# Patient Record
Sex: FEMALE | Race: WHITE | NOT HISPANIC OR LATINO | Employment: OTHER | URBAN - METROPOLITAN AREA
[De-identification: names, ages, dates, MRNs, and addresses within clinical notes are randomized per-mention and may not be internally consistent; named-entity substitution may affect disease eponyms.]

---

## 2017-01-10 ENCOUNTER — CARE COORDINATION (OUTPATIENT)
Dept: GERIATRIC MEDICINE | Facility: CLINIC | Age: 72
End: 2017-01-10

## 2017-01-10 NOTE — PROGRESS NOTES
TM left for client requesting call back to complete 6 month F/U screening.  Nighat Watson RN Case Manager  [facility]  [phone]

## 2017-01-13 ENCOUNTER — CARE COORDINATION (OUTPATIENT)
Dept: GERIATRIC MEDICINE | Facility: CLINIC | Age: 72
End: 2017-01-13

## 2017-01-13 NOTE — PROGRESS NOTES
[facility] Six-Month Telephone Assessment    6 month telephone assessment completed on January 13, 2017.    ER visits: No  Hospitalizations: No  TCU stays: No  Significant health status changes: none  Falls/Injuries: No  ADL/IADL changes: No  Changes in services: No    Caregiver Assessment follow up:  NA    Goals: See POC in chart for goal progress documentation.  Client got new hearing aids. She stated that they need a slight adjustment, but that is normal. Client saw a new dentist (Out of the list CM send client, none of the dentists were taking new client (this was from [employer] web site), but client's got a call from one of the dentist she called with another name of a dentist that would take her insurance. It's on [employer], but she couldn't remember the name). Client got fitted for new dentures, but hasn't received them yet. Client is doing well. Currently, she is staying at her brother home in [place] helping out her MERLE who had surgery.    Will see client in 6 months for an annual health risk assessment.   Encouraged client to call CM with any questions or concerns in the meantime.       Nighat Watson RN Case Manager  [facility]  [phone]

## 2017-01-30 ENCOUNTER — TELEPHONE (OUTPATIENT)
Dept: PEDIATRICS | Facility: CLINIC | Age: 72
End: 2017-01-30

## 2017-02-03 ENCOUNTER — ANTICOAGULATION THERAPY VISIT (OUTPATIENT)
Dept: NURSING | Facility: CLINIC | Age: 72
End: 2017-02-03
Payer: COMMERCIAL

## 2017-02-03 DIAGNOSIS — Z79.01 LONG-TERM (CURRENT) USE OF ANTICOAGULANTS: Primary | ICD-10-CM

## 2017-02-03 LAB — INR PPP: 2.4

## 2017-02-03 PROCEDURE — 99207 ZZC NO CHARGE NURSE ONLY: CPT

## 2017-02-03 NOTE — PROGRESS NOTES
ANTICOAGULATION FOLLOW-UP CLINIC VISIT    Patient Name:  Gemma Cowart  Date:  2/3/2017  Contact Type:  Face to Face    SUBJECTIVE:     Patient Findings     Positives No Problem Findings           OBJECTIVE    INR   Date Value Ref Range Status   02/03/2017 2.4  Final       ASSESSMENT / PLAN  INR assessment THER    Recheck INR In: 6 WEEKS    INR Location Clinic      Anticoagulation Summary as of 2/3/2017     INR goal 2.0-3.0   Selected INR 2.4 (2/3/2017)   Maintenance plan 7.5 mg (5 mg x 1.5) on Mon, Wed, Fri; 5 mg (5 mg x 1) all other days   Full instructions 7.5 mg on Mon, Wed, Fri; 5 mg all other days   Weekly total 42.5 mg   Plan last modified Carleen Wright RN (9/19/2016)   Next INR check 3/17/2017   Target end date Indefinite    Indications   Long-term (current) use of anticoagulants [Z79.01] [Z79.01]  DVT (deep venous thrombosis) (Resolved) [I82.409]         Anticoagulation Episode Summary     IN[facility] check location [facility]    Preferred lab     Send INR reminders to  ANTICOAG CLINIC    Comments 5mg tabs - HS dose // CALENDAR      Anticoagulation Care Providers     Provider Role Specialty Phone number    Alva Viera MD  Internal Medicine [phone]            See the Encounter Report to view Anticoagulation Flowsheet and Dosing Calendar (Go to Encounters tab in chart review, and find the Anticoagulation Therapy Visit)        Fadia Stover, RN

## 2017-02-22 ENCOUNTER — TELEPHONE (OUTPATIENT)
Dept: PHARMACY | Facility: CLINIC | Age: 72
End: 2017-02-22

## 2017-03-28 ENCOUNTER — ONCOLOGY VISIT (OUTPATIENT)
Dept: ONCOLOGY | Facility: CLINIC | Age: 72
End: 2017-03-28
Attending: INTERNAL MEDICINE
Payer: MEDICARE

## 2017-03-28 ENCOUNTER — ANTICOAGULATION THERAPY VISIT (OUTPATIENT)
Dept: NURSING | Facility: CLINIC | Age: 72
End: 2017-03-28
Payer: COMMERCIAL

## 2017-03-28 ENCOUNTER — HOSPITAL ENCOUNTER (OUTPATIENT)
Facility: CLINIC | Age: 72
Setting detail: SPECIMEN
Discharge: HOME | End: 2017-03-28
Attending: INTERNAL MEDICINE | Admitting: INTERNAL MEDICINE
Payer: MEDICARE

## 2017-03-28 DIAGNOSIS — Z85.3 HISTORY OF LEFT BREAST CANCER: ICD-10-CM

## 2017-03-28 DIAGNOSIS — Z98.84 BARIATRIC SURGERY STATUS: ICD-10-CM

## 2017-03-28 DIAGNOSIS — Z85.3 HISTORY OF LEFT BREAST CANCER: Primary | ICD-10-CM

## 2017-03-28 DIAGNOSIS — Z79.01 LONG-TERM (CURRENT) USE OF ANTICOAGULANTS: ICD-10-CM

## 2017-03-28 LAB
ALBUMIN SERPL-MCNC: 3.7 G/DL (ref 3.4–5)
ALP SERPL-CCNC: 56 U/L (ref 40–150)
ALT SERPL W P-5'-P-CCNC: 25 U/L (ref 0–50)
AST SERPL W P-5'-P-CCNC: 16 U/L (ref 0–45)
BASOPHILS # BLD AUTO: 0 10E9/L (ref 0–0.2)
BASOPHILS NFR BLD AUTO: 0.6 %
BILIRUB DIRECT SERPL-MCNC: 0.1 MG/DL (ref 0–0.2)
BILIRUB SERPL-MCNC: 0.4 MG/DL (ref 0.2–1.3)
CANCER AG27-29 SERPL-ACNC: 12 U/ML (ref 0–39)
DIFFERENTIAL METHOD BLD: NORMAL
EOSINOPHIL # BLD AUTO: 0.1 10E9/L (ref 0–0.7)
EOSINOPHIL NFR BLD AUTO: 2.2 %
ERYTHROCYTE [DISTWIDTH] IN BLOOD BY AUTOMATED COUNT: 14.6 % (ref 10–15)
HCT VFR BLD AUTO: 40.3 % (ref 35–47)
HGB BLD-MCNC: 13 G/DL (ref 11.7–15.7)
IMM GRANULOCYTES # BLD: 0 10E9/L (ref 0–0.4)
IMM GRANULOCYTES NFR BLD: 0.2 %
INR POINT OF CARE: 2 (ref 0.86–1.14)
LYMPHOCYTES # BLD AUTO: 1 10E9/L (ref 0.8–5.3)
LYMPHOCYTES NFR BLD AUTO: 19.2 %
MCH RBC QN AUTO: 26.6 PG (ref 26.5–33)
MCHC RBC AUTO-ENTMCNC: 32.3 G/DL (ref 31.5–36.5)
MCV RBC AUTO: 83 FL (ref 78–100)
MONOCYTES # BLD AUTO: 0.4 10E9/L (ref 0–1.3)
MONOCYTES NFR BLD AUTO: 7.6 %
NEUTROPHILS # BLD AUTO: 3.6 10E9/L (ref 1.6–8.3)
NEUTROPHILS NFR BLD AUTO: 70.2 %
NRBC # BLD AUTO: 0 10*3/UL
NRBC BLD AUTO-RTO: 0 /100
PLATELET # BLD AUTO: 210 10E9/L (ref 150–450)
PROT SERPL-MCNC: 6.5 G/DL (ref 6.8–8.8)
RBC # BLD AUTO: 4.88 10E12/L (ref 3.8–5.2)
WBC # BLD AUTO: 5.1 10E9/L (ref 4–11)

## 2017-03-28 PROCEDURE — 99207 ZZC NO CHARGE NURSE ONLY: CPT

## 2017-03-28 PROCEDURE — 36415 COLL VENOUS BLD VENIPUNCTURE: CPT

## 2017-03-28 PROCEDURE — 85610 PROTHROMBIN TIME: CPT | Mod: QW

## 2017-03-28 PROCEDURE — 85025 COMPLETE CBC W/AUTO DIFF WBC: CPT | Performed by: INTERNAL MEDICINE

## 2017-03-28 PROCEDURE — 86300 IMMUNOASSAY TUMOR CA 15-3: CPT | Performed by: INTERNAL MEDICINE

## 2017-03-28 PROCEDURE — 80076 HEPATIC FUNCTION PANEL: CPT | Performed by: INTERNAL MEDICINE

## 2017-03-28 NOTE — PROGRESS NOTES
Medical Assistant Note:  Gemma Cowart presents today for lab visit.    Patient seen by provider today: No.   present during visit today: Not Applicable.    Concerns: No Concerns.    Procedure:  Lab draw site: RAC, Needle type: BF, Gauge: 21 g gauze and coban applied.    Post Assessment:  Labs drawn without difficulty: Yes.    Discharge Plan:  Departure Mode: Ambulatory.    Latosha Byrne MA

## 2017-03-28 NOTE — MR AVS SNAPSHOT
After Visit Summary   3/28/2017    Gemma Cowart    MRN: [id number]           Patient Information     Date Of Birth          1945        Visit Information        Provider Department      3/28/2017 10:30 AM Nurse,  Oncology [facility]        Today's Diagnoses     History of left breast cancer    -  1       Follow-ups after your visit        Your next 10 appointments already scheduled     Mar 28, 2017 10:30 AM CDT   Return Visit with  Oncology Nurse   [facility] ([facility])    [facility]  [address]  [place] [address]   [phone]            Apr 05, 2017  1:45 PM CDT   Return Visit with Jyoti Narvaez MD   [facility] ([facility])    [facility]  [address]  [place] [address]   [phone]            May 09, 2017  8:45 AM CDT   Anticoagulation Visit with  ANTICOAGULATION CLINIC   [facility] ([facility])    [address] [place] [address]  [address]  [facility] [id number] [phone]              Who to contact     If you have questions or need follow up information about today's clinic visit or your schedule please contact [facility] directly at [phone].  Normal or non-critical lab and imaging results will be communicated to you by [employer]Share[employer], letter or phone within 4 business days after the clinic has received the results. If you do not hear from us within 7 days, please contact the clinic through MyChart or phone. If you have a critical or abnormal lab result, we will notify you by phone as soon as possible.  Submit refill requests through Melodigram or call your pharmacy and they will forward the refill request to us. Please allow 3 business days for your refill to be completed.          Additional Information About Your Visit        Melodigram Information     Melodigram lets you send messages to your doctor, view  "your test results, renew your prescriptions, schedule appointments and more. To sign up, go to [email or web address] . Click on \"Log in\" on the left side of the screen, which will take you to the Welcome page. Then click on \"Sign up Now\" on the right side of the page.     You will be asked to enter the access code listed below, as well as some personal information. Please follow the directions to create your username and password.     Your access code is: [email or web address]  Expires: 2017  9:45 AM     Your access code will  in 90 days. If you need help or a new code, please call your [facility] clinic or [phone].        Care EveryWhere ID     This is your Care EveryWhere ID. This could be used by other organizations to access your [facility] medical records  [id number]        Your Vitals Were     Last Period                   1990            Blood Pressure from Last 3 Encounters:   11/15/16 130/64   10/12/16 122/70   16 114/66    Weight from Last 3 Encounters:   11/15/16 103 kg (227 lb 2 oz)   10/12/16 101.7 kg (224 lb 4 oz)   16 100.6 kg (221 lb 12.8 oz)              Today, you had the following     No orders found for display         Today's Medication Changes          These changes are accurate as of: 3/28/17  9:45 AM.  If you have any questions, ask your nurse or doctor.               These medicines have changed or have updated prescriptions.        Dose/Directions    warfarin 5 MG tablet   Commonly known as:  COUMADIN   This may have changed:  additional instructions   Used for:  History of deep venous thrombosis        Dose:  5-7.5 mg   Take 1-1.5 tablets (5-7.5 mg) by mouth daily   Quantity:  135 tablet   Refills:  1                Primary Care Provider Office Phone # Fax #    Alva Viera [phone]       [facility] [place] [address] [place] [address]        Thank you!     Thank you for choosing [facility]  for your care. Our " goal is always to provide you with excellent care. Hearing back from our patients is one way we can continue to improve our services. Please take a few minutes to complete the written survey that you may receive in the mail after your visit with us. Thank you!             Your Updated Medication List - Protect others around you: Learn how to safely use, store and throw away your medicines at www.disposemymeds.org.          This list is accurate as of: 3/28/17  9:45 AM.  Always use your most recent med list.                   Brand Name Dispense Instructions for use    ACE/ARB NOT PRESCRIBED (INTENTIONAL)      ACE & ARB not prescribed due to Other: normal tensive, low microalbumuria       ASPIRIN NOT PRESCRIBED    INTENTIONAL     Antiplatelet medication not prescribed intentionally due to Current anticoagulant therapy (warfarin/enoxaparin)       B-12 1000 MCG Tbcr     180 tablet    Take 2,000 mcg by mouth daily       blood glucose lancets standard    no brand specified    2 Box    Use to test blood sugars 2 times daily or as directed. Please dispense lancets for Relion Confirm.       blood glucose monitoring test strip    no brand specified    200 each    Use to test blood sugars 2 times daily or as directed. Please dispense Relion Confirm test strips       CALCIUM CITRATE + D 315-250 MG-UNIT Tabs per tablet   Generic drug:  calcium citrate-vitamin D     120 tablet    Take 2 tablets by mouth 2 times daily       CERAVE Crea      Externally apply 1 Application topically as needed       clobetasol 0.05 % cream    TEMOVATE    270 g    Apply sparingly to affected area twice daily as needed.  Do not apply to face.       desonide 0.05 % cream    DESOWEN    60 g    Apply sparingly to affected area three times daily as needed.       hydrocortisone 0.2 % cream    WESTCORT    60 g    Apply to AA QD-BID x 1 week then PRN only       hydrocortisone 2.5 % cream     30 g    Apply to AA twice daily x 1 week then PRN only.       *  hydrOXYzine 25 MG tablet    ATARAX    180 tablet    Take 1-2 tablets (25-50 mg) by mouth At Bedtime       * hydrOXYzine 25 MG tablet    ATARAX    60 tablet    Take 1-2 tablets (25-50 mg) by mouth daily as needed for itching       letrozole 2.5 MG tablet    FEMARA    90 tablet    Take 1 tablet (2.5 mg) by mouth daily       MAGNESIUM OXIDE PO      Take 400 mg by mouth daily       metFORMIN 500 MG 24 hr tablet    GLUCOPHAGE-XR    180 tablet    Take 2 tablets (1,000 mg) by mouth daily (with dinner)       PARoxetine 30 MG tablet    PAXIL    180 tablet    Take 2 tablets (60 mg) by mouth At Bedtime       rosuvastatin 10 MG tablet    CRESTOR    90 tablet    Take 1 tablet (10 mg) by mouth daily       TYLENOL 500 MG tablet   Generic drug:  acetaminophen     100 tablet    Take 2 tablets (1,000 mg) by mouth every 8 hours as needed for mild pain       vitamin D 2000 UNITS Caps      Take 1 capsule by mouth daily.       warfarin 5 MG tablet    COUMADIN    135 tablet    Take 1-1.5 tablets (5-7.5 mg) by mouth daily       * Notice:  This list has 2 medication(s) that are the same as other medications prescribed for you. Read the directions carefully, and ask your doctor or other care provider to review them with you.

## 2017-03-28 NOTE — PROGRESS NOTES
ANTICOAGULATION FOLLOW-UP CLINIC VISIT    Patient Name:  Gemma Cowart  Date:  3/28/2017  Contact Type:  Face to Face    SUBJECTIVE:     Patient Findings     Positives No Problem Findings           OBJECTIVE    INR Protime   Date Value Ref Range Status   03/28/2017 2.0 (A) 0.86 - 1.14 Final       ASSESSMENT / PLAN  INR assessment THER    Recheck INR In: 6 WEEKS    INR Location Clinic      Anticoagulation Summary as of 3/28/2017     INR goal 2.0-3.0   Today's INR 2.0   Maintenance plan 7.5 mg (5 mg x 1.5) on Mon, Wed, Fri; 5 mg (5 mg x 1) all other days   Full instructions 7.5 mg on Mon, Wed, Fri; 5 mg all other days   Weekly total 42.5 mg   No change documented Rae Cooley, RN   Plan last modified Carleen Wright RN (9/19/2016)   Next INR check 5/9/2017   Target end date Indefinite    Indications   Long-term (current) use of anticoagulants [Z79.01] [Z79.01]  DVT (deep venous thrombosis) (Resolved) [I82.409]         Anticoagulation Episode Summary     INR check location [facility]    Preferred lab     Send INR reminders to  ANTICOAG CLINIC    Comments 5mg tabs - HS dose // CALENDAR      Anticoagulation Care Providers     Provider Role Specialty Phone number    Alva Viera MD  Internal Medicine [phone]            See the Encounter Report to view Anticoagulation Flowsheet and Dosing Calendar (Go to Encounters tab in chart review, and find the Anticoagulation Therapy Visit)        Rae Cooley, VILLA

## 2017-03-28 NOTE — MR AVS SNAPSHOT
Gemma Cowart   3/28/2017 8:45 AM   Anticoagulation Therapy Visit    Description:  71 year old female   Provider:   ANTICOAGULATION CLINIC   Department:   Nurse           INR as of 3/28/2017     Today's INR 2.0      Anticoagulation Summary as of 3/28/2017     INR goal 2.0-3.0   Today's INR 2.0   Full instructions 7.5 mg on Mon, Wed, Fri; 5 mg all other days   Next INR check 5/9/2017    Indications   Long-term (current) use of anticoagulants [Z79.01] [Z79.01]  DVT (deep venous thrombosis) (Resolved) [I82.409]         Your next Anticoagulation Clinic appointment(s)     Mar 28, 2017  8:45 AM CDT   [facility]ticoagulation [facility]  [facility]    [address] [place] [address]  [address]  [facility] [address] [phone]            May 09, 2017  8:45 AM CDT   Anticoagulation Visit with  [facility]ATION CLINIC   [facility]    [address] [place] [address]  [address]  [facility] [id number] [phone]              Contact Numbers     [facility]  Please call  [phone] to cancel and/or reschedule your appointment   Please call  [phone] with any problems or questions regarding your therapy.        March 2017 Details    Sun Mon Tue Wed Thu Fri Sat        1               2               3               4                 5               6               7               8               9               10               11                 12               13               14               15               16               17               18                 19               20               21               22               23               24               25                 26               27               28      5 mg   See details      29      7.5 mg         30      5 mg         31      7.5 mg           Date Details   03/28 This INR check               How to take your warfarin dose     To take:  5 mg  Take 1 of the 5 mg tablets.    To take:  7.5 mg Take 1.5 of the 5 mg tablets.           April 2017 Details    Sun Mon Tue Wed Thu Fri Sat           1      5 mg           2      5 mg         3      7.5 mg         4      5 mg         5      7.5 mg         6      5 mg         7      7.5 mg         8      5 mg           9      5 mg         10      7.5 mg         11      5 mg         12      7.5 mg         13      5 mg         14      7.5 mg         15      5 mg           16      5 mg         17      7.5 mg         18      5 mg         19      7.5 mg         20      5 mg         21      7.5 mg         22      5 mg           23      5 mg         24      7.5 mg         25      5 mg         26      7.5 mg         27      5 mg         28      7.5 mg         29      5 mg           30      5 mg                Date Details   No additional details            How to take your warfarin dose     To take:  5 mg Take 1 of the 5 mg tablets.    To take:  7.5 mg Take 1.5 of the 5 mg tablets.           May 2017 Details    Sun Mon Tue Wed Thu Fri Sat      1      7.5 mg         2      5 mg         3      7.5 mg         4      5 mg         5      7.5 mg         6      5 mg           7      5 mg         8      7.5 mg         9            10               11               12               13                 14               15               16               17               18               19               20                 21               22               23               24               25               26               27                 28               29               30               31                   Date Details   No additional details    Date of next INR:  5/9/2017         How to take your warfarin dose     To take:  5 mg Take 1 of the 5 mg tablets.    To take:  7.5 mg Take 1.5 of the 5 mg tablets.

## 2017-04-05 ENCOUNTER — ONCOLOGY VISIT (OUTPATIENT)
Dept: ONCOLOGY | Facility: CLINIC | Age: 72
End: 2017-04-05
Attending: INTERNAL MEDICINE
Payer: COMMERCIAL

## 2017-04-05 VITALS
HEART RATE: 99 BPM | WEIGHT: 202 LBS | RESPIRATION RATE: 17 BRPM | BODY MASS INDEX: 35.5 KG/M2 | DIASTOLIC BLOOD PRESSURE: 70 MMHG | TEMPERATURE: 98.3 F | OXYGEN SATURATION: 97 % | SYSTOLIC BLOOD PRESSURE: 106 MMHG

## 2017-04-05 DIAGNOSIS — Z12.31 SCREENING MAMMOGRAM, ENCOUNTER FOR: ICD-10-CM

## 2017-04-05 DIAGNOSIS — R25.2 CRAMP OF LIMB: ICD-10-CM

## 2017-04-05 DIAGNOSIS — M85.80 OSTEOPENIA: ICD-10-CM

## 2017-04-05 DIAGNOSIS — M25.50 MULTIPLE JOINT PAIN: ICD-10-CM

## 2017-04-05 DIAGNOSIS — Z85.3 HISTORY OF LEFT BREAST CANCER: Primary | ICD-10-CM

## 2017-04-05 DIAGNOSIS — Z98.84 BARIATRIC SURGERY STATUS: ICD-10-CM

## 2017-04-05 DIAGNOSIS — Z86.718 PERSONAL HISTORY OF DVT (DEEP VEIN THROMBOSIS): ICD-10-CM

## 2017-04-05 DIAGNOSIS — N95.1 MENOPAUSAL HOT FLUSHES: ICD-10-CM

## 2017-04-05 DIAGNOSIS — Z78.0 MENOPAUSE: ICD-10-CM

## 2017-04-05 PROCEDURE — 99214 OFFICE O/P EST MOD 30 MIN: CPT | Performed by: INTERNAL MEDICINE

## 2017-04-05 PROCEDURE — 99211 OFF/OP EST MAY X REQ PHY/QHP: CPT

## 2017-04-05 ASSESSMENT — PAIN SCALES - GENERAL: PAINLEVEL: NO PAIN (0)

## 2017-04-05 NOTE — MR AVS SNAPSHOT
After Visit Summary   4/5/2017    Gemma Cowart    MRN: [id number]           Patient Information     Date Of Birth          1945        Visit Information        Provider Department      4/5/2017 1:45 PM Jyoti Narvaez MD [facility]        Today's Diagnoses     History of left breast cancer    -  1    Bariatric surgery status        Multiple joint pain        Personal history of DVT (deep vein thrombosis)        Osteopenia        Cramp of limb        Menopausal hot flushes        Screening mammogram, encounter for        Menopause          Care Instructions    6 months f/u with labs and dexa      [facility] [place] [address] [facility] [address]  [place], [place]        Phone:   [phone]      INSTRUCTIONS:     Dexa Bone Density Screening    Please do not take the following 2 days prior to your exam     Vitamins     Calcium Tablets     Antacids    If you have had an x-ray examination with contrast, you must wait 2 weeks before having a bone density exam.                    Follow-ups after your visit        Your next 10 appointments already scheduled     May 09, 2017  8:45 AM CDT   Anticoagulation Visit with  ANTICOAGULATION CLINIC   [facility]    [address] [place] [address]  [address]  [facility] [address] [phone]            Oct 10, 2017  9:30 AM CDT   Return Visit with  Oncology Nurse   [facility] ([facility])    [facility]  [address] [facility] [address]  [facility] [address] [phone]            Oct 10, 2017 10:15 AM CDT   DX HIP/PELVIS/SPINE with MADX1   [facility] ([facility])    [address]  [address]  [facility] [address] [phone]           Please do not take any of the following 48 hours prior to your exam: vitamins, calcium tablets, antacids.            Oct 18, 2017  8:30 AM CDT   Return Visit with Jyoti Narvaez MD  "  [facility] ([facility])    [facility] [place]  [address]  [place] [address] [phone]              Future tests that were ordered for you today     Open Future Orders        Priority Expected Expires Ordered    DX Hip/Pelvis/Spine Routine 10/1/2017 2017 2017    Ca27.29  breast tumor marker Routine 10/1/2017 2017 2017    Hepatic panel Routine 10/1/2017 2017 2017            Who to contact     If you have questions or need follow up information about today's clinic visit or your schedule please contact [facility] directly at [phone].  Normal or non-critical lab and imaging results will be communicated to you by [employer], letter or phone within 4 business days after the clinic has received the results. If you do not hear from us within 7 days, please contact the clinic through [employer] or phone. If you have a critical or abnormal lab result, we will notify you by phone as soon as possible.  Submit refill requests through [employer] or call your pharmacy and they will forward the refill request to us. Please allow 3 business days for your refill to be completed.          Additional Information About Your Visit        [employer] Information     [employer] lets you send messages to your doctor, view your test results, renew your prescriptions, schedule appointments and more. To sign up, go to [email or web address] . Click on \"Log in\" on the left side of the screen, which will take you to the Welcome page. Then click on \"Sign up Now\" on the right side of the page.     You will be asked to enter the access code listed below, as well as some personal information. Please follow the directions to create your username and password.     Your access code is: [email or web address]  Expires: 2017  9:45 AM     Your access code will  in 90 days. If you need help or a new code, please call your [place] clinic or [phone].   "      Care EveryWhere ID     This is your Care EveryWhere ID. This could be used by other organizations to access your [place] medical records  [id number]        Your Vitals Were     Pulse Temperature Respirations Last Period Pulse Oximetry BMI (Body Mass Index)    99 98.3  F (36.8  C) (Oral) 17 09/24/1990 97% 35.5 kg/m2       Blood Pressure from Last 3 Encounters:   04/05/17 106/70   11/15/16 130/64   10/12/16 122/70    Weight from Last 3 Encounters:   04/05/17 91.6 kg (202 lb)   11/15/16 103 kg (227 lb 2 oz)   10/12/16 101.7 kg (224 lb 4 oz)                 Today's Medication Changes          These changes are accurate as of: 4/5/17  2:34 PM.  If you have any questions, ask your nurse or doctor.               These medicines have changed or have updated prescriptions.        Dose/Directions    warfarin 5 MG tablet   Commonly known as:  COUMADIN   This may have changed:  additional instructions   Used for:  History of deep venous thrombosis        Dose:  5-7.5 mg   Take 1-1.5 tablets (5-7.5 mg) by mouth daily   Quantity:  135 tablet   Refills:  1                Primary Care Provider Office Phone # Fax #    Alva Viera [phone]       [facility] [address] [place] [address]        Thank you!     Thank you for choosing [facility]  for your care. Our goal is always to provide you with excellent care. Hearing back from our patients is one way we can continue to improve our services. Please take a few minutes to complete the written survey that you may receive in the mail after your visit with us. Thank you!             Your Updated Medication List - Protect others around you: Learn how to safely use, store and throw away your medicines at www.disposemymeds.org.          This list is accurate as of: 4/5/17  2:34 PM.  Always use your most recent med list.                   Brand Name Dispense Instructions for use    ACE/ARB NOT PRESCRIBED (INTENTIONAL)       ACE & ARB not prescribed due to Other: normal tensive, low microalbumuria       ASPIRIN NOT PRESCRIBED    INTENTIONAL     Antiplatelet medication not prescribed intentionally due to Current anticoagulant therapy (warfarin/enoxaparin)       B-12 1000 MCG Tbcr     180 tablet    Take 2,000 mcg by mouth daily       blood glucose lancets standard    no brand specified    2 Box    Use to test blood sugars 2 times daily or as directed. Please dispense lancets for Relion Confirm.       blood glucose monitoring test strip    no brand specified    200 each    Use to test blood sugars 2 times daily or as directed. Please dispense Relion Confirm test strips       CALCIUM CITRATE + D 315-250 MG-UNIT Tabs per tablet   Generic drug:  calcium citrate-vitamin D     120 tablet    Take 2 tablets by mouth 2 times daily       CERAVE Crea      Externally apply 1 Application topically as needed       clobetasol 0.05 % cream    TEMOVATE    270 g    Apply sparingly to affected area twice daily as needed.  Do not apply to face.       desonide 0.05 % cream    DESOWEN    60 g    Apply sparingly to affected area three times daily as needed.       hydrocortisone 0.2 % cream    WESTCORT    60 g    Apply to AA QD-BID x 1 week then PRN only       hydrocortisone 2.5 % cream     30 g    Apply to AA twice daily x 1 week then PRN only.       * hydrOXYzine 25 MG tablet    ATARAX    180 tablet    Take 1-2 tablets (25-50 mg) by mouth At Bedtime       * hydrOXYzine 25 MG tablet    ATARAX    60 tablet    Take 1-2 tablets (25-50 mg) by mouth daily as needed for itching       letrozole 2.5 MG tablet    FEMARA    90 tablet    Take 1 tablet (2.5 mg) by mouth daily       MAGNESIUM OXIDE PO      Take 400 mg by mouth daily       metFORMIN 500 MG 24 hr tablet    GLUCOPHAGE-XR    180 tablet    Take 2 tablets (1,000 mg) by mouth daily (with dinner)       PARoxetine 30 MG tablet    PAXIL    180 tablet    Take 2 tablets (60 mg) by mouth At Bedtime       rosuvastatin  10 MG tablet    CRESTOR    90 tablet    Take 1 tablet (10 mg) by mouth daily       TYLENOL 500 MG tablet   Generic drug:  acetaminophen     100 tablet    Take 2 tablets (1,000 mg) by mouth every 8 hours as needed for mild pain       vitamin D 2000 UNITS Caps      Take 1 capsule by mouth daily.       warfarin 5 MG tablet    COUMADIN    135 tablet    Take 1-1.5 tablets (5-7.5 mg) by mouth daily       * Notice:  This list has 2 medication(s) that are the same as other medications prescribed for you. Read the directions carefully, and ask your doctor or other care provider to review them with you.

## 2017-04-05 NOTE — PROGRESS NOTES
Hematology follow up visit    CC:   Left Breast cancer i 2012 on femara  BRETT, B12 deficiency due to gastric bypass  Hx of LE DVT on coumadin, s/p IVC filter 10/2012, removed 1/2013    HPI  Her breast cancer was diagnosed in 08/2012 via screening MA. 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 LN negative disease. Estrogen and progesterone receptors were positive at greater than 90%. HER-2/gibran was negative. She had D7gX1X0 disease. She was started on Femara on 10/10/2012.    It is put on hold in 5/2016 due to memory loss and dysarthria. She then was evaluated by neurologist, got assurance. Her cognitive function did not change by holding femara, it is resumed in July 2016.     She had reconstructive surgery done 03/2013 by Dr. Wall.     PMH  1. History of lower extremity deep vein thrombosis, currently on long-term anticoagulation with Coumadin.   2. History of IVC filter placement 10/2012, removed 01/2013.   3. History of gastric bypass surgery, B12 deficiency and iron deficiency  4. Malabsorptive syndrome secondary to gastric bypass surgery.   5. Vitamin D deficiency.   6. History of progressive weight gain.   7. History of bilateral mastectomy for breast carcinoma, status post reconstructive surgery done 03/2013 by Dr. Wall.   8. The patient due to discontinue ascorbic acid, vitamin E and potentially Toprol-XL after discussion with the primary physician.   9. diabetes mellitus, hyperlipidemia    ALLERGIES/MEDICATIONS: reviewed in epic    SOCIAL HISTORY: The patient did smoke for several years but has since stopped in 10/1986.     FAMILY HISTORY: Overall noncontributory. + FH of Steven's d/s from the Dad side     ROS:  no problems with fevers or chills, nausea, vomiting or diarrhea. No joints pain, + morning stiffness.    + leg cramps.  + hot flushes  + insomnia and hair loss.   She reported  "word finding difficulty, short term memory problem since  Christmas 2015, evaluated by neurologist and got assurance.     PE  Blood pressure 106/70, pulse 99, temperature 98.3  F (36.8  C), temperature source Oral, resp. rate 17, weight 91.6 kg (202 lb), last menstrual period 09/24/1990, SpO2 97 %, not currently breastfeeding.  General: alert, oriented, NAD, pleasant  HEENT: no mucositis, sclera is anicteric, THEE,   Neck: supple, no JVD  LN: superficial LNs are not palpable in neck, supraclavicular, axilla and groin areas, no masses  LUNGS: Clear lung fields to auscultation. No wheezing or ronchi  HEART: Sounds S1, S2 heard, regular. No murmur or gallop  ABDOMEN: Soft, full, obese, nontender without palpable hepatosplenomegaly.   Extremity: bilateral knee pain, no joints deformities or effusion, no edema  Skin: scattered erythematous rash on elbows and knees, palms, feet, face, forehead.   Chest wall: mastectomies scars well healed, no masses.     Current LAB Data  3/2017 cbc diff, marker, LFT, ferritin/B12 not low      Old data reviewed with summary  Dexa 10/2015: osteopenia  CXR/BONE SCAN 5/2015: negative       10/2014 - cbc diff/CMP are fine, except cr 1.15, B12/folate are nl, vit D 24 low    Bone scan 1/2014 - DJD, no mets    CT body 1/2014 - no mets      ASSESSMENT AND PLAN:   1. Left breast cancer s/p double mastectomies, ER/ID+, her 2 - on Femara since 10/2012.   F/u in 6 months with labs.     2. history of gastric bypass surgery with BRETT and low B12.  Continue B12 oral indefinitely, routine follow up iron, need IV iron prn.    3. Joints pain. Advice OcT \"move free\". These did not change by holding Femara.     4. Osteopenia. Advice vit D proper dosing.    5. Hx of DVT on coumadin via her PMD. She is advised on ongoing coumadin/INR monitoring via her PMD office.     6. Constant hot flushes. Advice to try apple cider vaneiger and honey.     7. Leg cramps - advice tonic water.   "

## 2017-04-05 NOTE — PATIENT INSTRUCTIONS
6 months f/u with labs and dexa      [facility]   [address]  [place], [place]        Phone:   [phone]      INSTRUCTIONS:     Dexa Bone Density Screening    Please do not take the following 2 days prior to your exam     Vitamins     Calcium Tablets     Antacids    If you have had an x-ray examination with contrast, you must wait 2 weeks before having a bone density exam.

## 2017-04-07 DIAGNOSIS — Z86.718 HISTORY OF DEEP VENOUS THROMBOSIS: ICD-10-CM

## 2017-04-10 ENCOUNTER — TELEPHONE (OUTPATIENT)
Dept: PHARMACY | Facility: CLINIC | Age: 72
End: 2017-04-10

## 2017-04-10 RX ORDER — WARFARIN SODIUM 5 MG/1
TABLET ORAL
Qty: 135 TABLET | Refills: 3 | Status: ON HOLD | OUTPATIENT
Start: 2017-04-10 | End: 2018-04-05

## 2017-04-10 NOTE — TELEPHONE ENCOUNTER
This patient is due for MTM follow-up. I called the patient to schedule an appointment.     Patient is not interested in scheduling MTM follow-up at this time because things are going fine. We will reaching out to the patient in 6 months    Christelle Yañez PharmD [facility]  Medication Therapy Management Practitioner   #[phone]

## 2017-04-10 NOTE — TELEPHONE ENCOUNTER
Prescription approved per [facility] Refill Protocol.  Josee Plunkett, RN  Triage Nurse

## 2017-04-10 NOTE — TELEPHONE ENCOUNTER
WARAFIN 5MG           Last Written Prescription Date: 7/12/2016  Last Fill Quantity: 135, # refills: 1    Last Office Visit with G, P or [facility] prescribing provider:  11/15/2016   Future Office Visit:       Lab Results   Component Value Date    WBC 5.1 03/28/2017     Lab Results   Component Value Date    RBC 4.88 03/28/2017     Lab Results   Component Value Date    HGB 13.0 03/28/2017     Lab Results   Component Value Date    HCT 40.3 03/28/2017     No components found for: MCT  Lab Results   Component Value Date    MCV 83 03/28/2017     Lab Results   Component Value Date    MCH 26.6 03/28/2017     Lab Results   Component Value Date    MCHC 32.3 03/28/2017     Lab Results   Component Value Date    RDW 14.6 03/28/2017     Lab Results   Component Value Date     03/28/2017     Lab Results   Component Value Date    AST 16 03/28/2017     Lab Results   Component Value Date    ALT 25 03/28/2017     Creatinine   Date Value Ref Range Status   10/12/2016 1.14 (H) 0.52 - 1.04 mg/dL Final   ]

## 2017-04-19 ENCOUNTER — TELEPHONE (OUTPATIENT)
Dept: PEDIATRICS | Facility: CLINIC | Age: 72
End: 2017-04-19

## 2017-04-19 NOTE — TELEPHONE ENCOUNTER
Pt would like to know whether she can take otc Garcinia Cambogia(2 caps) qd. Will this cause any drug interaction with her current meds?     Please advise. Pt can be reached at [phone](OK to LM).    Reyna, RN  Triage Nurse

## 2017-04-19 NOTE — TELEPHONE ENCOUNTER
Per Natural Medicines Database:  ANTIDEPRESSANT DRUGS  Interaction Rating = Moderate Be cautious with this combination.   Severity = High   Occurrence = Possible   Level of Evidence = D    In one report, a patient experienced serotonin syndrome after taking Garcinia extract (60% hydroxycitric acid) 1000 mg daily in combination with escitalopram 20 mg, which she had been taking for a year. The patient was switched to sertraline 50 mg daily and again experienced serotonin syndrome (26661). Theoretically, combining Garcinia with other antidepressants or other serotonergic drugs might increase the risk of serotonergic side effects, including serotonin syndrome.    Some antidepressant drugs include the selective serotonin reuptake inhibitors (SSRIs) such as fluoxetine (Prozac), paroxetine (Paxil), sertraline (Zoloft), and others; and tricyclic and atypical antidepressants such as amitriptyline (Elavil), clomipramine (Anafranil), imipramine (Tofranil), and others.     Looks to be some concern about liver toxicity.    Called patient to inform about above drug interaction and recommend monitoring for liver function tests if she is to be on this herbal supplement.  She decided she will not take this.    Christelle Yañez, PharmD [facility]  Medication Therapy Management Practitioner   #[phone]

## 2017-04-19 NOTE — TELEPHONE ENCOUNTER
Did you see this recommendation or talk about this today  Christelle Yañez, PharmD [facility]  Medication Therapy Management Practitioner   #[phone]

## 2017-05-15 ENCOUNTER — TELEPHONE (OUTPATIENT)
Dept: PEDIATRICS | Facility: CLINIC | Age: 72
End: 2017-05-15

## 2017-05-15 ENCOUNTER — OFFICE VISIT (OUTPATIENT)
Dept: PEDIATRICS | Facility: CLINIC | Age: 72
End: 2017-05-15
Payer: COMMERCIAL

## 2017-05-15 VITALS
HEIGHT: 63 IN | OXYGEN SATURATION: 95 % | WEIGHT: 196.5 LBS | RESPIRATION RATE: 18 BRPM | HEART RATE: 76 BPM | BODY MASS INDEX: 34.82 KG/M2 | SYSTOLIC BLOOD PRESSURE: 90 MMHG | TEMPERATURE: 97.9 F | DIASTOLIC BLOOD PRESSURE: 58 MMHG

## 2017-05-15 DIAGNOSIS — I83.90 VARICOSE VEIN OF LEG: ICD-10-CM

## 2017-05-15 DIAGNOSIS — E11.69 TYPE 2 DIABETES MELLITUS WITH OTHER SPECIFIED COMPLICATION (H): Chronic | ICD-10-CM

## 2017-05-15 DIAGNOSIS — R68.89 COLD INTOLERANCE: ICD-10-CM

## 2017-05-15 DIAGNOSIS — D36.7 BENIGN NEOPLASM OF OTHER SPECIFIED SITES: ICD-10-CM

## 2017-05-15 DIAGNOSIS — R39.198 DECREASED URINE STREAM: ICD-10-CM

## 2017-05-15 DIAGNOSIS — R25.2 CRAMP OF LIMB: ICD-10-CM

## 2017-05-15 DIAGNOSIS — L71.9 ROSACEA: Chronic | ICD-10-CM

## 2017-05-15 DIAGNOSIS — M79.89 MASS OF SOFT TISSUE: Primary | ICD-10-CM

## 2017-05-15 LAB — HBA1C MFR BLD: 5.7 % (ref 4.3–6)

## 2017-05-15 PROCEDURE — 36415 COLL VENOUS BLD VENIPUNCTURE: CPT | Performed by: INTERNAL MEDICINE

## 2017-05-15 PROCEDURE — 99214 OFFICE O/P EST MOD 30 MIN: CPT | Performed by: INTERNAL MEDICINE

## 2017-05-15 PROCEDURE — 83036 HEMOGLOBIN GLYCOSYLATED A1C: CPT | Performed by: INTERNAL MEDICINE

## 2017-05-15 PROCEDURE — 84443 ASSAY THYROID STIM HORMONE: CPT | Performed by: INTERNAL MEDICINE

## 2017-05-15 PROCEDURE — 80048 BASIC METABOLIC PNL TOTAL CA: CPT | Performed by: INTERNAL MEDICINE

## 2017-05-15 NOTE — PROGRESS NOTES
"  SUBJECTIVE:                                                    Gemma Cowart is a 71 year old female who presents to clinic today for the following health issues:      Mass       Duration: x2 -3 months     Description (location/character/radiation): lower left back, intermittent dull pain     Intensity:  moderate    Accompanying signs and symptoms: \"Pt states after she rubs the area will have bowel movement\"     History (similar episodes/previous evaluation): Yes     Precipitating or alleviating factors: None    Therapies tried and outcome: None     Gemma comes in for multiple concerns:    1. Soft tissue mass L side: She has noticed this for the past 2-3 months or so. Occasionally uncomfortable. No trauma that she can recall, no redness, warmth or bruising around this. Not getting larger, but not getting smaller. She reports that her hematologist thought it may be a superficial blood clot but she is on warfarin for her prior history of DVT.    2. Slow flow of urine: Has noticed that her urine is flowing more slowing. No pain with urination or increased frequency, no incontinence with this. Able to fully empty her bladder. Was told her \"utereus was falling out\" wonders if this could affect her ability to empty her bladder.     3. Rosacea: has noticed her nose itching more. Does have more crusty spots. Overdue for follow-up with Derm.     4. Leg and ankle cramps: Has been noticing at night she will get cramps in her lower ankles and lower leg. Does have varicose veins in her L leg that are getting more prominent but these do not seem to ache or hurt. She is concerned about these given her hx of DVT.     5. She reports she feels cold all the time. No significant fatigue, she has been trying to lose weight.     Problem list and histories reviewed & adjusted, as indicated.  Additional history: as documented    Patient Active Problem List   Diagnosis     Nontoxic uninodular goiter     Rosacea     BRETT (iron deficiency " anemia)     Complications of gastric bypass surgery     Vitamin D deficiency disease     Vitamin B12 deficiency     Eczema     Dyshydrosis     Hyperlipidemia LDL goal <100     Anxiety     Insomnia     Health Care Home     Type 2 diabetes mellitus with other specified complication (H)     Psoriasis     Long-term (current) use of anticoagulants [Z79.01]     Advanced directives, counseling/discussion     Malignant neoplasm of left female breast, unspecified site of breast (H)     Morbid obesity (H)     CKD (chronic kidney disease) stage 3, GFR 30-59 ml/min     Personal history of DVT (deep vein thrombosis)     Past Surgical History:   Procedure Laterality Date     ABDOMEN SURGERY  2000    gastric bypass 2000     COLONOSCOPY  10/11/2012    Procedure: COLONOSCOPY;  colonoscopy;  Surgeon: Gela Cleary MD;  Location:  GI     DENTAL SURGERY  5/2007    all teeth removed - dentures     ENT SURGERY      all teeth pulled     GYN SURGERY  1/10/75    tubal ligation      H ABLATION SVT  5/28/2015    stopped metoprolol     HC BREAST RECONSTRUC W OTHR TECHNIQ  5/8/2013     HERNIA REPAIR  2005     IMPLANT FILTER INFERIOR VENA CAVA  10/8/2012    taken out  1/4/12     INSERT TISSUE EXPANDER BREAST BILATERAL  9/5/2012    Procedure: INSERT TISSUE EXPANDER BREAST BILATERAL;;  Surgeon: Orlando Wall MD;  Location:  OR     MASTECTOMY SIMPLE, SENTINEL NODE, COMBINED  9/5/2012    Procedure: COMBINED MASTECTOMY SIMPLE, SENTINEL NODE;  BILATERAL MASTECTOMY WITH LEFT AXILLARY SENTINEL LYMPH NODE BIOPSY, BILATERAL TISSUE EXPANDER        MASTECTOMY, BILATERAL       RECONSTRUCT BREAST BILATERAL, IMPLANT PROSTHESIS BILATERAL, COMBINED  5/22/2013    Procedure: COMBINED RECONSTRUCT BREAST BILATERAL, IMPLANT PROSTHESIS BILATERAL;  BILATERAL SECOND STAGE BREAST RECONSTRUCTION WITH SILICONE GEL IMPLANTS AND LIPOSUCTION;  Surgeon: Orlando Wall MD;  Location:  [facility]     RECONSTRUCT NIPPLE BILATERAL  8/22/2013    Procedure:  "RECONSTRUCT NIPPLE BILATERAL;  BILATERAL NIPPLE AREOLAR RECONSTRUCTION;  Surgeon: Orlando Wall MD;  Location: [facility]       Social History   Substance Use Topics     Smoking status: Former Smoker     Packs/day: 1.50     Years: 25.00     Types: Cigarettes     Quit date: 10/21/1986     Smokeless tobacco: Never Used      Comment: quit 1985     Alcohol use No     Family History   Problem Relation Age of Onset     Cancer - colorectal Mother      Prostate Cancer Father      Alzheimer Disease Father      CANCER Paternal Grandmother      Alzheimer Disease Paternal Grandfather      Cancer - colorectal Brother      DIABETES Brother      DIABETES Brother      Eye Disorder Brother      DIABETES Son      CANCER Maternal Uncle      CANCER Maternal Uncle            Reviewed and updated as needed this visit by clinical staff  Tobacco  Allergies  Meds  Med Hx  Fam Hx  Soc Hx        ROS:  Constitutional, HEENT, cardiovascular, pulmonary, gi and gu systems are negative, except as otherwise noted.    OBJECTIVE:                                                    BP 90/58 (BP Location: Right arm, Patient Position: Chair, Cuff Size: Adult Regular)  Pulse 76  Temp 97.9  F (36.6  C) (Oral)  Resp 18  Ht 5' 3.25\" (1.607 m)  Wt 196 lb 8 oz (89.1 kg)  LMP 09/24/1990  SpO2 95%  Breastfeeding? No  BMI 34.53 kg/m2  Body mass index is 34.53 kg/(m^2).  GENERAL: healthy, alert and no distress  EYES: Eyes grossly normal to inspection, PERRL and conjunctivae and sclerae normal  NECK: no adenopathy, no asymmetry, masses, or scars and thyroid normal to palpation  RESP: lungs clear to auscultation - no rales, rhonchi or wheezes  CV: regular rate and rhythm, normal S1 S2, no S3 or S4, no murmur  BACK: no CVA tenderness, no paralumbar tenderness. Palpable 5cmx1.5cm area of subcutaneous firmness deep to skin over mid L flank, no overlying skin changes or erythema, warmth.   patient declines pelvic exam.   SKIN: erythematous, crusting " lesions over nose.   EXTREMITIES: L leg with venous varicosities, trace swelling bilaterally.     Diagnostic Test Results:  none      ASSESSMENT/PLAN:                                                      1. Mass of soft tissue  Unclear etiology -- does not have classic appearance, texture of lipoma. Does not appear to be a blood clot. No evidence of infection. Could be resolving hematoma/scar. However, given patient's history of breast cancer, reasonable to get an US for further characterization.   - US Extremity Non Vascular Left; Future    2. Varicose vein of leg  Discussed that these are typically benign, discussed management with compression stockings. Can refer for further treatment if these become bothersome. Does not appear to be triggering leg cramps.     3. Cramp of limb  Will have patient untuck sheets at night, also discussed trying to lower her legs to see if this helps.   - Basic metabolic panel  (Ca, Cl, CO2, Creat, Gluc, K, Na, BUN)    4. Cold intolerance  No other symptoms or hypothyroidism, but reasonable to screen today.   - TSH with free T4 reflex    5. Rosacea  Will have her follow-up with Derm. Difficult to tell on exam what is rosacea vs AKs, so reasonable for her to see her specialist for this.     6. Decreased urine stream  Unclear etiology. I do suspect a cystocele given her hx; unfortunately not a candidate for any for of estrogen given breast cancer hx. Discussed kegels, pelvic floor physical therapy and surgical options. Patient is not too bothered by this, and not interested in surgery. She did decline pelvic exam today. Will screen UA to make sure no UTI.     7. Type 2 diabetes mellitus with other specified complication (H)  Previously well controlled, due for HgbA1c.   - Hemoglobin A1c    8. Benign neoplasm of other specified sites   - US Extremity Non Vascular Left; Future    Patient Instructions   Soft tissue mass in back:  -- Let's get an ultrasound to try to figure out what this is  before we do anything else    Slow urinary passage:  Likely due to weakened walls in the vagina which is common with age and prior vaginal deliveries. Unfortunately you are not a good candidate for estrogen therapies.  -- do regular Kegels  -- if this becomes more bothersome, we could consider surgery    Leg cramps  -- we will check electrolytes today  -- untuck your sheets and covers to see if this helps!    Leg veins:  -- these are normal and not concerning as they are superficial    Follow-up with Dermatology for your rosacea.    Cold intolerance:  -- we will check thyroid levels today    Type 2 DM: we will check HgbA1c today.       Alva Casas MD  [facility]

## 2017-05-15 NOTE — TELEPHONE ENCOUNTER
I have scheduled an US at the [facility] for Wednesday, 5/17/17 at 9 AM.   Check in is at 8:45am [id number].  Spoke with patient and she agrees.    Thank you,    Park Chan

## 2017-05-15 NOTE — MR AVS SNAPSHOT
After Visit Summary   5/15/2017    Gemma Cowart    MRN: [id number]           Patient Information     Date Of Birth          1945        Visit Information        Provider Department      5/15/2017 11:50 AM Alva Viera MD [facility]        Today's Diagnoses     Mass of soft tissue    -  1    Varicose vein of leg        Cramp of limb        Cold intolerance        Rosacea        Decreased urine stream        Type 2 diabetes mellitus with other specified complication (H)          Care Instructions    Soft tissue mass in back:  -- Let's get an ultrasound to try to figure out what this is before we do anything else    Slow urinary passage:  Likely due to weakened walls in the vagina which is common with age and prior vaginal deliveries. Unfortunately you are not a good candidate for estrogen therapies.  -- do regular Kegels  -- if this becomes more bothersome, we could consider surgery    Leg cramps  -- we will check electrolytes today  -- untuck your sheets and covers to see if this helps!    Leg veins:  -- these are normal and not concerning as they are superficial    Follow-up with Dermatology for your rosacea.    Cold intolerance:  -- we will check thyroid levels today    Type 2 DM: we will check HgbA1c today.         Follow-ups after your visit        Your next 10 appointments already scheduled     Oct 10, 2017  9:30 AM CDT   Return Visit with  Oncology Nurse   [facility] ([facility])    [facility]  [address]  [place] [address]   [phone]            Oct 10, 2017 10:15 AM CDT   DX HIP/PELVIS/SPINE with MADX1   [facility] ([facility])    [address]  [address]  [place] [address]   [phone]           Please do not take any of the following 48 hours prior to your exam: vitamins, calcium tablets, antacids.            Oct 18, 2017  8:30 AM CDT   Return Visit  "with Jyoti Narvaez MD   [facility] ([facility])    [facility]  [address]  [place] [address] [phone]              Future tests that were ordered for you today     Open Future Orders        Priority Expected Expires Ordered    US Renal Limited Routine 2017 5/15/2018 5/15/2017            Who to contact     If you have questions or need follow up information about today's clinic visit or your schedule please contact [facility] directly at [phone].  Normal or non-critical lab and imaging results will be communicated to you by MyChart, letter or phone within 4 business days after the clinic has received the results. If you do not hear from us within 7 days, please contact the clinic through [employer] or phone. If you have a critical or abnormal lab result, we will notify you by phone as soon as possible.  Submit refill requests through "[employer] or call your pharmacy and they will forward the refill request to us. Please allow 3 business days for your refill to be completed.          Additional Information About Your Visit        MyChart Information     "[employer] lets you send messages to your doctor, view your test results, renew your prescriptions, schedule appointments and more. To sign up, go to [email or web address] . Click on \"Log in\" on the left side of the screen, which will take you to the Welcome page. Then click on \"Sign up Now\" on the right side of the page.     You will be asked to enter the access code listed below, as well as some personal information. Please follow the directions to create your username and password.     Your access code is: [email or web address]  Expires: 2017  9:45 AM     Your access code will  in 90 days. If you need help or a new code, please call your [facility] or [phone].        Care EveryWhere ID     This is your Care EveryWhere ID. This could be used by other organizations to " "access your [facility] medical records  [id number]        Your Vitals Were     Pulse Temperature Respirations Height Last Period Pulse Oximetry    76 97.9  F (36.6  C) (Oral) 18 5' 3.25\" (1.607 m) 09/24/1990 95%    Breastfeeding? BMI (Body Mass Index)                No 34.53 kg/m2           Blood Pressure from Last 3 Encounters:   05/15/17 90/58   04/05/17 106/70   11/15/16 130/64    Weight from Last 3 Encounters:   05/15/17 196 lb 8 oz (89.1 kg)   04/05/17 202 lb (91.6 kg)   11/15/16 227 lb 2 oz (103 kg)              We Performed the Following     Basic metabolic panel  (Ca, Cl, CO2, Creat, Gluc, K, Na, BUN)     Hemoglobin A1c     TSH with free T4 reflex        Primary Care Provider Office Phone # Fax #    Alva Viera [phone]       [address] [place] [address]        Thank you!     Thank you for choosing [facility]  for your care. Our goal is always to provide you with excellent care. Hearing back from our patients is one way we can continue to improve our services. Please take a few minutes to complete the written survey that you may receive in the mail after your visit with us. Thank you!             Your Updated Medication List - Protect others around you: Learn how to safely use, store and throw away your medicines at www.disposemymeds.org.          This list is accurate as of: 5/15/17 12:34 PM.  Always use your most recent med list.                   Brand Name Dispense Instructions for use    ACE/ARB NOT PRESCRIBED (INTENTIONAL)      ACE & ARB not prescribed due to Other: normal tensive, low microalbumuria       ASPIRIN NOT PRESCRIBED    INTENTIONAL     Antiplatelet medication not prescribed intentionally due to Current anticoagulant therapy (warfarin/enoxaparin)       B-12 1000 MCG Tbcr     180 tablet    Take 2,000 mcg by mouth daily       blood glucose lancets standard    no brand specified    2 Box    Use to test blood sugars 2 " times daily or as directed. Please dispense lancets for Relion Confirm.       blood glucose monitoring test strip    no brand specified    200 each    Use to test blood sugars 2 times daily or as directed. Please dispense Relion Confirm test strips       CALCIUM CITRATE + D 315-250 MG-UNIT Tabs per tablet   Generic drug:  calcium citrate-vitamin D     120 tablet    Take 2 tablets by mouth 2 times daily       CERAVE Crea      Externally apply 1 Application topically as needed       clobetasol 0.05 % cream    TEMOVATE    270 g    Apply sparingly to affected area twice daily as needed.  Do not apply to face.       desonide 0.05 % cream    DESOWEN    60 g    Apply sparingly to affected area three times daily as needed.       hydrocortisone 0.2 % cream    WESTCORT    60 g    Apply to AA QD-BID x 1 week then PRN only       hydrocortisone 2.5 % cream     30 g    Apply to AA twice daily x 1 week then PRN only.       * hydrOXYzine 25 MG tablet    ATARAX    180 tablet    Take 1-2 tablets (25-50 mg) by mouth At Bedtime       * hydrOXYzine 25 MG tablet    ATARAX    60 tablet    Take 1-2 tablets (25-50 mg) by mouth daily as needed for itching       letrozole 2.5 MG tablet    FEMARA    90 tablet    Take 1 tablet (2.5 mg) by mouth daily       MAGNESIUM OXIDE PO      Take 400 mg by mouth daily       metFORMIN 500 MG 24 hr tablet    GLUCOPHAGE-XR    180 tablet    Take 2 tablets (1,000 mg) by mouth daily (with dinner)       PARoxetine 30 MG tablet    PAXIL    180 tablet    Take 2 tablets (60 mg) by mouth At Bedtime       rosuvastatin 10 MG tablet    CRESTOR    90 tablet    Take 1 tablet (10 mg) by mouth daily       TYLENOL 500 MG tablet   Generic drug:  acetaminophen     100 tablet    Take 2 tablets (1,000 mg) by mouth every 8 hours as needed for mild pain       vitamin D 2000 UNITS Caps      Take 1 capsule by mouth daily.       warfarin 5 MG tablet    COUMADIN    135 tablet    TAKE ONE TO ONE & ONE-HALF TABLETS BY MOUTH DAILY        * Notice:  This list has 2 medication(s) that are the same as other medications prescribed for you. Read the directions carefully, and ask your doctor or other care provider to review them with you.

## 2017-05-15 NOTE — PATIENT INSTRUCTIONS
Soft tissue mass in back:  -- Let's get an ultrasound to try to figure out what this is before we do anything else    Slow urinary passage:  Likely due to weakened walls in the vagina which is common with age and prior vaginal deliveries. Unfortunately you are not a good candidate for estrogen therapies.  -- do regular Kegels  -- if this becomes more bothersome, we could consider surgery    Leg cramps  -- we will check electrolytes today  -- untuck your sheets and covers to see if this helps!    Leg veins:  -- these are normal and not concerning as they are superficial    Follow-up with Dermatology for your rosacea.    Cold intolerance:  -- we will check thyroid levels today    Type 2 DM: we will check HgbA1c today.

## 2017-05-15 NOTE — NURSING NOTE
"Chief Complaint   Patient presents with     Mass       Initial BP 90/58 (BP Location: Right arm, Patient Position: Chair, Cuff Size: Adult Regular)  Pulse 76  Temp 97.9  F (36.6  C) (Oral)  Resp 18  Ht 5' 3.25\" (1.607 m)  Wt 196 lb 8 oz (89.1 kg)  LMP 09/24/1990  SpO2 95%  Breastfeeding? No  BMI 34.53 kg/m2 Estimated body mass index is 34.53 kg/(m^2) as calculated from the following:    Height as of this encounter: 5' 3.25\" (1.607 m).    Weight as of this encounter: 196 lb 8 oz (89.1 kg).  Medication Reconciliation: complete     Gisel Sellers MA 5/15/2017 11:49 AM    "

## 2017-05-16 LAB
ANION GAP SERPL CALCULATED.3IONS-SCNC: 6 MMOL/L (ref 3–14)
BUN SERPL-MCNC: 23 MG/DL (ref 7–30)
CALCIUM SERPL-MCNC: 8.9 MG/DL (ref 8.5–10.1)
CHLORIDE SERPL-SCNC: 110 MMOL/L (ref 94–109)
CO2 SERPL-SCNC: 27 MMOL/L (ref 20–32)
CREAT SERPL-MCNC: 1.23 MG/DL (ref 0.52–1.04)
GFR SERPL CREATININE-BSD FRML MDRD: 43 ML/MIN/1.7M2
GLUCOSE SERPL-MCNC: 89 MG/DL (ref 70–99)
POTASSIUM SERPL-SCNC: 4.7 MMOL/L (ref 3.4–5.3)
SODIUM SERPL-SCNC: 143 MMOL/L (ref 133–144)
TSH SERPL DL<=0.005 MIU/L-ACNC: 1.93 MU/L (ref 0.4–4)

## 2017-05-19 ENCOUNTER — HOSPITAL ENCOUNTER (OUTPATIENT)
Dept: ULTRASOUND IMAGING | Facility: CLINIC | Age: 72
Discharge: HOME | End: 2017-05-19
Attending: INTERNAL MEDICINE | Admitting: INTERNAL MEDICINE
Payer: MEDICARE

## 2017-05-19 ENCOUNTER — ANTICOAGULATION THERAPY VISIT (OUTPATIENT)
Dept: NURSING | Facility: CLINIC | Age: 72
End: 2017-05-19
Payer: COMMERCIAL

## 2017-05-19 DIAGNOSIS — Z79.01 LONG-TERM (CURRENT) USE OF ANTICOAGULANTS: ICD-10-CM

## 2017-05-19 DIAGNOSIS — D36.7 BENIGN NEOPLASM OF OTHER SPECIFIED SITES: ICD-10-CM

## 2017-05-19 DIAGNOSIS — M79.89 MASS OF SOFT TISSUE: ICD-10-CM

## 2017-05-19 LAB — INR POINT OF CARE: 1.5 (ref 0.86–1.14)

## 2017-05-19 PROCEDURE — 76882 US LMTD JT/FCL EVL NVASC XTR: CPT | Mod: LT

## 2017-05-19 PROCEDURE — 36416 COLLJ CAPILLARY BLOOD SPEC: CPT

## 2017-05-19 PROCEDURE — 85610 PROTHROMBIN TIME: CPT | Mod: QW

## 2017-05-19 PROCEDURE — 99207 ZZC NO CHARGE NURSE ONLY: CPT

## 2017-05-19 NOTE — PROGRESS NOTES
ANTICOAGULATION FOLLOW-UP CLINIC VISIT    Patient Name:  Gemma Cowart  Date:  5/19/2017  Contact Type:  Face to Face    SUBJECTIVE:     Patient Findings     Positives Change in diet/appetite (has been eating a lot of broccoli, spinach, arugula)    Comments Started Weight Watchers diet.           OBJECTIVE    INR Protime   Date Value Ref Range Status   05/19/2017 1.5 (A) 0.86 - 1.14 Final       ASSESSMENT / PLAN  INR assessment SUB    Recheck INR In: 2 WEEKS    INR Location Clinic      Anticoagulation Summary as of 5/19/2017     INR goal 2.0-3.0   Today's INR 1.5!   Maintenance plan 7.5 mg (5 mg x 1.5) on Mon, Wed, Fri; 5 mg (5 mg x 1) all other days   Full instructions 5/20: 7.5 mg; 5/21: 7.5 mg; Otherwise 7.5 mg on Mon, Wed, Fri; 5 mg all other days   Weekly total 42.5 mg   Plan last modified Carleen Wright RN (9/19/2016)   Next INR check 6/2/2017   Target end date Indefinite    Indications   Long-term (current) use of anticoagulants [Z79.01] [Z79.01]  DVT (deep venous thrombosis) (Resolved) [I82.409]         Anticoagulation Episode Summary     INR check location [facility]    Preferred lab     Send INR reminders to [facility]    Comments 5mg tabs - HS dose // CALENDAR      Anticoagulation Care Providers     Provider Role Specialty Phone number    Alva Viera MD  Internal Medicine [phone]            See the Encounter Report to view Anticoagulation Flowsheet and Dosing Calendar (Go to Encounters tab in chart review, and find the Anticoagulation Therapy Visit)        Rae Cooley RN

## 2017-05-19 NOTE — MR AVS SNAPSHOT
Gemma WYATT Sofya   5/19/2017 8:45 AM   Anticoagulation Therapy Visit    Description:  71 year old female   Provider:   ANTICOAGULATION CLINIC   Department:   Nurse           INR as of 5/19/2017     Today's INR 1.5!      Anticoagulation Summary as of 5/19/2017     INR goal 2.0-3.0   Today's INR 1.5!   Full instructions 5/20: 7.5 mg; 5/21: 7.5 mg; Otherwise 7.5 mg on Mon, Wed, Fri; 5 mg all other days   Next INR check 6/2/2017    Indications   Long-term (current) use of anticoagulants [Z79.01] [Z79.01]  DVT (deep venous thrombosis) (Resolved) [I82.409]         Your next Anticoagulation Clinic appointment(s)     Jun 02, 2017  8:30 AM CDT   Anticoagulation Visit with  ANTICOAGULATION CLINIC   [facility]    [address]  [address]  [facility] [place] [id number] [phone]              Contact Numbers     [facility]  Please call  [phone] to cancel and/or reschedule your appointment   Please call  [phone] with any problems or questions regarding your therapy.        May 2017 Details    Sun Mon Tue Wed Thu Fri Sat      1               2               3               4               5               6                 7               8               9               10               11               12               13                 14               15               16               17               18               19      7.5 mg   See details      20      7.5 mg           21      7.5 mg         22      7.5 mg         23      5 mg         24      7.5 mg         25      5 mg         26      7.5 mg         27      5 mg           28      5 mg         29      7.5 mg         30      5 mg         31      7.5 mg             Date Details   05/19 This INR check               How to take your warfarin dose     To take:  5 mg Take 1 of the 5 mg tablets.    To take:  7.5 mg Take 1.5 of the 5 mg tablets.           June 2017 Details    Sun Mon Tue Wed Thu Fri Sat          1      5 mg         2            3                 4               5               6               7               8               9               10                 11               12               13               14               15               16               17                 18               19               20               21               22               23               24                 25               26               27               28               29               30                 Date Details   No additional details    Date of next INR:  6/2/2017         How to take your warfarin dose     To take:  5 mg Take 1 of the 5 mg tablets.    To take:  7.5 mg Take 1.5 of the 5 mg tablets.

## 2017-05-22 ENCOUNTER — TELEPHONE (OUTPATIENT)
Dept: PEDIATRICS | Facility: CLINIC | Age: 72
End: 2017-05-22

## 2017-05-22 DIAGNOSIS — M79.89 MASS OF SOFT TISSUE: Primary | ICD-10-CM

## 2017-05-22 NOTE — TELEPHONE ENCOUNTER
Called patient to review recent US results. 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 MRI order, patient in agreement.    Alva Viera MD  Internal Medicine-Pediatrics

## 2017-05-27 ENCOUNTER — HOSPITAL ENCOUNTER (OUTPATIENT)
Dept: MRI IMAGING | Facility: CLINIC | Age: 72
Discharge: HOME | End: 2017-05-27
Attending: INTERNAL MEDICINE | Admitting: INTERNAL MEDICINE
Payer: MEDICARE

## 2017-05-27 DIAGNOSIS — M79.89 MASS OF SOFT TISSUE: ICD-10-CM

## 2017-05-27 PROCEDURE — 25000128 H RX IP 250 OP 636: Performed by: INTERNAL MEDICINE

## 2017-05-27 PROCEDURE — 74183 MRI ABD W/O CNTR FLWD CNTR: CPT

## 2017-05-27 PROCEDURE — A9585 GADOBUTROL INJECTION: HCPCS | Performed by: INTERNAL MEDICINE

## 2017-05-27 RX ORDER — GADOBUTROL 604.72 MG/ML
15 INJECTION INTRAVENOUS ONCE
Status: COMPLETED | OUTPATIENT
Start: 2017-05-27 | End: 2017-05-27

## 2017-05-27 RX ADMIN — GADOBUTROL 15 ML: 604.72 INJECTION INTRAVENOUS at 09:11

## 2017-06-05 ENCOUNTER — ANTICOAGULATION THERAPY VISIT (OUTPATIENT)
Dept: NURSING | Facility: CLINIC | Age: 72
End: 2017-06-05
Payer: COMMERCIAL

## 2017-06-05 DIAGNOSIS — Z79.01 LONG-TERM (CURRENT) USE OF ANTICOAGULANTS: ICD-10-CM

## 2017-06-05 LAB — INR POINT OF CARE: 1.7 (ref 0.86–1.14)

## 2017-06-05 PROCEDURE — 85610 PROTHROMBIN TIME: CPT | Mod: QW

## 2017-06-05 PROCEDURE — 99207 ZZC NO CHARGE NURSE ONLY: CPT

## 2017-06-05 PROCEDURE — 36416 COLLJ CAPILLARY BLOOD SPEC: CPT

## 2017-06-05 NOTE — MR AVS SNAPSHOT
Gemma WYATT Sofya   6/5/2017 2:30 PM   Anticoagulation Therapy Visit    Description:  71 year old female   Provider:  [facility]   Department:   Nurse           INR as of 6/5/2017     Today's INR       Anticoagulation Summary as of 6/5/2017     INR goal 2.0-3.0   Today's INR    Full instructions 6/5: 10 mg; Otherwise 5 mg on Tue, Thu; 7.5 mg all other days   Next INR check 6/19/2017    Indications   Long-term (current) use of anticoagulants [Z79.01] [Z79.01]  DVT (deep venous thrombosis) (Resolved) [I82.409]         Your next Anticoagulation Clinic appointment(s)     Jun 19, 2017  8:15 AM CDT   Anticoagulation Visit with  ANTICOAGULATION CLINIC   [facility] ([facility])    [address]  [address]  [facility] [address] [phone]              Contact Numbers     [facility]  Please call  [phone] to cancel and/or reschedule your appointment   Please call  [phone] with any problems or questions regarding your therapy.        June 2017 Details    Sun Mon Tue Wed Thu Fri Sat         1               2               3                 4               5      10 mg   See details      6      5 mg         7      7.5 mg         8      5 mg         9      7.5 mg         10      7.5 mg           11      7.5 mg         12      7.5 mg         13      5 mg         14      7.5 mg         15      5 mg         16      7.5 mg         17      7.5 mg           18      7.5 mg         19            20               21               22               23               24                 25               26               27               28               29               30                 Date Details   06/05 This INR check       Date of next INR:  6/19/2017         How to take your warfarin dose     To take:  5 mg Take 1 of the 5 mg tablets.    To take:  7.5 mg Take 1.5 of the 5 mg tablets.    To take:  10 mg Take 2 of the 5 mg tablets.

## 2017-06-05 NOTE — PROGRESS NOTES
ANTICOAGULATION FOLLOW-UP CLINIC VISIT    Patient Name:  Gemma Cowart  Date:  6/5/2017  Contact Type:  Face to Face    SUBJECTIVE:     Patient Findings     Positives No Problem Findings    Comments Continuing with Weight Watchers diet and eating lots of fruit and vegetables.           OBJECTIVE    INR Protime   Date Value Ref Range Status   06/05/2017 1.7 (A) 0.86 - 1.14 Final       ASSESSMENT / PLAN  INR assessment SUB    Recheck INR In: 2 WEEKS    INR Location Clinic      Anticoagulation Summary as of 6/5/2017     INR goal 2.0-3.0   Today's INR 1.7!   Maintenance plan 5 mg (5 mg x 1) on Tue, Thu; 7.5 mg (5 mg x 1.5) all other days   Full instructions 6/5: 10 mg; Otherwise 5 mg on Tue, Thu; 7.5 mg all other days   Weekly total 47.5 mg   Plan last modified Rae Cooley RN (6/5/2017)   Next INR check 6/19/2017   Target end date Indefinite    Indications   Long-term (current) use of anticoagulants [Z79.01] [Z79.01]  DVT (deep venous thrombosis) (Resolved) [I82.409]         Anticoagulation Episode Summary     [facility]    Preferred lab     Send INR reminders to [facility]    Comments 5mg tabs - HS dose // CALENDAR      Anticoagulation Care Providers     Provider Role Specialty Phone number    Alva Viera MD  Internal Medicine [phone]            See the Encounter Report to view Anticoagulation Flowsheet and Dosing Calendar (Go to Encounters tab in chart review, and find the Anticoagulation Therapy Visit)        Rae Cooley RN

## 2017-06-19 ENCOUNTER — ANTICOAGULATION THERAPY VISIT (OUTPATIENT)
Dept: NURSING | Facility: CLINIC | Age: 72
End: 2017-06-19
Payer: COMMERCIAL

## 2017-06-19 VITALS — BODY MASS INDEX: 33.53 KG/M2 | WEIGHT: 190.8 LBS

## 2017-06-19 DIAGNOSIS — Z79.01 LONG-TERM (CURRENT) USE OF ANTICOAGULANTS: ICD-10-CM

## 2017-06-19 LAB — INR POINT OF CARE: 2.4 (ref 0.86–1.14)

## 2017-06-19 PROCEDURE — 36416 COLLJ CAPILLARY BLOOD SPEC: CPT

## 2017-06-19 PROCEDURE — 85610 PROTHROMBIN TIME: CPT | Mod: QW

## 2017-06-19 PROCEDURE — 99207 ZZC NO CHARGE NURSE ONLY: CPT

## 2017-06-19 NOTE — MR AVS SNAPSHOT
Gemma WYATT Sofya   6/19/2017 8:15 AM   Anticoagulation Therapy Visit    Description:  71 year old female   Provider:  [facility]   Department:  [facility]           INR as of 6/19/2017     Today's INR 2.4      Anticoagulation Summary as of 6/19/2017     INR goal 2.0-3.0   Today's INR 2.4   Full instructions 5 mg on Tue, Thu; 7.5 mg all other days   Next INR check 7/31/2017    Indications   Long-term (current) use of anticoagulants [Z79.01] [Z79.01]  DVT (deep venous thrombosis) (Resolved) [I82.409]         Your next Anticoagulation Clinic appointment(s)     Jul 31, 2017  8:15 AM CDT   Anticoagulation Visit with  ANTICOAGULATION CLINIC   [facility] ([facility])    [address] [place] [address]  [address]  [facility] [address] [phone]              Contact Numbers     [facility]  Please call  [phone] to cancel and/or reschedule your appointment   Please call  [phone] with any problems or questions regarding your therapy.        June 2017 Details    Sun Mon Tue Wed Thu Fri Sat         1               2               3                 4               5               6               7               8               9               10                 11               12               13               14               15               16               17                 18               19      7.5 mg   See details      20      5 mg         21      7.5 mg         22      5 mg         23      7.5 mg         24      7.5 mg           25      7.5 mg         26      7.5 mg         27      5 mg         28      7.5 mg         29      5 mg         30      7.5 mg           Date Details   06/19 This INR check               How to take your warfarin dose     To take:  5 mg Take 1 of the 5 mg tablets.    To take:  7.5 mg Take 1.5 of the 5 mg tablets.           July 2017 Details    Sun Mon Tue Wed Thu Fri Sat           1      7.5 mg           2      7.5 mg         3      7.5  mg         4      5 mg         5      7.5 mg         6      5 mg         7      7.5 mg         8      7.5 mg           9      7.5 mg         10      7.5 mg         11      5 mg         12      7.5 mg         13      5 mg         14      7.5 mg         15      7.5 mg           16      7.5 mg         17      7.5 mg         18      5 mg         19      7.5 mg         20      5 mg         21      7.5 mg         22      7.5 mg           23      7.5 mg         24      7.5 mg         25      5 mg         26      7.5 mg         27      5 mg         28      7.5 mg         29      7.5 mg           30      7.5 mg         31                  Date Details   No additional details    Date of next INR:  7/31/2017         How to take your warfarin dose     To take:  5 mg Take 1 of the 5 mg tablets.    To take:  7.5 mg Take 1.5 of the 5 mg tablets.

## 2017-06-19 NOTE — PROGRESS NOTES
ANTICOAGULATION FOLLOW-UP CLINIC VISIT    Patient Name:  Gemma Cowart  Date:  6/19/2017  Contact Type:  Face to Face    SUBJECTIVE:     Patient Findings     Positives No Problem Findings    Comments Continuing to do Weight Watchers.           OBJECTIVE    INR Protime   Date Value Ref Range Status   06/19/2017 2.4 (A) 0.86 - 1.14 Final       ASSESSMENT / PLAN  INR assessment THER    Recheck INR In: 6 WEEKS    INR Location Clinic      Anticoagulation Summary as of 6/19/2017     INR goal 2.0-3.0   Today's INR 2.4   Maintenance plan 5 mg (5 mg x 1) on Tue, Thu; 7.5 mg (5 mg x 1.5) all other days   Full instructions 5 mg on Tue, Thu; 7.5 mg all other days   Weekly total 47.5 mg   No change documented Rae Cooley RN   Plan last modified Rae Cooley RN (6/5/2017)   Next INR check 7/31/2017   Target end date Indefinite    Indications   Long-term (current) use of anticoagulants [Z79.01] [Z79.01]  DVT (deep venous thrombosis) (Resolved) [I82.409]         Anticoagulation Episode Summary     [facility]    Preferred lab     Send INR reminders to [facility]    Comments 5mg tabs - HS dose // CALENDAR      Anticoagulation Care Providers     Provider Role Specialty Phone number    Alva Viera MD  Internal Medicine [phone]            See the Encounter Report to view Anticoagulation Flowsheet and Dosing Calendar (Go to Encounters tab in chart review, and find the Anticoagulation Therapy Visit)        Rae Cooley RN

## 2017-07-19 ENCOUNTER — CARE COORDINATION (OUTPATIENT)
Dept: GERIATRIC MEDICINE | Facility: CLINIC | Age: 72
End: 2017-07-19

## 2017-07-20 RX ORDER — IBUPROFEN 200 MG
1 CAPSULE ORAL DAILY
COMMUNITY
End: 2017-10-16

## 2017-07-20 NOTE — PROGRESS NOTES
Home visit/Jose L Risk Assessment/EW screening completed on: July 19, 2017  Member resides: Lives with family in a multi-level house  Member currently receiving the following services: No current services  See EMR for a list of client's diagnoses and medications.   Medication management: Medications reviewed:Yes. Medication management: Independent and sets up on own. Medication understanding:Patient has understanding of regimen and is adherent Yes. MTM offered.  Member Mood/behavior-PHQ9 score:  0 no need to f/u with PCP on PHQ9 score.   Plan of Care: No services recommended or requested.  Follow-Up Plan: Member informed of future contact, plan to f/u with member with a 6 month telephone assessment.  Contact information shared with member and family, encouraged member to call with any questions or concerns prior to this.  See [facility] for further detailed information    Nighat Watson RN Case Manager  [facility]  [phone]

## 2017-07-31 ENCOUNTER — ANTICOAGULATION THERAPY VISIT (OUTPATIENT)
Dept: NURSING | Facility: CLINIC | Age: 72
End: 2017-07-31
Payer: COMMERCIAL

## 2017-07-31 DIAGNOSIS — Z79.01 LONG-TERM (CURRENT) USE OF ANTICOAGULANTS: ICD-10-CM

## 2017-07-31 DIAGNOSIS — Z86.718 PERSONAL HISTORY OF DVT (DEEP VEIN THROMBOSIS): Primary | ICD-10-CM

## 2017-07-31 LAB — INR POINT OF CARE: 1.5 (ref 0.86–1.14)

## 2017-07-31 PROCEDURE — 85610 PROTHROMBIN TIME: CPT | Mod: QW

## 2017-07-31 PROCEDURE — 36416 COLLJ CAPILLARY BLOOD SPEC: CPT

## 2017-07-31 PROCEDURE — 99207 ZZC NO CHARGE NURSE ONLY: CPT

## 2017-07-31 NOTE — MR AVS SNAPSHOT
Gemma EDMUNDO Cowart   7/31/2017 8:15 AM   Anticoagulation Therapy Visit    Description:  71 year old female   Provider:   [facility]   Department:   Nurse           INR as of 7/31/2017     Today's INR 1.5!      Anticoagulation Summary as of 7/31/2017     INR goal 2.0-3.0   Today's INR 1.5!   Full instructions 8/1: 7.5 mg; Otherwise 5 mg on Tue, Thu; 7.5 mg all other days   Next INR check 8/14/2017    Indications   Long-term (current) use of anticoagulants [Z79.01] [Z79.01]  DVT (deep venous thrombosis) (Resolved) [I82.409]         Your next Anticoagulation Clinic appointment(s)     Aug 14, 2017  8:15 AM CDT   Anticoagulation Visit with  ANTICOAGULATION CLINIC   [facility] ([facility])    [address] [place] [address] [address]  [facility] [address] [phone]              Contact Numbers     [facility]  Please call  [phone] to cancel and/or reschedule your appointment   Please call  [phone] with any problems or questions regarding your therapy.        July 2017 Details    Sun Mon Tue Wed Thu Fri Sat           1                 2               3               4               5               6               7               8                 9               10               11               12               13               14               15                 16               17               18               19               20               21               22                 23               24               25               26               27               28               29                 30               31      7.5 mg   See details            Date Details   07/31 This INR check               How to take your warfarin dose     To take:  7.5 mg Take 1.5 of the 5 mg tablets.           August 2017 Details    Sun Mon Tue Wed Thu Fri Sat       1      7.5 mg         2      7.5 mg         3      5 mg         4      7.5 mg         5      7.5 mg           6       7.5 mg         7      7.5 mg         8      5 mg         9      7.5 mg         10      5 mg         11      7.5 mg         12      7.5 mg           13      7.5 mg         14            15               16               17               18               19                 20               21               22               23               24               25               26                 27               28               29               30               31                  Date Details   No additional details    Date of next INR:  8/14/2017         How to take your warfarin dose     To take:  5 mg Take 1 of the 5 mg tablets.    To take:  7.5 mg Take 1.5 of the 5 mg tablets.

## 2017-07-31 NOTE — PROGRESS NOTES
ANTICOAGULATION FOLLOW-UP CLINIC VISIT    Patient Name:  Gemma Cowart  Date:  7/31/2017  Contact Type:  Face to Face    SUBJECTIVE:     Patient Findings     Positives Change in diet/appetite, No Problem Findings, Unexplained INR or factor level change    Comments Ate a can of lima beans yesterday.           OBJECTIVE    INR Protime   Date Value Ref Range Status   07/31/2017 1.5 (A) 0.86 - 1.14 Final       ASSESSMENT / PLAN  INR assessment SUB    Recheck INR In: 2 WEEKS    INR Location Clinic      Anticoagulation Summary as of 7/31/2017     INR goal 2.0-3.0   Today's INR 1.5!   Maintenance plan 5 mg (5 mg x 1) on Tue, Thu; 7.5 mg (5 mg x 1.5) all other days   Full instructions 8/1: 7.5 mg; Otherwise 5 mg on Tue, Thu; 7.5 mg all other days   Weekly total 47.5 mg   Plan last modified Rae Cooley RN (6/5/2017)   Next INR check 8/14/2017   Target end date Indefinite    Indications   Long-term (current) use of anticoagulants [Z79.01] [Z79.01]  DVT (deep venous thrombosis) (Resolved) [I82.409]         Anticoagulation Episode Summary     [facility]    Preferred lab     Send [facility]R reminders to [facility]    Comments 5mg tabs - HS dose // CALENDAR      Anticoagulation Care Providers     Provider Role Specialty Phone number    Alva Viera MD  Internal Medicine [phone]            See the Encounter Report to view Anticoagulation Flowsheet and Dosing Calendar (Go to Encounters tab in chart review, and find the Anticoagulation Therapy Visit)        aRe Cooley RN

## 2017-08-07 ENCOUNTER — CARE COORDINATION (OUTPATIENT)
Dept: GERIATRIC MEDICINE | Facility: CLINIC | Age: 72
End: 2017-08-07

## 2017-08-07 NOTE — PROGRESS NOTES
Received after visit chart from care coordinator.  Completed following tasks: Mailed copy of care plan to client  Chart was returned to DEBBY.     Miracle Keating  Case Management Specialist  [facility]  [phone]

## 2017-08-14 ENCOUNTER — ANTICOAGULATION THERAPY VISIT (OUTPATIENT)
Dept: NURSING | Facility: CLINIC | Age: 72
End: 2017-08-14
Payer: COMMERCIAL

## 2017-08-14 LAB — INR POINT OF CARE: 1.7 (ref 0.86–1.14)

## 2017-08-14 PROCEDURE — 36416 COLLJ CAPILLARY BLOOD SPEC: CPT

## 2017-08-14 PROCEDURE — 99207 ZZC NO CHARGE NURSE ONLY: CPT

## 2017-08-14 PROCEDURE — 85610 PROTHROMBIN TIME: CPT | Mod: QW

## 2017-08-14 NOTE — PROGRESS NOTES
ANTICOAGULATION FOLLOW-UP CLINIC VISIT    Patient Name:  Gemma Cowart  Date:  8/14/2017  Contact Type:  Face to Face    SUBJECTIVE:     Patient Findings     Positives Change in diet/appetite, Missed doses    Comments Pt has been eating increased greens           OBJECTIVE    INR Protime   Date Value Ref Range Status   08/14/2017 1.7 (A) 0.86 - 1.14 Final       ASSESSMENT / PLAN  INR assessment SUB    Recheck INR In: 2 WEEKS    INR Location Clinic      Anticoagulation Summary as of 8/14/2017     INR goal 2.0-3.0   Today's INR 1.7!   Maintenance plan 5 mg (5 mg x 1) on Tue, Thu; 7.5 mg (5 mg x 1.5) all other days   Full instructions 8/15: 7.5 mg; Otherwise 5 mg on Tue, Thu; 7.5 mg all other days   Weekly total 47.5 mg   Plan last modified Rae Cooley RN (6/5/2017)   Next INR check 8/28/2017   Target end date Indefinite    Indications   Personal history of DVT (deep vein thrombosis) [Z86.718]  Long-term (current) use of anticoagulants [Z79.01] [Z79.01]         Anticoagulation Episode Summary     [facility]    Preferred lab     Send INR reminders to [facility]    Comments 5mg tabs - HS dose // CALENDAR // gastric bypass 2000      Anticoagulation Care Providers     Provider Role Specialty Phone number    Alva Viera MD  Internal Medicine [phone]            See the Encounter Report to view Anticoagulation Flowsheet and Dosing Calendar (Go to Encounters tab in chart review, and find the Anticoagulation Therapy Visit)    Advised to limit greens in diet & watch for clotting sx's.    Debra Murphy RN

## 2017-08-14 NOTE — MR AVS SNAPSHOT
Gemma EDMUNDO Cowart   8/14/2017 8:15 AM   Anticoagulation Therapy Visit    Description:  71 year old female   Provider:   [facility]   Department:   Nurse           INR as of 8/14/2017     Today's INR 1.7!      Anticoagulation Summary as of 8/14/2017     INR goal 2.0-3.0   Today's INR 1.7!   Full instructions 8/15: 7.5 mg; Otherwise 5 mg on Tue, Thu; 7.5 mg all other days   Next INR check 8/28/2017    Indications   Personal history of DVT (deep vein thrombosis) [Z86.718]  Long-term (current) use of anticoagulants [Z79.01] [Z79.01]         Your next Anticoagulation Clinic appointment(s)     Aug 14, 2017  8:15 AM CDT   Anticoagulation Visit with  ANTICOAGULATION CLINIC   [facility]    [address] [place] [address]  [address]  [place] [address] [phone]            Aug 28, 2017  8:00 AM CDT   Anticoagulation Visit with  ANTICOAGULATION CLINIC   [facility] ([facility])    [address] [place] [address]  [address]  [facility] [address] [phone]              Contact Numbers     [facility]  Please call  [phone] to cancel and/or reschedule your appointment   Please call  [phone] with any problems or questions regarding your therapy.        August 2017 Details    Sun Mon Tue Wed Thu Fri Sat       1               2               3               4               5                 6               7               8               9               10               11               12                 13               14      7.5 mg   See details      15      7.5 mg         16      7.5 mg         17      5 mg         18      7.5 mg         19      7.5 mg           20      7.5 mg         21      7.5 mg         22      5 mg         23      7.5 mg         24      5 mg         25      7.5 mg         26      7.5 mg           27      7.5 mg         28            29               30               31                  Date Details    08/14 This INR check       Date of next INR:  8/28/2017         How to take your warfarin dose     To take:  5 mg Take 1 of the 5 mg tablets.    To take:  7.5 mg Take 1.5 of the 5 mg tablets.

## 2017-08-28 ENCOUNTER — ANTICOAGULATION THERAPY VISIT (OUTPATIENT)
Dept: NURSING | Facility: CLINIC | Age: 72
End: 2017-08-28
Payer: COMMERCIAL

## 2017-08-28 DIAGNOSIS — Z86.718 PERSONAL HISTORY OF DVT (DEEP VEIN THROMBOSIS): ICD-10-CM

## 2017-08-28 DIAGNOSIS — Z79.01 LONG-TERM (CURRENT) USE OF ANTICOAGULANTS: ICD-10-CM

## 2017-08-28 LAB — INR POINT OF CARE: 1.7 (ref 0.86–1.14)

## 2017-08-28 PROCEDURE — 85610 PROTHROMBIN TIME: CPT | Mod: QW

## 2017-08-28 PROCEDURE — 99207 ZZC NO CHARGE NURSE ONLY: CPT

## 2017-08-28 PROCEDURE — 36416 COLLJ CAPILLARY BLOOD SPEC: CPT

## 2017-08-28 NOTE — MR AVS SNAPSHOT
Gemma EDMUNDO Cowart   8/28/2017 8:00 AM   Anticoagulation Therapy Visit    Description:  71 year old female   Provider:   [facility]INIC   Department:   Nurse           INR as of 8/28/2017     Today's INR 1.7!      Anticoagulation Summary as of 8/28/2017     INR goal 2.0-3.0   Today's INR 1.7!   Full instructions 8/29: 7.5 mg; Otherwise 5 mg on Tue, Thu; 7.5 mg all other days   Next INR check 9/25/2017    Indications   Personal history of DVT (deep vein thrombosis) [Z86.718]  Long-term (current) use of anticoagulants [Z79.01] [Z79.01]         Your next Anticoagulation Clinic appointment(s)     Sep 25, 2017  8:15 AM CDT   Anticoagulation Visit with  ANTICOAGULATION CLINIC   [facility] ([facility])    [address] [place] [address]  [address]  [facility] [address] [phone]              Contact Numbers     [facility]  Please call  [phone] to cancel and/or reschedule your appointment   Please call  [phone] with any problems or questions regarding your therapy.        August 2017 Details    Sun Mon Tue Wed Thu Fri Sat       1               2               3               4               5                 6               7               8               9               10               11               12                 13               14               15               16               17               18               19                 20               21               22               23               24               25               26                 27               28      7.5 mg   See details      29      7.5 mg         30      7.5 mg         31      5 mg            Date Details   08/28 This INR check               How to take your warfarin dose     To take:  5 mg Take 1 of the 5 mg tablets.    To take:  7.5 mg Take 1.5 of the 5 mg tablets.           September 2017 Details    Sun Mon Tue Wed Thu Fri Sat          1      7.5 mg         2      7.5 mg            3      7.5 mg         4      7.5 mg         5      5 mg         6      7.5 mg         7      5 mg         8      7.5 mg         9      7.5 mg           10      7.5 mg         11      7.5 mg         12      5 mg         13      7.5 mg         14      5 mg         15      7.5 mg         16      7.5 mg           17      7.5 mg         18      7.5 mg         19      5 mg         20      7.5 mg         21      5 mg         22      7.5 mg         23      7.5 mg           24      7.5 mg         25            26               27               28               29               30                Date Details   No additional details    Date of next INR:  9/25/2017         How to take your warfarin dose     To take:  5 mg Take 1 of the 5 mg tablets.    To take:  7.5 mg Take 1.5 of the 5 mg tablets.

## 2017-08-28 NOTE — PROGRESS NOTES
ANTICOAGULATION FOLLOW-UP CLINIC VISIT    Patient Name:  Gemma Cowart  Date:  8/28/2017  Contact Type:  Face to Face    SUBJECTIVE:     Patient Findings     Positives Missed doses (missed on Friday and Saturday)           OBJECTIVE    INR Protime   Date Value Ref Range Status   08/28/2017 1.7 (A) 0.86 - 1.14 Final       ASSESSMENT / PLAN  INR assessment SUB    Recheck INR In: 4 WEEKS    INR Location Clinic      Anticoagulation Summary as of 8/28/2017     INR goal 2.0-3.0   Today's INR 1.7!   Maintenance plan 5 mg (5 mg x 1) on Tue, Thu; 7.5 mg (5 mg x 1.5) all other days   Full instructions 8/29: 7.5 mg; Otherwise 5 mg on Tue, Thu; 7.5 mg all other days   Weekly total 47.5 mg   Plan last modified Rae Cooley, RN (6/5/2017)   Next INR check 9/25/2017   Target end date Indefinite    Indications   Personal history of DVT (deep vein thrombosis) [Z86.718]  Long-term (current) use of anticoagulants [Z79.01] [Z79.01]         Anticoagulation Episode Summary     [facility]    Preferred lab     Send INR reminders to [facility]    Comments 5mg tabs - HS dose // CALENDAR // gastric bypass 2000      Anticoagulation Care Providers     Provider Role Specialty Phone number    Alva Viera MD  Internal Medicine [phone]            See the Encounter Report to view Anticoagulation Flowsheet and Dosing Calendar (Go to Encounters tab in chart review, and find the Anticoagulation Therapy Visit)        Rae Cooley, RN

## 2017-09-07 DIAGNOSIS — E78.5 HYPERLIPIDEMIA LDL GOAL <100: Chronic | ICD-10-CM

## 2017-09-08 NOTE — TELEPHONE ENCOUNTER
rosuvastatin (CRESTOR) 10 MG tablet     Last Written Prescription Date: 11/30/2016  Last Fill Quantity: 90, # refills: 1  Last Office Visit with [facility] or [facility] prescribing provider: 5/15/2017  Next 5 appointments (look out 90 days)     Oct 10, 2017  9:30 AM CDT   Return Visit with  Oncology Nurse   [facility] ([facility])    [facility]  [address]  [facility] [address]   [phone]            Oct 18, 2017  8:30 AM CDT   Return Visit with Jyoti Narvaez MD   [facility] ([facility])    [facility]  [address]  [facility] [address]   [phone]                   Lab Results   Component Value Date    CHOL 123 10/12/2016     Lab Results   Component Value Date    HDL 50 10/12/2016     Lab Results   Component Value Date    LDL 43 10/12/2016     Lab Results   Component Value Date    TRIG 152 10/12/2016     Lab Results   Component Value Date    CHOLHDLRATIO 2.5 09/25/2015

## 2017-09-11 RX ORDER — ROSUVASTATIN CALCIUM 10 MG/1
TABLET, COATED ORAL
Qty: 90 TABLET | Refills: 0 | Status: SHIPPED | OUTPATIENT
Start: 2017-09-11 | End: 2017-10-16

## 2017-09-11 NOTE — TELEPHONE ENCOUNTER
Prescription approved per [facility] Refill Protocol.    Due for repeat FLP 10/2017 - future order placed. Noted in pharmacy comments.

## 2017-09-15 ENCOUNTER — TRANSFERRED RECORDS (OUTPATIENT)
Dept: HEALTH INFORMATION MANAGEMENT | Facility: CLINIC | Age: 72
End: 2017-09-15

## 2017-09-25 ENCOUNTER — ANTICOAGULATION THERAPY VISIT (OUTPATIENT)
Dept: NURSING | Facility: CLINIC | Age: 72
End: 2017-09-25
Payer: COMMERCIAL

## 2017-09-25 ENCOUNTER — DOCUMENTATION ONLY (OUTPATIENT)
Dept: PEDIATRICS | Facility: CLINIC | Age: 72
End: 2017-09-25

## 2017-09-25 DIAGNOSIS — Z79.01 LONG-TERM (CURRENT) USE OF ANTICOAGULANTS: ICD-10-CM

## 2017-09-25 DIAGNOSIS — Z86.718 PERSONAL HISTORY OF DVT (DEEP VEIN THROMBOSIS): ICD-10-CM

## 2017-09-25 LAB — INR POINT OF CARE: 1.7 (ref 0.86–1.14)

## 2017-09-25 PROCEDURE — 85610 PROTHROMBIN TIME: CPT | Mod: QW

## 2017-09-25 PROCEDURE — 36416 COLLJ CAPILLARY BLOOD SPEC: CPT

## 2017-09-25 PROCEDURE — 99207 ZZC NO CHARGE NURSE ONLY: CPT

## 2017-09-25 NOTE — PROGRESS NOTES
This patient is interested in getting a home INR monitor.  [facility] Patient Information Form was given to the patient to fill out.    [facility] Physician Form started and put in Dr. Viera's inbasket to finish and sign.   Please return form to INR room.    Rae Cooley RN

## 2017-09-25 NOTE — PROGRESS NOTES
ANTICOAGULATION FOLLOW-UP CLINIC VISIT    Patient Name:  Gemma Cowart  Date:  9/25/2017  Contact Type:  Face to Face    SUBJECTIVE:     Patient Findings     Positives Blood in stool    Comments Noted some rectal bleeding on Tuesday. Bright red blood.  Only one episode. She does have hemorrhoids.    She is interested in getting a home INR monitor.  [facility] Patient Information form given to her to fill out.  [facility] Physician Form started and put in Dr. Viera's inbasket to finish and sign.                OBJECTIVE    INR Protime   Date Value Ref Range Status   09/25/2017 1.7 (A) 0.86 - 1.14 Final       ASSESSMENT / PLAN  INR assessment SUB    Recheck INR In: 4 WEEKS    INR Location Clinic      Anticoagulation Summary as of 9/25/2017     INR goal 2.0-3.0   Today's INR 1.7!   Maintenance plan 7.5 mg (5 mg x 1.5) every day   Full instructions 7.5 mg every day   Weekly total 52.5 mg   Plan last modified Rae Cooley RN (9/25/2017)   Next INR check 10/23/2017   Target end date Indefinite    Indications   Personal history of DVT (deep vein thrombosis) [Z86.718]  Long-term (current) use of anticoagulants [Z79.01] [Z79.01]         Anticoagulation Episode Summary     [facility]    Preferred lab     Send INR reminders to [facility]    Comments 5mg tabs - HS dose // CALENDAR // gastric bypass 2000      Anticoagulation Care Providers     Provider Role Specialty Phone number    Alva Viera MD  Internal Medicine [phone]            See the Encounter Report to view Anticoagulation Flowsheet and Dosing Calendar (Go to Encounters tab in chart review, and find the Anticoagulation Therapy Visit)        Rae Cooley, VILLA

## 2017-09-25 NOTE — MR AVS SNAPSHOT
Gemma EDMUNDO Cowart   9/25/2017 8:15 AM   Anticoagulation Therapy Visit    Description:  71 year old female   Provider:  [facility]   Department:  [facility]           INR as of 9/25/2017     Today's INR 1.7!      Anticoagulation Summary as of 9/25/2017     INR goal 2.0-3.0   Today's INR 1.7!   Full instructions 7.5 mg every day   Next INR check 10/23/2017    Indications   Personal history of DVT (deep vein thrombosis) [Z86.718]  Long-term (current) use of anticoagulants [Z79.01] [Z79.01]         Your next Anticoagulation Clinic appointment(s)     Oct 23, 2017  8:15 AM CDT   Anticoagulation Visit with  ANTICOAGULATION CLINIC   [facility]    [address] [place] [address]  [address]  [facility] [address] [phone]              Contact Numbers     [facility]  Please call  [phone] to cancel and/or reschedule your appointment   Please call  [phone] with any problems or questions regarding your therapy.        September 2017 Details    Sun Mon Tue Wed Thu Fri Sat          1               2                 3               4               5               6               7               8               9                 10               11               12               13               14               15               16                 17               18               19               20               21               22               23                 24               25      7.5 mg   See details      26      7.5 mg         27      7.5 mg         28      7.5 mg         29      7.5 mg         30      7.5 mg          Date Details   09/25 This INR check               How to take your warfarin dose     To take:  7.5 mg Take 1.5 of the 5 mg tablets.           October 2017 Details    Sun Mon Tue Wed Thu Fri Sat     1      7.5 mg         2      7.5 mg         3      7.5 mg         4      7.5 mg         5      7.5 mg         6      7.5 mg         7      7.5  mg           8      7.5 mg         9      7.5 mg         10      7.5 mg         11      7.5 mg         12      7.5 mg         13      7.5 mg         14      7.5 mg           15      7.5 mg         16      7.5 mg         17      7.5 mg         18      7.5 mg         19      7.5 mg         20      7.5 mg         21      7.5 mg           22      7.5 mg         23            24               25               26               27               28                 29               30               31                    Date Details   No additional details    Date of next INR:  10/23/2017         How to take your warfarin dose     To take:  7.5 mg Take 1.5 of the 5 mg tablets.

## 2017-10-02 ENCOUNTER — RADIANT APPOINTMENT (OUTPATIENT)
Dept: GENERAL RADIOLOGY | Facility: CLINIC | Age: 72
End: 2017-10-02
Attending: INTERNAL MEDICINE
Payer: COMMERCIAL

## 2017-10-02 ENCOUNTER — ANTICOAGULATION THERAPY VISIT (OUTPATIENT)
Dept: NURSING | Facility: CLINIC | Age: 72
End: 2017-10-02
Payer: COMMERCIAL

## 2017-10-02 ENCOUNTER — OFFICE VISIT (OUTPATIENT)
Dept: PEDIATRICS | Facility: CLINIC | Age: 72
End: 2017-10-02
Payer: COMMERCIAL

## 2017-10-02 VITALS
HEIGHT: 63 IN | OXYGEN SATURATION: 96 % | DIASTOLIC BLOOD PRESSURE: 58 MMHG | TEMPERATURE: 98.7 F | SYSTOLIC BLOOD PRESSURE: 110 MMHG | HEART RATE: 66 BPM | BODY MASS INDEX: 32.62 KG/M2 | WEIGHT: 184.13 LBS

## 2017-10-02 DIAGNOSIS — K91.89 COMPLICATIONS OF GASTRIC BYPASS SURGERY: ICD-10-CM

## 2017-10-02 DIAGNOSIS — E11.69 TYPE 2 DIABETES MELLITUS WITH OTHER SPECIFIED COMPLICATION, WITHOUT LONG-TERM CURRENT USE OF INSULIN (H): Chronic | ICD-10-CM

## 2017-10-02 DIAGNOSIS — R10.84 ABDOMINAL PAIN, GENERALIZED: ICD-10-CM

## 2017-10-02 DIAGNOSIS — Z79.01 LONG-TERM (CURRENT) USE OF ANTICOAGULANTS: ICD-10-CM

## 2017-10-02 DIAGNOSIS — R07.89 XYPHOIDALGIA: ICD-10-CM

## 2017-10-02 DIAGNOSIS — R19.06 EPIGASTRIC MASS: ICD-10-CM

## 2017-10-02 DIAGNOSIS — Z86.718 PERSONAL HISTORY OF DVT (DEEP VEIN THROMBOSIS): ICD-10-CM

## 2017-10-02 DIAGNOSIS — Z23 NEED FOR PROPHYLACTIC VACCINATION AND INOCULATION AGAINST INFLUENZA: Primary | ICD-10-CM

## 2017-10-02 DIAGNOSIS — R11.2 NAUSEA AND VOMITING, INTRACTABILITY OF VOMITING NOT SPECIFIED, UNSPECIFIED VOMITING TYPE: ICD-10-CM

## 2017-10-02 DIAGNOSIS — C50.912 MALIGNANT NEOPLASM OF LEFT FEMALE BREAST, UNSPECIFIED ESTROGEN RECEPTOR STATUS, UNSPECIFIED SITE OF BREAST (H): Chronic | ICD-10-CM

## 2017-10-02 DIAGNOSIS — Z12.11 SPECIAL SCREENING FOR MALIGNANT NEOPLASMS, COLON: ICD-10-CM

## 2017-10-02 DIAGNOSIS — B37.2 YEAST INFECTION OF THE SKIN: ICD-10-CM

## 2017-10-02 DIAGNOSIS — Z98.84 HISTORY OF GASTRIC BYPASS: ICD-10-CM
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
## 2017-10-02 LAB
ALBUMIN SERPL-MCNC: 3.6 G/DL (ref 3.4–5)
ALP SERPL-CCNC: 56 U/L (ref 40–150)
ALT SERPL W P-5'-P-CCNC: 35 U/L (ref 0–50)
ANION GAP SERPL CALCULATED.3IONS-SCNC: 6 MMOL/L (ref 3–14)
AST SERPL W P-5'-P-CCNC: 28 U/L (ref 0–45)
BASOPHILS # BLD AUTO: 0 10E9/L (ref 0–0.2)
BASOPHILS NFR BLD AUTO: 0.6 %
BILIRUB SERPL-MCNC: 0.4 MG/DL (ref 0.2–1.3)
BUN SERPL-MCNC: 25 MG/DL (ref 7–30)
CALCIUM SERPL-MCNC: 9 MG/DL (ref 8.5–10.1)
CHLORIDE SERPL-SCNC: 108 MMOL/L (ref 94–109)
CO2 SERPL-SCNC: 28 MMOL/L (ref 20–32)
CREAT SERPL-MCNC: 1.33 MG/DL (ref 0.52–1.04)
CREAT UR-MCNC: 90 MG/DL
CRP SERPL-MCNC: <2.9 MG/L (ref 0–8)
DIFFERENTIAL METHOD BLD: NORMAL
EOSINOPHIL # BLD AUTO: 0.1 10E9/L (ref 0–0.7)
EOSINOPHIL NFR BLD AUTO: 1.8 %
ERYTHROCYTE [DISTWIDTH] IN BLOOD BY AUTOMATED COUNT: 13.9 % (ref 10–15)
ERYTHROCYTE [SEDIMENTATION RATE] IN BLOOD BY WESTERGREN METHOD: 10 MM/H (ref 0–30)
GFR SERPL CREATININE-BSD FRML MDRD: 39 ML/MIN/1.7M2
GLUCOSE SERPL-MCNC: 115 MG/DL (ref 70–99)
HBA1C MFR BLD: 5.4 % (ref 4.3–6)
HCT VFR BLD AUTO: 39.7 % (ref 35–47)
HGB BLD-MCNC: 12.5 G/DL (ref 11.7–15.7)
INR PPP: 1.29 (ref 0.86–1.14)
LYMPHOCYTES # BLD AUTO: 1 10E9/L (ref 0.8–5.3)
LYMPHOCYTES NFR BLD AUTO: 20.2 %
MCH RBC QN AUTO: 27.4 PG (ref 26.5–33)
MCHC RBC AUTO-ENTMCNC: 31.5 G/DL (ref 31.5–36.5)
MCV RBC AUTO: 87 FL (ref 78–100)
MICROALBUMIN UR-MCNC: <5 MG/L
MICROALBUMIN/CREAT UR: NORMAL MG/G CR (ref 0–25)
MONOCYTES # BLD AUTO: 0.4 10E9/L (ref 0–1.3)
MONOCYTES NFR BLD AUTO: 8.1 %
NEUTROPHILS # BLD AUTO: 3.4 10E9/L (ref 1.6–8.3)
NEUTROPHILS NFR BLD AUTO: 69.3 %
PLATELET # BLD AUTO: 198 10E9/L (ref 150–450)
POTASSIUM SERPL-SCNC: 4.8 MMOL/L (ref 3.4–5.3)
PROT SERPL-MCNC: 6.7 G/DL (ref 6.8–8.8)
RBC # BLD AUTO: 4.56 10E12/L (ref 3.8–5.2)
SODIUM SERPL-SCNC: 142 MMOL/L (ref 133–144)
VIT B12 SERPL-MCNC: 1241 PG/ML (ref 193–986)
WBC # BLD AUTO: 5 10E9/L (ref 4–11)

## 2017-10-02 PROCEDURE — 85652 RBC SED RATE AUTOMATED: CPT | Performed by: INTERNAL MEDICINE

## 2017-10-02 PROCEDURE — G0008 ADMIN INFLUENZA VIRUS VAC: HCPCS | Performed by: INTERNAL MEDICINE

## 2017-10-02 PROCEDURE — 82607 VITAMIN B-12: CPT | Performed by: INTERNAL MEDICINE

## 2017-10-02 PROCEDURE — 90662 IIV NO PRSV INCREASED AG IM: CPT | Performed by: INTERNAL MEDICINE

## 2017-10-02 PROCEDURE — 85025 COMPLETE CBC W/AUTO DIFF WBC: CPT | Performed by: INTERNAL MEDICINE

## 2017-10-02 PROCEDURE — 99215 OFFICE O/P EST HI 40 MIN: CPT | Mod: 25 | Performed by: INTERNAL MEDICINE

## 2017-10-02 PROCEDURE — 86140 C-REACTIVE PROTEIN: CPT | Performed by: INTERNAL MEDICINE

## 2017-10-02 PROCEDURE — 99207 ZZC NO CHARGE NURSE ONLY: CPT | Performed by: INTERNAL MEDICINE

## 2017-10-02 PROCEDURE — 36415 COLL VENOUS BLD VENIPUNCTURE: CPT | Performed by: INTERNAL MEDICINE

## 2017-10-02 PROCEDURE — 83036 HEMOGLOBIN GLYCOSYLATED A1C: CPT | Performed by: INTERNAL MEDICINE

## 2017-10-02 PROCEDURE — 71020 XR CHEST 2 VW: CPT

## 2017-10-02 PROCEDURE — 85610 PROTHROMBIN TIME: CPT | Performed by: INTERNAL MEDICINE

## 2017-10-02 PROCEDURE — 80053 COMPREHEN METABOLIC PANEL: CPT | Performed by: INTERNAL MEDICINE

## 2017-10-02 PROCEDURE — 74020 XR ABDOMEN 2 VW: CPT

## 2017-10-02 PROCEDURE — 82043 UR ALBUMIN QUANTITATIVE: CPT | Performed by: INTERNAL MEDICINE

## 2017-10-02 NOTE — PROGRESS NOTES
ANTICOAGULATION FOLLOW-UP    Patient Name:  Gemma Cowart  Date:  10/2/2017  Contact Type:  Telephone/ Gemma    SUBJECTIVE:     Patient Findings     Positives Other complaints (procedure 10/6/17)           OBJECTIVE    INR   Date Value Ref Range Status   10/02/2017 1.29 (H) 0.86 - 1.14 Final       ASSESSMENT / PLAN  INR assessment SUB    Recheck INR In: 2 WEEKS    INR Location Clinic      Anticoagulation Summary as of 10/2/2017     INR goal 2.0-3.0   Today's INR 1.29!   Maintenance plan 7.5 mg (5 mg x 1.5) every day   Full instructions 10/3: Hold; 10/4: Hold; 10/5: Hold; Otherwise 7.5 mg every day   Weekly total 52.5 mg   Plan last modified Rae Cooley RN (9/25/2017)   Next INR check 10/16/2017   Target end date Indefinite    Indications   Personal history of DVT (deep vein thrombosis) [Z86.718]  Long-term (current) use of anticoagulants [Z79.01] [Z79.01]         Anticoagulation Episode Summary     [facility]    Preferred lab     Send INR reminders to [facility]    Comments 5mg tabs - HS dose // CALENDAR // gastric bypass 2000 // DVT 11/16/12 warfarin until Apr 2013 // DVT 9/12/15      Anticoagulation Care Providers     Provider Role Specialty Phone number    Alva iVera MD  Internal Medicine [phone]            See the Encounter Report to view Anticoagulation Flowsheet and Dosing Calendar (Go to Encounters tab in chart review, and find the Anticoagulation Therapy Visit)    Dosage adjustment made based on physician directed care plan.    Carleen Wright RN

## 2017-10-02 NOTE — PATIENT INSTRUCTIONS
Abdominal pain with stooling/nausea and vomiting:  I'm not sure what's going on here, but I would like to get some imaging and lab tests to make sure that you do not have something that is causing intermittent bowel obstruction.   -- labs today (blood count, liver/kidney function, inflammatory markers)  -- referral placed for colonoscopy AND upper endoscopy to look at your bypass  -- I would like for you to get a CT scan of the chest, abdomen and pelvis as well    We will check INR today.    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 You can try an over the counter antifungal medication, like miconazole, to your belly button in the meantime.     Diabetic labs today. Come back to talk about medication adjustments.

## 2017-10-02 NOTE — MR AVS SNAPSHOT
Gemma WYATT Sofya   10/2/2017   Anticoagulation Therapy Visit    Description:  71 year old female   Provider:  Alva Viera MD   Department:  [facility]           INR as of 10/2/2017     Today's INR 1.29!      Anticoagulation Summary as of 10/2/2017     INR goal 2.0-3.0   Today's INR 1.29!   Full instructions 10/3: Hold; 10/4: Hold; 10/5: Hold; Otherwise 7.5 mg every day   Next INR check 10/16/2017    Indications   Personal history of DVT (deep vein thrombosis) [Z86.718]  Long-term (current) use of anticoagulants [Z79.01] [Z79.01]         Your next Anticoagulation Clinic appointment(s)     Oct 16, 2017  8:15 AM CDT   Anticoagulation Visit with  ANTICOAGULATION CLINIC   [facility]    [address] [place] [address] [address]  [facility] [address] [phone]              Contact Numbers     [facility]  Please call  [phone] to cancel and/or reschedule your appointment   Please call  [phone] with any problems or questions regarding your therapy.        October 2017 Details    Sun Mon Tue Wed Thu Fri Sat     1               2      7.5 mg   See details      3      Hold         4      Hold         5      Hold         6      7.5 mg         7      7.5 mg           8      7.5 mg         9      7.5 mg         10      7.5 mg         11      7.5 mg         12      7.5 mg         13      7.5 mg         14      7.5 mg           15      7.5 mg         16            17               18               19               20               21                 22               23               24               25               26               27               28                 29               30               31                    Date Details   10/02 This INR check       Date of next INR:  10/16/2017         How to take your warfarin dose     To take:  7.5 mg Take 1.5 of the 5 mg tablets.    Hold Do not take your warfarin dose. See the Details table to the right for additional  instructions.

## 2017-10-02 NOTE — NURSING NOTE
"Chief Complaint   Patient presents with     Gastrointestinal Problem     Flu Shot       Initial /58 (BP Location: Right arm, Patient Position: Chair, Cuff Size: Adult Regular)  Pulse 66  Temp 98.7  F (37.1  C) (Oral)  Ht 5' 3.25\" (1.607 m)  Wt 184 lb 2 oz (83.5 kg)  LMP 09/24/1990  SpO2 96%  Breastfeeding? No  BMI 32.36 kg/m2 Estimated body mass index is 32.36 kg/(m^2) as calculated from the following:    Height as of this encounter: 5' 3.25\" (1.607 m).    Weight as of this encounter: 184 lb 2 oz (83.5 kg).  Medication Reconciliation: complete     Gisel Sellers MA 10/2/2017 8:12 AM    "

## 2017-10-02 NOTE — MR AVS SNAPSHOT
After Visit Summary   10/2/2017    Gemma Cowart    MRN: [id number]           Patient Information     Date Of Birth          1945        Visit Information        Provider Department      10/2/2017 8:10 AM Alva Viera MD [facility] [place]        Today's Diagnoses     Need for prophylactic vaccination and inoculation against influenza    -  1    Xyphoidalgia        Nausea and vomiting, intractability of vomiting not specified, unspecified vomiting type        Type 2 diabetes mellitus with other specified complication, without long-term current use of insulin (H)        Malignant neoplasm of left female breast, unspecified estrogen receptor status, unspecified site of breast (H)        Epigastric mass        History of gastric bypass        Long-term (current) use of anticoagulants [Z79.01]        Yeast infection of the skin        Complications of gastric bypass surgery        Abdominal pain, generalized        Special screening for malignant neoplasms, colon          Care Instructions    Abdominal pain with stooling/nausea and vomiting:  I'm not sure what's going on here, but I would like to get some imaging and lab tests to make sure that you do not have something that is causing intermittent bowel obstruction.   -- labs today (blood count, liver/kidney function, inflammatory markers)  -- referral placed for colonoscopy AND upper endoscopy to look at your bypass  -- I would like for you to get a CT scan of the chest, abdomen and pelvis as well    We will check INR today.    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 You can try an over the counter antifungal medication, like miconazole, to your belly button in the meantime.     Diabetic labs today. Come back to talk about medication adjustments.           Follow-ups after your visit        Additional Services     GASTROENTEROLOGY ADULT REF PROCEDURE ONLY       Last Lab  Result: Creatinine (mg/dL)       Date                     Value                 05/15/2017               1.23 (H)         ----------  Body mass index is 32.36 kg/(m^2).      Patient will be contacted to schedule procedure.     Please be aware that coverage of these services is subject to the terms and limitations of your health insurance plan.  Call member services at your health plan with any benefit or coverage questions.  Any procedures must be performed at a [facility] facility OR coordinated by your clinic's referral office.    Please bring the following with you to your appointment:    (1) Any X-Rays, CTs or MRIs which have been performed.  Contact the facility where they were done to arrange for  prior to your scheduled appointment.    (2) List of current medications   (3) This referral request   (4) Any documents/labs given to you for this referral            GASTROENTEROLOGY ADULT REF PROCEDURE ONLY       Last Lab Result: Creatinine (mg/dL)       Date                     Value                 05/15/2017               1.23 (H)         ----------  Body mass index is 32.36 kg/(m^2).      Patient will be contacted to schedule procedure.     Please be aware that coverage of these services is subject to the terms and limitations of your health insurance plan.  Call member services at your health plan with any benefit or coverage questions.  Any procedures must be performed at a [facility] facility OR coordinated by your clinic's referral office.    Please bring the following with you to your appointment:    (1) Any X-Rays, CTs or MRIs which have been performed.  Contact the facility where they were done to arrange for  prior to your scheduled appointment.    (2) List of current medications   (3) This referral request   (4) Any documents/labs given to you for this referral            PLASTIC SURGERY REFERRAL       Your provider has referred you to: [facility]: Plastic and Reconstructive Surgery Clinic -  [place] [phone]   [email or web address]  [facility] - [place] [phone]   [email or web address]    Please be aware that coverage of these services is subject to the terms and limitations of your health insurance plan.  Call member services at your health plan with any benefit or coverage questions.      Please bring the following with you to your appointment:    (1) Any X-Rays, CTs or MRIs which have been performed.  Contact the facility where they were done to arrange for  prior to your scheduled appointment.    (2) List of current medications  (3) This referral request   (4) Any documents/labs given to you for this referral                  Your next 10 appointments already scheduled     Oct 10, 2017  9:30 AM CDT   Return Visit with  Oncology Nurse   [facility] ([facility])    [facility]  [address]  [facility] [address] [phone]            Oct 10, 2017 10:15 AM CDT   DX HIP/PELVIS/SPINE with MADX1   [facility] ([facility])    [address]  [address]  [facility] [id number] [phone]           Please do not take any of the following 24 hours prior to the day of your exam: vitamins, calcium tablets, antacids.  If possible, please wear clothes without metal (snaps, zippers). A sweatsuit works well.            Oct 18, 2017  8:30 AM CDT   Return Visit with Jyoti Narvaez MD   [facility])    [facility]  [address]  [facility] [address] [phone]            Oct 23, 2017  8:15 AM CDT   Anticoagulation Visit with  ANTICOAGULATION CLINIC   [facility] [place] ([facility] [place]    [address]  [address]  [place] [address] [phone]              Future tests that were ordered for you today      "Open Future Orders        Priority Expected Expires Ordered    CT Chest/Abdomen/Pelvis w Contrast Routine  10/2/2018 10/2/2017            Who to contact     If you have questions or need follow up information about today's clinic visit or your schedule please contact [facility] directly at [phone].  Normal or non-critical lab and imaging results will be communicated to you by MyChart, letter or phone within 4 business days after the clinic has received the results. If you do not hear from us within 7 days, please contact the clinic through [employer] or phone. If you have a critical or abnormal lab result, we will notify you by phone as soon as possible.  Submit refill requests through [employer] or call your pharmacy and they will forward the refill request to us. Please allow 3 business days for your refill to be completed.          Additional Information About Your Visit        MyChart Information     [employer] lets you send messages to your doctor, view your test results, renew your prescriptions, schedule appointments and more. To sign up, go to [email or web address] . Click on \"Log in\" on the left side of the screen, which will take you to the Welcome page. Then click on \"Sign up Now\" on the right side of the page.     You will be asked to enter the access code listed below, as well as some personal information. Please follow the directions to create your username and password.     Your access code is: [email or web address]  Expires: 2017  9:07 AM     Your access code will  in 90 days. If you need help or a new code, please call your [facility] clinic or [phone].        Care EveryWhere ID     This is your Care EveryWhere ID. This could be used by other organizations to access your [facility] medical records  [id number]        Your Vitals Were     Pulse Temperature Height Last Period Pulse Oximetry Breastfeeding?    66 98.7  F (37.1  C) (Oral) 5' 3.25\" (1.607 m) 1990 96% No    BMI (Body " Mass Index)                   32.36 kg/m2            Blood Pressure from Last 3 Encounters:   10/02/17 110/58   05/15/17 90/58   04/05/17 106/70    Weight from Last 3 Encounters:   10/02/17 184 lb 2 oz (83.5 kg)   06/19/17 190 lb 12.8 oz (86.5 kg)   05/15/17 196 lb 8 oz (89.1 kg)              We Performed the Following     Albumin Random Urine Quantitative with Creat Ratio     CBC with platelets differential     Comprehensive metabolic panel (BMP + Alb, Alk Phos, ALT, AST, Total. Bili, TP)     CRP, inflammation     Erythrocyte sedimentation rate auto     FLU VACCINE, INCREASED ANTIGEN, PRESV FREE, AGE 65+ [76304]     GASTROENTEROLOGY ADULT REF PROCEDURE ONLY     GASTROENTEROLOGY ADULT REF PROCEDURE ONLY     Hemoglobin A1c     INR     PLASTIC SURGERY REFERRAL     Vaccine Administration, Initial [55151]     Vitamin B12        Primary Care Provider Office Phone # Fax #    Alva Viera [phone] [address] [place] [address]        Equal Access to Services     [facility] : Hadii aad ku hadasho Soomaali, waaxda luqadaha, qaybta kaalmada adeegyada, waxay idiin hayaan adeeg kharaej la'radhan . So [facility] [phone].    ATENCIÓN: Si habla español, tiene a marcum disposición servicios gratuitos de asistencia lingüística. Llame al [phone].    We comply with applicable federal civil rights laws and [place] laws. We do not discriminate on the basis of race, color, national origin, age, disability, sex, sexual orientation, or gender identity.            Thank you!     Thank you for choosing [facility]  for your care. Our goal is always to provide you with excellent care. Hearing back from our patients is one way we can continue to improve our services. Please take a few minutes to complete the written survey that you may receive in the mail after your visit with us. Thank you!             Your Updated Medication List - Protect others around you: Learn how to safely use,  store and throw away your medicines at www.disposemymeds.org.          This list is accurate as of: 10/2/17  9:07 AM.  Always use your most recent med list.                   Brand Name Dispense Instructions for use Diagnosis    ACE/ARB NOT PRESCRIBED (INTENTIONAL)      ACE & ARB not prescribed due to Other: normal tensive, low microalbumuria    Type 2 diabetes mellitus with other specified complication (H)       ASPIRIN NOT PRESCRIBED    INTENTIONAL     Antiplatelet medication not prescribed intentionally due to Current anticoagulant therapy (warfarin/enoxaparin)    Type 2 diabetes mellitus with other specified complication (H)       B-12 1000 MCG Tbcr     180 tablet    Take 2,000 mcg by mouth daily    Vitamin B12 deficiency       blood glucose lancets standard    no brand specified    2 Box    Use to test blood sugars 2 times daily or as directed. Please dispense lancets for Relion Confirm.    Type 2 diabetes mellitus with other specified complication (H)       blood glucose monitoring test strip    no brand specified    200 each    Use to test blood sugars 2 times daily or as directed. Please dispense Relion Confirm test strips    Type 2 diabetes mellitus with other specified complication (H)       CALCIUM CITRATE + D 315-250 MG-UNIT Tabs per tablet   Generic drug:  calcium citrate-vitamin D     120 tablet    Take 2 tablets by mouth 2 times daily    Vitamin D deficiency disease       calcium citrate 950 MG tablet    CALCITRATE     Take 1 tablet by mouth daily        CERAVE Crea      Externally apply 1 Application topically as needed        clobetasol 0.05 % cream    TEMOVATE    270 g    Apply sparingly to affected area twice daily as needed.  Do not apply to face.    Eczema, unspecified eczema       desonide 0.05 % cream    DESOWEN    60 g    Apply sparingly to affected area three times daily as needed.    Atopic neurodermatitis       hydrocortisone 0.2 % cream    WESTCORT    60 g    Apply to AA QD-BID x 1 week  then PRN only    Atopic neurodermatitis       hydrocortisone 2.5 % cream     30 g    Apply to AA twice daily x 1 week then PRN only.    Atopic neurodermatitis       * hydrOXYzine 25 MG tablet    ATARAX    180 tablet    Take 1-2 tablets (25-50 mg) by mouth At Bedtime    Eczema, unspecified eczema       * hydrOXYzine 25 MG tablet    ATARAX    60 tablet    Take 1-2 tablets (25-50 mg) by mouth daily as needed for itching    Atopic neurodermatitis       letrozole 2.5 MG tablet    FEMARA    90 tablet    Take 1 tablet (2.5 mg) by mouth daily    History of left breast cancer       MAGNESIUM OXIDE PO      Take 250 mg by mouth        metFORMIN 500 MG 24 hr tablet    GLUCOPHAGE-XR    180 tablet    Take 2 tablets (1,000 mg) by mouth daily (with dinner)    Type 2 diabetes mellitus with other specified complication (H)       PARoxetine 30 MG tablet    PAXIL    180 tablet    Take 2 tablets (60 mg) by mouth At Bedtime    Anxiety       rosuvastatin 10 MG tablet    CRESTOR    90 tablet    TAKE ONE TABLET BY MOUTH ONCE DAILY    Hyperlipidemia LDL goal <100       TYLENOL 500 MG tablet   Generic drug:  acetaminophen     100 tablet    Take 2 tablets (1,000 mg) by mouth every 8 hours as needed for mild pain        vitamin D 2000 UNITS Caps      Take 1 capsule by mouth daily.        warfarin 5 MG tablet    COUMADIN    135 tablet    TAKE ONE TO ONE & ONE-HALF TABLETS BY MOUTH DAILY    History of deep venous thrombosis       * Notice:  This list has 2 medication(s) that are the same as other medications prescribed for you. Read the directions carefully, and ask your doctor or other care provider to review them with you.

## 2017-10-03 ENCOUNTER — TELEPHONE (OUTPATIENT)
Dept: PEDIATRICS | Facility: CLINIC | Age: 72
End: 2017-10-03

## 2017-10-05 ENCOUNTER — HOSPITAL ENCOUNTER (OUTPATIENT)
Dept: CT IMAGING | Facility: CLINIC | Age: 72
Discharge: HOME | End: 2017-10-05
Attending: INTERNAL MEDICINE | Admitting: INTERNAL MEDICINE
Payer: MEDICARE

## 2017-10-05 DIAGNOSIS — R11.2 NAUSEA AND VOMITING, INTRACTABILITY OF VOMITING NOT SPECIFIED, UNSPECIFIED VOMITING TYPE: ICD-10-CM

## 2017-10-05 DIAGNOSIS — R19.06 EPIGASTRIC MASS: ICD-10-CM

## 2017-10-05 DIAGNOSIS — R10.84 ABDOMINAL PAIN, GENERALIZED: ICD-10-CM

## 2017-10-05 DIAGNOSIS — R07.89 XYPHOIDALGIA: ICD-10-CM

## 2017-10-05 LAB
CREAT BLD-MCNC: 1.3 MG/DL (ref 0.52–1.04)
GFR SERPL CREATININE-BSD FRML MDRD: 40 ML/MIN/1.7M2

## 2017-10-05 PROCEDURE — 74177 CT ABD & PELVIS W/CONTRAST: CPT

## 2017-10-05 PROCEDURE — 71260 CT THORAX DX C+: CPT

## 2017-10-05 PROCEDURE — 25000128 H RX IP 250 OP 636: Performed by: RADIOLOGY

## 2017-10-05 PROCEDURE — 82565 ASSAY OF CREATININE: CPT

## 2017-10-05 RX ORDER — IOPAMIDOL 755 MG/ML
500 INJECTION, SOLUTION INTRAVASCULAR ONCE
Status: COMPLETED | OUTPATIENT
Start: 2017-10-05 | End: 2017-10-05

## 2017-10-05 RX ADMIN — IOPAMIDOL 91 ML: 755 INJECTION, SOLUTION INTRAVENOUS at 12:54

## 2017-10-05 RX ADMIN — SODIUM CHLORIDE 64 ML: 9 INJECTION, SOLUTION INTRAVENOUS at 12:54

## 2017-10-06 ENCOUNTER — HOSPITAL ENCOUNTER (OUTPATIENT)
Facility: CLINIC | Age: 72
Discharge: HOME | End: 2017-10-06
Attending: INTERNAL MEDICINE | Admitting: INTERNAL MEDICINE
Payer: MEDICARE

## 2017-10-06 VITALS
RESPIRATION RATE: 16 BRPM | DIASTOLIC BLOOD PRESSURE: 53 MMHG | OXYGEN SATURATION: 98 % | SYSTOLIC BLOOD PRESSURE: 112 MMHG

## 2017-10-06 LAB
COLONOSCOPY: NORMAL
GLUCOSE BLDC GLUCOMTR-MCNC: 108 MG/DL (ref 70–99)
INR PPP: 1.12 (ref 0.86–1.14)
UPPER GI ENDOSCOPY: NORMAL

## 2017-10-06 PROCEDURE — 25000125 ZZHC RX 250: Performed by: INTERNAL MEDICINE

## 2017-10-06 PROCEDURE — 36415 COLL VENOUS BLD VENIPUNCTURE: CPT | Performed by: INTERNAL MEDICINE

## 2017-10-06 PROCEDURE — G0500 MOD SEDAT ENDO SERVICE >5YRS: HCPCS | Performed by: INTERNAL MEDICINE

## 2017-10-06 PROCEDURE — 99153 MOD SED SAME PHYS/QHP EA: CPT | Performed by: INTERNAL MEDICINE

## 2017-10-06 PROCEDURE — 82962 GLUCOSE BLOOD TEST: CPT

## 2017-10-06 PROCEDURE — G0105 COLORECTAL SCRN; HI RISK IND: HCPCS | Performed by: INTERNAL MEDICINE

## 2017-10-06 PROCEDURE — 85610 PROTHROMBIN TIME: CPT | Performed by: INTERNAL MEDICINE

## 2017-10-06 PROCEDURE — 25000128 H RX IP 250 OP 636: Performed by: INTERNAL MEDICINE

## 2017-10-06 PROCEDURE — 43235 EGD DIAGNOSTIC BRUSH WASH: CPT | Performed by: INTERNAL MEDICINE

## 2017-10-06 PROCEDURE — 45378 DIAGNOSTIC COLONOSCOPY: CPT | Performed by: INTERNAL MEDICINE

## 2017-10-06 RX ORDER — LIDOCAINE 40 MG/G
2.5 CREAM TOPICAL
Status: DISCONTINUED | OUTPATIENT
Start: 2017-10-06 | End: 2017-10-06 | Stop reason: HOSPADM

## 2017-10-06 RX ORDER — FENTANYL CITRATE 50 UG/ML
INJECTION, SOLUTION INTRAMUSCULAR; INTRAVENOUS PRN
Status: DISCONTINUED | OUTPATIENT
Start: 2017-10-06 | End: 2017-10-06 | Stop reason: HOSPADM

## 2017-10-06 RX ORDER — NALOXONE HYDROCHLORIDE 0.4 MG/ML
.1-.4 INJECTION, SOLUTION INTRAMUSCULAR; INTRAVENOUS; SUBCUTANEOUS
Status: DISCONTINUED | OUTPATIENT
Start: 2017-10-06 | End: 2017-10-06 | Stop reason: HOSPADM

## 2017-10-06 RX ORDER — ONDANSETRON 2 MG/ML
4 INJECTION INTRAMUSCULAR; INTRAVENOUS EVERY 6 HOURS PRN
Status: DISCONTINUED | OUTPATIENT
Start: 2017-10-06 | End: 2017-10-06 | Stop reason: HOSPADM

## 2017-10-06 RX ORDER — FLUMAZENIL 0.1 MG/ML
0.2 INJECTION, SOLUTION INTRAVENOUS
Status: DISCONTINUED | OUTPATIENT
Start: 2017-10-06 | End: 2017-10-06 | Stop reason: HOSPADM

## 2017-10-06 RX ORDER — ONDANSETRON 2 MG/ML
4 INJECTION INTRAMUSCULAR; INTRAVENOUS
Status: DISCONTINUED | OUTPATIENT
Start: 2017-10-06 | End: 2017-10-06 | Stop reason: HOSPADM

## 2017-10-06 RX ORDER — ONDANSETRON 4 MG/1
4 TABLET, ORALLY DISINTEGRATING ORAL EVERY 6 HOURS PRN
Status: DISCONTINUED | OUTPATIENT
Start: 2017-10-06 | End: 2017-10-06 | Stop reason: HOSPADM

## 2017-10-06 NOTE — LETTER
October 3, 2017      Gemma Cowart  [address] [facility] [address]  [place] [address]        Thank you for choosing [facility]. You are scheduled for the following services.   Date:     10/06/2017   Friday  Procedure: UPPER ENDOSCOPY & COLONOSCOPY  Doctor:       Dr. Shashank Ortiz    Arrival Time:  7:00 AM   *check in at Emergency/Endoscopy desk*  *You will have your INR blood test done upon arrival.  Procedure Time:  8:00 AM  Location:   [facility]        Endoscopy Department, First Floor (Enter through ER Doors) *         [address] [place], [place] [address]      [phone] or [phone] ([facility]) to reschedule    NuLYTELY  PREP  Upper Endoscopy or Esophagogastroduodenoscopy (EGD) is a test performed to evaluate symptoms of persistent abdominal pain, nausea, vomiting or difficulty swallowing. It may also be used to treat various conditions of the upper gastrointestinal (GI) tract, such as bleeding, narrowing or abnormal growths. During the procedure, a doctor examines the lining of your esophagus, stomach and the first part of your small intestine through a thin, flexible tube called an endoscope. If growths or other abnormalities are found during the procedure, the doctor may remove the abnormal tissue (biopsy) for further examination.     Colonoscopy is the most accurate test to detect colon polyps and colon cancer; and the only test where polyps can be removed. During this procedure, a doctor examines the lining of your large intestine and rectum through a flexible tube.     Transportation  Arrange for a ride for the day of your procedures with a responsible adult.  A taxi ride is not an option unless you are accompanied by a responsible adult. If you fail to arrange transportation with a responsible adult, your procedure will be cancelled and rescheduled.          Fill your prescription for NuLYTELY  at your local pharmacy. Please call our office  at [phone] if you did not receive a prescription.    PREPARATION FOR COLONOSCOPY    7 days before:    Discontinue fiber supplements and medications containing iron. This includes Metamucil  and Fibercon ; and multivitamins with iron.  3 days before:    Begin a low-fiber diet. A low-fiber diet helps making the cleanout more effective. For additional details on low-fiber diet, please refer to the table on the last page.  2 days before:    Continue the low-fiber diet.     Drink at least 8 glasses of water throughout the day.     Stop eating solid foods at 11:45 pm.  1 day before:    In the morning: begin a clear liquid diet (liquids you can see through).     Examples of a clear liquid diet include: water, clear broth or bouillon, Gatorade, Pedialyte or Powerade, carbonated and non-carbonated soft drinks (Sprite , 7-Up , ginger ale), strained fruit juices without pulp (apple, white grape, white cranberry), Jell-O  and popsicles.     The following are not allowed on a clear liquid diet: red liquids, alcoholic beverages, coffee, dairy products (milk, creamer, and yogurt), protein shakes, creamy broths, juice with pulp and chewing tobacco.    At 6pm: drink 1 (one) 8 oz glass of NuLYTELY  solution every 15 minutes until half the bottle (approximately 8 glasses of 8 oz) is gone. Keep the solution refrigerated. Do not drink any other liquids while you are drinking the NuLYTELY  solution.    Over the course of the evening, drink an additional   liter of clear liquids and continue clear liquid diet.    COLON CLEANSING TIPS: drink adequate amounts of fluids before and after your colon cleansing to prevent dehydration. Stay near a toilet because you will have diarrhea. Even if you are sitting on the toilet, continue to drink the cleansing solution every 15 minutes. If you feel nauseous or vomit, rinse your mouth with water, take a 15 to 30-minute-break and then continue drinking the solution. You will be uncomfortable until  the stool has flushed from your colon (in about 2 to 4 hours). You may feel chilled.    DAY OF YOUR PROCEDURE    You may take all of your morning medications including blood pressure medications, blood thinners (if you have not been instructed to stop these by our office), methadone, and anti-seizure medications with sips of water 3 hours prior to your procedure or earlier. Do not take insulin or vitamins prior to your procedure. Continue the clear liquid diet.      6 hours prior: drink 1 (one) 8 oz glass of NuLYTELY  solution every 15 minutes until the remaining solution (approximately 8 glasses of 8 oz) is gone. Keep the solution refrigerated.      4 hours prior:   o STOP consuming all liquids   o Do not take anything by mouth during this time.   o Allow extra time to travel to your procedure as you may need to stop and use a restroom along the way.    You are ready for the procedure, if you followed all instructions and your stool is no longer formed, but clear or yellow liquid. If you are unsure whether your colon is clean, please call our office at [phone] before you leave for your appointment.    Bring the following to your procedure:  - Insurance Card/Photo ID.   - List of current medications including over-the-counter medications and supplements.   - Your rescue inhaler if you currently use one to control asthma.    Canceling or rescheduling your appointment:   If you must cancel or reschedule your appointment, please call [phone] as soon as possible.    DIRECTIONS TO THE ENDOSCOPY DEPARTMENT   From the north ([facility])  Take 35W South, exit on [address]. Get into the left hand barrett, turn left (east), go one-half mile to [address] and turn left. Go north to the first stoplight, take a right on [address] and follow it to the Emergency entrance.    From the south ([facility])  Take 35N to the 35E split and exit on [address]. On [address], turn left (west) to [address]. Turn right (north) on [address]. Go north to the first stoplight, take a right on [address] and follow it to the Emergency entrance.    From the east via 35E ([facility])  Take 35E south to [place] [address] exit. Turn right on [address] [place] [address]. Go west to [address]. Turn right (north) on [address]. Go to the first stoplight, take a right and follow on [address] to the Emergency entrance.  From the east via [address] ([facility])  Take [address] West to [address]. Turn left (south) on [address] to [address]. Turn left (east) on [address] and follow it to the Emergency entrance.    From the west via [address] ([place], [place])  Take [address] east to [address]. Turn right (south) on [address] to [address]. Turn left (east) on [address] and follow it to the Emergency entrance.    COLONOSCOPY PRE-PROCEDURE CHECKLIST  If you have diabetes, ask your regular doctor for diet and medication restrictions.  If you take an anticoagulant or anti-platelet medication (such as Coumadin , Lovenox , Pradaxa , Xarelto , Eliquis , etc.), please call your primary doctor for advice on holding this medication.  If you take aspirin you may continue to do so.  If you are or may be pregnant, please discuss the risks and benefits of this procedure with your doctor.    What happens during a colonoscopy?  Plan to spend up to two hours, starting at registration time, at the endoscopy center the day of your procedure. The colonoscopy takes an average of 15 to 30 minutes. Recovery time is about 30 minutes.    Before the exam:    You will change into a gown.    Your medical history and medication list will be reviewed with you, unless that has been done over the phone prior to the procedure.     A nurse will insert an intravenous (IV) line into your hand or arm.    The doctor will meet with you and will give you a consent form  to sign.    During the exam:     Medicine will be given through the IV line to help you relax.     Your heart rate and oxygen levels will be monitored. If your blood pressure is low, you may be given fluids through the IV line.     The doctor will insert a flexible hollow tube, called a colonoscope, into your rectum. The scope will be advanced slowly through the large intestine (colon).    You may have a feeling of fullness or pressure.     If an abnormal tissue or a polyp is found, the doctor may remove it through the endoscope for closer examination, or biopsy. Tissue removal is painless    After the exam:           Any tissue samples removed during the exam will be sent to a lab for evaluation. It may take 5-7 working days for you to be notified of the results.     A nurse will provide you with complete discharge instructions before you leave the endoscopy center. Be sure to ask the nurse for specific instructions if you take blood thinners such as Aspirin, Coumadin or Plavix.     The doctor will prepare a full report for you and for the physician who referred you for the procedure.     Your doctor will talk with you about the initial results of your exam.      Medication given during the exam will prohibit you from driving for the rest of the day.     Following the exam, you may resume your normal diet. Your first meal should be light, no greasy foods. Avoid alcohol until the next day.     You may resume your regular activities the day after the procedure.     LOW-FIBER DIET  Foods RECOMMENDED Foods to AVOID   Breads, Cereal, Rice and Pasta:   White bread, rolls, biscuits, croissant and esteban toast.   Waffles, Vietnamese toast and pancakes.   White rice, noodles, pasta, macaroni and peeled cooked potatoes.   Plain crackers and saltines.   Cooked cereals: farina, cream of rice.   Cold cereals: Puffed Rice , Rice Krispies , Corn Flakes  and Special K    Breads, Cereal, Rice and Pasta:   Breads or rolls with nuts,  seeds or fruit.   Whole wheat, pumpernickel, rye breads and cornbread.   Potatoes with skin, brown or wild rice, and kasha (buckwheat).     Vegetables:   Tender cooked and canned vegetables without seeds: carrots, asparagus tips, green or wax beans, pumpkin, spinach, lima beans. Vegetables:   Raw or steamed vegetables.   Vegetables with seeds.   Sauerkraut.   Winter squash, peas, broccoli, Brussel sprouts, cabbage, onions, cauliflower, baked beans, peas and corn.   Fruits:   Strained fruit juice.   Canned fruit, except pineapple.   Ripe bananas and melon. Fruits:   Prunes and prune juice.   Raw fruits.   Dried fruits: figs, dates and raisins.   Milk/Dairy:   Milk: plain or flavored.   Yogurt, custard and ice cream.   Cheese and cottage cheese Milk/Dairy:     Meat and other proteins:   ground, well-cooked tender beef, lamb, ham, veal, pork, fish, poultry and organ meats.   Eggs.   Peanut butter without nuts. Meat and other proteins:   Tough, fibrous meats with gristle.   Dry beans, peas and lentils.   Peanut butter with nuts.   Tofu.   Fats, Snack, Sweets, Condiments and Beverages:   Margarine, butter, oils, mayonnaise, sour cream and salad dressing, plain gravy.   Sugar, hard candy, clear jelly, honey and syrup.   Spices, cooked herbs, bouillon, broth and soups made with allowed vegetable, ketchup and mustard.   Coffee, tea and carbonated drinks.   Plain cakes, cookies and pretzels.   Gelatin, plain puddings, custard, ice cream, sherbet and popsicles. Fats, Snack, Sweets, Condiments and Beverages:   Nuts, seeds and coconut.   Jam, marmalade and preserves.   Pickles, olives, relish and horseradish.   All desserts containing nuts, seeds, dried fruit and coconut; or made from whole grains or bran.   Candy made with nuts or seeds.   Popcorn.

## 2017-10-06 NOTE — LETTER
October 3, 2017      Gemma Cowart  [address] [facility] [address]  [place] [address]        Thank you for choosing [facility]. You are scheduled for the following services.   Date:     10/06/2017   Friday  Procedure: UPPER ENDOSCOPY & COLONOSCOPY  Doctor:       Dr. Shashank Ortiz    Arrival Time:  7:00 AM   *check in at Emergency/Endoscopy desk*  *You will have your INR blood test done upon arrival.  Procedure Time:  8:00 AM  Location:   [facility]        Endoscopy Department, First Floor (Enter through ER Doors) *         [address] [place], [place] [address]      [phone] or [phone] ([facility]) to reschedule    NuLYTELY  PREP  Upper Endoscopy or Esophagogastroduodenoscopy (EGD) is a test performed to evaluate symptoms of persistent abdominal pain, nausea, vomiting or difficulty swallowing. It may also be used to treat various conditions of the upper gastrointestinal (GI) tract, such as bleeding, narrowing or abnormal growths. During the procedure, a doctor examines the lining of your esophagus, stomach and the first part of your small intestine through a thin, flexible tube called an endoscope. If growths or other abnormalities are found during the procedure, the doctor may remove the abnormal tissue (biopsy) for further examination.     Colonoscopy is the most accurate test to detect colon polyps and colon cancer; and the only test where polyps can be removed. During this procedure, a doctor examines the lining of your large intestine and rectum through a flexible tube.     Transportation  Arrange for a ride for the day of your procedures with a responsible adult.  A taxi ride is not an option unless you are accompanied by a responsible adult. If you fail to arrange transportation with a responsible adult, your procedure will be cancelled and rescheduled.          Fill your prescription for NuLYTELY  at your local pharmacy. Please call our office  at [phone] if you did not receive a prescription.    PREPARATION FOR COLONOSCOPY    7 days before:    Discontinue fiber supplements and medications containing iron. This includes Metamucil  and Fibercon ; and multivitamins with iron.  3 days before:    Begin a low-fiber diet. A low-fiber diet helps making the cleanout more effective. For additional details on low-fiber diet, please refer to the table on the last page.  2 days before:    Continue the low-fiber diet.     Drink at least 8 glasses of water throughout the day.     Stop eating solid foods at 11:45 pm.  1 day before:    In the morning: begin a clear liquid diet (liquids you can see through).     Examples of a clear liquid diet include: water, clear broth or bouillon, Gatorade, Pedialyte or Powerade, carbonated and non-carbonated soft drinks (Sprite , 7-Up , ginger ale), strained fruit juices without pulp (apple, white grape, white cranberry), Jell-O  and popsicles.     The following are not allowed on a clear liquid diet: red liquids, alcoholic beverages, coffee, dairy products (milk, creamer, and yogurt), protein shakes, creamy broths, juice with pulp and chewing tobacco.    At 6pm: drink 1 (one) 8 oz glass of NuLYTELY  solution every 15 minutes until half the bottle (approximately 8 glasses of 8 oz) is gone. Keep the solution refrigerated. Do not drink any other liquids while you are drinking the NuLYTELY  solution.    Over the course of the evening, drink an additional   liter of clear liquids and continue clear liquid diet.    COLON CLEANSING TIPS: drink adequate amounts of fluids before and after your colon cleansing to prevent dehydration. Stay near a toilet because you will have diarrhea. Even if you are sitting on the toilet, continue to drink the cleansing solution every 15 minutes. If you feel nauseous or vomit, rinse your mouth with water, take a 15 to 30-minute-break and then continue drinking the solution. You will be uncomfortable until  the stool has flushed from your colon (in about 2 to 4 hours). You may feel chilled.    DAY OF YOUR PROCEDURE    You may take all of your morning medications including blood pressure medications, blood thinners (if you have not been instructed to stop these by our office), methadone, and anti-seizure medications with sips of water 3 hours prior to your procedure or earlier. Do not take insulin or vitamins prior to your procedure. Continue the clear liquid diet.      6 hours prior: drink 1 (one) 8 oz glass of NuLYTELY  solution every 15 minutes until the remaining solution (approximately 8 glasses of 8 oz) is gone. Keep the solution refrigerated.      4 hours prior:   o STOP consuming all liquids   o Do not take anything by mouth during this time.   o Allow extra time to travel to your procedure as you may need to stop and use a restroom along the way.    You are ready for the procedure, if you followed all instructions and your stool is no longer formed, but clear or yellow liquid. If you are unsure whether your colon is clean, please call our office at [phone] before you leave for your appointment.    Bring the following to your procedure:  - Insurance Card/Photo ID.   - List of current medications including over-the-counter medications and supplements.   - Your rescue inhaler if you currently use one to control asthma.    Canceling or rescheduling your appointment:   If you must cancel or reschedule your appointment, please call [phone] as soon as possible.    DIRECTIONS TO THE ENDOSCOPY DEPARTMENT   From the north ([facility])  Take 35W South, exit on [address]. Get into the left hand barrett, turn left (east), go one-half mile to [address] and turn left. Go north to the first stoplight, take a right on [address] and follow it to the Emergency entrance.    From the south ([facility])  Take 35N to the 35E split and exit on [address]. On [address], turn left (west) to [address]. Turn right (north) on [address]. Go north to the first stoplight, take a right on [address] and follow it to the Emergency entrance.    From the east via 35E ([facility])  Take 35E south to [place] [address] exit. Turn right on [place] [address]. Go west to [address]. Turn right (north) on [address]. Go to the first stoplight, take a right and follow on [address] to the Emergency entrance.  From the east via [address] ([facility])  Take [address] West to [address]. Turn left (south) on [address] to [address]. Turn left (east) on [address] and follow it to the Emergency entrance.    From the west via [address] ([place], [place])  Take [address] 13 east to [address]. Turn right (south) on [address] to [address]. Turn left (east) on [address] and follow it to the Emergency entrance.    COLONOSCOPY PRE-PROCEDURE CHECKLIST  If you have diabetes, ask your regular doctor for diet and medication restrictions.  If you take an anticoagulant or anti-platelet medication (such as Coumadin , Lovenox , Pradaxa , Xarelto , Eliquis , etc.), please call your primary doctor for advice on holding this medication.  If you take aspirin you may continue to do so.  If you are or may be pregnant, please discuss the risks and benefits of this procedure with your doctor.    What happens during a colonoscopy?  Plan to spend up to two hours, starting at registration time, at the endoscopy center the day of your procedure. The colonoscopy takes an average of 15 to 30 minutes. Recovery time is about 30 minutes.    Before the exam:    You will change into a gown.    Your medical history and medication list will be reviewed with you, unless that has been done over the phone prior to the procedure.     A nurse will insert an intravenous (IV) line into your hand or arm.    The doctor will meet with you and will give you a consent form  to sign.    During the exam:     Medicine will be given through the IV line to help you relax.     Your heart rate and oxygen levels will be monitored. If your blood pressure is low, you may be given fluids through the IV line.     The doctor will insert a flexible hollow tube, called a colonoscope, into your rectum. The scope will be advanced slowly through the large intestine (colon).    You may have a feeling of fullness or pressure.     If an abnormal tissue or a polyp is found, the doctor may remove it through the endoscope for closer examination, or biopsy. Tissue removal is painless    After the exam:           Any tissue samples removed during the exam will be sent to a lab for evaluation. It may take 5-7 working days for you to be notified of the results.     A nurse will provide you with complete discharge instructions before you leave the endoscopy center. Be sure to ask the nurse for specific instructions if you take blood thinners such as Aspirin, Coumadin or Plavix.     The doctor will prepare a full report for you and for the physician who referred you for the procedure.     Your doctor will talk with you about the initial results of your exam.      Medication given during the exam will prohibit you from driving for the rest of the day.     Following the exam, you may resume your normal diet. Your first meal should be light, no greasy foods. Avoid alcohol until the next day.     You may resume your regular activities the day after the procedure.     LOW-FIBER DIET  Foods RECOMMENDED Foods to AVOID   Breads, Cereal, Rice and Pasta:   White bread, rolls, biscuits, croissant and esteban toast.   Waffles, Romanian toast and pancakes.   White rice, noodles, pasta, macaroni and peeled cooked potatoes.   Plain crackers and saltines.   Cooked cereals: farina, cream of rice.   Cold cereals: Puffed Rice , Rice Krispies , Corn Flakes  and Special K    Breads, Cereal, Rice and Pasta:   Breads or rolls with nuts,  seeds or fruit.   Whole wheat, pumpernickel, rye breads and cornbread.   Potatoes with skin, brown or wild rice, and kasha (buckwheat).     Vegetables:   Tender cooked and canned vegetables without seeds: carrots, asparagus tips, green or wax beans, pumpkin, spinach, lima beans. Vegetables:   Raw or steamed vegetables.   Vegetables with seeds.   Sauerkraut.   Winter squash, peas, broccoli, Brussel sprouts, cabbage, onions, cauliflower, baked beans, peas and corn.   Fruits:   Strained fruit juice.   Canned fruit, except pineapple.   Ripe bananas and melon. Fruits:   Prunes and prune juice.   Raw fruits.   Dried fruits: figs, dates and raisins.   Milk/Dairy:   Milk: plain or flavored.   Yogurt, custard and ice cream.   Cheese and cottage cheese Milk/Dairy:     Meat and other proteins:   ground, well-cooked tender beef, lamb, ham, veal, pork, fish, poultry and organ meats.   Eggs.   Peanut butter without nuts. Meat and other proteins:   Tough, fibrous meats with gristle.   Dry beans, peas and lentils.   Peanut butter with nuts.   Tofu.   Fats, Snack, Sweets, Condiments and Beverages:   Margarine, butter, oils, mayonnaise, sour cream and salad dressing, plain gravy.   Sugar, hard candy, clear jelly, honey and syrup.   Spices, cooked herbs, bouillon, broth and soups made with allowed vegetable, ketchup and mustard.   Coffee, tea and carbonated drinks.   Plain cakes, cookies and pretzels.   Gelatin, plain puddings, custard, ice cream, sherbet and popsicles. Fats, Snack, Sweets, Condiments and Beverages:   Nuts, seeds and coconut.   Jam, marmalade and preserves.   Pickles, olives, relish and horseradish.   All desserts containing nuts, seeds, dried fruit and coconut; or made from whole grains or bran.   Candy made with nuts or seeds.   Popcorn.

## 2017-10-06 NOTE — DISCHARGE INSTRUCTIONS

## 2017-10-06 NOTE — IP AVS SNAPSHOT
MRN:[id number]                      After Visit Summary   10/6/2017    Gemma Cowart    MRN: [id number]           Thank you!     Thank you for choosing [facility] for your care. Our goal is always to provide you with excellent care. Hearing back from our patients is one way we can continue to improve our services. Please take a few minutes to complete the written survey that you may receive in the mail after you visit. If you would like to speak to someone directly about your visit please contact Patient Relations at [phone]. Thank you!          Patient Information     Date Of Birth          1945        About your hospital stay     You were admitted on:  October 6, 2017 You last received care in the:  [facility]    You were discharged on:  October 6, 2017       Who to Call     For medical emergencies, please call 911.  For non-urgent questions about your medical care, please call your primary care provider or clinic, [phone]  For questions related to your surgery, please call your surgery clinic        Attending Provider     Provider Specialty    Shashank Ortiz MD Gastroenterology       Primary Care Provider Office Phone # Fax #    Alva MD Aylin [phone]      Your next 10 appointments already scheduled     Oct 10, 2017  9:30 AM CDT   Return Visit with  Oncology Nurse   [facility] ([facility])    [facility]  [address]  [facility] [address] [phone]            Oct 10, 2017 10:15 AM CDT   DX HIP/PELVIS/SPINE with MADX1   [facility] ([facility])    [address]  [address]  [facility] [address] [phone]           Please do not take any of the following 24 hours prior to the day of your exam: vitamins, calcium tablets, antacids.  If possible, please wear clothes without metal (snaps, zippers). A sweatsuit works  well.            Oct 16, 2017  8:15 AM CDT   Anticoagulation Visit with [facility]   [facility] [place] ([facility])    [address]  [address]  [place] [address] [phone]            Oct 18, 2017  8:30 AM CDT   Return Visit with Jyoti Narvaez MD   [facility] ([facility])    [facility] [place]  [address]  [place] [id number] [phone]              Further instructions from your care team         Understanding Diverticulosis and Diverticulitis     Pouches or diverticula usually occur in the lower part of the colon called the sigmoid.      Diverticulitis occurs when the pouches become inflamed.     The colon (large intestine) is the last part of the digestive tract. It absorbs water from stool and changes it from a liquid to a solid. In certain cases, small pouches called diverticula can form in the colon wall. This condition is called diverticulosis. The pouches can become infected. If this happens, it becomes a more serious problem called diverticulitis. These problems can be painful. But they can be managed.   Managing Your Condition  Diet changes or taking medications are often tried first. These may be enough to bring relief. If the case is bad, surgery may be done. You and your doctor can discuss the plan that is best for you.  If You Have Diverticulosis  Diet changes are often enough to control symptoms. The main changes are adding fiber (roughage) and drinking more water. Fiber absorbs water as it travels through your colon. This helps your stool stay soft and move smoothly. Water helps this process. If needed, you may be told to take over-the-counter stool softeners. To help relieve pain, antispasmodic medications may be prescribed.  If You Have Diverticulitis  Treatment depends on how bad your symptoms are.  For mild symptoms: You may be put on a liquid diet for a short time. You may also be  prescribed antibiotics. If these two steps relieve your symptoms, you may then be prescribed a high-fiber diet. If you still have symptoms, your doctor will discuss further treatment options with you.  For severe symptoms: You may need to be admitted to the hospital. There, you can be given IV antibiotics and fluids. Once symptoms are under control, the above treatments may be tried. If these don t control your condition, your doctor may discuss the option of having surgery with you.  [place] to Colon Health  Help keep your colon healthy with a diet that includes plenty of high-fiber fruits, vegetables, and whole grains. Drink plenty of liquids like water and juice. Your doctor may also recommend avoiding seeds and nuts.          0459-9636 [facility], [address] [place] [address], [place] [address]. All rights reserved. This information is not intended as a substitute for professional medical care. Always follow your healthcare professional's instructions.    Eating a High-Fiber Diet  Fiber is what gives strength and structure to plants. Most grains, beans, vegetables, and fruits contain fiber. Foods rich in fiber are often low in calories and fat, and they fill you up more. They may also reduce your risks for certain health problems. To find out the amount of fiber in canned, packaged, or frozen foods, read the  Nutrition Facts  label. It tells you how much fiber is in a serving.      Types of Fiber and Their Benefits  There are two types of fiber: insoluble and soluble. They both aid digestion and help you maintain a healthy weight.  Insoluble fiber: This is found in whole grains, cereals, certain fruits and vegetables (such as apple skin, corn, and carrots). Insoluble fiber may prevent constipation and reduce the risk of certain types of cancer.   Soluble fiber: This type of fiber is in oats, beans, and certain fruits and vegetables (such as strawberries and peas). Soluble fiber can reduce cholesterol (which may help  "lower the risk of heart disease), and helps control blood sugar levels.  Look for High-Fiber Foods  Whole-grain breads and cereals: Try to eat 6-8 ounces a day. Include wheat and oat bran cereals, whole-wheat muffins or toast, and corn tortillas in your meals.  Fruits: Try to eat 2 cups a day. Apples, oranges, strawberries, pears, and bananas are good sources. (Note: Fruit juice is low in fiber.)  Vegetables: Try to eat 3 cups a day. Add asparagus, carrots, broccoli, peas, and corn to your meals.  Legumes (beans): One cup of cooked lentils gives you over 15 grams of fiber. Try navy beans, lentils, and chickpeas.  Seeds:  A small handful of seeds gives you about 3 grams of fiber. Try sunflower seeds.    Keep Track of Your Fiber  A healthy diet includes 31 grams of fiber a day if you have a 2,000-calorie diet. Keep track of how much fiber you eat. Start by reading food labels. Then eat a variety of foods high in fiber. Ask your doctor about supplemental fiber products.            0940-4911 [facility], [address] [place] [address], [place] [address]. All rights reserved. This information is not intended as a substitute for professional medical care. Always follow your healthcare professional's instructions.    Pending Results     No orders found from 10/4/2017 to 10/7/2017.            Admission Information     Date & Time Provider Department Dept. Phone    10/6/2017 Shashank Ortiz MD [facility] [phone]      Your Vitals Were     Blood Pressure Respirations Last Period Pulse Oximetry          110/64 15 09/24/1990 94%        MyChart Information     [employer] lets you send messages to your doctor, view your test results, renew your prescriptions, schedule appointments and more. To sign up, go to [email or web address] . Click on \"Log in\" on the left side of the screen, which will take you to the Welcome page. Then click on \"Sign up Now\" on the right side of the page.     You will be asked to enter " the access code listed below, as well as some personal information. Please follow the directions to create your username and password.     Your access code is: [email or web address]  Expires: 2017  9:07 AM     Your access code will  in 90 days. If you need help or a new code, please call your [facility] clinic or [phone].        Care EveryWhere ID     This is your Care EveryWhere ID. This could be used by other organizations to access your [facility] medical records  [id number]        Equal Access to Services     [facility]: Hadii kavitha browning hadhola Somisa, waaxda luqadaha, qaybta kaalmagopi smith, bg hines . [facility] [phone].    ATENCIÓN: Si habla español, tiene a marcum disposición servicios gratuitos de asistencia lingüística. Llame al [phone].    We comply with applicable federal civil rights laws and [place] laws. We do not discriminate on the basis of race, color, national origin, age, disability, sex, sexual orientation, or gender identity.               Review of your medicines      CONTINUE these medicines which have NOT CHANGED        Dose / Directions    ACE/ARB NOT PRESCRIBED (INTENTIONAL)   Used for:  Type 2 diabetes mellitus with other specified complication (H)        ACE & ARB not prescribed due to Other: normal tensive, low microalbumuria   Refills:  0       ASPIRIN NOT PRESCRIBED   Commonly known as:  INTENTIONAL   Used for:  Type 2 diabetes mellitus with other specified complication (H)        Antiplatelet medication not prescribed intentionally due to Current anticoagulant therapy (warfarin/enoxaparin)   Refills:  0       B-12 1000 MCG Tbcr   Used for:  Vitamin B12 deficiency        Dose:  2000 mcg   Take 2,000 mcg by mouth daily   Quantity:  180 tablet   Refills:  1       blood glucose lancets standard   Commonly known as:  no brand specified   Used for:  Type 2 diabetes mellitus with other specified complication (H)        Use to test blood sugars  2 times daily or as directed. Please dispense lancets for Relion Confirm.   Quantity:  2 Box   Refills:  3       blood glucose monitoring test strip   Commonly known as:  no brand specified   Used for:  Type 2 diabetes mellitus with other specified complication (H)        Use to test blood sugars 2 times daily or as directed. Please dispense Relion Confirm test strips   Quantity:  200 each   Refills:  3       CALCIUM CITRATE + D 315-250 MG-UNIT Tabs per tablet   Used for:  Vitamin D deficiency disease   Generic drug:  calcium citrate-vitamin D        Dose:  2 tablet   Take 2 tablets by mouth 2 times daily   Quantity:  120 tablet   Refills:  0       calcium citrate 950 MG tablet   Commonly known as:  CALCITRATE        Dose:  1 tablet   Take 1 tablet by mouth daily   Refills:  0       CERAVE Crea        Dose:  1 Application   Externally apply 1 Application topically as needed   Refills:  0       clobetasol 0.05 % cream   Commonly known as:  TEMOVATE   Used for:  Eczema, unspecified eczema        Apply sparingly to affected area twice daily as needed.  Do not apply to face.   Quantity:  270 g   Refills:  3       desonide 0.05 % cream   Commonly known as:  DESOWEN   Used for:  Atopic neurodermatitis        Apply sparingly to affected area three times daily as needed.   Quantity:  60 g   Refills:  3       hydrocortisone 0.2 % cream   Commonly known as:  WESTCORT   Used for:  Atopic neurodermatitis        Apply to AA QD-BID x 1 week then PRN only   Quantity:  60 g   Refills:  3       hydrocortisone 2.5 % cream   Used for:  Atopic neurodermatitis        Apply to AA twice daily x 1 week then PRN only.   Quantity:  30 g   Refills:  3       * hydrOXYzine 25 MG tablet   Commonly known as:  ATARAX   Used for:  Eczema, unspecified eczema        Dose:  25-50 mg   Take 1-2 tablets (25-50 mg) by mouth At Bedtime   Quantity:  180 tablet   Refills:  1       * hydrOXYzine 25 MG tablet   Commonly known as:  ATARAX   Used for:  Atopic  neurodermatitis        Dose:  25-50 mg   Take 1-2 tablets (25-50 mg) by mouth daily as needed for itching   Quantity:  60 tablet   Refills:  11       letrozole 2.5 MG tablet   Commonly known as:  FEMARA   Used for:  History of left breast cancer        Dose:  2.5 mg   Take 1 tablet (2.5 mg) by mouth daily   Quantity:  90 tablet   Refills:  3       MAGNESIUM OXIDE PO   Indication:  with zinc (15 mg)        Dose:  250 mg   Take 250 mg by mouth   Refills:  0       metFORMIN 500 MG 24 hr tablet   Commonly known as:  GLUCOPHAGE-XR   Used for:  Type 2 diabetes mellitus with other specified complication (H)        Dose:  1000 mg   Take 2 tablets (1,000 mg) by mouth daily (with dinner)   Quantity:  180 tablet   Refills:  3       PARoxetine 30 MG tablet   Commonly known as:  PAXIL   Used for:  Anxiety        Dose:  60 mg   Take 2 tablets (60 mg) by mouth At Bedtime   Quantity:  180 tablet   Refills:  3       rosuvastatin 10 MG tablet   Commonly known as:  CRESTOR   Used for:  Hyperlipidemia LDL goal <100        TAKE ONE TABLET BY MOUTH ONCE DAILY   Quantity:  90 tablet   Refills:  0       TYLENOL 500 MG tablet   Generic drug:  acetaminophen        Dose:  1000 mg   Take 2 tablets (1,000 mg) by mouth every 8 hours as needed for mild pain   Quantity:  100 tablet   Refills:  0       vitamin D 2000 UNITS Caps        Dose:  1 capsule   Take 1 capsule by mouth daily.   Refills:  0       warfarin 5 MG tablet   Commonly known as:  COUMADIN   Used for:  History of deep venous thrombosis        TAKE ONE TO ONE & ONE-HALF TABLETS BY MOUTH DAILY   Quantity:  135 tablet   Refills:  3       * Notice:  This list has 2 medication(s) that are the same as other medications prescribed for you. Read the directions carefully, and ask your doctor or other care provider to review them with you.             Protect others around you: Learn how to safely use, store and throw away your medicines at www.disposemymeds.org.             Medication List:  This is a list of all your medications and when to take them. Check marks below indicate your daily home schedule. Keep this list as a reference.      Medications           Morning Afternoon Evening Bedtime As Needed    ACE/ARB NOT PRESCRIBED (INTENTIONAL)   ACE & ARB not prescribed due to Other: normal tensive, low microalbumuria                                ASPIRIN NOT PRESCRIBED   Commonly known as:  INTENTIONAL   Antiplatelet medication not prescribed intentionally due to Current anticoagulant therapy (warfarin/enoxaparin)                                B-12 1000 MCG Tbcr   Take 2,000 mcg by mouth daily                                blood glucose lancets standard   Commonly known as:  no brand specified   Use to test blood sugars 2 times daily or as directed. Please dispense lancets for Relion Confirm.                                blood glucose monitoring test strip   Commonly known as:  no brand specified   Use to test blood sugars 2 times daily or as directed. Please dispense Relion Confirm test strips                                CALCIUM CITRATE + D 315-250 MG-UNIT Tabs per tablet   Take 2 tablets by mouth 2 times daily   Generic drug:  calcium citrate-vitamin D                                calcium citrate 950 MG tablet   Commonly known as:  CALCITRATE   Take 1 tablet by mouth daily                                CERAVE Crea   Externally apply 1 Application topically as needed                                clobetasol 0.05 % cream   Commonly known as:  TEMOVATE   Apply sparingly to affected area twice daily as needed.  Do not apply to face.                                desonide 0.05 % cream   Commonly known as:  DESOWEN   Apply sparingly to affected area three times daily as needed.                                hydrocortisone 0.2 % cream   Commonly known as:  WESTCORT   Apply to AA QD-BID x 1 week then PRN only                                hydrocortisone 2.5 % cream   Apply to AA twice daily x  1 week then PRN only.                                * hydrOXYzine 25 MG tablet   Commonly known as:  ATARAX   Take 1-2 tablets (25-50 mg) by mouth At Bedtime                                * hydrOXYzine 25 MG tablet   Commonly known as:  ATARAX   Take 1-2 tablets (25-50 mg) by mouth daily as needed for itching                                letrozole 2.5 MG tablet   Commonly known as:  FEMARA   Take 1 tablet (2.5 mg) by mouth daily                                MAGNESIUM OXIDE PO   Take 250 mg by mouth                                metFORMIN 500 MG 24 hr tablet   Commonly known as:  GLUCOPHAGE-XR   Take 2 tablets (1,000 mg) by mouth daily (with dinner)                                PARoxetine 30 MG tablet   Commonly known as:  PAXIL   Take 2 tablets (60 mg) by mouth At Bedtime                                rosuvastatin 10 MG tablet   Commonly known as:  CRESTOR   TAKE ONE TABLET BY MOUTH ONCE DAILY                                TYLENOL 500 MG tablet   Take 2 tablets (1,000 mg) by mouth every 8 hours as needed for mild pain   Generic drug:  acetaminophen                                vitamin D 2000 UNITS Caps   Take 1 capsule by mouth daily.                                warfarin 5 MG tablet   Commonly known as:  COUMADIN   TAKE ONE TO ONE & ONE-HALF TABLETS BY MOUTH DAILY                                * Notice:  This list has 2 medication(s) that are the same as other medications prescribed for you. Read the directions carefully, and ask your doctor or other care provider to review them with you.

## 2017-10-06 NOTE — LETTER
October 11, 2017      Gemma Cowart  [address] [place] [address]          Dear Gemma,     Please find the results of your endoscopy and colonoscopy enclosed. Your endoscopy looks good -- everything is well healed at the site of your gastric bypass. Your colonoscopy did show some hemorrhoids, which was likely the source of bleeding in your stool. Otherwise, aside from some diverticula (small outpouchings of the colon which can be caused by constipation), the colonoscopy also looked normal.     Hopefully you are starting to feel better! Please let me know if you have any concerns or questions.     Sincerely,     Alva Casas MD   Internal Medicine-Pediatrics   Resulted Orders   COLONOSCOPY   Result Value Ref Range    COLONOSCOPY       [facility]  _______________________________________________________________________________  Patient Name: Gemma Cowart        Procedure Date: 10/6/2017 7:22 AM  MRN: [id number]                       Account Number: [id number]  YOB: 1945             Admit Type: Outpatient  Age: 71                               Gender: Female  Attending MD: Shashank Ortiz MD Total Sedation Time: 31 minutes  Instrument Name: 134                    _______________________________________________________________________________     Procedure:                Colonoscopy  Indications:              High risk colon cancer surveillance: Personal                             history of colonic polyps  Providers:                Shashank Ortiz MD (Doctor)  Referring MD:             Alva Casas (Referring MD)  Medicines:                Fentanyl 100 micrograms IV, Midazolam 2 mg IV  Complications:            No immediate complications.  ________________________________ _______________________________________________  Procedure:                Pre-Anesthesia Assessment:                            - Prior to the procedure, a History and Physical                              was performed, and patient medications and                             allergies were reviewed. The patient is competent.                             The risks and benefits of the procedure and the                             sedation options and risks were discussed with the                             patient. All questions were answered and informed                             consent was obtained. Patient identification and                             proposed procedure were verified by the physician                             in the pre-procedure area. Mental Status                             Examination: alert and oriented. Airway                             Examination: normal oropharyngeal airway and neck                             mobility. Respiratory Examination: clear  to                             auscultation. CV Examination: normal. Prophylactic                             Antibiotics: The patient does not require                             prophylactic antibiotics. Prior Anticoagulants: The                             patient has taken Coumadin (warfarin), last dose                             was 5 days prior to procedure. ASA Grade                             Assessment: II - A patient with mild systemic                             disease. After reviewing the risks and benefits,                             the patient was deemed in satisfactory condition to                             undergo the procedure. The anesthesia plan was to                             use moderate sedation / analgesia (conscious                             sedation). Immediately prior to administration of                             medications, the patient was re-assessed for                             adequacy to receive sedatives. The heart rate,                              respiratory rate, oxygen saturations, blood                             pressure, adequacy of pulmonary  ventilation, and                             response to care were monitored throughout the                             procedure. The physical status of the patient was                             re-assessed after the procedure.                            After obtaining informed consent, the colonoscope                             was passed under direct vision. Throughout the                             procedure, the patient's blood pressure, pulse, and                             oxygen saturations were monitored continuously. The                             Olympus Peds Colonoscope Model #PCF-H190L,                             [place]#134, SN#[id number] was introduced through the                             anus and advanced to the cecum, identified by                             appendiceal orifice and ileocecal valve. The                             colonoscopy was  performed without difficulty. The                             patient tolerated the procedure well. The quality                             of the bowel preparation was good.                                                                                   Findings:       Multiple medium-mouthed diverticula were found in the sigmoid colon.       Non-bleeding internal hemorrhoids were found during retroflexion. The        hemorrhoids were medium-sized.       The exam was otherwise without abnormality.                                                                                   Impression:               - Diverticulosis in the sigmoid colon.                            - Non-bleeding internal hemorrhoids.                            - The examination was otherwise normal.                            - No specimens collected.  Recommendation:           - Repeat colonoscopy in 5 years for surveillance.                                                                                   Pro cedure Code(s):       --- Professional ---       13984,  Colonoscopy, flexible; diagnostic, including collection of        specimen(s) by brushing or washing, when performed (separate procedure)    CPT copyright 2016 American Medical Association. All rights reserved.    The codes documented in this report are preliminary and upon  review may   be revised to meet current compliance requirements.    Shashank Ortiz M.D.  ______________________  Shashank Ortiz MD  10/6/2017 8:50:37 AM  Number of Addenda: 0    Note Initiated On: 10/6/2017 7:22 AM  MRN:                      [id number]  Procedure Date:           10/6/2017 7:22:07 AM  Scope Withdrawal Time: 0 hours 10 minutes 16 seconds   Total Procedure Duration: 0 hours 13 minutes 57 seconds   Estimated Blood Loss:       Scope In: 8:13:10 AM  Scope Out: 8:27:07 AM

## 2017-10-06 NOTE — CONSULTS
Pre-Endoscopy History and Physical     Gemma Cowart MRN# [id number]   YOB: 1945 Age: 71 year old     Date of Procedure: 10/6/2017  Primary care provider: Alva Viera  Type of Endoscopy: colonoscopy and esophagogastroduodenoscopy (upper GI endoscopy)  Reason for Procedure: personal history of polyps, abdominal pain  Type of Anesthesia Anticipated: Moderate (conscious) sedation    HPI:    Gemma is a 71 year old female who will be undergoing the above procedure.      A history and physical has been performed. The patient's medications and allergies have been reviewed. The risks and benefits of the procedure and the sedation options and risks were discussed with the patient.  All questions were answered and informed consent was obtained.      She denies a personal or family history of anesthesia complications or bleeding disorders.     Allergies   Allergen Reactions     Bacitracin Unknown     Phenylene         Current Facility-Administered Medications   Medication     lidocaine 1 % 1 mL     lidocaine (LMX4) kit 2.5 g     sodium chloride (PF) 0.9% PF flush 3 mL     sodium chloride (PF) 0.9% PF flush 3 mL     0.9% sodium chloride BOLUS     sodium chloride (PF) 0.9% PF flush 3 mL     ondansetron (ZOFRAN) injection 4 mg       Patient Active Problem List   Diagnosis     Nontoxic uninodular goiter     Rosacea     BRETT (iron deficiency anemia)     Complications of gastric bypass surgery     Vitamin D deficiency disease     Vitamin B12 deficiency     Eczema     Dyshydrosis     Hyperlipidemia LDL goal <100     Anxiety     Insomnia     Health Care Home     Type 2 diabetes mellitus with other specified complication (H)     Psoriasis     Long-term (current) use of anticoagulants [Z79.01]     Advanced directives, counseling/discussion     Malignant neoplasm of left female breast (H)     Morbid obesity (H)     CKD (chronic kidney disease) stage 3, GFR 30-59 ml/min     Personal history of DVT (deep vein  thrombosis)        Past Medical History:   Diagnosis Date     Anemia     iron deficiency anemia     Anxiety      Breast cancer (H) 2012    Bilateral Masectomies 9/2012     Breast cancer (H) 9/5/2012     Complications of gastric bypass surgery      Diabetes mellitus (H)     diet controlled     DVT (deep venous thrombosis) (H)      DVT, bilateral lower limbs (H)      Dyspnea on exertion      Eczema      Fracture of left knee region     patella - vertical     History of kidney stones      Obesity      Polyneuropathy      Pruritus     generalized     Rosacea      Tachycardia     with no treatment     Thyroid disease         Past Surgical History:   Procedure Laterality Date     ABDOMEN SURGERY  2000    gastric bypass 2000     COLONOSCOPY  10/11/2012    Procedure: COLONOSCOPY;  colonoscopy;  Surgeon: Gela Cleary MD;  Location:  GI     DENTAL SURGERY  5/2007    all teeth removed - dentures     ENT SURGERY      all teeth pulled     GYN SURGERY  1/10/75    tubal ligation      H ABLATION SVT  5/28/2015    stopped metoprolol     HC BREAST RECONSTRUC W OTHR TECHNIQ  5/8/2013     HERNIA REPAIR  2005     IMPLANT FILTER INFERIOR VENA CAVA  10/8/2012    taken out  1/4/12     INSERT TISSUE EXPANDER BREAST BILATERAL  9/5/2012    Procedure: INSERT TISSUE EXPANDER BREAST BILATERAL;;  Surgeon: Orlando Wall MD;  Location:  OR     MASTECTOMY SIMPLE, SENTINEL NODE, COMBINED  9/5/2012    Procedure: COMBINED MASTECTOMY SIMPLE, SENTINEL NODE;  BILATERAL MASTECTOMY WITH LEFT AXILLARY SENTINEL LYMPH NODE BIOPSY, BILATERAL TISSUE EXPANDER        MASTECTOMY, BILATERAL       RECONSTRUCT BREAST BILATERAL, IMPLANT PROSTHESIS BILATERAL, COMBINED  5/22/2013    Procedure: COMBINED RECONSTRUCT BREAST BILATERAL, IMPLANT PROSTHESIS BILATERAL;  BILATERAL SECOND STAGE BREAST RECONSTRUCTION WITH SILICONE GEL IMPLANTS AND LIPOSUCTION;  Surgeon: Orlando Wall MD;  Location:  [facility]     RECONSTRUCT NIPPLE BILATERAL  8/22/2013     "Procedure: RECONSTRUCT NIPPLE BILATERAL;  BILATERAL NIPPLE AREOLAR RECONSTRUCTION;  Surgeon: Orlando Wall MD;  Location: [facility]       Social History   Substance Use Topics     Smoking status: Former Smoker     Packs/day: 1.50     Years: 25.00     Types: Cigarettes     Quit date: 10/21/1986     Smokeless tobacco: Never Used      Comment: quit 1985     Alcohol use No       Family History   Problem Relation Age of Onset     Cancer - colorectal Mother      Colon Cancer Mother      Prostate Cancer Father      Alzheimer Disease Father      CANCER Paternal Grandmother      Alzheimer Disease Paternal Grandfather      Cancer - colorectal Brother      DIABETES Brother      DIABETES Brother      Eye Disorder Brother      DIABETES Son      CANCER Maternal Uncle      CANCER Maternal Uncle        REVIEW OF SYSTEMS:     5 point ROS negative except as noted above in HPI, including Gen., Resp., CV, GI &  system review.    PHYSICAL EXAM:   /71  Resp 15  LMP 09/24/1990  SpO2 98% Estimated body mass index is 32.36 kg/(m^2) as calculated from the following:    Height as of 10/2/17: 1.607 m (5' 3.25\").    Weight as of 10/2/17: 83.5 kg (184 lb 2 oz).   GENERAL APPEARANCE: healthy  MENTAL STATUS: alert  AIRWAY EXAM: Mallampatti Class I (visualization of the soft palate, fauces, uvula, anterior and posterior pillars)  RESP: lungs clear to auscultation - no rales, rhonchi or wheezes  CV: regular rates and rhythm    DIAGNOSTICS:      Not indicated    IMPRESSION     ASA Class 2 - Mild systemic disease    PLAN:     Colonoscopy, EGD    The above has been forwarded to the consulting provider.    Signed Electronically by: Shashank Ortiz  October 6, 2017  "

## 2017-10-06 NOTE — PROGRESS NOTES
"  SUBJECTIVE:   Gemma Cowart is a 71 year old female who presents to clinic today for the following health issues:    Gastrointestinal symptoms      Duration: x1 month intermittent     Description:           ABDOMINAL PAIN - \" all over\" .   Pain is described as sharp and burning and radiates to pelvic area .    Intensity:  mild    Accompanying signs and symptoms:  nausea, vomitting, chills, diarrhea, loose stools, constipation and painful bowel movements    History  Previous {similar problem: no   Previous evaluation:  no    Aggravating factors: none    Alleviating factors: nothing    Other Therapies tried: None    Gemma comes in today for evaluation of abdominal symptoms. She reports that she has been having nausea and vomiting intermittently for the past 2 weeks. She does get some bilateral lower abdominal pain when she has to pass a stool. She recently has had some alternating constipation and diarrhea, but no symptoms right now. She has a history of gastric bypass, and has noticed a prominent, painful lump at the bottom of her sternum. Does endorse some chills but no fevers, no rashes. Has lost about 40 pounds, but this was purposeful weight loss. No difficulty swallowing or symptoms of acid reflux. No black stools, but did recently have a stool with blood in it. Does have a hx of hemorrhoids.     Also wondering if she can get excess skin removed following weight loss, she has redundant abdominal folds and is getting foul smelling discharge from her belly button.     Problem list and histories reviewed & adjusted, as indicated.  Additional history: as documented    Patient Active Problem List   Diagnosis     Nontoxic uninodular goiter     Rosacea     BRETT (iron deficiency anemia)     Complications of gastric bypass surgery     Vitamin D deficiency disease     Vitamin B12 deficiency     Eczema     Dyshydrosis     Hyperlipidemia LDL goal <100     Anxiety     Insomnia     Health Care Home     Type 2 diabetes " mellitus with other specified complication (H)     Psoriasis     Long-term (current) use of anticoagulants [Z79.01]     Advanced directives, counseling/discussion     Malignant neoplasm of left female breast (H)     Morbid obesity (H)     CKD (chronic kidney disease) stage 3, GFR 30-59 ml/min     Personal history of DVT (deep vein thrombosis)     Past Surgical History:   Procedure Laterality Date     ABDOMEN SURGERY  2000    gastric bypass 2000     COLONOSCOPY  10/11/2012    Procedure: COLONOSCOPY;  colonoscopy;  Surgeon: Gela Cleary MD;  Location:  GI     COLONOSCOPY N/A 10/6/2017    Procedure: COLONOSCOPY;;  Surgeon: Shashank Ortiz MD;  Location:  GI     DENTAL SURGERY  5/2007    all teeth removed - dentures     ENT SURGERY      all teeth pulled     ESOPHAGOSCOPY, GASTROSCOPY, DUODENOSCOPY (EGD), COMBINED N/A 10/6/2017    Procedure: COMBINED ESOPHAGOSCOPY, GASTROSCOPY, DUODENOSCOPY (EGD);  ESOPHAGOSCOPY, GASTROSCOPY, DUODENOSCOPY (EGD) & Colonoscopy *Needs INR on arrival;  Surgeon: Shashank Ortiz MD;  Location:  GI     GYN SURGERY  1/10/75    tubal ligation      H ABLATION SVT  5/28/2015    stopped metoprolol     HC BREAST RECONSTRUC W OTHR TECHNIQ  5/8/2013     HERNIA REPAIR  2005     IMPLANT FILTER INFERIOR VENA CAVA  10/8/2012    taken out  1/4/12     INSERT TISSUE EXPANDER BREAST BILATERAL  9/5/2012    Procedure: INSERT TISSUE EXPANDER BREAST BILATERAL;;  Surgeon: Orlando Wall MD;  Location:  OR     MASTECTOMY SIMPLE, SENTINEL NODE, COMBINED  9/5/2012    Procedure: COMBINED MASTECTOMY SIMPLE, SENTINEL NODE;  BILATERAL MASTECTOMY WITH LEFT AXILLARY SENTINEL LYMPH NODE BIOPSY, BILATERAL TISSUE EXPANDER        MASTECTOMY, BILATERAL       RECONSTRUCT BREAST BILATERAL, IMPLANT PROSTHESIS BILATERAL, COMBINED  5/22/2013    Procedure: COMBINED RECONSTRUCT BREAST BILATERAL, IMPLANT PROSTHESIS BILATERAL;  BILATERAL SECOND STAGE BREAST RECONSTRUCTION WITH SILICONE GEL IMPLANTS AND  "LIPOSUCTION;  Surgeon: Orlando Wall MD;  Location: [facility]     RECONSTRUCT NIPPLE BILATERAL  8/22/2013    Procedure: RECONSTRUCT NIPPLE BILATERAL;  BILATERAL NIPPLE AREOLAR RECONSTRUCTION;  Surgeon: Orlando Wall MD;  Location: [facility]       Social History   Substance Use Topics     Smoking status: Former Smoker     Packs/day: 1.50     Years: 25.00     Types: Cigarettes     Quit date: 10/21/1986     Smokeless tobacco: Never Used      Comment: quit 1985     Alcohol use No     Family History   Problem Relation Age of Onset     Cancer - colorectal Mother      Colon Cancer Mother      Prostate Cancer Father      Alzheimer Disease Father      CANCER Paternal Grandmother      Alzheimer Disease Paternal Grandfather      Cancer - colorectal Brother      DIABETES Brother      DIABETES Brother      Eye Disorder Brother      DIABETES Son      CANCER Maternal Uncle      CANCER Maternal Uncle              Reviewed and updated as needed this visit by clinical staff  Tobacco  Allergies  Meds  Med Hx  Fam Hx  Soc Hx        ROS:  Ten point review of systems performed and all others negative unless noted above.      OBJECTIVE:     /58 (BP Location: Right arm, Patient Position: Chair, Cuff Size: Adult Regular)  Pulse 66  Temp 98.7  F (37.1  C) (Oral)  Ht 5' 3.25\" (1.607 m)  Wt 184 lb 2 oz (83.5 kg)  LMP 09/24/1990  SpO2 96%  Breastfeeding? No  BMI 32.36 kg/m2  Body mass index is 32.36 kg/(m^2).  GENERAL: healthy, alert and no distress  EYES: Eyes grossly normal to inspection, PERRL and conjunctivae and sclerae normal  NECK: no adenopathy, no asymmetry, masses, or scars and thyroid normal to palpation  RESP: lungs clear to auscultation - no rales, rhonchi or wheezes  CV: regular rates and rhythm, normal S1 S2, no S3 or S4 and no murmur, click or rub  ABDOMEN: prominent, irregular firm mass just below sternum which seems to correlate with xyphoid process; this is tender. Abdomen otherwise non-tender, " normoactive bowel sounds. No appreciable masses or organomegaly.   MS: no gross musculoskeletal defects noted, no edema  SKIN: no suspicious lesions or rashes. Minimal discharge and erythema around umbilicus  NEURO: Normal strength and tone, mentation intact and speech normal    Diagnostic Test Results:  CXR: no infiltrates, unclear what prominent mass is on this image  AXR: not acutely obstructed but air-fluid levels present by my read    ASSESSMENT/PLAN:     1. Nausea and vomiting, intractability of vomiting not specified, unspecified vomiting type  Could be related to gastroenteritis, however, given prominent epigastric mass (unclear if xyphoid vs underlying mass pressing xyphoid out) along with hx of metastatic breast cancer and gastric bypass, some concern for intermittent obstruction given severity of symptoms and air fluid levels on XR. Reasonable to obtain CAP CT scan for further characterization and to make sure there is not a developing mass or intermittent obstruction. Will also obtain EGD if CT negative to fully assess gastric bypass anastamosis and make sure that there is not an intragastric or intestinal process that could be triggering abdominal pain and vomiting.   - XR Abdomen 2 Views; Future  - CBC with platelets differential  - Comprehensive metabolic panel (BMP + Alb, Alk Phos, ALT, AST, Total. Bili, TP)  - Erythrocyte sedimentation rate auto  - CRP, inflammation  - CT Chest/Abdomen/Pelvis w Contrast; Future  - GASTROENTEROLOGY ADULT REF PROCEDURE ONLY  - GASTROENTEROLOGY ADULT REF PROCEDURE ONLY    2. Epigastric mass  May be prominent xyphoid; however, given irregularity on exam and tenderness with palpation and patient's report that this is worsening/getting more prominent, will need to investigate further to make sure there is not an underlying mass or metastatic process.   - CT Chest/Abdomen/Pelvis w Contrast; Future    3. Xyphoidalgia  As above.   - XR Chest 2 Views; Future  - CT  Chest/Abdomen/Pelvis w Contrast; Future    4. Abdominal pain, generalized  Unclear etiology. Concerning for possible obstruction vs gastroenteritis as above. Will also obtain basic labs. In setting of hematochezia (albeit with known hemorrhoids), reasonable to also obtain colonoscopy as patient is due for this anyway.   - CBC with platelets differential  - Hemoglobin A1c  - Comprehensive metabolic panel (BMP + Alb, Alk Phos, ALT, AST, Total. Bili, TP)  - Erythrocyte sedimentation rate auto  - CRP, inflammation  - CT Chest/Abdomen/Pelvis w Contrast; Future  - GASTROENTEROLOGY ADULT REF PROCEDURE ONLY    5. History of gastric bypass  - Vitamin B12    6. Type 2 diabetes mellitus with other specified complication, without long-term current use of insulin (H)  Due for labs.  - Hemoglobin A1c  - Albumin Random Urine Quantitative with Creat Ratio    7. Long-term (current) use of anticoagulants [Z79.01]  Due for labs.   - INR    8. Yeast infection of the skin  Secondary to redundant skin following significant weight loss; will refer to Plastics to discuss removal.   - PLASTIC SURGERY REFERRAL    9. Malignant neoplasm of left female breast, unspecified estrogen receptor status, unspecified site of breast (H)    10. Complications of gastric bypass surgery  Abdominal pannus and recurrent skin infections, referral as above.   - PLASTIC SURGERY REFERRAL    11. Need for prophylactic vaccination and inoculation against influenza  - FLU VACCINE, INCREASED ANTIGEN, PRESV FREE, AGE 65+ [00448]  - Vaccine Administration, Initial [55701]    12. Special screening for malignant neoplasms, colon  - GASTROENTEROLOGY ADULT REF PROCEDURE ONLY  - GASTROENTEROLOGY ADULT REF PROCEDURE ONLY    Patient Instructions   Abdominal pain with stooling/nausea and vomiting:  I'm not sure what's going on here, but I would like to get some imaging and lab tests to make sure that you do not have something that is causing intermittent bowel obstruction.   --  labs today (blood count, liver/kidney function, inflammatory markers)  -- referral placed for colonoscopy AND upper endoscopy to look at your bypass  -- I would like for you to get a CT scan of the chest, abdomen and pelvis as well    We will check INR today.    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 You can try an over the counter antifungal medication, like miconazole, to your belly button in the meantime.     Diabetic labs today. Come back to talk about medication adjustments.       Alva Casas MD  [facility]  Injectable Influenza Immunization Documentation    1.  Is the person to be vaccinated sick today?   No    2. Does the person to be vaccinated have an allergy to a component   of the vaccine?   No    3. Has the person to be vaccinated ever had a serious reaction   to influenza vaccine in the past?   No    4. Has the person to be vaccinated ever had Guillain-Barré syndrome?   No    Form completed by pt/ Gisel Sellers MA 10/2/2017 8:09 AM

## 2017-10-10 ENCOUNTER — HOSPITAL ENCOUNTER (OUTPATIENT)
Dept: BONE DENSITY | Facility: CLINIC | Age: 72
Discharge: HOME | End: 2017-10-10
Attending: INTERNAL MEDICINE | Admitting: INTERNAL MEDICINE
Payer: MEDICARE

## 2017-10-10 ENCOUNTER — HOSPITAL ENCOUNTER (OUTPATIENT)
Facility: CLINIC | Age: 72
Setting detail: SPECIMEN
Discharge: HOME | End: 2017-10-10
Attending: INTERNAL MEDICINE | Admitting: INTERNAL MEDICINE
Payer: MEDICARE

## 2017-10-10 ENCOUNTER — ONCOLOGY VISIT (OUTPATIENT)
Dept: ONCOLOGY | Facility: CLINIC | Age: 72
End: 2017-10-10
Attending: INTERNAL MEDICINE
Payer: MEDICARE

## 2017-10-10 DIAGNOSIS — Z85.3 HISTORY OF LEFT BREAST CANCER: ICD-10-CM

## 2017-10-10 DIAGNOSIS — Z78.0 MENOPAUSE: ICD-10-CM

## 2017-10-10 LAB
ALBUMIN SERPL-MCNC: 3.6 G/DL (ref 3.4–5)
ALP SERPL-CCNC: 58 U/L (ref 40–150)
ALT SERPL W P-5'-P-CCNC: 28 U/L (ref 0–50)
AST SERPL W P-5'-P-CCNC: 23 U/L (ref 0–45)
BILIRUB DIRECT SERPL-MCNC: <0.1 MG/DL (ref 0–0.2)
BILIRUB SERPL-MCNC: 0.5 MG/DL (ref 0.2–1.3)
CANCER AG27-29 SERPL-ACNC: 12 U/ML (ref 0–39)
PROT SERPL-MCNC: 6.5 G/DL (ref 6.8–8.8)

## 2017-10-10 PROCEDURE — 77080 DXA BONE DENSITY AXIAL: CPT

## 2017-10-10 PROCEDURE — 36415 COLL VENOUS BLD VENIPUNCTURE: CPT

## 2017-10-10 NOTE — MR AVS SNAPSHOT
After Visit Summary   10/10/2017    Gemma Cowart    MRN: [id number]           Patient Information     Date Of Birth          1945        Visit Information        Provider Department      10/10/2017 9:30 AM Nurse,  Oncology [facility]        Today's Diagnoses     History of left breast cancer           Follow-ups after your visit        Your next 10 appointments already scheduled     Oct 10, 2017  9:30 AM CDT   Return Visit with  Oncology Nurse   [facility] ([facility])    [facility]  [address]  [place] [address] [phone]            Oct 10, 2017 10:15 AM CDT   DX HIP/PELVIS/SPINE with MADX1   [facility] ([facility])    [address]  [address]  [place] [address] [phone]           Please do not take any of the following 24 hours prior to the day of your exam: vitamins, calcium tablets, antacids.  If possible, please wear clothes without metal (snaps, zippers). A sweatsuit works well.            Oct 16, 2017  8:15 AM CDT   Anticoagulation Visit with  ANTICOAGULATION CLINIC   [facility] ([facility])    [address]  [address]  [facility] [address] [phone]            Oct 16, 2017  9:00 AM CDT   Office Visit with Christelle Yañez [facility] ([facility])    [address]  [address]  [facility] [address] [phone]           Bring a current list of meds and any records pertaining to this visit. For Physicals, please bring immunization records and any forms needing to be filled out. Please arrive 10 minutes early to complete paperwork.            Oct 18, 2017  8:30 AM CDT   Return Visit with Jyoti Narvaez MD   [facility] ([facility])    [facility]  [address]  [place] [address]  "  [phone]              Who to contact     If you have questions or need follow up information about today's clinic visit or your schedule please contact [facility] directly at [phone].  Normal or non-critical lab and imaging results will be communicated to you by MyChart, letter or phone within 4 business days after the clinic has received the results. If you do not hear from us within 7 days, please contact the clinic through MyChart or phone. If you have a critical or abnormal lab result, we will notify you by phone as soon as possible.  Submit refill requests through [employer] or call your pharmacy and they will forward the refill request to us. Please allow 3 business days for your refill to be completed.          Additional Information About Your Visit        [employer] Information     [employer] lets you send messages to your doctor, view your test results, renew your prescriptions, schedule appointments and more. To sign up, go to [email or web address] . Click on \"Log in\" on the left side of the screen, which will take you to the Welcome page. Then click on \"Sign up Now\" on the right side of the page.     You will be asked to enter the access code listed below, as well as some personal information. Please follow the directions to create your username and password.     Your access code is: [email or web address]  Expires: 2017  9:07 AM     Your access code will  in 90 days. If you need help or a new code, please call your [place] clinic or [phone].        Care EveryWhere ID     This is your Care EveryWhere ID. This could be used by other organizations to access your [place] medical records  [id number]        Your Vitals Were     Last Period                   1990            Blood Pressure from Last 3 Encounters:   10/06/17 112/53   10/02/17 110/58   05/15/17 90/58    Weight from Last 3 Encounters:   10/02/17 83.5 kg (184 lb 2 oz)   17 86.5 kg (190 lb 12.8 oz)   05/15/17 " 89.1 kg (196 lb 8 oz)              We Performed the Following     Ca27.29  breast tumor marker     Hepatic panel        Primary Care Provider Office Phone # Fax #    Alva Viera [phone] [address] [place] [address]        Equal Access to Services     [facility]: Hadii aad ku hadasho Soomaali, waaxda luqadaha, qaybta kaalmada adeegyada, waxay idiin hayaan adeeg saman lalotusn . So [facility] [phone].    ATENCIÓN: Si habla español, tiene a marcum disposición servicios gratuitos de asistencia lingüística. Llame al [phone].    We comply with applicable federal civil rights laws and [place] laws. We do not discriminate on the basis of race, color, national origin, age, disability, sex, sexual orientation, or gender identity.            Thank you!     Thank you for choosing [facility]  for your care. Our goal is always to provide you with excellent care. Hearing back from our patients is one way we can continue to improve our services. Please take a few minutes to complete the written survey that you may receive in the mail after your visit with us. Thank you!             Your Updated Medication List - Protect others around you: Learn how to safely use, store and throw away your medicines at www.disposemymeds.org.          This list is accurate as of: 10/10/17  9:06 AM.  Always use your most recent med list.                   Brand Name Dispense Instructions for use Diagnosis    ACE/ARB NOT PRESCRIBED (INTENTIONAL)      ACE & ARB not prescribed due to Other: normal tensive, low microalbumuria    Type 2 diabetes mellitus with other specified complication (H)       ASPIRIN NOT PRESCRIBED    INTENTIONAL     Antiplatelet medication not prescribed intentionally due to Current anticoagulant therapy (warfarin/enoxaparin)    Type 2 diabetes mellitus with other specified complication (H)       B-12 1000 MCG Tbcr     180 tablet    Take 2,000 mcg by mouth daily     Vitamin B12 deficiency       blood glucose lancets standard    no brand specified    2 Box    Use to test blood sugars 2 times daily or as directed. Please dispense lancets for Relion Confirm.    Type 2 diabetes mellitus with other specified complication (H)       blood glucose monitoring test strip    no brand specified    200 each    Use to test blood sugars 2 times daily or as directed. Please dispense Relion Confirm test strips    Type 2 diabetes mellitus with other specified complication (H)       CALCIUM CITRATE + D 315-250 MG-UNIT Tabs per tablet   Generic drug:  calcium citrate-vitamin D     120 tablet    Take 2 tablets by mouth 2 times daily    Vitamin D deficiency disease       calcium citrate 950 MG tablet    CALCITRATE     Take 1 tablet by mouth daily        CERAVE Crea      Externally apply 1 Application topically as needed        clobetasol 0.05 % cream    TEMOVATE    270 g    Apply sparingly to affected area twice daily as needed.  Do not apply to face.    Eczema, unspecified eczema       desonide 0.05 % cream    DESOWEN    60 g    Apply sparingly to affected area three times daily as needed.    Atopic neurodermatitis       hydrocortisone 0.2 % cream    WESTCORT    60 g    Apply to AA QD-BID x 1 week then PRN only    Atopic neurodermatitis       hydrocortisone 2.5 % cream     30 g    Apply to AA twice daily x 1 week then PRN only.    Atopic neurodermatitis       * hydrOXYzine 25 MG tablet    ATARAX    180 tablet    Take 1-2 tablets (25-50 mg) by mouth At Bedtime    Eczema, unspecified eczema       * hydrOXYzine 25 MG tablet    ATARAX    60 tablet    Take 1-2 tablets (25-50 mg) by mouth daily as needed for itching    Atopic neurodermatitis       letrozole 2.5 MG tablet    FEMARA    90 tablet    Take 1 tablet (2.5 mg) by mouth daily    History of left breast cancer       MAGNESIUM OXIDE PO      Take 250 mg by mouth        metFORMIN 500 MG 24 hr tablet    GLUCOPHAGE-XR    180 tablet    Take 2 tablets  (1,000 mg) by mouth daily (with dinner)    Type 2 diabetes mellitus with other specified complication (H)       PARoxetine 30 MG tablet    PAXIL    180 tablet    Take 2 tablets (60 mg) by mouth At Bedtime    Anxiety       rosuvastatin 10 MG tablet    CRESTOR    90 tablet    TAKE ONE TABLET BY MOUTH ONCE DAILY    Hyperlipidemia LDL goal <100       TYLENOL 500 MG tablet   Generic drug:  acetaminophen     100 tablet    Take 2 tablets (1,000 mg) by mouth every 8 hours as needed for mild pain        vitamin D 2000 UNITS Caps      Take 1 capsule by mouth daily.        warfarin 5 MG tablet    COUMADIN    135 tablet    TAKE ONE TO ONE & ONE-HALF TABLETS BY MOUTH DAILY    History of deep venous thrombosis       * Notice:  This list has 2 medication(s) that are the same as other medications prescribed for you. Read the directions carefully, and ask your doctor or other care provider to review them with you.

## 2017-10-16 ENCOUNTER — ANTICOAGULATION THERAPY VISIT (OUTPATIENT)
Dept: NURSING | Facility: CLINIC | Age: 72
End: 2017-10-16
Payer: COMMERCIAL

## 2017-10-16 ENCOUNTER — OFFICE VISIT (OUTPATIENT)
Dept: PHARMACY | Facility: CLINIC | Age: 72
End: 2017-10-16
Payer: COMMERCIAL

## 2017-10-16 VITALS
WEIGHT: 186 LBS | HEART RATE: 59 BPM | DIASTOLIC BLOOD PRESSURE: 67 MMHG | SYSTOLIC BLOOD PRESSURE: 115 MMHG | BODY MASS INDEX: 32.69 KG/M2

## 2017-10-16 DIAGNOSIS — E11.69 TYPE 2 DIABETES MELLITUS WITH OTHER SPECIFIED COMPLICATION, WITHOUT LONG-TERM CURRENT USE OF INSULIN (H): Primary | Chronic | ICD-10-CM

## 2017-10-16 DIAGNOSIS — Z86.718 PERSONAL HISTORY OF DVT (DEEP VEIN THROMBOSIS): ICD-10-CM

## 2017-10-16 DIAGNOSIS — R52 INTERMITTENT PAIN: ICD-10-CM

## 2017-10-16 DIAGNOSIS — E78.5 HYPERLIPIDEMIA LDL GOAL <100: ICD-10-CM

## 2017-10-16 DIAGNOSIS — L30.9 ECZEMA, UNSPECIFIED TYPE: ICD-10-CM

## 2017-10-16 DIAGNOSIS — F41.9 ANXIETY: ICD-10-CM

## 2017-10-16 DIAGNOSIS — M85.80 OSTEOPENIA, UNSPECIFIED LOCATION: ICD-10-CM

## 2017-10-16 DIAGNOSIS — E53.8 VITAMIN B12 DEFICIENCY: ICD-10-CM

## 2017-10-16 DIAGNOSIS — R10.9 STOMACH PAIN: ICD-10-CM

## 2017-10-16 DIAGNOSIS — C50.912 MALIGNANT NEOPLASM OF LEFT FEMALE BREAST, UNSPECIFIED ESTROGEN RECEPTOR STATUS, UNSPECIFIED SITE OF BREAST (H): ICD-10-CM

## 2017-10-16 DIAGNOSIS — Z71.85 VACCINE COUNSELING: ICD-10-CM

## 2017-10-16 DIAGNOSIS — K21.9 GASTROESOPHAGEAL REFLUX DISEASE WITHOUT ESOPHAGITIS: ICD-10-CM

## 2017-10-16 DIAGNOSIS — I82.409 ACUTE THROMBOEMBOLISM OF DEEP VEINS OF LOWER EXTREMITY, UNSPECIFIED LATERALITY (H): ICD-10-CM

## 2017-10-16 DIAGNOSIS — Z79.01 LONG-TERM (CURRENT) USE OF ANTICOAGULANTS: ICD-10-CM

## 2017-10-16 LAB — INR POINT OF CARE: 1.4 (ref 0.86–1.14)

## 2017-10-16 PROCEDURE — 99607 MTMS BY PHARM ADDL 15 MIN: CPT | Performed by: PHARMACIST

## 2017-10-16 PROCEDURE — 99207 ZZC NO CHARGE NURSE ONLY: CPT

## 2017-10-16 PROCEDURE — 99605 MTMS BY PHARM NP 15 MIN: CPT | Performed by: PHARMACIST

## 2017-10-16 PROCEDURE — 85610 PROTHROMBIN TIME: CPT | Mod: QW

## 2017-10-16 PROCEDURE — 36416 COLLJ CAPILLARY BLOOD SPEC: CPT

## 2017-10-16 ASSESSMENT — ANXIETY QUESTIONNAIRES
6. BECOMING EASILY ANNOYED OR IRRITABLE: SEVERAL DAYS
2. NOT BEING ABLE TO STOP OR CONTROL WORRYING: NOT AT ALL
3. WORRYING TOO MUCH ABOUT DIFFERENT THINGS: SEVERAL DAYS
5. BEING SO RESTLESS THAT IT IS HARD TO SIT STILL: NOT AT ALL
1. FEELING NERVOUS, ANXIOUS, OR ON EDGE: NOT AT ALL
GAD7 TOTAL SCORE: 2
IF YOU CHECKED OFF ANY PROBLEMS ON THIS QUESTIONNAIRE, HOW DIFFICULT HAVE THESE PROBLEMS MADE IT FOR YOU TO DO YOUR WORK, TAKE CARE OF THINGS AT HOME, OR GET ALONG WITH OTHER PEOPLE: NOT DIFFICULT AT ALL
7. FEELING AFRAID AS IF SOMETHING AWFUL MIGHT HAPPEN: NOT AT ALL

## 2017-10-16 ASSESSMENT — PATIENT HEALTH QUESTIONNAIRE - PHQ9
SUM OF ALL RESPONSES TO PHQ QUESTIONS 1-9: 4
5. POOR APPETITE OR OVEREATING: NOT AT ALL

## 2017-10-16 NOTE — MR AVS SNAPSHOT
After Visit Summary   10/16/2017    Gemma Cowart    MRN: [id number]           Patient Information     Date Of Birth          1945        Visit Information        Provider Department      10/16/2017 9:00 AM Christelle Yañez, [facility] [place] [facility]        Today's Diagnoses     Type 2 diabetes mellitus with other specified complication, without long-term current use of insulin (H)    -  1      Care Instructions    Recommendations from today's MTM visit:                                                    MTM (medication therapy management) is a service provided by a clinical pharmacist designed to help you get the most of out of your medicines.      Cholesterol:  Continue off rosuvastatin.  Check cholesterol in 1 month to see if you need cholesterol medication.  Make lab only appointment.  If LDL is >70, need restart the rosuvastatin.    Osteoporosis:  Take calcium citrate petite 2 tablets once daily.    Vaccinations:  Have your pharmacy give you the Zoster vaccine, they can run it through insurance to let you know if it covered.    Next MTM/pharmacist visit: 1 year    To schedule another MTM appointment, please call the clinic directly or you may call the MTM scheduling line at [phone] or toll-free at [phone].     My Clinical Pharmacist's contact information:                                                      It was a pleasure seeing you today!  Please feel free to contact me with any questions or concerns you have.      Christelle Yañez, PharmD [facility]  Medication Therapy Management Practitioner   #[phone]    You may receive a survey about the MTM services you received.  I would appreciate your feedback to help me serve you better in the future. Please fill it out and return it when you can. Your comments will be anonymous.      My healthcare goals:                                                      Great job on weight watchers and walking.               "Follow-ups after your visit        Your next 10 appointments already scheduled     Oct 18, 2017  8:30 AM CDT   Return Visit with Jyoti Narvaez MD   [facility] ([facility])    [facility] [place]  [address]  [place] [address] [phone]            Oct 30, 2017  8:30 AM CDT   Anticoagulation Visit with  ANTICOAGULATION CLINIC   [facility] [place] ([facility]    [address]  [address]  [place] [address] [phone]              Who to contact     If you have questions or need follow up information about today's clinic visit or your schedule please contact [facility] directly at [phone].  Normal or non-critical lab and imaging results will be communicated to you by MyChart, letter or phone within 4 business days after the clinic has received the results. If you do not hear from us within 7 days, please contact the clinic through MyChart or phone. If you have a critical or abnormal lab result, we will notify you by phone as soon as possible.  Submit refill requests through [employer] or call your pharmacy and they will forward the refill request to us. Please allow 3 business days for your refill to be completed.          Additional Information About Your Visit        [employer] Information     [employer] lets you send messages to your doctor, view your test results, renew your prescriptions, schedule appointments and more. To sign up, go to [email or web address] . Click on \"Log in\" on the left side of the screen, which will take you to the Welcome page. Then click on \"Sign up Now\" on the right side of the page.     You will be asked to enter the access code listed below, as well as some personal information. Please follow the directions to create your username and password.     Your access code is: [email or web address]  Expires: 2017  9:07 AM     Your access code will  in 90 days. If you need help " or a new code, please call your [place] clinic or [phone].        Care EveryWhere ID     This is your Care EveryWhere ID. This could be used by other organizations to access your [place] medical records  [id number]        Your Vitals Were     Pulse Last Period BMI (Body Mass Index)             59 09/24/1990 32.69 kg/m2          Blood Pressure from Last 3 Encounters:   10/16/17 115/67   10/06/17 112/53   10/02/17 110/58    Weight from Last 3 Encounters:   10/16/17 186 lb (84.4 kg)   10/02/17 184 lb 2 oz (83.5 kg)   06/19/17 190 lb 12.8 oz (86.5 kg)              We Performed the Following     C FOOT EXAM  NO CHARGE          Today's Medication Changes          These changes are accurate as of: 10/16/17  9:11 AM.  If you have any questions, ask your nurse or doctor.               These medicines have changed or have updated prescriptions.        Dose/Directions    B-12 1000 MCG Tbcr   This may have changed:  how much to take   Used for:  Vitamin B12 deficiency        Dose:  2000 mcg   Take 2,000 mcg by mouth daily   Quantity:  180 tablet   Refills:  1                Primary Care Provider Office Phone # Fax #    Alva Viera [phone]       [facility] [place] [address]        Equal Access to Services     [facility]: Hadii kavitha wilkinsono Somisa, waaxda luqadaha, qaybta kaalmada adebonifacio, bg miranda. So [facility] [phone].    ATENCIÓN: Si habla español, tiene a marcum disposición servicios gratuitos de asistencia lingüística. Llame al [phone].    We comply with applicable federal civil rights laws and [place] laws. We do not discriminate on the basis of race, color, national origin, age, disability, sex, sexual orientation, or gender identity.            Thank you!     Thank you for choosing [facility]  for your care. Our goal is always to provide you with excellent care. Hearing back from our patients is one way we can  continue to improve our services. Please take a few minutes to complete the written survey that you may receive in the mail after your visit with us. Thank you!             Your Updated Medication List - Protect others around you: Learn how to safely use, store and throw away your medicines at www.disposemymeds.org.          This list is accurate as of: 10/16/17  9:11 AM.  Always use your most recent med list.                   Brand Name Dispense Instructions for use Diagnosis    ACE/ARB NOT PRESCRIBED (INTENTIONAL)      ACE & ARB not prescribed due to Other: normal tensive, low microalbumuria    Type 2 diabetes mellitus with other specified complication (H)       ASPIRIN NOT PRESCRIBED    INTENTIONAL     Antiplatelet medication not prescribed intentionally due to Current anticoagulant therapy (warfarin/enoxaparin)    Type 2 diabetes mellitus with other specified complication (H)       B-12 1000 MCG Tbcr     180 tablet    Take 2,000 mcg by mouth daily    Vitamin B12 deficiency       blood glucose lancets standard    no brand specified    2 Box    Use to test blood sugars 2 times daily or as directed. Please dispense lancets for Relion Confirm.    Type 2 diabetes mellitus with other specified complication (H)       blood glucose monitoring test strip    no brand specified    200 each    Use to test blood sugars 2 times daily or as directed. Please dispense Relion Confirm test strips    Type 2 diabetes mellitus with other specified complication (H)       Calcium Citrate Petite/Vit D 200-250 MG-UNIT Tabs      Take 2 tablets by mouth daily        CERAVE Crea      Externally apply 1 Application topically as needed        clobetasol 0.05 % cream    TEMOVATE    270 g    Apply sparingly to affected area twice daily as needed.  Do not apply to face.    Eczema, unspecified eczema       PARoxetine 30 MG tablet    PAXIL    180 tablet    Take 2 tablets (60 mg) by mouth At Bedtime    Anxiety       TYLENOL 500 MG tablet   Generic  drug:  acetaminophen     100 tablet    Take 2 tablets (1,000 mg) by mouth every 8 hours as needed for mild pain        vitamin D 2000 UNITS Caps      Take 1 capsule by mouth daily.        warfarin 5 MG tablet    COUMADIN    135 tablet    TAKE ONE TO ONE & ONE-HALF TABLETS BY MOUTH DAILY    History of deep venous thrombosis

## 2017-10-16 NOTE — PROGRESS NOTES
SUBJECTIVE/OBJECTIVE:                                                    Gemma Cowart is a 72 year old female coming in for an initial visit for Medication Therapy Management.  She was referred to me from Dr. Viera.     Chief Complaint: Stopped taking statins due to side effects.  Stomach pain is being investigated.      Personal Healthcare Goals: Her goal is to be on little as possible for medications.    Allergies/ADRs: Reviewed in Epic  Tobacco: No tobacco use Quit 31 years ago  Alcohol: Less than 1 beverage / month  Caffeine: 1-2 cups/day of coffee  Activity: Walking 1-2 miles a day, 5 days a week; has treadmill  PMH: Gastric Bypass Myla-en-Y 2000     Medication Adherence: no issues reported and sets up med boxes, take meds at bedtime    Pain in stomach:  Saw GI and had upper GI endoscopy and colonoscopy.  Pain is on and off and no plans for treatment as procedures were normal except for some bleeding hemmorhoids.  Will meet with Dr. Viera.    Diabetes:  Pt currently taking no medication.  Stopped taking metformin.  Pt is not experiencing side effects  SMBG: several times daily.  Ranges (patient reported):  and this is highest, 2 hours meal 114 yesterday  Recent symptoms of high blood sugar? polyphagia and fatigue  Eye exam: up to date  Foot exam: due  Microalbumin is < 30 mg/g. Pt is not taking an ACEi/ARB.  Aspirin: Not taking due to warfarin therapy  Diet/Exercise: Lost 48 lbs from weight watchers in the past year.  See above for walking  Diabetic Foot Screen:  Any complaints of increased pain or numbness ? No  Is there a foot ulcer now or a history of foot ulcer? No  Does the foot have an abnormal shape? No  Are the nails thick, too long or ingrown? No  Are there any redness or open areas? No      Hyperlipidemia: Current therapy includes none.  Stopped taking statin 1 month ago.  No side effects from rosuvastatin.    Vitamin B 12 deficiency: Current medication vitamin B12  1000mcg per day.  No current side effects.  Component      Latest Ref Rng 4/27/2016   Vitamin B12      193 - 986 pg/mL 1771 (H)     Component      Latest Ref Rng & Units 10/2/2017   Vitamin B12      193 - 986 pg/mL 1241 (H)     Osteopenia: Current therapy includes: Vitamin D supplements: 2,000 IU. Pt is not experiencing side effects.  Pt is getting the following sources of dietary calcium: yogurt once a day and cheese 2 slices, greens, little cottage cheese or milk.  Pills in the past were causing her to vomit them up.  See oncology and will discuss recent DEXA scan..  Last vitamin D level:   Component      Latest Ref Rng 10/27/2014   Vitamin D Deficiency screening      30 - 75 ug/L 24 (L)     Component      Latest Ref Rng & Units 10/12/2016   Vitamin D Deficiency screening      20 - 75 ug/L 29     DEXA History: 10/9/2015 and 10/10/2017 Osteopenia  Risk factors: aromatase inhibitor use  Gastric Bypass surgery    Arthritis pain: Current medication is acetaminophen 500mg as needed, rarely uses    Eczema: Current medication is clobetasol and this is working very well.  No side effects reported.    Breast Cancer:  Current medication is none.  Stopped Letrozole 2.5mg daily after 5 years even thought her oncologist recommended 10 years.    DVT:  Current medication is warfarin 7.5mg every day but M and F 10 mg.  She had IVC filter taken out and sees Dr. Narvaez, hematologist.  She had clot when stopped warfarin.  See [facility] in 2 weeks.  No bleeding or bruising.  Recently had colonoscopy and the reason for lower INR today.  Lab Results   Component Value Date    INR 1.4 10/16/2017    INR 1.12 10/06/2017    INR 1.29 10/02/2017    INR 1.7 09/25/2017    INR 1.7 08/28/2017    INR 1.7 08/14/2017    INR 1.5 07/31/2017    INR 2.4 06/19/2017       Anxiety:  Current medication is paroxetine 60mg QD. She reports she knows she is on a high dose but wants to continue and her children want her to continue.  She does not have side effects even when she stopped  this for 4-5 days once.  She finds with losing weight she has more interest and energy in wanting to do things.  ELI-7 SCORE 3/18/2014 8/17/2016 10/16/2017   Total Score 2 - -   Total Score - 6 2     PHQ-9 SCORE 3/18/2014 8/8/2015 10/16/2017   Total Score 11 5 -   Total Score - - 4     GERD: Current medications include: tums as needed  Pt c/o heartburn.  Patient feels that current regimen is effective.    Current labs include:BP Readings from Last 3 Encounters:   10/06/17 112/53   10/02/17 110/58   05/15/17 90/58     Today's Vitals: /67  Pulse 59  Wt 186 lb (84.4 kg)  LMP 09/24/1990  BMI 32.69 kg/m2  Lab Results   Component Value Date    A1C 5.4 10/02/2017   .  Lab Results   Component Value Date    CHOL 123 10/12/2016     Lab Results   Component Value Date    TRIG 152 10/12/2016     Lab Results   Component Value Date    HDL 50 10/12/2016     Lab Results   Component Value Date    LDL 43 10/12/2016       Liver Function Studies -   Recent Labs   Lab Test  10/10/17   0845   PROTTOTAL  6.5*   ALBUMIN  3.6   BILITOTAL  0.5   ALKPHOS  58   AST  23   ALT  28       Lab Results   Component Value Date    UCRR 90 10/02/2017    MICROL <5 10/02/2017    UMALCR Unable to calculate due to low value 10/02/2017       Last Basic Metabolic Panel:  Lab Results   Component Value Date     10/02/2017      Lab Results   Component Value Date    POTASSIUM 4.8 10/02/2017     Lab Results   Component Value Date    CHLORIDE 108 10/02/2017     Lab Results   Component Value Date    BUN 25 10/02/2017     Lab Results   Component Value Date    CR 1.33 10/02/2017     GFR Estimate   Date Value Ref Range Status   10/05/2017 40 (L) >60 mL/min/1.7m2 Final   10/02/2017 39 (L) >60 mL/min/1.7m2 Final     Comment:     Non  GFR Calc   05/15/2017 43 (L) >60 mL/min/1.7m2 Final     Comment:     Non  GFR Calc     GFR Estimate If Black   Date Value Ref Range Status   10/05/2017 49 (L) >60 mL/min/1.7m2 Final   10/02/2017  48 (L) >60 mL/min/1.7m2 Final     Comment:      GFR Calc   05/15/2017 52 (L) >60 mL/min/1.7m2 Final     Comment:      GFR Calc     TSH   Date Value Ref Range Status   05/15/2017 1.93 0.40 - 4.00 mU/L Final   ]    Most Recent Immunizations   Administered Date(s) Administered     Influenza (High Dose) 3 valent vaccine 10/02/2017     Influenza (IIV3) 10/19/2012     Pneumococcal (PCV 13) 10/09/2015     Pneumococcal 23 valent 04/23/2013     TD (ADULT, 7+) 01/01/2006     TDAP Vaccine (Adacel) 04/23/2013     ASSESSMENT:                                                       Current medications were reviewed today.     Medication Adherence: needs improvement - see below    Diabetes: Stable. Patient is meeting A1c goal of < 7%. Pt would benefit from Updated Hemoglobin A1c in 3 months. Due for annual foot exam.  Diabetic foot exam:   Sensation Testing done at all points on the diagram with monofilament     Right Foot: Sensation Normal at all points  Left Foot: Sensation Normal at all points     Risk Category: 0- No loss of protective sensation  Performed by Christelle Yañez [facility]    Hyperlipidemia: Needs Improvement. Pt is not on moderate intensity statin which is indicated based on 2013 ACC/AHA guidelines for lipid management.  She would benefit form lipid labs in 1 month and determine if at that time she needs to restart statin.    Pain in stomach: unimproved.  Pt would benefit from meeting with Dr. Viera to discuss next steps for pain.    Vitamin B 12 deficiency: stable.  Vitamin B-12 above goal but decreasing with decreasing the amount of vitamin B-12 the patient reduced to.    Osteopenia: Needs Improvement. Pt is not meeting RDI of calcium 1200mg/day. Pt is exceeding RDI of Vitamin D 1000 IU/day and would recommend continuing this dose with lower levels of vitamin D. Pt would benefit from:additional supplementation with calcium citrate, this type of calcium preferred with history of  gastric bypass.    Arthritis pain: stable    Eczema: stable    Breast Cancer:  Stable.  Pt would benefit from following up with oncologist    DVT:  Needs improvement.  Pt not meeting INR goal but had warfarin dose adjusted today by [facility], she would benefit from following up in 2 weeks as planned.    Anxiety:  Stable    GERD: stable    Vaccinations:  Needs improvement.  Pt would benefit from getting Zoster vaccination at her pharmacy.    PLAN:                                                      Cholesterol:  Continue off rosuvastatin.  Check cholesterol in 1 month. Pt to make lab only appointment.  If LDL is >70, recommend restart the rosuvastatin.    Osteoporosis:  Take calcium citrate petite 2 tablets once daily.    Vaccinations:  Pt to get Zoster vaccine at the pharmacy.    Next MTM/pharmacist visit: 1 year    I spent 50 minutes with this patient today.  All changes were made via collaborative practice agreement with Alva Viera. A copy of the visit note was provided to the patient's primary care provider.    The patient was given a summary of these recommendations as an after visit summary.     Christelle Yañez, PharmD [facility]  Medication Therapy Management Practitioner   #[phone]

## 2017-10-16 NOTE — PROGRESS NOTES
ANTICOAGULATION FOLLOW-UP CLINIC VISIT    Patient Name:  Gemma Cowart  Date:  10/16/2017  Contact Type:  Face to Face    SUBJECTIVE:     Patient Findings     Positives No Problem Findings, Unexplained INR or factor level change           OBJECTIVE    INR Protime   Date Value Ref Range Status   10/16/2017 1.4 (A) 0.86 - 1.14 Final       ASSESSMENT / PLAN  INR assessment SUB    Recheck INR In: 2 WEEKS    INR Location Clinic      Anticoagulation Summary as of 10/16/2017     INR goal 2.0-3.0   Today's INR 1.4!   Maintenance plan 10 mg (5 mg x 2) on Mon, Fri; 7.5 mg (5 mg x 1.5) all other days   Full instructions 10/16: 10 mg; Otherwise 10 mg on Mon, Fri; 7.5 mg all other days   Weekly total 57.5 mg   Plan last modified Rae Cooley RN (10/16/2017)   Next INR check 10/30/2017   Target end date Indefinite    Indications   Personal history of DVT (deep vein thrombosis) [Z86.718]  Long-term (current) use of anticoagulants [Z79.01] [Z79.01]         Anticoagulation Episode Summary     INR check location [facility]    Preferred lab     Send INR reminders to [facility]    Comments 5mg tabs - HS dose // CALENDAR // gastric bypass 2000 // DVT 11/16/12 warfarin until Apr 2013 // DVT 9/12/15      Anticoagulation Care Providers     Provider Role Specialty Phone number    Alva Viera MD  Internal Medicine [phone]            See the Encounter Report to view Anticoagulation Flowsheet and Dosing Calendar (Go to Encounters tab in chart review, and find the Anticoagulation Therapy Visit)        Rae Cooley RN

## 2017-10-16 NOTE — MR AVS SNAPSHOT
Gemma Cowart   10/16/2017 8:15 AM   Anticoagulation Therapy Visit    Description:  72 year old female   Provider:   ANTICOAGULATION CLINIC   Department:   Nurse           INR as of 10/16/2017     Today's INR 1.4!      Anticoagulation Summary as of 10/16/2017     INR goal 2.0-3.0   Today's INR 1.4!   Full instructions 10/16: 10 mg; Otherwise 10 mg on Mon, Fri; 7.5 mg all other days   Next INR check 10/30/2017    Indications   Personal history of DVT (deep vein thrombosis) [Z86.718]  Long-term (current) use of anticoagulants [Z79.01] [Z79.01]         Your next Anticoagulation Clinic appointment(s)     Oct 16, 2017  8:15 AM CDT   [facility]  [facility] CL[facility]    [address] [place] [address]  [address]  [place] [address] [phone]            Oct 30, 2017  8:30 AM CDT   Anticoagulation Visit with  [facility]AGULATION CLINIC   [facility]    [address] [place] [address]  [address]  [facility] [address] [phone]              Contact Numbers     [facility]  Please call  [phone] to cancel and/or reschedule your appointment   Please call  [phone] with any problems or questions regarding your therapy.        October 2017 Details    Sun Mon Tue Wed Thu Fri Sat     1               2               3               4               5               6               7                 8               9               10               11               12               13               14                 15               16      10 mg   See details      17      7.5 mg         18      7.5 mg         19      7.5 mg         20      10 mg         21      7.5 mg           22      7.5 mg         23      10 mg         24      7.5 mg         25      7.5 mg         26      7.5 mg         27      10 mg         28      7.5 mg           29      7.5 mg         30            31                    Date  Details   10/16 This INR check       Date of next INR:  10/30/2017         How to take your warfarin dose     To take:  7.5 mg Take 1.5 of the 5 mg tablets.    To take:  10 mg Take 2 of the 5 mg tablets.

## 2017-10-16 NOTE — PATIENT INSTRUCTIONS
Recommendations from today's MTM visit:                                                    MTM (medication therapy management) is a service provided by a clinical pharmacist designed to help you get the most of out of your medicines.      Cholesterol:  Continue off rosuvastatin.  Check cholesterol in 1 month to see if you need cholesterol medication.  Make lab only appointment.  If LDL is >70, need restart the rosuvastatin.    Osteoporosis:  Take calcium citrate petite 2 tablets once daily.    Vaccinations:  Have your pharmacy give you the Zoster vaccine, they can run it through insurance to let you know if it covered.    Next MTM/pharmacist visit: 1 year    To schedule another MTM appointment, please call the clinic directly or you may call the MTM scheduling line at [phone] or toll-free at [phone].     My Clinical Pharmacist's contact information:                                                      It was a pleasure seeing you today!  Please feel free to contact me with any questions or concerns you have.      Christelle Yañez, PharmD [facility]  Medication Therapy Management Practitioner   #[phone]    You may receive a survey about the MTM services you received.  I would appreciate your feedback to help me serve you better in the future. Please fill it out and return it when you can. Your comments will be anonymous.      My healthcare goals:                                                      Great job on weight watchers and walking.

## 2017-10-17 ASSESSMENT — ANXIETY QUESTIONNAIRES: GAD7 TOTAL SCORE: 2

## 2017-10-18 ENCOUNTER — ONCOLOGY VISIT (OUTPATIENT)
Dept: ONCOLOGY | Facility: CLINIC | Age: 72
End: 2017-10-18
Attending: INTERNAL MEDICINE
Payer: COMMERCIAL

## 2017-10-18 VITALS
HEART RATE: 61 BPM | WEIGHT: 186.6 LBS | TEMPERATURE: 98.8 F | DIASTOLIC BLOOD PRESSURE: 66 MMHG | BODY MASS INDEX: 32.79 KG/M2 | SYSTOLIC BLOOD PRESSURE: 108 MMHG | OXYGEN SATURATION: 95 %

## 2017-10-18 DIAGNOSIS — Z86.718 PERSONAL HISTORY OF DVT (DEEP VEIN THROMBOSIS): ICD-10-CM

## 2017-10-18 DIAGNOSIS — M25.50 MULTIPLE JOINT PAIN: ICD-10-CM

## 2017-10-18 DIAGNOSIS — Z98.84 BARIATRIC SURGERY STATUS: ICD-10-CM

## 2017-10-18 DIAGNOSIS — Z85.3 HISTORY OF LEFT BREAST CANCER: Primary | ICD-10-CM

## 2017-10-18 DIAGNOSIS — M85.80 OSTEOPENIA, UNSPECIFIED LOCATION: ICD-10-CM

## 2017-10-18 PROCEDURE — 99214 OFFICE O/P EST MOD 30 MIN: CPT | Performed by: INTERNAL MEDICINE

## 2017-10-18 PROCEDURE — 99211 OFF/OP EST MAY X REQ PHY/QHP: CPT

## 2017-10-18 ASSESSMENT — PAIN SCALES - GENERAL: PAINLEVEL: NO PAIN (0)

## 2017-10-18 NOTE — PROGRESS NOTES
Hematology follow up visit    CC:   Left Breast cancer in 2012 on femara  BRETT, B12 deficiency due to gastric bypass  Hx of LE DVT on coumadin, s/p IVC filter 10/2012, removed 1/2013    HPI  Her breast cancer was diagnosed in 08/2012 via screening MA. 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 LN negative disease. Estrogen and progesterone receptors were positive at greater than 90%. HER-2/gibran was negative. She had Z1fM2K2 disease.   She was started on Femara on 10/10/2012.    It is put on hold in 5/2016 due to memory loss and dysarthria. She then was evaluated by neurologist, got assurance. Her cognitive function did not change by holding femara, it is resumed in July 2016. She finished 5 yrs of it in fall 2017, not willing to continue due to hair loss, bone health and hot flushes.     She had reconstructive surgery done 03/2013 by Dr. Wall.     PMH  1. History of lower extremity deep vein thrombosis x 2 LLE, currently on long-term anticoagulation with Coumadin.   2. History of IVC filter placement 10/2012, removed 01/2013.   3. History of gastric bypass surgery, B12 deficiency and iron deficiency  4. Malabsorptive syndrome secondary to gastric bypass surgery.   5. Vitamin D deficiency.   6. History of progressive weight gain.   7. History of bilateral mastectomy for breast carcinoma, status post reconstructive surgery done 03/2013 by Dr. Wall.   8. The patient due to discontinue ascorbic acid, vitamin E and potentially Toprol-XL after discussion with the primary physician.   9. diabetes mellitus, hyperlipidemia  10. Hemorrhoid     ALLERGIES/MEDICATIONS: reviewed in [facility]    SOCIAL HISTORY: The patient did smoke for several years but has since stopped in 10/1986.     FAMILY HISTORY: Overall noncontributory. + FH of Steven's d/s from the Dad side     ROS:    no problems with fevers or chills,  "nausea, vomiting or diarrhea.   + joints pain, + morning stiffness.    + insomnia .   She reported word finding difficulty, short term memory problem since  Christmas 2015, evaluated by neurologist and got assurance.     PE  Blood pressure 108/66, pulse 61, temperature 98.8  F (37.1  C), temperature source Oral, weight 84.6 kg (186 lb 9.6 oz), last menstrual period 09/24/1990, SpO2 95 %, not currently breastfeeding.  General: alert, oriented, NAD, pleasant  HEENT: no mucositis, sclera is anicteric, THEE,   Neck: supple, no JVD  LN: superficial LNs are not palpable in neck, supraclavicular, axilla and groin areas, no masses  LUNGS: Clear lung fields to auscultation. No wheezing or ronchi  HEART: Sounds S1, S2 heard, regular. No murmur or gallop  ABDOMEN: Soft, full, obese, nontender without palpable hepatosplenomegaly.   Extremity: bilateral knee pain, no joints deformities or effusion, no edema  Skin: scattered erythematous rash on elbows and knees, palms, feet, face, forehead.   Chest wall: mastectomies scars well healed, no masses.     Current LAB Data Reviewed  10/2017 cbc diff, marker, LFT,   3/2017 -ferritin/B12 not low      Current Image Reviewed  Dexa 10/2017 : Advanced osteopenia of the left hip, moderate osteopenia of the right hip, and normal bone mineral density of the lumbar spine. Stable to minor improvement compare to 2015.     Old data reviewed with summary  Dexa 10/2015: osteopenia  CXR/BONE SCAN 5/2015: negative       10/2014 - cbc diff/CMP are fine, except cr 1.15, B12/folate are nl, vit D 24 low    Bone scan 1/2014 - DJD, no mets    CT body 1/2014 - no mets      ASSESSMENT AND PLAN:   1. Left breast cancer s/p double mastectomies, ER/MO+, her 2 - on Femara since 10/2012.   F/u in 6 months with labs.     2. history of gastric bypass surgery with BRETT and low B12.  Continue B12 oral indefinitely, routine follow up iron, need IV iron prn.    3. Joints pain. Advice OcT \"move free\".     4. Osteopenia. " Advice vit D proper dosing.    5. Hx of DVT on coumadin via her PMD. She is advised on ongoing coumadin/INR monitoring via her PMD office.

## 2017-10-18 NOTE — MR AVS SNAPSHOT
After Visit Summary   10/18/2017    Gemma Cowart    MRN: [id number]           Patient Information     Date Of Birth          1945        Visit Information        Provider Department      10/18/2017 8:30 AM Jyoti Narvaez MD [facility]        Today's Diagnoses     History of left breast cancer    -  1    Bariatric surgery status        Personal history of DVT (deep vein thrombosis)        Multiple joint pain        Osteopenia, unspecified location          Care Instructions    1 yr f/u with labs.           Follow-ups after your visit        Your next 10 appointments already scheduled     Oct 30, 2017  8:30 AM CDT   Anticoagulation Visit with  ANTICOAGULATION CLINIC   [facility] ([facility])    [address]  [address]  [place] [address] [phone]            Oct 30, 2017  9:30 AM CDT   Office Visit with Alva Viera MD   [facility] ([facility])    [address] [place] [address]  [address]  [place] [address] [phone]           Bring a current list of meds and any records pertaining to this visit. For Physicals, please bring immunization records and any forms needing to be filled out. Please arrive 10 minutes early to complete paperwork.            Oct 10, 2018  8:30 AM CDT   Return Visit with  Oncology Nurse   [facility] ([facility])    [facility]  [address]  [place] [address]   [phone]            Oct 17, 2018  8:30 AM CDT   Return Visit with Jyoti Narvaez MD   [facility] ([facility])    [facility]  [address]  [place] [address]   [phone]              Future tests that were ordered for you today     Open Future Orders        Priority Expected Expires Ordered    Ca27.29  breast tumor marker Routine 9/1/2018 10/18/2018 10/18/2017    Comprehensive metabolic  "panel Routine 2018 10/18/2018 10/18/2017            Who to contact     If you have questions or need follow up information about today's clinic visit or your schedule please contact [facility] directly at [phone].  Normal or non-critical lab and imaging results will be communicated to you by MyChart, letter or phone within 4 business days after the clinic has received the results. If you do not hear from us within 7 days, please contact the clinic through ShopReply[employer] or phone. If you have a critical or abnormal lab result, we will notify you by phone as soon as possible.  Submit refill requests through NOBOT or call your pharmacy and they will forward the refill request to us. Please allow 3 business days for your refill to be completed.          Additional Information About Your Visit        [employer] Information     NOBOT lets you send messages to your doctor, view your test results, renew your prescriptions, schedule appointments and more. To sign up, go to [email or web address] . Click on \"Log in\" on the left side of the screen, which will take you to the Welcome page. Then click on \"Sign up Now\" on the right side of the page.     You will be asked to enter the access code listed below, as well as some personal information. Please follow the directions to create your username and password.     Your access code is: [email or web address]  Expires: 2017  9:07 AM     Your access code will  in 90 days. If you need help or a new code, please call your [place] clinic or [phone].        Care EveryWhere ID     This is your Care EveryWhere ID. This could be used by other organizations to access your [place] medical records  [id number]        Your Vitals Were     Pulse Temperature Last Period Pulse Oximetry BMI (Body Mass Index)       61 98.8  F (37.1  C) (Oral) 1990 95% 32.79 kg/m2        Blood Pressure from Last 3 Encounters:   10/18/17 108/66   10/16/17 115/67   10/06/17 112/53    " Weight from Last 3 Encounters:   10/18/17 84.6 kg (186 lb 9.6 oz)   10/16/17 84.4 kg (186 lb)   10/02/17 83.5 kg (184 lb 2 oz)                 Today's Medication Changes          These changes are accurate as of: 10/18/17  9:00 AM.  If you have any questions, ask your nurse or doctor.               Stop taking these medicines if you haven't already. Please contact your care team if you have questions.     KWASI Watkins                    Primary Care Provider Office Phone # Fax #    Alva Viera [phone] [address] [place] [address]        Equal Access to Services     [facility]: Hadii kavitha Ortiz, wahudson joseph, sonia brinkmagopi smith, bg hines . So [facility] [phone].    ATENCIÓN: Si habla español, tiene a marcum disposición servicios gratuitos de asistencia lingüística. Llame al [phone].    We comply with applicable federal civil rights laws and [place] laws. We do not discriminate on the basis of race, color, national origin, age, disability, sex, sexual orientation, or gender identity.            Thank you!     Thank you for choosing [facility]  for your care. Our goal is always to provide you with excellent care. Hearing back from our patients is one way we can continue to improve our services. Please take a few minutes to complete the written survey that you may receive in the mail after your visit with us. Thank you!             Your Updated Medication List - Protect others around you: Learn how to safely use, store and throw away your medicines at www.disposemymeds.org.          This list is accurate as of: 10/18/17  9:00 AM.  Always use your most recent med list.                   Brand Name Dispense Instructions for use Diagnosis    ACE/ARB NOT PRESCRIBED (INTENTIONAL)      ACE & ARB not prescribed due to Other: normal tensive, low microalbumuria    Type 2 diabetes mellitus with other specified  complication (H)       ASPIRIN NOT PRESCRIBED    INTENTIONAL     Antiplatelet medication not prescribed intentionally due to Current anticoagulant therapy (warfarin/enoxaparin)    Type 2 diabetes mellitus with other specified complication (H)       B-12 1000 MCG Tbcr      Take 1,000 mcg by mouth daily    Vitamin B12 deficiency       blood glucose lancets standard    no brand specified    2 Box    Use to test blood sugars 2 times daily or as directed. Please dispense lancets for Relion Confirm.    Type 2 diabetes mellitus with other specified complication (H)       blood glucose monitoring test strip    no brand specified    200 each    Use to test blood sugars 2 times daily or as directed. Please dispense Relion Confirm test strips    Type 2 diabetes mellitus with other specified complication (H)       Calcium Citrate Petite/Vit D 200-250 MG-UNIT Tabs      Take 2 tablets by mouth daily        clobetasol 0.05 % cream    TEMOVATE    270 g    Apply sparingly to affected area twice daily as needed.  Do not apply to face.    Eczema, unspecified eczema       PARoxetine 30 MG tablet    PAXIL    180 tablet    Take 2 tablets (60 mg) by mouth At Bedtime    Anxiety       TYLENOL 500 MG tablet   Generic drug:  acetaminophen     100 tablet    Take 2 tablets (1,000 mg) by mouth every 8 hours as needed for mild pain        vitamin D 2000 UNITS Caps      Take 1 capsule by mouth daily.        warfarin 5 MG tablet    COUMADIN    135 tablet    TAKE ONE TO ONE & ONE-HALF TABLETS BY MOUTH DAILY    History of deep venous thrombosis

## 2017-10-18 NOTE — PROGRESS NOTES
"Oncology Rooming Note    October 18, 2017 8:28 AM   Gemma Cowart is a 72 year old female who presents for:    Chief Complaint   Patient presents with     Oncology Clinic Visit     Initial Vitals: /66 (BP Location: Right arm, Patient Position: Chair, Cuff Size: Adult Large)  Pulse 61  Temp 98.8  F (37.1  C) (Oral)  Wt 84.6 kg (186 lb 9.6 oz)  LMP 09/24/1990  SpO2 95%  BMI 32.79 kg/m2 Estimated body mass index is 32.79 kg/(m^2) as calculated from the following:    Height as of 10/2/17: 1.607 m (5' 3.25\").    Weight as of this encounter: 84.6 kg (186 lb 9.6 oz). Body surface area is 1.94 meters squared.  No Pain (0) Comment: Data Unavailable   Patient's last menstrual period was 09/24/1990.  Allergies reviewed: Yes  Medications reviewed: Yes    Medications: Medication refills not needed today.  Pharmacy name entered into [facility]:    [facility] [address] [place] [address] PHARMACY [address] [place], [place] - [address] [place] [address].    Clinical concerns: None     5 minutes for nursing intake (face to face time)     Huyen Kumar CMA            DISCHARGE PLAN:  Next appointments: See patient instruction section. Pt instructions reviewed with pt. Lab and follow up scheduled by Huyen. AVS printed and given to pt.  Departure Mode: Ambulatory  Accompanied by: self  5 minutes for nursing discharge (face to face time)   Huyen Kumar CMA      "

## 2017-10-30 ENCOUNTER — ANTICOAGULATION THERAPY VISIT (OUTPATIENT)
Dept: NURSING | Facility: CLINIC | Age: 72
End: 2017-10-30
Payer: COMMERCIAL

## 2017-10-30 ENCOUNTER — TELEPHONE (OUTPATIENT)
Dept: PEDIATRICS | Facility: CLINIC | Age: 72
End: 2017-10-30

## 2017-10-30 ENCOUNTER — OFFICE VISIT (OUTPATIENT)
Dept: PEDIATRICS | Facility: CLINIC | Age: 72
End: 2017-10-30
Payer: COMMERCIAL

## 2017-10-30 VITALS
OXYGEN SATURATION: 98 % | BODY MASS INDEX: 32.57 KG/M2 | TEMPERATURE: 97.9 F | SYSTOLIC BLOOD PRESSURE: 102 MMHG | HEART RATE: 65 BPM | WEIGHT: 183.8 LBS | DIASTOLIC BLOOD PRESSURE: 62 MMHG | HEIGHT: 63 IN

## 2017-10-30 DIAGNOSIS — L08.82 OMPHALITIS IN ADULT: ICD-10-CM

## 2017-10-30 DIAGNOSIS — E11.69 TYPE 2 DIABETES MELLITUS WITH OTHER SPECIFIED COMPLICATION, WITHOUT LONG-TERM CURRENT USE OF INSULIN (H): Chronic | ICD-10-CM

## 2017-10-30 DIAGNOSIS — Z79.01 LONG-TERM (CURRENT) USE OF ANTICOAGULANTS: ICD-10-CM

## 2017-10-30 DIAGNOSIS — L40.9 PSORIASIS: Chronic | ICD-10-CM

## 2017-10-30 DIAGNOSIS — Z86.718 PERSONAL HISTORY OF DVT (DEEP VEIN THROMBOSIS): ICD-10-CM

## 2017-10-30 DIAGNOSIS — K58.9 IRRITABLE BOWEL SYNDROME, UNSPECIFIED TYPE: Primary | ICD-10-CM

## 2017-10-30 LAB — INR POINT OF CARE: 2.2 (ref 0.86–1.14)

## 2017-10-30 PROCEDURE — 36416 COLLJ CAPILLARY BLOOD SPEC: CPT

## 2017-10-30 PROCEDURE — 99214 OFFICE O/P EST MOD 30 MIN: CPT | Performed by: INTERNAL MEDICINE

## 2017-10-30 PROCEDURE — 85610 PROTHROMBIN TIME: CPT | Mod: QW

## 2017-10-30 PROCEDURE — 99207 ZZC NO CHARGE NURSE ONLY: CPT

## 2017-10-30 RX ORDER — CLOBETASOL PROPIONATE 0.5 MG/G
CREAM TOPICAL
Qty: 270 G | Refills: 3 | COMMUNITY
Start: 2017-10-30 | End: 2018-02-16

## 2017-10-30 RX ORDER — KETOCONAZOLE 20 MG/G
CREAM TOPICAL 2 TIMES DAILY
Qty: 60 G | Refills: 1 | Status: SHIPPED | OUTPATIENT
Start: 2017-10-30 | End: 2018-02-16

## 2017-10-30 RX ORDER — CLOBETASOL PROPIONATE 0.5 MG/ML
SOLUTION TOPICAL
Qty: 50 ML | Refills: 0 | Status: SHIPPED | OUTPATIENT
Start: 2017-10-30 | End: 2018-03-02

## 2017-10-30 NOTE — MR AVS SNAPSHOT
Gemma Cowart   10/30/2017 8:30 AM   Anticoagulation Therapy Visit    Description:  72 year old female   Provider:   ANTICOAGULATION CLINIC   Department:   Nurse           INR as of 10/30/2017     Today's INR 2.2      Anticoagulation Summary as of 10/30/2017     INR goal 2.0-3.0   Today's INR 2.2   Full instructions 10 mg on Mon; 7.5 mg all other days   Next INR check 11/27/2017    Indications   Personal history of DVT (deep vein thrombosis) [Z86.718]  Long-term (current) use of anticoagulants [Z79.01] [Z79.01]         Your next Anticoagulation Clinic appointment(s)     Oct 30, 2017  8:30 AM CDT   Anticoagulation Visit with  ANTICOAGULATION CLINIC   [facility] ([facility]    [address] [place] [address]  [address]  [facility] [id number] [phone]            Nov 27, 2017  8:15 AM CST   Anticoagulation Visit with  ANTICOAGULATION CLINIC   [facility] ([facility])    [address] [place] [address]  [address]  [facility] [id number] [phone]              Contact Numbers     [facility] [place]  Please call  [phone] to cancel and/or reschedule your appointment   Please call  [phone] with any problems or questions regarding your therapy.        October 2017 Details    Sun Mon Tue Wed Thu Fri Sat     1               2               3               4               5               6               7                 8               9               10               11               12               13               14                 15               16               17               18               19               20               21                 22               23               24               25               26               27               28                 29               30      10 mg   See details      31      7.5 mg              Date Details   10/30 This INR check               How to take your warfarin dose     To take:  7.5 mg Take  1.5 of the 5 mg tablets.    To take:  10 mg Take 2 of the 5 mg tablets.           November 2017 Details    Sun Mon Tue Wed Thu Fri Sat        1      7.5 mg         2      7.5 mg         3      7.5 mg         4      7.5 mg           5      7.5 mg         6      10 mg         7      7.5 mg         8      7.5 mg         9      7.5 mg         10      7.5 mg         11      7.5 mg           12      7.5 mg         13      10 mg         14      7.5 mg         15      7.5 mg         16      7.5 mg         17      7.5 mg         18      7.5 mg           19      7.5 mg         20      10 mg         21      7.5 mg         22      7.5 mg         23      7.5 mg         24      7.5 mg         25      7.5 mg           26      7.5 mg         27            28               29               30                  Date Details   No additional details    Date of next INR:  11/27/2017         How to take your warfarin dose     To take:  7.5 mg Take 1.5 of the 5 mg tablets.    To take:  10 mg Take 2 of the 5 mg tablets.

## 2017-10-30 NOTE — NURSING NOTE
"Chief Complaint   Patient presents with     Recheck Medication       Initial /62  Pulse 65  Temp 97.9  F (36.6  C) (Tympanic)  Ht 5' 3.25\" (1.607 m)  Wt 183 lb 12.8 oz (83.4 kg)  LMP 09/24/1990  SpO2 98%  BMI 32.3 kg/m2 Estimated body mass index is 32.3 kg/(m^2) as calculated from the following:    Height as of this encounter: 5' 3.25\" (1.607 m).    Weight as of this encounter: 183 lb 12.8 oz (83.4 kg).  Medication Reconciliation: complete   Dee Dee Keating CMA    "

## 2017-10-30 NOTE — PATIENT INSTRUCTIONS
All of your tests looked good! Most likely the abdominal pain is due to irritable bowel syndrome. Try keeping an journal of your food to see if we can determine if there is something that makes your abdominal symptoms worse. Let me know if you are continuing to get pain and we can try a medication to see if it helps.    Use ketoconazole cream to belly button twice daily for at least 14 days.    Clobetasol to scalp refilled.     Come back in 3-6 months for fasting labs.

## 2017-10-30 NOTE — TELEPHONE ENCOUNTER
Prior auth received from [facility] for clobetasol 0.05% solution.  Checking with provider to see if needed or will change Rx.  Dee Dee Keating, CMA

## 2017-10-30 NOTE — TELEPHONE ENCOUNTER
Called insurance.   Transferred to correct dept: Sarbjit [phone]. Keren - rep  Prior auth for clobetasol solution 0.05%.    Formulary covers:  fluocinolone topical solution  Fluocinonide solution  Mometasone solution  Hydrocortisone vuty solution    Prior auth started for clobetasol solution  Case # [id number]  Rep states 72 hours before decision.    Dee Dee Keating, CMA

## 2017-10-30 NOTE — MR AVS SNAPSHOT
After Visit Summary   10/30/2017    Gemma Cowart    MRN: [id number]           Patient Information     Date Of Birth          1945        Visit Information        Provider Department      10/30/2017 9:30 AM Alva Viera MD [facility]        Today's Diagnoses     Irritable bowel syndrome, unspecified type    -  1    Type 2 diabetes mellitus with other specified complication, without long-term current use of insulin (H)        Psoriasis        Omphalitis in adult          Care Instructions    All of your tests looked good! Most likely the abdominal pain is due to irritable bowel syndrome. Try keeping an journal of your food to see if we can determine if there is something that makes your abdominal symptoms worse. Let me know if you are continuing to get pain and we can try a medication to see if it helps.    Use ketoconazole cream to belly button twice daily for at least 14 days.    Clobetasol to scalp refilled.           Follow-ups after your visit        Your next 10 appointments already scheduled     Nov 27, 2017  8:15 AM CST   Anticoagulation Visit with  ANTICOAGULATION CLINIC   [facility] [place] ([facility])    [address] [place] [address]  [address]  [facility] [address]   [phone]            Oct 10, 2018  8:30 AM CDT   Return Visit with  Oncology Nurse   [facility] ([facility])    [facility]  [address]  [facility] [address]   [phone]            Oct 17, 2018  8:30 AM CDT   Return Visit with Jyoti Narvaez MD   [facility] ([facility])    [facility]  [address]  [facility] [address]   [phone]              Future tests that were ordered for you today     Open Future Orders        Priority Expected Expires Ordered    Hemoglobin A1c Routine  10/30/2018 10/30/2017    Lipid panel reflex to direct LDL Fasting Routine   "10/30/2018 10/30/2017            Who to contact     If you have questions or need follow up information about today's clinic visit or your schedule please contact [facility] directly at [phone].  Normal or non-critical lab and imaging results will be communicated to you by MyChart, letter or phone within 4 business days after the clinic has received the results. If you do not hear from us within 7 days, please contact the clinic through MyChart or phone. If you have a critical or abnormal lab result, we will notify you by phone as soon as possible.  Submit refill requests through OutTrippin or call your pharmacy and they will forward the refill request to us. Please allow 3 business days for your refill to be completed.          Additional Information About Your Visit        [employer] Information     Out[employer] lets you send messages to your doctor, view your test results, renew your prescriptions, schedule appointments and more. To sign up, go to [email or web address] . Click on \"Log in\" on the left side of the screen, which will take you to the Welcome page. Then click on \"Sign up Now\" on the right side of the page.     You will be asked to enter the access code listed below, as well as some personal information. Please follow the directions to create your username and password.     Your access code is: [email or web address]  Expires: 2017  9:07 AM     Your access code will  in 90 days. If you need help or a new code, please call your [place] clinic or [phone].        Care EveryWhere ID     This is your Care EveryWhere ID. This could be used by other organizations to access your [place] medical records  [id number]        Your Vitals Were     Pulse Temperature Height Last Period Pulse Oximetry BMI (Body Mass Index)    65 97.9  F (36.6  C) (Tympanic) 5' 3.25\" (1.607 m) 1990 98% 32.3 kg/m2       Blood Pressure from Last 3 Encounters:   10/30/17 102/62   10/18/17 108/66   10/16/17 115/67 "    Weight from Last 3 Encounters:   10/30/17 183 lb 12.8 oz (83.4 kg)   10/18/17 186 lb 9.6 oz (84.6 kg)   10/16/17 186 lb (84.4 kg)                 Today's Medication Changes          These changes are accurate as of: 10/30/17 10:02 AM.  If you have any questions, ask your nurse or doctor.               Start taking these medicines.        Dose/Directions    ketoconazole 2 % cream   Commonly known as:  NIZORAL   Used for:  Omphalitis in adult   Started by:  Alva Viera MD        Apply topically 2 times daily   Quantity:  60 g   Refills:  1         These medicines have changed or have updated prescriptions.        Dose/Directions    * clobetasol 0.05 % external solution   Commonly known as:  TEMOVATE   This may have changed:  You were already taking a medication with the same name, and this prescription was added. Make sure you understand how and when to take each.   Used for:  Psoriasis   Changed by:  Alva Viera MD        Apply sparingly to affected area twice daily for 14 days.  Do not apply to face.   Quantity:  50 mL   Refills:  0       * clobetasol 0.05 % cream   Commonly known as:  TEMOVATE   This may have changed:  Another medication with the same name was added. Make sure you understand how and when to take each.   Changed by:  Alva Viera MD        Apply sparingly to affected area twice daily as needed.  Do not apply to face.   Quantity:  270 g   Refills:  3       * Notice:  This list has 2 medication(s) that are the same as other medications prescribed for you. Read the directions carefully, and ask your doctor or other care provider to review them with you.         Where to get your medicines      These medications were sent to [facility] [address] [place] [address] - [address]  [address] [address]     Phone:  [phone]     clobetasol 0.05 % external solution    ketoconazole 2 % cream                Primary Care Provider Office  Phone # Fax #    Alva Viera [phone] [address] [place] [address]        Equal Access to Services     CAROLE LERMA : Hadii aad ku hadzulayvon Betzaidamisa, waleonilada khurramraghavha, qamarkosta kaalmada sarah, bg davison lalotusevelyne ruben. So [facility] [place] [facility] [phone].    ATENCIÓN: Si habla español, tiene a marcum disposición servicios gratuitos de asistencia lingüística. Llame al [phone].    We comply with applicable federal civil rights laws and [place] laws. We do not discriminate on the basis of race, color, national origin, age, disability, sex, sexual orientation, or gender identity.            Thank you!     Thank you for choosing [facility]  for your care. Our goal is always to provide you with excellent care. Hearing back from our patients is one way we can continue to improve our services. Please take a few minutes to complete the written survey that you may receive in the mail after your visit with us. Thank you!             Your Updated Medication List - Protect others around you: Learn how to safely use, store and throw away your medicines at www.disposemymeds.org.          This list is accurate as of: 10/30/17 10:02 AM.  Always use your most recent med list.                   Brand Name Dispense Instructions for use Diagnosis    ACE/ARB/ARNI NOT PRESCRIBED (INTENTIONAL)      ACE & ARB not prescribed due to Other: normal tensive, low microalbumuria    Type 2 diabetes mellitus with other specified complication (H)       ASPIRIN NOT PRESCRIBED    INTENTIONAL     Antiplatelet medication not prescribed intentionally due to Current anticoagulant therapy (warfarin/enoxaparin)    Type 2 diabetes mellitus with other specified complication (H)       B-12 1000 MCG Tbcr      Take 1,000 mcg by mouth daily    Vitamin B12 deficiency       blood glucose lancets standard    no brand specified    2 Box    Use to test blood sugars 2 times daily or as directed. Please  dispense lancets for Relion Confirm.    Type 2 diabetes mellitus with other specified complication (H)       blood glucose monitoring test strip    no brand specified    200 each    Use to test blood sugars 2 times daily or as directed. Please dispense Relion Confirm test strips    Type 2 diabetes mellitus with other specified complication (H)       Calcium Citrate Petite/Vit D 200-250 MG-UNIT Tabs      Take 2 tablets by mouth daily        * clobetasol 0.05 % external solution    TEMOVATE    50 mL    Apply sparingly to affected area twice daily for 14 days.  Do not apply to face.    Psoriasis       * clobetasol 0.05 % cream    TEMOVATE    270 g    Apply sparingly to affected area twice daily as needed.  Do not apply to face.        ketoconazole 2 % cream    NIZORAL    60 g    Apply topically 2 times daily    Omphalitis in adult       PARoxetine 30 MG tablet    PAXIL    180 tablet    Take 2 tablets (60 mg) by mouth At Bedtime    Anxiety       TYLENOL 500 MG tablet   Generic drug:  acetaminophen     100 tablet    Take 2 tablets (1,000 mg) by mouth every 8 hours as needed for mild pain        vitamin D 2000 UNITS Caps      Take 1 capsule by mouth daily.        warfarin 5 MG tablet    COUMADIN    135 tablet    TAKE ONE TO ONE & ONE-HALF TABLETS BY MOUTH DAILY    History of deep venous thrombosis       * Notice:  This list has 2 medication(s) that are the same as other medications prescribed for you. Read the directions carefully, and ask your doctor or other care provider to review them with you.

## 2017-10-30 NOTE — PROGRESS NOTES
SUBJECTIVE:   Gemma Cowart is a 72 year old female who presents to clinic today for the following health issues:    Gemma comes in to follow-up on her recent test results. She reports that she continues to have intermittent abdominal pain, however, no vomiting or terrible nausea any more. The abdominal pain seems to come and go, and can be associated with both constipation and diarrhea. She is not sure if any specific food sets this off or not. No longer having blood in her stool.     She does continue to have redness and some drainage as well as foul odor from her belly button. Wondering if there is a treatment for this. She is planning to make an appointment with plastic surgery to discuss pannus removal.     Needs refill of clobetasol solution for her scalp psoriasis.    Met with MTM recently, stopped statin and metformin. Wanted to make sure that this was ok.     Problem list and histories reviewed & adjusted, as indicated.  Additional history: as documented    Patient Active Problem List   Diagnosis     Nontoxic uninodular goiter     Rosacea     BRETT (iron deficiency anemia)     Complications of gastric bypass surgery     Vitamin D deficiency disease     Vitamin B12 deficiency     Eczema     Dyshydrosis     Hyperlipidemia LDL goal <100     Anxiety     Insomnia     Health Care Home     Type 2 diabetes mellitus with other specified complication (H)     Psoriasis     Long-term (current) use of anticoagulants [Z79.01]     Advanced directives, counseling/discussion     Malignant neoplasm of left female breast (H)     Morbid obesity (H)     CKD (chronic kidney disease) stage 3, GFR 30-59 ml/min     Personal history of DVT (deep vein thrombosis)     Past Surgical History:   Procedure Laterality Date     ABDOMEN SURGERY  2000    gastric bypass 2000     COLONOSCOPY  10/11/2012    Procedure: COLONOSCOPY;  colonoscopy;  Surgeon: Gela Cleary MD;  Location:  GI     COLONOSCOPY N/A 10/6/2017    Procedure:  COLONOSCOPY;;  Surgeon: Shashank Ortiz MD;  Location:  GI     DENTAL SURGERY  5/2007    all teeth removed - dentures     ENT SURGERY      all teeth pulled     ESOPHAGOSCOPY, GASTROSCOPY, DUODENOSCOPY (EGD), COMBINED N/A 10/6/2017    Procedure: COMBINED ESOPHAGOSCOPY, GASTROSCOPY, DUODENOSCOPY (EGD);  ESOPHAGOSCOPY, GASTROSCOPY, DUODENOSCOPY (EGD) & Colonoscopy *Needs INR on arrival;  Surgeon: Shashank Ortiz MD;  Location:  GI     GYN SURGERY  1/10/75    tubal ligation      H ABLATION SVT  5/28/2015    stopped metoprolol     HC BREAST RECONSTRUC W OTHR TECHNIQ  5/8/2013     HERNIA REPAIR  2005     IMPLANT FILTER INFERIOR VENA CAVA  10/8/2012    taken out  1/4/12     INSERT TISSUE EXPANDER BREAST BILATERAL  9/5/2012    Procedure: INSERT TISSUE EXPANDER BREAST BILATERAL;;  Surgeon: Orlando Wall MD;  Location:  OR     MASTECTOMY SIMPLE, SENTINEL NODE, COMBINED  9/5/2012    Procedure: COMBINED MASTECTOMY SIMPLE, SENTINEL NODE;  BILATERAL MASTECTOMY WITH LEFT AXILLARY SENTINEL LYMPH NODE BIOPSY, BILATERAL TISSUE EXPANDER        MASTECTOMY, BILATERAL       RECONSTRUCT BREAST BILATERAL, IMPLANT PROSTHESIS BILATERAL, COMBINED  5/22/2013    Procedure: COMBINED RECONSTRUCT BREAST BILATERAL, IMPLANT PROSTHESIS BILATERAL;  BILATERAL SECOND STAGE BREAST RECONSTRUCTION WITH SILICONE GEL IMPLANTS AND LIPOSUCTION;  Surgeon: Orlando Wall MD;  Location: [facility]     RECONSTRUCT NIPPLE BILATERAL  8/22/2013    Procedure: RECONSTRUCT NIPPLE BILATERAL;  BILATERAL NIPPLE AREOLAR RECONSTRUCTION;  Surgeon: Orlando Wall MD;  Location: [facility]       Social History   Substance Use Topics     Smoking status: Former Smoker     Packs/day: 1.50     Years: 25.00     Types: Cigarettes     Quit date: 10/21/1986     Smokeless tobacco: Never Used      Comment: quit 1985     Alcohol use No     Family History   Problem Relation Age of Onset     Cancer - colorectal Mother      Colon Cancer Mother      Prostate Cancer  "Father      Alzheimer Disease Father      CANCER Paternal Grandmother      Alzheimer Disease Paternal Grandfather      Cancer - colorectal Brother      DIABETES Brother      DIABETES Brother      Eye Disorder Brother      DIABETES Son      CANCER Maternal Uncle      CANCER Maternal Uncle              Reviewed and updated as needed this visit by clinical staffTobacco  Allergies  Meds  Med Hx  Surg Hx  Fam Hx  Soc Hx        ROS:  Constitutional, HEENT, derm, GI,  systems are negative, except as otherwise noted.      OBJECTIVE:   /62  Pulse 65  Temp 97.9  F (36.6  C) (Tympanic)  Ht 5' 3.25\" (1.607 m)  Wt 183 lb 12.8 oz (83.4 kg)  LMP 09/24/1990  SpO2 98%  BMI 32.3 kg/m2  Body mass index is 32.3 kg/(m^2).  GENERAL: overweight woman, appears stated age. Seated comfortably and in NAD.  SKIN: scattered, coalescing erythematous papules in umbilicus with small amount of oozy drainage. Scattered crusted lesions on scalp    Diagnostic Test Results:  none     ASSESSMENT/PLAN:     1. Irritable bowel syndrome, unspecified type  Reviewed recent work up, including colonoscopy, EGD, and CT scan. Overall reassuring and without clear etiology for patient's symptoms; as such, anticipate that some of her intermittent abdominal discomfort may be related to IBS. Discussed keeping a food diary to monitor symptoms, and management of diarrhea/constipation with stool softeners or imodium if needed. Could consider Levsin in future as well.     2. Psoriasis  Will refill solution for scalp.   - clobetasol (TEMOVATE) 0.05 % external solution; Apply sparingly to affected area twice daily for 14 days.  Do not apply to face.  Dispense: 50 mL; Refill: 0    3. Omphalitis in adult  Appears candidal in nature, will try topical antifungal treatment.   - ketoconazole (NIZORAL) 2 % cream; Apply topically 2 times daily  Dispense: 60 g; Refill: 1    4. Type 2 diabetes mellitus with other specified complication, without long-term current " use of insulin (H)  Will obtain fasting labs in 3-6 months after stopping metformin and statin.   - Lipid panel reflex to direct LDL Fasting; Future  - Hemoglobin A1c; Future    Patient Instructions   All of your tests looked good! Most likely the abdominal pain is due to irritable bowel syndrome. Try keeping an journal of your food to see if we can determine if there is something that makes your abdominal symptoms worse. Let me know if you are continuing to get pain and we can try a medication to see if it helps.    Use ketoconazole cream to belly button twice daily for at least 14 days.    Clobetasol to scalp refilled.     Come back in 3-6 months for fasting labs.       Alva Casas MD  [facility]

## 2017-10-30 NOTE — PROGRESS NOTES
ANTICOAGULATION FOLLOW-UP CLINIC VISIT    Patient Name:  Gemma Cowart  Date:  10/30/2017  Contact Type:  Face to Face    SUBJECTIVE:     Patient Findings     Positives Med error    Comments She took 10 mg on Mon and Fri only the first week after being seen and took 7.5 mg qd after that.  Dose adjusted.           OBJECTIVE    INR Protime   Date Value Ref Range Status   10/30/2017 2.2 (A) 0.86 - 1.14 Final       ASSESSMENT / PLAN  INR assessment THER    Recheck INR In: 4 WEEKS    INR Location Clinic      Anticoagulation Summary as of 10/30/2017     INR goal 2.0-3.0   Today's INR 2.2   Maintenance plan 10 mg (5 mg x 2) on Mon; 7.5 mg (5 mg x 1.5) all other days   Full instructions 10 mg on Mon; 7.5 mg all other days   Weekly total 55 mg   Plan last modified Rae Cooley, RN (10/30/2017)   Next INR check 11/27/2017   Target end date Indefinite    Indications   Personal history of DVT (deep vein thrombosis) [Z86.718]  Long-term (current) use of anticoagulants [Z79.01] [Z79.01]         Anticoagulation Episode Summary     [facility]    Preferred lab     Send INR reminders to [facility]    Comments 5mg tabs - HS dose // CALENDAR // gastric bypass 2000 // DVT 11/16/12 warfarin until Apr 2013 // DVT 9/12/15      Anticoagulation Care Providers     Provider Role Specialty Phone number    Alva Viera MD  Internal Medicine [phone]            See the Encounter Report to view Anticoagulation Flowsheet and Dosing Calendar (Go to Encounters tab in chart review, and find the Anticoagulation Therapy Visit)        Rae Cooley RN

## 2017-11-02 NOTE — TELEPHONE ENCOUNTER
Request for more information received from insurance.  Provider completed.  Faxed to insurance.  Dee Dee Keating, CMA

## 2017-11-27 ENCOUNTER — ANTICOAGULATION THERAPY VISIT (OUTPATIENT)
Dept: NURSING | Facility: CLINIC | Age: 72
End: 2017-11-27
Payer: COMMERCIAL

## 2017-11-27 DIAGNOSIS — Z79.01 LONG-TERM (CURRENT) USE OF ANTICOAGULANTS: ICD-10-CM

## 2017-11-27 DIAGNOSIS — Z86.718 PERSONAL HISTORY OF DVT (DEEP VEIN THROMBOSIS): ICD-10-CM

## 2017-11-27 LAB — INR POINT OF CARE: 2 (ref 0.86–1.14)

## 2017-11-27 PROCEDURE — 36416 COLLJ CAPILLARY BLOOD SPEC: CPT

## 2017-11-27 PROCEDURE — 99207 ZZC NO CHARGE NURSE ONLY: CPT

## 2017-11-27 PROCEDURE — 85610 PROTHROMBIN TIME: CPT | Mod: QW

## 2017-11-27 NOTE — PROGRESS NOTES
ANTICOAGULATION FOLLOW-UP CLINIC VISIT    Patient Name:  Gemma Cowart  Date:  11/27/2017  Contact Type:  Face to Face    SUBJECTIVE:     Patient Findings     Positives No Problem Findings           OBJECTIVE    INR Protime   Date Value Ref Range Status   11/27/2017 2.0 (A) 0.86 - 1.14 Final       ASSESSMENT / PLAN  INR assessment THER    Recheck INR In: 5 WEEKS Due to Christmas holiday   INR Location Clinic      Anticoagulation Summary as of 11/27/2017     INR goal 2.0-3.0   Today's INR 2.0   Maintenance plan 10 mg (5 mg x 2) on Mon; 7.5 mg (5 mg x 1.5) all other days   Full instructions 10 mg on Mon; 7.5 mg all other days   Weekly total 55 mg   No change documented Rae Cooley RN   Plan last modified Rae Cooley RN (10/30/2017)   Next INR check 1/2/2018   Target end date Indefinite    Indications   Personal history of DVT (deep vein thrombosis) [Z86.718]  Long-term (current) use of anticoagulants [Z79.01] [Z79.01]         Anticoagulation Episode Summary     [facility]    Preferred lab     Send INR reminders to [facility]    Comments 5mg tabs - HS dose // CALENDAR // gastric bypass 2000 // DVT 11/16/12 warfarin until Apr 2013 // DVT 9/12/15      Anticoagulation Care Providers     Provider Role Specialty Phone number    Alva Viera MD  Internal Medicine [phone]            See the Encounter Report to view Anticoagulation Flowsheet and Dosing Calendar (Go to Encounters tab in chart review, and find the Anticoagulation Therapy Visit)        Rae Cooley RN

## 2017-11-27 NOTE — MR AVS SNAPSHOT
Gemma EDMUNDO Cowart   11/27/2017 8:15 AM   Anticoagulation Therapy Visit    Description:  72 year old female   Provider:  [facility]   Department:   Nurse           INR as of 11/27/2017     Today's INR 2.0      Anticoagulation Summary as of 11/27/2017     INR goal 2.0-3.0   Today's INR 2.0   Full instructions 10 mg on Mon; 7.5 mg all other days   Next INR check 1/2/2018    Indications   Personal history of DVT (deep vein thrombosis) [Z86.718]  Long-term (current) use of anticoagulants [Z79.01] [Z79.01]         Your next Anticoagulation Clinic appointment(s)     Jan 02, 2018  8:45 AM CST   Anticoagulation Visit with  ANTICOAGULATION CLINIC   [facility]    [address]  [address]  [facility] [address] [phone]              Contact Numbers     [facility]  Please call  [phone] to cancel and/or reschedule your appointment   Please call  [phone] with any problems or questions regarding your therapy.        November 2017 Details    Sun Mon Tue Wed Thu Fri Sat        1               2               3               4                 5               6               7               8               9               10               11                 12               13               14               15               16               17               18                 19               20               21               22               23               24               25                 26               27      10 mg   See details      28      7.5 mg         29      7.5 mg         30      7.5 mg            Date Details   11/27 This INR check               How to take your warfarin dose     To take:  7.5 mg Take 1.5 of the 5 mg tablets.    To take:  10 mg Take 2 of the 5 mg tablets.           December 2017 Details    Sun Mon Tue Wed Thu Fri Sat          1      7.5 mg         2      7.5 mg           3      7.5 mg         4      10 mg          5      7.5 mg         6      7.5 mg         7      7.5 mg         8      7.5 mg         9      7.5 mg           10      7.5 mg         11      10 mg         12      7.5 mg         13      7.5 mg         14      7.5 mg         15      7.5 mg         16      7.5 mg           17      7.5 mg         18      10 mg         19      7.5 mg         20      7.5 mg         21      7.5 mg         22      7.5 mg         23      7.5 mg           24      7.5 mg         25      10 mg         26      7.5 mg         27      7.5 mg         28      7.5 mg         29      7.5 mg         30      7.5 mg           31      7.5 mg                Date Details   No additional details            How to take your warfarin dose     To take:  7.5 mg Take 1.5 of the 5 mg tablets.    To take:  10 mg Take 2 of the 5 mg tablets.           January 2018 Details    Sun Mon Tue Wed Thu Fri Sat      1      10 mg         2            3               4               5               6                 7               8               9               10               11               12               13                 14               15               16               17               18               19               20                 21               22               23               24               25               26               27                 28               29               30               31                   Date Details   No additional details    Date of next INR:  1/2/2018         How to take your warfarin dose     To take:  7.5 mg Take 1.5 of the 5 mg tablets.    To take:  10 mg Take 2 of the 5 mg tablets.

## 2018-01-02 ENCOUNTER — ANTICOAGULATION THERAPY VISIT (OUTPATIENT)
Dept: NURSING | Facility: CLINIC | Age: 73
End: 2018-01-02
Payer: COMMERCIAL

## 2018-01-02 DIAGNOSIS — Z79.01 LONG-TERM (CURRENT) USE OF ANTICOAGULANTS: ICD-10-CM

## 2018-01-02 DIAGNOSIS — Z86.718 PERSONAL HISTORY OF DVT (DEEP VEIN THROMBOSIS): ICD-10-CM

## 2018-01-02 LAB — INR POINT OF CARE: 2.4 (ref 0.86–1.14)

## 2018-01-02 PROCEDURE — 85610 PROTHROMBIN TIME: CPT | Mod: QW

## 2018-01-02 PROCEDURE — 36416 COLLJ CAPILLARY BLOOD SPEC: CPT

## 2018-01-02 PROCEDURE — 99207 ZZC NO CHARGE NURSE ONLY: CPT

## 2018-01-02 NOTE — PROGRESS NOTES
ANTICOAGULATION FOLLOW-UP CLINIC VISIT    Patient Name:  Gemma Cowart  Date:  1/2/2018  Contact Type:  Face to Face    SUBJECTIVE:     Patient Findings     Positives No Problem Findings           OBJECTIVE    INR Protime   Date Value Ref Range Status   01/02/2018 2.4 (A) 0.86 - 1.14 Final       ASSESSMENT / PLAN  INR assessment THER    Recheck INR In: 6 WEEKS    INR Location Clinic      Anticoagulation Summary as of 1/2/2018     INR goal 2.0-3.0   Today's INR 2.4   Maintenance plan 10 mg (5 mg x 2) on Mon; 7.5 mg (5 mg x 1.5) all other days   Full instructions 10 mg on Mon; 7.5 mg all other days   Weekly total 55 mg   No change documented Rae Cooley RN   Plan last modified Rae Cooley RN (10/30/2017)   Next INR check 2/12/2018   Target end date Indefinite    Indications   Personal history of DVT (deep vein thrombosis) [Z86.718]  Long-term (current) use of anticoagulants [Z79.01] [Z79.01]         Anticoagulation Episode Summary     INR check location [facility]    Preferred lab     Send INR reminders to  [facility]    Comments 5mg tabs - HS dose // CALENDAR // gastric bypass 2000 // DVT 11/16/12 warfarin until Apr 2013 // DVT 9/12/15      Anticoagulation Care Providers     Provider Role Specialty Phone number    Alva Viera MD  Internal Medicine [phone]            See the Encounter Report to view Anticoagulation Flowsheet and Dosing Calendar (Go to Encounters tab in chart review, and find the Anticoagulation Therapy Visit)        Rae Cooley RN

## 2018-01-02 NOTE — MR AVS SNAPSHOT
Gemma EDMUNDO Cowart   1/2/2018 8:45 AM   Anticoagulation Therapy Visit    Description:  72 year old female   Provider:  [facility]   Department:   Nurse           INR as of 1/2/2018     Today's INR 2.4      Anticoagulation Summary as of 1/2/2018     INR goal 2.0-3.0   Today's INR 2.4   Full instructions 10 mg on Mon; 7.5 mg all other days   Next INR check 2/12/2018    Indications   Personal history of DVT (deep vein thrombosis) [Z86.718]  Long-term (current) use of anticoagulants [Z79.01] [Z79.01]         Your next Anticoagulation Clinic appointment(s)     Feb 12, 2018  8:45 AM CST   Anticoagulation Visit with  ANTICOAGULATION CLINIC   [facility]    [address] [place] [address]  [address]  [facility] [address] [phone]              Contact Numbers     [facility]  Please call  [phone] to cancel and/or reschedule your appointment   Please call  [phone] with any problems or questions regarding your therapy.        January 2018 Details    Sun Mon Tue Wed Thu Fri Sat      1               2      7.5 mg   See details      3      7.5 mg         4      7.5 mg         5      7.5 mg         6      7.5 mg           7      7.5 mg         8      10 mg         9      7.5 mg         10      7.5 mg         11      7.5 mg         12      7.5 mg         13      7.5 mg           14      7.5 mg         15      10 mg         16      7.5 mg         17      7.5 mg         18      7.5 mg         19      7.5 mg         20      7.5 mg           21      7.5 mg         22      10 mg         23      7.5 mg         24      7.5 mg         25      7.5 mg         26      7.5 mg         27      7.5 mg           28      7.5 mg         29      10 mg         30      7.5 mg         31      7.5 mg             Date Details   01/02 This INR check               How to take your warfarin dose     To take:  7.5 mg Take 1.5 of the 5 mg tablets.    To take:  10 mg Take 2 of the 5 mg  tablets.           February 2018 Details    Sun Mon Tue Wed Thu Fri Sat         1      7.5 mg         2      7.5 mg         3      7.5 mg           4      7.5 mg         5      10 mg         6      7.5 mg         7      7.5 mg         8      7.5 mg         9      7.5 mg         10      7.5 mg           11      7.5 mg         12            13               14               15               16               17                 18               19               20               21               22               23               24                 25               26               27               28                   Date Details   No additional details    Date of next INR:  2/12/2018         How to take your warfarin dose     To take:  7.5 mg Take 1.5 of the 5 mg tablets.    To take:  10 mg Take 2 of the 5 mg tablets.

## 2018-01-24 ENCOUNTER — TELEPHONE (OUTPATIENT)
Dept: PEDIATRICS | Facility: CLINIC | Age: 73
End: 2018-01-24

## 2018-01-24 NOTE — TELEPHONE ENCOUNTER
Pt called back and the surgeons office  Dr Orlando Wall   At [facility]  [address]  [address]  [place]  [address]    Phone is [phone]

## 2018-01-24 NOTE — TELEPHONE ENCOUNTER
Reason for Call:  Other Letter    Detailed comments: pt called and needs a letter about her stomach flap surgery. Surgeon is Dr Orlando Wall with [facility]. The letter needs to say concerning the stomach flap removal. Her [employer] refuesed it and he is requesting a letter writtten by Dr Viera stating why she needs to surgery done. Needs to mention any pertaining information regarding the bellybutton, rashes, and why the flap should be removed. The surgeon is appealing the refusal.  Pt will call back on how to send the letter.    Phone Number Patient can be reached at: Home number on file [phone] (home)    Best Time: any    Can we leave a detailed message on this number? YES    Call taken on 1/24/2018 at 12:01 PM by Stacie Rosenbaum

## 2018-01-25 NOTE — TELEPHONE ENCOUNTER
Called, spoke to pt and informed of letter completed and asked if she would like us to Fax letter or mail it to [facility]. PT verbalized understanding and stated she would like letter faxed.    Letter Faxed to [facility] Attn: Dr. Wall Fax# [phone]    Gisel Sellers MA 1/25/2018 1:46 PM

## 2018-02-05 ENCOUNTER — TELEPHONE (OUTPATIENT)
Dept: PEDIATRICS | Facility: CLINIC | Age: 73
End: 2018-02-05

## 2018-02-05 ENCOUNTER — TRANSFERRED RECORDS (OUTPATIENT)
Dept: HEALTH INFORMATION MANAGEMENT | Facility: CLINIC | Age: 73
End: 2018-02-05

## 2018-02-05 NOTE — TELEPHONE ENCOUNTER
Pt LM in INR [facility] requesting call back to discuss about the low INR reading today. Was seen in urgency room today & INR was 1.5. Haven't taken today's coumadin dose(10 mg) yet. Has INR appointment on 02/12. Requesting call back at [phone](OK to LM).    Called pt back. Pt had a fall recently(a week ago), still has rib pain & knee pain, was seen in urgency room, had x-ray, ruled out fracture, was advised to monitor & f/u with pcp. While she was in the UR, they checked her inr(venipuncture) & it was 1.5. Not on any new med, not taking any otc ibuprofen, no changes in diet, didn't miss any dose.     Advised to increase her coumadin to 15 mg tonight(usually takes 10 mg on Mon & 7.5 mg on all other days) & continue 7.5 mg on Tues & Wed, recheck INR on Thursday. With any worsening sx's, advised pt to notify us. Pt agrees to the plan.      Last INR visit notes from 01/02:  INR goal 2.0-3.0   Today's INR 2.4   Maintenance plan 10 mg (5 mg x 2) on Mon; 7.5 mg (5 mg x 1.5) all other days   Full instructions 10 mg on Mon; 7.5 mg all other days   Weekly total 55 mg   No change documented Rae Cooley, RN   Plan last modified Rae Cooley, RN (10/30/2017)   Next INR check 2/12/2018     Reyna, RN  Triage Nurse

## 2018-02-05 NOTE — TELEPHONE ENCOUNTER
Patient fell about a week ago and has had severe rib and knee pain since. Also has some back pain. Appointment scheduled.  Camila Hernández RN

## 2018-02-08 ENCOUNTER — ANTICOAGULATION THERAPY VISIT (OUTPATIENT)
Dept: NURSING | Facility: CLINIC | Age: 73
End: 2018-02-08
Payer: COMMERCIAL

## 2018-02-08 DIAGNOSIS — Z86.718 PERSONAL HISTORY OF DVT (DEEP VEIN THROMBOSIS): ICD-10-CM

## 2018-02-08 DIAGNOSIS — Z79.01 LONG-TERM (CURRENT) USE OF ANTICOAGULANTS: ICD-10-CM

## 2018-02-08 LAB — INR POINT OF CARE: 2.7 (ref 0.86–1.14)

## 2018-02-08 PROCEDURE — 85610 PROTHROMBIN TIME: CPT | Mod: QW

## 2018-02-08 PROCEDURE — 36416 COLLJ CAPILLARY BLOOD SPEC: CPT

## 2018-02-08 PROCEDURE — 99207 ZZC NO CHARGE NURSE ONLY: CPT

## 2018-02-08 NOTE — MR AVS SNAPSHOT
Gemma EDMUNDO Cowart   2/8/2018 8:15 AM   Anticoagulation Therapy Visit    Description:  72 year old female   Provider:  [facility]   Department:   Nurse           INR as of 2/8/2018     Today's INR 2.7      Anticoagulation Summary as of 2/8/2018     INR goal 2.0-3.0   Today's INR 2.7   Full instructions 10 mg on Mon; 7.5 mg all other days   Next INR check 2/26/2018    Indications   Personal history of DVT (deep vein thrombosis) [Z86.718]  Long-term (current) use of anticoagulants [Z79.01] [Z79.01]         Your next Anticoagulation Clinic appointment(s)     Feb 26, 2018  8:45 AM CST   Anticoagulation Visit with  ANTICOAGULATION CLINIC   [facility] ([facility]    [address]  [address]  [facility] [address] [phone]              Contact Numbers     [facility]  Please call  [phone] to cancel and/or reschedule your appointment   Please call  [phone] with any problems or questions regarding your therapy.        February 2018 Details    Sun Mon Tue Wed Thu Fri Sat         1               2               3                 4               5               6               7               8      7.5 mg   See details      9      7.5 mg         10      7.5 mg           11      7.5 mg         12      10 mg         13      7.5 mg         14      7.5 mg         15      7.5 mg         16      7.5 mg         17      7.5 mg           18      7.5 mg         19      10 mg         20      7.5 mg         21      7.5 mg         22      7.5 mg         23      7.5 mg         24      7.5 mg           25      7.5 mg         26            27               28                   Date Details   02/08 This INR check       Date of next INR:  2/26/2018         How to take your warfarin dose     To take:  7.5 mg Take 1.5 of the 5 mg tablets.    To take:  10 mg Take 2 of the 5 mg tablets.

## 2018-02-09 NOTE — PROGRESS NOTES
ANTICOAGULATION FOLLOW-UP CLINIC VISIT    Patient Name:  Gemma Cowart  Date:  2/8/2018  Contact Type:  Face to Face    SUBJECTIVE:     Patient Findings     Positives Other complaints    Comments Fell on 2/5/18.  Rib and knee pain.  Healing bruise on right upper shin.           OBJECTIVE    INR Protime   Date Value Ref Range Status   02/08/2018 2.7 (A) 0.86 - 1.14 Final       ASSESSMENT / PLAN  INR assessment THER    Recheck INR In: 2 WEEKS    INR Location Clinic      Anticoagulation Summary as of 2/8/2018     INR goal 2.0-3.0   Today's INR 2.7   Maintenance plan 10 mg (5 mg x 2) on Mon; 7.5 mg (5 mg x 1.5) all other days   Full instructions 10 mg on Mon; 7.5 mg all other days   Weekly total 55 mg   Plan last modified Rae Cooley, RN (10/30/2017)   Next INR check 2/26/2018   Target end date Indefinite    Indications   Personal history of DVT (deep vein thrombosis) [Z86.718]  Long-term (current) use of anticoagulants [Z79.01] [Z79.01]         Anticoagulation Episode Summary     INR check location [facility]    Preferred lab     Send INR reminders to [facility]    Comments 5mg tabs - HS dose // CALENDAR // gastric bypass 2000 // DVT 11/16/12 warfarin until Apr 2013 // DVT 9/12/15      Anticoagulation Care Providers     Provider Role Specialty Phone number    Alva Viera MD  Internal Medicine [phone]            See the Encounter Report to view Anticoagulation Flowsheet and Dosing Calendar (Go to Encounters tab in chart review, and find the Anticoagulation Therapy Visit)        Rae Cooley RN

## 2018-02-15 NOTE — PROGRESS NOTES
SUBJECTIVE:   Gemma Cowart is a 72 year old female who presents to clinic today for the following health issues:      ED/UC Followup:    Facility:  [facility]   Date of visit: 02/05/2018  Reason for visit: Fall   Current Status: Improved      Fall: patient had a fall on 2/5/18 where she tripped over  A rug that did not have anti-slip on it and hit her left side against her laundry machine. She went to the urgency room and was noted to have concern for rib fracture on xray at the 9th rib. She feels as though she has been improving well and occasionally will have some pain over the area. She notes that this was purely a mechanical fall and did not feel LH or dizzy prior to this. She also hit her right knee, which had an xray (normal) and feels improved. No head trauma or other areas of trauma following this. No dysuria or hematuria.    Palpitations: patient has noted that she has been getting palpitations again at night. She has a history of ablation for SVT in the past. No palpitations, chest pain or shortness of breath with exertion. She has been noting more for the last several days. Typically the SVT had been helped with bearing down, this is not helped as much. NO LH or dizziness with these episodes. She is anticoagulated for history of DVTs.    Psoriasis: needs refill of her clobetasol, feels that this has been working well.      Problem list and histories reviewed & adjusted, as indicated.  Additional history: as documented    Patient Active Problem List   Diagnosis     Nontoxic uninodular goiter     Rosacea     BRETT (iron deficiency anemia)     Complications of gastric bypass surgery     Vitamin D deficiency disease     Vitamin B12 deficiency     Eczema     Dyshydrosis     Hyperlipidemia LDL goal <100     Anxiety     Insomnia     Health Care Home     Type 2 diabetes mellitus with other specified complication (H)     Psoriasis     Long-term (current) use of anticoagulants [Z79.01]     Advanced  directives, counseling/discussion     Malignant neoplasm of left female breast (H)     Morbid obesity (H)     CKD (chronic kidney disease) stage 3, GFR 30-59 ml/min     Personal history of DVT (deep vein thrombosis)     Past Surgical History:   Procedure Laterality Date     ABDOMEN SURGERY  2000    gastric bypass 2000     COLONOSCOPY  10/11/2012    Procedure: COLONOSCOPY;  colonoscopy;  Surgeon: Gela Cleary MD;  Location:  GI     COLONOSCOPY N/A 10/6/2017    Procedure: COLONOSCOPY;;  Surgeon: Shashank Ortiz MD;  Location:  GI     DENTAL SURGERY  5/2007    all teeth removed - dentures     ENT SURGERY      all teeth pulled     ESOPHAGOSCOPY, GASTROSCOPY, DUODENOSCOPY (EGD), COMBINED N/A 10/6/2017    Procedure: COMBINED ESOPHAGOSCOPY, GASTROSCOPY, DUODENOSCOPY (EGD);  ESOPHAGOSCOPY, GASTROSCOPY, DUODENOSCOPY (EGD) & Colonoscopy *Needs INR on arrival;  Surgeon: Shashank Ortiz MD;  Location:  GI     GYN SURGERY  1/10/75    tubal ligation      H ABLATION SVT  5/28/2015    stopped metoprolol     HC BREAST RECONSTRUC W OTHR TECHNIQ  5/8/2013     HERNIA REPAIR  2005     IMPLANT FILTER INFERIOR VENA CAVA  10/8/2012    taken out  1/4/12     INSERT TISSUE EXPANDER BREAST BILATERAL  9/5/2012    Procedure: INSERT TISSUE EXPANDER BREAST BILATERAL;;  Surgeon: Orlando Wall MD;  Location:  OR     MASTECTOMY SIMPLE, SENTINEL NODE, COMBINED  9/5/2012    Procedure: COMBINED MASTECTOMY SIMPLE, SENTINEL NODE;  BILATERAL MASTECTOMY WITH LEFT AXILLARY SENTINEL LYMPH NODE BIOPSY, BILATERAL TISSUE EXPANDER        MASTECTOMY, BILATERAL       RECONSTRUCT BREAST BILATERAL, IMPLANT PROSTHESIS BILATERAL, COMBINED  5/22/2013    Procedure: COMBINED RECONSTRUCT BREAST BILATERAL, IMPLANT PROSTHESIS BILATERAL;  BILATERAL SECOND STAGE BREAST RECONSTRUCTION WITH SILICONE GEL IMPLANTS AND LIPOSUCTION;  Surgeon: Orlando Wall MD;  Location:  [facility]     RECONSTRUCT NIPPLE BILATERAL  8/22/2013    Procedure: RECONSTRUCT  "NIPPLE BILATERAL;  BILATERAL NIPPLE AREOLAR RECONSTRUCTION;  Surgeon: Orlando Wall MD;  Location: [facility]       Social History   Substance Use Topics     Smoking status: Former Smoker     Packs/day: 1.50     Years: 25.00     Types: Cigarettes     Quit date: 10/21/1986     Smokeless tobacco: Never Used      Comment: quit 1985     Alcohol use No     Family History   Problem Relation Age of Onset     Cancer - colorectal Mother      Colon Cancer Mother      Prostate Cancer Father      Alzheimer Disease Father      CANCER Paternal Grandmother      Alzheimer Disease Paternal Grandfather      Cancer - colorectal Brother      DIABETES Brother      Crohn Disease Daughter      DIABETES Brother      Eye Disorder Brother      DIABETES Son      CANCER Maternal Uncle      CANCER Maternal Uncle            Reviewed and updated as needed this visit by clinical staff       Reviewed and updated as needed this visit by Provider         ROS:  Constitutional, HEENT, cardiovascular, pulmonary, GI, , musculoskeletal, neuro, skin, endocrine and psych systems are negative, except as otherwise noted.    OBJECTIVE:     /58 (BP Location: Right arm, Cuff Size: Adult Regular)  Pulse 70  Temp 97.5  F (36.4  C) (Oral)  Ht 5' 3.25\" (1.607 m)  Wt 185 lb (83.9 kg)  LMP 09/24/1990  SpO2 97%  BMI 32.51 kg/m2  Body mass index is 32.51 kg/(m^2).  GENERAL: healthy, alert and no distress  NECK: no adenopathy, no asymmetry, masses, or scars and thyroid normal to palpation  RESP: lungs clear to auscultation - no rales, rhonchi or wheezes  RESP: mild tenderness over lateral rib cage area along the 9th rib  CV: regular rate and rhythm, normal S1 S2, no S3 or S4, no murmur, click or rub, no peripheral edema and peripheral pulses strong  ABDOMEN: very mild very deep palpation tenderness in the LUQ otherwise soft, nontender, no hepatosplenomegaly, no masses and bowel sounds normal  MS: no gross musculoskeletal defects noted, no edema  SKIN: no " suspicious lesions or rashes  NEURO: Normal strength and tone, mentation intact and speech normal  PSYCH: mentation appears normal, affect normal/bright    Diagnostic Test Results:  Reviewed in care-everywhere    EKG today sinus rhythm    ASSESSMENT/PLAN:       ICD-10-CM    1. Palpitations R00.2 EKG 12-lead complete w/read - Clinics     Zio Patch Holter     CBC with platelets     Comprehensive metabolic panel   2. Psoriasis L40.9 clobetasol (TEMOVATE) 0.05 % cream   3. Fall, subsequent encounter W19.XXXD      Discussed healing pattern for rib fractures. Discussed with LUQ tenderness (mild) that she should let us know ASAP if changing- would evaluate for spleen lac or kidney hematoma but since improving and out over a week, will hold off for now.     With palpitations, will get Ziopatch to further elucidate what is going on with current symptoms with her history of SVT. Discussed warning signs for recheck.    Patient Instructions   1) Let us know if stomach area becomes painful as we discussed    2) Labs as we discussed    3) They should call you to set up the holter monitor-If you don't hear from them please call  [facility] : [phone] or toll-free [phone] ? [facility] [address] [place] [address].     MD Alf Doe MD, MD  [facility]

## 2018-02-16 ENCOUNTER — OFFICE VISIT (OUTPATIENT)
Dept: PEDIATRICS | Facility: CLINIC | Age: 73
End: 2018-02-16
Payer: COMMERCIAL

## 2018-02-16 VITALS
OXYGEN SATURATION: 97 % | BODY MASS INDEX: 32.78 KG/M2 | TEMPERATURE: 97.5 F | HEART RATE: 70 BPM | SYSTOLIC BLOOD PRESSURE: 102 MMHG | DIASTOLIC BLOOD PRESSURE: 58 MMHG | WEIGHT: 185 LBS | HEIGHT: 63 IN

## 2018-02-16 DIAGNOSIS — R00.2 PALPITATIONS: Primary | ICD-10-CM

## 2018-02-16 DIAGNOSIS — L40.9 PSORIASIS: Chronic | ICD-10-CM
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
## 2018-02-16 PROCEDURE — 93000 ELECTROCARDIOGRAM COMPLETE: CPT | Performed by: INTERNAL MEDICINE

## 2018-02-16 PROCEDURE — 99214 OFFICE O/P EST MOD 30 MIN: CPT | Performed by: INTERNAL MEDICINE

## 2018-02-16 RX ORDER — CLOBETASOL PROPIONATE 0.5 MG/G
CREAM TOPICAL
Qty: 270 G | Refills: 3 | Status: SHIPPED | OUTPATIENT
Start: 2018-02-16 | End: 2019-05-13

## 2018-02-16 NOTE — MR AVS SNAPSHOT
After Visit Summary   2/16/2018    Gemma Cowart    MRN: [id number]           Patient Information     Date Of Birth          1945        Visit Information        Provider Department      2/16/2018 9:50 AM Alf Guzmán MD [facility]        Today's Diagnoses     Palpitations    -  1    Psoriasis          Care Instructions    1) Let us know if stomach area becomes painful as we discussed    2) Labs as we discussed    3) They should call you to set up the holter monitor-If you don't hear from them please call [facility] : [phone] or toll-free [phone] ? [place] [facility] [address] [place] [address].     Alf Guzmán MD              Follow-ups after your visit        Your next 10 appointments already scheduled     Feb 26, 2018  8:10 AM CST   LAB with [facility]   [facility]    [address]  [address]  [facility] [address] [phone]           Please do not eat 10-12 hours before your appointment if you are coming in fasting for labs on lipids, cholesterol, or glucose (sugar). This does not apply to pregnant women. Water, hot tea and black coffee (with nothing added) are okay. Do not drink other fluids, diet soda or chew gum.            Feb 26, 2018  8:45 AM CST   Anticoagulation Visit with [facility]   [facility]    [address]  [address]  [facility] [address] [phone]            Oct 10, 2018  8:30 AM CDT   Return Visit with  Oncology Nurse   [facility] ([facility])    [facility]  [address]  [facility] [address] [phone]            Oct 17, 2018  8:30 AM CDT   Return Visit with Jyoti Narvaez MD   [facility])    [facility]  [address]  [facility] [address]  "  [phone]              Future tests that were ordered for you today     Open Future Orders        Priority Expected Expires Ordered    CBC with platelets Routine  2019    Comprehensive metabolic panel Routine  2019    Zio Patch Holter Routine  2018            Who to contact     If you have questions or need follow up information about today's clinic visit or your schedule please contact [facility] directly at [phone].  Normal or non-critical lab and imaging results will be communicated to you by MyChart, letter or phone within 4 business days after the clinic has received the results. If you do not hear from us within 7 days, please contact the clinic through [employer] or phone. If you have a critical or abnormal lab result, we will notify you by phone as soon as possible.  Submit refill requests through [employer] or call your pharmacy and they will forward the refill request to us. Please allow 3 business days for your refill to be completed.          Additional Information About Your Visit        [email or web address] Information     L[employer]inate lets you send messages to your doctor, view your test results, renew your prescriptions, schedule appointments and more. To sign up, go to [email or web address] . Click on \"Log in\" on the left side of the screen, which will take you to the Welcome page. Then click on \"Sign up Now\" on the right side of the page.     You will be asked to enter the access code listed below, as well as some personal information. Please follow the directions to create your username and password.     Your access code is: [email or web address]  Expires: 2018 10:24 AM     Your access code will  in 90 days. If you need help or a new code, please call your [place] clinic or [phone].        Care EveryWhere ID     This is your Care EveryWhere ID. This could be used by other organizations to access your [place] medical records  [id number]      " "  Your Vitals Were     Pulse Temperature Height Last Period Pulse Oximetry BMI (Body Mass Index)    70 97.5  F (36.4  C) (Oral) 5' 3.25\" (1.607 m) 09/24/1990 97% 32.51 kg/m2       Blood Pressure from Last 3 Encounters:   02/16/18 102/58   10/30/17 102/62   10/18/17 108/66    Weight from Last 3 Encounters:   02/16/18 185 lb (83.9 kg)   10/30/17 183 lb 12.8 oz (83.4 kg)   10/18/17 186 lb 9.6 oz (84.6 kg)              We Performed the Following     EKG 12-lead complete w/read - Clinics          Where to get your medicines      These medications were sent to [facility] [address] [place] [address] & [address] [address]    Hours:  24-hours Phone:  [phone]     clobetasol 0.05 % cream          Primary Care Provider Office Phone # Fax #    Alva Viera [phone]       [facility] [place] [address]        Equal Access to Services     [facility]: Hadii kavitha browning hadzulayo Soomaali, waaxda luqadaha, qaybta kaalmada adeegyada, bg miranda. So [facility] [phone].    ATENCIÓN: Si habla español, tiene a marcum disposición servicios gratuitos de asistencia lingüística. [facility] [phone].    We comply with applicable federal civil rights laws and [place] laws. We do not discriminate on the basis of race, color, national origin, age, disability, sex, sexual orientation, or gender identity.            Thank you!     Thank you for choosing [facility]  for your care. Our goal is always to provide you with excellent care. Hearing back from our patients is one way we can continue to improve our services. Please take a few minutes to complete the written survey that you may receive in the mail after your visit with us. Thank you!             Your Updated Medication List - Protect others around you: Learn how to safely use, store and throw away your medicines at " www.disposemymeds.org.          This list is accurate as of 2/16/18 10:24 AM.  Always use your most recent med list.                   Brand Name Dispense Instructions for use Diagnosis    ACE/ARB/ARNI NOT PRESCRIBED (INTENTIONAL)      ACE & ARB not prescribed due to Other: normal tensive, low microalbumuria    Type 2 diabetes mellitus with other specified complication (H)       ASPIRIN NOT PRESCRIBED    INTENTIONAL     Antiplatelet medication not prescribed intentionally due to Current anticoagulant therapy (warfarin/enoxaparin)    Type 2 diabetes mellitus with other specified complication (H)       B-12 1000 MCG Tbcr      Take 1,000 mcg by mouth daily    Vitamin B12 deficiency       blood glucose lancets standard    no brand specified    2 Box    Use to test blood sugars 2 times daily or as directed. Please dispense lancets for Relion Confirm.    Type 2 diabetes mellitus with other specified complication (H)       blood glucose monitoring test strip    no brand specified    200 each    Use to test blood sugars 2 times daily or as directed. Please dispense Relion Confirm test strips    Type 2 diabetes mellitus with other specified complication (H)       * clobetasol 0.05 % external solution    TEMOVATE    50 mL    Apply sparingly to affected area twice daily for 14 days.  Do not apply to face.    Psoriasis       * clobetasol 0.05 % cream    TEMOVATE    270 g    Apply sparingly to affected area twice daily as needed.  Do not apply to face.    Psoriasis       PARoxetine 30 MG tablet    PAXIL    180 tablet    Take 2 tablets (60 mg) by mouth At Bedtime    Anxiety       TYLENOL 500 MG tablet   Generic drug:  acetaminophen     100 tablet    Take 2 tablets (1,000 mg) by mouth every 8 hours as needed for mild pain        vitamin D 2000 UNITS Caps      Take 1 capsule by mouth daily.        warfarin 5 MG tablet    COUMADIN    135 tablet    TAKE ONE TO ONE & ONE-HALF TABLETS BY MOUTH DAILY    History of deep venous thrombosis        * Notice:  This list has 2 medication(s) that are the same as other medications prescribed for you. Read the directions carefully, and ask your doctor or other care provider to review them with you.

## 2018-02-16 NOTE — PATIENT INSTRUCTIONS
1) Let us know if stomach area becomes painful as we discussed    2) Labs as we discussed    3) They should call you to set up the holter monitor-If you don't hear from them please call [facility] : [phone] or toll-free [phone] ? [facility] [address] [place] [address].     Alf Guzmán MD

## 2018-02-19 ENCOUNTER — CARE COORDINATION (OUTPATIENT)
Dept: GERIATRIC MEDICINE | Facility: CLINIC | Age: 73
End: 2018-02-19

## 2018-02-19 DIAGNOSIS — E53.8 VITAMIN B12 DEFICIENCY: ICD-10-CM

## 2018-02-19 NOTE — PROGRESS NOTES
[facility] Six-Month Telephone Assessment    6 month telephone assessment completed on February 19, 2018.    ER visits: No  Hospitalizations: No  TCU stays: No  Significant health status changes: None  Falls/Injuries: Yes: Client had a mechanical fall on a rug and sprained her right knee and broke her rib  ADL/IADL changes: No  Changes in services: No    Caregiver Assessment follow up:  NA    Goals: See POC in chart for goal progress documentation.  Client has lost 45 pounds on Weight Watchers. She hasn't been exercising as it is too cold/icy to go walking outside. She will start again in the spring. Client had a fall and broke a rib. She went to Urgent Care for this. She is doing better, but it still huts when she takes deep breathes. She hasn't gotten any hearing aid batteries, so CM will try to order them from [facility].    Will see client in 6 months for an annual health risk assessment.   Encouraged client to call CM with any questions or concerns in the meantime.     Nighat Carbajal RN, BSN, PHN  [facility]   [phone]

## 2018-02-20 NOTE — TELEPHONE ENCOUNTER
"Requested Prescriptions   Pending Prescriptions Disp Refills     Cyanocobalamin (B-12) 1000 MCG TBCR Historical Rx  Last Written Prescription Date:  10/16/2017  Last Fill Quantity: na,  # refills: na   Last office visit: 2/16/2018 with prescribing provider:  Alf Guzmán MD   Future Office Visit:     30 tablet      Sig: Take 1,000 mcg by mouth daily    Vitamin Supplements (Adult) Protocol Passed    2/19/2018  7:11 PM       Passed - High dose Vitamin D not ordered       Passed - Recent or future visit with authorizing provider's specialty    Patient had office visit in the last year or has a visit in the next 30 days with authorizing provider.  See \"Patient Info\" tab in inbasket, or \"Choose Columns\" in Meds & Orders section of the refill encounter.               "

## 2018-02-21 ENCOUNTER — TELEPHONE (OUTPATIENT)
Dept: PEDIATRICS | Facility: CLINIC | Age: 73
End: 2018-02-21

## 2018-02-21 ENCOUNTER — HOSPITAL ENCOUNTER (OUTPATIENT)
Dept: CARDIOLOGY | Facility: CLINIC | Age: 73
Discharge: HOME | End: 2018-02-21
Attending: INTERNAL MEDICINE | Admitting: INTERNAL MEDICINE
Payer: MEDICARE

## 2018-02-21 DIAGNOSIS — R00.2 PALPITATIONS: ICD-10-CM

## 2018-02-21 PROCEDURE — 0296T ZIO PATCH HOLTER: CPT

## 2018-02-21 PROCEDURE — 0298T ZZC EXT ECG > 48HR TO 21 DAY REVIEW AND INTERPRETATN: CPT | Performed by: INTERNAL MEDICINE

## 2018-02-21 NOTE — LETTER
[facility]  [address] [address]                  [phone]   2018    Gemma WYATT Sofya  [address]  [place] [address]      Dear Gemma,    Here is a summary of your recent test results:    Here are the results from the recent monitor that we did. Your heart monitor did not show any concerning heart beats. There were some early contractions from the atria (top of the heart) causing something called premature atrial contractions. These are not concerning and may be causing your symptoms.     Your test results are enclosed.      Please contact me if you have any questions.           Thank you very much for choosing [facility]    Best regards,    Alf Guzmán MD        Results for orders placed or performed during the hospital encounter of 18   Zio Patch Holter    [facility]  [address]  [facility] [address]  [phone]  2018      Patient:  Gemma Cowart  Chart: [id number]  :  1945  Age:  72 year old  Sex:  female       Procedure:  Event Monitor Placed: Please see scanned document for result once interpretation is completed.        Technician performing hook-up:  Cindy Hopkins

## 2018-02-21 NOTE — TELEPHONE ENCOUNTER
Prior Authorization Retail Medication Request  Medication/Dose: clobetasol (TEMOVATE) 0.05 % cream  Diagnosis and ICD code: Psoriasis [L40.9]  New/Renewal/Insurance Change PA: Renewal  Previously Tried and Failed Therapies: N/A     Insurance ID (if provided): [id number]  Insurance Phone (if provided): [phone]    Medicare ID: [id number]  Medicare Phone: [phone]    [facility] ID: [id number]  [facility] Phone: [phone]    Any additional info from fax request: N/A     If you received a fax notification from an outside Pharmacy:  Pharmacy Name:[facility]   Pharmacy #:[phone]  Pharmacy Fax:[phone]

## 2018-02-26 ENCOUNTER — ANTICOAGULATION THERAPY VISIT (OUTPATIENT)
Dept: NURSING | Facility: CLINIC | Age: 73
End: 2018-02-26
Payer: COMMERCIAL

## 2018-02-26 DIAGNOSIS — E11.69 TYPE 2 DIABETES MELLITUS WITH OTHER SPECIFIED COMPLICATION, WITHOUT LONG-TERM CURRENT USE OF INSULIN (H): Chronic | ICD-10-CM

## 2018-02-26 DIAGNOSIS — Z86.718 PERSONAL HISTORY OF DVT (DEEP VEIN THROMBOSIS): ICD-10-CM

## 2018-02-26 DIAGNOSIS — R00.2 PALPITATIONS: ICD-10-CM

## 2018-02-26 DIAGNOSIS — Z79.01 LONG-TERM (CURRENT) USE OF ANTICOAGULANTS: ICD-10-CM

## 2018-02-26 LAB
ALBUMIN SERPL-MCNC: 3.4 G/DL (ref 3.4–5)
ALP SERPL-CCNC: 70 U/L (ref 40–150)
ALT SERPL W P-5'-P-CCNC: 25 U/L (ref 0–50)
ANION GAP SERPL CALCULATED.3IONS-SCNC: 5 MMOL/L (ref 3–14)
AST SERPL W P-5'-P-CCNC: 23 U/L (ref 0–45)
BILIRUB SERPL-MCNC: 0.3 MG/DL (ref 0.2–1.3)
BUN SERPL-MCNC: 21 MG/DL (ref 7–30)
CALCIUM SERPL-MCNC: 8.7 MG/DL (ref 8.5–10.1)
CHLORIDE SERPL-SCNC: 110 MMOL/L (ref 94–109)
CHOLEST SERPL-MCNC: 164 MG/DL
CO2 SERPL-SCNC: 26 MMOL/L (ref 20–32)
CREAT SERPL-MCNC: 1.15 MG/DL (ref 0.52–1.04)
ERYTHROCYTE [DISTWIDTH] IN BLOOD BY AUTOMATED COUNT: 14.2 % (ref 10–15)
GFR SERPL CREATININE-BSD FRML MDRD: 46 ML/MIN/1.7M2
GLUCOSE SERPL-MCNC: 121 MG/DL (ref 70–99)
HBA1C MFR BLD: 5.5 % (ref 4.3–6)
HCT VFR BLD AUTO: 40.6 % (ref 35–47)
HDLC SERPL-MCNC: 61 MG/DL
HGB BLD-MCNC: 12.5 G/DL (ref 11.7–15.7)
INR POINT OF CARE: 2.3 (ref 0.86–1.14)
LDLC SERPL CALC-MCNC: 85 MG/DL
MCH RBC QN AUTO: 26.7 PG (ref 26.5–33)
MCHC RBC AUTO-ENTMCNC: 30.8 G/DL (ref 31.5–36.5)
MCV RBC AUTO: 87 FL (ref 78–100)
NONHDLC SERPL-MCNC: 103 MG/DL
PLATELET # BLD AUTO: 180 10E9/L (ref 150–450)
POTASSIUM SERPL-SCNC: 4.3 MMOL/L (ref 3.4–5.3)
PROT SERPL-MCNC: 6.3 G/DL (ref 6.8–8.8)
RBC # BLD AUTO: 4.69 10E12/L (ref 3.8–5.2)
SODIUM SERPL-SCNC: 141 MMOL/L (ref 133–144)
TRIGL SERPL-MCNC: 88 MG/DL
WBC # BLD AUTO: 4 10E9/L (ref 4–11)

## 2018-02-26 PROCEDURE — 85027 COMPLETE CBC AUTOMATED: CPT | Performed by: INTERNAL MEDICINE

## 2018-02-26 PROCEDURE — 85610 PROTHROMBIN TIME: CPT | Mod: QW

## 2018-02-26 PROCEDURE — 99207 ZZC NO CHARGE NURSE ONLY: CPT

## 2018-02-26 PROCEDURE — 36416 COLLJ CAPILLARY BLOOD SPEC: CPT

## 2018-02-26 PROCEDURE — 80053 COMPREHEN METABOLIC PANEL: CPT | Performed by: INTERNAL MEDICINE

## 2018-02-26 PROCEDURE — 83036 HEMOGLOBIN GLYCOSYLATED A1C: CPT | Performed by: INTERNAL MEDICINE

## 2018-02-26 PROCEDURE — 80061 LIPID PANEL: CPT | Performed by: INTERNAL MEDICINE

## 2018-02-26 NOTE — TELEPHONE ENCOUNTER
PA Initiation    Medication: Clobetasol cream 0.05% - INITIATED  Insurance Company: [employer] - Phone [phone] Fax [phone]  Pharmacy Filling the Rx: [facility] [address] [place] [address]  Filling Pharmacy Phone: [phone]  Filling Pharmacy Fax:    Start Date: 2/26/2018

## 2018-02-26 NOTE — MR AVS SNAPSHOT
Gemma EDMUNDO Cowart   2/26/2018 8:45 AM   Anticoagulation Therapy Visit    Description:  72 year old female   Provider:  [facility]   Department:  [facility]           INR as of 2/26/2018     Today's INR 2.3      Anticoagulation Summary as of 2/26/2018     INR goal 2.0-3.0   Today's INR 2.3   Full instructions 10 mg on Mon; 7.5 mg all other days   Next INR check 3/26/2018    Indications   Personal history of DVT (deep vein thrombosis) [Z86.718]  Long-term (current) use of anticoagulants [Z79.01] [Z79.01]         Your next Anticoagulation Clinic appointment(s)     Mar 26, 2018  8:30 AM CDT   Anticoagulation Visit with  [facility] CLINIC   [facility]    [address]  [address]  [facility] [address] [phone]              Contact Numbers     [facility]  Please call  [phone] to cancel and/or reschedule your appointment   Please call  [phone] with any problems or questions regarding your therapy.        February 2018 Details    Sun Mon Tue Wed Thu Fri Sat         1               2               3                 4               5               6               7               8               9               10                 11               12               13               14               15               16               17                 18               19               20               21               22               23               24                 25               26      10 mg   See details      27      7.5 mg         28      7.5 mg             Date Details   02/26 This INR check               How to take your warfarin dose     To take:  7.5 mg Take 1.5 of the 5 mg tablets.    To take:  10 mg Take 2 of the 5 mg tablets.           March 2018 Details    Sun Mon Tue Wed Thu Fri Sat         1      7.5 mg         2      7.5 mg         3      7.5 mg           4      7.5 mg         5      10 mg         6      7.5 mg          7      7.5 mg         8      7.5 mg         9      7.5 mg         10      7.5 mg           11      7.5 mg         12      10 mg         13      7.5 mg         14      7.5 mg         15      7.5 mg         16      7.5 mg         17      7.5 mg           18      7.5 mg         19      10 mg         20      7.5 mg         21      7.5 mg         22      7.5 mg         23      7.5 mg         24      7.5 mg           25      7.5 mg         26            27               28               29               30               31                Date Details   No additional details    Date of next INR:  3/26/2018         How to take your warfarin dose     To take:  7.5 mg Take 1.5 of the 5 mg tablets.    To take:  10 mg Take 2 of the 5 mg tablets.

## 2018-02-26 NOTE — PROGRESS NOTES
ANTICOAGULATION FOLLOW-UP CLINIC VISIT    Patient Name:  Gemma Cowart  Date:  2/26/2018  Contact Type:  Face to Face    SUBJECTIVE:     Patient Findings     Positives No Problem Findings           OBJECTIVE    INR Protime   Date Value Ref Range Status   02/26/2018 2.3 (A) 0.86 - 1.14 Final       ASSESSMENT / PLAN  INR assessment THER    Recheck INR In: 4 WEEKS    INR Location Clinic      Anticoagulation Summary as of 2/26/2018     INR goal 2.0-3.0   Today's INR 2.3   Maintenance plan 10 mg (5 mg x 2) on Mon; 7.5 mg (5 mg x 1.5) all other days   Full instructions 10 mg on Mon; 7.5 mg all other days   Weekly total 55 mg   No change documented Rae Cooley RN   Plan last modified Rae Cooley RN (10/30/2017)   Next INR check 3/26/2018   Target end date Indefinite    Indications   Personal history of DVT (deep vein thrombosis) [Z86.718]  Long-term (current) use of anticoagulants [Z79.01] [Z79.01]         Anticoagulation Episode Summary     INR check location [facility]    Preferred lab     Send INR reminders to  ANTICOAG CLINIC    Comments 5mg tabs - HS dose // CALENDAR // gastric bypass 2000 // DVT 11/16/12 warfarin until Apr 2013 // DVT 9/12/15      Anticoagulation Care Providers     Provider Role Specialty Phone number    Alva Viera MD  Internal Medicine [phone]            See the Encounter Report to view Anticoagulation Flowsheet and Dosing Calendar (Go to Encounters tab in chart review, and find the Anticoagulation Therapy Visit)        Rae Cooley RN

## 2018-02-27 NOTE — TELEPHONE ENCOUNTER
Prior Authorization Approval    Authorization Effective Date: 1/1/2018  Authorization Expiration Date: 2/26/2019  Medication: Clobetasol cream 0.05% - APPROVED  Approved Dose/Quantity: DAILY  Reference #:     Insurance Company: [employer] - Phone [phone] Fax [phone]  Expected CoPay: $1.25     CoPay Card Available:      Foundation Assistance Needed:    Which Pharmacy is filling the prescription (Not needed for infusion/clinic administered): [facility] [address] [place] [address]  Pharmacy Notified: Yes  Patient Notified: Yes    PHARMACY AND PATIENT NOTIFIED

## 2018-03-02 ENCOUNTER — OFFICE VISIT (OUTPATIENT)
Dept: PEDIATRICS | Facility: CLINIC | Age: 73
End: 2018-03-02
Payer: COMMERCIAL

## 2018-03-02 VITALS
DIASTOLIC BLOOD PRESSURE: 66 MMHG | WEIGHT: 189.6 LBS | BODY MASS INDEX: 33.59 KG/M2 | TEMPERATURE: 98.2 F | SYSTOLIC BLOOD PRESSURE: 110 MMHG | HEART RATE: 62 BPM | OXYGEN SATURATION: 99 % | HEIGHT: 63 IN

## 2018-03-02 DIAGNOSIS — B37.2 YEAST INFECTION OF THE SKIN: ICD-10-CM

## 2018-03-02 DIAGNOSIS — R21 RASH AND NONSPECIFIC SKIN ERUPTION: Primary | ICD-10-CM

## 2018-03-02 DIAGNOSIS — F41.9 ANXIETY: Chronic | ICD-10-CM

## 2018-03-02 DIAGNOSIS — L40.9 PSORIASIS: Chronic | ICD-10-CM

## 2018-03-02 DIAGNOSIS — Z12.31 ENCOUNTER FOR SCREENING MAMMOGRAM FOR BREAST CANCER: ICD-10-CM

## 2018-03-02 DIAGNOSIS — M25.561 ACUTE PAIN OF RIGHT KNEE: ICD-10-CM

## 2018-03-02 PROCEDURE — 99214 OFFICE O/P EST MOD 30 MIN: CPT | Performed by: INTERNAL MEDICINE

## 2018-03-02 RX ORDER — PAROXETINE 30 MG/1
60 TABLET, FILM COATED ORAL AT BEDTIME
Qty: 180 TABLET | Refills: 3 | Status: SHIPPED | OUTPATIENT
Start: 2018-03-02 | End: 2018-09-07

## 2018-03-02 RX ORDER — NYSTATIN 100000 [USP'U]/G
POWDER TOPICAL 3 TIMES DAILY PRN
Qty: 60 G | Refills: 1 | Status: SHIPPED | OUTPATIENT
Start: 2018-03-02 | End: 2018-10-17

## 2018-03-02 RX ORDER — CLOBETASOL PROPIONATE 0.5 MG/ML
SOLUTION TOPICAL
Qty: 50 ML | Refills: 11 | Status: SHIPPED | OUTPATIENT
Start: 2018-03-02 | End: 2019-03-25

## 2018-03-02 RX ORDER — MUPIROCIN 20 MG/G
OINTMENT TOPICAL 3 TIMES DAILY
Qty: 22 G | Refills: 1 | Status: SHIPPED | OUTPATIENT
Start: 2018-03-02 | End: 2018-03-07

## 2018-03-02 ASSESSMENT — ANXIETY QUESTIONNAIRES
3. WORRYING TOO MUCH ABOUT DIFFERENT THINGS: NEARLY EVERY DAY
5. BEING SO RESTLESS THAT IT IS HARD TO SIT STILL: SEVERAL DAYS
1. FEELING NERVOUS, ANXIOUS, OR ON EDGE: NEARLY EVERY DAY
IF YOU CHECKED OFF ANY PROBLEMS ON THIS QUESTIONNAIRE, HOW DIFFICULT HAVE THESE PROBLEMS MADE IT FOR YOU TO DO YOUR WORK, TAKE CARE OF THINGS AT HOME, OR GET ALONG WITH OTHER PEOPLE: VERY DIFFICULT
7. FEELING AFRAID AS IF SOMETHING AWFUL MIGHT HAPPEN: MORE THAN HALF THE DAYS
GAD7 TOTAL SCORE: 14
2. NOT BEING ABLE TO STOP OR CONTROL WORRYING: NOT AT ALL
6. BECOMING EASILY ANNOYED OR IRRITABLE: NEARLY EVERY DAY

## 2018-03-02 ASSESSMENT — PATIENT HEALTH QUESTIONNAIRE - PHQ9: 5. POOR APPETITE OR OVEREATING: MORE THAN HALF THE DAYS

## 2018-03-02 NOTE — MR AVS SNAPSHOT
After Visit Summary   3/2/2018    Gemma Cowart    MRN: [id number]           Patient Information     Date Of Birth          1945        Visit Information        Provider Department      3/2/2018 9:50 AM Alf Guzmán MD [facility]        Today's Diagnoses     Rash and nonspecific skin eruption    -  1    Psoriasis        Anxiety        Yeast infection of the skin        Acute pain of right knee        Encounter for screening mammogram for breast cancer          Care Instructions    1) Physical therapy downstairs to help with the knee pain, use icy hot or biofreeze over the areas- if not improving we will get you in to see physical therapy    2) Breast imaging to be done at [facility]- can call your insurance to see if these are covered     3) Refilled medications that we discussed    4) Use nystatin powder and mupirocin over the belly button area    I will let you know holter monitor results when I get them back.    Alf Guzmán MD          Follow-ups after your visit        Additional Services     [facility], AND CHIROPRACTIC REFERRAL       **This order will print in the [facility] Scheduling Office**    Physical Therapy, Hand Therapy and Chiropractic Care are available through:    *[facility]  *[facility]  *[facility]    Call one number to schedule at any of the above locations: [phone].    Your provider has referred you to: Physical Therapy at [facility] or [facility]    Indication/Reason for Referral: Knee Pain  Onset of Illness: after fall about a month ago  Therapy Orders: Evaluate and Treat  Special Programs: None  Special Request: None    Gracie Bowman      Additional Comments for the Therapist or Chiropractor:     Please be aware that coverage of these services is subject to the terms and limitations of your health insurance plan.  Call member services at your health plan with any benefit or coverage questions.      Please bring  the following to your appointment:    *Your personal calendar for scheduling future appointments  *Comfortable clothing                  Your next 10 appointments already scheduled     Mar 26, 2018  8:30 AM CDT   Anticoagulation Visit with [facility]   [facility] [place] [facility]    [address]  [address]  [place] [address]   [phone]            Oct 10, 2018  8:30 AM CDT   Return Visit with  Oncology Nurse   [facility] ([facility])    [facility]  [address]  [place] [address]   [phone]            Oct 17, 2018  8:30 AM CDT   Return Visit with Jyoti Narvaez MD   [facility] ([facility])    [facility]  [address]  [place] [address]   [phone]              Future tests that were ordered for you today     Open Future Orders        Priority Expected Expires Ordered    MA Diagnostic Digital Bilateral Routine  3/2/2019 3/2/2018    US Breast Left Complete 4 Quadrants Routine  3/2/2019 3/2/2018            Who to contact     If you have questions or need follow up information about today's clinic visit or your schedule please contact [facility] directly at [phone].  Normal or non-critical lab and imaging results will be communicated to you by Hobobehart, letter or phone within 4 business days after the clinic has received the results. If you do not hear from us within 7 days, please contact the clinic through Hobobehart or phone. If you have a critical or abnormal lab result, we will notify you by phone as soon as possible.  Submit refill requests through SeeToo or call your pharmacy and they will forward the refill request to us. Please allow 3 business days for your refill to be completed.          Additional Information About Your Visit        SeeToo Information     SeeToo lets you send messages to your doctor,  "view your test results, renew your prescriptions, schedule appointments and more. To sign up, go to [email or web address] . Click on \"Log in\" on the left side of the screen, which will take you to the Welcome page. Then click on \"Sign up Now\" on the right side of the page.     You will be asked to enter the access code listed below, as well as some personal information. Please follow the directions to create your username and password.     Your access code is: [email or web address]  Expires: 2018 10:24 AM     Your access code will  in 90 days. If you need help or a new code, please call your [facility] clinic or [phone].        Care EveryWhere ID     This is your Care EveryWhere ID. This could be used by other organizations to access your [facility] medical records  [id number]        Your Vitals Were     Pulse Temperature Height Last Period Pulse Oximetry BMI (Body Mass Index)    62 98.2  F (36.8  C) (Oral) 5' 3.25\" (1.607 m) 1990 99% 33.32 kg/m2       Blood Pressure from Last 3 Encounters:   18 110/66   18 102/58   10/30/17 102/62    Weight from Last 3 Encounters:   18 189 lb 9.6 oz (86 kg)   18 185 lb (83.9 kg)   10/30/17 183 lb 12.8 oz (83.4 kg)              We Performed the Following     [facility] PT, HAND, AND CHIROPRACTIC REFERRAL          Today's Medication Changes          These changes are accurate as of 3/2/18 10:30 AM.  If you have any questions, ask your nurse or doctor.               Start taking these medicines.        Dose/Directions    mupirocin 2 % ointment   Commonly known as:  BACTROBAN   Used for:  Rash and nonspecific skin eruption   Started by:  Alf Guzmán MD        Apply topically 3 times daily for 5 days   Quantity:  22 g   Refills:  1       nystatin [id number] UNIT/GM Powd   Commonly known as:  MYCOSTATIN   Used for:  Yeast infection of the skin   Started by:  Alf Guzmán MD        Apply topically 3 times daily as needed   Quantity:  60 g "   Refills:  1         These medicines have changed or have updated prescriptions.        Dose/Directions    warfarin 5 MG tablet   Commonly known as:  COUMADIN   This may have changed:  See the new instructions.   Used for:  History of deep venous thrombosis        TAKE ONE TO ONE & ONE-HALF TABLETS BY MOUTH DAILY   Quantity:  135 tablet   Refills:  3            Where to get your medicines      These medications were sent to [facility] [address] [place] [address] & [address] [address]    Hours:  24-hours Phone:  [phone]     clobetasol 0.05 % external solution    mupirocin 2 % ointment    nystatin [id number] UNIT/GM Powd    PARoxetine 30 MG tablet                Primary Care Provider Office Phone # Fax #    Alva Viera [phone] [address] [place] [address]        Equal Access to Services     [facility]: Hadii aad ku hadasho Soomaali, waaxda luqadaha, qaybta kaalmada adeegyada, waxay jocelinin hayaan reyna hines . So [facility] [phone].    ATENCIÓN: Si habla español, tiene a marcum disposición servicios gratuitos de asistencia lingüística. [facility] [phone].    We comply with applicable federal civil rights laws and [place] laws. We do not discriminate on the basis of race, color, national origin, age, disability, sex, sexual orientation, or gender identity.            Thank you!     Thank you for choosing [facility]  for your care. Our goal is always to provide you with excellent care. Hearing back from our patients is one way we can continue to improve our services. Please take a few minutes to complete the written survey that you may receive in the mail after your visit with us. Thank you!             Your Updated Medication List - Protect others around you: Learn how to safely use, store and throw away your medicines at www.disposemymeds.org.          This list is  accurate as of 3/2/18 10:30 AM.  Always use your most recent med list.                   Brand Name Dispense Instructions for use Diagnosis    ACE/ARB/ARNI NOT PRESCRIBED (INTENTIONAL)      ACE & ARB not prescribed due to Other: normal tensive, low microalbumuria    Type 2 diabetes mellitus with other specified complication (H)       ASPIRIN NOT PRESCRIBED    INTENTIONAL     Antiplatelet medication not prescribed intentionally due to Current anticoagulant therapy (warfarin/enoxaparin)    Type 2 diabetes mellitus with other specified complication (H)       B-12 1000 MCG Tbcr     90 tablet    Take 1,000 mcg by mouth daily    Vitamin B12 deficiency       blood glucose lancets standard    no brand specified    2 Box    Use to test blood sugars 2 times daily or as directed. Please dispense lancets for Relion Confirm.    Type 2 diabetes mellitus with other specified complication (H)       blood glucose monitoring test strip    no brand specified    200 each    Use to test blood sugars 2 times daily or as directed. Please dispense Relion Confirm test strips    Type 2 diabetes mellitus with other specified complication (H)       * clobetasol 0.05 % cream    TEMOVATE    270 g    Apply sparingly to affected area twice daily as needed.  Do not apply to face.    Psoriasis       * clobetasol 0.05 % external solution    TEMOVATE    50 mL    Apply sparingly to affected area twice daily for 14 days.  Do not apply to face.    Psoriasis       mupirocin 2 % ointment    BACTROBAN    22 g    Apply topically 3 times daily for 5 days    Rash and nonspecific skin eruption       nystatin [id number] UNIT/GM Powd    MYCOSTATIN    60 g    Apply topically 3 times daily as needed    Yeast infection of the skin       PARoxetine 30 MG tablet    PAXIL    180 tablet    Take 2 tablets (60 mg) by mouth At Bedtime    Anxiety       TYLENOL 500 MG tablet   Generic drug:  acetaminophen     100 tablet    Take 2 tablets (1,000 mg) by mouth every 8 hours as  needed for mild pain        vitamin D 2000 UNITS Caps      Take 1 capsule by mouth daily.        warfarin 5 MG tablet    COUMADIN    135 tablet    TAKE ONE TO ONE & ONE-HALF TABLETS BY MOUTH DAILY    History of deep venous thrombosis       * Notice:  This list has 2 medication(s) that are the same as other medications prescribed for you. Read the directions carefully, and ask your doctor or other care provider to review them with you.

## 2018-03-02 NOTE — PATIENT INSTRUCTIONS
1) Physical therapy downstairs to help with the knee pain, use icy hot or biofreeze over the areas- if not improving we will get you in to see physical therapy    2) Breast imaging to be done at [facility]- can call your insurance to see if these are covered Please call [facility] at [phone] to arrange your study.    3) Refilled medications that we discussed    4) Use nystatin powder and mupirocin over the belly button area    I will let you know holter monitor results when I get them back.    Alf Guzmán MD

## 2018-03-02 NOTE — PROGRESS NOTES
SUBJECTIVE:   Gemma Cowart is a 72 year old female who presents to clinic today for the following health issues:      Musculoskeletal problem/pain      Duration: x 1 month    Description  Location: right knee    Intensity:  moderate    Accompanying signs and symptoms: radiation of pain to up and down right leg and swelling    History  Previous similar problem: no   Previous evaluation:  x-ray    Precipitating or alleviating factors:  Trauma or overuse: YES- sprained your knee due to a fall  Aggravating factors include: standing, walking and climbing stairs    Therapies tried and outcome: heat and ice     She feels that it has been getting slightly better but still having some ongoing pain. Cannot take NSAIDs with warfarin, has not tried anything topical over the area.    Pt would also like to go over labs from last visit. Had the holter monitor done for her palpitations.    Has noted that needs refill on clobetasol ointment for her psoriasis, works well.     Umbilical skin infection: has been having this ongoing, tried ketoconazole in the past, thinking about getting tummy tuck for help with this.     History of breast cancer about 5 years ago: feels some pain over the left breast area again now, wondering about repeat screening, has implants in place. Noted in left upper medial area.         Problem list and histories reviewed & adjusted, as indicated.  Additional history: as documented    Patient Active Problem List   Diagnosis     Nontoxic uninodular goiter     Rosacea     BRETT (iron deficiency anemia)     Complications of gastric bypass surgery     Vitamin D deficiency disease     Vitamin B12 deficiency     Eczema     Dyshydrosis     Hyperlipidemia LDL goal <100     Anxiety     Insomnia     Health Care Home     Type 2 diabetes mellitus with other specified complication (H)     Psoriasis     Long-term (current) use of anticoagulants [Z79.01]     Advanced directives, counseling/discussion     Malignant  neoplasm of left female breast (H)     Morbid obesity (H)     CKD (chronic kidney disease) stage 3, GFR 30-59 ml/min     Personal history of DVT (deep vein thrombosis)     Past Surgical History:   Procedure Laterality Date     ABDOMEN SURGERY  2000    gastric bypass 2000     COLONOSCOPY  10/11/2012    Procedure: COLONOSCOPY;  colonoscopy;  Surgeon: Gela Cleary MD;  Location:  GI     COLONOSCOPY N/A 10/6/2017    Procedure: COLONOSCOPY;;  Surgeon: Shashank Ortiz MD;  Location:  GI     DENTAL SURGERY  5/2007    all teeth removed - dentures     ENT SURGERY      all teeth pulled     ESOPHAGOSCOPY, GASTROSCOPY, DUODENOSCOPY (EGD), COMBINED N/A 10/6/2017    Procedure: COMBINED ESOPHAGOSCOPY, GASTROSCOPY, DUODENOSCOPY (EGD);  ESOPHAGOSCOPY, GASTROSCOPY, DUODENOSCOPY (EGD) & Colonoscopy *Needs INR on arrival;  Surgeon: Shashank Ortiz MD;  Location:  GI     GYN SURGERY  1/10/75    tubal ligation      H ABLATION SVT  5/28/2015    stopped metoprolol     HC BREAST RECONSTRUC W OTHR TECHNIQ  5/8/2013     HERNIA REPAIR  2005     IMPLANT FILTER INFERIOR VENA CAVA  10/8/2012    taken out  1/4/12     INSERT TISSUE EXPANDER BREAST BILATERAL  9/5/2012    Procedure: INSERT TISSUE EXPANDER BREAST BILATERAL;;  Surgeon: Orlando Wall MD;  Location:  OR     MASTECTOMY SIMPLE, SENTINEL NODE, COMBINED  9/5/2012    Procedure: COMBINED MASTECTOMY SIMPLE, SENTINEL NODE;  BILATERAL MASTECTOMY WITH LEFT AXILLARY SENTINEL LYMPH NODE BIOPSY, BILATERAL TISSUE EXPANDER        MASTECTOMY, BILATERAL       RECONSTRUCT BREAST BILATERAL, IMPLANT PROSTHESIS BILATERAL, COMBINED  5/22/2013    Procedure: COMBINED RECONSTRUCT BREAST BILATERAL, IMPLANT PROSTHESIS BILATERAL;  BILATERAL SECOND STAGE BREAST RECONSTRUCTION WITH SILICONE GEL IMPLANTS AND LIPOSUCTION;  Surgeon: Orlando Wall MD;  Location:  [facility]     RECONSTRUCT NIPPLE BILATERAL  8/22/2013    Procedure: RECONSTRUCT NIPPLE BILATERAL;  BILATERAL NIPPLE AREOLAR  "RECONSTRUCTION;  Surgeon: Orlando Wall MD;  Location: [facility]       Social History   Substance Use Topics     Smoking status: Former Smoker     Packs/day: 1.50     Years: 25.00     Types: Cigarettes     Quit date: 10/21/1986     Smokeless tobacco: Never Used      Comment: quit 1985     Alcohol use No     Family History   Problem Relation Age of Onset     Cancer - colorectal Mother      Colon Cancer Mother      Prostate Cancer Father      Alzheimer Disease Father      CANCER Paternal Grandmother      Alzheimer Disease Paternal Grandfather      Cancer - colorectal Brother      DIABETES Brother      Crohn Disease Daughter      DIABETES Brother      Eye Disorder Brother      DIABETES Son      CANCER Maternal Uncle      CANCER Maternal Uncle            Reviewed and updated as needed this visit by clinical staff       Reviewed and updated as needed this visit by Provider         ROS:  Constitutional, HEENT, cardiovascular, pulmonary, GI, , musculoskeletal, neuro, skin, endocrine and psych systems are negative, except as otherwise noted.    OBJECTIVE:     /66 (BP Location: Right arm, Patient Position: Chair, Cuff Size: Adult Regular)  Pulse 62  Temp 98.2  F (36.8  C) (Oral)  Ht 5' 3.25\" (1.607 m)  Wt 189 lb 9.6 oz (86 kg)  LMP 09/24/1990  SpO2 99%  BMI 33.32 kg/m2  Body mass index is 33.32 kg/(m^2).  GENERAL: healthy, alert and no distress  NECK: no adenopathy, no asymmetry, masses, or scars and thyroid normal to palpation  RESP: lungs clear to auscultation - no rales, rhonchi or wheezes  BREAST: noted implants bilaterally, well healed bilateral mastectomy scars, no nodules, mild tenderness in the left upper medial quadrant of the breast on the left  CV: regular rate and rhythm, normal S1 S2, no S3 or S4, no murmur, click or rub, no peripheral edema and peripheral pulses strong  ABDOMEN: soft, nontender, no hepatosplenomegaly, no masses and bowel sounds normal  MS: noted mild medial tenderness of the " knee, mild effusion, no warmth, normal ROM of the hip  SKIN: noted mild erythema around umbilicus,   no suspicious lesions or rashes  NEURO: Normal strength and tone, mentation intact and speech normal  PSYCH: mentation appears normal, affect normal/bright    Diagnostic Test Results:  Results for orders placed or performed in visit on 02/26/18   INR point of care   Result Value Ref Range    INR Protime 2.3 (A) 0.86 - 1.14       ASSESSMENT/PLAN:       ICD-10-CM    1. Rash and nonspecific skin eruption R21 mupirocin (BACTROBAN) 2 % ointment   2. Psoriasis L40.9 clobetasol (TEMOVATE) 0.05 % external solution   3. Anxiety F41.9 PARoxetine (PAXIL) 30 MG tablet   4. Yeast infection of the skin B37.2 nystatin (MYCOSTATIN) 349458 UNIT/GM POWD   5. Acute pain of right knee M25.561 [facility], AND CHIROPRACTIC REFERRAL   6. Encounter for screening mammogram for breast cancer Z12.31 MA Diagnostic Digital Bilateral     US Breast Left Complete 4 Quadrants     Start with PT for knee pain, consider ortho if not improving.    Patient Instructions   1) Physical therapy downstairs to help with the knee pain, use icy hot or biofreeze over the areas- if not improving we will get you in to see physical therapy    2) Breast [facility] to be done at [facility]- can call your insurance to see if these are covered Please call [facility] at [phone] to arrange your study.    3) Refilled medications that we discussed    4) Use nystatin powder and mupirocin over the belly button area    I will let you know holter monitor results when I get them back.    MD Alf Doe MD, MD  [facility]

## 2018-03-03 ASSESSMENT — PATIENT HEALTH QUESTIONNAIRE - PHQ9: SUM OF ALL RESPONSES TO PHQ QUESTIONS 1-9: 8

## 2018-03-03 ASSESSMENT — ANXIETY QUESTIONNAIRES: GAD7 TOTAL SCORE: 14

## 2018-03-08 ENCOUNTER — THERAPY VISIT (OUTPATIENT)
Dept: PHYSICAL THERAPY | Facility: CLINIC | Age: 73
End: 2018-03-08
Payer: MEDICARE

## 2018-03-08 DIAGNOSIS — M25.561 ACUTE PAIN OF RIGHT KNEE: Primary | ICD-10-CM

## 2018-03-08 PROCEDURE — G8979 MOBILITY GOAL STATUS: HCPCS | Mod: GP | Performed by: PHYSICAL THERAPIST

## 2018-03-08 PROCEDURE — 97110 THERAPEUTIC EXERCISES: CPT | Mod: GP | Performed by: PHYSICAL THERAPIST

## 2018-03-08 PROCEDURE — 97161 PT EVAL LOW COMPLEX 20 MIN: CPT | Mod: GP | Performed by: PHYSICAL THERAPIST

## 2018-03-08 PROCEDURE — G8978 MOBILITY CURRENT STATUS: HCPCS | Mod: GP | Performed by: PHYSICAL THERAPIST

## 2018-03-08 ASSESSMENT — ACTIVITIES OF DAILY LIVING (ADL)
KNEEL ON THE FRONT OF YOUR KNEE: ACTIVITY IS VERY DIFFICULT
STAND: ACTIVITY IS NOT DIFFICULT
SWELLING: I HAVE THE SYMPTOM BUT IT DOES NOT AFFECT MY ACTIVITY
RAW_SCORE: 41
HOW_WOULD_YOU_RATE_THE_OVERALL_FUNCTION_OF_YOUR_KNEE_DURING_YOUR_USUAL_DAILY_ACTIVITIES?: ABNORMAL
WALK: ACTIVITY IS SOMEWHAT DIFFICULT
STIFFNESS: THE SYMPTOM AFFECTS MY ACTIVITY SLIGHTLY
GO DOWN STAIRS: ACTIVITY IS SOMEWHAT DIFFICULT
PAIN: THE SYMPTOM AFFECTS MY ACTIVITY MODERATELY
GO UP STAIRS: ACTIVITY IS SOMEWHAT DIFFICULT
WEAKNESS: THE SYMPTOM AFFECTS MY ACTIVITY SLIGHTLY
AS_A_RESULT_OF_YOUR_KNEE_INJURY,_HOW_WOULD_YOU_RATE_YOUR_CURRENT_LEVEL_OF_DAILY_ACTIVITY?: ABNORMAL
SIT WITH YOUR KNEE BENT: ACTIVITY IS NOT DIFFICULT
SQUAT: I AM UNABLE TO DO THE ACTIVITY
RISE FROM A CHAIR: ACTIVITY IS SOMEWHAT DIFFICULT
KNEE_ACTIVITY_OF_DAILY_LIVING_SUM: 41
LIMPING: THE SYMPTOM AFFECTS MY ACTIVITY SEVERELY
GIVING WAY, BUCKLING OR SHIFTING OF KNEE: I DO NOT HAVE THE SYMPTOM
KNEE_ACTIVITY_OF_DAILY_LIVING_SCORE: 58.57
HOW_WOULD_YOU_RATE_THE_CURRENT_FUNCTION_OF_YOUR_KNEE_DURING_YOUR_USUAL_DAILY_ACTIVITIES_ON_A_SCALE_FROM_0_TO_100_WITH_100_BEING_YOUR_LEVEL_OF_KNEE_FUNCTION_PRIOR_TO_YOUR_INJURY_AND_0_BEING_THE_INABILITY_TO_PERFORM_ANY_OF_YOUR_USUAL_DAILY_ACTIVITIES?: 50

## 2018-03-08 NOTE — LETTER
DEPARTMENT OF HEALTH AND HUMAN SERVICES  CENTERS FOR MEDICARE & MEDICAID SERVICES    PLAN/UPDATED PLAN OF PROGRESS FOR OUTPATIENT REHABILITATION    PATIENTS NAME:  Gemma Cowart     : 1945    PROVIDER NUMBER:    [id number]    [facility]   [facility]    PROVIDER NAME: [facility] [employer] [facility]    MEDICAL RECORD NUMBER: [id number]     START OF CARE DATE:  SOC Date: 18   TYPE:  PT    PRIMARY/TREATMENT DIAGNOSIS: (Pertinent Medical Diagnosis)  Acute pain of right knee    VISITS FROM START OF CARE:  Rxs Used: 1     [facility] Initial Evaluation  Subjective:  Patient is a 72 year old female presenting with rehab right knee hpi. The history is provided by the patient. No  was used.   Gemma Cowart is a 72 year old female with a right knee condition.  Condition occurred with:  A fall/slip.  Condition occurred: at home.  This is a new condition  Pt fell and twisted her knee stepping onto a scale around 2018.  She also fractured a rib left side. Xrays negative for fracture left knee. Chief complaint currently is decreased mobility, decreased strength, pain and limping. She has 14 steps to get into her home. She feels better with rest and worse with walking and weight bearing. Past history of left patellar fracture. .    Patient reports pain:  Anterior, medial and posterior.  Radiates to:  Lower leg and low back.  Pain is described as sharp and aching and is intermittent and reported as 4/10.  Associated symptoms:  Edema and loss of motion/stiffness. Pain is the same all the time.  Symptoms are exacerbated by weight bearing, ascending stairs, descending stairs, standing, walking and activity and relieved by rest.  Since onset symptoms are unchanged.  Special tests:  X-ray (negative for fracture).        Pertinent medical history includes:  Osteoarthritis, history of fractures, cancer, diabetes, overweight, heart problems and smoking.      Current  medications:  Heparin/coumadin and other (anxiety).  Current occupation is Retired.      Red flags:  Calf pain, swelling, warmth and pain at rest/night.    Objective:    Gait:    Gait Type:  Antalgic   Assistive Devices:  None  Deviations:  Knee:  Knee extension decr R  Knee Evaluation:  ROM:    AROM  PATIENTS NAME:  Gemma Cowart   : 1945      Extension:  Left: 0    Right:  7  Flexion: Left: 130    Right: 110  Strength:   Extension:  Left: 5/5   Pain:      Right: 4-/5    Pain:+  Flexion:  Left: 5/5   Pain:      Right: 4+ and 5-/5    Pain:-    Ligament Testing:    Valgus 30:  Right:  Trace  Palpation:    Right knee tenderness present at:  Medial Joint Line; Semitendinosus and Patellar Medial  Edema:  Edema of the knee: medial.    Assessment/Plan:    Patient is a 72 year old female with right side knee complaints.    Patient has the following significant findings with corresponding treatment plan.                Diagnosis 1:  Right knee pain  Pain -  hot/cold therapy, self management, education and home program  Decreased ROM/flexibility - manual therapy, therapeutic exercise, therapeutic activity and home program  Decreased strength - therapeutic exercise, therapeutic activities and home program  Edema - cold therapy and self management/home program  Impaired gait - gait training and home program  Decreased function - therapeutic activities and home program  Therapy Evaluation Codes:   1) History comprised of:   Personal factors that impact the plan of care:      None.    Comorbidity factors that impact the plan of care are:      Cancer, Diabetes, Osteoarthritis, Overweight and Smoking.     Medications impacting care: Heparin/coumadin and anti-anxiety.  2) Examination of Body Systems comprised of:   Body structures and functions that impact the plan of care:      Knee.   Activity limitations that impact the plan of care are:      Bathing, Bending, Cooking, Dressing, Squatting/kneeling, Stairs, Standing and  "Walking.  3) Clinical presentation characteristics are:   Stable/Uncomplicated.  4) Decision-Making    Low complexity using standardized patient assessment instrument and/or measureable assessment of functional outcome.  Cumulative Therapy Evaluation is: Low complexity.  Previous and current functional limitations:  (See Goal Flow Sheet for this information)    Short term and Long term goals: (See Goal Flow Sheet for this information)   Communication ability:  Patient appears to be able to clearly communicate and understand verbal and written communication and follow directions correctly.  Treatment Explanation - The following has been discussed with the patient:   RX ordered/plan of care  Anticipated outcomes  PATIENTS NAME:  Gemma Cowart   : 1945       Possible risks and side effects  This patient would benefit from PT intervention to resume normal activities.   Rehab potential is excellent.  Frequency:  1 X week, once daily  Duration:  for 10 weeks  Discharge Plan:  Achieve all LTG.  Independent in home treatment program.  Reach maximal therapeutic benefit.    Caregiver Signature/Credentials _____________________________ Date ________       Treating Provider: Stacie Apodaca, PT   I have reviewed and certified the need for these services and plan of treatment while under my care.        PHYSICIAN'S SIGNATURE:   _________________________________________  Date___________   Alf Guzmán    Certification period:  Beginning of Cert date period: 18 to  End of Cert period date: 18     Functional Level Progress Report: Please see attached \"Goal Flow sheet for Functional level.\"    ____X____ Continue Services or       ________ DC Services                Service dates: From  SOC Date: 18 date to present                         "

## 2018-03-15 ENCOUNTER — THERAPY VISIT (OUTPATIENT)
Dept: PHYSICAL THERAPY | Facility: CLINIC | Age: 73
End: 2018-03-15
Payer: MEDICARE

## 2018-03-15 DIAGNOSIS — M25.561 ACUTE PAIN OF RIGHT KNEE: ICD-10-CM

## 2018-03-15 PROCEDURE — 97110 THERAPEUTIC EXERCISES: CPT | Mod: GP | Performed by: PHYSICAL THERAPIST

## 2018-03-15 PROCEDURE — 97112 NEUROMUSCULAR REEDUCATION: CPT | Mod: GP | Performed by: PHYSICAL THERAPIST

## 2018-03-22 ENCOUNTER — THERAPY VISIT (OUTPATIENT)
Dept: PHYSICAL THERAPY | Facility: CLINIC | Age: 73
End: 2018-03-22
Payer: MEDICARE

## 2018-03-22 ENCOUNTER — TELEPHONE (OUTPATIENT)
Dept: PEDIATRICS | Facility: CLINIC | Age: 73
End: 2018-03-22

## 2018-03-22 DIAGNOSIS — M25.561 ACUTE PAIN OF RIGHT KNEE: ICD-10-CM

## 2018-03-22 PROCEDURE — 97110 THERAPEUTIC EXERCISES: CPT | Mod: GP | Performed by: PHYSICAL THERAPIST

## 2018-03-22 PROCEDURE — 97112 NEUROMUSCULAR REEDUCATION: CPT | Mod: GP | Performed by: PHYSICAL THERAPIST

## 2018-03-22 NOTE — TELEPHONE ENCOUNTER
Galina with [facility] calling ([phone]) to inquire on Warfarin hold plan for upcoming surgery on 4/5/18 for a panniculectomy.     Routing to Dr. Viera & Rae, INR RN to advise.     Call back Galina at number above with plan.     FYI PreOp   Next 5 appointments (look out 90 days)     Mar 29, 2018 11:30 AM CDT   Pre-Op physical with Alva [facility]iera MD   [facility]    [address] [place] [address]  [address] [place] [address] [phone]

## 2018-03-22 NOTE — TELEPHONE ENCOUNTER
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 I have a message out to her hematologist/oncologist to enquire about whether she should have pre-/seamus-operative bridging, but would recommend that she have post-operative bridging with enoxaparin.    Alva Viera MD  Internal Medicine-Pediatrics

## 2018-03-23 ENCOUNTER — TELEPHONE (OUTPATIENT)
Dept: PEDIATRICS | Facility: CLINIC | Age: 73
End: 2018-03-23

## 2018-03-23 NOTE — TELEPHONE ENCOUNTER
Called [facility] back at [phone] & provided info as per  to Ekta.     Reyna, RN  Triage Nurse

## 2018-03-23 NOTE — TELEPHONE ENCOUNTER
[facility] calling to check on status of script request for hearing aid batteries that was sent on 3/12.   I told her we try to find the request and refax assuming we already sent it being the request was almost 2 weeks ago.     If you have any questions or need the original fax resent, please call Leida at [facility] [phone]

## 2018-03-26 ENCOUNTER — ANTICOAGULATION THERAPY VISIT (OUTPATIENT)
Dept: NURSING | Facility: CLINIC | Age: 73
End: 2018-03-26
Payer: COMMERCIAL

## 2018-03-26 DIAGNOSIS — Z86.718 PERSONAL HISTORY OF DVT (DEEP VEIN THROMBOSIS): ICD-10-CM

## 2018-03-26 DIAGNOSIS — Z79.01 LONG-TERM (CURRENT) USE OF ANTICOAGULANTS: ICD-10-CM

## 2018-03-26 LAB — INR POINT OF CARE: 3.1 (ref 0.86–1.14)

## 2018-03-26 PROCEDURE — 85610 PROTHROMBIN TIME: CPT | Mod: QW

## 2018-03-26 PROCEDURE — 36416 COLLJ CAPILLARY BLOOD SPEC: CPT

## 2018-03-26 PROCEDURE — 99207 ZZC NO CHARGE NURSE ONLY: CPT

## 2018-03-26 NOTE — MR AVS SNAPSHOT
Gemma EDMUNDO Cowart   3/26/2018 8:30 AM   Anticoagulation Therapy Visit    Description:  72 year old female   Provider:  [facility]   Department:  [facility] Nurse           INR as of 3/26/2018     Today's INR 3.1!      Anticoagulation Summary as of 3/26/2018     INR goal 2.0-3.0   Today's INR 3.1!   Full instructions 3/31: Hold; 4/1: Hold; 4/2: Hold; 4/3: Hold; 4/4: Hold; Otherwise 10 mg on Mon; 7.5 mg all other days   Next INR check 4/9/2018    Indications   Personal history of DVT (deep vein thrombosis) [Z86.718]  Long-term (current) use of anticoagulants [Z79.01] [Z79.01]         Your next Anticoagulation Clinic appointment(s)     Apr 09, 2018 11:00 AM CDT   Anticoagulation Visit with  ANTICOAGULATION CLINIC   [facility] ([facility])    [address] [place] [address]  [address]  [facility] [address] [phone]              Contact Numbers     [facility]  Please call  [phone] to cancel and/or reschedule your appointment   Please call  [phone] with any problems or questions regarding your therapy.        March 2018 Details    Sun Mon Tue Wed Thu Fri Sat         1               2               3                 4               5               6               7               8               9               10                 11               12               13               14               15               16               17                 18               19               20               21               22               23               24                 25               26      10 mg   See details      27      7.5 mg         28      7.5 mg         29      7.5 mg         30      7.5 mg         31      Hold          Date Details   03/26 This INR check               How to take your warfarin dose     To take:  7.5 mg Take 1.5 of the 5 mg tablets.    To take:  10 mg Take 2 of the 5 mg tablets.    Hold Do not take your warfarin dose. See the Details table to the  right for additional instructions.                April 2018 Details    Sun Mon Tue Wed Thu Fri Sat     1      Hold         2      Hold         3      Hold         4      Hold         5      7.5 mg         6      7.5 mg         7      7.5 mg           8      7.5 mg         9            10               11               12               13               14                 15               16               17               18               19               20               21                 22               23               24               25               26               27               28                 29               30                     Date Details   No additional details    Date of next INR:  4/9/2018         How to take your warfarin dose     To take:  7.5 mg Take 1.5 of the 5 mg tablets.    To take:  10 mg Take 2 of the 5 mg tablets.    Hold Do not take your warfarin dose. See the Details table to the right for additional instructions.

## 2018-03-26 NOTE — PROGRESS NOTES
ANTICOAGULATION FOLLOW-UP CLINIC VISIT    Patient Name:  Gemma Cowart  Date:  3/26/2018  Contact Type:  Face to Face    SUBJECTIVE:     Patient Findings     Positives Dental/Other procedures, No Problem Findings    Comments She is having surgery on 4/5/18 (PANNICULECTOMY)  Pre-op on 3/29/18 with Dr. Viera.  Lovenox bridging will be determined at pre-op.                   OBJECTIVE    INR   Date Value Ref Range Status   04/05/2018 1.06 0.86 - 1.14 Final       ASSESSMENT / PLAN  INR assessment THER    Recheck INR In: 2 WEEKS    INR Location Clinic      Anticoagulation Summary as of 3/26/2018     INR goal 2.0-3.0   Today's INR 3.1!   Maintenance plan 10 mg (5 mg x 2) on Mon; 7.5 mg (5 mg x 1.5) all other days   Full instructions 3/31: Hold; 4/1: Hold; 4/2: Hold; 4/3: Hold; 4/4: Hold; 4/5: Hold; 4/6: Hold; 4/7: Hold; Otherwise 10 mg on Mon; 7.5 mg all other days   Weekly total 55 mg   Plan last modified Rae Cooley RN (10/30/2017)   Next INR check 4/16/2018   Target end date Indefinite    Indications   Personal history of DVT (deep vein thrombosis) [Z86.718]  Long-term (current) use of anticoagulants [Z79.01] [Z79.01]         Anticoagulation Episode Summary     INR check location [facility]    Preferred lab     Send INR reminders to [facility]    Comments 5mg tabs - HS dose // CALENDAR // gastric bypass 2000 // DVT 11/16/12 warfarin until Apr 2013 // DVT 9/12/15      Anticoagulation Care Providers     Provider Role Specialty Phone number    Alva Viera MD  Internal Medicine [phone]            See the Encounter Report to view Anticoagulation Flowsheet and Dosing Calendar (Go to Encounters tab in chart review, and find the Anticoagulation Therapy Visit)        Rae Cooley RN

## 2018-03-26 NOTE — TELEPHONE ENCOUNTER
Did not see script request for hearing aid batteries. Called and spoke with Leida at [facility] to refax the original request.    Gisel Sellers MA 3/26/2018 12:08 PM

## 2018-03-29 ENCOUNTER — OFFICE VISIT (OUTPATIENT)
Dept: PEDIATRICS | Facility: CLINIC | Age: 73
End: 2018-03-29
Payer: COMMERCIAL

## 2018-03-29 ENCOUNTER — TELEPHONE (OUTPATIENT)
Dept: PEDIATRICS | Facility: CLINIC | Age: 73
End: 2018-03-29

## 2018-03-29 ENCOUNTER — THERAPY VISIT (OUTPATIENT)
Dept: PHYSICAL THERAPY | Facility: CLINIC | Age: 73
End: 2018-03-29
Payer: MEDICARE

## 2018-03-29 VITALS
SYSTOLIC BLOOD PRESSURE: 96 MMHG | BODY MASS INDEX: 33.19 KG/M2 | WEIGHT: 187.31 LBS | HEIGHT: 63 IN | OXYGEN SATURATION: 96 % | HEART RATE: 65 BPM | DIASTOLIC BLOOD PRESSURE: 62 MMHG | TEMPERATURE: 98.8 F

## 2018-03-29 DIAGNOSIS — Z01.818 PREOP GENERAL PHYSICAL EXAM: Primary | ICD-10-CM

## 2018-03-29 DIAGNOSIS — Z86.718 PERSONAL HISTORY OF DVT (DEEP VEIN THROMBOSIS): ICD-10-CM

## 2018-03-29 DIAGNOSIS — M79.3 PANNICULITIS: ICD-10-CM
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
## 2018-03-29 DIAGNOSIS — K91.89 COMPLICATIONS OF GASTRIC BYPASS SURGERY: ICD-10-CM

## 2018-03-29 DIAGNOSIS — M25.561 ACUTE PAIN OF RIGHT KNEE: ICD-10-CM

## 2018-03-29 PROCEDURE — 97110 THERAPEUTIC EXERCISES: CPT | Mod: GP | Performed by: PHYSICAL THERAPIST

## 2018-03-29 PROCEDURE — 97112 NEUROMUSCULAR REEDUCATION: CPT | Mod: GP | Performed by: PHYSICAL THERAPIST

## 2018-03-29 PROCEDURE — 99215 OFFICE O/P EST HI 40 MIN: CPT | Performed by: INTERNAL MEDICINE

## 2018-03-29 NOTE — TELEPHONE ENCOUNTER
Call received from [facility](Katelynn) checking on hematologist/oncologist's in-put on bridging after upcoming procedure. ([facility], Dr.Li Narvaez at [phone]).    Huddled with :  - oncologist did get back to us stating that they agree with 's advise(recommend post-operative bridging with enoxaparin)  - they didn't advise on any pre/seamus bridging      Notified Katelynn. Pt coming in for pre-op today, need to advise her on stopping coumadin before surgery. Last INR appointment was on 03/26.    Notes from INR visit:  She is having surgery on 4/5/18 (PANNICULECTOMY)  Pre-op on 3/29/18 with Dr. Viera.  Lovenox bridging with be determined at pre-op.     Reyna, RN  Triage Nurse

## 2018-03-29 NOTE — TELEPHONE ENCOUNTER
PHarmacy calling to clarify Lovenox dosing.  Lovenox comes in a 40 mg syringe-Ok to dispense this instead of 100 mg syringe to reduce chance of error?    Patient will be taking a 40 mg dose.  Clarify if sig should be date specific or written to take daily for 8 days.    Discussed with Dr. Viera.  May dispense 40 mg syringe.  Sig should be date specific.  Patient will need a total of 8 vials.  She should use daily on April 2., 3 ,4 and then for 5 days post-op.  Pharmacist notified of these directions.  Voices understanding of instructions.  No further questions.  Will call back if any other questions or concerns.  ROBY Vann RN

## 2018-03-29 NOTE — PROGRESS NOTES
[facility]  [address]  [address]  [place] [address]  [phone]  Dept: [phone]    PRE-OP EVALUATION:  Today's date: 3/29/2018    Gemma Cowart (: 1945) presents for pre-operative evaluation assessment as requested by Dr. Wall.  She requires evaluation and anesthesia risk assessment prior to undergoing surgery/procedure for treatment of stomach .    Fax number for surgical facility: [facility]  Primary Physician: Alva Viera  Type of Anesthesia Anticipated: General    Patient has a Health Care Directive or Living Will:  NO    Preop Questions 3/29/2018   Who is doing your surgery? DR Perry   What are you having done?  panniculectony   Date of Surgery/Procedure:     Facility or Hospital where procedure/surgery will be performed: [facility]   1.  Do you have a history of Heart attack, stroke, stent, coronary bypass surgery, or other heart surgery? YES  - patient had an IVC filter placed for recurrent DVT. No other cardiac interventions.    2.  Do you ever have any pain or discomfort in your chest? YES - patient has a history of heart burn and occasional xyphoid discomfort   3.  Do you have a history of  Heart Failure? No   4.   Are you troubled by shortness of breath when:  walking on a level surface, or up a slight hill, or at night? No   5.  Do you currently have a cold, bronchitis or other respiratory infection? No   6.  Do you have a cough, shortness of breath, or wheezing? No   7.  Do you sometimes get pains in the calves of your legs when you walk? No   8. Do you or anyone in your family have previous history of blood clots? YES - personal history of recurrent DVT.    9.  Do you or does anyone in your family have a serious bleeding problem such as prolonged bleeding following surgeries or cuts? No   10. Have you ever had problems with anemia or been told to take iron pills? YES - last taken 3 years ago.    11. Have you had any abnormal  blood loss such as black, tarry or bloody stools, or abnormal vaginal bleeding? YES - patient with distant history of vaginal and rectal bleeding following colonoscopy while on treatment for breast cancer.    12. Have you ever had a blood transfusion? YES - see above   13. Have you or any of your relatives ever had problems with anesthesia? No   14. Do you have sleep apnea, excessive snoring or daytime drowsiness? YES - does snore, never diagnosed with NAIMA.    15. Do you have any prosthetic heart valves? No   16. Do you have prosthetic joints? No   17. Is there any chance that you may be pregnant? No         HPI:     HPI related to upcoming procedure:     Patient with back pain and recurrent abdominal wall infections, oomphalitis and panniculitis secondary to excess abdominal skin following gastric bypass. Planning for panniculectomy and umbilical resection.       See problem list for active medical problems.  Problems all longstanding and stable, except as noted/documented.  See ROS for pertinent symptoms related to these conditions.                                                                                                  .  Diabetes - Patient has a longstanding history of DiabetesType Type II . Patient is being treated with diet modification and gastric bypass and denies significant side effects. Control has been good. No complications.                                                                                                            .  Depression/anxiety - Patient has a long history of Depression of moderate severity requiring medication for control with recent symptoms being stable.    History of left breast cancer - status post bilateral mastectomies.    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 Did require IVC filter placed previously, this has since been removed.                 Chronic kidney disease stage 3 - stable. Most recent creatinine 1.15 on  2018.                                                                                                                                                                  .    MEDICAL HISTORY:     Patient Active Problem List    Diagnosis Date Noted     Acute pain of right knee 2018     Priority: Medium     Personal history of DVT (deep vein thrombosis) 11/15/2016     Priority: Medium     CKD (chronic kidney disease) stage 3, GFR 30-59 ml/min 10/16/2016     Priority: Medium     Malignant neoplasm of left female breast (H) 10/12/2016     Priority: Medium     S/p bilateral mastectomy, follows with Dr. Narvaez.        Morbid obesity (H) 10/12/2016     Priority: Medium     Advanced directives, counseling/discussion 2016     Priority: Medium     Advance Care Planning 2017: ACP Review of Chart / Resources Provided:  Reviewed chart for advance care plan.  Gemma Cowart has no plan or code status on file. Discussed available resources and provided with information.   Added by Nighat Watson  Advance Care Planning 2016: ACP Review of Chart / Resources Provided:  Reviewed chart for advance care plan.  Gemma Cowart has no plan or code status on file. Discussed available resources and provided with information. Confirmed code status reflects current choices pending further ACP discussions.  Confirmed/documented legally designated decision makers.  Added by Nighat Watson             Long-term (current) use of anticoagulants [Z79.01] 2016     Priority: Medium     Type 2 diabetes mellitus with other specified complication (H) 10/09/2015     Priority: Medium     Psoriasis 10/09/2015     Priority: Medium     Health Care Home 2013     Priority: Medium     [facility] Case Management  Nighat Carbajal RN  [phone]  [facility] CARE PLAN SUMMARY    Client Name:  Gemma EDMUNDO Cowart  Address:  [address] [place] [address] Phone: [phone] (home)    :   "1945 Date of Assessment:  7/19/2017   Health Plan:  [facility] [employer]  Health Plan Number: [id number] Medical Assistance Number: [id number]  Financial Worker:    Case #:     FVP :  Nighat Carbajal RN CM Phone:  [phone]  CM Fax:  [phone]   FVP Enrollment Date: 07/01/2013 Case Mix:  L  Rate Cell:  A  Waiver Type:  none   Primary Emergency Contact:   CATCAMILLE GEORGE  Address: [address] [place] [address]  Home Phone: [phone]  Mobile Phone: [phone]  Relation: Daughter  Secondary Emergency Contact:   IRAIDA SHELTON [place], [place]   Home Phone: [phone]  Mobile Phone: [phone]  Relation: Son Language:  English  :  No   Health Care Agent/POA:  none Advanced Directives/Living Will:  none   Primary Care Clinic/Phone[facility]Fax[facility]  [facility]/(p) [phone], (f) [phone] Primary Dx:  Type 2 diabetes mellitus [E11.69]  Secondary Dx:  Breast cancer, L w/bilat mastectomy [C50.919]   Primary Physician:  Yue Martin   Height:  5' 3\"  Weight:  196 lbs   Specialty Physician:    Audiologist:     Eye Care Provider:   Dental Care Provider:    [facility]: [place] [phone]/See a Dentist [phone]   Other:          [facility] CURRENT SERVICES SUMMARY  Equipment owned/DME history:   SERVICE TYPE/PROVIDER NAME/PHONE AUTH DATE FREQUENCY Units OR $ Amt DESCRIPTION   Medical Transportation: [facility] [phone]  Fax:  8/1/17-  7/31/18 as needed       * For ADC please select ADC provider and [employer] in order to process auth                   Insomnia 06/27/2013     Priority: Medium     Anxiety 04/23/2013     Priority: Medium     Eczema 02/22/2013     Priority: Medium     Dyshydrosis 02/22/2013     Priority: Medium     Hyperlipidemia LDL goal <100 02/22/2013     Priority: Medium     BRETT (iron deficiency anemia) 02/01/2013     Priority: Medium     Complications of gastric bypass surgery " 02/01/2013     Priority: Medium     Vitamin D deficiency disease 02/01/2013     Priority: Medium     Vitamin B12 deficiency 02/01/2013     Priority: Medium     Nontoxic uninodular goiter 10/18/2012     Priority: Medium     Rosacea 10/18/2012     Priority: Medium      Past Medical History:   Diagnosis Date     Anemia     iron deficiency anemia     Anxiety      Breast cancer (H) 2012    Bilateral Masectomies 9/2012     Breast cancer (H) 9/5/2012     Complications of gastric bypass surgery      Diabetes mellitus (H)     diet controlled     DVT (deep venous thrombosis) (H)      DVT, bilateral lower limbs (H)      Dyspnea on exertion      Eczema      Fracture of left knee region     patella - vertical     History of kidney stones      Obesity      Polyneuropathy      Pruritus     generalized     Rosacea      Tachycardia     with no treatment     Thyroid disease      Past Surgical History:   Procedure Laterality Date     ABDOMEN SURGERY  2000    gastric bypass 2000     COLONOSCOPY  10/11/2012    Procedure: COLONOSCOPY;  colonoscopy;  Surgeon: Gela Cleary MD;  Location:  GI     COLONOSCOPY N/A 10/6/2017    Procedure: COLONOSCOPY;;  Surgeon: Shashank Ortiz MD;  Location:  GI     DENTAL SURGERY  5/2007    all teeth removed - dentures     ENT SURGERY      all teeth pulled     ESOPHAGOSCOPY, GASTROSCOPY, DUODENOSCOPY (EGD), COMBINED N/A 10/6/2017    Procedure: COMBINED ESOPHAGOSCOPY, GASTROSCOPY, DUODENOSCOPY (EGD);  ESOPHAGOSCOPY, GASTROSCOPY, DUODENOSCOPY (EGD) & Colonoscopy *Needs INR on arrival;  Surgeon: Shashank Ortiz MD;  Location:  GI     GYN SURGERY  1/10/75    tubal ligation      H ABLATION SVT  5/28/2015    stopped metoprolol     HC BREAST RECONSTRUC W OTHR TECHNIQ  5/8/2013     HERNIA REPAIR  2005     IMPLANT FILTER INFERIOR VENA CAVA  10/8/2012    taken out  1/4/12     INSERT TISSUE EXPANDER BREAST BILATERAL  9/5/2012    Procedure: INSERT TISSUE EXPANDER BREAST BILATERAL;;  Surgeon:  Orlando Wall MD;  Location:  OR     MASTECTOMY SIMPLE, SENTINEL NODE, COMBINED  9/5/2012    Procedure: COMBINED MASTECTOMY SIMPLE, SENTINEL NODE;  BILATERAL MASTECTOMY WITH LEFT AXILLARY SENTINEL LYMPH NODE BIOPSY, BILATERAL TISSUE EXPANDER        MASTECTOMY, BILATERAL       RECONSTRUCT BREAST BILATERAL, IMPLANT PROSTHESIS BILATERAL, COMBINED  5/22/2013    Procedure: COMBINED RECONSTRUCT BREAST BILATERAL, IMPLANT PROSTHESIS BILATERAL;  BILATERAL SECOND STAGE BREAST RECONSTRUCTION WITH SILICONE GEL IMPLANTS AND LIPOSUCTION;  Surgeon: Orlando Wall MD;  Location: [facility]     RECONSTRUCT NIPPLE BILATERAL  8/22/2013    Procedure: RECONSTRUCT NIPPLE BILATERAL;  BILATERAL NIPPLE AREOLAR RECONSTRUCTION;  Surgeon: Orlando Wall MD;  Location: [facility]     Current Outpatient Prescriptions   Medication Sig Dispense Refill     clobetasol (TEMOVATE) 0.05 % external solution Apply sparingly to affected area twice daily for 14 days.  Do not apply to face. 50 mL 11     PARoxetine (PAXIL) 30 MG tablet Take 2 tablets (60 mg) by mouth At Bedtime 180 tablet 3     nystatin (MYCOSTATIN) 446352 UNIT/GM POWD Apply topically 3 times daily as needed 60 g 1     STATIN NOT PRESCRIBED, INTENTIONAL, Please choose reason not prescribed, below       Cyanocobalamin (B-12) 1000 MCG TBCR Take 1,000 mcg by mouth daily 90 tablet 1     clobetasol (TEMOVATE) 0.05 % cream Apply sparingly to affected area twice daily as needed.  Do not apply to face. 270 g 3     warfarin (COUMADIN) 5 MG tablet TAKE ONE TO ONE & ONE-HALF TABLETS BY MOUTH DAILY (Patient taking differently: TAKE ONE AND ONE-HALF TO TWO TABLETS BY MOUTH DAILY) 135 tablet 3     acetaminophen (TYLENOL) 500 MG tablet Take 2 tablets (1,000 mg) by mouth every 8 hours as needed for mild pain 100 tablet 0     ACE/ARB NOT PRESCRIBED, INTENTIONAL, ACE & ARB not prescribed due to Other: normal tensive, low microalbumuria       ASPIRIN NOT PRESCRIBED (INTENTIONAL) Antiplatelet  "medication not prescribed intentionally due to Current anticoagulant therapy (warfarin/enoxaparin)       blood glucose monitoring (NO BRAND SPECIFIED) test strip Use to test blood sugars 2 times daily or as directed. Please dispense Relion Confirm test strips 200 each 3     blood glucose (NO BRAND SPECIFIED) lancets standard Use to test blood sugars 2 times daily or as directed. Please dispense lancets for Relion Confirm. 2 Box 3     Cholecalciferol (VITAMIN D) 2000 UNITS CAPS Take 1 capsule by mouth daily.       OTC products: None, except as noted above    Allergies   Allergen Reactions     Bacitracin Unknown     Phenylene       Latex Allergy: NO    Social History   Substance Use Topics     Smoking status: Former Smoker     Packs/day: 1.50     Years: 25.00     Types: Cigarettes     Quit date: 10/21/1986     Smokeless tobacco: Never Used      Comment: quit 1985     Alcohol use No     History   Drug Use No       REVIEW OF SYSTEMS:   + recurrent panniculitis as above. Does have intermittent nausea which is stable; thought to be due to gastric bypass. Does have acid reflux. Does have IBS. Has right knee pain secondary to osteoarthritis. Constitutional, neuro, ENT, endocrine, pulmonary, cardiac, gastrointestinal, genitourinary, musculoskeletal, integument and psychiatric systems are negative, except as otherwise noted.    EXAM:   BP 96/62  Pulse 65  Temp 98.8  F (37.1  C) (Tympanic)  Ht 5' 3.25\" (1.607 m)  Wt 187 lb 5 oz (85 kg)  LMP 09/24/1990  SpO2 96%  Breastfeeding? No  BMI 32.92 kg/m2    GENERAL APPEARANCE: healthy, alert and no distress     EYES: EOMI, PERRL     HENT: ear canals and TM's normal and nose and mouth without ulcers or lesions     NECK: no adenopathy, no asymmetry, masses, or scars and thyroid normal to palpation     RESP: lungs clear to auscultation - no rales, rhonchi or wheezes     CV: regular rates and rhythm, normal S1 S2, no S3 or S4 and no murmur, click or rub     ABDOMEN:  soft, " nontender, no HSM or masses and bowel sounds normal. +pannus     MS: extremities normal- no gross deformities noted, no evidence of inflammation in joints, FROM in all extremities.     SKIN: no suspicious lesions or rashes     NEURO: Normal strength and tone, sensory exam grossly normal, mentation intact and speech normal     PSYCH: mentation appears normal. and affect normal/bright     LYMPHATICS: No cervical adenopathy    DIAGNOSTICS:   EKG: Not indicated due to non-vascular surgery and last ekg on 2-16-18 (within 30 days for CAD history or last year for cardiac risk factors); demonstrates normal sinus rhythm without ST changes.     Recent Labs   Lab Test 03/26/18 02/26/18   0747 02/26/18   10/02/17   0918   HGB   --   12.5   --    --   12.5   PLT   --   180   --    --   198   INR  3.1*   --   2.3*   < >  1.29*   NA   --   141   --    --   142   POTASSIUM   --   4.3   --    --   4.8   CR   --   1.15*   --    --   1.33*   A1C   --   5.5   --    --   5.4    < > = values in this interval not displayed.        IMPRESSION:   Reason for surgery/procedure: pannus with recurrent panniculitis secondary to complications from gastric bypass.     The proposed surgical procedure is considered INTERMEDIATE risk.    REVISED CARDIAC RISK INDEX  The patient has the following serious cardiovascular risks for perioperative complications such as (MI, PE, VFib and 3  AV Block):  No serious cardiac risks  INTERPRETATION: 0 risks: Class I (very low risk - 0.4% complication rate)    The patient has the following additional risks for perioperative complications:  History of recurrent DVT, follow bridging protocol as below.       ICD-10-CM    1. Preop general physical exam Z01.818    2. Complications of gastric bypass surgery K91.89     Y83.2    3. Panniculitis M79.3    4. Personal history of DVT (deep vein thrombosis) Z86.718 enoxaparin (LOVENOX) 100 MG/ML injection     INR       RECOMMENDATIONS:     --Because of DVT or PE history,  patient should be on low molecular weight heparin and wear compression hose before & after surgery. Please see patient instructions. She will hold warfarin 5 days prior to surgery, and begin anticoagulation with enoxaparin 3 days prior to surgery. She will hold enoxaparin 24 hours prior to surgery and restart this 24 hours following surgery. She has follow-up scheduled in INR clinic, which will determine duration of enoxaparin.     Obstructive Sleep Apnea (or suspected sleep apnea)  Hospital staff are advised to monitor for sleep related oxygen desaturations due to suspicion of NAIMA    --Patient is to take all scheduled medications on the day of surgery EXCEPT for modifications listed below.    Anticoagulant or Antiplatelet Medication Use  WARFARIN: Bridging therapy with Lovenox/IV heparin recommended to start 3 days before surgery (see above).         APPROVAL GIVEN to proceed with proposed procedure, without further diagnostic evaluation       Signed Electronically by: Alva Casas MD    Copy of this evaluation report is provided to requesting physician.    [facility] Preop Guidelines

## 2018-03-29 NOTE — PATIENT INSTRUCTIONS
Stop warfarin on March 31.     Start enoxaparin injections daily on April 2. Given last injection on April 4 at least 24 hours prior to surgery. On April 4, come in to lab for INR check.     Restart injections on April 6 24 hours after surgery.     Follow-up with Rae on April 9, she will determine if you need any more injections.     Before Your Surgery      Call your surgeon if there is any change in your health. This includes signs of a cold or flu (such as a sore throat, runny nose, cough, rash or fever).    Do not smoke, drink alcohol or take over the counter medicine (unless your surgeon or primary care doctor tells you to) for the 24 hours before and after surgery.    If you take prescribed drugs: Follow your doctor s orders about which medicines to take and which to stop until after surgery.    Eating and drinking prior to surgery: follow the instructions from your surgeon    Take a shower or bath the night before surgery. Use the soap your surgeon gave you to gently clean your skin. If you do not have soap from your surgeon, use your regular soap. Do not shave or scrub the surgery site.  Wear clean pajamas and have clean sheets on your bed.

## 2018-03-29 NOTE — MR AVS SNAPSHOT
After Visit Summary   3/29/2018    Gemma Cowart    MRN: [id number]           Patient Information     Date Of Birth          1945        Visit Information        Provider Department      3/29/2018 11:30 AM Alva Viera MD [facility]        Today's Diagnoses     Preop general physical exam    -  1    Complications of gastric bypass surgery        Panniculitis        Personal history of DVT (deep vein thrombosis)          Care Instructions    Stop warfarin on March 31.     Start enoxaparin injections daily on April 2. Given last injection on April 4 at least 24 hours prior to surgery. On April 4, come in to lab for INR check.     Restart injections on April 6 24 hours after surgery.     Follow-up with Rae on April 9, she will determine if you need any more injections.     Before Your Surgery      Call your surgeon if there is any change in your health. This includes signs of a cold or flu (such as a sore throat, runny nose, cough, rash or fever).    Do not smoke, drink alcohol or take over the counter medicine (unless your surgeon or primary care doctor tells you to) for the 24 hours before and after surgery.    If you take prescribed drugs: Follow your doctor s orders about which medicines to take and which to stop until after surgery.    Eating and drinking prior to surgery: follow the instructions from your surgeon    Take a shower or bath the night before surgery. Use the soap your surgeon gave you to gently clean your skin. If you do not have soap from your surgeon, use your regular soap. Do not shave or scrub the surgery site.  Wear clean pajamas and have clean sheets on your bed.           Follow-ups after your visit        Your next 10 appointments already scheduled     Apr 04, 2018  9:10 AM CDT   LAB with [facility]   [facility] ([facility])    [address] [address] [phone]           Please do not eat  10-12 hours before your appointment if you are coming in fasting for labs on lipids, cholesterol, or glucose (sugar). This does not apply to pregnant women. Water, hot tea and black coffee (with nothing added) are okay. Do not drink other fluids, diet soda or chew gum.            Apr 05, 2018   Procedure with [facility] Wall MD   [facility]    [address]  [facility] [address]   [phone]            Apr 09, 2018 11:00 AM CDT   Anticoagulation Visit with [facility]   [facility] ([facility])    [address]  [address]  [facility] [address] [phone]            Oct 10, 2018  8:30 AM CDT   Return Visit with  Oncology Nurse   [facility] ([facility])    [facility]  [address]  [facility] [address]   [phone]            Oct 17, 2018  8:30 AM CDT   Return Visit with Jyoti Narvaez MD   [facility] ([facility])    [facility]  [address]  [facility] [address]   [phone]              Future tests that were ordered for you today     Open Future Orders        Priority Expected Expires Ordered    INR STAT  3/29/2019 3/29/2018            Who to contact     If you have questions or need follow up information about today's clinic visit or your schedule please contact [facility] directly at [phone].  Normal or non-critical lab and imaging results will be communicated to you by [employer], letter or phone within 4 business days after the clinic has received the results. If you do not hear from us within 7 days, please contact the clinic through MyChart or phone. If you have a critical or abnormal lab result, we will notify you by phone as soon as possible.  Submit refill requests through [employer] or call your pharmacy and they will forward the refill request to us. Please allow 3  "business days for your refill to be completed.          Additional Information About Your Visit        [employer] Information     [employer] lets you send messages to your doctor, view your test results, renew your prescriptions, schedule appointments and more. To sign up, go to [email or web address] . Click on \"Log in\" on the left side of the screen, which will take you to the Welcome page. Then click on \"Sign up Now\" on the right side of the page.     You will be asked to enter the access code listed below, as well as some personal information. Please follow the directions to create your username and password.     Your access code is: [email or web address]  Expires: 2018 11:24 AM     Your access code will  in 90 days. If you need help or a new code, please call your [place] clinic or [phone].        Care EveryWhere ID     This is your Care EveryWhere ID. This could be used by other organizations to access your [place] medical records  [id number]        Your Vitals Were     Pulse Temperature Height Last Period Pulse Oximetry Breastfeeding?    65 98.8  F (37.1  C) (Tympanic) 5' 3.25\" (1.607 m) 1990 96% No    BMI (Body Mass Index)                   32.92 kg/m2            Blood Pressure from Last 3 Encounters:   18 96/62   18 110/66   18 102/58    Weight from Last 3 Encounters:   18 187 lb 5 oz (85 kg)   18 189 lb 9.6 oz (86 kg)   18 185 lb (83.9 kg)                 Today's Medication Changes          These changes are accurate as of 3/29/18 12:07 PM.  If you have any questions, ask your nurse or doctor.               Start taking these medicines.        Dose/Directions    enoxaparin 100 MG/ML injection   Commonly known as:  LOVENOX   Used for:  Personal history of DVT (deep vein thrombosis)   Started by:  Alva Viera MD        Dose:  40 mg   Inject 0.4 mLs (40 mg) Subcutaneous daily for 8 days   Quantity:  3.2 mL   Refills:  0         These medicines have changed " or have updated prescriptions.        Dose/Directions    warfarin 5 MG tablet   Commonly known as:  COUMADIN   This may have changed:  See the new instructions.   Used for:  History of deep venous thrombosis        TAKE ONE TO ONE & ONE-HALF TABLETS BY MOUTH DAILY   Quantity:  135 tablet   Refills:  3            Where to get your medicines      These medications were sent to [id number] - [place], [place] - [address] AT [facility] [place]  [address], [facility] [address]     Phone:  [phone]     enoxaparin 100 MG/ML injection                Primary Care Provider Office Phone # Fax #    Alva Viera [phone] [address] [place] [address]        Equal Access to Services     [facility] : Hadii aad ku hadasho Soomaali, waaxda luqadaha, qaybta kaalmada adeegyada, waxkelly hines . So [facility] [phone].    ATENCIÓN: Si habla español, tiene a marcum disposición servicios gratuitos de asistencia lingüística. [facility] [phone].    We comply with applicable federal civil rights laws and [place] laws. We do not discriminate on the basis of race, color, national origin, age, disability, sex, sexual orientation, or gender identity.            Thank you!     Thank you for choosing [facility]  for your care. Our goal is always to provide you with excellent care. Hearing back from our patients is one way we can continue to improve our services. Please take a few minutes to complete the written survey that you may receive in the mail after your visit with us. Thank you!             Your Updated Medication List - Protect others around you: Learn how to safely use, store and throw away your medicines at www.disposemymeds.org.          This list is accurate as of 3/29/18 12:07 PM.  Always use your most recent med list.                   Brand Name Dispense Instructions for use Diagnosis     ACE/ARB/ARNI NOT PRESCRIBED (INTENTIONAL)      ACE & ARB not prescribed due to Other: normal tensive, low microalbumuria    Type 2 diabetes mellitus with other specified complication (H)       ASPIRIN NOT PRESCRIBED    INTENTIONAL     Antiplatelet medication not prescribed intentionally due to Current anticoagulant therapy (warfarin/enoxaparin)    Type 2 diabetes mellitus with other specified complication (H)       B-12 1000 MCG Tbcr     90 tablet    Take 1,000 mcg by mouth daily    Vitamin B12 deficiency       blood glucose lancets standard    no brand specified    2 Box    Use to test blood sugars 2 times daily or as directed. Please dispense lancets for Relion Confirm.    Type 2 diabetes mellitus with other specified complication (H)       blood glucose monitoring test strip    no brand specified    200 each    Use to test blood sugars 2 times daily or as directed. Please dispense Relion Confirm test strips    Type 2 diabetes mellitus with other specified complication (H)       * clobetasol 0.05 % cream    TEMOVATE    270 g    Apply sparingly to affected area twice daily as needed.  Do not apply to face.    Psoriasis       * clobetasol 0.05 % external solution    TEMOVATE    50 mL    Apply sparingly to affected area twice daily for 14 days.  Do not apply to face.    Psoriasis       enoxaparin 100 MG/ML injection    LOVENOX    3.2 mL    Inject 0.4 mLs (40 mg) Subcutaneous daily for 8 days    Personal history of DVT (deep vein thrombosis)       nystatin 853504 UNIT/GM Powd    MYCOSTATIN    60 g    Apply topically 3 times daily as needed    Yeast infection of the skin       PARoxetine 30 MG tablet    PAXIL    180 tablet    Take 2 tablets (60 mg) by mouth At Bedtime    Anxiety       STATIN NOT PRESCRIBED (INTENTIONAL)      Please choose reason not prescribed, below        TYLENOL 500 MG tablet   Generic drug:  acetaminophen     100 tablet    Take 2 tablets (1,000 mg) by mouth every 8 hours as needed for mild pain         vitamin D 2000 UNITS Caps      Take 1 capsule by mouth daily.        warfarin 5 MG tablet    COUMADIN    135 tablet    TAKE ONE TO ONE & ONE-HALF TABLETS BY MOUTH DAILY    History of deep venous thrombosis       * Notice:  This list has 2 medication(s) that are the same as other medications prescribed for you. Read the directions carefully, and ask your doctor or other care provider to review them with you.

## 2018-04-02 NOTE — H&P (VIEW-ONLY)
[facility]  [address]  [address]  [place] [address]  [phone]  Dept: [phone]    PRE-OP EVALUATION:  Today's date: 3/29/2018    Gemma Cowart (: 1945) presents for pre-operative evaluation assessment as requested by Dr. Wall.  She requires evaluation and anesthesia risk assessment prior to undergoing surgery/procedure for treatment of stomach .    Fax number for surgical facility: [facility]  Primary Physician: Alva Viera  Type of Anesthesia Anticipated: General    Patient has a Health Care Directive or Living Will:  NO    Preop Questions 3/29/2018   Who is doing your surgery? DR Perry   What are you having done?  panniculectony   Date of Surgery/Procedure:     Facility or Hospital where procedure/surgery will be performed: [facility]   1.  Do you have a history of Heart attack, stroke, stent, coronary bypass surgery, or other heart surgery? YES  - patient had an IVC filter placed for recurrent DVT. No other cardiac interventions.    2.  Do you ever have any pain or discomfort in your chest? YES - patient has a history of heart burn and occasional xyphoid discomfort   3.  Do you have a history of  Heart Failure? No   4.   Are you troubled by shortness of breath when:  walking on a level surface, or up a slight hill, or at night? No   5.  Do you currently have a cold, bronchitis or other respiratory infection? No   6.  Do you have a cough, shortness of breath, or wheezing? No   7.  Do you sometimes get pains in the calves of your legs when you walk? No   8. Do you or anyone in your family have previous history of blood clots? YES - personal history of recurrent DVT.    9.  Do you or does anyone in your family have a serious bleeding problem such as prolonged bleeding following surgeries or cuts? No   10. Have you ever had problems with anemia or been told to take iron pills? YES - last taken 3 years ago.    11. Have you had any abnormal  blood loss such as black, tarry or bloody stools, or abnormal vaginal bleeding? YES - patient with distant history of vaginal and rectal bleeding following colonoscopy while on treatment for breast cancer.    12. Have you ever had a blood transfusion? YES - see above   13. Have you or any of your relatives ever had problems with anesthesia? No   14. Do you have sleep apnea, excessive snoring or daytime drowsiness? YES - does snore, never diagnosed with NAIMA.    15. Do you have any prosthetic heart valves? No   16. Do you have prosthetic joints? No   17. Is there any chance that you may be pregnant? No         HPI:     HPI related to upcoming procedure:     Patient with back pain and recurrent abdominal wall infections, oomphalitis and panniculitis secondary to excess abdominal skin following gastric bypass. Planning for panniculectomy and umbilical resection.       See problem list for active medical problems.  Problems all longstanding and stable, except as noted/documented.  See ROS for pertinent symptoms related to these conditions.                                                                                                  .  Diabetes - Patient has a longstanding history of DiabetesType Type II . Patient is being treated with diet modification and gastric bypass and denies significant side effects. Control has been good. No complications.                                                                                                            .  Depression/anxiety - Patient has a long history of Depression of moderate severity requiring medication for control with recent symptoms being stable.    History of left breast cancer - status post bilateral mastectomies.    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 Did require IVC filter placed previously, this has since been removed.                 Chronic kidney disease stage 3 - stable. Most recent creatinine 1.15 on  2018.                                                                                                                                                                  .    MEDICAL HISTORY:     Patient Active Problem List    Diagnosis Date Noted     Acute pain of right knee 2018     Priority: Medium     Personal history of DVT (deep vein thrombosis) 11/15/2016     Priority: Medium     CKD (chronic kidney disease) stage 3, GFR 30-59 ml/min 10/16/2016     Priority: Medium     Malignant neoplasm of left female breast (H) 10/12/2016     Priority: Medium     S/p bilateral mastectomy, follows with Dr. Narvaez.        Morbid obesity (H) 10/12/2016     Priority: Medium     Advanced directives, counseling/discussion 2016     Priority: Medium     Advance Care Planning 2017: ACP Review of Chart / Resources Provided:  Reviewed chart for advance care plan.  Gemma Cowart has no plan or code status on file. Discussed available resources and provided with information.   Added by Nighat Watson  Advance Care Planning 2016: ACP Review of Chart / Resources Provided:  Reviewed chart for advance care plan.  Gemma Cowart has no plan or code status on file. Discussed available resources and provided with information. Confirmed code status reflects current choices pending further ACP discussions.  Confirmed/documented legally designated decision makers.  Added by Nighat Watson             Long-term (current) use of anticoagulants [Z79.01] 2016     Priority: Medium     Type 2 diabetes mellitus with other specified complication (H) 10/09/2015     Priority: Medium     Psoriasis 10/09/2015     Priority: Medium     Health Care Home 2013     Priority: Medium     [facility] Case Management  Nighat Carbajal RN  [phone]  [facility] CARE PLAN SUMMARY    Client Name:  Gemma EDMUNDO Cowart  Address:  [address] [place] [address] Phone: [phone] (home)    :   "1945 Date of Assessment:  7/19/2017   Health Plan:  [facility] [employer]  Health Plan Number: [id number] Medical Assistance Number: [id number]  Financial Worker:    Case #:     FVP :  Nighat Carbajal RN CM Phone:  [phone]  CM Fax:  [phone]   FVP Enrollment Date: 07/01/2013 Case Mix:  L  Rate Cell:  A  Waiver Type:  none   Primary Emergency Contact:   CATCAMILLE GEORGE  Address: [address] [place] [address]  Home Phone: [phone]  Mobile Phone: [phone]  Relation: Daughter  Secondary Emergency Contact:   IRAIDA SHELTON [place], [place]   Home Phone: [phone]  Mobile Phone: [phone]  Relation: Son Language:  English  :  No   Health Care Agent/POA:  none Advanced Directives/Living Will:  none   Primary Care Clinic/Phone/Fax:  [facility]/(p) [phone], (f) [phone] Primary Dx:  Type 2 diabetes mellitus [E11.69]  Secondary Dx:  Breast cancer, L w/bilat mastectomy [C50.919]   Primary Physician:  Yue Martin   Height:  5' 3\"  Weight:  196 lbs   Specialty Physician:    Audiologist:     Eye Care Provider:   Dental Care Provider:    [facility]: Deyvi [phone]/See a Dentist [phone]   Other:          [facility] CURRENT SERVICES SUMMARY  Equipment owned/DME history:   SERVICE TYPE/PROVIDER NAME/PHONE AUTH DATE FREQUENCY Units OR $ Amt DESCRIPTION   Medical Transportation: [facility] [phone]  Fax:  8/1/17-  7/31/18 as needed       * For ADC please select ADC provider and [employer] in order to process auth                   Insomnia 06/27/2013     Priority: Medium     Anxiety 04/23/2013     Priority: Medium     Eczema 02/22/2013     Priority: Medium     Dyshydrosis 02/22/2013     Priority: Medium     Hyperlipidemia LDL goal <100 02/22/2013     Priority: Medium     BRETT (iron deficiency anemia) 02/01/2013     Priority: Medium     Complications of gastric bypass surgery " 02/01/2013     Priority: Medium     Vitamin D deficiency disease 02/01/2013     Priority: Medium     Vitamin B12 deficiency 02/01/2013     Priority: Medium     Nontoxic uninodular goiter 10/18/2012     Priority: Medium     Rosacea 10/18/2012     Priority: Medium      Past Medical History:   Diagnosis Date     Anemia     iron deficiency anemia     Anxiety      Breast cancer (H) 2012    Bilateral Masectomies 9/2012     Breast cancer (H) 9/5/2012     Complications of gastric bypass surgery      Diabetes mellitus (H)     diet controlled     DVT (deep venous thrombosis) (H)      DVT, bilateral lower limbs (H)      Dyspnea on exertion      Eczema      Fracture of left knee region     patella - vertical     History of kidney stones      Obesity      Polyneuropathy      Pruritus     generalized     Rosacea      Tachycardia     with no treatment     Thyroid disease      Past Surgical History:   Procedure Laterality Date     ABDOMEN SURGERY  2000    gastric bypass 2000     COLONOSCOPY  10/11/2012    Procedure: COLONOSCOPY;  colonoscopy;  Surgeon: Gela Cleary MD;  Location:  GI     COLONOSCOPY N/A 10/6/2017    Procedure: COLONOSCOPY;;  Surgeon: Shashank Ortiz MD;  Location:  GI     DENTAL SURGERY  5/2007    all teeth removed - dentures     ENT SURGERY      all teeth pulled     ESOPHAGOSCOPY, GASTROSCOPY, DUODENOSCOPY (EGD), COMBINED N/A 10/6/2017    Procedure: COMBINED ESOPHAGOSCOPY, GASTROSCOPY, DUODENOSCOPY (EGD);  ESOPHAGOSCOPY, GASTROSCOPY, DUODENOSCOPY (EGD) & Colonoscopy *Needs INR on arrival;  Surgeon: Shashank Ortiz MD;  Location:  GI     GYN SURGERY  1/10/75    tubal ligation      H ABLATION SVT  5/28/2015    stopped metoprolol     HC BREAST RECONSTRUC W OTHR TECHNIQ  5/8/2013     HERNIA REPAIR  2005     IMPLANT FILTER INFERIOR VENA CAVA  10/8/2012    taken out  1/4/12     INSERT TISSUE EXPANDER BREAST BILATERAL  9/5/2012    Procedure: INSERT TISSUE EXPANDER BREAST BILATERAL;;  Surgeon:  Orlando Wall MD;  Location:  OR     MASTECTOMY SIMPLE, SENTINEL NODE, COMBINED  9/5/2012    Procedure: COMBINED MASTECTOMY SIMPLE, SENTINEL NODE;  BILATERAL MASTECTOMY WITH LEFT AXILLARY SENTINEL LYMPH NODE BIOPSY, BILATERAL TISSUE EXPANDER        MASTECTOMY, BILATERAL       RECONSTRUCT BREAST BILATERAL, IMPLANT PROSTHESIS BILATERAL, COMBINED  5/22/2013    Procedure: COMBINED RECONSTRUCT BREAST BILATERAL, IMPLANT PROSTHESIS BILATERAL;  BILATERAL SECOND STAGE BREAST RECONSTRUCTION WITH SILICONE GEL IMPLANTS AND LIPOSUCTION;  Surgeon: Orlando Wall MD;  Location: [facility]     RECONSTRUCT NIPPLE BILATERAL  8/22/2013    Procedure: RECONSTRUCT NIPPLE BILATERAL;  BILATERAL NIPPLE AREOLAR RECONSTRUCTION;  Surgeon: Orlando Wall MD;  Location: [facility]     Current Outpatient Prescriptions   Medication Sig Dispense Refill     clobetasol (TEMOVATE) 0.05 % external solution Apply sparingly to affected area twice daily for 14 days.  Do not apply to face. 50 mL 11     PARoxetine (PAXIL) 30 MG tablet Take 2 tablets (60 mg) by mouth At Bedtime 180 tablet 3     nystatin (MYCOSTATIN) 301718 UNIT/GM POWD Apply topically 3 times daily as needed 60 g 1     STATIN NOT PRESCRIBED, INTENTIONAL, Please choose reason not prescribed, below       Cyanocobalamin (B-12) 1000 MCG TBCR Take 1,000 mcg by mouth daily 90 tablet 1     clobetasol (TEMOVATE) 0.05 % cream Apply sparingly to affected area twice daily as needed.  Do not apply to face. 270 g 3     warfarin (COUMADIN) 5 MG tablet TAKE ONE TO ONE & ONE-HALF TABLETS BY MOUTH DAILY (Patient taking differently: TAKE ONE AND ONE-HALF TO TWO TABLETS BY MOUTH DAILY) 135 tablet 3     acetaminophen (TYLENOL) 500 MG tablet Take 2 tablets (1,000 mg) by mouth every 8 hours as needed for mild pain 100 tablet 0     ACE/ARB NOT PRESCRIBED, INTENTIONAL, ACE & ARB not prescribed due to Other: normal tensive, low microalbumuria       ASPIRIN NOT PRESCRIBED (INTENTIONAL) Antiplatelet  "medication not prescribed intentionally due to Current anticoagulant therapy (warfarin/enoxaparin)       blood glucose monitoring (NO BRAND SPECIFIED) test strip Use to test blood sugars 2 times daily or as directed. Please dispense Relion Confirm test strips 200 each 3     blood glucose (NO BRAND SPECIFIED) lancets standard Use to test blood sugars 2 times daily or as directed. Please dispense lancets for Relion Confirm. 2 Box 3     Cholecalciferol (VITAMIN D) 2000 UNITS CAPS Take 1 capsule by mouth daily.       OTC products: None, except as noted above    Allergies   Allergen Reactions     Bacitracin Unknown     Phenylene       Latex Allergy: NO    Social History   Substance Use Topics     Smoking status: Former Smoker     Packs/day: 1.50     Years: 25.00     Types: Cigarettes     Quit date: 10/21/1986     Smokeless tobacco: Never Used      Comment: quit 1985     Alcohol use No     History   Drug Use No       REVIEW OF SYSTEMS:   + recurrent panniculitis as above. Does have intermittent nausea which is stable; thought to be due to gastric bypass. Does have acid reflux. Does have IBS. Has right knee pain secondary to osteoarthritis. Constitutional, neuro, ENT, endocrine, pulmonary, cardiac, gastrointestinal, genitourinary, musculoskeletal, integument and psychiatric systems are negative, except as otherwise noted.    EXAM:   BP 96/62  Pulse 65  Temp 98.8  F (37.1  C) (Tympanic)  Ht 5' 3.25\" (1.607 m)  Wt 187 lb 5 oz (85 kg)  LMP 09/24/1990  SpO2 96%  Breastfeeding? No  BMI 32.92 kg/m2    GENERAL APPEARANCE: healthy, alert and no distress     EYES: EOMI, PERRL     HENT: ear canals and TM's normal and nose and mouth without ulcers or lesions     NECK: no adenopathy, no asymmetry, masses, or scars and thyroid normal to palpation     RESP: lungs clear to auscultation - no rales, rhonchi or wheezes     CV: regular rates and rhythm, normal S1 S2, no S3 or S4 and no murmur, click or rub     ABDOMEN:  soft, " nontender, no HSM or masses and bowel sounds normal. +pannus     MS: extremities normal- no gross deformities noted, no evidence of inflammation in joints, FROM in all extremities.     SKIN: no suspicious lesions or rashes     NEURO: Normal strength and tone, sensory exam grossly normal, mentation intact and speech normal     PSYCH: mentation appears normal. and affect normal/bright     LYMPHATICS: No cervical adenopathy    DIAGNOSTICS:   EKG: Not indicated due to non-vascular surgery and last ekg on 2-16-18 (within 30 days for CAD history or last year for cardiac risk factors); demonstrates normal sinus rhythm without ST changes.     Recent Labs   Lab Test 03/26/18 02/26/18   0747 02/26/18   10/02/17   0918   HGB   --   12.5   --    --   12.5   PLT   --   180   --    --   198   INR  3.1*   --   2.3*   < >  1.29*   NA   --   141   --    --   142   POTASSIUM   --   4.3   --    --   4.8   CR   --   1.15*   --    --   1.33*   A1C   --   5.5   --    --   5.4    < > = values in this interval not displayed.        IMPRESSION:   Reason for surgery/procedure: pannus with recurrent panniculitis secondary to complications from gastric bypass.     The proposed surgical procedure is considered INTERMEDIATE risk.    REVISED CARDIAC RISK INDEX  The patient has the following serious cardiovascular risks for perioperative complications such as (MI, PE, VFib and 3  AV Block):  No serious cardiac risks  INTERPRETATION: 0 risks: Class I (very low risk - 0.4% complication rate)    The patient has the following additional risks for perioperative complications:  History of recurrent DVT, follow bridging protocol as below.       ICD-10-CM    1. Preop general physical exam Z01.818    2. Complications of gastric bypass surgery K91.89     Y83.2    3. Panniculitis M79.3    4. Personal history of DVT (deep vein thrombosis) Z86.718 enoxaparin (LOVENOX) 100 MG/ML injection     INR       RECOMMENDATIONS:     --Because of DVT or PE history,  patient should be on low molecular weight heparin and wear compression hose before & after surgery. Please see patient instructions. She will hold warfarin 5 days prior to surgery, and begin anticoagulation with enoxaparin 3 days prior to surgery. She will hold enoxaparin 24 hours prior to surgery and restart this 24 hours following surgery. She has follow-up scheduled in INR clinic, which will determine duration of enoxaparin.     Obstructive Sleep Apnea (or suspected sleep apnea)  Hospital staff are advised to monitor for sleep related oxygen desaturations due to suspicion of NAIMA    --Patient is to take all scheduled medications on the day of surgery EXCEPT for modifications listed below.    Anticoagulant or Antiplatelet Medication Use  WARFARIN: Bridging therapy with Lovenox/IV heparin recommended to start 3 days before surgery (see above).         APPROVAL GIVEN to proceed with proposed procedure, without further diagnostic evaluation       Signed Electronically by: Alva Casas MD    Copy of this evaluation report is provided to requesting physician.    [facility] Preop Guidelines

## 2018-04-03 NOTE — PROGRESS NOTES
Patient never followed through.  She continues to come into the clinic for INR appointments.  Form is in INR room.    Rae Cooley RN

## 2018-04-04 DIAGNOSIS — Z86.718 PERSONAL HISTORY OF DVT (DEEP VEIN THROMBOSIS): ICD-10-CM

## 2018-04-04 LAB — INR PPP: 1.2 (ref 0.86–1.14)

## 2018-04-04 PROCEDURE — 85610 PROTHROMBIN TIME: CPT | Performed by: INTERNAL MEDICINE

## 2018-04-04 PROCEDURE — 36415 COLL VENOUS BLD VENIPUNCTURE: CPT | Performed by: INTERNAL MEDICINE

## 2018-04-05 ENCOUNTER — SURGERY (OUTPATIENT)
Age: 73
End: 2018-04-05

## 2018-04-05 ENCOUNTER — TRANSFERRED RECORDS (OUTPATIENT)
Dept: HEALTH INFORMATION MANAGEMENT | Facility: CLINIC | Age: 73
End: 2018-04-05

## 2018-04-05 ENCOUNTER — ANESTHESIA EVENT (OUTPATIENT)
Dept: SURGERY | Facility: CLINIC | Age: 73
End: 2018-04-05
Payer: MEDICARE

## 2018-04-05 ENCOUNTER — HOSPITAL ENCOUNTER (OUTPATIENT)
Facility: CLINIC | Age: 73
Discharge: HOME | End: 2018-04-05
Attending: PLASTIC SURGERY | Admitting: PLASTIC SURGERY
Payer: MEDICARE

## 2018-04-05 ENCOUNTER — ANESTHESIA (OUTPATIENT)
Dept: SURGERY | Facility: CLINIC | Age: 73
End: 2018-04-05
Payer: MEDICARE

## 2018-04-05 VITALS
DIASTOLIC BLOOD PRESSURE: 74 MMHG | WEIGHT: 187 LBS | TEMPERATURE: 98.2 F | SYSTOLIC BLOOD PRESSURE: 123 MMHG | BODY MASS INDEX: 32.86 KG/M2 | OXYGEN SATURATION: 95 % | RESPIRATION RATE: 16 BRPM

## 2018-04-05 DIAGNOSIS — E65 PANNICULUS: Primary | ICD-10-CM

## 2018-04-05 LAB — INR PPP: 1.06 (ref 0.86–1.14)

## 2018-04-05 PROCEDURE — 25000128 H RX IP 250 OP 636: Performed by: NURSE ANESTHETIST, CERTIFIED REGISTERED

## 2018-04-05 PROCEDURE — 36415 COLL VENOUS BLD VENIPUNCTURE: CPT | Performed by: ANESTHESIOLOGY

## 2018-04-05 PROCEDURE — 36000058 ZZH SURGERY LEVEL 3 EA 15 ADDTL MIN: Performed by: PLASTIC SURGERY

## 2018-04-05 PROCEDURE — 25000128 H RX IP 250 OP 636: Performed by: PLASTIC SURGERY

## 2018-04-05 PROCEDURE — 37000008 ZZH ANESTHESIA TECHNICAL FEE, 1ST 30 MIN: Performed by: PLASTIC SURGERY

## 2018-04-05 PROCEDURE — 71000027 ZZH RECOVERY PHASE 2 EACH 15 MINS: Performed by: PLASTIC SURGERY

## 2018-04-05 PROCEDURE — 25000125 ZZHC RX 250: Performed by: NURSE ANESTHETIST, CERTIFIED REGISTERED

## 2018-04-05 PROCEDURE — 25000132 ZZH RX MED GY IP 250 OP 250 PS 637: Mod: GY | Performed by: PLASTIC SURGERY

## 2018-04-05 PROCEDURE — 71000012 ZZH RECOVERY PHASE 1 LEVEL 1 FIRST HR: Performed by: PLASTIC SURGERY

## 2018-04-05 PROCEDURE — 25000128 H RX IP 250 OP 636: Performed by: ANESTHESIOLOGY

## 2018-04-05 PROCEDURE — 37000009 ZZH ANESTHESIA TECHNICAL FEE, EACH ADDTL 15 MIN: Performed by: PLASTIC SURGERY

## 2018-04-05 PROCEDURE — 27210995 ZZH RX 272: Performed by: PLASTIC SURGERY

## 2018-04-05 PROCEDURE — 36000056 ZZH SURGERY LEVEL 3 1ST 30 MIN: Performed by: PLASTIC SURGERY

## 2018-04-05 PROCEDURE — 27210794 ZZH OR GENERAL SUPPLY STERILE: Performed by: PLASTIC SURGERY

## 2018-04-05 PROCEDURE — A9270 NON-COVERED ITEM OR SERVICE: HCPCS | Mod: GY | Performed by: PLASTIC SURGERY

## 2018-04-05 PROCEDURE — 71000013 ZZH RECOVERY PHASE 1 LEVEL 1 EA ADDTL HR: Performed by: PLASTIC SURGERY

## 2018-04-05 PROCEDURE — 25000125 ZZHC RX 250: Performed by: PLASTIC SURGERY

## 2018-04-05 PROCEDURE — 25000566 ZZH SEVOFLURANE, EA 15 MIN: Performed by: PLASTIC SURGERY

## 2018-04-05 PROCEDURE — 40000170 ZZH STATISTIC PRE-PROCEDURE ASSESSMENT II: Performed by: PLASTIC SURGERY

## 2018-04-05 PROCEDURE — 85610 PROTHROMBIN TIME: CPT | Performed by: ANESTHESIOLOGY

## 2018-04-05 RX ORDER — SODIUM CHLORIDE, SODIUM LACTATE, POTASSIUM CHLORIDE, CALCIUM CHLORIDE 600; 310; 30; 20 MG/100ML; MG/100ML; MG/100ML; MG/100ML
INJECTION, SOLUTION INTRAVENOUS CONTINUOUS PRN
Status: DISCONTINUED | OUTPATIENT
Start: 2018-04-05 | End: 2018-04-05

## 2018-04-05 RX ORDER — CEFAZOLIN SODIUM 1 G
1 VIAL (EA) INJECTION SEE ADMIN INSTRUCTIONS
Status: DISCONTINUED | OUTPATIENT
Start: 2018-04-05 | End: 2018-04-05 | Stop reason: HOSPADM

## 2018-04-05 RX ORDER — MAGNESIUM HYDROXIDE 1200 MG/15ML
LIQUID ORAL PRN
Status: DISCONTINUED | OUTPATIENT
Start: 2018-04-05 | End: 2018-04-05 | Stop reason: HOSPADM

## 2018-04-05 RX ORDER — OXYCODONE HYDROCHLORIDE 5 MG/1
5 TABLET ORAL
Status: COMPLETED | OUTPATIENT
Start: 2018-04-05 | End: 2018-04-05

## 2018-04-05 RX ORDER — ONDANSETRON 4 MG/1
4 TABLET, ORALLY DISINTEGRATING ORAL EVERY 30 MIN PRN
Status: DISCONTINUED | OUTPATIENT
Start: 2018-04-05 | End: 2018-04-05 | Stop reason: HOSPADM

## 2018-04-05 RX ORDER — HYDROMORPHONE HYDROCHLORIDE 1 MG/ML
.3-.5 INJECTION, SOLUTION INTRAMUSCULAR; INTRAVENOUS; SUBCUTANEOUS EVERY 10 MIN PRN
Status: DISCONTINUED | OUTPATIENT
Start: 2018-04-05 | End: 2018-04-05 | Stop reason: HOSPADM

## 2018-04-05 RX ORDER — SODIUM CHLORIDE, SODIUM LACTATE, POTASSIUM CHLORIDE, CALCIUM CHLORIDE 600; 310; 30; 20 MG/100ML; MG/100ML; MG/100ML; MG/100ML
INJECTION, SOLUTION INTRAVENOUS CONTINUOUS
Status: DISCONTINUED | OUTPATIENT
Start: 2018-04-05 | End: 2018-04-05 | Stop reason: HOSPADM

## 2018-04-05 RX ORDER — HYDROXYZINE HYDROCHLORIDE 25 MG/1
25 TABLET, FILM COATED ORAL
Status: DISCONTINUED | OUTPATIENT
Start: 2018-04-05 | End: 2018-04-05 | Stop reason: HOSPADM

## 2018-04-05 RX ORDER — PROPOFOL 10 MG/ML
INJECTION, EMULSION INTRAVENOUS PRN
Status: DISCONTINUED | OUTPATIENT
Start: 2018-04-05 | End: 2018-04-05

## 2018-04-05 RX ORDER — CEPHALEXIN 500 MG/1
500 CAPSULE ORAL 4 TIMES DAILY
Qty: 20 CAPSULE | Refills: 0 | Status: SHIPPED | OUTPATIENT
Start: 2018-04-05 | End: 2018-04-10

## 2018-04-05 RX ORDER — FENTANYL CITRATE 50 UG/ML
INJECTION, SOLUTION INTRAMUSCULAR; INTRAVENOUS PRN
Status: DISCONTINUED | OUTPATIENT
Start: 2018-04-05 | End: 2018-04-05

## 2018-04-05 RX ORDER — EPHEDRINE SULFATE 50 MG/ML
INJECTION, SOLUTION INTRAMUSCULAR; INTRAVENOUS; SUBCUTANEOUS PRN
Status: DISCONTINUED | OUTPATIENT
Start: 2018-04-05 | End: 2018-04-05

## 2018-04-05 RX ORDER — ONDANSETRON 2 MG/ML
INJECTION INTRAMUSCULAR; INTRAVENOUS PRN
Status: DISCONTINUED | OUTPATIENT
Start: 2018-04-05 | End: 2018-04-05

## 2018-04-05 RX ORDER — ACETAMINOPHEN 325 MG/1
650 TABLET ORAL
Status: DISCONTINUED | OUTPATIENT
Start: 2018-04-05 | End: 2018-04-05 | Stop reason: HOSPADM

## 2018-04-05 RX ORDER — PROPOFOL 10 MG/ML
INJECTION, EMULSION INTRAVENOUS CONTINUOUS PRN
Status: DISCONTINUED | OUTPATIENT
Start: 2018-04-05 | End: 2018-04-05

## 2018-04-05 RX ORDER — ACETAMINOPHEN 10 MG/ML
1000 INJECTION, SOLUTION INTRAVENOUS
Status: DISCONTINUED | OUTPATIENT
Start: 2018-04-05 | End: 2018-04-05 | Stop reason: HOSPADM

## 2018-04-05 RX ORDER — NALOXONE HYDROCHLORIDE 0.4 MG/ML
.1-.4 INJECTION, SOLUTION INTRAMUSCULAR; INTRAVENOUS; SUBCUTANEOUS
Status: DISCONTINUED | OUTPATIENT
Start: 2018-04-05 | End: 2018-04-05 | Stop reason: HOSPADM

## 2018-04-05 RX ORDER — LIDOCAINE HYDROCHLORIDE 20 MG/ML
INJECTION, SOLUTION INFILTRATION; PERINEURAL PRN
Status: DISCONTINUED | OUTPATIENT
Start: 2018-04-05 | End: 2018-04-05

## 2018-04-05 RX ORDER — FENTANYL CITRATE 50 UG/ML
25-50 INJECTION, SOLUTION INTRAMUSCULAR; INTRAVENOUS
Status: DISCONTINUED | OUTPATIENT
Start: 2018-04-05 | End: 2018-04-05 | Stop reason: HOSPADM

## 2018-04-05 RX ORDER — ONDANSETRON 2 MG/ML
4 INJECTION INTRAMUSCULAR; INTRAVENOUS EVERY 30 MIN PRN
Status: DISCONTINUED | OUTPATIENT
Start: 2018-04-05 | End: 2018-04-05 | Stop reason: HOSPADM

## 2018-04-05 RX ORDER — CEFAZOLIN SODIUM 2 G/100ML
2 INJECTION, SOLUTION INTRAVENOUS
Status: COMPLETED | OUTPATIENT
Start: 2018-04-05 | End: 2018-04-05

## 2018-04-05 RX ORDER — ONDANSETRON 4 MG/1
4 TABLET, ORALLY DISINTEGRATING ORAL
Status: DISCONTINUED | OUTPATIENT
Start: 2018-04-05 | End: 2018-04-05 | Stop reason: HOSPADM

## 2018-04-05 RX ORDER — OXYCODONE HYDROCHLORIDE 5 MG/1
5-10 TABLET ORAL
Qty: 50 TABLET | Refills: 0 | Status: SHIPPED | OUTPATIENT
Start: 2018-04-05 | End: 2018-04-20

## 2018-04-05 RX ORDER — DEXAMETHASONE SODIUM PHOSPHATE 4 MG/ML
INJECTION, SOLUTION INTRA-ARTICULAR; INTRALESIONAL; INTRAMUSCULAR; INTRAVENOUS; SOFT TISSUE PRN
Status: DISCONTINUED | OUTPATIENT
Start: 2018-04-05 | End: 2018-04-05

## 2018-04-05 RX ADMIN — MIDAZOLAM 1 MG: 1 INJECTION INTRAMUSCULAR; INTRAVENOUS at 15:19

## 2018-04-05 RX ADMIN — FENTANYL CITRATE 25 MCG: 50 INJECTION, SOLUTION INTRAMUSCULAR; INTRAVENOUS at 17:07

## 2018-04-05 RX ADMIN — Medication 10 MG: at 15:34

## 2018-04-05 RX ADMIN — Medication 10 MG: at 15:37

## 2018-04-05 RX ADMIN — Medication 5 MG: at 15:42

## 2018-04-05 RX ADMIN — OXYCODONE HYDROCHLORIDE 5 MG: 5 TABLET ORAL at 18:10

## 2018-04-05 RX ADMIN — DEXAMETHASONE SODIUM PHOSPHATE 4 MG: 4 INJECTION, SOLUTION INTRA-ARTICULAR; INTRALESIONAL; INTRAMUSCULAR; INTRAVENOUS; SOFT TISSUE at 15:55

## 2018-04-05 RX ADMIN — SODIUM CHLORIDE 1000 ML: 900 IRRIGANT IRRIGATION at 15:44

## 2018-04-05 RX ADMIN — FENTANYL CITRATE 25 MCG: 50 INJECTION, SOLUTION INTRAMUSCULAR; INTRAVENOUS at 16:30

## 2018-04-05 RX ADMIN — SODIUM CHLORIDE, POTASSIUM CHLORIDE, SODIUM LACTATE AND CALCIUM CHLORIDE: 600; 310; 30; 20 INJECTION, SOLUTION INTRAVENOUS at 15:19

## 2018-04-05 RX ADMIN — METHYLENE BLUE 0.5 ML: 10 INJECTION INTRAVENOUS at 16:44

## 2018-04-05 RX ADMIN — PROPOFOL 30 MCG/KG/MIN: 10 INJECTION, EMULSION INTRAVENOUS at 15:26

## 2018-04-05 RX ADMIN — SODIUM CHLORIDE, POTASSIUM CHLORIDE, SODIUM LACTATE AND CALCIUM CHLORIDE: 600; 310; 30; 20 INJECTION, SOLUTION INTRAVENOUS at 16:09

## 2018-04-05 RX ADMIN — FENTANYL CITRATE 25 MCG: 50 INJECTION, SOLUTION INTRAMUSCULAR; INTRAVENOUS at 16:45

## 2018-04-05 RX ADMIN — FENTANYL CITRATE 25 MCG: 50 INJECTION INTRAMUSCULAR; INTRAVENOUS at 17:42

## 2018-04-05 RX ADMIN — FENTANYL CITRATE 50 MCG: 50 INJECTION, SOLUTION INTRAMUSCULAR; INTRAVENOUS at 15:47

## 2018-04-05 RX ADMIN — CEFAZOLIN SODIUM 2 G: 2 INJECTION, SOLUTION INTRAVENOUS at 15:27

## 2018-04-05 RX ADMIN — PROPOFOL 200 MG: 10 INJECTION, EMULSION INTRAVENOUS at 15:21

## 2018-04-05 RX ADMIN — Medication 5 MG: at 15:31

## 2018-04-05 RX ADMIN — LIDOCAINE HYDROCHLORIDE 80 MG: 20 INJECTION, SOLUTION INFILTRATION; PERINEURAL at 15:21

## 2018-04-05 RX ADMIN — FENTANYL CITRATE 50 MCG: 50 INJECTION, SOLUTION INTRAMUSCULAR; INTRAVENOUS at 15:21

## 2018-04-05 RX ADMIN — FENTANYL CITRATE 25 MCG: 50 INJECTION, SOLUTION INTRAMUSCULAR; INTRAVENOUS at 16:08

## 2018-04-05 RX ADMIN — PHENYLEPHRINE HYDROCHLORIDE 100 MCG: 10 INJECTION, SOLUTION INTRAMUSCULAR; INTRAVENOUS; SUBCUTANEOUS at 15:42

## 2018-04-05 RX ADMIN — ONDANSETRON 4 MG: 2 INJECTION INTRAMUSCULAR; INTRAVENOUS at 17:15

## 2018-04-05 ASSESSMENT — LIFESTYLE VARIABLES: TOBACCO_USE: 0

## 2018-04-05 NOTE — ANESTHESIA PREPROCEDURE EVALUATION
Anesthesia Evaluation     . Pt has had prior anesthetic.     No history of anesthetic complications          ROS/MED HX    ENT/Pulmonary:      (-) tobacco use, asthma and sleep apnea   Neurologic:       Cardiovascular: Comment: Hx of cardiac ablation for tachycardia    (+) ----. : . . VIDES, . :. .       METS/Exercise Tolerance:     Hematologic:         Musculoskeletal:         GI/Hepatic:        (-) GERD   Renal/Genitourinary:     (+) chronic renal disease,       Endo:     (+) type II DM (no onsulin) thyroid problem Obesity, .      Psychiatric:         Infectious Disease:         Malignancy:         Other: Comment: S/p gastric bypass                    Physical Exam  Normal systems: dental    Airway   Mallampati: II  TM distance: >3 FB  Neck ROM: full    Dental     Cardiovascular   Rhythm and rate: regular      Pulmonary    breath sounds clear to auscultation                    Anesthesia Plan      History & Physical Review  History and physical reviewed and following examination; no interval change.    ASA Status:  2 .        Plan for General with Intravenous induction.   PONV prophylaxis:  Ondansetron (or other 5HT-3)       Postoperative Care      Consents  Anesthetic plan, risks, benefits and alternatives discussed with:  Patient..                          .

## 2018-04-05 NOTE — IP AVS SNAPSHOT
[place] [address]    [place] [address]    Phone:  [phone]                                       After Visit Summary   4/5/2018    Gemma Cowart    MRN: [id number]           After Visit Summary Signature Page     I have received my discharge instructions, and my questions have been answered. I have discussed any challenges I see with this plan with the nurse or doctor.    ..........................................................................................................................................  Patient/Patient Representative Signature      ..........................................................................................................................................  Patient Representative Print Name and Relationship to Patient    ..................................................               ................................................  Date                                            Time    ..........................................................................................................................................  Reviewed by Signature/Title    ...................................................              ..............................................  Date                                                            Time

## 2018-04-05 NOTE — IP AVS SNAPSHOT
MRN:[id number]                      After Visit Summary   4/5/2018    Gemma Cowart    MRN: [id number]           Thank you!     Thank you for choosing [facility] for your care. Our goal is always to provide you with excellent care. Hearing back from our patients is one way we can continue to improve our services. Please take a few minutes to complete the written survey that you may receive in the mail after you visit with us. Thank you!        Patient Information     Date Of Birth          1945        About your hospital stay     You were admitted on:  April 5, 2018 You last received care in the:  [facility] PACU    You were discharged on:  April 5, 2018       Who to Call     For medical emergencies, please call 911.  For non-urgent questions about your medical care, please call your primary care provider or clinic, [phone]  For questions related to your surgery, please call your surgery clinic        Attending Provider     Provider Orlando Aguilera MD Plastic Surgery       Primary Care Provider Office Phone # Fax #    Alva Viera [phone]      After Care Instructions     Diet Instructions       Resume pre-procedure diet            Discharge Instructions       Patient to follow up with appointment in 1 week.            Dressing       Light activity.  No lifting over 10lbs. Wear abdominal binder day and night.  Ok to shower in 48 hours.  Leave sterri strips on.  Record output from drains.  Do not restart Lovenox for at least 48 hours.  Start Coumadin in 48 hours.            Follow up       Follow up with Dr. Wall in approximately 1 week.  You may request an appointment on the portal or you may call the office for an appointment - [phone].            Ice to affected area       Ice to operative site PRN            No lifting       No lifting over 10 lbs and no strenuous physical activity for 4 weeks            Shower       No shower  for 48 hours post procedure. May shower Postoperative Day (POD)  2                  Your next 10 appointments already scheduled     Apr 09, 2018 11:00 AM CDT   Anticoagulation Visit with [facility]   [facility] [place] ([facility] [place])    [address]  [address]  [place] [address]   [phone]            Oct 10, 2018  8:30 AM CDT   Return Visit with  Oncology Nurse   [facility]    [facility]  [address]  [place] [address]   [phone]            Oct 17, 2018  8:30 AM CDT   Return Visit with Jyoti Narvaez MD   [facility]    [facility]  [address]  [place] [address]   [phone]              Further instructions from your care team       Same Day Surgery Discharge Instructions for  Sedation and General Anesthesia       It's not unusual to feel dizzy, light-headed or faint for up to 24 hours after surgery or while taking pain medication.  If you have these symptoms: sit for a few minutes before standing and have someone assist you when you get up to walk or use the bathroom.      You should rest and relax for the next 24 hours. We recommend you make arrangements to have an adult stay with you for at least 24 hours after your discharge.  Avoid hazardous and strenuous activity.      DO NOT DRIVE any vehicle or operate mechanical equipment for 24 hours following the end of your surgery.  Even though you may feel normal, your reactions may be affected by the medication you have received.      Do not drink alcoholic beverages for 24 hours following surgery.       Slowly progress to your regular diet as you feel able. It's not unusual to feel nauseated and/or vomit after receiving anesthesia.  If you develop these symptoms, drink clear liquids (apple juice, ginger ale, broth, 7-up, etc. ) until you feel better.  If  your nausea and vomiting persists for 24 hours, please notify your surgeon.        All narcotic pain medications, along with inactivity and anesthesia, can cause constipation. Drinking plenty of liquids and increasing fiber intake will help.      For any questions of a medical nature, call your surgeon.      Do not make important decisions for 24 hours.      If you had general anesthesia, you may have a sore throat for a couple of days related to the breathing tube used during surgery.  You may use Cepacol lozenges to help with this discomfort.  If it worsens or if you develop a fever, contact your surgeon.       If you feel your pain is not well managed with the pain medications prescribed by your surgeon, please contact your surgeon's office to let them know so they can address your concerns.       Reasons to contact your surgeon:    1. Signs of possible infection: Check your incision daily for redness, swelling, warmth, red streaks or foul drainage.   2. Elevated temperature.  3. Pain not controlled with pain medication and/or rest.   4. Uncontrolled nausea or vomiting.  5. Any questions or concerns.          Spencer Cortes Drain  Home Care Instructions    What is a Spencer Cortes (SHERLYN) Drain?  This is a small tube that connects to a bulb.  Its gentle suction removes extra fluid from a surgical wound.  Your doctor will remove the tube when the amount of fluid decreases.  The color and amount of fluid varies.  Right after surgery the fluid is bright red.  Over time, it changes to light pink and may become clear or the color of straw.    How should I care for my tube site?    Keep the skin around the tube dry.  Check with your doctor about how to shower.  You may need to cover the site with plastic when you shower.  Or, it may be okay to let the site get wet and put on a clean bandage after you shower.      If the bandage gets wet, you will need to change it.  How should I care for the bulb?    Keep the bulb  compressed at all times except while you empty it.     Attach the bulb to your clothing with tape and a safety pin.    Try to empty the bulb at the same time every day.  Empty the bulb at least once a day, or when the bulb becomes half full.  If it becomes too full, there will not be enough suction.    To empty the bulb:    Wash your hands.    Open the bulb cap.    Drain the fluid from the bulb into the measuring cup.  If you have two drains, use two cups.      Clean the mouth of the bulb with an alcohol wipe if your nurse told you to.    Squeeze the bulb (fold it in half before you close the bulb cap) If it does not stay compressed, call your nurse or clinic.    Write the amount of drainage on the drainage record (see back page).  If you have two drains, write the amount for each bulb.    Flush the drainage down the toilet.  Rinse the measuring cup.    Wash your hands.    When should I call my doctor?   Call your doctor if:    You have a fever over 101 F (38.3 C), taken under the tongue.     The drainage increases or smells bad.    The skin around your tube has increased redness, swelling, warmth or pain.    You have pus or fluid leaking at the tube site.    Your stitches break.    You think the tube is not draining.    The tube falls out.    You have any problems or concerns.    Your drainage record:    Empty your bulb at least once per day or when 1/2 to 1/3 full.  Write down the date, time and amount of drainage in each bulb.   Bring this record to each clinic visit.    Date Time Bulb 1: amount of  Drainage in (ml or cc) Bulb 2: amount of drainage (in ml or cc) Notes                                                            Pending Results     No orders found from 4/3/2018 to 4/6/2018.            Admission Information     Date & Time Provider Department Dept. Phone    4/5/2018 [facility] [phone]      Your Vitals Were     Blood Pressure Temperature Respirations Weight  "Last Period Pulse Oximetry    131/69 98.2  F (36.8  C) 14 84.8 kg (187 lb) 1990 95%    BMI (Body Mass Index)                   32.86 kg/m2           [facility] Information     [facility] lets you send messages to your doctor, view your test results, renew your prescriptions, schedule appointments and more. To sign up, go to [email or web address] . Click on \"Log in\" on the left side of the screen, which will take you to the Welcome page. Then click on \"Sign up Now\" on the right side of the page.     You will be asked to enter the access code listed below, as well as some personal information. Please follow the directions to create your username and password.     Your access code is: [email or web address]  Expires: 2018 11:24 AM     Your access code will  in 90 days. If you need help or a new code, please call your [facility] clinic or [phone].        Care EveryWhere ID     This is your Care EveryWhere ID. This could be used by other organizations to access your [facility] medical records  [id number]        Equal Access to Services     [facility] : Hadii kavitha Ortiz, wahudson joseph, qaybta kaalmada sarah, bg hines . So [facility] [phone].    ATENCIÓN: Si habla español, tiene a marcum disposición servicios gratuitos de asistencia lingüística. Llame al [phone].    We comply with applicable federal civil rights laws and [place] laws. We do not discriminate on the basis of race, color, national origin, age, disability, sex, sexual orientation, or gender identity.               Review of your medicines      START taking        Dose / Directions    cephALEXin 500 MG capsule   Commonly known as:  KEFLEX   Used for:  Panniculus        Dose:  500 mg   Take 1 capsule (500 mg) by mouth 4 times daily for 5 days   Quantity:  20 capsule   Refills:  0       oxyCODONE IR 5 MG tablet   Commonly known as:  ROXICODONE   Used for:  Panniculus   Notes to Patient:  Had one tab at " 6:10 PM        Dose:  5-10 mg   Take 1-2 tablets (5-10 mg) by mouth every 3 hours as needed for pain or other (Moderate to Severe)   Quantity:  50 tablet   Refills:  0         CONTINUE these medicines which have NOT CHANGED        Dose / Directions    ACE/ARB/ARNI NOT PRESCRIBED (INTENTIONAL)   Used for:  Type 2 diabetes mellitus with other specified complication (H)        ACE & ARB not prescribed due to Other: normal tensive, low microalbumuria   Refills:  0       B-12 1000 MCG Tbcr   Used for:  Vitamin B12 deficiency        Dose:  1000 mcg   Take 1,000 mcg by mouth daily   Quantity:  90 tablet   Refills:  1       blood glucose lancets standard   Commonly known as:  no brand specified   Used for:  Type 2 diabetes mellitus with other specified complication (H)        Use to test blood sugars 2 times daily or as directed. Please dispense lancets for Relion Confirm.   Quantity:  2 Box   Refills:  3       blood glucose monitoring test strip   Commonly known as:  no brand specified   Used for:  Type 2 diabetes mellitus with other specified complication (H)        Use to test blood sugars 2 times daily or as directed. Please dispense Relion Confirm test strips   Quantity:  200 each   Refills:  3       * clobetasol 0.05 % cream   Commonly known as:  TEMOVATE   Used for:  Psoriasis        Apply sparingly to affected area twice daily as needed.  Do not apply to face.   Quantity:  270 g   Refills:  3       * clobetasol 0.05 % external solution   Commonly known as:  TEMOVATE   Used for:  Psoriasis        Apply sparingly to affected area twice daily for 14 days.  Do not apply to face.   Quantity:  50 mL   Refills:  11       nystatin 559228 UNIT/GM Powd   Commonly known as:  MYCOSTATIN   Used for:  Yeast infection of the skin        Apply topically 3 times daily as needed   Quantity:  60 g   Refills:  1       PARoxetine 30 MG tablet   Commonly known as:  PAXIL   Used for:  Anxiety        Dose:  60 mg   Take 2 tablets (60 mg)  by mouth At Bedtime   Quantity:  180 tablet   Refills:  3       STATIN NOT PRESCRIBED (INTENTIONAL)        Please choose reason not prescribed, below   Refills:  0       TYLENOL 500 MG tablet   Generic drug:  acetaminophen        Dose:  1000 mg   Take 2 tablets (1,000 mg) by mouth every 8 hours as needed for mild pain   Quantity:  100 tablet   Refills:  0       vitamin D 2000 UNITS Caps        Dose:  1 capsule   Take 1 capsule by mouth daily.   Refills:  0       * Notice:  This list has 2 medication(s) that are the same as other medications prescribed for you. Read the directions carefully, and ask your doctor or other care provider to review them with you.      STOP taking     enoxaparin 100 MG/ML injection   Commonly known as:  LOVENOX           warfarin 5 MG tablet   Commonly known as:  COUMADIN                Where to get your medicines      These medications were sent to [facility] - [address]  [address], [place] [address]     Phone:  [phone]     cephALEXin 500 MG capsule         Some of these will need a paper prescription and others can be bought over the counter. Ask your nurse if you have questions.     Bring a paper prescription for each of these medications     oxyCODONE IR 5 MG tablet                Protect others around you: Learn how to safely use, store and throw away your medicines at www.disposemymeds.org.        ANTIBIOTIC INSTRUCTION     You've Been Prescribed an Antibiotic - Now What?  Your healthcare team thinks that you or your loved one might have an infection. Some infections can be treated with antibiotics, which are powerful, life-saving drugs. Like all medications, antibiotics have side effects and should only be used when necessary. There are some important things you should know about your antibiotic treatment.      Your healthcare team may run tests before you start taking an antibiotic.    Your team may take samples (e.g., from  your blood, urine or other areas) to run tests to look for bacteria. These test can be important to determine if you need an antibiotic at all and, if you do, which antibiotic will work best.      Within a few days, your healthcare team might change or even stop your antibiotic.    Your team may start you on an antibiotic while they are working to find out what is making you sick.    Your team might change your antibiotic because test results show that a different antibiotic would be better to treat your infection.    In some cases, once your team has more information, they learn that you do not need an antibiotic at all. They may find out that you don't have an infection, or that the antibiotic you're taking won't work against your infection. For example, an infection caused by a virus can't be treated with antibiotics. Staying on an antibiotic when you don't need it is more likely to be harmful than helpful.      You may experience side effects from your antibiotic.    Like all medications, antibiotics have side effects. Some of these can be serious.    Let you healthcare team know if you have any known allergies when you are admitted to the hospital.    One significant side effect of nearly all antibiotics is the risk of severe and sometimes deadly diarrhea caused by Clostridium difficile (C. Difficile). This occurs when a person takes antibiotics because some good germs are destroyed. Antibiotic use allows C. diificile to take over, putting patients at high risk for this serious infection.    As a patient or caregiver, it is important to understand your or your loved one's antibiotic treatment. It is especially important for caregivers to speak up when patients can't speak for themselves. Here are some important questions to ask your healthcare team.    What infection is this antibiotic treating and how do you know I have that infection?    What side effects might occur from this antibiotic?    How long will I  need to take this antibiotic?    Is it safe to take this antibiotic with other medications or supplements (e.g., vitamins) that I am taking?     Are there any special directions I need to know about taking this antibiotic? For example, should I take it with food?    How will I be monitored to know whether my infection is responding to the antibiotic?    What tests may help to make sure the right antibiotic is prescribed for me?      Information provided by:  www.cdc.gov/getsmart  U.S. Department of Health and Human Services  Centers for disease Control and Prevention  National Center for Emerging and Zoonotic Infectious Diseases  Division of Healthcare Quality Promotion        Information about OPIOIDS     PRESCRIPTION OPIOIDS: WHAT YOU NEED TO KNOW    Prescription opioids can be used to help relieve moderate to severe pain and are often prescribed following a surgery or injury, or for certain health conditions. These medications can be an important part of treatment but also come with serious risks. It is important to work with your health care provider to make sure you are getting the safest, most effective care.    WHAT ARE THE RISKS AND SIDE EFFECTS OF OPIOID USE?  Prescription opioids carry serious risks of addiction and overdose, especially with prolonged use. An opioid overdose, often marked by slowed breathing can cause sudden death. The use of prescription opioids can have a number of side effects as well, even when taken as directed:      Tolerance - meaning you might need to take more of a medication for the same pain relief    Physical dependence - meaning you have symptoms of withdrawal when a medication is stopped    Increased sensitivity to pain    Constipation    Nausea, vomiting, and dry mouth    Sleepiness and dizziness    Confusion    Depression    Low levels of testosterone that can result in lower sex drive, energy, and strength    Itching and sweating    RISKS ARE GREATER WITH:    History of  drug misuse, substance use disorder, or overdose    Mental health conditions (such as depression or anxiety)    Sleep apnea    Older age (65 years or older)    Pregnancy    Avoid alcohol while taking prescription opioids.   Also, unless specifically advised by your health care provider, medications to avoid include:    Benzodiazepines (such as Xanax or Valium)    Muscle relaxants (such as Soma or Flexeril)    Hypnotics (such as Ambien or Lunesta)    Other prescription opioids    KNOW YOUR OPTIONS:  Talk to your health care provider about ways to manage your pain that do not involve prescription opioids. Some of these options may actually work better and have fewer risks and side effects:    Pain relievers such as acetaminophen, ibuprofen, and naproxen    Some medications that are also used for depression or seizures    Physical therapy and exercise    Cognitive behavioral therapy, a psychological, goal-directed approach, in which patients learn how to modify physical, behavioral, and emotional triggers of pain and stress    IF YOU ARE PRESCRIBED OPIOIDS FOR PAIN:    Never take opioids in greater amounts or more often than prescribed    Follow up with your primary health care provider and work together to create a plan on how to manage your pain.    Talk about ways to help manage your pain that do not involve prescription opioids    Talk about all concerns and side effects    Help prevent misuse and abuse    Never sell or share prescription opioids    Never use another person's prescription opioids    Store prescription opioids in a secure place and out of reach of others (this may include visitors, children, friends, and family)    Visit www.cdc.gov/drugoverdose to learn about risks of opioid abuse and overdose    If you believe you may be struggling with addiction, tell your health care provider and ask for guidance or call [facility]'s National Helpline at 1-448-459-HELP    LEARN MORE /  www.cdc.gov/drugoverdose/prescribing/guideline.html    Safely dispose of unused prescription opioids: Find your local drug take-back programs and more information about the importance of safe disposal at www.doseofreality.[place].gov             Medication List: This is a list of all your medications and when to take them. Check marks below indicate your daily home schedule. Keep this list as a reference.      Medications           Morning Afternoon Evening Bedtime As Needed    ACE/ARB/ARNI NOT PRESCRIBED (INTENTIONAL)   ACE & ARB not prescribed due to Other: normal tensive, low microalbumuria                                B-12 1000 MCG Tbcr   Take 1,000 mcg by mouth daily                                blood glucose lancets standard   Commonly known as:  no brand specified   Use to test blood sugars 2 times daily or as directed. Please dispense lancets for Relion Confirm.                                blood glucose monitoring test strip   Commonly known as:  no brand specified   Use to test blood sugars 2 times daily or as directed. Please dispense Relion Confirm test strips                                cephALEXin 500 MG capsule   Commonly known as:  KEFLEX   Take 1 capsule (500 mg) by mouth 4 times daily for 5 days                                * clobetasol 0.05 % cream   Commonly known as:  TEMOVATE   Apply sparingly to affected area twice daily as needed.  Do not apply to face.                                * clobetasol 0.05 % external solution   Commonly known as:  TEMOVATE   Apply sparingly to affected area twice daily for 14 days.  Do not apply to face.                                nystatin [id number] UNIT/GM Powd   Commonly known as:  MYCOSTATIN   Apply topically 3 times daily as needed                                oxyCODONE IR 5 MG tablet   Commonly known as:  ROXICODONE   Take 1-2 tablets (5-10 mg) by mouth every 3 hours as needed for pain or other (Moderate to Severe)   Last time this was given:  5 mg on  4/5/2018  6:10 PM   Notes to Patient:  Had one tab at 6:10 PM                                PARoxetine 30 MG tablet   Commonly known as:  PAXIL   Take 2 tablets (60 mg) by mouth At Bedtime                                STATIN NOT PRESCRIBED (INTENTIONAL)   Please choose reason not prescribed, below                                TYLENOL 500 MG tablet   Take 2 tablets (1,000 mg) by mouth every 8 hours as needed for mild pain   Generic drug:  acetaminophen                                vitamin D 2000 UNITS Caps   Take 1 capsule by mouth daily.                                * Notice:  This list has 2 medication(s) that are the same as other medications prescribed for you. Read the directions carefully, and ask your doctor or other care provider to review them with you.

## 2018-04-05 NOTE — PROGRESS NOTES
Patient discharged home with daughter.  Instructions and medications reviewed including care of SHERLYN drains.  All belongings returned.

## 2018-04-05 NOTE — ANESTHESIA POSTPROCEDURE EVALUATION
Patient: Gemma Cowart    Procedure(s):  PANNICULECTOMY  - Wound Class: I-Clean    Diagnosis:PINNICULOUS   Diagnosis Additional Information: No value filed.    Anesthesia Type:  General    Note:  Anesthesia Post Evaluation    Patient location during evaluation: PACU  Patient participation: Able to fully participate in evaluation  Level of consciousness: awake  Pain management: adequate  Airway patency: patent  Cardiovascular status: acceptable  Respiratory status: acceptable  Hydration status: acceptable  PONV: none     Anesthetic complications: None          Last vitals:  Vitals:    04/05/18 1322   BP: 122/73   Resp: 16   Temp: 35.7  C (96.2  F)   SpO2: 97%         Electronically Signed By: Dee Dee Brown  April 5, 2018  5:17 PM

## 2018-04-05 NOTE — ANESTHESIA CARE TRANSFER NOTE
Patient: Gemma Cowart    Procedure(s):  PANNICULECTOMY  - Wound Class: I-Clean    Diagnosis: PINNICULOUS   Diagnosis Additional Information: No value filed.    Anesthesia Type:   General     Note:  Airway :Face Mask  Patient transferred to:PACU  Comments: Spontaneous respirations, tidal volume > 400, oxygen saturation > 97%, follows commands.  Suctioned and LMA removed.  Exchanging well.Transferred to PACU, spontaneous respirations, 10L oxygen via facemask.  All monitors and alarms on and functioning, VSS.  Patient awake, comfortable.  Report to PACU RN.Handoff Report: Identifed the Patient, Identified the Reponsible Provider, Reviewed the pertinent medical history, Discussed the surgical course, Reviewed Intra-OP anesthesia mangement and issues during anesthesia, Set expectations for post-procedure period and Allowed opportunity for questions and acknowledgement of understanding      Vitals: (Last set prior to Anesthesia Care Transfer)    CRNA VITALS  4/5/2018 1648 - 4/5/2018 1724      4/5/2018             Pulse: 86    SpO2: 98 %    Resp Rate (observed): (!)  2                Electronically Signed By: ANTON Jewell CRNA  April 5, 2018  5:24 PM

## 2018-04-05 NOTE — ANESTHESIA POSTPROCEDURE EVALUATION
Patient: Gemma Cowart    Procedure(s):  PANNICULECTOMY  - Wound Class: I-Clean    Diagnosis:PINNICULOUS   Diagnosis Additional Information: No value filed.    Anesthesia Type:  General    Note:  Anesthesia Post Evaluation    Patient location during evaluation: PACU  Patient participation: Able to fully participate in evaluation  Level of consciousness: awake  Pain management: adequate  Airway patency: patent  Cardiovascular status: acceptable  Respiratory status: acceptable  Hydration status: acceptable  PONV: none     Anesthetic complications: None          Last vitals:  Vitals:    04/05/18 1742 04/05/18 1745 04/05/18 1800   BP:  133/70 131/69   Resp: 16 14 14   Temp:  36.8  C (98.2  F) 36.8  C (98.2  F)   SpO2: 95% 94% 95%         Electronically Signed By: Dee Dee Brown  April 5, 2018  6:07 PM

## 2018-04-05 NOTE — DISCHARGE INSTRUCTIONS
Same Day Surgery Discharge Instructions for  Sedation and General Anesthesia       It's not unusual to feel dizzy, light-headed or faint for up to 24 hours after surgery or while taking pain medication.  If you have these symptoms: sit for a few minutes before standing and have someone assist you when you get up to walk or use the bathroom.      You should rest and relax for the next 24 hours. We recommend you make arrangements to have an adult stay with you for at least 24 hours after your discharge.  Avoid hazardous and strenuous activity.      DO NOT DRIVE any vehicle or operate mechanical equipment for 24 hours following the end of your surgery.  Even though you may feel normal, your reactions may be affected by the medication you have received.      Do not drink alcoholic beverages for 24 hours following surgery.       Slowly progress to your regular diet as you feel able. It's not unusual to feel nauseated and/or vomit after receiving anesthesia.  If you develop these symptoms, drink clear liquids (apple juice, ginger ale, broth, 7-up, etc. ) until you feel better.  If your nausea and vomiting persists for 24 hours, please notify your surgeon.        All narcotic pain medications, along with inactivity and anesthesia, can cause constipation. Drinking plenty of liquids and increasing fiber intake will help.      For any questions of a medical nature, call your surgeon.      Do not make important decisions for 24 hours.      If you had general anesthesia, you may have a sore throat for a couple of days related to the breathing tube used during surgery.  You may use Cepacol lozenges to help with this discomfort.  If it worsens or if you develop a fever, contact your surgeon.       If you feel your pain is not well managed with the pain medications prescribed by your surgeon, please contact your surgeon's office to let them know so they can address your concerns.       Reasons to contact your surgeon:    1. Signs  of possible infection: Check your incision daily for redness, swelling, warmth, red streaks or foul drainage.   2. Elevated temperature.  3. Pain not controlled with pain medication and/or rest.   4. Uncontrolled nausea or vomiting.  5. Any questions or concerns.          Spencer Ocrtes Drain  Home Care Instructions    What is a Spencer Cortes (SHERLYN) Drain?  This is a small tube that connects to a bulb.  Its gentle suction removes extra fluid from a surgical wound.  Your doctor will remove the tube when the amount of fluid decreases.  The color and amount of fluid varies.  Right after surgery the fluid is bright red.  Over time, it changes to light pink and may become clear or the color of straw.    How should I care for my tube site?    Keep the skin around the tube dry.  Check with your doctor about how to shower.  You may need to cover the site with plastic when you shower.  Or, it may be okay to let the site get wet and put on a clean bandage after you shower.      If the bandage gets wet, you will need to change it.  How should I care for the bulb?    Keep the bulb compressed at all times except while you empty it.     Attach the bulb to your clothing with tape and a safety pin.    Try to empty the bulb at the same time every day.  Empty the bulb at least once a day, or when the bulb becomes half full.  If it becomes too full, there will not be enough suction.    To empty the bulb:    Wash your hands.    Open the bulb cap.    Drain the fluid from the bulb into the measuring cup.  If you have two drains, use two cups.      Clean the mouth of the bulb with an alcohol wipe if your nurse told you to.    Squeeze the bulb (fold it in half before you close the bulb cap) If it does not stay compressed, call your nurse or clinic.    Write the amount of drainage on the drainage record (see back page).  If you have two drains, write the amount for each bulb.    Flush the drainage down the toilet.  Rinse the measuring  cup.    Wash your hands.    When should I call my doctor?   Call your doctor if:    You have a fever over 101 F (38.3 C), taken under the tongue.     The drainage increases or smells bad.    The skin around your tube has increased redness, swelling, warmth or pain.    You have pus or fluid leaking at the tube site.    Your stitches break.    You think the tube is not draining.    The tube falls out.    You have any problems or concerns.    Your drainage record:    Empty your bulb at least once per day or when 1/2 to 1/3 full.  Write down the date, time and amount of drainage in each bulb.   Bring this record to each clinic visit.    Date Time Bulb 1: amount of  Drainage in (ml or cc) Bulb 2: amount of drainage (in ml or cc) Notes

## 2018-04-05 NOTE — OP NOTE
Date of Service: 04/05/18    PREOPERATIVE DIAGNOSES:   Symptomatic panniculus    POSTOPERATIVE DIAGNOSES:   same    PROCEDURES:   Panniculectomy    SURGEON: Orlando Wall MD     ANESTHESIA: General endotracheal anesthesia    COMPLICATIONS: None.     ESTIMATED BLOOD LOSS: 50 cc    DISPOSITION: To the Post Anesthesia Care Unit in stable condition.    TUBES AND DRAINS: Adria ×2    COUNTS: Correct     SPECIMENS: Discarded.  8.1 pounds of tissue was removed.      INDICATIONS:    This is a 72-year-old female who lost a substantial amount weight.  She now has excess skin and symptoms of intertriginous rashes including her umbilicus.  She has requested removal of the excess tissue along with her umbilicus.  We discussed details, risks, benefits, and alternatives.  She understands limitations and risks including bleeding, hematoma, seroma, dehiscence, scarring, asymmetry, DVT or PE.  She has a history of DVTs.  She was approved for surgery by her primary care physician.  She was bridged with Lovenox but stopped more than 24 hours ago.  We will resume her Lovenox in 48 hours as well as her Coumadin.      DESCRIPTION OF PROCEDURE:    She was marked preoperatively in the upright position.   The upper incision was marked above the umbilicus in a horizontal plane. She was taken to the operating room and placed supine. Pressure points were padded and sequential compression devices were placed. She was given IV anesthesia and IV antibiotic by the anesthesia staff.   General anesthesia was used. She was prepped in the standard fashion and a timeout was performed to ensure the correct orientation and procedure.      I made my upper incision and continued directly down towards the abdominal wall with electrocautery.  Vessels were controlled with ligaclips and 3-0 Vicryl ties.  I then elevated the expected amount of tissue to be removed inferiorly.  I marked the umbilical depth with methylene blue.  I then ensured complete  removal of the umbilicus.  There is no fascial defect here.  I then marked the excess skin and excised it.  This weighed 8.1 pounds.  It was discarded.  Hemostasis was ensured and I irrigated copiously with normal saline.  I placed 15-Danish round channel drains from the lateral aspects and secured them with 2-0 silk sutures.  The skin was closed with interrupted 0 PDS in Gene's fascia.  The deep dermis was closed with interrupted, inverted 3-0 Monocryl and Insorb staples.  The final layer was closed with a running 4-0 Monocryl subcuticular stitch.  Mastisol and Steri-Strips were applied to the incision.  Sterile ABDs and drain sponges were applied.  She was wrapped with an abdominal binder comfortably.  She was awoken from anesthesia and taken to the PACU in stable condition.

## 2018-04-06 ENCOUNTER — PATIENT OUTREACH (OUTPATIENT)
Dept: GERIATRIC MEDICINE | Facility: CLINIC | Age: 73
End: 2018-04-06

## 2018-04-06 NOTE — PROGRESS NOTES
TM left for client after her surgery yesterday. TM from client stating that she had surgery yesterday to remove extra skin on her stomach and she is doing very well. She stated that her phone isn't working. It isn't ringing. No other needs noted by client.  Nighat Carbajal, RN, BSN, PHN  [facility]   [phone]

## 2018-04-11 ENCOUNTER — TELEPHONE (OUTPATIENT)
Dept: NURSING | Facility: CLINIC | Age: 73
End: 2018-04-11

## 2018-04-11 NOTE — TELEPHONE ENCOUNTER
Patient called asking to speak to INR nurse, advised she is not in the office today.    Would like a call back to be notified when she is due for her next visit with the anticoagulation clinic.

## 2018-04-12 NOTE — PROGRESS NOTES
Per [facility], hearing aid batteries were delivered on 04/05/2018.     Miracle Keating  Case Management Specialist  [facility]  [phone]

## 2018-04-12 NOTE — TELEPHONE ENCOUNTER
Spoke to patient by phone.  She is scheduled for INR appointment on Monday 4/16/18.    Rae Cooley RN

## 2018-04-16 ENCOUNTER — ANTICOAGULATION THERAPY VISIT (OUTPATIENT)
Dept: NURSING | Facility: CLINIC | Age: 73
End: 2018-04-16
Payer: COMMERCIAL

## 2018-04-16 DIAGNOSIS — Z79.01 LONG-TERM (CURRENT) USE OF ANTICOAGULANTS: ICD-10-CM

## 2018-04-16 DIAGNOSIS — Z86.718 PERSONAL HISTORY OF DVT (DEEP VEIN THROMBOSIS): ICD-10-CM

## 2018-04-16 LAB — INR POINT OF CARE: 1.8 (ref 0.86–1.14)

## 2018-04-16 PROCEDURE — 36416 COLLJ CAPILLARY BLOOD SPEC: CPT

## 2018-04-16 PROCEDURE — 99207 ZZC NO CHARGE NURSE ONLY: CPT

## 2018-04-16 PROCEDURE — 85610 PROTHROMBIN TIME: CPT | Mod: QW

## 2018-04-16 NOTE — PROGRESS NOTES
ANTICOAGULATION FOLLOW-UP CLINIC VISIT    Patient Name:  Gemma Cowart  Date:  4/16/2018  Contact Type:  Face to Face  Pt had panniculectomy on 04/05, was on lovenox, restarted coumadin on 04/08, has been taking 10 mg on Mon & 7.5 mg all other days. Denies any concerns. Not on any abx or pain meds. Advised to increase coumadin to 10 mg on Tues & recheck INR on Fri.     SUBJECTIVE:     Patient Findings     Positives No Problem Findings    Comments Denies bleeding/bruising or missed dose.             OBJECTIVE    INR Protime   Date Value Ref Range Status   04/16/2018 1.8 (A) 0.86 - 1.14 Final       ASSESSMENT / PLAN  INR assessment SUB    Recheck INR In: 4 DAYS    INR Location Clinic      Anticoagulation Summary as of 4/16/2018     INR goal 2.0-3.0   Today's INR 1.8!   Maintenance plan 10 mg (5 mg x 2) on Mon; 7.5 mg (5 mg x 1.5) all other days   Full instructions 4/17: 10 mg; Otherwise 10 mg on Mon; 7.5 mg all other days   Weekly total 55 mg   Plan last modified Rae Cooley RN (10/30/2017)   Next INR check 4/20/2018   Target end date Indefinite    Indications   Personal history of DVT (deep vein thrombosis) [Z86.718]  Long-term (current) use of anticoagulants [Z79.01] [Z79.01]         Anticoagulation Episode Summary     INR check location [facility]    Preferred lab     Send INR reminders to  ANTICO CL[facility]IC    Comments 5mg tabs - HS dose // CALENDAR // gastric bypass 2000 // DVT 11/16/12 warfarin until Apr 2013 // DVT 9/12/15      Anticoagulation Care Providers     Provider Role Specialty Phone number    Alva Viera MD  Internal Medicine [phone]            See the Encounter Report to view Anticoagulation Flowsheet and Dosing Calendar (Go to Encounters tab in chart review, and find the Anticoagulation Therapy Visit)    Debra Murphy RN

## 2018-04-16 NOTE — MR AVS SNAPSHOT
Gemma EDMUNDO Cowart   4/16/2018 8:30 AM   Anticoagulation Therapy Visit    Description:  72 year old female   Provider:  [facility]   Department:   Nurse           INR as of 4/16/2018     Today's INR 1.8!      Anticoagulation Summary as of 4/16/2018     INR goal 2.0-3.0   Today's INR 1.8!   Full instructions 4/17: 10 mg; Otherwise 10 mg on Mon; 7.5 mg all other days   Next INR check 4/20/2018    Indications   Personal history of DVT (deep vein thrombosis) [Z86.718]  Long-term (current) use of anticoagulants [Z79.01] [Z79.01]         Your next Anticoagulation Clinic appointment(s)     Apr 20, 2018  8:00 AM CDT   Anticoagulation Visit with  ANTICOAGULATION CLINIC   [facility] ([facility])    [address] [place] [address]  [address]  [facility] [id number] [phone]              Contact Numbers     [facility]  Please call  [phone] to cancel and/or reschedule your appointment   Please call  [phone] with any problems or questions regarding your therapy.        April 2018 Details    Sun Mon Tue Wed Thu Fri Sat     1               2               3               4               5               6               7                 8               9               10               11               12               13               14                 15               16      10 mg   See details      17      10 mg         18      7.5 mg         19      7.5 mg         20            21                 22               23               24               25               26               27               28                 29               30                     Date Details   04/16 This INR check       Date of next INR:  4/20/2018         How to take your warfarin dose     To take:  7.5 mg Take 1.5 of the 5 mg tablets.    To take:  10 mg Take 2 of the 5 mg tablets.

## 2018-04-20 ENCOUNTER — ANTICOAGULATION THERAPY VISIT (OUTPATIENT)
Dept: NURSING | Facility: CLINIC | Age: 73
End: 2018-04-20
Payer: COMMERCIAL

## 2018-04-20 DIAGNOSIS — Z79.01 LONG-TERM (CURRENT) USE OF ANTICOAGULANTS: ICD-10-CM

## 2018-04-20 DIAGNOSIS — Z86.718 PERSONAL HISTORY OF DVT (DEEP VEIN THROMBOSIS): ICD-10-CM

## 2018-04-20 LAB — INR POINT OF CARE: 2.8 (ref 0.86–1.14)

## 2018-04-20 PROCEDURE — 85610 PROTHROMBIN TIME: CPT | Mod: QW

## 2018-04-20 PROCEDURE — 99207 ZZC NO CHARGE NURSE ONLY: CPT

## 2018-04-20 PROCEDURE — 36416 COLLJ CAPILLARY BLOOD SPEC: CPT

## 2018-04-20 NOTE — PROGRESS NOTES
ANTICOAGULATION FOLLOW-UP CLINIC VISIT    Patient Name:  Gemma Cowart  Date:  4/20/2018  Contact Type:  Face to Face    SUBJECTIVE:     Patient Findings     Positives No Problem Findings           OBJECTIVE    INR Protime   Date Value Ref Range Status   04/20/2018 2.8 (A) 0.86 - 1.14 Final       ASSESSMENT / PLAN  INR assessment THER    Recheck INR In: 4 WEEKS    INR Location Clinic      Anticoagulation Summary as of 4/20/2018     INR goal 2.0-3.0   Today's INR 2.8   Maintenance plan 10 mg (5 mg x 2) on Mon; 7.5 mg (5 mg x 1.5) all other days   Full instructions 10 mg on Mon; 7.5 mg all other days   Weekly total 55 mg   No change documented Rae Cooley RN   Plan last modified Rae Cooley RN (10/30/2017)   Next INR check 5/14/2018   Target end date Indefinite    Indications   Personal history of DVT (deep vein thrombosis) [Z86.718]  Long-term (current) use of anticoagulants [Z79.01] [Z79.01]         Anticoagulation Episode Summary     INR check location [facility]    Preferred lab     Send INR reminders to  ANTICOAG CLINIC    Comments 5mg tabs - HS dose // CALENDAR // gastric bypass 2000 // DVT 11/16/12 warfarin until Apr 2013 // DVT 9/12/15      Anticoagulation Care Providers     Provider Role Specialty Phone number    Alva Viera MD  Internal Medicine [phone]            See the Encounter Report to view Anticoagulation Flowsheet and Dosing Calendar (Go to Encounters tab in chart review, and find the Anticoagulation Therapy Visit)        Rae Cooley RN

## 2018-04-20 NOTE — MR AVS SNAPSHOT
Gemma WYATT Sofya   4/20/2018 8:00 AM   Anticoagulation Therapy Visit    Description:  72 year old female   Provider:  [facility]   Department:  [facility]           INR as of 4/20/2018     Today's INR 2.8      Anticoagulation Summary as of 4/20/2018     INR goal 2.0-3.0   Today's INR 2.8   Full instructions 10 mg on Mon; 7.5 mg all other days   Next INR check 5/14/2018    Indications   Personal history of DVT (deep vein thrombosis) [Z86.718]  Long-term (current) use of anticoagulants [Z79.01] [Z79.01]         Your next Anticoagulation Clinic appointment(s)     May 14, 2018  8:00 AM CDT   Anticoagulation Visit with  ANTICOAGULATION CLINIC   [facility] ([facility])    [address] [place] [address]  [address]  [facility] [address] [phone]              Contact Numbers     [facility]  Please call  [phone] to cancel and/or reschedule your appointment   Please call  [phone] with any problems or questions regarding your therapy.        April 2018 Details    Sun Mon Tue Wed Thu Fri Sat     1               2               3               4               5               6               7                 8               9               10               11               12               13               14                 15               16               17               18               19               20      7.5 mg   See details      21      7.5 mg           22      7.5 mg         23      10 mg         24      7.5 mg         25      7.5 mg         26      7.5 mg         27      7.5 mg         28      7.5 mg           29      7.5 mg         30      10 mg               Date Details   04/20 This INR check               How to take your warfarin dose     To take:  7.5 mg Take 1.5 of the 5 mg tablets.    To take:  10 mg Take 2 of the 5 mg tablets.           May 2018 Details    Sun Mon Tue Wed Thu Fri Sat       1      7.5 mg         2      7.5 mg         3      7.5 mg          4      7.5 mg         5      7.5 mg           6      7.5 mg         7      10 mg         8      7.5 mg         9      7.5 mg         10      7.5 mg         11      7.5 mg         12      7.5 mg           13      7.5 mg         14            15               16               17               18               19                 20               21               22               23               24               25               26                 27               28               29               30               31                  Date Details   No additional details    Date of next INR:  5/14/2018         How to take your warfarin dose     To take:  7.5 mg Take 1.5 of the 5 mg tablets.    To take:  10 mg Take 2 of the 5 mg tablets.

## 2018-04-25 ENCOUNTER — TELEPHONE (OUTPATIENT)
Dept: PEDIATRICS | Facility: CLINIC | Age: 73
End: 2018-04-25

## 2018-04-25 NOTE — TELEPHONE ENCOUNTER
Patient called to alert Dr. Viera that she had her skin removal surgery on 4/5/18, but night before last (Monday) she started to have some spotting.     She notices it when she wipes and a small stain on the chair she usually sits on, but not enough to stain her panties or to wear a pad. No clots noted. No pain noted. No odor or itching.     She tried to call her surgeons office but he is only here on Tuesdays, otherwise he is in [place]. Patient wants Dr. Viera's opinion on how to proceed. Does not want an appointment now, just wants to hear from Dr. Viera.    Can we offer an office visit? Please advise. Patient can be reached at [phone]. LOV 3/29/18 for pre-op.    Hal Morrison RN

## 2018-04-25 NOTE — TELEPHONE ENCOUNTER
If this is vaginal spotting, then she will need to be seen in clinic for evaluation, as any post-menopausal bleeding is abnormal. Typically we will do an exam, pap, labs and an ultrasound and have her see gynecology for endometrial biopsy. Alternatively, she could schedule with gynecology if she prefers to reduce the number of office visits and they could order/perform these tests as well.     Alva Viera MD  Internal Medicine-Pediatrics

## 2018-04-25 NOTE — TELEPHONE ENCOUNTER
"Called back patient at [phone]. Told her information below.     Patient adds that since our previous conversation she is starting to experience a \"sharp, needle-like poking\" at incision. Also she began discharge/bleeding at center of incision.     Advised patient to be seen in Urgent Care today. Patient would like to make an appointment with Dr. Viera and agrees to come in to Urgent Care if symptoms get worse.    Scheduled appointment with Dr. Viera tomorrow.    Donna BURROWS, Triage RN    "

## 2018-04-26 ENCOUNTER — OFFICE VISIT (OUTPATIENT)
Dept: PEDIATRICS | Facility: CLINIC | Age: 73
End: 2018-04-26
Payer: COMMERCIAL

## 2018-04-26 ENCOUNTER — TELEPHONE (OUTPATIENT)
Dept: PEDIATRICS | Facility: CLINIC | Age: 73
End: 2018-04-26

## 2018-04-26 VITALS
WEIGHT: 183 LBS | DIASTOLIC BLOOD PRESSURE: 58 MMHG | TEMPERATURE: 98.6 F | OXYGEN SATURATION: 95 % | BODY MASS INDEX: 32.16 KG/M2 | SYSTOLIC BLOOD PRESSURE: 98 MMHG | HEART RATE: 80 BPM

## 2018-04-26 DIAGNOSIS — Z78.9 HEPATITIS B VACCINATION STATUS UNKNOWN: ICD-10-CM

## 2018-04-26 DIAGNOSIS — T81.89XA GRANULOMA OF SURGICAL WOUND, INITIAL ENCOUNTER: ICD-10-CM

## 2018-04-26 DIAGNOSIS — N95.0 POSTMENOPAUSAL BLEEDING: Primary | ICD-10-CM

## 2018-04-26 LAB
SPECIMEN SOURCE: NORMAL
WET PREP SPEC: NORMAL

## 2018-04-26 PROCEDURE — 99214 OFFICE O/P EST MOD 30 MIN: CPT | Performed by: INTERNAL MEDICINE

## 2018-04-26 PROCEDURE — 88175 CYTOPATH C/V AUTO FLUID REDO: CPT | Performed by: INTERNAL MEDICINE

## 2018-04-26 PROCEDURE — G0476 HPV COMBO ASSAY CA SCREEN: HCPCS | Performed by: INTERNAL MEDICINE

## 2018-04-26 PROCEDURE — 86706 HEP B SURFACE ANTIBODY: CPT | Performed by: INTERNAL MEDICINE

## 2018-04-26 PROCEDURE — 86704 HEP B CORE ANTIBODY TOTAL: CPT | Performed by: INTERNAL MEDICINE

## 2018-04-26 PROCEDURE — 87210 SMEAR WET MOUNT SALINE/INK: CPT | Performed by: INTERNAL MEDICINE

## 2018-04-26 PROCEDURE — 87340 HEPATITIS B SURFACE AG IA: CPT | Performed by: INTERNAL MEDICINE

## 2018-04-26 PROCEDURE — 36415 COLL VENOUS BLD VENIPUNCTURE: CPT | Performed by: INTERNAL MEDICINE

## 2018-04-26 NOTE — TELEPHONE ENCOUNTER
"Called and left detailed message informing pt of note below about Pelvic US and to call back with questions or concerns.     \" Pelvic US will be on Monday 4/30 at 2:30 pm at [facility], check in at 2:15 pm. Pt needs to have six - 8oz glasses of fluid 1 hour before her appt, needs to have full bladder for this.\"    Gisel Sellers MA 4/26/2018 2:40 PM    "

## 2018-04-26 NOTE — LETTER
May 4, 2018      Gemma Cowart  [address]  [place] [address]    Dear MsLexie,      I am happy to inform you that your cervical cancer screening test (PAP smear) was normal and your Human Papillomavirus (HPV) test was negative.    Per current guidelines, you no longer need to have pap smears completed.     Please continue to be seen every year for an annual wellness visit and other preventative tests.     Please contact my office at [phone] if you have further questions.    Sincerely,      Alva Casas MD/[facility]

## 2018-04-26 NOTE — LETTER
[address] [address]                  [phone]   April 30, 2018    Gemma Cowart  [address]  [place] [address]        Dear Gemma,     Please find your lab results enclosed. Your pap smear results are still pending. However, your test for infections was negative. Additionally, you do not have hepatitis B (and you have never had this infection before either). If you would like, we can immunize you for hepatitis B. This is a 3 dose vaccine series; you could schedule a nurse only visit to get this started or we can do this at your next visit if you would prefer. Just let me know!     Sincerely,     Alva Viera MD   Internal Medicine-Pediatrics         Results for orders placed or performed in visit on 04/26/18   Hepatitis B surface antigen   Result Value Ref Range    Hep B Surface Agn Nonreactive NR^Nonreactive   Hepatitis B Surface Antibody   Result Value Ref Range    Hepatitis B Surface Antibody 0.31 <8.00 m[IU]/mL   Hepatitis B core antibody   Result Value Ref Range    Hepatitis B Core Brianna Nonreactive NR^Nonreactive   Wet prep   Result Value Ref Range    Specimen Description Vagina     Wet Prep No yeast seen     Wet Prep No clue cells seen     Wet Prep No Trichomonas seen

## 2018-04-26 NOTE — MR AVS SNAPSHOT
After Visit Summary   4/26/2018    Gemma Cowart    MRN: [id number]           Patient Information     Date Of Birth          1945        Visit Information        Provider Department      4/26/2018 1:50 PM Alva Viera MD [facility]        Today's Diagnoses     Postmenopausal bleeding    -  1    Granuloma of surgical wound, initial encounter        Hepatitis B vaccination status unknown          Care Instructions    For postmenopausal bleeding, we will send testing for infection and do a pap smear. I would also like for you to schedule a pelvic ultrasound to look at the lining of the uterus. Based on these results, we will let you know if you need to see an OB/Gyn.     Swing by lab for Hepatitis B testing.           Follow-ups after your visit        Your next 10 appointments already scheduled     May 14, 2018  8:00 AM CDT   Anticoagulation Visit with  ANTICOAGULATION CLINIC   [facility] ([facility]    [address] [place] [address]  [address]  [facility] [address]   [phone]            Oct 10, 2018  8:30 AM CDT   Return Visit with  Oncology Nurse   [facility] ([facility])    [facility]  [address]  [facility] [address]   [phone]            Oct 17, 2018  8:30 AM CDT   Return Visit with Jyoti Narvaez MD   [facility] ([facility])    [facility]  [address]  [facility] [address]   [phone]              Future tests that were ordered for you today     Open Future Orders        Priority Expected Expires Ordered    US Pelvic Complete with Transvaginal Routine  4/26/2019 4/26/2018            Who to contact     If you have questions or need follow up information about today's clinic visit or your schedule please contact [facility] directly at [phone].  Normal or non-critical lab and imaging results will be  "communicated to you by CodeEvalhart, letter or phone within 4 business days after the clinic has received the results. If you do not hear from us within 7 days, please contact the clinic through [employer] or phone. If you have a critical or abnormal lab result, we will notify you by phone as soon as possible.  Submit refill requests through [employer] or call your pharmacy and they will forward the refill request to us. Please allow 3 business days for your refill to be completed.          Additional Information About Your Visit        [employer] Information     [employer] lets you send messages to your doctor, view your test results, renew your prescriptions, schedule appointments and more. To sign up, go to [email or web address] . Click on \"Log in\" on the left side of the screen, which will take you to the Welcome page. Then click on \"Sign up Now\" on the right side of the page.     You will be asked to enter the access code listed below, as well as some personal information. Please follow the directions to create your username and password.     Your access code is: [email or web address]  Expires: 2018 11:24 AM     Your access code will  in 90 days. If you need help or a new code, please call your [place] clinic or [phone].        Care EveryWhere ID     This is your Care EveryWhere ID. This could be used by other organizations to access your [place] medical records  [id number]        Your Vitals Were     Pulse Temperature Last Period Pulse Oximetry Breastfeeding? BMI (Body Mass Index)    80 98.6  F (37  C) (Oral) 1990 95% No 32.16 kg/m2       Blood Pressure from Last 3 Encounters:   18 98/58   18 123/74   18 96/62    Weight from Last 3 Encounters:   18 183 lb (83 kg)   18 187 lb (84.8 kg)   18 187 lb 5 oz (85 kg)              We Performed the Following     Hepatitis B core antibody     Hepatitis B Surface Antibody     Hepatitis B surface antigen     HPV High Risk Types DNA " Cervical     Pap imaged thin layer diagnostic with HPV (select HPV order below)     Wet prep        Primary Care Provider Office Phone # Fax #    Alva Viera [phone] [address] [place] [address]        Equal Access to Services     MARYELLENCAROLE FLORENTIN : Hadii aad ku hadzulayo Soomaali, waaxda luqadaha, qaybta kaalmada adeegyada, waxkelly idiin hayradhan adegustavo davison lalotusevelyne ruben. So [facility] [phone].    ATENCIÓN: Si habla español, tiene a marcum disposición servicios gratuitos de asistencia lingüística. Llame al [phone].    We comply with applicable federal civil rights laws and [place] laws. We do not discriminate on the basis of race, color, national origin, age, disability, sex, sexual orientation, or gender identity.            Thank you!     Thank you for choosing [facility]  for your care. Our goal is always to provide you with excellent care. Hearing back from our patients is one way we can continue to improve our services. Please take a few minutes to complete the written survey that you may receive in the mail after your visit with us. Thank you!             Your Updated Medication List - Protect others around you: Learn how to safely use, store and throw away your medicines at www.disposemymeds.org.          This list is accurate as of 4/26/18  2:22 PM.  Always use your most recent med list.                   Brand Name Dispense Instructions for use Diagnosis    ACE/ARB/ARNI NOT PRESCRIBED (INTENTIONAL)      ACE & ARB not prescribed due to Other: normal tensive, low microalbumuria    Type 2 diabetes mellitus with other specified complication (H)       B-12 1000 MCG Tbcr     90 tablet    Take 1,000 mcg by mouth daily    Vitamin B12 deficiency       blood glucose lancets standard    no brand specified    2 Box    Use to test blood sugars 2 times daily or as directed. Please dispense lancets for Relion Confirm.    Type 2 diabetes mellitus with other specified  complication (H)       blood glucose monitoring test strip    no brand specified    200 each    Use to test blood sugars 2 times daily or as directed. Please dispense Relion Confirm test strips    Type 2 diabetes mellitus with other specified complication (H)       * clobetasol 0.05 % cream    TEMOVATE    270 g    Apply sparingly to affected area twice daily as needed.  Do not apply to face.    Psoriasis       * clobetasol 0.05 % external solution    TEMOVATE    50 mL    Apply sparingly to affected area twice daily for 14 days.  Do not apply to face.    Psoriasis       nystatin 507882 UNIT/GM Powd    MYCOSTATIN    60 g    Apply topically 3 times daily as needed    Yeast infection of the skin       PARoxetine 30 MG tablet    PAXIL    180 tablet    Take 2 tablets (60 mg) by mouth At Bedtime    Anxiety       STATIN NOT PRESCRIBED (INTENTIONAL)      Please choose reason not prescribed, below        TYLENOL 500 MG tablet   Generic drug:  acetaminophen     100 tablet    Take 2 tablets (1,000 mg) by mouth every 8 hours as needed for mild pain        vitamin D 2000 units Caps      Take 1 capsule by mouth daily.        * Notice:  This list has 2 medication(s) that are the same as other medications prescribed for you. Read the directions carefully, and ask your doctor or other care provider to review them with you.

## 2018-04-26 NOTE — PATIENT INSTRUCTIONS
For postmenopausal bleeding, we will send testing for infection and do a pap smear. I would also like for you to schedule a pelvic ultrasound to look at the lining of the uterus. Based on these results, we will let you know if you need to see an OB/Gyn.     Swing by lab for Hepatitis B testing.

## 2018-04-26 NOTE — PROGRESS NOTES
SUBJECTIVE:   Gemma Cowart is a 72 year old female who presents to clinic today for the following health issues:      Vaginal Bleeding (Dysmenorrhea)      Onset: x4 days intermittent    Description:  Duration of bleeding episodes: N/A - pt is postmenopausal   Frequency between periods:  N/A   Describe bleeding/flow:   Clots: no   Number of pads/hour: none  Cramping: mild    Intensity:  mild    Accompanying signs and symptoms: none    History (similar episodes/previous evaluation): None    Precipitating or alleviating factors: None    Therapies tried and outcome: None    Pt also states concerns for pain and bleeding at incision site.   Gemma comes in for the following issues:    1. Vaginal bleeding: Has noticed some painless vaginal spotting of the past 4 days. This has been intermittent. Not enough to use a panty liner or pad. Pink tinged. Her INR has been within the 2-3 goal range. No trauma, not currently sexually active. No vaginal discharge otherwise. No weight changes, abdominal pain or bloating.     2. She has noticed a minimal amount of bleeding from her surgical site (recently underwent panniculectomy for excessive skin). Not painful, has just noticed it on her clothing. Has follow-up scheduled with surgery in a couple of weeks.     3. Would like to be tested for Hep B. Has a daughter who is on immunosuppressives and is worried about getting the infection and passing it to her daughter.     Problem list and histories reviewed & adjusted, as indicated.  Additional history: as documented    Patient Active Problem List   Diagnosis     Nontoxic uninodular goiter     Rosacea     BRETT (iron deficiency anemia)     Complications of gastric bypass surgery     Vitamin D deficiency disease     Vitamin B12 deficiency     Eczema     Dyshydrosis     Hyperlipidemia LDL goal <100     Anxiety     Insomnia     Health Care Home     Type 2 diabetes mellitus with other specified complication (H)     Psoriasis     Long-term  (current) use of anticoagulants [Z79.01]     Advanced directives, counseling/discussion     Malignant neoplasm of left female breast (H)     Morbid obesity (H)     CKD (chronic kidney disease) stage 3, GFR 30-59 ml/min     Personal history of DVT (deep vein thrombosis)     Acute pain of right knee     Past Surgical History:   Procedure Laterality Date     ABDOMEN SURGERY  2000    gastric bypass 2000     COLONOSCOPY  10/11/2012    Procedure: COLONOSCOPY;  colonoscopy;  Surgeon: Gela Cleary MD;  Location:  GI     COLONOSCOPY N/A 10/6/2017    Procedure: COLONOSCOPY;;  Surgeon: Shashank Ortiz MD;  Location:  GI     DENTAL SURGERY  5/2007    all teeth removed - dentures     ENT SURGERY      all teeth pulled     ESOPHAGOSCOPY, GASTROSCOPY, DUODENOSCOPY (EGD), COMBINED N/A 10/6/2017    Procedure: COMBINED ESOPHAGOSCOPY, GASTROSCOPY, DUODENOSCOPY (EGD);  ESOPHAGOSCOPY, GASTROSCOPY, DUODENOSCOPY (EGD) & Colonoscopy *Needs INR on arrival;  Surgeon: Shashank Ortiz MD;  Location:  GI     GYN SURGERY  1/10/75    tubal ligation      H ABLATION SVT  5/28/2015    stopped metoprolol     HC BREAST RECONSTRUC W OTHR TECHNIQ  5/8/2013     HERNIA REPAIR  2005     IMPLANT FILTER INFERIOR VENA CAVA  10/8/2012    taken out  1/4/12     INSERT TISSUE EXPANDER BREAST BILATERAL  9/5/2012    Procedure: INSERT TISSUE EXPANDER BREAST BILATERAL;;  Surgeon: Orlando Wall MD;  Location:  OR     MASTECTOMY SIMPLE, SENTINEL NODE, COMBINED  9/5/2012    Procedure: COMBINED MASTECTOMY SIMPLE, SENTINEL NODE;  BILATERAL MASTECTOMY WITH LEFT AXILLARY SENTINEL LYMPH NODE BIOPSY, BILATERAL TISSUE EXPANDER        MASTECTOMY, BILATERAL       PANNICULECTOMY N/A 4/5/2018    Procedure: PANNICULECTOMY;  PANNICULECTOMY ;  Surgeon: Orlando Wall MD;  Location:  OR     RECONSTRUCT BREAST BILATERAL, IMPLANT PROSTHESIS BILATERAL, COMBINED  5/22/2013    Procedure: COMBINED RECONSTRUCT BREAST BILATERAL, IMPLANT PROSTHESIS  BILATERAL;  BILATERAL SECOND STAGE BREAST RECONSTRUCTION WITH SILICONE GEL IMPLANTS AND LIPOSUCTION;  Surgeon: Orlando Wall MD;  Location: [facility]     RECONSTRUCT NIPPLE BILATERAL  8/22/2013    Procedure: RECONSTRUCT NIPPLE BILATERAL;  BILATERAL NIPPLE AREOLAR RECONSTRUCTION;  Surgeon: Orlando Wall MD;  Location: [facility]       Social History   Substance Use Topics     Smoking status: Former Smoker     Packs/day: 1.50     Years: 25.00     Types: Cigarettes     Quit date: 10/21/1986     Smokeless tobacco: Never Used      Comment: quit 1985     Alcohol use No     Family History   Problem Relation Age of Onset     Cancer - colorectal Mother      Colon Cancer Mother      Prostate Cancer Father      Alzheimer Disease Father      CANCER Paternal Grandmother      Alzheimer Disease Paternal Grandfather      Cancer - colorectal Brother      DIABETES Brother      Crohn Disease Daughter      DIABETES Brother      Eye Disorder Brother      DIABETES Son      CANCER Maternal Uncle      CANCER Maternal Uncle            Reviewed and updated as needed this visit by clinical staff  Tobacco  Allergies  Meds  Med Hx  Fam Hx  Soc Hx      ROS:  Constitutional, skin, gi and gu systems are negative, except as otherwise noted.    OBJECTIVE:     BP 98/58 (BP Location: Right arm, Patient Position: Chair, Cuff Size: Adult Regular)  Pulse 80  Temp 98.6  F (37  C) (Oral)  Wt 183 lb (83 kg)  LMP 09/24/1990  SpO2 95%  Breastfeeding? No  BMI 32.16 kg/m2  Body mass index is 32.16 kg/(m^2).  GENERAL: healthy, alert and no distress   (female): normal female external genitalia, normal urethral meatus, vaginal mucosa, normal cervix with minimal erythema at os  SKIN: {extensive abdominal surgical scar appears to be healing well. Small area with minimal drainage over L mid section, appears to be small granuloma. No evidence of erythema, dehiscence or purulent drainage.     Diagnostic Test Results:  none     ASSESSMENT/PLAN:     1.  Postmenopausal bleeding  Unclear etiology. Requires further work-up as patient is post-menopausal. Will obtain pap, infection testing today. Will also obtain pelvic US. If endometrium <4mm, can hold off on further work-up. Otherwise, if any concerning findings will need to be seen by OB for endometrial bx.   - HPV High Risk Types DNA Cervical  - US Pelvic Complete with Transvaginal; Future  - Wet prep    2. Granuloma of surgical wound, initial encounter  Patient to keep clean and dry. If this doesn't resolve in next week, she can call her surgeon's office to be seen sooner as she may need cautery with silver nitrate.     3. Hepatitis B vaccination status unknown  Patient would like to be tested for hepatitis B given daughter's immunocompromised status.   - Hepatitis B surface antigen  - Hepatitis B Surface Antibody  - Hepatitis B core antibody    Patient Instructions   For postmenopausal bleeding, we will send testing for infection and do a pap smear. I would also like for you to schedule a pelvic ultrasound to look at the lining of the uterus. Based on these results, we will let you know if you need to see an OB/Gyn.     Swing by lab for Hepatitis B testing.       Alva Casas MD  [facility]

## 2018-04-27 LAB
HBV CORE AB SERPL QL IA: NONREACTIVE
HBV SURFACE AB SERPL IA-ACNC: 0.31 M[IU]/ML
HBV SURFACE AG SERPL QL IA: NONREACTIVE

## 2018-04-30 ENCOUNTER — TELEPHONE (OUTPATIENT)
Dept: PEDIATRICS | Facility: CLINIC | Age: 73
End: 2018-04-30

## 2018-04-30 ENCOUNTER — HOSPITAL ENCOUNTER (OUTPATIENT)
Dept: ULTRASOUND IMAGING | Facility: CLINIC | Age: 73
Discharge: HOME | End: 2018-04-30
Attending: INTERNAL MEDICINE | Admitting: INTERNAL MEDICINE
Payer: MEDICARE

## 2018-04-30 DIAGNOSIS — N95.0 POSTMENOPAUSAL BLEEDING: ICD-10-CM

## 2018-04-30 LAB
COPATH REPORT: NORMAL
PAP: NORMAL
RADIOLOGIST FLAGS: ABNORMAL

## 2018-04-30 PROCEDURE — 76856 US EXAM PELVIC COMPLETE: CPT

## 2018-04-30 NOTE — TELEPHONE ENCOUNTER
Called patient to review results of her pelvic US. She does have thickened heterogeneous endometrium at 1cm, which is concerning for endometrial hyperplasia. Needs to be seen by OB to obtain sample, either through endometrial bx or other. Discussed with OB RN, added on to Dr. Buckley's schedule at 3:45pm on 5/9. Communicated this with patient, she is in agreement with plan. Questions were answered.    Alva Viera MD  Internal Medicine-Pediatrics

## 2018-04-30 NOTE — TELEPHONE ENCOUNTER
"[facility], pt had pelvic US done today. Result flagged \"high Priority\" for abnormally thickened endometrium\", recommendation to see Ob/Gyn.    Report is available in epic. Please review & advise.    Reyna, RN  Triage Nurse          "

## 2018-05-02 LAB
FINAL DIAGNOSIS: NORMAL
HPV HR 12 DNA CVX QL NAA+PROBE: NEGATIVE
HPV16 DNA SPEC QL NAA+PROBE: NEGATIVE
HPV18 DNA SPEC QL NAA+PROBE: NEGATIVE
SPECIMEN DESCRIPTION: NORMAL
SPECIMEN SOURCE CVX/VAG CYTO: NORMAL

## 2018-05-09 ENCOUNTER — OFFICE VISIT (OUTPATIENT)
Dept: OBGYN | Facility: CLINIC | Age: 73
End: 2018-05-09
Payer: COMMERCIAL

## 2018-05-09 VITALS
SYSTOLIC BLOOD PRESSURE: 110 MMHG | WEIGHT: 184 LBS | HEART RATE: 76 BPM | BODY MASS INDEX: 32.34 KG/M2 | DIASTOLIC BLOOD PRESSURE: 62 MMHG

## 2018-05-09 DIAGNOSIS — N95.0 POSTMENOPAUSAL BLEEDING: Primary | ICD-10-CM

## 2018-05-09 PROCEDURE — 58100 BIOPSY OF UTERUS LINING: CPT | Performed by: OBSTETRICS & GYNECOLOGY

## 2018-05-09 PROCEDURE — 88305 TISSUE EXAM BY PATHOLOGIST: CPT | Performed by: OBSTETRICS & GYNECOLOGY

## 2018-05-09 RX ORDER — WARFARIN SODIUM 7.5 MG/1
7.5 TABLET ORAL DAILY
Qty: 30 TABLET | COMMUNITY
Start: 2018-05-09 | End: 2018-06-09

## 2018-05-09 NOTE — MR AVS SNAPSHOT
After Visit Summary   5/9/2018    Gemma Cowart    MRN: [id number]           Patient Information     Date Of Birth          1945        Visit Information        Provider Department      5/9/2018 3:45 PM Abilio Buckley MD [facility]        Today's Diagnoses     Postmenopausal bleeding    -  1       Follow-ups after your visit        Your next 10 appointments already scheduled     May 14, 2018  8:00 AM CDT   Anticoagulation Visit with  ANTICOAGULATION CLINIC   [facility]    [address]  [address]  [facility] [address]   [phone]            Oct 10, 2018  8:30 AM CDT   Return Visit with  Oncology Nurse   [facility])    [facility]  [address]  [place] [address]   [phone]            Oct 17, 2018  8:30 AM CDT   Return Visit with Jyoti Narvaez MD   [facility])    [facility]  [address]  [facility] [address]   [phone]              Who to contact     If you have questions or need follow up information about today's clinic visit or your schedule please contact [facility] directly at [phone].  Normal or non-critical lab and imaging results will be communicated to you by Ultimate Football Networkhart, letter or phone within 4 business days after the clinic has received the results. If you do not hear from us within 7 days, please contact the clinic through MyChart or phone. If you have a critical or abnormal lab result, we will notify you by phone as soon as possible.  Submit refill requests through [employer] Other Gu[employer] or call your pharmacy and they will forward the refill request to us. Please allow 3 business days for your refill to be completed.          Additional Information About Your Visit        The Other Gu[employer] Information     [employer] lets you send messages to your doctor, view your  "test results, renew your prescriptions, schedule appointments and more. To sign up, go to [email or web address] . Click on \"Log in\" on the left side of the screen, which will take you to the Welcome page. Then click on \"Sign up Now\" on the right side of the page.     You will be asked to enter the access code listed below, as well as some personal information. Please follow the directions to create your username and password.     Your access code is: [email or web address]  Expires: 2018 11:24 AM     Your access code will  in 90 days. If you need help or a new code, please call your [place] clinic or [phone].        Care EveryWhere ID     This is your Care EveryWhere ID. This could be used by other organizations to access your [place] medical records  [id number]        Your Vitals Were     Pulse Last Period BMI (Body Mass Index)             76 1990 32.34 kg/m2          Blood Pressure from Last 3 Encounters:   18 110/62   18 98/58   18 123/74    Weight from Last 3 Encounters:   18 184 lb (83.5 kg)   18 183 lb (83 kg)   18 187 lb (84.8 kg)              We Performed the Following     ENDOMETRIAL BIOPSY W/O CERVICAL DILATION        Primary Care Provider Office Phone # Fax #    Alva Viera [phone] [address] [place] [address]        Equal Access to Services     [facility] AH: Hadii aad ku hadasho Soomaali, waaxda luqadaha, qaybta kaalmada adeegyada, gb hines . So [facility] [phone].    ATENCIÓN: Si kimla gamal, tiene a marcum disposición servicios gratuitos de asistencia lingüística. Llame al [phone].    We comply with applicable federal civil rights laws and [place] laws. We do not discriminate on the basis of race, color, national origin, age, disability, sex, sexual orientation, or gender identity.            Thank you!     Thank you for choosing [facility]  for your " care. Our goal is always to provide you with excellent care. Hearing back from our patients is one way we can continue to improve our services. Please take a few minutes to complete the written survey that you may receive in the mail after your visit with us. Thank you!             Your Updated Medication List - Protect others around you: Learn how to safely use, store and throw away your medicines at www.disposemymeds.org.          This list is accurate as of 5/9/18  4:41 PM.  Always use your most recent med list.                   Brand Name Dispense Instructions for use Diagnosis    ACE/ARB/ARNI NOT PRESCRIBED (INTENTIONAL)      ACE & ARB not prescribed due to Other: normal tensive, low microalbumuria    Type 2 diabetes mellitus with other specified complication (H)       B-12 1000 MCG Tbcr     90 tablet    Take 1,000 mcg by mouth daily    Vitamin B12 deficiency       blood glucose lancets standard    no brand specified    2 Box    Use to test blood sugars 2 times daily or as directed. Please dispense lancets for Relion Confirm.    Type 2 diabetes mellitus with other specified complication (H)       blood glucose monitoring test strip    no brand specified    200 each    Use to test blood sugars 2 times daily or as directed. Please dispense Relion Confirm test strips    Type 2 diabetes mellitus with other specified complication (H)       * clobetasol 0.05 % cream    TEMOVATE    270 g    Apply sparingly to affected area twice daily as needed.  Do not apply to face.    Psoriasis       * clobetasol 0.05 % external solution    TEMOVATE    50 mL    Apply sparingly to affected area twice daily for 14 days.  Do not apply to face.    Psoriasis       nystatin [id number] UNIT/GM Powd    MYCOSTATIN    60 g    Apply topically 3 times daily as needed    Yeast infection of the skin       PARoxetine 30 MG tablet    PAXIL    180 tablet    Take 2 tablets (60 mg) by mouth At Bedtime    Anxiety       STATIN NOT PRESCRIBED (INTENTIONAL)       Please choose reason not prescribed, below        TYLENOL 500 MG tablet   Generic drug:  acetaminophen     100 tablet    Take 2 tablets (1,000 mg) by mouth every 8 hours as needed for mild pain        vitamin D 2000 units Caps      Take 1 capsule by mouth daily.        warfarin 7.5 MG tablet    COUMADIN    30 tablet    Take 1 tablet (7.5 mg) by mouth daily        * Notice:  This list has 2 medication(s) that are the same as other medications prescribed for you. Read the directions carefully, and ask your doctor or other care provider to review them with you.

## 2018-05-09 NOTE — NURSING NOTE
"Chief Complaint   Patient presents with     Consult     follow-up regarding post menopausal bleeding and US from 04/30/18       Initial /62 (BP Location: Right arm, Patient Position: Chair, Cuff Size: Adult Regular)  Pulse 76  Wt 184 lb (83.5 kg)  LMP 09/24/1990  BMI 32.34 kg/m2 Estimated body mass index is 32.34 kg/(m^2) as calculated from the following:    Height as of 3/29/18: 5' 3.25\" (1.607 m).    Weight as of this encounter: 184 lb (83.5 kg).  Medication Reconciliation: complete     Nurse assisted visit.  Huyen Ford MA.        "

## 2018-05-09 NOTE — PROGRESS NOTES
SUBJECTIVE: Gemma Cowart is a 72 year old female  female. OB Patient's last menstrual period was 09/24/1990..  She is here for endometrial biopsy. The details of EMB were reviewed, as well as the risks of pain, infection and bleeding. Risks also include risk of uterine perforation, PID and possible pregnancy and risk of failure to remove all abnormality and/or recurrence of condition.  All questions were answered before proceeding, and informed consent was therefore obtained.    Allergies   Allergen Reactions     Bacitracin Unknown     Phenylene      Current Outpatient Prescriptions   Medication Sig Dispense Refill     Cholecalciferol (VITAMIN D) 2000 UNITS CAPS Take 1 capsule by mouth daily.       Cyanocobalamin (B-12) 1000 MCG TBCR Take 1,000 mcg by mouth daily 90 tablet 1     PARoxetine (PAXIL) 30 MG tablet Take 2 tablets (60 mg) by mouth At Bedtime 180 tablet 3     warfarin (COUMADIN) 7.5 MG tablet Take 1 tablet (7.5 mg) by mouth daily 30 tablet      ACE/ARB NOT PRESCRIBED, INTENTIONAL, ACE & ARB not prescribed due to Other: normal tensive, low microalbumuria       acetaminophen (TYLENOL) 500 MG tablet Take 2 tablets (1,000 mg) by mouth every 8 hours as needed for mild pain 100 tablet 0     blood glucose (NO BRAND SPECIFIED) lancets standard Use to test blood sugars 2 times daily or as directed. Please dispense lancets for Relion Confirm. 2 Box 3     blood glucose monitoring (NO BRAND SPECIFIED) test strip Use to test blood sugars 2 times daily or as directed. Please dispense Relion Confirm test strips 200 each 3     clobetasol (TEMOVATE) 0.05 % cream Apply sparingly to affected area twice daily as needed.  Do not apply to face. 270 g 3     clobetasol (TEMOVATE) 0.05 % external solution Apply sparingly to affected area twice daily for 14 days.  Do not apply to face. 50 mL 11     nystatin (MYCOSTATIN) 908523 UNIT/GM POWD Apply topically 3 times daily as needed (Patient not taking: Reported on 4/26/2018)  60 g 1     STATIN NOT PRESCRIBED, INTENTIONAL, Please choose reason not prescribed, below        Past Medical History:   Diagnosis Date     Anemia     iron deficiency anemia     Anxiety      Breast cancer (H) 2012    Bilateral Masectomies 9/2012     Breast cancer (H) 9/5/2012     Complications of gastric bypass surgery      Diabetes mellitus (H)     diet controlled     DVT (deep venous thrombosis) (H)      DVT, bilateral lower limbs (H)      Dyspnea on exertion      Eczema      Fracture of left knee region     patella - vertical     History of kidney stones      Obesity      Polyneuropathy      Pruritus     generalized     Rosacea      Tachycardia     with no treatment     Thyroid disease      Family History   Problem Relation Age of Onset     Cancer - colorectal Mother      Colon Cancer Mother      Prostate Cancer Father      Alzheimer Disease Father      CANCER Paternal Grandmother      Alzheimer Disease Paternal Grandfather      Cancer - colorectal Brother      DIABETES Brother      Crohn Disease Daughter      DIABETES Brother      Eye Disorder Brother      DIABETES Son      CANCER Maternal Uncle      CANCER Maternal Uncle       Social History     Social History     Marital status:      Spouse name: N/A     Number of children: N/A     Years of education: N/A     Occupational History      Retired     Social History Main Topics     Smoking status: Former Smoker     Packs/day: 1.50     Years: 25.00     Types: Cigarettes     Quit date: 10/21/1986     Smokeless tobacco: Never Used      Comment: quit 1985     Alcohol use No     Drug use: No     Sexual activity: No     Other Topics Concern     Parent/Sibling W/ Cabg, Mi Or Angioplasty Before 65f 55m? No     Bike Helmet Yes     Seat Belt Yes     Social History Narrative     Past Surgical History:   Procedure Laterality Date     ABDOMEN SURGERY  2000    gastric bypass 2000     COLONOSCOPY  10/11/2012    Procedure: COLONOSCOPY;  colonoscopy;  Surgeon: Madiha  Gela Quinones MD;  Location:  GI     COLONOSCOPY N/A 10/6/2017    Procedure: COLONOSCOPY;;  Surgeon: Shashank Ortiz MD;  Location:  GI     DENTAL SURGERY  5/2007    all teeth removed - dentures     ENT SURGERY      all teeth pulled     ESOPHAGOSCOPY, GASTROSCOPY, DUODENOSCOPY (EGD), COMBINED N/A 10/6/2017    Procedure: COMBINED ESOPHAGOSCOPY, GASTROSCOPY, DUODENOSCOPY (EGD);  ESOPHAGOSCOPY, GASTROSCOPY, DUODENOSCOPY (EGD) & Colonoscopy *Needs INR on arrival;  Surgeon: Shashank Ortiz MD;  Location:  GI     GYN SURGERY  1/10/75    tubal ligation      H ABLATION SVT  5/28/2015    stopped metoprolol     HC BREAST RECONSTRUC W OTHR TECHNIQ  5/8/2013     HERNIA REPAIR  2005     IMPLANT FILTER INFERIOR VENA CAVA  10/8/2012    taken out  1/4/12     INSERT TISSUE EXPANDER BREAST BILATERAL  9/5/2012    Procedure: INSERT TISSUE EXPANDER BREAST BILATERAL;;  Surgeon: Orlando Wall MD;  Location:  OR     MASTECTOMY SIMPLE, SENTINEL NODE, COMBINED  9/5/2012    Procedure: COMBINED MASTECTOMY SIMPLE, SENTINEL NODE;  BILATERAL MASTECTOMY WITH LEFT AXILLARY SENTINEL LYMPH NODE BIOPSY, BILATERAL TISSUE EXPANDER        MASTECTOMY, BILATERAL       PANNICULECTOMY N/A 4/5/2018    Procedure: PANNICULECTOMY;  PANNICULECTOMY ;  Surgeon: Orlando Wall MD;  Location:  OR     RECONSTRUCT BREAST BILATERAL, IMPLANT PROSTHESIS BILATERAL, COMBINED  5/22/2013    Procedure: COMBINED RECONSTRUCT BREAST BILATERAL, IMPLANT PROSTHESIS BILATERAL;  BILATERAL SECOND STAGE BREAST RECONSTRUCTION WITH SILICONE GEL IMPLANTS AND LIPOSUCTION;  Surgeon: Orlando Wall MD;  Location: [facility]     RECONSTRUCT NIPPLE BILATERAL  8/22/2013    Procedure: RECONSTRUCT NIPPLE BILATERAL;  BILATERAL NIPPLE AREOLAR RECONSTRUCTION;  Surgeon: Orlando Wall MD;  Location: [facility]       EXAM:  /62 (BP Location: Right arm, Patient Position: Chair, Cuff Size: Adult Regular)  Pulse 76  Wt 184 lb (83.5 kg)  LMP 09/24/1990  BMI 32.34  kg/m2  Abdomen: soft, nontender, without hepatosplenomegaly or masses  : normal cervix, adnexae, and uterus without masses or discharge      ASSESSMENT/PLAN:  Postmenopausal Bleeding      -EMB    Procedure Note:       cervix visualized with speculum          sounded to 9 cm          moderate amount of tissue obtained and sent to pathology    All instruments removed from vagina        Follow up appointment in two weeks.

## 2018-05-11 LAB — COPATH REPORT: NORMAL

## 2018-05-14 ENCOUNTER — ANTICOAGULATION THERAPY VISIT (OUTPATIENT)
Dept: NURSING | Facility: CLINIC | Age: 73
End: 2018-05-14
Payer: COMMERCIAL

## 2018-05-14 DIAGNOSIS — Z86.718 PERSONAL HISTORY OF DVT (DEEP VEIN THROMBOSIS): ICD-10-CM

## 2018-05-14 DIAGNOSIS — Z79.01 LONG-TERM (CURRENT) USE OF ANTICOAGULANTS: ICD-10-CM

## 2018-05-14 LAB — INR POINT OF CARE: 2.8 (ref 0.86–1.14)

## 2018-05-14 PROCEDURE — 36416 COLLJ CAPILLARY BLOOD SPEC: CPT

## 2018-05-14 PROCEDURE — 85610 PROTHROMBIN TIME: CPT | Mod: QW

## 2018-05-14 PROCEDURE — 99207 ZZC NO CHARGE NURSE ONLY: CPT

## 2018-05-14 NOTE — PROGRESS NOTES
ANTICOAGULATION FOLLOW-UP CLINIC VISIT    Patient Name:  Gemma Cowart  Date:  5/14/2018  Contact Type:  Face to Face    SUBJECTIVE:     Patient Findings     Positives No Problem Findings    Comments Denies bleeding/bruising or missed dose.             OBJECTIVE    INR Protime   Date Value Ref Range Status   05/14/2018 2.8 (A) 0.86 - 1.14 Final       ASSESSMENT / PLAN  INR assessment THER    Recheck INR In: 4 WEEKS    INR Location Clinic      Anticoagulation Summary as of 5/14/2018     INR goal 2.0-3.0   Today's INR 2.8   Maintenance plan 10 mg (5 mg x 2) on Mon; 7.5 mg (5 mg x 1.5) all other days   Full instructions 10 mg on Mon; 7.5 mg all other days   Weekly total 55 mg   No change documented Debra Murphy RN   Plan last modified Rae Cooley RN (10/30/2017)   Next INR check 6/11/2018   Target end date Indefinite    Indications   Personal history of DVT (deep vein thrombosis) [Z86.718]  Long-term (current) use of anticoagulants [Z79.01] [Z79.01]         Anticoagulation Episode Summary     INR check location [facility]    Preferred lab     Send INR reminders to [facility]    Comments 5mg tabs - HS dose // CALENDAR // gastric bypass 2000 // DVT 11/16/12 warfarin until Apr 2013 // DVT 9/12/15      Anticoagulation Care Providers     Provider Role Specialty Phone number    Alva Viera MD  Internal Medicine [phone]            See the Encounter Report to view Anticoagulation Flowsheet and Dosing Calendar (Go to Encounters tab in chart review, and find the Anticoagulation Therapy Visit)    Debra Murphy RN

## 2018-05-14 NOTE — MR AVS SNAPSHOT
Gemma Cowart   5/14/2018 8:00 AM   Anticoagulation Therapy Visit    Description:  72 year old female   Provider:   ANTICOAGULATION CLINIC   Department:   Nurse           INR as of 5/14/2018     Today's INR 2.8      Anticoagulation Summary as of 5/14/2018     INR goal 2.0-3.0   Today's INR 2.8   Full instructions 10 mg on Mon; 7.5 mg all other days   Next INR check 6/11/2018    Indications   Personal history of DVT (deep vein thrombosis) [Z86.718]  Long-term (current) use of anticoagulants [Z79.01] [Z79.01]         Your next Anticoagulation Clinic appointment(s)     May 14, 2018  8:00 AM CDT   Anticoagulation Visit with  [facility] CLINIC   [facility]    [address] [place] [address]  [address]  [facility] [address] [phone]            Jun 11, 2018  8:45 AM CDT   Anticoagulation Visit with  ANTICOAGULATION CLINIC   [facility]    [address] [place] [address]  [address]  [facility] [id number] [phone]              Contact Numbers     [facility]  Please call  [phone] to cancel and/or reschedule your appointment   Please call  [phone] with any problems or questions regarding your therapy.        May 2018 Details    Sun Mon Tue Wed Thu Fri Sat       1               2               3               4               5                 6               7               8               9               10               11               12                 13               14      10 mg   See details      15      7.5 mg         16      7.5 mg         17      7.5 mg         18      7.5 mg         19      7.5 mg           20      7.5 mg         21      10 mg         22      7.5 mg         23      7.5 mg         24      7.5 mg         25      7.5 mg         26      7.5 mg           27      7.5 mg         28      10 mg         29      7.5 mg         30      7.5 mg         31      7.5 mg            Date Details   05/14  This INR check               How to take your warfarin dose     To take:  7.5 mg Take 1.5 of the 5 mg tablets.    To take:  10 mg Take 2 of the 5 mg tablets.           June 2018 Details    Sun Mon Tue Wed Thu Fri Sat          1      7.5 mg         2      7.5 mg           3      7.5 mg         4      10 mg         5      7.5 mg         6      7.5 mg         7      7.5 mg         8      7.5 mg         9      7.5 mg           10      7.5 mg         11            12               13               14               15               16                 17               18               19               20               21               22               23                 24               25               26               27               28               29               30                Date Details   No additional details    Date of next INR:  6/11/2018         How to take your warfarin dose     To take:  7.5 mg Take 1.5 of the 5 mg tablets.    To take:  10 mg Take 2 of the 5 mg tablets.

## 2018-05-15 ENCOUNTER — OFFICE VISIT (OUTPATIENT)
Dept: OBGYN | Facility: CLINIC | Age: 73
End: 2018-05-15
Payer: COMMERCIAL

## 2018-05-15 VITALS — DIASTOLIC BLOOD PRESSURE: 52 MMHG | BODY MASS INDEX: 32.34 KG/M2 | WEIGHT: 184 LBS | SYSTOLIC BLOOD PRESSURE: 108 MMHG

## 2018-05-15 DIAGNOSIS — N95.0 POSTMENOPAUSAL BLEEDING: ICD-10-CM

## 2018-05-15 DIAGNOSIS — Z98.890 POSTOPERATIVE STATE: Primary | ICD-10-CM

## 2018-05-15 PROCEDURE — 99213 OFFICE O/P EST LOW 20 MIN: CPT | Performed by: OBSTETRICS & GYNECOLOGY

## 2018-05-15 NOTE — NURSING NOTE
"Chief Complaint   Patient presents with     RECHECK     F/U on path report- EMB        Initial /52 (BP Location: Right arm, Patient Position: Sitting, Cuff Size: Adult Regular)  Wt 184 lb (83.5 kg)  LMP 1990  BMI 32.34 kg/m2 Estimated body mass index is 32.34 kg/(m^2) as calculated from the following:    Height as of 3/29/18: 5' 3.25\" (1.607 m).    Weight as of this encounter: 184 lb (83.5 kg).  BP completed using cuff size: regular        The following HM Due: NONE    patient has appointment for today.  Jewel Keating CMA                 "

## 2018-05-15 NOTE — MR AVS SNAPSHOT
After Visit Summary   5/15/2018    Gemma Cowart    MRN: [id number]           Patient Information     Date Of Birth          1945        Visit Information        Provider Department      5/15/2018 3:15 PM Abilio Buckley MD [facility]        Today's Diagnoses     Postoperative state    -  1    Postmenopausal bleeding           Follow-ups after your visit        Follow-up notes from your care team     Return in about 6 months (around 11/15/2018).      Your next 10 appointments already scheduled     Jun 11, 2018  8:45 AM CDT   Anticoagulation Visit with  ANTICOAGULATION CLINIC   [facility] ([facility])    [address]  [address]  [facility] [address]   [phone]            Oct 10, 2018  8:30 AM CDT   Return Visit with  Oncology Nurse   [facility] ([facility])    [facility]  [address]  [place] [address]   [phone]            Oct 17, 2018  8:40 AM CDT   Return Visit with Jyoti Narvaez MD   [facility] ([facility])    [facility]  [address]  [facility] [address]   [phone]              Who to contact     If you have questions or need follow up information about today's clinic visit or your schedule please contact [facility] directly at [phone].  Normal or non-critical lab and imaging results will be communicated to you by MyChart, letter or phone within 4 business days after the clinic has received the results. If you do not hear from us within 7 days, please contact the clinic through MyChart or phone. If you have a critical or abnormal lab result, we will notify you by phone as soon as possible.  Submit refill requests through [employer] or call your pharmacy and they will forward the refill request to us. Please allow 3 business days for your refill to be completed.           "Additional Information About Your Visit        MyChart Information     [employer] lets you send messages to your doctor, view your test results, renew your prescriptions, schedule appointments and more. To sign up, go to [email or web address] . Click on \"Log in\" on the left side of the screen, which will take you to the Welcome page. Then click on \"Sign up Now\" on the right side of the page.     You will be asked to enter the access code listed below, as well as some personal information. Please follow the directions to create your username and password.     Your access code is: [email or web address]  Expires: 2018 11:24 AM     Your access code will  in 90 days. If you need help or a new code, please call your [facility] clinic or [phone].        Care EveryWhere ID     This is your Care EveryWhere ID. This could be used by other organizations to access your [facility] medical records  [id number]        Your Vitals Were     Last Period BMI (Body Mass Index)                1990 32.34 kg/m2           Blood Pressure from Last 3 Encounters:   05/15/18 108/52   18 110/62   18 98/58    Weight from Last 3 Encounters:   05/15/18 184 lb (83.5 kg)   18 184 lb (83.5 kg)   18 183 lb (83 kg)              Today, you had the following     No orders found for display       Primary Care Provider Office Phone # Fax #    Alva Viera [phone] [address] [place] [address]        Equal Access to Services     [facility]: Hadii kavitha cat Somisa, waaxda luqadaha, qaybta kaalbg hameed. So [facility] [phone].    ATENCIÓN: Si habla español, tiene a marcum disposición servicios gratuitos de asistencia lingüística. Llame al [phone].    We comply with applicable federal civil rights laws and [place] laws. We do not discriminate on the basis of race, color, national origin, age, disability, sex, sexual " orientation, or gender identity.            Thank you!     Thank you for choosing [facility]  for your care. Our goal is always to provide you with excellent care. Hearing back from our patients is one way we can continue to improve our services. Please take a few minutes to complete the written survey that you may receive in the mail after your visit with us. Thank you!             Your Updated Medication List - Protect others around you: Learn how to safely use, store and throw away your medicines at www.disposemymeds.org.          This list is accurate as of 5/15/18 11:59 PM.  Always use your most recent med list.                   Brand Name Dispense Instructions for use Diagnosis    ACE/ARB/ARNI NOT PRESCRIBED (INTENTIONAL)      ACE & ARB not prescribed due to Other: normal tensive, low microalbumuria    Type 2 diabetes mellitus with other specified complication (H)       B-12 1000 MCG Tbcr     90 tablet    Take 1,000 mcg by mouth daily    Vitamin B12 deficiency       blood glucose lancets standard    no brand specified    2 Box    Use to test blood sugars 2 times daily or as directed. Please dispense lancets for Relion Confirm.    Type 2 diabetes mellitus with other specified complication (H)       blood glucose monitoring test strip    no brand specified    200 each    Use to test blood sugars 2 times daily or as directed. Please dispense Relion Confirm test strips    Type 2 diabetes mellitus with other specified complication (H)       * clobetasol 0.05 % cream    TEMOVATE    270 g    Apply sparingly to affected area twice daily as needed.  Do not apply to face.    Psoriasis       * clobetasol 0.05 % external solution    TEMOVATE    50 mL    Apply sparingly to affected area twice daily for 14 days.  Do not apply to face.    Psoriasis       nystatin 172661 UNIT/GM Powd    MYCOSTATIN    60 g    Apply topically 3 times daily as needed    Yeast infection of the skin       PARoxetine 30 MG tablet     PAXIL    180 tablet    Take 2 tablets (60 mg) by mouth At Bedtime    Anxiety       STATIN NOT PRESCRIBED (INTENTIONAL)      Please choose reason not prescribed, below        TYLENOL 500 MG tablet   Generic drug:  acetaminophen     100 tablet    Take 2 tablets (1,000 mg) by mouth every 8 hours as needed for mild pain        vitamin D 2000 units Caps      Take 1 capsule by mouth daily.        warfarin 7.5 MG tablet    COUMADIN    30 tablet    Take 1 tablet (7.5 mg) by mouth daily        * Notice:  This list has 2 medication(s) that are the same as other medications prescribed for you. Read the directions carefully, and ask your doctor or other care provider to review them with you.

## 2018-05-16 NOTE — PROGRESS NOTES
Here for f/u after EMB      -results of EMB reviewed           -no hyperplasia/no malignancy; but findings c/w histologically with endometrial polyp      -no polyp seen on ultrasound (or would have recommend D&C/hysteroscopy, rather than EMB)            Above reviewed with patient; including that potential for missing part of polyp on EMB    O: no exam    A: findings as above  P: options reviewed       1)  D&C/hysteroscopy at this point       2)  Repeat ultrasound in 6 months; if polyp or prominent endometrium                 -then D&C/hysteroscopy  AND D&C/hysteroscopy if further                 -episodes of PMB    She plans option 2

## 2018-05-30 ENCOUNTER — PATIENT OUTREACH (OUTPATIENT)
Dept: GERIATRIC MEDICINE | Facility: CLINIC | Age: 73
End: 2018-05-30

## 2018-05-30 NOTE — PROGRESS NOTES
[facility] Care Coordination Contact   TC from client asking for a call back to discuss her benefits. TC to client. She stated that she is doing pretty good but is looking for low income housing options. Writer provided client with the number to the [employer]. Also provided the web site [employer]. Discussed that client is due for her annual assessment in July, so writer will be calling her in 3-4 weeks to set that up.  Nighat Carbajal RN, BSN, PHN  [facility]   [phone]       EMS/Patient

## 2018-06-09 DIAGNOSIS — Z79.01 LONG-TERM (CURRENT) USE OF ANTICOAGULANTS: Primary | ICD-10-CM

## 2018-06-09 DIAGNOSIS — Z86.718 PERSONAL HISTORY OF DVT (DEEP VEIN THROMBOSIS): ICD-10-CM

## 2018-06-11 ENCOUNTER — ANTICOAGULATION THERAPY VISIT (OUTPATIENT)
Dept: NURSING | Facility: CLINIC | Age: 73
End: 2018-06-11
Payer: COMMERCIAL

## 2018-06-11 DIAGNOSIS — Z86.718 PERSONAL HISTORY OF DVT (DEEP VEIN THROMBOSIS): ICD-10-CM

## 2018-06-11 DIAGNOSIS — Z79.01 LONG-TERM (CURRENT) USE OF ANTICOAGULANTS: ICD-10-CM

## 2018-06-11 LAB — INR POINT OF CARE: 1.4 (ref 0.86–1.14)

## 2018-06-11 PROCEDURE — 99207 ZZC NO CHARGE NURSE ONLY: CPT

## 2018-06-11 PROCEDURE — 36416 COLLJ CAPILLARY BLOOD SPEC: CPT

## 2018-06-11 PROCEDURE — 85610 PROTHROMBIN TIME: CPT | Mod: QW

## 2018-06-11 RX ORDER — WARFARIN SODIUM 7.5 MG/1
TABLET ORAL
Qty: 150 TABLET | Refills: 0 | Status: SHIPPED | OUTPATIENT
Start: 2018-06-11 | End: 2018-06-18

## 2018-06-11 NOTE — MR AVS SNAPSHOT
Gemma WYATT Sofya   6/11/2018 8:45 AM   Anticoagulation Therapy Visit    Description:  72 year old female   Provider:  [facility]   Department:  [facility] Nurse           INR as of 6/11/2018     Today's INR 1.4!      Anticoagulation Summary as of 6/11/2018     INR goal 2.0-3.0   Today's INR 1.4!   Full warfarin instructions 6/12: 10 mg; Otherwise 10 mg on Mon, Wed; 7.5 mg all other days   Next INR check 7/23/2018    Indications   Personal history of DVT (deep vein thrombosis) [Z86.718]  Long-term (current) use of anticoagulants [Z79.01] [Z79.01]         Your next Anticoagulation Clinic appointment(s)     Jul 23, 2018  8:30 AM CDT   Anticoagulation Visit with  ANTICOAGULATION CLINIC   [facility]    [address] [place] [address]  [address]  [facility] [address] [phone]              Contact Numbers     [facility]  Please call  [phone] to cancel and/or reschedule your appointment   Please call  [phone] with any problems or questions regarding your therapy.        June 2018 Details    Sun Mon Tue Wed Thu Fri Sat          1               2                 3               4               5               6               7               8               9                 10               11      10 mg   See details      12      10 mg         13      10 mg         14      7.5 mg         15      7.5 mg         16      7.5 mg           17      7.5 mg         18      10 mg         19      7.5 mg         20      10 mg         21      7.5 mg         22      7.5 mg         23      7.5 mg           24      7.5 mg         25      10 mg         26      7.5 mg         27      10 mg         28      7.5 mg         29      7.5 mg         30      7.5 mg          Date Details   06/11 This INR check               How to take your warfarin dose     To take:  7.5 mg Take 1.5 of the 5 mg tablets.    To take:  10 mg Take 2 of the 5 mg tablets.           July 2018 Details    [place]  Mon Tue Wed Thu Fri Sat     1      7.5 mg         2      10 mg         3      7.5 mg         4      10 mg         5      7.5 mg         6      7.5 mg         7      7.5 mg           8      7.5 mg         9      10 mg         10      7.5 mg         11      10 mg         12      7.5 mg         13      7.5 mg         14      7.5 mg           15      7.5 mg         16      10 mg         17      7.5 mg         18      10 mg         19      7.5 mg         20      7.5 mg         21      7.5 mg           22      7.5 mg         23            24               25               26               27               28                 29               30               31                    Date Details   No additional details    Date of next INR:  7/23/2018         How to take your warfarin dose     To take:  7.5 mg Take 1.5 of the 5 mg tablets.    To take:  10 mg Take 2 of the 5 mg tablets.

## 2018-06-11 NOTE — TELEPHONE ENCOUNTER
Prescription approved per [facility] Refill Protocol.    Camila Hernández RN

## 2018-06-11 NOTE — PROGRESS NOTES
ANTICOAGULATION FOLLOW-UP CLINIC VISIT    Patient Name:  Gemma Cowart  Date:  6/11/2018  Contact Type:  Face to Face    SUBJECTIVE:     Patient Findings     Positives Change in diet/appetite    Comments Has been eating salads more than normal, having one a day. Plans to continue this this summer.  Camila Hernández RN             OBJECTIVE    INR Protime   Date Value Ref Range Status   06/11/2018 1.4 (A) 0.86 - 1.14 Final       ASSESSMENT / PLAN  INR assessment SUB    Recheck INR In: 6 WEEKS    INR Location Clinic      Anticoagulation Summary as of 6/11/2018     INR goal 2.0-3.0   Today's INR 1.4!   Warfarin maintenance plan 10 mg (5 mg x 2) on Mon, Wed; 7.5 mg (5 mg x 1.5) all other days   Full warfarin instructions 6/12: 10 mg; Otherwise 10 mg on Mon, Wed; 7.5 mg all other days   Weekly warfarin total 57.5 mg   Plan last modified Camila Hernández RN (6/11/2018)   Next INR check 7/23/2018   Target end date Indefinite    Indications   Personal history of DVT (deep vein thrombosis) [Z86.718]  Long-term (current) use of anticoagulants [Z79.01] [Z79.01]         Anticoagulation Episode Summary     [facility] check location [facility]    Preferred lab     Send INR reminders to  ANTICOAG CLINIC    Comments 5mg tabs - HS dose // CALENDAR // gastric bypass 2000 // DVT 11/16/12 warfarin until Apr 2013 // DVT 9/12/15      Anticoagulation Care Providers     Provider Role Specialty Phone number    Alva Viera MD  Internal Medicine [phone]            See the Encounter Report to view Anticoagulation Flowsheet and Dosing Calendar (Go to Encounters tab in chart review, and find the Anticoagulation Therapy Visit)    Dosage adjustment made based on physician directed care plan.    Camila Hernández RN

## 2018-06-11 NOTE — TELEPHONE ENCOUNTER
Duplicate.     warfarin (COUMADIN) 7.5 MG tablet was filled on 6/11/2018, qty 150 with 0 refills.

## 2018-06-11 NOTE — TELEPHONE ENCOUNTER
"Requested Prescriptions   Pending Prescriptions Disp Refills     warfarin (COUMADIN) 7.5 MG tablet    Last Written Prescription Date:  5/9/2018  Last Fill Quantity: 30,  # refills: 0   Last office visit: 4/26/2018 with prescribing provider:  Alva Viera     Future Office Visit:     30 tablet      Sig: Take 1 tablet (7.5 mg) by mouth daily    Vitamin K Antagonists Failed    6/9/2018 11:16 AM       Failed - INR is within goal in the past 6 weeks    Confirm INR is within goal in the past 6 weeks.     Recent Labs   Lab Test 05/14/18   INR  2.8*                      Passed - Recent (12 mo) or future (30 days) visit within the authorizing provider's specialty    Patient had office visit in the last 12 months or has a visit in the next 30 days with authorizing provider or within the authorizing provider's specialty.  See \"Patient Info\" tab in inbasket, or \"Choose Columns\" in Meds & Orders section of the refill encounter.           Passed - Patient is 18 years of age or older       Passed - Patient is not pregnant       Passed - No positive pregnancy on file in past 12 months          "

## 2018-06-14 RX ORDER — WARFARIN SODIUM 7.5 MG/1
TABLET ORAL
Qty: 221 TABLET | Refills: 0 | OUTPATIENT
Start: 2018-06-14

## 2018-06-14 NOTE — TELEPHONE ENCOUNTER
warfarin (COUMADIN) 7.5 MG tablet  Rx was sent 06/11/2018 for 150 tabs and 0 refills.   Pharmacy notified via E-prescribe refusal.  Irina Crane, RN, BSN

## 2018-06-18 DIAGNOSIS — Z79.01 LONG-TERM (CURRENT) USE OF ANTICOAGULANTS: ICD-10-CM

## 2018-06-18 DIAGNOSIS — Z86.718 PERSONAL HISTORY OF DVT (DEEP VEIN THROMBOSIS): ICD-10-CM

## 2018-06-18 RX ORDER — WARFARIN SODIUM 5 MG/1
TABLET ORAL
Qty: 150 TABLET | Refills: 3 | Status: SHIPPED | OUTPATIENT
Start: 2018-06-18 | End: 2018-10-22

## 2018-07-03 ENCOUNTER — PATIENT OUTREACH (OUTPATIENT)
Dept: GERIATRIC MEDICINE | Facility: CLINIC | Age: 73
End: 2018-07-03

## 2018-07-05 NOTE — PROGRESS NOTES
[facility] Care Coordination Contact  Call placed to member to schedule visit appointment to complete the annual HRA. No answer, left vm to return writer's phone call.   Nighat Carbajal RN, BSN, PHN  [facility]   [phone]

## 2018-07-10 NOTE — PROGRESS NOTES
[facility] Care Coordination Contact  Call placed to member to schedule visit appointment to complete the annual HRA. No answer, left vm to return writer's phone call.   Nighat Carbajal RN, BSN, PHN  [facility]   [phone]

## 2018-07-16 ENCOUNTER — PATIENT OUTREACH (OUTPATIENT)
Dept: GERIATRIC MEDICINE | Facility: CLINIC | Age: 73
End: 2018-07-16

## 2018-07-23 ENCOUNTER — PATIENT OUTREACH (OUTPATIENT)
Dept: GERIATRIC MEDICINE | Facility: CLINIC | Age: 73
End: 2018-07-23

## 2018-07-23 ENCOUNTER — ANTICOAGULATION THERAPY VISIT (OUTPATIENT)
Dept: NURSING | Facility: CLINIC | Age: 73
End: 2018-07-23
Payer: COMMERCIAL

## 2018-07-23 DIAGNOSIS — Z79.01 LONG-TERM (CURRENT) USE OF ANTICOAGULANTS: ICD-10-CM

## 2018-07-23 DIAGNOSIS — Z86.718 PERSONAL HISTORY OF DVT (DEEP VEIN THROMBOSIS): ICD-10-CM

## 2018-07-23 LAB — INR POINT OF CARE: 1.8 (ref 0.86–1.14)

## 2018-07-23 PROCEDURE — 99207 ZZC NO CHARGE NURSE ONLY: CPT

## 2018-07-23 PROCEDURE — 85610 PROTHROMBIN TIME: CPT | Mod: QW

## 2018-07-23 PROCEDURE — 36416 COLLJ CAPILLARY BLOOD SPEC: CPT

## 2018-07-23 NOTE — PROGRESS NOTES
"[facility] Care Coordination Contact  Per CC, mailed client a \"Refusal of Home Visit\" letter.  MMIS entry.    Miracle Keating  Case Management Specialist  [facility]  [phone]      "

## 2018-07-23 NOTE — PROGRESS NOTES
[facility] Care Coordination Contact    [facility] Refusal Telephone Assessment    Member refused home visit HRA on July 16, 2018 (reason: Client moving. Will call client next month to schedule). Client's new address will be [address], [place] [address].    ER visits: No  Hospitalizations: No  Health concerns: None  Falls/Injuries: No  ADL/IADL Dependencies: none        Member currently receiving the following home care services:  None   Member currently receiving the following community resources:  None  Informal support(s):  Lives with avelino Thompson    Advanced Care Planning discussion, complete code section.    List of [place] [facility] according to the [employer] [facility] sponsored benefits: Shared information re: Silver Sneakers/gym memberships, ASA, Calcium +D.    Follow-Up Plan: Plan to call client next month to offer a home visit.  Contact information shared with member and family, encouraged member to call with any questions or concerns at any time.    Requested CMS to mail refusal letter.    Nighat Carbajal RN, BSN, PHN  [facility]   [phone]

## 2018-07-23 NOTE — MR AVS SNAPSHOT
Gemma Cowart   7/23/2018 8:30 AM   Anticoagulation Therapy Visit    Description:  72 year old female   Provider:   ANTICOAGULATION CLINIC   Department:   Nurse           INR as of 7/23/2018     Today's INR 1.8!      Anticoagulation Summary as of 7/23/2018     INR goal 2.0-3.0   Today's INR 1.8!   Full warfarin instructions 7/24: 10 mg; Otherwise 10 mg on Mon, Wed; 7.5 mg all other days   Next INR check 8/20/2018    Indications   Personal history of DVT (deep vein thrombosis) [Z86.718]  Long-term (current) use of anticoagulants [Z79.01] [Z79.01]         Your next Anticoagulation Clinic appointment(s)     Jul 23, 2018  8:30 AM CDT   Anticoagulation Visit with  ANTICOAGULATION CLINIC   [facility] ([facility]    [address] [place] [address]  [address]  [place] [address] [phone]            Aug 20, 2018  8:00 AM CDT   Anticoagulation Visit with  ANTICOAGULATION CLINIC   [facility] ([facility])    [address] [place] [address]  [address]  [facility] [address] [phone]              Contact Numbers     [facility]  Please call  [phone] to cancel and/or reschedule your appointment   Please call  [phone] with any problems or questions regarding your therapy.        July 2018 Details    Sun Mon Tue Wed Thu Fri Sat     1               2               3               4               5               6               7                 8               9               10               11               12               13               14                 15               16               17               18               19               20               21                 22               23      10 mg   See details      24      10 mg         25      10 mg         26      7.5 mg         27      7.5 mg         28      7.5 mg           29      7.5 mg         30      10 mg         31      7.5 mg              Date Details   07/23 This INR check                How to take your warfarin dose     To take:  7.5 mg Take 1.5 of the 5 mg tablets.    To take:  10 mg Take 2 of the 5 mg tablets.           August 2018 Details    Sun Mon Tue Wed Thu Fri Sat        1      10 mg         2      7.5 mg         3      7.5 mg         4      7.5 mg           5      7.5 mg         6      10 mg         7      7.5 mg         8      10 mg         9      7.5 mg         10      7.5 mg         11      7.5 mg           12      7.5 mg         13      10 mg         14      7.5 mg         15      10 mg         16      7.5 mg         17      7.5 mg         18      7.5 mg           19      7.5 mg         20            21               22               23               24               25                 26               27               28               29               30               31                 Date Details   No additional details    Date of next INR:  8/20/2018         How to take your warfarin dose     To take:  7.5 mg Take 1.5 of the 5 mg tablets.    To take:  10 mg Take 2 of the 5 mg tablets.

## 2018-07-23 NOTE — PROGRESS NOTES
ANTICOAGULATION FOLLOW-UP CLINIC VISIT    Patient Name:  Gemma Cowart  Date:  7/23/2018  Contact Type:  Face to Face  Pt missed her coumadin on Sat by accident. Denies any concerns/sx's. Increased her coumadin dosing for this week & monitor. Recheck in 4 weeks.     SUBJECTIVE:     Patient Findings     Positives Missed doses    Comments Denies bleeding/bruising.             OBJECTIVE    INR Protime   Date Value Ref Range Status   07/23/2018 1.8 (A) 0.86 - 1.14 Final       ASSESSMENT / PLAN  INR assessment SUB    Recheck INR In: 4 WEEKS    INR Location Clinic      Anticoagulation Summary as of 7/23/2018     INR goal 2.0-3.0   Today's INR 1.8!   Warfarin maintenance plan 10 mg (5 mg x 2) on Mon, Wed; 7.5 mg (5 mg x 1.5) all other days   Full warfarin instructions 7/24: 10 mg; Otherwise 10 mg on Mon, Wed; 7.5 mg all other days   Weekly warfarin total 57.5 mg   Plan last modified Camila Hernández RN (6/11/2018)   Next INR check 8/20/2018   Target end date Indefinite    Indications   Personal history of DVT (deep vein thrombosis) [Z86.718]  Long-term (current) use of anticoagulants [Z79.01] [Z79.01]         Anticoagulation Episode Summary     INR check location [facility]    Preferred lab     Send INR reminders to [facility]    Comments 5mg tabs - HS dose // CALENDAR // gastric bypass 2000 // DVT 11/16/12 warfarin until Apr 2013 // DVT 9/12/15      Anticoagulation Care Providers     Provider Role Specialty Phone number    Alva Viera MD  Internal Medicine [phone]            See the Encounter Report to view Anticoagulation Flowsheet and Dosing Calendar (Go to Encounters tab in chart review, and find the Anticoagulation Therapy Visit)    Debra Murphy RN

## 2018-07-23 NOTE — LETTER
[address], [address]  [place] [address]  Phone:  [phone]  Fax:  [phone]        July 23, 2018    SALUD EDMUNDO MARTINEZ  [address] [place] [address]    Dear Salud,    My name is Nighat Carbajal RN, BSN, PHN and I am your [facility] Care Coordinator.  You have indicated that you are not interested in meeting with me in person to complete a Health Risk Assessment.     As your care coordinator, I work in collaboration with your primary physician and clinic.  We also want you to be aware of the health plan initiatives that are available to you, free of charge.     Currently, the health plan is supporting the pneumonia vaccine and prescription ordered calcium and vitamin D supplements.  Please call me for more details, my number is listed below.    [facility] encourages all members to contact their care coordinator if they have had any change in condition, a recent emergency room visit or hospital stay.    Thank you,       Nighat Carbajal RN, BSN, PHN  Care Coordinator  [facility]  [phone]

## 2018-08-09 DIAGNOSIS — Z79.01 LONG-TERM (CURRENT) USE OF ANTICOAGULANTS: ICD-10-CM

## 2018-08-09 DIAGNOSIS — Z86.718 PERSONAL HISTORY OF DVT (DEEP VEIN THROMBOSIS): ICD-10-CM

## 2018-08-10 NOTE — TELEPHONE ENCOUNTER
Not due for a refill.     warfarin (COUMADIN) 5 MG tablet was filled on 6/18/2018, qty 150 with 3 refills.

## 2018-08-13 ENCOUNTER — PATIENT OUTREACH (OUTPATIENT)
Dept: GERIATRIC MEDICINE | Facility: CLINIC | Age: 73
End: 2018-08-13

## 2018-08-13 RX ORDER — WARFARIN SODIUM 7.5 MG/1
TABLET ORAL
Qty: 150 TABLET | Refills: 0 | OUTPATIENT
Start: 2018-08-13

## 2018-08-13 NOTE — PROGRESS NOTES
[facility] Care Coordination Contact  Call placed to member to schedule a home visit appointment to complete the annual HRA.  A home visit has been scheduled for Friday, August 24 at 2 pm. Client's new address is [address] [place] [address].  will send [place] [phone] with client's new address.  Nighat Carbajal RN, BSN, PHN  [facility] Care Coordinator  [phone]

## 2018-08-13 NOTE — TELEPHONE ENCOUNTER
Pharmacist Wendy called back, stated Patient is not due, for RX refill, at this time.    Please disregard RX refill request

## 2018-08-13 NOTE — TELEPHONE ENCOUNTER
Spoke with pharmacist and we had sent in for 5 mg tabs in June. We usually prefer to send in just one size tablet if possible. Pharmacist is going to check with the patient to see if she is specifically wanting the 7.5 and the 5 mg for the Monday dosing and call back.   Camila Hernández RN

## 2018-08-22 ENCOUNTER — TELEPHONE (OUTPATIENT)
Dept: PEDIATRICS | Facility: CLINIC | Age: 73
End: 2018-08-22

## 2018-08-22 NOTE — TELEPHONE ENCOUNTER
Pt no showed for his INR appointment on 8/20. I have left a message on 8/20 for pt to call back.     Doesn't look like pt called us back. Please reach pt again to offer INR appointment. Thanks.     Reyna, RN  Triage Nurse

## 2018-08-24 NOTE — TELEPHONE ENCOUNTER
The pt is aware and scheduled for her upcoming appointments on 9/7/18.   Rosa Maria Ngo on 8/24/2018 at 9:44 AM

## 2018-08-31 ENCOUNTER — PATIENT OUTREACH (OUTPATIENT)
Dept: GERIATRIC MEDICINE | Facility: CLINIC | Age: 73
End: 2018-08-31

## 2018-09-06 ASSESSMENT — ACTIVITIES OF DAILY LIVING (ADL): DEPENDENT_IADLS:: INDEPENDENT

## 2018-09-06 NOTE — PROGRESS NOTES
[facility] Care Coordination Contact    [facility] Home Visit Assessment     Home visit for Health Risk Assessment with Gemma Cowart completed on August 31, 2018    Type of residence:: Private home - stairs  Current living arrangement:: I live in a private home with family     Assessment completed with:: Patient    Current Care Plan  Member currently receiving the following home care services:     Member currently receiving the following community resources: None      Medication Review  Medication reconciliation completed in Epic: Yes  Medication set-up & administration: Independent and sets up on own weekly.  Self-administers medications.  Medication understanding concerns (by member, family or CC): No  Medication adherence concerns (by member, family or CC): No    Mental/Behavioral Health   Depression Screening: See PHQ assessment flowsheet.   Mental health DX:: Yes   Mental health DX how managed:: Medication    Falls Assessment:   Fallen 2 or more times in the past year?: Yes, Closed Rib Fracture.        ADL/IADL Dependencies:   Dependent ADLs:: Independent  Dependent IADLs:: Independent    [facility] sponsored benefits: Shared information re: Silver Sneakers/gym memberships, ASA, Calcium +D.    PCA Assessment completed at visit: Not applicable     Elderly Waiver Eligibility: No-does not meet criteria    Care Plan & Recommendations: Continue to support client with care coordination services as needed.    See [facility] for detailed assessment information.    Follow-Up Plan: Member informed of future contact, plan to f/u with member with a 6 month telephone assessment.  Contact information shared with member and family, encouraged member to call with any questions or concerns at any time.    [facility] care continuum providers: Please refer to Health Care Home on the [facility] Problem List to view this patient's [facility] Care Plan Summary.    Nighat Carbajal RN, BSN, PHN  [facility]  Care Coordinator  [phone]

## 2018-09-07 ENCOUNTER — OFFICE VISIT (OUTPATIENT)
Dept: PEDIATRICS | Facility: CLINIC | Age: 73
End: 2018-09-07
Payer: COMMERCIAL

## 2018-09-07 ENCOUNTER — ANTICOAGULATION THERAPY VISIT (OUTPATIENT)
Dept: PEDIATRICS | Facility: CLINIC | Age: 73
End: 2018-09-07

## 2018-09-07 VITALS
HEART RATE: 78 BPM | DIASTOLIC BLOOD PRESSURE: 60 MMHG | HEIGHT: 63 IN | OXYGEN SATURATION: 96 % | SYSTOLIC BLOOD PRESSURE: 110 MMHG | WEIGHT: 189 LBS | BODY MASS INDEX: 33.49 KG/M2 | TEMPERATURE: 98.4 F

## 2018-09-07 DIAGNOSIS — F41.1 GAD (GENERALIZED ANXIETY DISORDER): ICD-10-CM

## 2018-09-07 DIAGNOSIS — E11.69 TYPE 2 DIABETES MELLITUS WITH OTHER SPECIFIED COMPLICATION, WITHOUT LONG-TERM CURRENT USE OF INSULIN (H): Primary | Chronic | ICD-10-CM

## 2018-09-07 DIAGNOSIS — E53.8 VITAMIN B12 DEFICIENCY: ICD-10-CM

## 2018-09-07 DIAGNOSIS — Z98.84 S/P GASTRIC BYPASS: ICD-10-CM

## 2018-09-07 DIAGNOSIS — Z79.01 LONG-TERM (CURRENT) USE OF ANTICOAGULANTS: ICD-10-CM

## 2018-09-07 DIAGNOSIS — C50.912 MALIGNANT NEOPLASM OF LEFT FEMALE BREAST, UNSPECIFIED ESTROGEN RECEPTOR STATUS, UNSPECIFIED SITE OF BREAST (H): ICD-10-CM

## 2018-09-07 DIAGNOSIS — E11.69 TYPE 2 DIABETES MELLITUS WITH OTHER SPECIFIED COMPLICATION, WITHOUT LONG-TERM CURRENT USE OF INSULIN (H): Chronic | ICD-10-CM

## 2018-09-07 DIAGNOSIS — Z86.718 PERSONAL HISTORY OF DVT (DEEP VEIN THROMBOSIS): ICD-10-CM

## 2018-09-07 DIAGNOSIS — Z23 NEED FOR PROPHYLACTIC VACCINATION AND INOCULATION AGAINST INFLUENZA: ICD-10-CM

## 2018-09-07 DIAGNOSIS — E78.5 HYPERLIPIDEMIA LDL GOAL <100: Chronic | ICD-10-CM

## 2018-09-07 LAB
CREAT UR-MCNC: 126 MG/DL
HBA1C MFR BLD: 5.7 % (ref 0–5.6)
INR PPP: 1.2 (ref 0.86–1.14)
MICROALBUMIN UR-MCNC: 6 MG/L
MICROALBUMIN/CREAT UR: 5.03 MG/G CR (ref 0–25)

## 2018-09-07 PROCEDURE — 99207 ZZC NO CHARGE NURSE ONLY: CPT | Performed by: INTERNAL MEDICINE

## 2018-09-07 PROCEDURE — 83036 HEMOGLOBIN GLYCOSYLATED A1C: CPT | Performed by: INTERNAL MEDICINE

## 2018-09-07 PROCEDURE — 85610 PROTHROMBIN TIME: CPT | Performed by: INTERNAL MEDICINE

## 2018-09-07 PROCEDURE — 82043 UR ALBUMIN QUANTITATIVE: CPT | Performed by: INTERNAL MEDICINE

## 2018-09-07 PROCEDURE — G0008 ADMIN INFLUENZA VIRUS VAC: HCPCS | Performed by: INTERNAL MEDICINE

## 2018-09-07 PROCEDURE — 90662 IIV NO PRSV INCREASED AG IM: CPT | Performed by: INTERNAL MEDICINE

## 2018-09-07 PROCEDURE — 99214 OFFICE O/P EST MOD 30 MIN: CPT | Mod: 25 | Performed by: INTERNAL MEDICINE

## 2018-09-07 PROCEDURE — 80061 LIPID PANEL: CPT | Performed by: INTERNAL MEDICINE

## 2018-09-07 PROCEDURE — 36416 COLLJ CAPILLARY BLOOD SPEC: CPT | Performed by: INTERNAL MEDICINE

## 2018-09-07 RX ORDER — PAROXETINE 30 MG/1
60 TABLET, FILM COATED ORAL AT BEDTIME
Qty: 180 TABLET | Refills: 3 | Status: SHIPPED | OUTPATIENT
Start: 2018-09-07 | End: 2019-08-12

## 2018-09-07 RX ORDER — BUSPIRONE HYDROCHLORIDE 5 MG/1
5 TABLET ORAL 2 TIMES DAILY
Qty: 180 TABLET | Refills: 1 | Status: SHIPPED | OUTPATIENT
Start: 2018-09-07 | End: 2018-10-17

## 2018-09-07 RX ORDER — CYANOCOBALAMIN (VITAMIN B-12) 2500 MCG
2500 TABLET, SUBLINGUAL SUBLINGUAL DAILY
Qty: 90 TABLET | Refills: 3 | Status: SHIPPED | OUTPATIENT
Start: 2018-09-07 | End: 2019-09-06

## 2018-09-07 RX ORDER — MULTIVIT-MIN/IRON/FOLIC ACID/K 18-600-40
1 CAPSULE ORAL DAILY
Qty: 90 CAPSULE | Refills: 3 | Status: SHIPPED | OUTPATIENT
Start: 2018-09-07 | End: 2019-09-06

## 2018-09-07 NOTE — Clinical Note
"Pt has a hx of \"no shows\" for INR visits. Her INR on 9/7 was 1.2. Dosing instructions were provided & scheduled her to recheck INR on 9/11. If pt doesn't show up for her appointment or cancels her appointment, please reach out to bring her in ASAP. Thanks.   Debra"

## 2018-09-07 NOTE — PROGRESS NOTES
ANTICOAGULATION FOLLOW-UP CLINIC VISIT    Patient Name:  Gemma Cowart  Date:  9/7/2018  Contact Type:  Telephone  Pt no showed for her INR appointment on 8/20, was seen by  today & had INR done through labs. Her INR today is low(1.2). Called pt. Unable to find the cause for the abnormal INR result. Denies change in diet, new med/supplement, illness, inflammation, bleeding/bruising, missed dose or any alcohol intake. She usually takes her coumadin in the morning, but haven't taken her med yet because of she had to go in fasting for her appointment this am. Denies any clotting/stroke sx's.     Advised pt to take 15 mg today, 10 mg Sat, 7.5 mg Sun & 10 mg on Mon, recheck INR on Tues(9/11). Also advised to stay away from green leafy veg/salads. Went over the sx's to look for in case of clot/PE/stroke. In such instances, advised her to go to ER. Pt agrees to the plan.    SUBJECTIVE:        OBJECTIVE    INR   Date Value Ref Range Status   09/07/2018 1.20 (H) 0.86 - 1.14 Final     Comment:     This test is intended for monitoring Coumadin therapy.  Results are not   accurate in patients with prolonged INR due to factor deficiency.         ASSESSMENT / PLAN  INR assessment SUB    Recheck INR In: 4 DAYS    INR Location Clinic      Anticoagulation Summary as of 9/7/2018     INR goal 2.0-3.0   Today's INR 1.20!   Warfarin maintenance plan 10 mg (5 mg x 2) on Mon, Wed; 7.5 mg (5 mg x 1.5) all other days   Full warfarin instructions 9/7: 15 mg; 9/8: 10 mg; Otherwise 10 mg on Mon, Wed; 7.5 mg all other days   Weekly warfarin total 57.5 mg   Plan last modified Camila Hernández RN (6/11/2018)   Next INR check 9/11/2018   Target end date Indefinite    Indications   Personal history of DVT (deep vein thrombosis) [Z86.718]  Long-term (current) use of anticoagulants [Z79.01] [Z79.01]         Anticoagulation Episode Summary     INR check location [facility]    Preferred lab     Send IN[facility] reminders to [facility]    Comments 5mg tabs - HS dose // CALENDAR // gastric bypass 2000 // DVT 11/16/12 warfarin until Apr 2013 // DVT 9/12/15      Anticoagulation Care Providers     Provider Role Specialty Phone number    Alva Viera MD  Internal Medicine [phone]            See the Encounter Report to view Anticoagulation Flowsheet and Dosing Calendar (Go to Encounters tab in chart review, and find the Anticoagulation Therapy Visit)    Debra Murphy RN

## 2018-09-07 NOTE — MR AVS SNAPSHOT
Gemma WYATT Sofya   9/7/2018   Anticoagulation Therapy Visit    Description:  72 year old female   Provider:  Alva Viera MD   Department:  [facility]           INR as of 9/7/2018     Today's INR 1.20!      Anticoagulation Summary as of 9/7/2018     INR goal 2.0-3.0   Today's INR 1.20!   Full warfarin instructions 9/7: 15 mg; 9/8: 10 mg; Otherwise 10 mg on Mon, Wed; 7.5 mg all other days   Next INR check 9/11/2018    Indications   Personal history of DVT (deep vein thrombosis) [Z86.718]  Long-term (current) use of anticoagulants [Z79.01] [Z79.01]         Your next Anticoagulation Clinic appointment(s)     Sep 11, 2018 10:30 AM CDT   Anticoagulation Visit with  ANTICOAGULATION CLINIC   [facility] [place] [facility] ([facility])    [address] [place] [address]  [address]  [facility] [address]   [phone]              September 2018 Details    Sun Mon Tue Wed Thu Fri Sat           1                 2               3               4               5               6               7      15 mg   See details      8      10 mg           9      7.5 mg         10      10 mg         11            12               13               14               15                 16               17               18               19               20               21               22                 23               24               25               26               27               28               29                 30                      Date Details   09/07 This INR check       Date of next INR:  9/11/2018         How to take your warfarin dose     To take:  7.5 mg Take 1.5 of the 5 mg tablets.    To take:  10 mg Take 2 of the 5 mg tablets.    To take:  15 mg Take 3 of the 5 mg tablets.

## 2018-09-07 NOTE — MR AVS SNAPSHOT
After Visit Summary   9/7/2018    Gemma Cowart    MRN: [id number]           Patient Information     Date Of Birth          1945        Visit Information        Provider Department      9/7/2018 10:50 AM Alva Viera MD [facility] [place]        Today's Diagnoses     Type 2 diabetes mellitus with other specified complication, without long-term current use of insulin (H)    -  1    Need for prophylactic vaccination and inoculation against influenza        Vitamin B12 deficiency        S/P gastric bypass        ELI (generalized anxiety disorder)        Personal history of DVT (deep vein thrombosis)          Care Instructions    Diabetes numbers look great! A1c is 5.7. No changes (you aren't on anything). Follow-up in 6-9 months.     Follow-up with Dr. Narvaez as scheduled.    We will get you figured out for INR management.    For anxiety:  -- continue paroxetine  -- start buspirone 1 tab twice daily. Touch base with me in about 1 month to see if we need to increase this dose further.           Follow-ups after your visit        Additional Services     INR CLINIC REFERRAL       Your provider has referred you to INR Services.    Please be aware that coverage of these services is subject to the terms and limitations of your health insurance plan.  Call member services at your health plan with any benefit or coverage questions.    Indication for Anticoagulation: DVT (recurrent)  If nonstandard INR is desired, indicate goal range and explanation: 2-3  Expected Duration of Therapy: Lifetime                  Your next 10 appointments already scheduled     Sep 07, 2018 10:50 AM CDT   Office Visit with Alva Viera MD   [facility] [place] ([facility])    [address] [place] [address] [phone]           Bring a current list of meds and any records pertaining to this visit. For Physicals, please bring immunization records and any forms needing to  be filled out. Please arrive 10 minutes early to complete paperwork.            Sep 07, 2018 11:30 AM CDT   LAB with EA LAB   [facility] [place] ([facility])    [address]  [address]  [place] [address] [phone]           Please do not eat 10-12 hours before your appointment if you are coming in fasting for labs on lipids, cholesterol, or glucose (sugar). This does not apply to pregnant women. Water, hot tea and black coffee (with nothing added) are okay. Do not drink other fluids, diet soda or chew gum.            Oct 10, 2018  8:30 AM CDT   Return Visit with  Oncology Nurse   [facility] ([facility])    [facility]  [address]  [facility] [address]   [phone]            Oct 17, 2018  8:40 AM CDT   Return Visit with Jyoti Narvaez MD   [facility] ([facility])    [facility]  [address]  [facility] [address]   [phone]              Who to contact     If you have questions or need follow up information about today's clinic visit or your schedule please contact [facility] directly at [phone].  Normal or non-critical lab and imaging results will be communicated to you by MyChart, letter or phone within 4 business days after the clinic has received the results. If you do not hear from us within 7 days, please contact the clinic through MyChart or phone. If you have a critical or abnormal lab result, we will notify you by phone as soon as possible.  Submit refill requests through [employer] or call your pharmacy and they will forward the refill request to us. Please allow 3 business days for your refill to be completed.          Additional Information About Your Visit        Care EveryWhere ID     This is your Care EveryWhere ID. This could be used by other organizations to access your [place] medical records  [id number]       "  Your Vitals Were     Pulse Temperature Height Last Period Pulse Oximetry BMI (Body Mass Index)    78 98.4  F (36.9  C) (Tympanic) 5' 3.25\" (1.607 m) 09/24/1990 96% 33.22 kg/m2       Blood Pressure from Last 3 Encounters:   09/07/18 110/60   05/15/18 108/52   05/09/18 110/62    Weight from Last 3 Encounters:   09/07/18 189 lb (85.7 kg)   05/15/18 184 lb (83.5 kg)   05/09/18 184 lb (83.5 kg)              We Performed the Following     FLU VAC, SPLIT VIRUS IM > 3 YO (QUADRIVALENT) [83548]     INR CLINIC REFERRAL     Vaccine Administration, Initial [59028]          Today's Medication Changes          These changes are accurate as of 9/7/18 10:26 AM.  If you have any questions, ask your nurse or doctor.               Start taking these medicines.        Dose/Directions    busPIRone 5 MG tablet   Commonly known as:  BUSPAR   Used for:  ELI (generalized anxiety disorder)   Started by:  Alva Viera MD        Dose:  5 mg   Take 1 tablet (5 mg) by mouth 2 times daily   Quantity:  180 tablet   Refills:  1         These medicines have changed or have updated prescriptions.        Dose/Directions    * B-12 1000 MCG Tbcr   This may have changed:  Another medication with the same name was added. Make sure you understand how and when to take each.   Used for:  Vitamin B12 deficiency   Changed by:  Alva Viera MD        Dose:  1000 mcg   Take 1,000 mcg by mouth daily   Quantity:  90 tablet   Refills:  1       * cyanocobalamin 2500 MCG sublingual tablet   Commonly known as:  VITAMIN B-12   This may have changed:  You were already taking a medication with the same name, and this prescription was added. Make sure you understand how and when to take each.   Used for:  Vitamin B12 deficiency, S/P gastric bypass   Changed by:  Alva Viera MD        Dose:  2500 mcg   Place 2,500 mcg under the tongue daily   Quantity:  90 tablet   Refills:  3       * Notice:  This list has 2 medication(s) that are the same as other " medications prescribed for you. Read the directions carefully, and ask your doctor or other care provider to review them with you.         Where to get your medicines      These medications were sent to [facility] [id number] [place] [address] AT [address]  [address] [facility] [address]     Phone:  [phone]     busPIRone 5 MG tablet    cyanocobalamin 2500 MCG sublingual tablet    PARoxetine 30 MG tablet    vitamin D 2000 units Caps                Primary Care Provider Office Phone # Fax #    Alva Viera [phone] [address] [place] [address]        Equal Access to Services     [facility] FLORENTIN : Hadii kavitha wilkinsono Somisa, waaxda khurramadaha, qaybta kaalmada sarah, gb hines . So [facility] [phone].    ATENCIÓN: Si habla español, tiene a marcum disposición servicios gratuitos de asistencia lingüística. Llame al [phone].    We comply with applicable federal civil rights laws and [place] laws. We do not discriminate on the basis of race, color, national origin, age, disability, sex, sexual orientation, or gender identity.            Thank you!     Thank you for choosing [facility]  for your care. Our goal is always to provide you with excellent care. Hearing back from our patients is one way we can continue to improve our services. Please take a few minutes to complete the written survey that you may receive in the mail after your visit with us. Thank you!             Your Updated Medication List - Protect others around you: Learn how to safely use, store and throw away your medicines at www.disposemymeds.org.          This list is accurate as of 9/7/18 10:26 AM.  Always use your most recent med list.                   Brand Name Dispense Instructions for use Diagnosis    ACE/ARB/ARNI NOT PRESCRIBED (INTENTIONAL)      ACE & ARB not prescribed due to Other: normal tensive, low  microalbumuria    Type 2 diabetes mellitus with other specified complication (H)       * B-12 1000 MCG Tbcr     90 tablet    Take 1,000 mcg by mouth daily    Vitamin B12 deficiency       * cyanocobalamin 2500 MCG sublingual tablet    VITAMIN B-12    90 tablet    Place 2,500 mcg under the tongue daily    Vitamin B12 deficiency, S/P gastric bypass       blood glucose lancets standard    no brand specified    2 Box    Use to test blood sugars 2 times daily or as directed. Please dispense lancets for Relion Confirm.    Type 2 diabetes mellitus with other specified complication (H)       blood glucose monitoring test strip    no brand specified    200 each    Use to test blood sugars 2 times daily or as directed. Please dispense Relion Confirm test strips    Type 2 diabetes mellitus with other specified complication (H)       busPIRone 5 MG tablet    BUSPAR    180 tablet    Take 1 tablet (5 mg) by mouth 2 times daily    ELI (generalized anxiety disorder)       * clobetasol 0.05 % cream    TEMOVATE    270 g    Apply sparingly to affected area twice daily as needed.  Do not apply to face.    Psoriasis       * clobetasol 0.05 % external solution    TEMOVATE    50 mL    Apply sparingly to affected area twice daily for 14 days.  Do not apply to face.    Psoriasis       nystatin 736671 UNIT/GM Powd    MYCOSTATIN    60 g    Apply topically 3 times daily as needed    Yeast infection of the skin       PARoxetine 30 MG tablet    PAXIL    180 tablet    Take 2 tablets (60 mg) by mouth At Bedtime    ELI (generalized anxiety disorder)       STATIN NOT PRESCRIBED (INTENTIONAL)      Please choose reason not prescribed, below        TYLENOL 500 MG tablet   Generic drug:  acetaminophen     100 tablet    Take 2 tablets (1,000 mg) by mouth every 8 hours as needed for mild pain        vitamin D 2000 units Caps     90 capsule    Take 1 capsule by mouth daily    S/P gastric bypass       warfarin 5 MG tablet    COUMADIN    150 tablet    Take 10  MG M,W(2 tabs) 7.5 MG(1.5 tabs) all others or as advise by INR clinic    Long-term (current) use of anticoagulants, Personal history of DVT (deep vein thrombosis)       * Notice:  This list has 4 medication(s) that are the same as other medications prescribed for you. Read the directions carefully, and ask your doctor or other care provider to review them with you.

## 2018-09-07 NOTE — PATIENT INSTRUCTIONS
Diabetes numbers look great! A1c is 5.7. No changes (you aren't on anything). Follow-up in 6-9 months.     Follow-up with Dr. Narvaez as scheduled.    We will get you figured out for INR management.    For anxiety:  -- continue paroxetine  -- start buspirone 1 tab twice daily. Touch base with me in about 1 month to see if we need to increase this dose further.

## 2018-09-07 NOTE — LETTER
[facility]  [address] [place] [address]                  [phone]   September 10, 2018    Gemma Cowart  [address] [place] [address]   [facility] [address]      Dear Gemma,     Please find your lab results enclosed. Your hemoglobin A1c looks excellent, as we discussed. Additionally, your cholesterol levels look great. Your urine protein levels were normal. Everything is looking good! Please let me know if you have any concerns or questions.     Sincerely,     Alva Viera MD   Internal Medicine-Pediatrics       Results for orders placed or performed in visit on 09/07/18   Lipid panel reflex to direct LDL   Result Value Ref Range    Cholesterol 172 <200 mg/dL    Triglycerides 106 <150 mg/dL    HDL Cholesterol 58 >49 mg/dL    LDL Cholesterol Calculated 93 <100 mg/dL    Non HDL Cholesterol 114 <130 mg/dL   Hemoglobin A1c   Result Value Ref Range    Hemoglobin A1C 5.7 (H) 0 - 5.6 %   Albumin Random Urine Quantitative with Creat Ratio   Result Value Ref Range    Creatinine Urine 126 mg/dL    Albumin Urine mg/L 6 mg/L    Albumin Urine mg/g Cr 5.03 0 - 25 mg/g Cr

## 2018-09-07 NOTE — PROGRESS NOTES
"  SUBJECTIVE:   Gemma Cowart is a 72 year old female who presents to clinic today for the following health issues:      Diabetes Follow-up      Patient is checking blood sugars: intermittently results 140's - 150's in the am     Diabetic concerns: low blood sugar - 47 and 60's      Symptoms of hypoglycemia (low blood sugar): shaky, dizzy, weak     Paresthesias (numbness or burning in feet) or sores: No     Date of last diabetic eye exam: x1 year ago     BP Readings from Last 2 Encounters:   05/15/18 108/52   18 110/62     Hemoglobin A1C (%)   Date Value   2018 5.5   10/02/2017 5.4     LDL Cholesterol Calculated (mg/dL)   Date Value   2018 85   10/12/2016 43       Diabetes Management Resources  Hyperlipidemia Follow-Up      Rate your low fat/cholesterol diet?: not monitoring fat    Taking statin?  No    Other lipid medications/supplements?:  none      Amount of exercise or physical activity: None    Problems taking medications regularly: No    Medication side effects: none     Diet: regular (no restrictions)      Gemma comes in for follow-up of the followin. DM: Manages this without medication; purely on diet control. Has not been checking blood sugars.    2. Breast cancer: Scheduled for annual follow-up with Oncology next month.    3. Hx of DVT: on warfarin chronically; had INR checked today and needs advice on how to manage this. Has been eating more salads recently.    4. Anxiety: Feels that she has been slightly more anxious recently. Unsure of why this is, but just feeling more \"on edge\". Wondering if she can go up on her paroxetine dose.     Problem list and histories reviewed & adjusted, as indicated.  Additional history: as documented    Patient Active Problem List   Diagnosis     Nontoxic uninodular goiter     Rosacea     BRETT (iron deficiency anemia)     Complications of gastric bypass surgery     Vitamin D deficiency disease     Vitamin B12 deficiency     Eczema     Dyshydrosis "     Hyperlipidemia LDL goal <100     Anxiety     Insomnia     Health Care Home     Type 2 diabetes mellitus with other specified complication (H)     Psoriasis     Long-term (current) use of anticoagulants [Z79.01]     Advanced directives, counseling/discussion     Malignant neoplasm of left female breast (H)     Morbid obesity (H)     CKD (chronic kidney disease) stage 3, GFR 30-59 ml/min     Personal history of DVT (deep vein thrombosis)     Acute pain of right knee     Postmenopausal bleeding     Past Surgical History:   Procedure Laterality Date     ABDOMEN SURGERY  2000    gastric bypass 2000     COLONOSCOPY  10/11/2012    Procedure: COLONOSCOPY;  colonoscopy;  Surgeon: Gela Cleary MD;  Location:  GI     COLONOSCOPY N/A 10/6/2017    Procedure: COLONOSCOPY;;  Surgeon: Shashank Ortiz MD;  Location:  GI     DENTAL SURGERY  5/2007    all teeth removed - dentures     ENT SURGERY      all teeth pulled     ESOPHAGOSCOPY, GASTROSCOPY, DUODENOSCOPY (EGD), COMBINED N/A 10/6/2017    Procedure: COMBINED ESOPHAGOSCOPY, GASTROSCOPY, DUODENOSCOPY (EGD);  ESOPHAGOSCOPY, GASTROSCOPY, DUODENOSCOPY (EGD) & Colonoscopy *Needs INR on arrival;  Surgeon: Shashank Ortiz MD;  Location:  GI     GYN SURGERY  1/10/75    tubal ligation      H ABLATION SVT  5/28/2015    stopped metoprolol     HC BREAST RECONSTRUC W OTHR TECHNIQ  5/8/2013     HERNIA REPAIR  2005     IMPLANT FILTER INFERIOR VENA CAVA  10/8/2012    taken out  1/4/12     INSERT TISSUE EXPANDER BREAST BILATERAL  9/5/2012    Procedure: INSERT TISSUE EXPANDER BREAST BILATERAL;;  Surgeon: Orlando Wall MD;  Location:  OR     MASTECTOMY SIMPLE, SENTINEL NODE, COMBINED  9/5/2012    Procedure: COMBINED MASTECTOMY SIMPLE, SENTINEL NODE;  BILATERAL MASTECTOMY WITH LEFT AXILLARY SENTINEL LYMPH NODE BIOPSY, BILATERAL TISSUE EXPANDER        MASTECTOMY, BILATERAL       PANNICULECTOMY N/A 4/5/2018    Procedure: PANNICULECTOMY;  PANNICULECTOMY ;  Surgeon:  "Orlando Wall MD;  Location:  OR     RECONSTRUCT BREAST BILATERAL, IMPLANT PROSTHESIS BILATERAL, COMBINED  5/22/2013    Procedure: COMBINED RECONSTRUCT BREAST BILATERAL, IMPLANT PROSTHESIS BILATERAL;  BILATERAL SECOND STAGE BREAST RECONSTRUCTION WITH SILICONE GEL IMPLANTS AND LIPOSUCTION;  Surgeon: Orlando Wall MD;  Location: [facility]     RECONSTRUCT NIPPLE BILATERAL  8/22/2013    Procedure: RECONSTRUCT NIPPLE BILATERAL;  BILATERAL NIPPLE AREOLAR RECONSTRUCTION;  Surgeon: Orlando Wall MD;  Location: [facility]       Social History   Substance Use Topics     Smoking status: Former Smoker     Packs/day: 1.50     Years: 25.00     Types: Cigarettes     Quit date: 10/21/1986     Smokeless tobacco: Never Used      Comment: quit 1985     Alcohol use No     Family History   Problem Relation Age of Onset     Cancer - colorectal Mother      Colon Cancer Mother      Prostate Cancer Father      Alzheimer Disease Father      Cancer Paternal Grandmother      Alzheimer Disease Paternal Grandfather      Cancer - colorectal Brother      Diabetes Brother      Crohn Disease Daughter      Diabetes Brother      Eye Disorder Brother      Diabetes Son      Cancer Maternal Uncle      Cancer Maternal Uncle            Reviewed and updated as needed this visit by clinical staff  Allergies  Meds  Med Hx  Fam Hx       ROS:  Constitutional, HEENT, cardiovascular, pulmonary, gi and psych systems are negative, except as otherwise noted.    OBJECTIVE:     /60 (BP Location: Right arm, Patient Position: Chair, Cuff Size: Adult Regular)  Pulse 78  Temp 98.4  F (36.9  C) (Tympanic)  Ht 5' 3.25\" (1.607 m)  Wt 189 lb (85.7 kg)  LMP 09/24/1990  SpO2 96%  BMI 33.22 kg/m2  Body mass index is 33.22 kg/(m^2).  GENERAL: healthy, alert and no distress  NEURO: Normal strength and tone, mentation intact and speech normal  PSYCH: mentation appears normal, affect normal/bright    Diagnostic Test Results:  Results for orders placed " or performed in visit on 09/07/18   INR   Result Value Ref Range    INR 1.20 (H) 0.86 - 1.14       ASSESSMENT/PLAN:     1. Type 2 diabetes mellitus with other specified complication, without long-term current use of insulin (H)  Managed with diet following gastric bypass; well controlled at this time. Follow-up every 6 months.  - Hemoglobin A1c; Future  - Albumin Random Urine Quantitative with Creat Ratio; Future    2. Vitamin B12 deficiency  Due to gastric bypass; medication refilled.   - cyanocobalamin (VITAMIN B-12) 2500 MCG sublingual tablet; Place 2,500 mcg under the tongue daily  Dispense: 90 tablet; Refill: 3    3. S/P gastric bypass  Medications refilled. Due for labs at next visit.  - Cholecalciferol (VITAMIN D) 2000 units CAPS; Take 1 capsule by mouth daily  Dispense: 90 capsule; Refill: 3  - cyanocobalamin (VITAMIN B-12) 2500 MCG sublingual tablet; Place 2,500 mcg under the tongue daily  Dispense: 90 tablet; Refill: 3    4. ELI (generalized anxiety disorder)  Recently worsened, unclear why. Reviewed with patient that she is already taking higher than recommended dose of paroxetine (in past she has declined to decrease). Will plan to start Buspar to see if this helps. Medication side effects reviewed, will start at low dose and taper up as needed.   - PARoxetine (PAXIL) 30 MG tablet; Take 2 tablets (60 mg) by mouth At Bedtime  Dispense: 180 tablet; Refill: 3  - busPIRone (BUSPAR) 5 MG tablet; Take 1 tablet (5 mg) by mouth 2 times daily  Dispense: 180 tablet; Refill: 1    5. Personal history of DVT (deep vein thrombosis)  INR nurse to reach out to patient today for management of subtherapeutic levels.   - INR CLINIC REFERRAL  - INR    6. Long-term (current) use of anticoagulants [Z79.01]  - INR    7. Malignant neoplasm of left female breast, unspecified estrogen receptor status, unspecified site of breast (H)  Follows with oncology.     8. Need for prophylactic vaccination and inoculation against  influenza  - Vaccine Administration, Initial [48692]  - FLU VACCINE, INCREASED ANTIGEN, PRESV FREE, AGE 65+ [19215]    Patient Instructions   Diabetes numbers look great! A1c is 5.7. No changes (you aren't on anything). Follow-up in 6-9 months.     Follow-up with Dr. Narvaez as scheduled.    We will get you figured out for INR management.    For anxiety:  -- continue paroxetine  -- start buspirone 1 tab twice daily. Touch base with me in about 1 month to see if we need to increase this dose further.       Alva Casas MD  [facility]    Injectable Influenza Immunization Documentation    1.  Is the person to be vaccinated sick today?   No    2. Does the person to be vaccinated have an allergy to a component   of the vaccine?   No  Egg Allergy Algorithm Link    3. Has the person to be vaccinated ever had a serious reaction   to influenza vaccine in the past?   No    4. Has the person to be vaccinated ever had Guillain-Barré syndrome?   No    Form completed by Gisel Sellers MA 9/7/2018 10:01 AM

## 2018-09-08 LAB
CHOLEST SERPL-MCNC: 172 MG/DL
HDLC SERPL-MCNC: 58 MG/DL
LDLC SERPL CALC-MCNC: 93 MG/DL
NONHDLC SERPL-MCNC: 114 MG/DL
TRIGL SERPL-MCNC: 106 MG/DL

## 2018-09-10 ENCOUNTER — TELEPHONE (OUTPATIENT)
Dept: PEDIATRICS | Facility: CLINIC | Age: 73
End: 2018-09-10

## 2018-09-10 ENCOUNTER — PATIENT OUTREACH (OUTPATIENT)
Dept: GERIATRIC MEDICINE | Facility: CLINIC | Age: 73
End: 2018-09-10

## 2018-09-10 NOTE — LETTER
September 10, 2018    SALUD MARTINEZ  [address]  [place] [place] [address]     Dear Salud,    At [facility], we re dedicated to improving the health and well-being of our members.  Enclosed you will find the Comprehensive Care Plan that was developed with you on August 31st, 2018. Please review the Care Plan carefully.    As a reminder, some of the things we discussed at your visit include:    Ways to improve or maintain your physical health such as walking 20 minutes a day.     Ways to reduce the risk of falls.     Health care needs you may have.     Don t forget to contact your care coordinator if you:    Have been hospitalized or plan to be hospitalized.     Have experienced a fall.      Have experienced a change in physical health, which may include bladder control and pain issues.      Are experiencing emotional problems.     If you do not agree with your Care Plan, have questions about it, or have experienced a change in your needs, please contact me, your care coordinator, at [phone]. If you are hearing impaired, please call the [place] [employer] at 851 or [phone] (mhmjes-tc-yljzio relay service).    Sincerely,       Nighat Carbajal RN, BSN, PHN  [facility]     MSC+ [id number] [phone]                                        (12/16)

## 2018-09-10 NOTE — PROGRESS NOTES
[facility] Care Coordination Contact  Received after visit chart from care coordinator.  Completed following tasks: Mailed copy of care plan to client and Updated services in access.     Chart was returned to DEBBY.     Miracle Keating  Case Management Specialist  [facility]  [phone]

## 2018-09-10 NOTE — TELEPHONE ENCOUNTER
"Pt notifies the following:     - has been feeling extremely dizzy(constant), low grade fever(haven't check temp), sinus pressure in face \"clogged up\", low appetite since Friday evening  - had projectile vomiting(3-4 episodes) on Sat, subsided on Sat evening  - started buspar 5 mg bid from Fri(already taking paxil 60 mg at bedtime)  - able to keep food & fluids down  - received flu vaccine on Fri as well  - denies SOB, trouble breathing, vision trouble, any cardiac sx's, vomiting, abd pain/cramping, diarrhea, HA, nausea, increased anxiety, panic attacks   - took her morning dose today    Advised pt not take any more buspar until she hears from us. Push fluids & bland diet. Please advise. Need to notify pt with MD's advise. Pt can be reached at [phone](OK to LM).    Notes from 9/7:  ELI (generalized anxiety disorder)  Recently worsened, unclear why. Reviewed with patient that she is already taking higher than recommended dose of paroxetine (in past she has declined to decrease). Will plan to start Buspar to see if this helps. Medication side effects reviewed, will start at low dose and taper up as needed.     Reyna, RN  Triage Nurse          "

## 2018-09-11 ENCOUNTER — ANTICOAGULATION THERAPY VISIT (OUTPATIENT)
Dept: NURSING | Facility: CLINIC | Age: 73
End: 2018-09-11
Payer: COMMERCIAL

## 2018-09-11 DIAGNOSIS — Z86.718 PERSONAL HISTORY OF DVT (DEEP VEIN THROMBOSIS): ICD-10-CM

## 2018-09-11 DIAGNOSIS — Z79.01 LONG-TERM (CURRENT) USE OF ANTICOAGULANTS: ICD-10-CM

## 2018-09-11 LAB — INR POINT OF CARE: 2.6 (ref 0.86–1.14)

## 2018-09-11 PROCEDURE — 36416 COLLJ CAPILLARY BLOOD SPEC: CPT

## 2018-09-11 PROCEDURE — 85610 PROTHROMBIN TIME: CPT | Mod: QW

## 2018-09-11 PROCEDURE — 99207 ZZC NO CHARGE NURSE ONLY: CPT

## 2018-09-11 NOTE — MR AVS SNAPSHOT
Gemma WYATT Sofya   9/11/2018 10:30 AM   Anticoagulation Therapy Visit    Description:  72 year old female   Provider:  [facility]   Department:   Nurse           INR as of 9/11/2018     Today's INR 2.6      Anticoagulation Summary as of 9/11/2018     INR goal 2.0-3.0   Today's INR 2.6   Full warfarin instructions 10 mg on Mon, Thu; 7.5 mg all other days   Next INR check     Indications   Personal history of DVT (deep vein thrombosis) [Z86.718]  Long-term (current) use of anticoagulants [Z79.01] [Z79.01]         Your next Anticoagulation Clinic appointment(s)     Oct 23, 2018 10:00 AM CDT   Anticoagulation Visit with  ANTICOAGULATION CLINIC   [facility]    [address]  [address]  [facility] [place] [facility] [address] [phone]              Contact Numbers     [facility]  Please call  [phone] to cancel and/or reschedule your appointment   Please call  [phone] with any problems or questions regarding your therapy.        September 2018 Details    Sun Mon Tue Wed Thu Fri Sat           1                 2               3               4               5               6               7               8                 9               10               11      7.5 mg   See details      12      7.5 mg         13      10 mg         14      7.5 mg         15      7.5 mg           16      7.5 mg         17      10 mg         18      7.5 mg         19      7.5 mg         20      10 mg         21      7.5 mg         22      7.5 mg           23      7.5 mg         24      10 mg         25      7.5 mg         26      7.5 mg         27      10 mg         28      7.5 mg         29      7.5 mg           30      7.5 mg                Date Details   09/11 This INR check      Date of next INR: No date specified         How to take your warfarin dose     To take:  7.5 mg Take 1.5 of the 5 mg tablets.    To take:  10 mg Take 2 of the 5 mg tablets.

## 2018-09-11 NOTE — PROGRESS NOTES
ANTICOAGULATION FOLLOW-UP CLINIC VISIT    Patient Name:  Gemma Cowart  Date:  9/11/2018  Contact Type:  Face to Face    SUBJECTIVE:     Patient Findings     Positives Change in medications    Comments Patient started Buspar last week but was stopped yesterday due to dizziness. No changes in total dosage amount but patient wants to change dates of 10 mg from Wed to Thurs.   Camila Hernández RN             OBJECTIVE    INR Protime   Date Value Ref Range Status   09/11/2018 2.6 (A) 0.86 - 1.14 Final       ASSESSMENT / PLAN  INR assessment THER    Recheck INR In: 6 WEEKS    INR Location Clinic      Anticoagulation Summary as of 9/11/2018     INR goal 2.0-3.0   Today's INR 2.6   Warfarin maintenance plan 10 mg (5 mg x 2) on Mon, Thu; 7.5 mg (5 mg x 1.5) all other days   Full warfarin instructions 10 mg on Mon, Thu; 7.5 mg all other days   Weekly warfarin total 57.5 mg   Plan last modified Camila Hernández RN (9/11/2018)   Next INR check 10/23/2018   Target end date Indefinite    Indications   Personal history of DVT (deep vein thrombosis) [Z86.718]  Long-term (current) use of anticoagulants [Z79.01] [Z79.01]         Anticoagulation Episode Summary     INR check location [facility]    Preferred lab     Send INR reminders to [facility]    Comments 5mg tabs - HS dose // CALENDAR // gastric bypass 2000 // DVT 11/16/12 warfarin until Apr 2013 // DVT 9/12/15      Anticoagulation Care Providers     Provider Role Specialty Phone number    Alva Viera MD  Internal Medicine [phone]            See the Encounter Report to view Anticoagulation Flowsheet and Dosing Calendar (Go to Encounters tab in chart review, and find the Anticoagulation Therapy Visit)    Dosage adjustment made based on physician directed care plan.    Camila Hernández RN

## 2018-10-05 ENCOUNTER — PATIENT OUTREACH (OUTPATIENT)
Dept: GERIATRIC MEDICINE | Facility: CLINIC | Age: 73
End: 2018-10-05

## 2018-10-10 ENCOUNTER — HOSPITAL ENCOUNTER (OUTPATIENT)
Facility: CLINIC | Age: 73
Setting detail: SPECIMEN
Discharge: HOME | End: 2018-10-10
Attending: INTERNAL MEDICINE | Admitting: INTERNAL MEDICINE
Payer: MEDICARE

## 2018-10-10 ENCOUNTER — ONCOLOGY VISIT (OUTPATIENT)
Dept: ONCOLOGY | Facility: CLINIC | Age: 73
End: 2018-10-10
Attending: INTERNAL MEDICINE
Payer: MEDICARE

## 2018-10-10 DIAGNOSIS — Z85.3 HISTORY OF LEFT BREAST CANCER: ICD-10-CM

## 2018-10-10 LAB
ALBUMIN SERPL-MCNC: 3.3 G/DL (ref 3.4–5)
ALP SERPL-CCNC: 71 U/L (ref 40–150)
ALT SERPL W P-5'-P-CCNC: 22 U/L (ref 0–50)
ANION GAP SERPL CALCULATED.3IONS-SCNC: 4 MMOL/L (ref 3–14)
AST SERPL W P-5'-P-CCNC: 18 U/L (ref 0–45)
BILIRUB SERPL-MCNC: 0.5 MG/DL (ref 0.2–1.3)
BUN SERPL-MCNC: 17 MG/DL (ref 7–30)
CALCIUM SERPL-MCNC: 8.4 MG/DL (ref 8.5–10.1)
CANCER AG27-29 SERPL-ACNC: 15 U/ML (ref 0–39)
CHLORIDE SERPL-SCNC: 109 MMOL/L (ref 94–109)
CO2 SERPL-SCNC: 29 MMOL/L (ref 20–32)
CREAT SERPL-MCNC: 1.09 MG/DL (ref 0.52–1.04)
GFR SERPL CREATININE-BSD FRML MDRD: 49 ML/MIN/1.7M2
GLUCOSE SERPL-MCNC: 102 MG/DL (ref 70–99)
POTASSIUM SERPL-SCNC: 5.2 MMOL/L (ref 3.4–5.3)
PROT SERPL-MCNC: 6.5 G/DL (ref 6.8–8.8)
SODIUM SERPL-SCNC: 142 MMOL/L (ref 133–144)

## 2018-10-10 PROCEDURE — 36415 COLL VENOUS BLD VENIPUNCTURE: CPT

## 2018-10-10 PROCEDURE — 86300 IMMUNOASSAY TUMOR CA 15-3: CPT | Performed by: INTERNAL MEDICINE

## 2018-10-10 PROCEDURE — 80053 COMPREHEN METABOLIC PANEL: CPT | Performed by: INTERNAL MEDICINE

## 2018-10-10 NOTE — LETTER
10/10/2018         RE: Gemma Cowart  [address]  [place] [address]        Dear Colleague,    Thank you for referring your patient, Gemma Cowart, to the [facility]. Please see a copy of my visit note below.    Medical Assistant Note:  Gemma Cowart presents today for lab draw.    Patient seen by provider today: No.   present during visit today: Not Applicable.    Concerns: No Concerns.    Procedure:  Lab draw site: RAC, Needle type: BF, Gauge: 23g with gauze and coban.    Post Assessment:  Labs drawn without difficulty: Yes.    Discharge Plan:  Departure Mode: Ambulatory.    Face to Face Time: 4mins.      Yen Angeles CMA                  Again, thank you for allowing me to participate in the care of your patient.        Sincerely,        Oncology Nurse

## 2018-10-10 NOTE — PROGRESS NOTES
Medical Assistant Note:  Gemma Cowart presents today for lab draw.    Patient seen by provider today: No.   present during visit today: Not Applicable.    Concerns: No Concerns.    Procedure:  Lab draw site: RAC, Needle type: BF, Gauge: 23g with gauze and coban.    Post Assessment:  Labs drawn without difficulty: Yes.    Discharge Plan:  Departure Mode: Ambulatory.    Face to Face Time: 4mins.      Yen Angeles CMA

## 2018-10-14 NOTE — PROGRESS NOTES
[facility] Care Coordination Contact   TC from client stating that she cancelled her [employer]. She is wanting the application for [facility] from [facility]. TC to [facility] to request they send client a [facility] application.  Nighat Carbajal RN, BSN, PHN  [facility] Coordinator  [phone]

## 2018-10-17 ENCOUNTER — HOSPITAL ENCOUNTER (OUTPATIENT)
Facility: CLINIC | Age: 73
Setting detail: SPECIMEN
End: 2018-10-17
Attending: INTERNAL MEDICINE
Payer: MEDICARE

## 2018-10-17 ENCOUNTER — ONCOLOGY VISIT (OUTPATIENT)
Dept: ONCOLOGY | Facility: CLINIC | Age: 73
End: 2018-10-17
Attending: INTERNAL MEDICINE
Payer: COMMERCIAL

## 2018-10-17 VITALS
WEIGHT: 194 LBS | OXYGEN SATURATION: 98 % | RESPIRATION RATE: 16 BRPM | DIASTOLIC BLOOD PRESSURE: 84 MMHG | TEMPERATURE: 97.9 F | BODY MASS INDEX: 34.09 KG/M2 | SYSTOLIC BLOOD PRESSURE: 120 MMHG | HEART RATE: 61 BPM

## 2018-10-17 DIAGNOSIS — Z85.3 HISTORY OF LEFT BREAST CANCER: Primary | ICD-10-CM

## 2018-10-17 DIAGNOSIS — Z98.84 BARIATRIC SURGERY STATUS: ICD-10-CM

## 2018-10-17 DIAGNOSIS — M85.80 OSTEOPENIA, UNSPECIFIED LOCATION: ICD-10-CM

## 2018-10-17 DIAGNOSIS — Z86.718 PERSONAL HISTORY OF DVT (DEEP VEIN THROMBOSIS): ICD-10-CM

## 2018-10-17 PROCEDURE — 99214 OFFICE O/P EST MOD 30 MIN: CPT | Performed by: INTERNAL MEDICINE

## 2018-10-17 PROCEDURE — G0463 HOSPITAL OUTPT CLINIC VISIT: HCPCS

## 2018-10-17 ASSESSMENT — PAIN SCALES - GENERAL: PAINLEVEL: NO PAIN (0)

## 2018-10-17 NOTE — PROGRESS NOTES
"Oncology Rooming Note    October 17, 2018 8:38 AM   Gemma Cowart is a 73 year old female who presents for:    No chief complaint on file.    Initial Vitals: /84 (BP Location: Right arm, Patient Position: Sitting, Cuff Size: Adult Regular)  Pulse 61  Temp 97.9  F (36.6  C) (Oral)  Resp 16  Wt 88 kg (194 lb)  LMP 09/24/1990  SpO2 98%  BMI 34.09 kg/m2 Estimated body mass index is 34.09 kg/(m^2) as calculated from the following:    Height as of 9/7/18: 1.607 m (5' 3.25\").    Weight as of this encounter: 88 kg (194 lb). Body surface area is 1.98 meters squared.  No Pain (0) Comment: Data Unavailable   Patient's last menstrual period was 09/24/1990.  Allergies reviewed: Yes  Medications reviewed: Yes    Medications: Medication refills not needed today.  Pharmacy name entered into [facility]:    [id number] - [place], [place] - [address] AT [facility] [place], [place] - [address] AT [facility] [id number] [place] [address] - [address] [facility] AT [facility] [address]    Clinical concerns: no   5 minutes for nursing intake (face to face time)          Harini Villarreal MA              "

## 2018-10-17 NOTE — MR AVS SNAPSHOT
After Visit Summary   10/17/2018    Gemma Cowart    MRN: [id number]           Patient Information     Date Of Birth          1945        Visit Information        Provider Department      10/17/2018 8:40 AM Jyoti Narvaez MD [facility]        Today's Diagnoses     History of left breast cancer    -  1    Bariatric surgery status        Personal history of DVT (deep vein thrombosis)        Osteopenia, unspecified location          Care Instructions    1 yr f/u with labs.           Follow-ups after your visit        Your next 10 appointments already scheduled     Oct 23, 2018 10:00 AM CDT   Anticoagulation Visit with  ANTICOAGULATION CLINIC   [facility]    [address]  [address]  [place] [address]   [phone]            Oct 16, 2019  8:00 AM CDT   Return Visit with  Oncology Nurse   [facility] ([facility])    [facility] [place]  [address]  [place] [address]   [phone]            Oct 16, 2019  9:00 AM CDT   Return Visit with Jyoti Narvaez MD   [facility] ([facility])    [facility] [place]  [address]  [place] [address] [phone]              Future tests that were ordered for you today     Open Future Orders        Priority Expected Expires Ordered    Ca27.29  breast tumor marker Routine 9/1/2019 10/17/2019 10/17/2018    Comprehensive metabolic panel Routine 9/1/2019 10/17/2019 10/17/2018    CBC with platelets differential Routine 9/1/2019 10/17/2019 10/17/2018            Who to contact     If you have questions or need follow up information about today's clinic visit or your schedule please contact [facility] directly at [phone].  Normal or non-critical lab and imaging results will be communicated to you by MyChart, letter or phone within 4 business days after the clinic has  received the results. If you do not hear from us within 7 days, please contact the clinic through [employer] or phone. If you have a critical or abnormal lab result, we will notify you by phone as soon as possible.  Submit refill requests through [employer] or call your pharmacy and they will forward the refill request to us. Please allow 3 business days for your refill to be completed.          Additional Information About Your Visit        Care EveryWhere ID     This is your Care EveryWhere ID. This could be used by other organizations to access your [place] medical records  [id number]        Your Vitals Were     Pulse Temperature Respirations Last Period Pulse Oximetry BMI (Body Mass Index)    61 97.9  F (36.6  C) (Oral) 16 09/24/1990 98% 34.09 kg/m2       Blood Pressure from Last 3 Encounters:   10/17/18 120/84   09/07/18 110/60   05/15/18 108/52    Weight from Last 3 Encounters:   10/17/18 88 kg (194 lb)   09/07/18 85.7 kg (189 lb)   05/15/18 83.5 kg (184 lb)                 Today's Medication Changes          These changes are accurate as of 10/17/18  9:16 AM.  If you have any questions, ask your nurse or doctor.               These medicines have changed or have updated prescriptions.        Dose/Directions    cyanocobalamin 2500 MCG sublingual tablet   Commonly known as:  VITAMIN B-12   This may have changed:  Another medication with the same name was removed. Continue taking this medication, and follow the directions you see here.   Used for:  Vitamin B12 deficiency, S/P gastric bypass   Changed by:  Jyoti Narvaez MD        Dose:  2500 mcg   Place 2,500 mcg under the tongue daily   Quantity:  90 tablet   Refills:  3         Stop taking these medicines if you haven't already. Please contact your care team if you have questions.     busPIRone 5 MG tablet   Commonly known as:  BUSPAR   Stopped by:  Jyoti Narvaez MD           nystatin [id number] UNIT/GM Powd   Commonly known as:  MYCOSTATIN   Stopped by:  Jyoti Narvaez  MD           STATIN NOT PRESCRIBED (INTENTIONAL)   Stopped by:  Jyoti Narvaez MD                    Primary Care Provider Office Phone # Fax #    Alva Viera [phone] [address] [place] [address]        Equal Access to Services     [facility]: Hadii aad ku hadasho Soomaali, waaxda luqadaha, qaybta kaalmada adeegyada, waxay idiin hayaan adeeg khmaddy lalotusn . So [facility] [phone].    ATENCIÓN: Si habla español, tiene a marcum disposición servicios gratuitos de asistencia lingüística. Llame al [phone].    We comply with applicable federal civil rights laws and [place] laws. We do not discriminate on the basis of race, color, national origin, age, disability, sex, sexual orientation, or gender identity.            Thank you!     Thank you for choosing [facility]  for your care. Our goal is always to provide you with excellent care. Hearing back from our patients is one way we can continue to improve our services. Please take a few minutes to complete the written survey that you may receive in the mail after your visit with us. Thank you!             Your Updated Medication List - Protect others around you: Learn how to safely use, store and throw away your medicines at www.disposemymeds.org.          This list is accurate as of 10/17/18  9:16 AM.  Always use your most recent med list.                   Brand Name Dispense Instructions for use Diagnosis    ACE/ARB/ARNI NOT PRESCRIBED (INTENTIONAL)      ACE & ARB not prescribed due to Other: normal tensive, low microalbumuria    Type 2 diabetes mellitus with other specified complication (H)       blood glucose lancets standard    no brand specified    2 Box    Use to test blood sugars 2 times daily or as directed. Please dispense lancets for Relion Confirm.    Type 2 diabetes mellitus with other specified complication (H)       blood glucose monitoring test strip    no brand specified    200 each    Use to  test blood sugars 2 times daily or as directed. Please dispense Relion Confirm test strips    Type 2 diabetes mellitus with other specified complication (H)       * clobetasol 0.05 % cream    TEMOVATE    270 g    Apply sparingly to affected area twice daily as needed.  Do not apply to face.    Psoriasis       * clobetasol 0.05 % external solution    TEMOVATE    50 mL    Apply sparingly to affected area twice daily for 14 days.  Do not apply to face.    Psoriasis       cyanocobalamin 2500 MCG sublingual tablet    VITAMIN B-12    90 tablet    Place 2,500 mcg under the tongue daily    Vitamin B12 deficiency, S/P gastric bypass       PARoxetine 30 MG tablet    PAXIL    180 tablet    Take 2 tablets (60 mg) by mouth At Bedtime    ELI (generalized anxiety disorder)       TYLENOL 500 MG tablet   Generic drug:  acetaminophen     100 tablet    Take 2 tablets (1,000 mg) by mouth every 8 hours as needed for mild pain        vitamin D 2000 units Caps     90 capsule    Take 1 capsule by mouth daily    S/P gastric bypass       warfarin 5 MG tablet    COUMADIN    150 tablet    Take 10 MG M,W(2 tabs) 7.5 MG(1.5 tabs) all others or as advise by INR clinic    Long-term (current) use of anticoagulants, Personal history of DVT (deep vein thrombosis)       * Notice:  This list has 2 medication(s) that are the same as other medications prescribed for you. Read the directions carefully, and ask your doctor or other care provider to review them with you.

## 2018-10-17 NOTE — LETTER
"    10/17/2018         RE: Gemma Cowart  [address]  [place] [address]        Dear Colleague,    Thank you for referring your patient, Gemma Cowart, to the [facility]. Please see a copy of my visit note below.    Oncology Rooming Note    October 17, 2018 8:38 AM   Gemma Cowart is a 73 year old female who presents for:    No chief complaint on file.    Initial Vitals: /84 (BP Location: Right arm, Patient Position: Sitting, Cuff Size: Adult Regular)  Pulse 61  Temp 97.9  F (36.6  C) (Oral)  Resp 16  Wt 88 kg (194 lb)  LMP 09/24/1990  SpO2 98%  BMI 34.09 kg/m2 Estimated body mass index is 34.09 kg/(m^2) as calculated from the following:    Height as of 9/7/18: 1.607 m (5' 3.25\").    Weight as of this encounter: 88 kg (194 lb). Body surface area is 1.98 meters squared.  No Pain (0) Comment: Data Unavailable   Patient's last menstrual period was 09/24/1990.  Allergies reviewed: Yes  Medications reviewed: Yes    Medications: Medication refills not needed today.  Pharmacy name entered into [facility]:    [id number] - [place], [place] - [address] [facility] AT [facility] [address] [place], [place] - [address] AT [facility] [address] [place], [place] - [address] AT [facility] OF [address]    Clinical concerns: no   5 minutes for nursing intake (face to face time)          Harini Villarreal MA                Hematology follow up visit    CC:   Left Breast cancer in 2012 s/p 5 yrs of femara  BRETT, B12 deficiency due to gastric bypass  Hx of LE DVT on coumadin, s/p IVC filter 10/2012, removed 1/2013    HPI  Her breast cancer was diagnosed in 08/2012 via screening MA. 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 LN negative disease. Estrogen and progesterone receptors were " positive at greater than 90%. HER-2/gibran was negative. She had B7vB6A0 disease.   She was started on Femara on 10/10/2012.    It is put on hold in 5/2016 due to memory loss and dysarthria. She then was evaluated by neurologist, got assurance. Her cognitive function did not change by holding femara, it is resumed in July 2016. She finished 5 yrs of it in fall 2017, not willing to continue due to hair loss, bone health and hot flushes.     She had reconstructive surgery done 03/2013 by Dr. Wall.     PMH  1. History of lower extremity deep vein thrombosis x 2 LLE, currently on long-term anticoagulation with Coumadin.   2. History of IVC filter placement 10/2012, removed 01/2013.   3. History of gastric bypass surgery, B12 deficiency and iron deficiency  4. Malabsorptive syndrome secondary to gastric bypass surgery.   5. Vitamin D deficiency.   6. History of progressive weight gain.   7. History of bilateral mastectomy for breast carcinoma, status post reconstructive surgery done 03/2013 by Dr. Wall.   8. The patient due to discontinue ascorbic acid, vitamin E and potentially Toprol-XL after discussion with the primary physician.   9. diabetes mellitus, hyperlipidemia  10. Hemorrhoid     ALLERGIES/MEDICATIONS: reviewed in epic    SOCIAL HISTORY: The patient did smoke for several years but has since stopped in 10/1986.     FAMILY HISTORY: Overall noncontributory. + FH of Alezheimer's d/s from the Dad side     ROS:    no problems with fevers or chills, nausea, vomiting or diarrhea.   + insomnia .   She reported word finding difficulty, short term memory problem since  Christmas 2015, evaluated by neurologist and got assurance, working dx is age related.     PE  Blood pressure 120/84, pulse 61, temperature 97.9  F (36.6  C), temperature source Oral, resp. rate 16, weight 88 kg (194 lb), last menstrual period 09/24/1990, SpO2 98 %, not currently breastfeeding.    ECOG 0    General: alert, oriented, NAD,  pleasant  HEENT: no mucositis, sclera is anicteric, THEE,   Neck: supple, no JVD  LN: superficial LNs are not palpable in neck, supraclavicular, axilla and groin areas, no masses  LUNGS: Clear lung fields to auscultation. No wheezing or ronchi  HEART: Sounds S1, S2 heard, regular. No murmur or gallop  ABDOMEN: Soft, full, obese, nontender without palpable hepatosplenomegaly.   Extremity: bilateral knee pain, no joints deformities or effusion, no edema  Skin: scattered erythematous rash on elbows and knees, palms, feet, face, forehead.   Chest wall: mastectomies scars well healed, no masses.     Current LAB Data Reviewed  cmp marker are fine      Current Image Reviewed  Dexa 10/2017 : Advanced osteopenia of the left hip, moderate osteopenia of the right hip, and normal bone mineral density of the lumbar spine. Stable to minor improvement compare to 2015.     Old data reviewed with summary  Dexa 10/2015: osteopenia  CXR/BONE SCAN 5/2015: negative       10/2014 - cbc diff/CMP are fine, except cr 1.15, B12/folate are nl, vit D 24 low    Bone scan 1/2014 - DJD, no mets    CT body 1/2014 - no mets      ASSESSMENT AND PLAN:   1. Left breast cancer s/p double mastectomies, ER/CA+, her 2 - s/p  5 yrs of Femara till 2017.  F/u in 12 months with labs.     2. history of gastric bypass surgery with BRETT and low B12.  Continue B12 oral indefinitely, routine follow up iron, need IV iron prn.    3. Osteopenia. Advice vit D proper dosing.    5. Hx of DVT on coumadin via her PMD. She is advised on ongoing coumadin/INR monitoring via her PMD office.         Again, thank you for allowing me to participate in the care of your patient.        Sincerely,        Jyoti Narvaez MD, MD

## 2018-10-17 NOTE — PROGRESS NOTES
Hematology follow up visit    CC:   Left Breast cancer in 2012 s/p 5 yrs of femara  BRETT, B12 deficiency due to gastric bypass  Hx of LE DVT on coumadin, s/p IVC filter 10/2012, removed 1/2013    HPI  Her breast cancer was diagnosed in 08/2012 via screening MA. 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 LN negative disease. Estrogen and progesterone receptors were positive at greater than 90%. HER-2/gibran was negative. She had G4nI3G1 disease.   She was started on Femara on 10/10/2012.    It is put on hold in 5/2016 due to memory loss and dysarthria. She then was evaluated by neurologist, got assurance. Her cognitive function did not change by holding femara, it is resumed in July 2016. She finished 5 yrs of it in fall 2017, not willing to continue due to hair loss, bone health and hot flushes.     She had reconstructive surgery done 03/2013 by Dr. Wall.     PMH  1. History of lower extremity deep vein thrombosis x 2 LLE, currently on long-term anticoagulation with Coumadin.   2. History of IVC filter placement 10/2012, removed 01/2013.   3. History of gastric bypass surgery, B12 deficiency and iron deficiency  4. Malabsorptive syndrome secondary to gastric bypass surgery.   5. Vitamin D deficiency.   6. History of progressive weight gain.   7. History of bilateral mastectomy for breast carcinoma, status post reconstructive surgery done 03/2013 by Dr. Wall.   8. The patient due to discontinue ascorbic acid, vitamin E and potentially Toprol-XL after discussion with the primary physician.   9. diabetes mellitus, hyperlipidemia  10. Hemorrhoid     ALLERGIES/MEDICATIONS: reviewed in [facility]    SOCIAL HISTORY: The patient did smoke for several years but has since stopped in 10/1986.     FAMILY HISTORY: Overall noncontributory. + FH of Steven's d/s from the Dad side     ROS:    no problems with fevers or  chills, nausea, vomiting or diarrhea.   + insomnia .   She reported word finding difficulty, short term memory problem since  Christmas 2015, evaluated by neurologist and got assurance, working dx is age related.     PE  Blood pressure 120/84, pulse 61, temperature 97.9  F (36.6  C), temperature source Oral, resp. rate 16, weight 88 kg (194 lb), last menstrual period 09/24/1990, SpO2 98 %, not currently breastfeeding.    ECOG 0    General: alert, oriented, NAD, pleasant  HEENT: no mucositis, sclera is anicteric, THEE,   Neck: supple, no JVD  LN: superficial LNs are not palpable in neck, supraclavicular, axilla and groin areas, no masses  LUNGS: Clear lung fields to auscultation. No wheezing or ronchi  HEART: Sounds S1, S2 heard, regular. No murmur or gallop  ABDOMEN: Soft, full, obese, nontender without palpable hepatosplenomegaly.   Extremity: bilateral knee pain, no joints deformities or effusion, no edema  Skin: scattered erythematous rash on elbows and knees, palms, feet, face, forehead.   Chest wall: mastectomies scars well healed, no masses.     Current LAB Data Reviewed  cmp marker are fine      Current Image Reviewed  Dexa 10/2017 : Advanced osteopenia of the left hip, moderate osteopenia of the right hip, and normal bone mineral density of the lumbar spine. Stable to minor improvement compare to 2015.     Old data reviewed with summary  Dexa 10/2015: osteopenia  CXR/BONE SCAN 5/2015: negative       10/2014 - cbc diff/CMP are fine, except cr 1.15, B12/folate are nl, vit D 24 low    Bone scan 1/2014 - DJD, no mets    CT body 1/2014 - no mets      ASSESSMENT AND PLAN:   1. Left breast cancer s/p double mastectomies, ER/IA+, her 2 - s/p  5 yrs of Femara till 2017.  F/u in 12 months with labs.     2. history of gastric bypass surgery with BRETT and low B12.  Continue B12 oral indefinitely, routine follow up iron, need IV iron prn.    3. Osteopenia. Advice vit D proper dosing.    5. Hx of DVT on coumadin via her  PMD. She is advised on ongoing coumadin/INR monitoring via her PMD office.

## 2018-10-19 ENCOUNTER — TELEPHONE (OUTPATIENT)
Dept: ONCOLOGY | Facility: CLINIC | Age: 73
End: 2018-10-19

## 2018-10-19 NOTE — TELEPHONE ENCOUNTER
Received positive distress screen regarding patient's concern for current weight.  Called and spoke with patient.  Patient states she is following [employer] meal plan.  Patient's daughter is also attempting to lose weight so they are supporting each other.  Congratulated patient on her effort for weight loss.  Encouraged patient to call if had any weight loss questions or additional questions.  Patient verbalized understanding of plan.      Moncho Temple, RD, LD  Clinical Dietitian  [facility]  [phone] (direct)

## 2018-10-22 ENCOUNTER — ANTICOAGULATION THERAPY VISIT (OUTPATIENT)
Dept: NURSING | Facility: CLINIC | Age: 73
End: 2018-10-22
Payer: COMMERCIAL

## 2018-10-22 DIAGNOSIS — Z86.718 PERSONAL HISTORY OF DVT (DEEP VEIN THROMBOSIS): ICD-10-CM

## 2018-10-22 DIAGNOSIS — Z79.01 LONG TERM CURRENT USE OF ANTICOAGULANT THERAPY: Primary | ICD-10-CM

## 2018-10-22 LAB — INR POINT OF CARE: 2.6 (ref 0.86–1.14)

## 2018-10-22 PROCEDURE — 85610 PROTHROMBIN TIME: CPT | Mod: QW

## 2018-10-22 PROCEDURE — 99207 ZZC NO CHARGE NURSE ONLY: CPT

## 2018-10-22 PROCEDURE — 36416 COLLJ CAPILLARY BLOOD SPEC: CPT

## 2018-10-22 RX ORDER — WARFARIN SODIUM 5 MG/1
TABLET ORAL
Qty: 150 TABLET | Refills: 1
Start: 2018-10-22 | End: 2020-05-04

## 2018-10-22 NOTE — PROGRESS NOTES
ANTICOAGULATION FOLLOW-UP CLINIC VISIT    Patient Name:  Gemma Cowart  Date:  10/22/2018  Contact Type:  Face to Face    SUBJECTIVE:     Patient Findings     Positives No Problem Findings           OBJECTIVE    INR Protime   Date Value Ref Range Status   10/22/2018 2.6 (A) 0.86 - 1.14 Final       ASSESSMENT / PLAN  INR assessment THER    Recheck INR In: 6 WEEKS    INR Location Clinic      Anticoagulation Summary as of 10/22/2018     INR goal 2.0-3.0   Today's INR 2.6   Warfarin maintenance plan 10 mg (5 mg x 2) on Mon, Thu; 7.5 mg (5 mg x 1.5) all other days   Full warfarin instructions 10 mg on Mon, Thu; 7.5 mg all other days   Weekly warfarin total 57.5 mg   No change documented Rae Cooley RN   Plan last modified Camila Hernández RN (9/11/2018)   Next INR check 12/3/2018   Target end date Indefinite    Indications   Personal history of DVT (deep vein thrombosis) [Z86.718]  Long term current use of anticoagulant therapy [Z79.01]         Anticoagulation Episode Summary     INR check location [facility]    Preferred lab     Send INR reminders to  ANTICOAG CLINIC    Comments 5mg tabs - HS dose // CALENDAR // gastric bypass 2000 // DVT 11/16/12 warfarin until Apr 2013 // DVT 9/12/15      Anticoagulation Care Providers     Provider Role Specialty Phone number    Alva Viera MD  Internal Medicine [phone]            See the Encounter Report to view Anticoagulation Flowsheet and Dosing Calendar (Go to Encounters tab in chart review, and find the Anticoagulation Therapy Visit)        Rae Cooley RN

## 2018-10-22 NOTE — MR AVS SNAPSHOT
Gemma Cowart   10/22/2018 11:00 AM   Anticoagulation Therapy Visit    Description:  73 year old female   Provider:   ANTICOAGULATION CLINIC   Department:   Nurse           INR as of 10/22/2018     Today's INR 2.6      Anticoagulation Summary as of 10/22/2018     INR goal 2.0-3.0   Today's INR 2.6   Full warfarin instructions 10 mg on Mon, Thu; 7.5 mg all other days   Next INR check 12/3/2018    Indications   Personal history of DVT (deep vein thrombosis) [Z86.718]  Long term current use of anticoagulant therapy [Z79.01]         Your next Anticoagulation Clinic appointment(s)     Oct 22, 2018 11:00 AM CDT   Anticoagulation Visit with  ANTICOAGULATION CLINIC   [facility]    [address]  [address]  [place] [id number] [phone]            Dec 03, 2018  8:45 AM CST   Anticoagulation Visit with  ANTICOAGULATION CLINIC   [facility] ([facility])    [address] [place] [address]  [address]  [place] [id number] [phone]              Contact Numbers     [facility]  Please call  [phone] to cancel and/or reschedule your appointment   Please call  [phone] with any problems or questions regarding your therapy.        October 2018 Details    Sun Mon Tue Wed Thu Fri Sat      1               2               3               4               5               6                 7               8               9               10               11               12               13                 14               15               16               17               18               19               20                 21               22      10 mg   See details      23      7.5 mg         24      7.5 mg         25      10 mg         26      7.5 mg         27      7.5 mg           28      7.5 mg         29      10 mg         30      7.5 mg         31      7.5 mg             Date Details   10/22 This INR check                How to take your warfarin dose     To take:  7.5 mg Take 1.5 of the 5 mg tablets.    To take:  10 mg Take 2 of the 5 mg tablets.           November 2018 Details    Sun Mon Tue Wed Thu Fri Sat         1      10 mg         2      7.5 mg         3      7.5 mg           4      7.5 mg         5      10 mg         6      7.5 mg         7      7.5 mg         8      10 mg         9      7.5 mg         10      7.5 mg           11      7.5 mg         12      10 mg         13      7.5 mg         14      7.5 mg         15      10 mg         16      7.5 mg         17      7.5 mg           18      7.5 mg         19      10 mg         20      7.5 mg         21      7.5 mg         22      10 mg         23      7.5 mg         24      7.5 mg           25      7.5 mg         26      10 mg         27      7.5 mg         28      7.5 mg         29      10 mg         30      7.5 mg           Date Details   No additional details            How to take your warfarin dose     To take:  7.5 mg Take 1.5 of the 5 mg tablets.    To take:  10 mg Take 2 of the 5 mg tablets.           December 2018 Details    Sun Mon Tue Wed Thu Fri Sat           1      7.5 mg           2      7.5 mg         3            4               5               6               7               8                 9               10               11               12               13               14               15                 16               17               18               19               20               21               22                 23               24               25               26               27               28               29                 30               31                     Date Details   No additional details    Date of next INR:  12/3/2018         How to take your warfarin dose     To take:  7.5 mg Take 1.5 of the 5 mg tablets.    To take:  10 mg Take 2 of the 5 mg tablets.

## 2018-11-08 ENCOUNTER — OFFICE VISIT (OUTPATIENT)
Dept: PEDIATRICS | Facility: CLINIC | Age: 73
End: 2018-11-08
Payer: COMMERCIAL

## 2018-11-08 VITALS
WEIGHT: 195 LBS | SYSTOLIC BLOOD PRESSURE: 120 MMHG | BODY MASS INDEX: 34.27 KG/M2 | HEART RATE: 72 BPM | TEMPERATURE: 98.5 F | DIASTOLIC BLOOD PRESSURE: 77 MMHG

## 2018-11-08 DIAGNOSIS — Z79.01 LONG TERM CURRENT USE OF ANTICOAGULANT THERAPY: ICD-10-CM

## 2018-11-08 DIAGNOSIS — K64.9 BLEEDING HEMORRHOID: Primary | ICD-10-CM

## 2018-11-08 PROCEDURE — 99214 OFFICE O/P EST MOD 30 MIN: CPT | Performed by: PHYSICIAN ASSISTANT

## 2018-11-08 NOTE — PROGRESS NOTES
SUBJECTIVE:   Gemma Cowart is a 73 year old female who presents to clinic today for the following health issues    BRBPR  Onset: this morning    Description:   Pain: YES- sometimes with BM's  Itching: no     Accompanying Signs & Symptoms:  Blood streaked toilet paper: YES-quite a lot on tissue  Blood in stool: not sure, too much toilet paper in toilet due to bleeding  Changes in stool pattern: YES- used to go every morning but now every 2-3 days, may have BM 3-4x in one day.    History:   Any previous GI studies done:Colonoscopy-mother  from colon CA  Family History of colon cancer: YES- mother, brother    Precipitating factors:   Hasnt been paying attention to portion size that she has been eating lately    Alleviating factors:  None  Therapies Tried and outcome: none  On coumadin.     ROS:  ROS otherwise negative    OBJECTIVE:                                                    /77  Pulse 72  Temp 98.5  F (36.9  C) (Tympanic)  Wt 195 lb (88.5 kg)  LMP 1990  BMI 34.27 kg/m2  Body mass index is 34.27 kg/(m^2).   GENERAL: alert, no distress  RECTUM: inspection reveals an external hemorrhoid with open wound. No tenderness to palpation. No bleeding currently.     Diagnostic test results:  No results found for this or any previous visit (from the past 24 hour(s)).     ASSESSMENT/PLAN:                                                    (K64.9) Bleeding hemorrhoid  (primary encounter diagnosis)  Comment: referral to CRS for further evaluation given anti-coagulation status. Keep stools soft.   Proceed to ED if cannot stop bleeding. Patient in agreement with plan.   Plan: COLORECTAL SURGERY REFERRAL            (Z79.01) Long term current use of anticoagulant therapy  Comment:   Plan:       Follow up with PMD regarding 6-12 month history of intermittent LLQ abdominal pain. Appointment scheduled.    Neris Pritchard PA-C  [facility]

## 2018-11-09 ENCOUNTER — PATIENT OUTREACH (OUTPATIENT)
Dept: GERIATRIC MEDICINE | Facility: CLINIC | Age: 73
End: 2018-11-09

## 2018-11-09 NOTE — PROGRESS NOTES
[facility] Care Coordination Contact    [facility] Health Plan or Product Change    CC received notification that member's health plan or health plan product changed from [facility] MSC+ to [facility] MSHO effective 11/1/2018.  CC contacted member and discussed change and face-to-face visit was offered. Member declined need for home visit. CC reviewed previous Health Risk Assessment/LTCC and POC with member and no changes noted.    Called member and introduced self as member s new CC. Confirmed with member that the welcome letter with writer's name and contact information has been received.  Reviewed LTCC/Health Risk Assessment (HRA) and POC with member. No changes noted.  Transitional HRA completed. Care Plan Summary updated and reflects current services.  Required referral authorization information communicated to CMS: No  MMIS entry completed by: Case Management Specialist  Writer reviewed the following with member:  ER visits: No  Hospitalizations: No  TCU stays: No  Significant health status changes: None  Falls/Injuries: No  ADL/IADL changes: No  Changes in services: No    Follow-Up Plan: Member informed of future contact, plan to f/u with member with at next regularly scheduled contact.  Contact information shared with member and family, encouraged member to call with any questions or concerns.  Nighat Carbajal, RN, BSN, PHN  [facility] Care Coordinator  [phone]

## 2018-11-12 ENCOUNTER — PATIENT OUTREACH (OUTPATIENT)
Dept: GERIATRIC MEDICINE | Facility: CLINIC | Age: 73
End: 2018-11-12

## 2018-11-12 NOTE — LETTER
November 12, 2018    SALUD MARTINEZ  [address] [place] [address]    Dear Salud,    Welcome to [facility] ([facility]) ([facility]) health program. My name is Nighat Carbajal RN, BSN, PHN, and I am your [facility] .     I will call you soon to see how you are doing and determine what needs you may have. My job is to help connect you to services, complete an assessment, and develop a care plan with you. There is no charge to you for the case management and assessment services. Our goal is to keep you as healthy and independent as possible.     [facility] combines the benefits you may already receive from Medical Assistance, Medicare and the Prescription Drug Coverage Program.    Soon you will receive a new [facility] member identification (ID) card from [facility]. You must show this card whenever you get health services.    If you have questions, please feel free to call me at [phone]. If you reach my voice mail, please leave a message and your phone number. If you are hearing impaired, please call the [place] Relay at 671 or [phone] ([email or web address] relay service).    Sincerely,       Nighat Carbajal RN, BSN, PHN    [employer]  [email or web address]    [[facility] members only:  [facility] is a health plan that contracts with both Medicare and the [place] Medical Assistance (Medicaid) program to provide benefits of both programs to enrollees. Enrollment in [facility] depends on contract renewal.]    [facility] [id number] DHS Approved ([id number])    (12/16)

## 2018-11-12 NOTE — PROGRESS NOTES
Member [facility]. CC notified.    Ginger Armendariz  Care Management Specialist  [facility]  [phone]

## 2018-11-13 ENCOUNTER — PATIENT OUTREACH (OUTPATIENT)
Dept: GERIATRIC MEDICINE | Facility: CLINIC | Age: 73
End: 2018-11-13

## 2018-11-13 NOTE — PROGRESS NOTES
[facility] Care Coordination Contact    TC from client stating that she got a letter stating that her insurance is ending because she didn't sign up for any drug program.  Client's daughter faxed a copy of form to CC. It is from [facility]. CC checked [place] and client has insurance as of 11/9/18 with [facility]. She has switched from MSC+ to MSHO. The letter states MSC+ is ending. TC to client informing her she still has insurance with [employer] just a different product.  Nighat Carbajal, RN, BSN, PHN  [facility] Coordinator  [phone]

## 2018-12-03 ENCOUNTER — ANTICOAGULATION THERAPY VISIT (OUTPATIENT)
Dept: NURSING | Facility: CLINIC | Age: 73
End: 2018-12-03
Payer: COMMERCIAL

## 2018-12-03 DIAGNOSIS — Z79.01 LONG TERM CURRENT USE OF ANTICOAGULANT THERAPY: ICD-10-CM

## 2018-12-03 DIAGNOSIS — Z86.718 PERSONAL HISTORY OF DVT (DEEP VEIN THROMBOSIS): Primary | ICD-10-CM

## 2018-12-03 LAB — INR POINT OF CARE: 3.8 (ref 0.86–1.14)

## 2018-12-03 PROCEDURE — 85610 PROTHROMBIN TIME: CPT | Mod: QW

## 2018-12-03 PROCEDURE — 36416 COLLJ CAPILLARY BLOOD SPEC: CPT

## 2018-12-03 PROCEDURE — 99207 ZZC NO CHARGE NURSE ONLY: CPT

## 2018-12-03 NOTE — PROGRESS NOTES
ANTICOAGULATION FOLLOW-UP CLINIC VISIT    Patient Name:  Gemma Cowart  Date:  12/3/2018  Contact Type:  Face to Face    SUBJECTIVE:     Patient Findings     Positives Inflammation    Comments Psoriasis is acting up because of dry weather.  Increased stress due to a computer update.    Patient denies: extra doses, illness,   bleeding/bruises, medication changes, diet changes,  or increased alcohol intake.             OBJECTIVE    INR Protime   Date Value Ref Range Status   12/03/2018 3.8 (A) 0.86 - 1.14 Final       ASSESSMENT / PLAN  INR assessment SUPRA    Recheck INR In: 2 WEEKS    INR Location Clinic      Anticoagulation Summary as of 12/3/2018     INR goal 2.0-3.0   Today's INR 3.8!   Warfarin maintenance plan 10 mg (5 mg x 2) on Mon, Thu; 7.5 mg (5 mg x 1.5) all other days   Full warfarin instructions 12/3: 5 mg; Otherwise 10 mg on Mon, Thu; 7.5 mg all other days   Weekly warfarin total 57.5 mg   Plan last modified Camila Hernández RN (9/11/2018)   Next INR check 12/17/2018   Target end date Indefinite    Indications   Personal history of DVT (deep vein thrombosis) [Z86.718]  Long term current use of anticoagulant therapy [Z79.01]         Anticoagulation Episode Summary     INR check location [facility]    Preferred lab     Send INR reminders to [facility]    Comments 5mg tabs - HS dose // CALENDAR // gastric bypass 2000 // DVT 11/16/12 warfarin until Apr 2013 // DVT 9/12/15      Anticoagulation Care Providers     Provider Role Specialty Phone number    Alva Viera MD  Internal Medicine [phone]            See the Encounter Report to view Anticoagulation Flowsheet and Dosing Calendar (Go to Encounters tab in chart review, and find the Anticoagulation Therapy Visit)    Dosage adjustment made based on physician directed care plan.    Rae Cooley, VILLA

## 2018-12-03 NOTE — MR AVS SNAPSHOT
Gemma WYATT Sofya   12/3/2018 8:45 AM   Anticoagulation Therapy Visit    Description:  73 year old female   Provider:  [facility]   Department:  [facility]           INR as of 12/3/2018     Today's INR 3.8!      Anticoagulation Summary as of 12/3/2018     INR goal 2.0-3.0   Today's INR 3.8!   Full warfarin instructions 12/3: 5 mg; Otherwise 10 mg on Mon, Thu; 7.5 mg all other days   Next INR check 12/17/2018    Indications   Personal history of DVT (deep vein thrombosis) [Z86.718]  Long term current use of anticoagulant therapy [Z79.01]         Your next Anticoagulation Clinic appointment(s)     Dec 17, 2018  9:30 AM CST   Anticoagulation Visit with  ANTICOAGULATION CLINIC   [facility] ([facility])    [address]  [address]  [place] [address] [phone]              Contact Numbers     [facility]  Please call  [phone] to cancel and/or reschedule your appointment   Please call  [phone] with any problems or questions regarding your therapy.        December 2018 Details    Sun Mon Tue Wed Thu Fri Sat           1                 2               3      5 mg   See details      4      7.5 mg         5      7.5 mg         6      10 mg         7      7.5 mg         8      7.5 mg           9      7.5 mg         10      10 mg         11      7.5 mg         12      7.5 mg         13      10 mg         14      7.5 mg         15      7.5 mg           16      7.5 mg         17            18               19               20               21               22                 23               24               25               26               27               28               29                 30               31                     Date Details   12/03 This INR check       Date of next INR:  12/17/2018         How to take your warfarin dose     To take:  5 mg Take 1 of the 5 mg tablets.    To take:  7.5 mg Take 1.5 of the 5 mg tablets.    To take:  10  mg Take 2 of the 5 mg tablets.

## 2018-12-04 NOTE — MR AVS SNAPSHOT
After Visit Summary   10/10/2018    Gemma Cowart    MRN: [id number]           Patient Information     Date Of Birth          1945        Visit Information        Provider Department      10/10/2018 8:30 AM Nurse, [facility]        Today's Diagnoses     History of left breast cancer           Follow-ups after your visit        Your next 10 appointments already scheduled     Oct 17, 2018  8:40 AM CDT   Return Visit with Jyoti Narvaez MD   [facility] ([facility])    [facility]  [address]  [facility] [address] [phone]            Oct 23, 2018 10:00 AM CDT   Anticoagulation Visit with  ANTICOAGULATION CLINIC   [facility] ([facility])    [address]  [address]  [facility] [place] [facility] [address] [phone]              Who to contact     If you have questions or need follow up information about today's clinic visit or your schedule please contact [facility] directly at [phone].  Normal or non-critical lab and imaging results will be communicated to you by MyChart, letter or phone within 4 business days after the clinic has received the results. If you do not hear from us within 7 days, please contact the clinic through MyChart or phone. If you have a critical or abnormal lab result, we will notify you by phone as soon as possible.  Submit refill requests through [employer] or call your pharmacy and they will forward the refill request to us. Please allow 3 business days for your refill to be completed.          Additional Information About Your Visit        Care EveryWhere ID     This is your Care EveryWhere ID. This could be used by other organizations to access your [facility] medical records  [id number]        Your Vitals Were     Last Period                   09/24/1990            Blood Pressure from Last 3 Encounters:   09/07/18 110/60   05/15/18 108/52    05/09/18 110/62    Weight from Last 3 Encounters:   09/07/18 85.7 kg (189 lb)   05/15/18 83.5 kg (184 lb)   05/09/18 83.5 kg (184 lb)              We Performed the Following     Ca27.29  breast tumor marker     Comprehensive metabolic panel        Primary Care Provider Office Phone # Fax #    Alva Viera [phone] [address] [place] [address]        Equal Access to Services     [facility] : Hadii aad ku hadasho Soomaali, waaxda luqadaha, qaybta kaalmada adeegyada, waxay idiin hayaan adeeg kharash lamark anthony . So [facility] [phone].    ATENCIÓN: Si habla español, tiene a marcum disposición servicios gratuitos de asistencia lingüística. [facility] [phone].    We comply with applicable federal civil rights laws and [place] laws. We do not discriminate on the basis of race, color, national origin, age, disability, sex, sexual orientation, or gender identity.            Thank you!     Thank you for choosing [facility]  for your care. Our goal is always to provide you with excellent care. Hearing back from our patients is one way we can continue to improve our services. Please take a few minutes to complete the written survey that you may receive in the mail after your visit with us. Thank you!             Your Updated Medication List - Protect others around you: Learn how to safely use, store and throw away your medicines at www.disposemymeds.org.          This list is accurate as of 10/10/18  8:35 AM.  Always use your most recent med list.                   Brand Name Dispense Instructions for use Diagnosis    ACE/ARB/ARNI NOT PRESCRIBED (INTENTIONAL)      ACE & ARB not prescribed due to Other: normal tensive, low microalbumuria    Type 2 diabetes mellitus with other specified complication (H)       * B-12 1000 MCG Tbcr     90 tablet    Take 1,000 mcg by mouth daily    Vitamin B12 deficiency       * cyanocobalamin 2500 MCG sublingual tablet    VITAMIN B-12    90  fall tablet    Place 2,500 mcg under the tongue daily    Vitamin B12 deficiency, S/P gastric bypass       blood glucose lancets standard    no brand specified    2 Box    Use to test blood sugars 2 times daily or as directed. Please dispense lancets for Relion Confirm.    Type 2 diabetes mellitus with other specified complication (H)       blood glucose monitoring test strip    no brand specified    200 each    Use to test blood sugars 2 times daily or as directed. Please dispense Relion Confirm test strips    Type 2 diabetes mellitus with other specified complication (H)       busPIRone 5 MG tablet    BUSPAR    180 tablet    Take 1 tablet (5 mg) by mouth 2 times daily    ELI (generalized anxiety disorder)       * clobetasol 0.05 % cream    TEMOVATE    270 g    Apply sparingly to affected area twice daily as needed.  Do not apply to face.    Psoriasis       * clobetasol 0.05 % external solution    TEMOVATE    50 mL    Apply sparingly to affected area twice daily for 14 days.  Do not apply to face.    Psoriasis       nystatin 727755 UNIT/GM Powd    MYCOSTATIN    60 g    Apply topically 3 times daily as needed    Yeast infection of the skin       PARoxetine 30 MG tablet    PAXIL    180 tablet    Take 2 tablets (60 mg) by mouth At Bedtime    ELI (generalized anxiety disorder)       STATIN NOT PRESCRIBED (INTENTIONAL)      Please choose reason not prescribed, below        TYLENOL 500 MG tablet   Generic drug:  acetaminophen     100 tablet    Take 2 tablets (1,000 mg) by mouth every 8 hours as needed for mild pain        vitamin D 2000 units Caps     90 capsule    Take 1 capsule by mouth daily    S/P gastric bypass       warfarin 5 MG tablet    COUMADIN    150 tablet    Take 10 MG M,W(2 tabs) 7.5 MG(1.5 tabs) all others or as advise by INR clinic    Long-term (current) use of anticoagulants, Personal history of DVT (deep vein thrombosis)       * Notice:  This list has 4 medication(s) that are the same as other medications  prescribed for you. Read the directions carefully, and ask your doctor or other care provider to review them with you.

## 2018-12-05 ENCOUNTER — PATIENT OUTREACH (OUTPATIENT)
Dept: GERIATRIC MEDICINE | Facility: CLINIC | Age: 73
End: 2018-12-05

## 2018-12-07 ENCOUNTER — TRANSFERRED RECORDS (OUTPATIENT)
Dept: HEALTH INFORMATION MANAGEMENT | Facility: CLINIC | Age: 73
End: 2018-12-07

## 2018-12-07 NOTE — PROGRESS NOTES
[facility] Care Coordination Contact    TC from client ([phone]) stating that she is having problems getting her prescriptions filled. She spoke with the [facility] that had something listed wrong and got that corrected, but she is still having problems. She goes to [facility] in [place] ([phone]). TC to [facility]. Client was going to bring in her insurance card, but they haven't been able to get it through their system. Client is trying to fill Paroxetine and Clabetasol. TC to [facility]. Spoke with Eric with [facility]. Client still had [employer] listed as her pharmacy insurance. CC informed them that client cancelled her BCBS as of 11/1/18. [facility] will update this and is should be updated in the system by the end of the day. TC to client informing her of this. Advised her to call back if she is still having problems.  Nighat Carbajal, RN, BSN, PHN  [facility] Care Coordinator  [phone]

## 2018-12-10 ENCOUNTER — TELEPHONE (OUTPATIENT)
Dept: PEDIATRICS | Facility: CLINIC | Age: 73
End: 2018-12-10

## 2018-12-10 DIAGNOSIS — H91.90 HEARING LOSS, UNSPECIFIED HEARING LOSS TYPE, UNSPECIFIED LATERALITY: Primary | ICD-10-CM

## 2018-12-10 NOTE — TELEPHONE ENCOUNTER
12/10/2018 Patient call wanting a referral for hearing test and hearing aids to the [facility].Pt can be reached @ [phone] can leave a message.

## 2018-12-14 NOTE — TELEPHONE ENCOUNTER
Referral to ENT placed, patient can call to schedule.    Alva Viera MD  Internal Medicine-Pediatrics

## 2018-12-14 NOTE — TELEPHONE ENCOUNTER
The pt is aware and has no further questions at this time.   Rosa Maria Ngo on 12/14/2018 at 4:26 PM

## 2018-12-17 ENCOUNTER — ANTICOAGULATION THERAPY VISIT (OUTPATIENT)
Dept: NURSING | Facility: CLINIC | Age: 73
End: 2018-12-17
Payer: COMMERCIAL

## 2018-12-17 DIAGNOSIS — Z79.01 LONG TERM CURRENT USE OF ANTICOAGULANT THERAPY: Primary | ICD-10-CM

## 2018-12-17 DIAGNOSIS — Z86.718 PERSONAL HISTORY OF DVT (DEEP VEIN THROMBOSIS): ICD-10-CM

## 2018-12-17 LAB — INR POINT OF CARE: 2.2 (ref 0.86–1.14)

## 2018-12-17 PROCEDURE — 85610 PROTHROMBIN TIME: CPT | Mod: QW

## 2018-12-17 PROCEDURE — 36416 COLLJ CAPILLARY BLOOD SPEC: CPT

## 2018-12-17 PROCEDURE — 99207 ZZC NO CHARGE NURSE ONLY: CPT

## 2018-12-17 NOTE — PROGRESS NOTES
ANTICOAGULATION FOLLOW-UP CLINIC VISIT    Patient Name:  Gemma Cowart  Date:  2018  Contact Type:  Face to Face    SUBJECTIVE:     Patient Findings     Positives:   Bruising    Comments:   Bruises both knees.  No known injury.            OBJECTIVE    INR Protime   Date Value Ref Range Status   2018 2.2 (A) 0.86 - 1.14 Final       ASSESSMENT / PLAN  INR assessment THER    Recheck INR In: 6 WEEKS    INR Location Clinic      Anticoagulation Summary  As of 2018    INR goal:   2.0-3.0   TTR:   59.1 % (2.6 y)   INR used for dosin.2 (2018)   Warfarin maintenance plan:   10 mg (5 mg x 2) every Mon, Thu; 7.5 mg (5 mg x 1.5) all other days   Full warfarin instructions:   10 mg every Mon, Thu; 7.5 mg all other days   Weekly warfarin total:   57.5 mg   No change documented:   Rae Cooley RN   Plan last modified:   Camila Hernández RN (2018)   Next INR check:   2019   Target end date:   Indefinite    Indications    Personal history of DVT (deep vein thrombosis) [Z86.718]  Long term current use of anticoagulant therapy [Z79.01]             Anticoagulation Episode Summary     INR check location:   [facility]    Preferred lab:       Send INR reminders to:   [facility]    Comments:   5mg tabs - HS dose // CALENDAR // gastric bypass  // DVT 12 warfarin until 2013 // DVT 9/12/15      Anticoagulation Care Providers     Provider Role Specialty Phone number    Alva Viera MD  Internal Medicine [phone]            See the Encounter Report to view Anticoagulation Flowsheet and Dosing Calendar (Go to Encounters tab in chart review, and find the Anticoagulation Therapy Visit)        Rae Cooley, VILLA

## 2018-12-24 ENCOUNTER — ANCILLARY PROCEDURE (OUTPATIENT)
Dept: GENERAL RADIOLOGY | Facility: CLINIC | Age: 73
End: 2018-12-24
Attending: INTERNAL MEDICINE
Payer: COMMERCIAL

## 2018-12-24 ENCOUNTER — OFFICE VISIT (OUTPATIENT)
Dept: PEDIATRICS | Facility: CLINIC | Age: 73
End: 2018-12-24
Payer: COMMERCIAL

## 2018-12-24 VITALS
BODY MASS INDEX: 35.12 KG/M2 | HEIGHT: 63 IN | DIASTOLIC BLOOD PRESSURE: 60 MMHG | SYSTOLIC BLOOD PRESSURE: 118 MMHG | TEMPERATURE: 98.2 F | WEIGHT: 198.19 LBS | HEART RATE: 68 BPM | OXYGEN SATURATION: 95 %

## 2018-12-24 DIAGNOSIS — M25.50 MULTIPLE JOINT PAIN: ICD-10-CM

## 2018-12-24 DIAGNOSIS — Z86.79 HISTORY OF PSVT (PAROXYSMAL SUPRAVENTRICULAR TACHYCARDIA): ICD-10-CM

## 2018-12-24 DIAGNOSIS — N18.30 CKD (CHRONIC KIDNEY DISEASE) STAGE 3, GFR 30-59 ML/MIN (H): ICD-10-CM

## 2018-12-24 DIAGNOSIS — R00.2 PALPITATIONS: Primary | ICD-10-CM

## 2018-12-24 DIAGNOSIS — Q66.70 HIGH ARCHES: ICD-10-CM

## 2018-12-24 LAB
ERYTHROCYTE [DISTWIDTH] IN BLOOD BY AUTOMATED COUNT: 14.1 % (ref 10–15)
HCT VFR BLD AUTO: 40.2 % (ref 35–47)
HGB BLD-MCNC: 12.2 G/DL (ref 11.7–15.7)
MCH RBC QN AUTO: 25.1 PG (ref 26.5–33)
MCHC RBC AUTO-ENTMCNC: 30.3 G/DL (ref 31.5–36.5)
MCV RBC AUTO: 83 FL (ref 78–100)
PLATELET # BLD AUTO: 218 10E9/L (ref 150–450)
RBC # BLD AUTO: 4.87 10E12/L (ref 3.8–5.2)
WBC # BLD AUTO: 5.8 10E9/L (ref 4–11)

## 2018-12-24 PROCEDURE — 73030 X-RAY EXAM OF SHOULDER: CPT | Mod: RT

## 2018-12-24 PROCEDURE — 36415 COLL VENOUS BLD VENIPUNCTURE: CPT | Performed by: INTERNAL MEDICINE

## 2018-12-24 PROCEDURE — 80048 BASIC METABOLIC PNL TOTAL CA: CPT | Performed by: INTERNAL MEDICINE

## 2018-12-24 PROCEDURE — 84443 ASSAY THYROID STIM HORMONE: CPT | Performed by: INTERNAL MEDICINE

## 2018-12-24 PROCEDURE — 85027 COMPLETE CBC AUTOMATED: CPT | Performed by: INTERNAL MEDICINE

## 2018-12-24 PROCEDURE — 93000 ELECTROCARDIOGRAM COMPLETE: CPT | Performed by: INTERNAL MEDICINE

## 2018-12-24 PROCEDURE — 83735 ASSAY OF MAGNESIUM: CPT | Performed by: INTERNAL MEDICINE

## 2018-12-24 PROCEDURE — 99214 OFFICE O/P EST MOD 30 MIN: CPT | Performed by: INTERNAL MEDICINE

## 2018-12-24 PROCEDURE — 73030 X-RAY EXAM OF SHOULDER: CPT | Mod: LT

## 2018-12-24 ASSESSMENT — MIFFLIN-ST. JEOR: SCORE: 1382.06

## 2018-12-24 NOTE — PROGRESS NOTES
SUBJECTIVE:   Gemma Cowart is a 73 year old female who presents to clinic today for the following health issues:    1.Kidney disease: Has seen this in her chart but doesn't know why she was diagnosed with kidney disease or if there is anything she should be doing about it. Doesn't take NSAIDs due to gastric bypass.     2. High arch: Has noticed prominent mid foot bones. Wondering if this is problematic. No foot pain or issues with this.     3. Palpitations: She reports that she is starting to get intermittent palpitations again. Apparently underwent ablation through [facility] for paroxysmal SVT previously, had been asymptomatic for some time up until recently. palpitations occur probably every 3 days or so and self resolve.     4. Joint pain: Reports that since she moved 5 months ago, she has been experiencing joint pain in her shoulders, hips and knees bilaterally. Has been told she has OA in her knees. Shoulders are most bothersome right now. Does have some swelling in her R shoulder, otherwise no issues with joint swelling, redness or warmth. Has previously had injections in her knees which helped with the pain.     Problem list and histories reviewed & adjusted, as indicated.  Additional history: as documented    Patient Active Problem List   Diagnosis     Nontoxic uninodular goiter     Rosacea     BRETT (iron deficiency anemia)     Complications of gastric bypass surgery     Vitamin D deficiency disease     Vitamin B12 deficiency     Eczema     Dyshydrosis     Hyperlipidemia LDL goal <100     Anxiety     Insomnia     Health Care Home     Type 2 diabetes mellitus with other specified complication (H)     Psoriasis     Long term current use of anticoagulant therapy     Advanced directives, counseling/discussion     Malignant neoplasm of left female breast (H)     Morbid obesity (H)     CKD (chronic kidney disease) stage 3, GFR 30-59 ml/min (H)     Personal history of DVT (deep vein thrombosis)     Acute pain of  right knee     Postmenopausal bleeding     Past Surgical History:   Procedure Laterality Date     ABDOMEN SURGERY  2000    gastric bypass 2000     COLONOSCOPY  10/11/2012    Procedure: COLONOSCOPY;  colonoscopy;  Surgeon: Gela Cleary MD;  Location:  GI     COLONOSCOPY N/A 10/6/2017    Procedure: COLONOSCOPY;;  Surgeon: Shashank Ortiz MD;  Location:  GI     DENTAL SURGERY  5/2007    all teeth removed - dentures     ENT SURGERY      all teeth pulled     ESOPHAGOSCOPY, GASTROSCOPY, DUODENOSCOPY (EGD), COMBINED N/A 10/6/2017    Procedure: COMBINED ESOPHAGOSCOPY, GASTROSCOPY, DUODENOSCOPY (EGD);  ESOPHAGOSCOPY, GASTROSCOPY, DUODENOSCOPY (EGD) & Colonoscopy *Needs INR on arrival;  Surgeon: Shashank Ortiz MD;  Location:  GI     GYN SURGERY  1/10/75    tubal ligation      H ABLATION SVT  5/28/2015    stopped metoprolol     HC BREAST RECONSTRUC W OTHR TECHNIQ  5/8/2013     HERNIA REPAIR  2005     IMPLANT FILTER INFERIOR VENA CAVA  10/8/2012    taken out  1/4/12     INSERT TISSUE EXPANDER BREAST BILATERAL  9/5/2012    Procedure: INSERT TISSUE EXPANDER BREAST BILATERAL;;  Surgeon: Orlando Wall MD;  Location:  OR     MASTECTOMY SIMPLE, SENTINEL NODE, COMBINED  9/5/2012    Procedure: COMBINED MASTECTOMY SIMPLE, SENTINEL NODE;  BILATERAL MASTECTOMY WITH LEFT AXILLARY SENTINEL LYMPH NODE BIOPSY, BILATERAL TISSUE EXPANDER        MASTECTOMY, BILATERAL       PANNICULECTOMY N/A 4/5/2018    Procedure: PANNICULECTOMY;  PANNICULECTOMY ;  Surgeon: Orlando Wall MD;  Location:  OR     RECONSTRUCT BREAST BILATERAL, IMPLANT PROSTHESIS BILATERAL, COMBINED  5/22/2013    Procedure: COMBINED RECONSTRUCT BREAST BILATERAL, IMPLANT PROSTHESIS BILATERAL;  BILATERAL SECOND STAGE BREAST RECONSTRUCTION WITH SILICONE GEL IMPLANTS AND LIPOSUCTION;  Surgeon: Orlando Wall MD;  Location:  [facility]     RECONSTRUCT NIPPLE BILATERAL  8/22/2013    Procedure: RECONSTRUCT NIPPLE BILATERAL;  BILATERAL NIPPLE AREOLAR  "RECONSTRUCTION;  Surgeon: Orlando Wall MD;  Location: [facility]       Social History     Tobacco Use     Smoking status: Former Smoker     Packs/day: 1.50     Years: 25.00     Pack years: 37.50     Types: Cigarettes     Last attempt to quit: 10/21/1986     Years since quittin.2     Smokeless tobacco: Never Used     Tobacco comment: quit    Substance Use Topics     Alcohol use: No     Alcohol/week: 0.0 oz     Family History   Problem Relation Age of Onset     Cancer - colorectal Mother      Colon Cancer Mother      Prostate Cancer Father      Alzheimer Disease Father      Cancer Paternal Grandmother      Alzheimer Disease Paternal Grandfather      Cancer - colorectal Brother      Diabetes Brother      Crohn's Disease Daughter      Diabetes Brother      Eye Disorder Brother      Diabetes Son      Cancer Maternal Uncle      Cancer Maternal Uncle            Reviewed and updated as needed this visit by clinical staff  Tobacco  Allergies  Meds  Med Hx  Fam Hx  Soc Hx      ROS:  Constitutional, HEENT, cardiovascular, pulmonary, gi and gu systems are negative, except as otherwise noted.    OBJECTIVE:     /60 (BP Location: Right arm, Patient Position: Chair, Cuff Size: Adult Large)   Pulse 68   Temp 98.2  F (36.8  C) (Tympanic)   Ht 1.607 m (5' 3.25\")   Wt 89.9 kg (198 lb 3 oz)   LMP 1990   SpO2 95%   BMI 34.83 kg/m    Body mass index is 34.83 kg/m .  GENERAL: healthy, alert and no distress  NECK: no adenopathy, no asymmetry, masses, or scars and thyroid normal to palpation  RESP: lungs clear to auscultation - no rales, rhonchi or wheezes  CV: regular rate and rhythm, normal S1 S2, no S3 or S4, no murmur, click or rub, no peripheral edema and peripheral pulses strong  MS: slightly decreased ROM in shoulders bilaterally. R shoulder with soft slightly fluctuant bulging around AC joint but no overlying warmth or erythema, no appreciable knee effusions or tenderness.       ASSESSMENT/PLAN: "     1. Palpitations  Concerning for recurrence of patient's SVT. Will obtain 14 day event monitor as she is having these episodes every couple of days. Will obtain basic screening labs as well. Will likely need cardiology evaluation once event monitor is complete to determine if she needs repeat ablation, but can wait until report available. ECG today reassuring.   - EKG 12-lead complete w/read - Clinics  - TSH with free T4 reflex  - CBC with platelets  - Basic metabolic panel  (Ca, Cl, CO2, Creat, Gluc, K, Na, BUN)  - Magnesium  - Cardiac Event Monitor Adult Pediatric; Future  - CARDIOLOGY EVAL ADULT REFERRAL    2. History of PSVT (paroxysmal supraventricular tachycardia)  As above.   - TSH with free T4 reflex  - CBC with platelets  - Basic metabolic panel  (Ca, Cl, CO2, Creat, Gluc, K, Na, BUN)  - Magnesium  - Cardiac Event Monitor Adult Pediatric; Future  - CARDIOLOGY EVAL ADULT REFERRAL    3. Multiple joint pain  Anticipate she likely has OA flare. Cannot take NSAIDs due to CKD and gastric bypass. Possible R AC joint effusion. Given that shoulders are most bothersome at this time, will obtain radiographs; if evidence of arthritis will refer to Ortho to discuss injections as this has worked well in past. In meantime, discussed benefits of acetaminophen, ice, and possible physical therapy.   - XR Shoulder Left G/E 3 Views; Future  - XR Shoulder Right G/E 3 Views; Future    4. CKD (chronic kidney disease) stage 3, GFR 30-59 ml/min (H)  Reviewed pathophysiology with patient, importance of staying hydrated and nephrotoxin avoidance.     5. High arches  Discussed supportive cares.       Patient Instructions   1. Kidney disease is common at your age and something we will monitor. Stay well hydrated, avoid ibuprofen (as you are) and keep BP under control.    2. You have high arches in your feet -- probably not a major issue, just affects shoes that you choose.    3. Palpitations: Check labs today, get event monitor and  then follow-up with cardiology after you do event monitor. They are here in clinic. Avoid caffeine and alcohol as these make it worse.    4. X-rays today of shoulders, likely will send you to orthopedics if it looks like arthritis to talk about management options. Ice regularly and use acetaminophen up to 1000mg 3 times daily.       Alva Casas MD  [facility]

## 2018-12-24 NOTE — PATIENT INSTRUCTIONS
1. Kidney disease is common at your age and something we will monitor. Stay well hydrated, avoid ibuprofen (as you are) and keep BP under control.    2. You have high arches in your feet -- probably not a major issue, just affects shoes that you choose.    3. Palpitations: Check labs today, get event monitor and then follow-up with cardiology after you do event monitor. They are here in clinic. Avoid caffeine and alcohol as these make it worse.    4. X-rays today of shoulders, likely will send you to orthopedics if it looks like arthritis to talk about management options. Ice regularly and use acetaminophen up to 1000mg 3 times daily.

## 2018-12-25 LAB
ANION GAP SERPL CALCULATED.3IONS-SCNC: 5 MMOL/L (ref 3–14)
BUN SERPL-MCNC: 21 MG/DL (ref 7–30)
CALCIUM SERPL-MCNC: 8.5 MG/DL (ref 8.5–10.1)
CHLORIDE SERPL-SCNC: 108 MMOL/L (ref 94–109)
CO2 SERPL-SCNC: 28 MMOL/L (ref 20–32)
CREAT SERPL-MCNC: 1.27 MG/DL (ref 0.52–1.04)
GFR SERPL CREATININE-BSD FRML MDRD: 42 ML/MIN/{1.73_M2}
GLUCOSE SERPL-MCNC: 95 MG/DL (ref 70–99)
MAGNESIUM SERPL-MCNC: 2.2 MG/DL (ref 1.6–2.3)
POTASSIUM SERPL-SCNC: 4.7 MMOL/L (ref 3.4–5.3)
SODIUM SERPL-SCNC: 141 MMOL/L (ref 133–144)
TSH SERPL DL<=0.005 MIU/L-ACNC: 1.89 MU/L (ref 0.4–4)

## 2018-12-31 DIAGNOSIS — M19.011 ARTHRITIS OF RIGHT ACROMIOCLAVICULAR JOINT: Primary | ICD-10-CM

## 2018-12-31 DIAGNOSIS — M25.50 MULTIPLE JOINT PAIN: ICD-10-CM

## 2019-01-02 ENCOUNTER — HOSPITAL ENCOUNTER (OUTPATIENT)
Dept: CARDIOLOGY | Facility: CLINIC | Age: 74
Discharge: HOME | End: 2019-01-02
Attending: INTERNAL MEDICINE | Admitting: INTERNAL MEDICINE
Payer: COMMERCIAL

## 2019-01-02 DIAGNOSIS — Z86.79 HISTORY OF PSVT (PAROXYSMAL SUPRAVENTRICULAR TACHYCARDIA): ICD-10-CM

## 2019-01-02 DIAGNOSIS — R00.2 PALPITATIONS: ICD-10-CM

## 2019-01-02 PROCEDURE — 93270 REMOTE 30 DAY ECG REV/REPORT: CPT

## 2019-01-02 PROCEDURE — 93272 ECG/REVIEW INTERPRET ONLY: CPT | Performed by: INTERNAL MEDICINE

## 2019-01-02 NOTE — LETTER
[facility]  [address] [place] [id number]                  [phone]   January 18, 2019    Gemma Cowart  [address] [place] [id number]      Dear Gemma,     Here are the results from the recent holter monitor that we did. There was a couple of short runs of evidence of the PSVT that you had previously- the longest run was 7 beats in a row (short). We will have you see cardiology as scheduled.     Let me know if you have questions or concerns!     Sincerely,       Alf Guzmán MD   Internal Medicine and Pediatrics       Results for orders placed or performed in visit on 12/24/18   XR Shoulder Right G/E 3 Views    Narrative    XR SHOULDER RT G/E 3 VW, XR SHOULDER LT G/E 3 VW 12/24/2018 1:06 PM    HISTORY: Joint pain.    COMPARISON: None.    THREE-VIEW RIGHT SHOULDER: No acute osseous finding is demonstrated.  AC joint degenerative change is present.    THREE-VIEW LEFT SHOULDER: No significant osseous or joint abnormality  is seen.    ARON JUSTIN MD

## 2019-01-04 NOTE — PROGRESS NOTES
ASSESSMENT & PLAN  Patient Instructions     1. Impingement syndrome of right shoulder    2. Biceps tendinitis of both shoulders      -Patient has right shoulder pain due to impingement syndrome and biceps tendinitis and left shoulder pain due to biceps tendinitis.  -Patient will start formal physical therapy and home exercise program.  -Patient may continue with Tylenol as needed.  -Patient will follow-up if pain does not improve.  -Patient reports pain in other body parts which she may schedule follow-up appointment for further workup and treatment.  -Call direct clinic number [[phone]] at any time with questions or concerns.    Albert Yeo MD [facility]  [facility]          -----    SUBJECTIVE  Gemma Cowart is a/an 73 year old Right handed female who is seen in consultation at the request of  Alva Viera M.D. for evaluation of right shoulder pain. The patient is seen by themselves.    Onset: 5 month(s) ago. Patient states she noticed pain in bilateral shoulders while moving into a new home, but does not recall a specific injury.  Location of Pain: bilateral anterior shoulder  Rating of Pain at worst: 8/10  Rating of Pain Currently: 2/10  Worsened by: reaching behind back, overhead movements  Better with: rest/activity avoidance  Treatments tried: rest/activity avoidance, Tylenol and previous imaging (xray 12/24/18)  Associated symptoms: no distal numbness or tingling; denies swelling or warmth  Orthopedic history: NO  Relevant surgical history: NO  Social history: social history: retired    Past Medical History:   Diagnosis Date     Anemia     iron deficiency anemia     Anxiety      Breast cancer (H) 2012    Bilateral Masectomies 9/2012     Breast cancer (H) 9/5/2012     Complications of gastric bypass surgery      Diabetes mellitus (H)     diet controlled     DVT (deep venous thrombosis) (H)      DVT, bilateral lower limbs (H)      Dyspnea on  exertion      Eczema      Fracture of left knee region     patella - vertical     History of kidney stones      Obesity      Polyneuropathy      Pruritus     generalized     Rosacea      Tachycardia     with no treatment     Thyroid disease      Social History     Socioeconomic History     Marital status:      Spouse name: Not on file     Number of children: Not on file     Years of education: Not on file     Highest education level: Not on file   Social Needs     Financial resource strain: Not on file     Food insecurity - worry: Not on file     Food insecurity - inability: Not on file     Transportation needs - medical: Not on file     Transportation needs - non-medical: Not on file   Occupational History     Employer: RETIRED   Tobacco Use     Smoking status: Former Smoker     Packs/day: 1.50     Years: 25.00     Pack years: 37.50     Types: Cigarettes     Last attempt to quit: 10/21/1986     Years since quittin.2     Smokeless tobacco: Never Used     Tobacco comment: quit    Substance and Sexual Activity     Alcohol use: No     Alcohol/week: 0.0 oz     Drug use: No     Sexual activity: No     Partners: Male   Other Topics Concern     Parent/sibling w/ CABG, MI or angioplasty before 65F 55M? No      Service Not Asked     Blood Transfusions Not Asked     Caffeine Concern Not Asked     Occupational Exposure Not Asked     Hobby Hazards Not Asked     Sleep Concern Not Asked     Stress Concern Not Asked     Weight Concern Not Asked     Special Diet Not Asked     Back Care Not Asked     Exercise Not Asked     Bike Helmet Yes     Seat Belt Yes     Self-Exams Not Asked   Social History Narrative     Not on file         Patient's past medical, surgical, social, and family histories were reviewed today and no changes are noted.    REVIEW OF SYSTEMS:  10 point ROS is negative other than symptoms noted above in HPI, Past Medical History or as stated below  Constitutional: NEGATIVE for fever, chills,  "change in weight  Skin: NEGATIVE for worrisome rashes, moles or lesions  GI/: NEGATIVE for bowel or bladder changes  Neuro: NEGATIVE for weakness, dizziness or paresthesias    OBJECTIVE:  /80   Ht 1.607 m (5' 3.25\")   Wt 89.8 kg (198 lb)   LMP 09/24/1990   BMI 34.80 kg/m     General: healthy, alert and in no distress  HEENT: no scleral icterus or conjunctival erythema  Skin: no suspicious lesions or rash. No jaundice.  CV: regular rhythm by palpation  Resp: normal respiratory effort without conversational dyspnea   Psych: normal mood and affect  Gait: normal steady gait with appropriate coordination and balance  Neuro: normal light touch sensory exam of the bilateral upper extremities.    MSK:  BILATERAL SHOULDER  Inspection:    no swelling, bruising, discoloration, or obvious deformity or asymmetry  Palpation:    Tender about the proximal biceps tendon. Remainder of bony and tendinous landmarks are nontender.  Active Range of Motion:     Abduction 1650, FF 1650, , IR L3.      Scapular dyskinesis absent  Strength:    Scapular plane abduction 5-/5,  ER 5/5, IR 5/5, biceps 5/5, triceps 5/5  Special Tests:    Positive: Posey' (right only) and Speed's (b/l)    Negative: Neer's, supraspinatus (empty can), drop arm/painful arc, crossed arm adduction, Avery's and Yergason's  Independent visualization of the below image:  No results found for this or any previous visit (from the past 24 hour(s)).  Results for orders placed or performed in visit on 12/24/18   XR Shoulder Right G/E 3 Views    Narrative    XR SHOULDER RT G/E 3 VW, XR SHOULDER LT G/E 3 VW 12/24/2018 1:06 PM    HISTORY: Joint pain.    COMPARISON: None.    THREE-VIEW RIGHT SHOULDER: No acute osseous finding is demonstrated.  AC joint degenerative change is present.    THREE-VIEW LEFT SHOULDER: No significant osseous or joint abnormality  is seen.    ARON JUSTIN MD     XR SHOULDER RT G/E 3 VW, XR SHOULDER LT G/E 3 VW 12/24/2018 1:06 " PM     HISTORY: Joint pain.     COMPARISON: None.     THREE-VIEW RIGHT SHOULDER: No acute osseous finding is demonstrated.  AC joint degenerative change is present.     THREE-VIEW LEFT SHOULDER: No significant osseous or joint abnormality  is seen.     KEVIN LEACH, MD Albert Yeo MD [facility]

## 2019-01-07 NOTE — PROGRESS NOTES
[facility] [place]  [address]  [address]  [place] [address] [phone]  Dept: [phone]    PRE-OP EVALUATION:  Today's date: 2019    Gemma Cowart (: 1945) presents for pre-operative evaluation assessment as requested by Dr. Davison.  She requires evaluation and anesthesia risk assessment prior to undergoing surgery/procedure for treatment of Cataract .    Proposed Surgery/ Procedure: Cataract removal  Date of Surgery/ Procedure: 2019 and 2019  Time of Surgery/ Procedure: [facility]/Surgical Facility: [facility] [place]  Fax number for surgical facility: [phone]  Primary Physician: Alva Viera  Type of Anesthesia Anticipated: Local with MAC    Patient has a Health Care Directive or Living Will:  NO    1. YES - Implanted blood clot catcher on neck and it was removed - Do you have a history of heart attack, stroke, stent, bypass or surgery on an artery in the head, neck, heart or legs?  2. NO - Do you ever have any pain or discomfort in your chest?  3. NO - Do you have a history of  Heart Failure?  4. YES - Are you troubled by shortness of breath when: walking on the level, up a slight hill or at night?  5. NO - Do you currently have a cold, bronchitis or other respiratory infection?  6. NO - Do you have a cough, shortness of breath or wheezing?  7. NO - Do you sometimes get pains in the calves of your legs when you walk?  8. YES, self - Do you or anyone in your family have previous history of blood clots?  9. NO - Do you or does anyone in your family have a serious bleeding problem such as prolonged bleeding following surgeries or cuts?  10. YES, 10 years ago - Have you ever had problems with anemia or been told to take iron pills?  11. NO - Have you had any abnormal blood loss such as black, tarry or bloody stools, or abnormal vaginal bleeding?  12. NO - Have you ever had a blood transfusion?  13. NO - Have you or any of your  relatives ever had problems with anesthesia?  14. NO - Do you have sleep apnea, excessive snoring or daytime drowsiness?  15. NO - Do you have any prosthetic heart valves?  16. NO - Do you have prosthetic joints?  17. NO - Is there any chance that you may be pregnant?      HPI:     HPI related to upcoming procedure: bilateral cataracts.      Long term current use of anticoagulant therapy due to history of DVT (deep vein thrombosis)  Stable, on warfarin    Palpitations  Hx of re-entrant SVT s/p ablation, now with new-onset self-limited palpitations.  Recently evaluated by PCP and currently undergoing 14-day holter monitor study to evaluate re-emergence of SVT.  No associated chest pain, syncope or presyncope, dizziness, SOB.      Anxiety  Stable on current SSRI    CKD (chronic kidney disease) stage 3, GFR 30-59 ml/min (H)  Stable, compensated.      See problem list for active medical problems.  Problems all longstanding and stable, except as noted/documented.  See ROS for pertinent symptoms related to these conditions.                                                                                                                                                          .    MEDICAL HISTORY:     Patient Active Problem List    Diagnosis Date Noted     Postmenopausal bleeding 05/09/2018     Priority: Medium     Acute pain of right knee 03/08/2018     Priority: Medium     Personal history of DVT (deep vein thrombosis) 11/15/2016     Priority: Medium     CKD (chronic kidney disease) stage 3, GFR 30-59 ml/min (H) 10/16/2016     Priority: Medium     Malignant neoplasm of left female breast (H) 10/12/2016     Priority: Medium     S/p bilateral mastectomy, follows with Dr. Narvaez.        Morbid obesity (H) 10/12/2016     Priority: Medium     Advanced directives, counseling/discussion 08/19/2016     Priority: Medium     Advance Care Planning 7/19/2017: ACP Review of Chart / Resources Provided:  Reviewed chart for advance care  plan.  Gemma Cowart has no plan or code status on file. Discussed available resources and provided with information.   Added by Nighat Watson  Advance Care Planning 2016: ACP Review of Chart / Resources Provided:  Reviewed chart for advance care plan.  Gemma Cowart has no plan or code status on file. Discussed available resources and provided with information. Confirmed code status reflects current choices pending further ACP discussions.  Confirmed/documented legally designated decision makers.  Added by Nighat Watson             Long term current use of anticoagulant therapy 2016     Priority: Medium     Type 2 diabetes mellitus with other specified complication (H) 10/09/2015     Priority: Medium     Psoriasis 10/09/2015     Priority: Medium     Health Care Home 2013     Priority: Medium     [facility] Case Management  Nighat Carbajal RN  [phone]  [facility] CARE PLAN SUMMARY    Client Name:  Gemma Cowart  Address:  [address] [place] [address], [address] [place] [address] Phone: [phone] (home)    :  1945 Date of Assessment:  2018   Health Plan:  [facility]  Health Plan Number: [id number] Medical Assistance Number: [id number]  Financial Worker:  Shauna Messina,  [phone], Fax [phone]  Case #:  [id number]   [facility] :  Nighat Carbajal RN  Phone:  [phone]   Fax:  [phone]   [facility] Enrollment Date: 2013 Case Mix:  L  Rate Cell:  A  Waiver Type:  none   Primary Emergency Contact:   CAMILLE SHELTON  Address: [address] [place] [address]  Home Phone: [phone]  Mobile Phone: [phone]  Relation: Daughter  Secondary Emergency Contact:   IRAIDA SHELTON [place]   Home Phone: [phone]  Mobile Phone: [phone]  Relation: Son Language:  English  :  No   Health Care Agent/POA:  none Advanced Directives/Living Will:  none   Primary Care  "Clinic/Phone/Fax:  [facility] [place]  Phone: [phone]  Fax: [phone] Primary Dx:  Type 2 diabetes mellitus [E11.69]  Secondary Dx:  CKD (chronic kidney disease) stage 3, GFR 30-59 ml/min [N18.3]   Primary Physician:  Alva Viera MD.   Height:  5' 3\"  Weight:  184 lbs   Specialty Physician:    Audiologist:     Eye Care Provider:   Dental Care Provider:    [place]osal[place]: Deyvi [phone]/See a Dentist [phone]   Other:          [facility] CURRENT SERVICES SUMMARY  Equipment owned/DME history:   SERVICE TYPE/PROVIDER NAME/PHONE AUTH DATE FREQUENCY Units OR $ Amt DESCRIPTION   Medical Transportation: [employer] [phone]  Fax:  8/1/18-  7/31/19 as needed       Supplies: [employer] [phone]  Fax:  8/1/18-  9/31/19 Q 3 months  32 hearing aid batteries        * For ADC please select ADC provider and [employer] in order to process auth                   Insomnia 06/27/2013     Priority: Medium     Anxiety 04/23/2013     Priority: Medium     Eczema 02/22/2013     Priority: Medium     Dyshydrosis 02/22/2013     Priority: Medium     Hyperlipidemia LDL goal <100 02/22/2013     Priority: Medium     BRETT (iron deficiency anemia) 02/01/2013     Priority: Medium     Complications of gastric bypass surgery 02/01/2013     Priority: Medium     Vitamin D deficiency disease 02/01/2013     Priority: Medium     Vitamin B12 deficiency 02/01/2013     Priority: Medium     Nontoxic uninodular goiter 10/18/2012     Priority: Medium     Rosacea 10/18/2012     Priority: Medium      Past Medical History:   Diagnosis Date     Anemia     iron deficiency anemia     Anxiety      Breast cancer (H) 2012    Bilateral Masectomies 9/2012     Breast cancer (H) 9/5/2012     Complications of gastric bypass surgery      Diabetes mellitus (H)     diet controlled     DVT (deep venous thrombosis) (H)      DVT, bilateral lower limbs (H)      Dyspnea on exertion      Eczema      Fracture of left knee region "     patella - vertical     History of kidney stones      Obesity      Polyneuropathy      Pruritus     generalized     Rosacea      Tachycardia     with no treatment     Thyroid disease      Past Surgical History:   Procedure Laterality Date     ABDOMEN SURGERY  2000    gastric bypass 2000     COLONOSCOPY  10/11/2012    Procedure: COLONOSCOPY;  colonoscopy;  Surgeon: Gela Cleary MD;  Location:  GI     COLONOSCOPY N/A 10/6/2017    Procedure: COLONOSCOPY;;  Surgeon: Shashank Ortiz MD;  Location:  GI     DENTAL SURGERY  5/2007    all teeth removed - dentures     ENT SURGERY      all teeth pulled     ESOPHAGOSCOPY, GASTROSCOPY, DUODENOSCOPY (EGD), COMBINED N/A 10/6/2017    Procedure: COMBINED ESOPHAGOSCOPY, GASTROSCOPY, DUODENOSCOPY (EGD);  ESOPHAGOSCOPY, GASTROSCOPY, DUODENOSCOPY (EGD) & Colonoscopy *Needs INR on arrival;  Surgeon: Shashank Ortiz MD;  Location:  GI     GYN SURGERY  1/10/75    tubal ligation      H ABLATION SVT  5/28/2015    stopped metoprolol     HC BREAST RECONSTRUC W OTHR TECHNIQ  5/8/2013     HERNIA REPAIR  2005     IMPLANT FILTER INFERIOR VENA CAVA  10/8/2012    taken out  1/4/12     INSERT TISSUE EXPANDER BREAST BILATERAL  9/5/2012    Procedure: INSERT TISSUE EXPANDER BREAST BILATERAL;;  Surgeon: Orlando Wall MD;  Location:  OR     MASTECTOMY SIMPLE, SENTINEL NODE, COMBINED  9/5/2012    Procedure: COMBINED MASTECTOMY SIMPLE, SENTINEL NODE;  BILATERAL MASTECTOMY WITH LEFT AXILLARY SENTINEL LYMPH NODE BIOPSY, BILATERAL TISSUE EXPANDER        MASTECTOMY, BILATERAL       PANNICULECTOMY N/A 4/5/2018    Procedure: PANNICULECTOMY;  PANNICULECTOMY ;  Surgeon: Orlando Wall MD;  Location:  OR     RECONSTRUCT BREAST BILATERAL, IMPLANT PROSTHESIS BILATERAL, COMBINED  5/22/2013    Procedure: COMBINED RECONSTRUCT BREAST BILATERAL, IMPLANT PROSTHESIS BILATERAL;  BILATERAL SECOND STAGE BREAST RECONSTRUCTION WITH SILICONE GEL IMPLANTS AND LIPOSUCTION;  Surgeon:  Orlando Wall MD;  Location: [facility]     RECONSTRUCT NIPPLE BILATERAL  2013    Procedure: RECONSTRUCT NIPPLE BILATERAL;  BILATERAL NIPPLE AREOLAR RECONSTRUCTION;  Surgeon: Orlando Wall MD;  Location: [facility]     Current Outpatient Medications   Medication Sig Dispense Refill     ACE/ARB NOT PRESCRIBED, INTENTIONAL, ACE & ARB not prescribed due to Other: normal tensive, low microalbumuria       acetaminophen (TYLENOL) 500 MG tablet Take 2 tablets (1,000 mg) by mouth every 8 hours as needed for mild pain 100 tablet 0     blood glucose (NO BRAND SPECIFIED) lancets standard Use to test blood sugars 2 times daily or as directed. Please dispense lancets for Relion Confirm. 2 Box 3     blood glucose monitoring (NO BRAND SPECIFIED) test strip Use to test blood sugars 2 times daily or as directed. Please dispense Relion Confirm test strips 200 each 3     Cholecalciferol (VITAMIN D) 2000 units CAPS Take 1 capsule by mouth daily 90 capsule 3     clobetasol (TEMOVATE) 0.05 % cream Apply sparingly to affected area twice daily as needed.  Do not apply to face. 270 g 3     clobetasol (TEMOVATE) 0.05 % external solution Apply sparingly to affected area twice daily for 14 days.  Do not apply to face. 50 mL 11     cyanocobalamin (VITAMIN B-12) 2500 MCG sublingual tablet Place 2,500 mcg under the tongue daily 90 tablet 3     PARoxetine (PAXIL) 30 MG tablet Take 2 tablets (60 mg) by mouth At Bedtime 180 tablet 3     warfarin (COUMADIN) 5 MG tablet Take 10 mg (2 tabs) on Mon, Thu; 7.5 mg (1.5 tabs) all other days or as directed by INR Clinic 150 tablet 1     OTC products: None, except as noted above    Allergies   Allergen Reactions     Bacitracin Unknown     Phenylene       Latex Allergy: NO    Social History     Tobacco Use     Smoking status: Former Smoker     Packs/day: 1.50     Years: 25.00     Pack years: 37.50     Types: Cigarettes     Last attempt to quit: 10/21/1986     Years since quittin.2     Smokeless  "tobacco: Never Used     Tobacco comment: quit 1985   Substance Use Topics     Alcohol use: No     Alcohol/week: 0.0 oz     History   Drug Use No       REVIEW OF SYSTEMS:   Constitutional, neuro, ENT, endocrine, pulmonary, cardiac, gastrointestinal, genitourinary, musculoskeletal, integument and psychiatric systems are negative, except as otherwise noted.    EXAM:   /66   Pulse 81   Temp 98.2  F (36.8  C) (Oral)   Ht 1.607 m (5' 3.25\")   Wt 90.2 kg (198 lb 12.8 oz)   LMP 09/24/1990   SpO2 97%   BMI 34.94 kg/m      GENERAL APPEARANCE: healthy, alert and no distress     EYES: EOMI, PERRL     HENT: ear canals and TM's normal and nose and mouth without ulcers or lesions     NECK: no adenopathy, no asymmetry, masses, or scars and thyroid normal to palpation     RESP: lungs clear to auscultation - no rales, rhonchi or wheezes     CV: regular rates and rhythm, normal S1 S2, no S3 or S4 and no murmur, click or rub     ABDOMEN:  soft, nontender, no HSM or masses and bowel sounds normal     MS: extremities normal- no gross deformities noted, no evidence of inflammation in joints, FROM in all extremities.     SKIN: no suspicious lesions or rashes     NEURO: Normal strength and tone, sensory exam grossly normal, mentation intact and speech normal     PSYCH: mentation appears normal. and affect normal/bright     LYMPHATICS: No cervical adenopathy    DIAGNOSTICS:   No labs or EKG required for low risk surgery (cataract, skin procedure, breast biopsy, etc)    Recent Labs   Lab Test 12/24/18  1157 12/17/18  0945 12/03/18  0854  10/10/18  0835  09/07/18  0926  02/26/18  0747   HGB 12.2  --   --   --   --   --   --   --  12.5     --   --   --   --   --   --   --  180   INR  --  2.2* 3.8*   < >  --    < >  --    < >  --      --   --   --  142  --   --   --  141   POTASSIUM 4.7  --   --   --  5.2  --   --   --  4.3   CR 1.27*  --   --   --  1.09*  --   --   --  1.15*   A1C  --   --   --   --   --   --  5.7*  -- "  5.5    < > = values in this interval not displayed.        IMPRESSION:   Reason for surgery/procedure: Cataracts  Diagnosis/reason for consult: preoperative evaluation     The proposed surgical procedure is considered LOW risk.    REVISED CARDIAC RISK INDEX  The patient has the following serious cardiovascular risks for perioperative complications such as (MI, PE, VFib and 3  AV Block):  No serious cardiac risks  INTERPRETATION: 0 risks: Class I (very low risk - 0.4% complication rate)    The patient has the following additional risks for perioperative complications:  No identified additional risks      ICD-10-CM    1. Preop general physical exam Z01.818    2. Age-related cataract of both eyes, unspecified age-related cataract type H25.9    3. Long term current use of anticoagulant therapy Z79.01    4. Personal history of DVT (deep vein thrombosis) Z86.718    5. Anxiety F41.9    6. CKD (chronic kidney disease) stage 3, GFR 30-59 ml/min (H) N18.3        RECOMMENDATIONS:       Cardiovascular Risk  Performs 4 METs exercise without symptoms (Light housework (dusting, washing dishes) and Climb a flight of stairs) .       --Patient is to take all scheduled medications on the day of surgery EXCEPT for modifications listed below.    Anticoagulant or Antiplatelet Medication Use  Bleeding risk is low for this procedure (e.g. dental, skin, cataract)        APPROVAL GIVEN to proceed with proposed procedure, without further diagnostic evaluation       Signed Electronically by: Swapnil Wilde MD    Copy of this evaluation report is provided to requesting physician.    [place] Preop Guidelines    Revised Cardiac Risk Index

## 2019-01-08 ENCOUNTER — OFFICE VISIT (OUTPATIENT)
Dept: PEDIATRICS | Facility: CLINIC | Age: 74
End: 2019-01-08
Payer: COMMERCIAL

## 2019-01-08 VITALS
OXYGEN SATURATION: 97 % | HEIGHT: 63 IN | DIASTOLIC BLOOD PRESSURE: 66 MMHG | SYSTOLIC BLOOD PRESSURE: 114 MMHG | WEIGHT: 198.8 LBS | TEMPERATURE: 98.2 F | BODY MASS INDEX: 35.22 KG/M2 | HEART RATE: 81 BPM

## 2019-01-08 DIAGNOSIS — Z79.01 LONG TERM CURRENT USE OF ANTICOAGULANT THERAPY: ICD-10-CM

## 2019-01-08 DIAGNOSIS — Z01.818 PREOP GENERAL PHYSICAL EXAM: Primary | ICD-10-CM

## 2019-01-08 DIAGNOSIS — Z86.718 PERSONAL HISTORY OF DVT (DEEP VEIN THROMBOSIS): ICD-10-CM

## 2019-01-08 DIAGNOSIS — R00.2 PALPITATIONS: ICD-10-CM

## 2019-01-08 DIAGNOSIS — F41.9 ANXIETY: Chronic | ICD-10-CM

## 2019-01-08 DIAGNOSIS — H25.9 AGE-RELATED CATARACT OF BOTH EYES, UNSPECIFIED AGE-RELATED CATARACT TYPE: ICD-10-CM

## 2019-01-08 DIAGNOSIS — N18.30 CKD (CHRONIC KIDNEY DISEASE) STAGE 3, GFR 30-59 ML/MIN (H): ICD-10-CM

## 2019-01-08 PROCEDURE — 99214 OFFICE O/P EST MOD 30 MIN: CPT | Performed by: INTERNAL MEDICINE

## 2019-01-08 ASSESSMENT — MIFFLIN-ST. JEOR: SCORE: 1384.84

## 2019-01-09 ENCOUNTER — OFFICE VISIT (OUTPATIENT)
Dept: ORTHOPEDICS | Facility: CLINIC | Age: 74
End: 2019-01-09
Payer: COMMERCIAL

## 2019-01-09 VITALS
WEIGHT: 198 LBS | DIASTOLIC BLOOD PRESSURE: 80 MMHG | HEIGHT: 63 IN | BODY MASS INDEX: 35.08 KG/M2 | SYSTOLIC BLOOD PRESSURE: 138 MMHG

## 2019-01-09 DIAGNOSIS — M75.21 BICEPS TENDINITIS OF BOTH SHOULDERS: ICD-10-CM

## 2019-01-09 DIAGNOSIS — M75.41 IMPINGEMENT SYNDROME OF RIGHT SHOULDER: Primary | ICD-10-CM

## 2019-01-09 DIAGNOSIS — M75.22 BICEPS TENDINITIS OF BOTH SHOULDERS: ICD-10-CM

## 2019-01-09 PROCEDURE — 99203 OFFICE O/P NEW LOW 30 MIN: CPT | Performed by: FAMILY MEDICINE

## 2019-01-09 ASSESSMENT — MIFFLIN-ST. JEOR: SCORE: 1381.21

## 2019-01-09 NOTE — PATIENT INSTRUCTIONS
1. Impingement syndrome of right shoulder    2. Biceps tendinitis of both shoulders      -Patient has right shoulder pain due to impingement syndrome and biceps tendinitis and left shoulder pain due to biceps tendinitis.  -Patient will start formal physical therapy and home exercise program.  -Patient may continue with Tylenol as needed.  -Patient will follow-up if pain does not improve.  -Patient reports pain in other body parts which she may schedule follow-up appointment for further workup and treatment.  -Call direct clinic number [[phone]] at any time with questions or concerns.    Albert Yeo MD [facility]  [facility] [place]

## 2019-01-09 NOTE — LETTER
1/9/2019         RE: Gemma Cowart  [address] [place] [id number]        Dear Colleague,    Thank you for referring your patient, Gemma Cowart, to the [facility]. Please see a copy of my visit note below.    ASSESSMENT & PLAN  Patient Instructions     1. Impingement syndrome of right shoulder    2. Biceps tendinitis of both shoulders      -Patient has right shoulder pain due to impingement syndrome and biceps tendinitis and left shoulder pain due to biceps tendinitis.  -Patient will start formal physical therapy and home exercise program.  -Patient may continue with Tylenol as needed.  -Patient will follow-up if pain does not improve.  -Patient reports pain in other body parts which she may schedule follow-up appointment for further workup and treatment.  -Call direct clinic number [[phone]] at any time with questions or concerns.    Albert Yeo MD [facility]  [facility]          -----    SUBJECTIVE  Gemma Cowart is a/an 73 year old Right handed female who is seen in consultation at the request of  Alva Viera M.D. for evaluation of right shoulder pain. The patient is seen by themselves.    Onset: 5 month(s) ago. Patient states she noticed pain in bilateral shoulders while moving into a new home, but does not recall a specific injury.  Location of Pain: bilateral anterior shoulder  Rating of Pain at worst: 8/10  Rating of Pain Currently: 2/10  Worsened by: reaching behind back, overhead movements  Better with: rest/activity avoidance  Treatments tried: rest/activity avoidance, Tylenol and previous imaging (xray 12/24/18)  Associated symptoms: no distal numbness or tingling; denies swelling or warmth  Orthopedic history: NO  Relevant surgical history: NO  Social history: social history: retired    Past Medical History:   Diagnosis Date     Anemia     iron deficiency anemia     Anxiety      Breast cancer (H) 2012     Bilateral Masectomies 2012     Breast cancer (H) 2012     Complications of gastric bypass surgery      Diabetes mellitus (H)     diet controlled     DVT (deep venous thrombosis) (H)      DVT, bilateral lower limbs (H)      Dyspnea on exertion      Eczema      Fracture of left knee region     patella - vertical     History of kidney stones      Obesity      Polyneuropathy      Pruritus     generalized     Rosacea      Tachycardia     with no treatment     Thyroid disease      Social History     Socioeconomic History     Marital status:      Spouse name: Not on file     Number of children: Not on file     Years of education: Not on file     Highest education level: Not on file   Social Needs     Financial resource strain: Not on file     Food insecurity - worry: Not on file     Food insecurity - inability: Not on file     Transportation needs - medical: Not on file     Transportation needs - non-medical: Not on file   Occupational History     Employer: RETIRED   Tobacco Use     Smoking status: Former Smoker     Packs/day: 1.50     Years: 25.00     Pack years: 37.50     Types: Cigarettes     Last attempt to quit: 10/21/1986     Years since quittin.2     Smokeless tobacco: Never Used     Tobacco comment: quit    Substance and Sexual Activity     Alcohol use: No     Alcohol/week: 0.0 oz     Drug use: No     Sexual activity: No     Partners: Male   Other Topics Concern     Parent/sibling w/ CABG, MI or angioplasty before 65F 55M? No      Service Not Asked     Blood Transfusions Not Asked     Caffeine Concern Not Asked     Occupational Exposure Not Asked     Hobby Hazards Not Asked     Sleep Concern Not Asked     Stress Concern Not Asked     Weight Concern Not Asked     Special Diet Not Asked     Back Care Not Asked     Exercise Not Asked     Bike Helmet Yes     Seat Belt Yes     Self-Exams Not Asked   Social History Narrative     Not on file         Patient's past medical, surgical,  "social, and family histories were reviewed today and no changes are noted.    REVIEW OF SYSTEMS:  10 point ROS is negative other than symptoms noted above in HPI, Past Medical History or as stated below  Constitutional: NEGATIVE for fever, chills, change in weight  Skin: NEGATIVE for worrisome rashes, moles or lesions  GI/: NEGATIVE for bowel or bladder changes  Neuro: NEGATIVE for weakness, dizziness or paresthesias    OBJECTIVE:  /80   Ht 1.607 m (5' 3.25\")   Wt 89.8 kg (198 lb)   LMP 09/24/1990   BMI 34.80 kg/m      General: healthy, alert and in no distress  HEENT: no scleral icterus or conjunctival erythema  Skin: no suspicious lesions or rash. No jaundice.  CV: regular rhythm by palpation  Resp: normal respiratory effort without conversational dyspnea   Psych: normal mood and affect  Gait: normal steady gait with appropriate coordination and balance  Neuro: normal light touch sensory exam of the bilateral upper extremities.    MSK:  BILATERAL SHOULDER  Inspection:    no swelling, bruising, discoloration, or obvious deformity or asymmetry  Palpation:    Tender about the proximal biceps tendon. Remainder of bony and tendinous landmarks are nontender.  Active Range of Motion:     Abduction 1650, FF 1650, , IR L3.      Scapular dyskinesis absent  Strength:    Scapular plane abduction 5-/5,  ER 5/5, IR 5/5, biceps 5/5, triceps 5/5  Special Tests:    Positive: Posey' (right only) and Speed's (b/l)    Negative: Neer's, supraspinatus (empty can), drop arm/painful arc, crossed arm adduction, Gilbert's and Yergason's  Independent visualization of the below image:  No results found for this or any previous visit (from the past 24 hour(s)).  Results for orders placed or performed in visit on 12/24/18   XR Shoulder Right G/E 3 Views    Narrative    XR SHOULDER RT G/E 3 VW, XR SHOULDER LT G/E 3 VW 12/24/2018 1:06 PM    HISTORY: Joint pain.    COMPARISON: None.    THREE-VIEW RIGHT SHOULDER: No acute " osseous finding is demonstrated.  AC joint degenerative change is present.    THREE-VIEW LEFT SHOULDER: No significant osseous or joint abnormality  is seen.    ARON JUSTIN MD     XR SHOULDER RT G/E 3 VW, XR SHOULDER LT G/E 3 VW 12/24/2018 1:06 PM     HISTORY: Joint pain.     COMPARISON: None.     THREE-VIEW RIGHT SHOULDER: No acute osseous finding is demonstrated.  AC joint degenerative change is present.     THREE-VIEW LEFT SHOULDER: No significant osseous or joint abnormality  is seen.     KEVIN LEACH, MD Albert Yeo MD [facility]      Again, thank you for allowing me to participate in the care of your patient.        Sincerely,        Albert Yeo, MD

## 2019-01-17 ENCOUNTER — TELEPHONE (OUTPATIENT)
Dept: PHARMACY | Facility: CLINIC | Age: 74
End: 2019-01-17

## 2019-01-17 NOTE — TELEPHONE ENCOUNTER
We have attempted to contact this patient two times to set up a MTM follow up appointment and were unsuccessful. Contact attempts were made via letter.  We will no longer continue to contact this patient to schedule a visit at this time. Please refer back to MTM if you believe this patient would continue to benefit from our services.     Thank you!    Christelle Yañez, PharmD [facility]  Medication Therapy Management Practitioner   #[phone]

## 2019-01-24 PROBLEM — M25.561 ACUTE PAIN OF RIGHT KNEE: Status: RESOLVED | Noted: 2018-03-08 | Resolved: 2019-01-24

## 2019-01-24 NOTE — PROGRESS NOTES
Discharge Note    Progress reporting period is from initial eval to Mar 29, 2018.     Gemma failed to return for next follow up visit and current status is unknown.  Please see information below for last relevant information on current status.  Patient seen for 4 visits.  SUBJECTIVE  Subjective changes noted by patient:  Pt reports increased right medial knee pain today. She was doing a lot of dishes and pivoting a lot on her feet the past couple of days so this may have been a trigger. Pt will be having skin reduction surgery next week and will return to PT in 3-4 weeks.   .  Current pain level is 2/10.     Previous pain level was   .   Changes in function:  Yes (See Goal flowsheet attached for changes in current functional level)  Adverse reaction to treatment or activity: None    OBJECTIVE  Changes noted in objective findings: AROM right knee 0-120, MMT right quads 4+/5. Gait antalgic. Tenderness right MCL.      ASSESSMENT/PLAN  Diagnosis: right knee pain   DIAGP:  The encounter diagnosis was Acute pain of right knee.  Updated problem list and treatment plan:   Pain - HEP  Decreased ROM/flexibility - HEP  Decreased function - HEP  Decreased strength - HEP  STG/LTGs have been met or progress has been made towards goals:  Yes, please see goal flowsheet for most current information  Assessment of Progress: current status is unknown.    Last current status: Pt has experienced exacerbation of symptoms   Self Management Plans:  HEP  I have re-evaluated this patient and find that the nature, scope, duration and intensity of the therapy is appropriate for the medical condition of the patient.  Gemma continues to require the following intervention to meet STG and LTG's:  HEP.    Recommendations:  Discharge with current home program.  Patient to follow up with MD as needed.    Please refer to the daily flowsheet for treatment today, total treatment time and time spent performing 1:1 timed codes.

## 2019-01-24 NOTE — TELEPHONE ENCOUNTER
Patient states that she has stopped the coumadin until after the procedure and her diet has changed as well because she is not to have fruit or leafy greens.    Adequate: hears normal conversation without difficulty

## 2019-01-28 ENCOUNTER — ANTICOAGULATION THERAPY VISIT (OUTPATIENT)
Dept: NURSING | Facility: CLINIC | Age: 74
End: 2019-01-28
Payer: COMMERCIAL

## 2019-01-28 DIAGNOSIS — Z79.01 LONG TERM CURRENT USE OF ANTICOAGULANT THERAPY: Primary | ICD-10-CM

## 2019-01-28 DIAGNOSIS — Z86.718 PERSONAL HISTORY OF DVT (DEEP VEIN THROMBOSIS): ICD-10-CM

## 2019-01-28 LAB — INR POINT OF CARE: 3.9 (ref 0.86–1.14)

## 2019-01-28 PROCEDURE — 36416 COLLJ CAPILLARY BLOOD SPEC: CPT

## 2019-01-28 PROCEDURE — 99207 ZZC NO CHARGE NURSE ONLY: CPT

## 2019-01-28 PROCEDURE — 85610 PROTHROMBIN TIME: CPT | Mod: QW

## 2019-01-28 NOTE — PROGRESS NOTES
ANTICOAGULATION FOLLOW-UP CLINIC VISIT    Patient Name:  Gemma Cowart  Date:  1/28/2019  Contact Type:  Face to Face    SUBJECTIVE:     Patient Findings     Positives:   Dental/Other procedures, Unexplained INR or factor level change    Comments:   Patient denies: extra doses, illness,   bleeding/bruises, medication changes, diet changes.    Had left cataract surgery on 1/16 and   will have second one on 1/30/19.    She is feeling a little stressed about it,   although the first surgery went well.           OBJECTIVE    INR Protime   Date Value Ref Range Status   01/28/2019 3.9 (A) 0.86 - 1.14 Final       ASSESSMENT / PLAN  INR assessment SUPRA    Recheck INR In: 2 WEEKS    INR Location Clinic      Anticoagulation Summary  As of 1/28/2019    INR goal:   2.0-3.0   TTR:   58.6 % (2.7 y)   INR used for dosing:   3.9! (1/28/2019)   Warfarin maintenance plan:   10 mg (5 mg x 2) every Mon, Thu; 7.5 mg (5 mg x 1.5) all other days   Full warfarin instructions:   1/28: 5 mg; Otherwise 10 mg every Mon, Thu; 7.5 mg all other days   Weekly warfarin total:   57.5 mg   Plan last modified:   Camila Hernández RN (9/11/2018)   Next INR check:   2/11/2019   Target end date:   Indefinite    Indications    Personal history of DVT (deep vein thrombosis) [Z86.718]  Long term current use of anticoagulant therapy [Z79.01]             Anticoagulation Episode Summary     INR check location:   [facility]    Preferred lab:       Send INR reminders to:   [facility]    Comments:   5mg tabs - HS dose // CALENDAR // gastric bypass 2000 // DVT 11/16/12 warfarin until Apr 2013 // DVT 9/12/15      Anticoagulation Care Providers     Provider Role Specialty Phone number    Alva Viera MD  Internal Medicine [phone]            See the Encounter Report to view Anticoagulation Flowsheet and Dosing Calendar (Go to Encounters tab in chart review, and find the Anticoagulation Therapy Visit)    Dosage adjustment made based on  physician directed care plan.    Rae Cooley RN

## 2019-02-06 ENCOUNTER — PATIENT OUTREACH (OUTPATIENT)
Dept: GERIATRIC MEDICINE | Facility: CLINIC | Age: 74
End: 2019-02-06

## 2019-02-06 NOTE — PROGRESS NOTES
[facility] Care Coordination Contact      [facility] Six-Month Telephone Assessment    6 month telephone assessment completed on February 6, 2019..    ER visits: No  Hospitalizations: No  TCU stays: No  Significant health status changes: none  Falls/Injuries: No  ADL/IADL changes: No  Changes in services: No    Caregiver Assessment follow up:  NA    Goals: See POC in chart for goal progress documentation.      Client reports that she had cataract surgery on both eyes. She hasn't lost weight, in fact, she has gained 15 pounds. Discussed exercising, but it is hard getting outside to walk right now. She reports no falls. She was have shoulder issues related to moving boxes, etc. The doctor had recommended therapy, but she didn't go and is feeling better. She has applications for senior housing in [place] and in [place] she is going to fill out. There are waitlists for both places.    Will see member in 6 months for an annual health risk assessment.   Encouraged member to call CC with any questions or concerns in the meantime.     Nighat Carbajal RN, BSN, PHN  [facility] Care Coordinator  [phone]

## 2019-02-28 DIAGNOSIS — Z79.01 LONG TERM CURRENT USE OF ANTICOAGULANT THERAPY: ICD-10-CM

## 2019-02-28 DIAGNOSIS — Z86.718 PERSONAL HISTORY OF DVT (DEEP VEIN THROMBOSIS): ICD-10-CM

## 2019-02-28 NOTE — TELEPHONE ENCOUNTER
"Requested Prescriptions   Pending Prescriptions Disp Refills     warfarin (COUMADIN) 5 MG tablet [Pharmacy Med Name: WARFARIN SOD 5MG TABLETS (PEACH)]    Last Written Prescription Date:  10/22/2018  Last Fill Quantity: 150,  # refills: 1   Last office visit: 1/8/2019 with prescribing provider:  Alva Viera     Future Office Visit:     150 tablet 0     Sig: TAKE 2 TABLETS BY MOUTH ON MONDAY AND WEDNESDAY AND 1 AND 1/2 TABLETS ALL OTHER DAYS OR AS ADVISED BY INR CLINIC    Vitamin K Antagonists Failed - 2/28/2019  3:51 AM       Failed - INR is within goal in the past 6 weeks    Confirm INR is within goal in the past 6 weeks.     Recent Labs   Lab Test 01/28/19  0852   INR 3.9*                      Passed - Recent (12 mo) or future (30 days) visit within the authorizing provider's specialty    Patient had office visit in the last 12 months or has a visit in the next 30 days with authorizing provider or within the authorizing provider's specialty.  See \"Patient Info\" tab in inbasket, or \"Choose Columns\" in Meds & Orders section of the refill encounter.             Passed - Medication is active on med list       Passed - Patient is 18 years of age or older       Passed - Patient is not pregnant       Passed - No positive pregnancy on file in past 12 months          "

## 2019-03-04 DIAGNOSIS — Z79.01 LONG TERM CURRENT USE OF ANTICOAGULANT THERAPY: ICD-10-CM

## 2019-03-04 DIAGNOSIS — Z86.718 PERSONAL HISTORY OF DVT (DEEP VEIN THROMBOSIS): ICD-10-CM

## 2019-03-04 RX ORDER — WARFARIN SODIUM 5 MG/1
TABLET ORAL
Qty: 60 TABLET | Refills: 0 | Status: SHIPPED | OUTPATIENT
Start: 2019-03-04 | End: 2019-03-25

## 2019-03-04 NOTE — TELEPHONE ENCOUNTER
30 day fill given.  Patient is over due for INR appointment.  Was elevated at last check and advised 2 week follow up  Donna CA RN - Triage  [facility]

## 2019-03-05 RX ORDER — WARFARIN SODIUM 5 MG/1
TABLET ORAL
Qty: 150 TABLET | Refills: 0 | OUTPATIENT
Start: 2019-03-05

## 2019-03-05 NOTE — TELEPHONE ENCOUNTER
On the rx, it states pt is requesting 90 days.  Looks like the pt is due for an appt for refills.  Will deny to the pharmacy with that note.      Looks like appt scheduled for 3/7/19 with INR clinic.

## 2019-03-06 ENCOUNTER — OFFICE VISIT (OUTPATIENT)
Dept: CARDIOLOGY | Facility: CLINIC | Age: 74
End: 2019-03-06
Payer: COMMERCIAL

## 2019-03-06 VITALS
WEIGHT: 203.7 LBS | HEIGHT: 63 IN | HEART RATE: 90 BPM | SYSTOLIC BLOOD PRESSURE: 128 MMHG | BODY MASS INDEX: 36.09 KG/M2 | DIASTOLIC BLOOD PRESSURE: 72 MMHG

## 2019-03-06 DIAGNOSIS — Z86.718 PERSONAL HISTORY OF DVT (DEEP VEIN THROMBOSIS): ICD-10-CM

## 2019-03-06 DIAGNOSIS — E78.5 HYPERLIPIDEMIA LDL GOAL <100: Primary | Chronic | ICD-10-CM

## 2019-03-06 PROCEDURE — 99203 OFFICE O/P NEW LOW 30 MIN: CPT | Performed by: INTERNAL MEDICINE

## 2019-03-06 PROCEDURE — 93000 ELECTROCARDIOGRAM COMPLETE: CPT | Performed by: INTERNAL MEDICINE

## 2019-03-06 ASSESSMENT — MIFFLIN-ST. JEOR: SCORE: 1407.07

## 2019-03-06 NOTE — PROGRESS NOTES
HPI and Plan:   See dictation    Orders Placed This Encounter   Procedures     EKG 12-lead complete w/read - Clinics (performed today)       No orders of the defined types were placed in this encounter.      There are no discontinued medications.      Encounter Diagnoses   Name Primary?     Hyperlipidemia LDL goal <100 Yes     Personal history of DVT (deep vein thrombosis)        CURRENT MEDICATIONS:  Current Outpatient Medications   Medication Sig Dispense Refill     acetaminophen (TYLENOL) 500 MG tablet Take 2 tablets (1,000 mg) by mouth every 8 hours as needed for mild pain 100 tablet 0     blood glucose (NO BRAND SPECIFIED) lancets standard Use to test blood sugars 2 times daily or as directed. Please dispense lancets for Relion Confirm. 2 Box 3     blood glucose monitoring (NO BRAND SPECIFIED) test strip Use to test blood sugars 2 times daily or as directed. Please dispense Relion Confirm test strips 200 each 3     Cholecalciferol (VITAMIN D) 2000 units CAPS Take 1 capsule by mouth daily 90 capsule 3     clobetasol (TEMOVATE) 0.05 % cream Apply sparingly to affected area twice daily as needed.  Do not apply to face. 270 g 3     clobetasol (TEMOVATE) 0.05 % external solution Apply sparingly to affected area twice daily for 14 days.  Do not apply to face. 50 mL 11     cyanocobalamin (VITAMIN B-12) 2500 MCG sublingual tablet Place 2,500 mcg under the tongue daily 90 tablet 3     PARoxetine (PAXIL) 30 MG tablet Take 2 tablets (60 mg) by mouth At Bedtime 180 tablet 3     warfarin (COUMADIN) 5 MG tablet TAKE 2 TABLETS BY MOUTH ON MONDAY AND WEDNESDAY AND 1 AND 1/2 TABLETS ALL OTHER DAYS OR AS ADVISED BY INR CLINIC 60 tablet 0     warfarin (COUMADIN) 5 MG tablet Take 10 mg (2 tabs) on Mon, Thu; 7.5 mg (1.5 tabs) all other days or as directed by INR Clinic 150 tablet 1     ACE/ARB NOT PRESCRIBED, INTENTIONAL, ACE & ARB not prescribed due to Other: normal tensive, low microalbumuria         ALLERGIES     Allergies    Allergen Reactions     Bacitracin Unknown     Phenylene        PAST MEDICAL HISTORY:  Past Medical History:   Diagnosis Date     Anemia     iron deficiency anemia     Anxiety      Breast cancer (H) 2012    Bilateral Masectomies 9/2012     Complications of gastric bypass surgery      Diabetes mellitus (H)     diet controlled     DVT (deep venous thrombosis) (H)      Eczema      Fracture of left knee region     patella - vertical     History of kidney stones      Obesity      Palpitation     with no treatment     Polyneuropathy      Pruritus     generalized     Rosacea      SVT (supraventricular tachycardia) (H)     s/p ablation 5/28/2015 at [facility] for left lateral AP       PAST SURGICAL HISTORY:  Past Surgical History:   Procedure Laterality Date     ABDOMEN SURGERY  2000    gastric bypass 2000     COLONOSCOPY  10/11/2012    Procedure: COLONOSCOPY;  colonoscopy;  Surgeon: Gela Cleary MD;  Location:  GI     COLONOSCOPY N/A 10/6/2017    Procedure: COLONOSCOPY;;  Surgeon: Shashank Ortiz MD;  Location:  GI     DENTAL SURGERY  5/2007    all teeth removed - dentures     ENT SURGERY      all teeth pulled     ESOPHAGOSCOPY, GASTROSCOPY, DUODENOSCOPY (EGD), COMBINED N/A 10/6/2017    Procedure: COMBINED ESOPHAGOSCOPY, GASTROSCOPY, DUODENOSCOPY (EGD);  ESOPHAGOSCOPY, GASTROSCOPY, DUODENOSCOPY (EGD) & Colonoscopy *Needs INR on arrival;  Surgeon: Shashank Ortiz MD;  Location:  GI     GYN SURGERY  1/10/75    tubal ligation      H ABLATION SVT  5/28/2015    stopped metoprolol     HC BREAST RECONSTRUC W OTHR TECHNIQ  5/8/2013     HERNIA REPAIR  2005     IMPLANT FILTER INFERIOR VENA CAVA  10/8/2012    taken out  1/4/12     INSERT TISSUE EXPANDER BREAST BILATERAL  9/5/2012    Procedure: INSERT TISSUE EXPANDER BREAST BILATERAL;;  Surgeon: Orlando Wall MD;  Location:  OR     MASTECTOMY SIMPLE, SENTINEL NODE, COMBINED  9/5/2012    Procedure: COMBINED MASTECTOMY SIMPLE, SENTINEL NODE;   BILATERAL MASTECTOMY WITH LEFT AXILLARY SENTINEL LYMPH NODE BIOPSY, BILATERAL TISSUE EXPANDER        MASTECTOMY, BILATERAL       PANNICULECTOMY N/A 2018    Procedure: PANNICULECTOMY;  PANNICULECTOMY ;  Surgeon: Orlando Wall MD;  Location:  OR     RECONSTRUCT BREAST BILATERAL, IMPLANT PROSTHESIS BILATERAL, COMBINED  2013    Procedure: COMBINED RECONSTRUCT BREAST BILATERAL, IMPLANT PROSTHESIS BILATERAL;  BILATERAL SECOND STAGE BREAST RECONSTRUCTION WITH SILICONE GEL IMPLANTS AND LIPOSUCTION;  Surgeon: Orlando Wall MD;  Location: [facility]     RECONSTRUCT NIPPLE BILATERAL  2013    Procedure: RECONSTRUCT NIPPLE BILATERAL;  BILATERAL NIPPLE AREOLAR RECONSTRUCTION;  Surgeon: Orlando Wall MD;  Location: [facility]       FAMILY HISTORY:  Family History   Problem Relation Age of Onset     Cancer - colorectal Mother      Colon Cancer Mother      Prostate Cancer Father      Alzheimer Disease Father      Cancer Paternal Grandmother      Alzheimer Disease Paternal Grandfather      Cancer - colorectal Brother      Diabetes Brother      Crohn's Disease Daughter      Diabetes Brother      Eye Disorder Brother      Diabetes Son      Cancer Maternal Uncle      Cancer Maternal Uncle        SOCIAL HISTORY:  Social History     Socioeconomic History     Marital status:      Spouse name: None     Number of children: None     Years of education: None     Highest education level: None   Occupational History     Employer: RETIRED   Social Needs     Financial resource strain: None     Food insecurity:     Worry: None     Inability: None     Transportation needs:     Medical: None     Non-medical: None   Tobacco Use     Smoking status: Former Smoker     Packs/day: 1.50     Years: 25.00     Pack years: 37.50     Types: Cigarettes     Last attempt to quit: 10/21/1986     Years since quittin.3     Smokeless tobacco: Never Used     Tobacco comment: quit    Substance and Sexual Activity     Alcohol  "use: No     Alcohol/week: 0.0 oz     Drug use: No     Sexual activity: No     Partners: Male   Lifestyle     Physical activity:     Days per week: None     Minutes per session: None     Stress: None   Relationships     Social connections:     Talks on phone: None     Gets together: None     Attends Islam service: None     Active member of club or organization: None     Attends meetings of clubs or organizations: None     Relationship status: None     Intimate partner violence:     Fear of current or ex partner: None     Emotionally abused: None     Physically abused: None     Forced sexual activity: None   Other Topics Concern     Parent/sibling w/ CABG, MI or angioplasty before 65F 55M? No      Service Not Asked     Blood Transfusions Not Asked     Caffeine Concern Not Asked     Occupational Exposure Not Asked     Hobby Hazards Not Asked     Sleep Concern Not Asked     Stress Concern Not Asked     Weight Concern Not Asked     Special Diet Not Asked     Back Care Not Asked     Exercise Not Asked     Bike Helmet Yes     Seat Belt Yes     Self-Exams Not Asked   Social History Narrative     None       Review of Systems:  Skin:  Positive for   red spots on face and neck; eczema   Eyes:  Positive for glasses cataract extraction of both eyes  ENT:  Positive for hearing loss hearing aids  Respiratory:  Positive for dyspnea on exertion;wheezing wheezing after getting out of the shower   Cardiovascular:    Positive for;palpitations;chest pain;lightheadedness;edema;fatigue sharp stabbing pain in chest; lightheaded when getting up quickly  Gastroenterology: Negative      Genitourinary:  Negative      Musculoskeletal:  Positive for nocturnal cramping;arthritis;joint pain arthritis \"all over\"; knee and hip pain  Neurologic:  Positive for headaches    Psychiatric:  Positive for sleep disturbances;anxiety    Heme/Lymph/Imm:  Positive for allergies;chills;night sweats RX  Endocrine:  Positive for diabetes;thyroid " "disorder goiter on thyroid; diabetes is diet controlled    Physical Exam:  Vitals: /72 (BP Location: Right arm, Patient Position: Chair, Cuff Size: Adult Large)   Pulse 90   Ht 1.607 m (5' 3.25\")   Wt 92.4 kg (203 lb 11.2 oz)   LMP 09/24/1990   BMI 35.80 kg/m      Constitutional:  cooperative, alert and oriented, well developed, well nourished, in no acute distress        Skin:  warm and dry to the touch, no apparent skin lesions or masses noted          Head:  normocephalic, no masses or lesions        Eyes:  pupils equal and round, conjunctivae and lids unremarkable, sclera white, no xanthalasma, EOMS intact, no nystagmus        Lymph:No Cervical lymphadenopathy present     ENT:  no pallor or cyanosis, dentition good        Neck:  carotid pulses are full and equal bilaterally, JVP normal, no carotid bruit        Respiratory:  normal breath sounds, clear to auscultation, normal A-P diameter, normal symmetry, normal respiratory excursion, no use of accessory muscles         Cardiac: regular rhythm, normal S1/S2, no S3 or S4, apical impulse not displaced, no murmurs, gallops or rubs                                                         GI:  abdomen soft, non-tender, BS normoactive, no mass, no HSM, no bruits        Extremities and Muscular Skeletal:  no deformities, clubbing, cyanosis, erythema observed              Neurological:  no gross motor deficits        Psych:  Alert and Oriented x 3        CC  Alva Viera MD  [address] [place] [address]              "

## 2019-03-06 NOTE — LETTER
3/6/2019      Alva Casas MD  [address] [place] [address]      RE: Gemma Cowart       Dear Colleague,    I had the pleasure of seeing Gemma Cowart in the [facility].    Service Date: 03/06/2019      HISTORY OF PRESENT ILLNESS:  I saw Ms. Cowart for evaluation of palpitations.  She is a 73-year-old white female with a history of SVT.  She had a successful ablation of a concealed left lateral accessory pathway by Dr. Mclain at [facility] on 05/20/2015.  Recently, the patient has been complaining of recurrent palpitations.  The symptoms were described as skipping heartbeats, followed by heavy heartbeat.  There is no heart racing.  There was no shortness of breath, dizziness or other severe symptoms.  The frequency of the symptoms is in random and could be a couple of times a day and then will be quite for weeks without problem.  She was recently put on a 30-day event monitor.  I had a chance to review the monitor results.  She had 1 episode of atrial tachycardia with relatively slow rate and some irregularity for 7 beats.  On another occasion, she had a PVC couplet and short-lasting PVC bigeminy.  During those 2 episodes of arrhythmia, she did not have symptoms.  Overall, her PVC frequency was low.  She has no history of syncope.      PAST MEDICAL HISTORY:  Remarkable for recurrent distal DVT, currently on Coumadin, type 2 diabetes, breast cancer, anxiety, obesity, status post gastric bypass surgery.      PHYSICAL EXAMINATION:   VITAL SIGNS:  Blood pressure was 128/72, heart rate 90 beats per minute, body weight 203 pounds.   HEENT:  Eyes and ENT were unremarkable.   LUNGS:  Clear.   CARDIAC:  Rhythm was regular, and the heart sounds were normal with no murmur.   ABDOMEN:  Examination showed moderate obesity.   EXTREMITIES:  There was no pedal edema.      ASSESSMENT AND RECOMMENDATIONS:  Ms. Cowart has a palpitation that is suggestive of PVCs.   Overall, her PVC frequency is low and does not pose a risk for long-term.  The single episode of nonsustained atrial tachycardia does not need treatment when she has no symptoms.  Overall, her current symptoms are relatively mild and the frequency is low.  I would recommend observation for the time being.  She should continue her current activities without restriction.  She does not require routine Cardiology followup.      cc:   Alva Viera MD    [address]  [address]         FERN SANTOYO MD             D: 2019   T: 2019   MT: ANA      Name:     SALUD MARTINEZ   MRN:      [id number]        Account:      [id number]   :      1945           Service Date: 2019      Document: [id number]           Outpatient Encounter Medications as of 3/6/2019   Medication Sig Dispense Refill     acetaminophen (TYLENOL) 500 MG tablet Take 2 tablets (1,000 mg) by mouth every 8 hours as needed for mild pain 100 tablet 0     blood glucose (NO BRAND SPECIFIED) lancets standard Use to test blood sugars 2 times daily or as directed. Please dispense lancets for Relion Confirm. 2 Box 3     blood glucose monitoring (NO BRAND SPECIFIED) test strip Use to test blood sugars 2 times daily or as directed. Please dispense Relion Confirm test strips 200 each 3     Cholecalciferol (VITAMIN D) 2000 units CAPS Take 1 capsule by mouth daily 90 capsule 3     clobetasol (TEMOVATE) 0.05 % cream Apply sparingly to affected area twice daily as needed.  Do not apply to face. 270 g 3     clobetasol (TEMOVATE) 0.05 % external solution Apply sparingly to affected area twice daily for 14 days.  Do not apply to face. 50 mL 11     cyanocobalamin (VITAMIN B-12) 2500 MCG sublingual tablet Place 2,500 mcg under the tongue daily 90 tablet 3     PARoxetine (PAXIL) 30 MG tablet Take 2 tablets (60 mg) by mouth At Bedtime 180 tablet 3     warfarin (COUMADIN) 5 MG tablet TAKE 2 TABLETS BY  MOUTH ON MONDAY AND WEDNESDAY AND 1 AND 1/2 TABLETS ALL OTHER DAYS OR AS ADVISED BY INR CLINIC 60 tablet 0     warfarin (COUMADIN) 5 MG tablet Take 10 mg (2 tabs) on Mon, Thu; 7.5 mg (1.5 tabs) all other days or as directed by INR Clinic 150 tablet 1     ACE/ARB NOT PRESCRIBED, INTENTIONAL, ACE & ARB not prescribed due to Other: normal tensive, low microalbumuria       No facility-administered encounter medications on file as of 3/6/2019.        Again, thank you for allowing me to participate in the care of your patient.      Sincerely,    Inna Montes MD     [facility]

## 2019-03-06 NOTE — LETTER
3/6/2019    Alva Casas MD  [address] [place] [address]    RE: Gemma Cowart       Dear Colleague,    I had the pleasure of seeing Gemma Cowart in the [facility].    HPI and Plan:   See dictation    Orders Placed This Encounter   Procedures     EKG 12-lead complete w/read - Clinics (performed today)       No orders of the defined types were placed in this encounter.      There are no discontinued medications.      Encounter Diagnoses   Name Primary?     Hyperlipidemia LDL goal <100 Yes     Personal history of DVT (deep vein thrombosis)        CURRENT MEDICATIONS:  Current Outpatient Medications   Medication Sig Dispense Refill     acetaminophen (TYLENOL) 500 MG tablet Take 2 tablets (1,000 mg) by mouth every 8 hours as needed for mild pain 100 tablet 0     blood glucose (NO BRAND SPECIFIED) lancets standard Use to test blood sugars 2 times daily or as directed. Please dispense lancets for Relion Confirm. 2 Box 3     blood glucose monitoring (NO BRAND SPECIFIED) test strip Use to test blood sugars 2 times daily or as directed. Please dispense Relion Confirm test strips 200 each 3     Cholecalciferol (VITAMIN D) 2000 units CAPS Take 1 capsule by mouth daily 90 capsule 3     clobetasol (TEMOVATE) 0.05 % cream Apply sparingly to affected area twice daily as needed.  Do not apply to face. 270 g 3     clobetasol (TEMOVATE) 0.05 % external solution Apply sparingly to affected area twice daily for 14 days.  Do not apply to face. 50 mL 11     cyanocobalamin (VITAMIN B-12) 2500 MCG sublingual tablet Place 2,500 mcg under the tongue daily 90 tablet 3     PARoxetine (PAXIL) 30 MG tablet Take 2 tablets (60 mg) by mouth At Bedtime 180 tablet 3     warfarin (COUMADIN) 5 MG tablet TAKE 2 TABLETS BY MOUTH ON MONDAY AND WEDNESDAY AND 1 AND 1/2 TABLETS ALL OTHER DAYS OR AS ADVISED BY INR CLINIC 60 tablet 0     warfarin (COUMADIN) 5 MG tablet Take 10 mg (2 tabs) on Mon, Thu; 7.5  mg (1.5 tabs) all other days or as directed by INR Clinic 150 tablet 1     ACE/ARB NOT PRESCRIBED, INTENTIONAL, ACE & ARB not prescribed due to Other: normal tensive, low microalbumuria         ALLERGIES     Allergies   Allergen Reactions     Bacitracin Unknown     Phenylene        PAST MEDICAL HISTORY:  Past Medical History:   Diagnosis Date     Anemia     iron deficiency anemia     Anxiety      Breast cancer (H) 2012    Bilateral Masectomies 9/2012     Complications of gastric bypass surgery      Diabetes mellitus (H)     diet controlled     DVT (deep venous thrombosis) (H)      Eczema      Fracture of left knee region     patella - vertical     History of kidney stones      Obesity      Palpitation     with no treatment     Polyneuropathy      Pruritus     generalized     Rosacea      SVT (supraventricular tachycardia) (H)     s/p ablation 5/28/2015 at [facility] for left lateral AP       PAST SURGICAL HISTORY:  Past Surgical History:   Procedure Laterality Date     ABDOMEN SURGERY  2000    gastric bypass 2000     COLONOSCOPY  10/11/2012    Procedure: COLONOSCOPY;  colonoscopy;  Surgeon: Gela Cleary MD;  Location:  GI     COLONOSCOPY N/A 10/6/2017    Procedure: COLONOSCOPY;;  Surgeon: Shashank Ortiz MD;  Location:  GI     DENTAL SURGERY  5/2007    all teeth removed - dentures     ENT SURGERY      all teeth pulled     ESOPHAGOSCOPY, GASTROSCOPY, DUODENOSCOPY (EGD), COMBINED N/A 10/6/2017    Procedure: COMBINED ESOPHAGOSCOPY, GASTROSCOPY, DUODENOSCOPY (EGD);  ESOPHAGOSCOPY, GASTROSCOPY, DUODENOSCOPY (EGD) & Colonoscopy *Needs INR on arrival;  Surgeon: Shashank Ortiz MD;  Location:  GI     GYN SURGERY  1/10/75    tubal ligation      H ABLATION SVT  5/28/2015    stopped metoprolol     HC BREAST RECONSTRUC W OTHR TECHNIQ  5/8/2013     HERNIA REPAIR  2005     IMPLANT FILTER INFERIOR VENA CAVA  10/8/2012    taken out  1/4/12     INSERT TISSUE EXPANDER BREAST BILATERAL  9/5/2012     Procedure: INSERT TISSUE EXPANDER BREAST BILATERAL;;  Surgeon: Orlando Wall MD;  Location:  OR     MASTECTOMY SIMPLE, SENTINEL NODE, COMBINED  9/5/2012    Procedure: COMBINED MASTECTOMY SIMPLE, SENTINEL NODE;  BILATERAL MASTECTOMY WITH LEFT AXILLARY SENTINEL LYMPH NODE BIOPSY, BILATERAL TISSUE EXPANDER        MASTECTOMY, BILATERAL       PANNICULECTOMY N/A 4/5/2018    Procedure: PANNICULECTOMY;  PANNICULECTOMY ;  Surgeon: Orlando Wall MD;  Location:  OR     RECONSTRUCT BREAST BILATERAL, IMPLANT PROSTHESIS BILATERAL, COMBINED  5/22/2013    Procedure: COMBINED RECONSTRUCT BREAST BILATERAL, IMPLANT PROSTHESIS BILATERAL;  BILATERAL SECOND STAGE BREAST RECONSTRUCTION WITH SILICONE GEL IMPLANTS AND LIPOSUCTION;  Surgeon: Orlando Wall MD;  Location: [facility]     RECONSTRUCT NIPPLE BILATERAL  8/22/2013    Procedure: RECONSTRUCT NIPPLE BILATERAL;  BILATERAL NIPPLE AREOLAR RECONSTRUCTION;  Surgeon: Orlando Wall MD;  Location: [facility]       FAMILY HISTORY:  Family History   Problem Relation Age of Onset     Cancer - colorectal Mother      Colon Cancer Mother      Prostate Cancer Father      Alzheimer Disease Father      Cancer Paternal Grandmother      Alzheimer Disease Paternal Grandfather      Cancer - colorectal Brother      Diabetes Brother      Crohn's Disease Daughter      Diabetes Brother      Eye Disorder Brother      Diabetes Son      Cancer Maternal Uncle      Cancer Maternal Uncle        SOCIAL HISTORY:  Social History     Socioeconomic History     Marital status:      Spouse name: None     Number of children: None     Years of education: None     Highest education level: None   Occupational History     Employer: RETIRED   Social Needs     Financial resource strain: None     Food insecurity:     Worry: None     Inability: None     Transportation needs:     Medical: None     Non-medical: None   Tobacco Use     Smoking status: Former Smoker     Packs/day: 1.50     Years: 25.00      "Pack years: 37.50     Types: Cigarettes     Last attempt to quit: 10/21/1986     Years since quittin.3     Smokeless tobacco: Never Used     Tobacco comment: quit    Substance and Sexual Activity     Alcohol use: No     Alcohol/week: 0.0 oz     Drug use: No     Sexual activity: No     Partners: Male   Lifestyle     Physical activity:     Days per week: None     Minutes per session: None     Stress: None   Relationships     Social connections:     Talks on phone: None     Gets together: None     Attends Protestant service: None     Active member of club or organization: None     Attends meetings of clubs or organizations: None     Relationship status: None     Intimate partner violence:     Fear of current or ex partner: None     Emotionally abused: None     Physically abused: None     Forced sexual activity: None   Other Topics Concern     Parent/sibling w/ CABG, MI or angioplasty before 65F 55M? No      Service Not Asked     Blood Transfusions Not Asked     Caffeine Concern Not Asked     Occupational Exposure Not Asked     Hobby Hazards Not Asked     Sleep Concern Not Asked     Stress Concern Not Asked     Weight Concern Not Asked     Special Diet Not Asked     Back Care Not Asked     Exercise Not Asked     Bike Helmet Yes     Seat Belt Yes     Self-Exams Not Asked   Social History Narrative     None       Review of Systems:  Skin:  Positive for   red spots on face and neck; eczema   Eyes:  Positive for glasses cataract extraction of both eyes  ENT:  Positive for hearing loss hearing aids  Respiratory:  Positive for dyspnea on exertion;wheezing wheezing after getting out of the shower   Cardiovascular:    Positive for;palpitations;chest pain;lightheadedness;edema;fatigue sharp stabbing pain in chest; lightheaded when getting up quickly  Gastroenterology: Negative      Genitourinary:  Negative      Musculoskeletal:  Positive for nocturnal cramping;arthritis;joint pain arthritis \"all over\"; knee and " "hip pain  Neurologic:  Positive for headaches    Psychiatric:  Positive for sleep disturbances;anxiety    Heme/Lymph/Imm:  Positive for allergies;chills;night sweats RX  Endocrine:  Positive for diabetes;thyroid disorder goiter on thyroid; diabetes is diet controlled    Physical Exam:  Vitals: /72 (BP Location: Right arm, Patient Position: Chair, Cuff Size: Adult Large)   Pulse 90   Ht 1.607 m (5' 3.25\")   Wt 92.4 kg (203 lb 11.2 oz)   LMP 09/24/1990   BMI 35.80 kg/m       Constitutional:  cooperative, alert and oriented, well developed, well nourished, in no acute distress        Skin:  warm and dry to the touch, no apparent skin lesions or masses noted          Head:  normocephalic, no masses or lesions        Eyes:  pupils equal and round, conjunctivae and lids unremarkable, sclera white, no xanthalasma, EOMS intact, no nystagmus        Lymph:No Cervical lymphadenopathy present     ENT:  no pallor or cyanosis, dentition good        Neck:  carotid pulses are full and equal bilaterally, JVP normal, no carotid bruit        Respiratory:  normal breath sounds, clear to auscultation, normal A-P diameter, normal symmetry, normal respiratory excursion, no use of accessory muscles         Cardiac: regular rhythm, normal S1/S2, no S3 or S4, apical impulse not displaced, no murmurs, gallops or rubs                                                         GI:  abdomen soft, non-tender, BS normoactive, no mass, no HSM, no bruits        Extremities and Muscular Skeletal:  no deformities, clubbing, cyanosis, erythema observed              Neurological:  no gross motor deficits        Psych:  Alert and Oriented x 3        CC  Alva Viera MD  [address] [place] [address] [place] [address]                Thank you for allowing me to participate in the care of your patient.      Sincerely,     Inna Montes MD     [facility]    cc:   Alva Viera MD  [facility] [address] [place], " [place] [address]

## 2019-03-06 NOTE — PROGRESS NOTES
Service Date: 03/06/2019      HISTORY OF PRESENT ILLNESS:  I saw Ms. Cowart for evaluation of palpitations.  She is a 73-year-old white female with a history of SVT.  She had a successful ablation of a concealed left lateral accessory pathway by Dr. Mclain at [facility] on 05/20/2015.  Recently, the patient has been complaining of recurrent palpitations.  The symptoms were described as skipping heartbeats, followed by heavy heartbeat.  There is no heart racing.  There was no shortness of breath, dizziness or other severe symptoms.  The frequency of the symptoms is in random and could be a couple of times a day and then will be quite for weeks without problem.  She was recently put on a 30-day event monitor.  I had a chance to review the monitor results.  She had 1 episode of atrial tachycardia with relatively slow rate and some irregularity for 7 beats.  On another occasion, she had a PVC couplet and short-lasting PVC bigeminy.  During those 2 episodes of arrhythmia, she did not have symptoms.  Overall, her PVC frequency was low.  She has no history of syncope.      PAST MEDICAL HISTORY:  Remarkable for recurrent distal DVT, currently on Coumadin, type 2 diabetes, breast cancer, anxiety, obesity, status post gastric bypass surgery.      PHYSICAL EXAMINATION:   VITAL SIGNS:  Blood pressure was 128/72, heart rate 90 beats per minute, body weight 203 pounds.   HEENT:  Eyes and ENT were unremarkable.   LUNGS:  Clear.   CARDIAC:  Rhythm was regular, and the heart sounds were normal with no murmur.   ABDOMEN:  Examination showed moderate obesity.   EXTREMITIES:  There was no pedal edema.      ASSESSMENT AND RECOMMENDATIONS:  Ms. Cowart has a palpitation that is suggestive of PVCs.  Overall, her PVC frequency is low and does not pose a risk for long-term.  The single episode of nonsustained atrial tachycardia does not need treatment when she has no symptoms.  Overall, her current symptoms are relatively mild and  the frequency is low.  I would recommend observation for the time being.  She should continue her current activities without restriction.  She does not require routine Cardiology followup.      cc:   Alva Viera MD    [address]  [address]         FERN SANTOYO MD             D: 2019   T: 2019   MT: ANA      Name:     SALUD MARTINEZ   MRN:      [id number]        Account:      [id number]   :      1945           Service Date: 2019      Document: [id number]

## 2019-03-07 ENCOUNTER — ANTICOAGULATION THERAPY VISIT (OUTPATIENT)
Dept: NURSING | Facility: CLINIC | Age: 74
End: 2019-03-07
Payer: COMMERCIAL

## 2019-03-07 ENCOUNTER — OFFICE VISIT (OUTPATIENT)
Dept: PEDIATRICS | Facility: CLINIC | Age: 74
End: 2019-03-07
Payer: COMMERCIAL

## 2019-03-07 VITALS
DIASTOLIC BLOOD PRESSURE: 79 MMHG | TEMPERATURE: 97.6 F | BODY MASS INDEX: 35.8 KG/M2 | HEART RATE: 75 BPM | SYSTOLIC BLOOD PRESSURE: 148 MMHG | WEIGHT: 203.7 LBS | OXYGEN SATURATION: 98 %

## 2019-03-07 DIAGNOSIS — M67.80 CYST OF TENDON SHEATH: ICD-10-CM

## 2019-03-07 DIAGNOSIS — Z86.718 PERSONAL HISTORY OF DVT (DEEP VEIN THROMBOSIS): ICD-10-CM

## 2019-03-07 DIAGNOSIS — J01.90 ACUTE NON-RECURRENT SINUSITIS, UNSPECIFIED LOCATION: Primary | ICD-10-CM

## 2019-03-07 DIAGNOSIS — Z79.01 LONG TERM CURRENT USE OF ANTICOAGULANT THERAPY: Primary | ICD-10-CM

## 2019-03-07 LAB — INR POINT OF CARE: 1.5 (ref 0.86–1.14)

## 2019-03-07 PROCEDURE — 85610 PROTHROMBIN TIME: CPT | Mod: QW

## 2019-03-07 PROCEDURE — 99213 OFFICE O/P EST LOW 20 MIN: CPT | Performed by: FAMILY MEDICINE

## 2019-03-07 PROCEDURE — 99207 ZZC NO CHARGE NURSE ONLY: CPT

## 2019-03-07 PROCEDURE — 36416 COLLJ CAPILLARY BLOOD SPEC: CPT

## 2019-03-07 RX ORDER — CEFUROXIME AXETIL 250 MG/1
250 TABLET ORAL 2 TIMES DAILY
Qty: 20 TABLET | Refills: 1 | Status: SHIPPED | OUTPATIENT
Start: 2019-03-07 | End: 2019-03-25

## 2019-03-07 NOTE — PROGRESS NOTES
ANTICOAGULATION FOLLOW-UP CLINIC VISIT    Patient Name:  Gemma Cowart  Date:  3/7/2019  Contact Type:  Face to Face    SUBJECTIVE:     Patient Findings     Positives:   Change in diet/appetite, Missed doses    Comments:   Has been eating lima beans or asparagus, and   salad every day for the past several weeks.    Thinks she missed her warfarin dose yesterday.    She has a subconjunctival hemorrhage in her right eye.  Onset yesterday.   Saw ophthalmologist for cataract follow up yesterday.    Patient denies:   medication changes,   or signs/symptoms of a clot.                  OBJECTIVE    INR Protime   Date Value Ref Range Status   2019 1.5 (A) 0.86 - 1.14 Final       ASSESSMENT / PLAN  INR assessment SUB    Recheck INR In: 2 WEEKS    INR Location Clinic      Anticoagulation Summary  As of 3/7/2019    INR goal:   2.0-3.0   TTR:   58.0 % (2.8 y)   INR used for dosin.5! (3/7/2019)   Warfarin maintenance plan:   10 mg (5 mg x 2) every Mon, Thu; 7.5 mg (5 mg x 1.5) all other days   Full warfarin instructions:   3/8: 10 mg; Otherwise 10 mg every Mon, Thu; 7.5 mg all other days   Weekly warfarin total:   57.5 mg   Plan last modified:   Camila Hernández RN (2018)   Next INR check:   3/25/2019   Target end date:   Indefinite    Indications    Personal history of DVT (deep vein thrombosis) [Z86.718]  Long term current use of anticoagulant therapy [Z79.01]             Anticoagulation Episode Summary     INR check location:   [facility]    Preferred lab:       Send INR reminders to:   [facility]    Comments:   5mg tabs - HS dose // CALENDAR // gastric bypass  // DVT 12 warfarin until 2013 // DVT 9/12/15      Anticoagulation Care Providers     Provider Role Specialty Phone number    Alva Viera MD  Internal Medicine [phone]            See the Encounter Report to view Anticoagulation Flowsheet and Dosing Calendar (Go to Encounters tab in chart review, and find the  Anticoagulation Therapy Visit)    Dosage adjustment made based on physician directed care plan.    Rae Cooley RN

## 2019-03-07 NOTE — PROGRESS NOTES
CHIEF COMPLAINT    She has had some facial pressure for about a week. She has nasal and post nasal congestion.    Check lump R hand - palm.      HISTORY    She had cataract surgery Jan 16, 31.    She has had retro-orbital and retronasal pressure as well as some congestion for the last week.  She saw her eye doctor who felt she likely had sinusitis rather than a problem following her cataract surgery.    Patient has also noticed a small lump in the palm of the right hand.  It is not particularly tender at this time.  She has no history of hand surgery.      Patient Active Problem List   Diagnosis     Nontoxic uninodular goiter     Rosacea     BRETT (iron deficiency anemia)     Complications of gastric bypass surgery     Vitamin D deficiency disease     Vitamin B12 deficiency     Eczema     Dyshydrosis     Hyperlipidemia LDL goal <100     Anxiety     Insomnia     Health Care Home     Type 2 diabetes mellitus with other specified complication (H)     Psoriasis     Long term current use of anticoagulant therapy     Advanced directives, counseling/discussion     Malignant neoplasm of left female breast (H)     Morbid obesity (H)     CKD (chronic kidney disease) stage 3, GFR 30-59 ml/min (H)     Personal history of DVT (deep vein thrombosis)     Postmenopausal bleeding       REVIEW OF SYSTEMS    No fevers or chills.  No cough or S OB.  No chest pain.  No nausea or abdominal pain.  No numbness or weakness.      Past Medical History:   Diagnosis Date     Anemia     iron deficiency anemia     Anxiety      Breast cancer (H) 2012    Bilateral Masectomies 9/2012     Complications of gastric bypass surgery      Diabetes mellitus (H)     diet controlled     DVT (deep venous thrombosis) (H)      Eczema      Fracture of left knee region     patella - vertical     History of kidney stones      Obesity      Palpitation     with no treatment     Polyneuropathy      Pruritus     generalized     Rosacea      SVT (supraventricular  tachycardia) (H)     s/p ablation 5/28/2015 at [facility] for left lateral AP       EXAM  /79   Pulse 75   Temp 97.6  F (36.4  C) (Tympanic)   Wt 92.4 kg (203 lb 11.2 oz)   LMP 09/24/1990   SpO2 98%   BMI 35.80 kg/m      Eye: Small conjunctival hemorrhage right eye, no drainage.  No swelling.  Pharynx: No inflammation.  Tympanic membranes: Normal.  Neck: No adenopathy or thyromegaly.  Chest: Nonlabored breathing.  Right hand: Small nodule on the flexor tendon sheath ring finger palpable in the right palm.      (J01.90) Acute non-recurrent sinusitis, unspecified location  (primary encounter diagnosis)  Comment:   Plan: cefuroxime (CEFTIN) 250 MG tablet            (M67.80) Cyst of tendon sheath  Comment:   Plan: If bothersome, referral to hand specialist could be made and I suspect this could be removed fairly easily.  Patient advised.

## 2019-03-25 ENCOUNTER — ANTICOAGULATION THERAPY VISIT (OUTPATIENT)
Dept: NURSING | Facility: CLINIC | Age: 74
End: 2019-03-25
Payer: COMMERCIAL

## 2019-03-25 DIAGNOSIS — Z79.01 LONG TERM CURRENT USE OF ANTICOAGULANT THERAPY: Primary | ICD-10-CM

## 2019-03-25 DIAGNOSIS — Z86.718 PERSONAL HISTORY OF DVT (DEEP VEIN THROMBOSIS): ICD-10-CM

## 2019-03-25 LAB — INR POINT OF CARE: 3.6 (ref 0.86–1.14)

## 2019-03-25 PROCEDURE — 99207 ZZC NO CHARGE NURSE ONLY: CPT

## 2019-03-25 PROCEDURE — 36416 COLLJ CAPILLARY BLOOD SPEC: CPT

## 2019-03-25 PROCEDURE — 85610 PROTHROMBIN TIME: CPT | Mod: QW

## 2019-03-25 NOTE — PROGRESS NOTES
"ANTICOAGULATION FOLLOW-UP CLINIC VISIT    Patient Name:  Gemma Cowart  Date:  3/25/2019  Contact Type:  Face to Face    SUBJECTIVE:     Patient Findings     Positives:   Change in health    Comments:     Was on ceftin for sinus infection.   Finished a week ago.  Continues to have a \"drippy nose\"    Some arthritis flare in knees     Patient denies: extra doses, bleeding/bruises,   medication changes, diet changes,  or any alcohol intake.             OBJECTIVE    INR Protime   Date Value Ref Range Status   03/25/2019 3.6 (A) 0.86 - 1.14 Final       ASSESSMENT / PLAN  INR assessment SUPRA    Recheck INR In: 2 WEEKS    INR Location Clinic      Anticoagulation Summary  As of 3/25/2019    INR goal:   2.0-3.0   TTR:   57.8 % (2.9 y)   INR used for dosing:   3.6! (3/25/2019)   Warfarin maintenance plan:   10 mg (5 mg x 2) every Mon, Thu; 7.5 mg (5 mg x 1.5) all other days   Full warfarin instructions:   3/25: 2.5 mg; Otherwise 10 mg every Mon, Thu; 7.5 mg all other days   Weekly warfarin total:   57.5 mg   Plan last modified:   Camila Hernández RN (9/11/2018)   Next INR check:   4/8/2019   Target end date:   Indefinite    Indications    Personal history of DVT (deep vein thrombosis) [Z86.718]  Long term current use of anticoagulant therapy [Z79.01]             Anticoagulation Episode Summary     INR check location:   [facility]    Preferred lab:       Send INR reminders to:   [facility]    Comments:   5mg tabs - HS dose // CALENDAR // gastric bypass 2000 // DVT 11/16/12 warfarin until Apr 2013 // DVT 9/12/15      Anticoagulation Care Providers     Provider Role Specialty Phone number    Alva Virea MD  Internal Medicine [phone]            See the Encounter Report to view Anticoagulation Flowsheet and Dosing Calendar (Go to Encounters tab in chart review, and find the Anticoagulation Therapy Visit)    Dosage adjustment made based on physician directed care plan.    Rae Cooley RN             "

## 2019-04-04 ENCOUNTER — OFFICE VISIT (OUTPATIENT)
Dept: PEDIATRICS | Facility: CLINIC | Age: 74
End: 2019-04-04
Payer: COMMERCIAL

## 2019-04-04 ENCOUNTER — ANTICOAGULATION THERAPY VISIT (OUTPATIENT)
Dept: NURSING | Facility: CLINIC | Age: 74
End: 2019-04-04
Payer: COMMERCIAL

## 2019-04-04 VITALS
BODY MASS INDEX: 35.85 KG/M2 | SYSTOLIC BLOOD PRESSURE: 122 MMHG | OXYGEN SATURATION: 98 % | HEART RATE: 81 BPM | DIASTOLIC BLOOD PRESSURE: 70 MMHG | TEMPERATURE: 98.4 F | WEIGHT: 204 LBS

## 2019-04-04 DIAGNOSIS — N64.4 BREAST PAIN: ICD-10-CM

## 2019-04-04 DIAGNOSIS — Z85.3 PERSONAL HISTORY OF MALIGNANT NEOPLASM OF BREAST: ICD-10-CM

## 2019-04-04 DIAGNOSIS — Z86.718 PERSONAL HISTORY OF DVT (DEEP VEIN THROMBOSIS): ICD-10-CM

## 2019-04-04 DIAGNOSIS — J01.90 ACUTE SINUSITIS TREATED WITH ANTIBIOTICS IN THE PAST 60 DAYS: Primary | ICD-10-CM

## 2019-04-04 DIAGNOSIS — Z79.01 LONG TERM CURRENT USE OF ANTICOAGULANT THERAPY: Primary | ICD-10-CM

## 2019-04-04 LAB — INR POINT OF CARE: 2.4 (ref 0.86–1.14)

## 2019-04-04 PROCEDURE — 36416 COLLJ CAPILLARY BLOOD SPEC: CPT

## 2019-04-04 PROCEDURE — 99207 ZZC NO CHARGE NURSE ONLY: CPT

## 2019-04-04 PROCEDURE — 85610 PROTHROMBIN TIME: CPT | Mod: QW

## 2019-04-04 PROCEDURE — 99213 OFFICE O/P EST LOW 20 MIN: CPT | Mod: GE | Performed by: STUDENT IN AN ORGANIZED HEALTH CARE EDUCATION/TRAINING PROGRAM

## 2019-04-04 RX ORDER — CETIRIZINE HYDROCHLORIDE 10 MG/1
10 TABLET ORAL DAILY
Qty: 90 TABLET | Refills: 3 | Status: SHIPPED | OUTPATIENT
Start: 2019-04-04 | End: 2021-03-23

## 2019-04-04 NOTE — PROGRESS NOTES
ANTICOAGULATION FOLLOW-UP CLINIC VISIT    Patient Name:  Gemma Cowart  Date:  2019  Contact Type:  Face to Face    SUBJECTIVE:     Patient Findings     Comments:   The patient was assessed for   diet, medication,   missed or extra doses,   bruising or bleeding,   with no problem findings.             OBJECTIVE    INR Protime   Date Value Ref Range Status   2019 2.4 (A) 0.86 - 1.14 Final       ASSESSMENT / PLAN  INR assessment THER    Recheck INR In: 6 WEEKS    INR Location Clinic      Anticoagulation Summary  As of 2019    INR goal:   2.0-3.0   TTR:   57.8 % (2.9 y)   INR used for dosin.4 (2019)   Warfarin maintenance plan:   10 mg (5 mg x 2) every Mon, Thu; 7.5 mg (5 mg x 1.5) all other days   Full warfarin instructions:   10 mg every Mon, Thu; 7.5 mg all other days   Weekly warfarin total:   57.5 mg   No change documented:   Rae Cooley RN   Plan last modified:   Camila Hernández RN (2018)   Next INR check:   2019   Target end date:   Indefinite    Indications    Personal history of DVT (deep vein thrombosis) [Z86.718]  Long term current use of anticoagulant therapy [Z79.01]             Anticoagulation Episode Summary     INR check location:   [facility]    Preferred lab:       Send INR reminders to:   [facility]    Comments:   5mg tabs - HS dose // CALENDAR // gastric bypass  // DVT 12 warfarin until 2013 // DVT 9/12/15      Anticoagulation Care Providers     Provider Role Specialty Phone number    Alva Viera MD  Internal Medicine [phone]            See the Encounter Report to view Anticoagulation Flowsheet and Dosing Calendar (Go to Encounters tab in chart review, and find the Anticoagulation Therapy Visit)        Rae Cooley RN

## 2019-04-04 NOTE — PROGRESS NOTES
SUBJECTIVE:   Gemma Cowart is a 73 year old female who presents to clinic today for the following health issues:        Breast pain      Duration: 2-3 weeks    Description (location/character/radiation): Intermittent pain in both breasts, especially in outer quadrants. Occasional pain in axilla. Had one time where she felt a lump in the left axilla but she cannot feel it now.    Intensity:  moderate    Accompanying signs and symptoms: no skin changes, no nipple discharge, no obvious lumps palpated.    History (similar episodes/previous evaluation): Hx of L breast adenocarcinoma s/p bilateral mastectomies and reconstructive surgery with silicone implants. Completed 5 yeas of femara. Sees Dr. Denisse Narvaez of oncology, last seen Oct 2018 with plan for 12 month f/up. Last imaging was MRI of breasts in 2013.    Precipitating or alleviating factors: None    Therapies tried and outcome: None     2. Sinusitis  Started early March. Symptoms include rhinorrhea, sinus pressure, pressure behind the eye, nasal congestion, dry cough. No fever. No ear pain or pressure. Seen in  3/7, given ceftin for 10 days. Did not get better. Symptoms continued. She did not do any other therapies.     Problem list and histories reviewed & adjusted, as indicated.  Additional history: as documented    Patient Active Problem List   Diagnosis     Nontoxic uninodular goiter     Rosacea     BRETT (iron deficiency anemia)     Complications of gastric bypass surgery     Vitamin D deficiency disease     Vitamin B12 deficiency     Eczema     Dyshydrosis     Hyperlipidemia LDL goal <100     Anxiety     Insomnia     Health Care Home     Type 2 diabetes mellitus with other specified complication (H)     Psoriasis     Long term current use of anticoagulant therapy     Advanced directives, counseling/discussion     Malignant neoplasm of left female breast (H)     Morbid obesity (H)     CKD (chronic kidney disease) stage 3, GFR 30-59 ml/min (H)     Personal  history of DVT (deep vein thrombosis)     Postmenopausal bleeding     Past Surgical History:   Procedure Laterality Date     ABDOMEN SURGERY  2000    gastric bypass 2000     COLONOSCOPY  10/11/2012    Procedure: COLONOSCOPY;  colonoscopy;  Surgeon: Gela Cleary MD;  Location:  GI     COLONOSCOPY N/A 10/6/2017    Procedure: COLONOSCOPY;;  Surgeon: Shashank Ortiz MD;  Location:  GI     DENTAL SURGERY  5/2007    all teeth removed - dentures     ENT SURGERY      all teeth pulled     ESOPHAGOSCOPY, GASTROSCOPY, DUODENOSCOPY (EGD), COMBINED N/A 10/6/2017    Procedure: COMBINED ESOPHAGOSCOPY, GASTROSCOPY, DUODENOSCOPY (EGD);  ESOPHAGOSCOPY, GASTROSCOPY, DUODENOSCOPY (EGD) & Colonoscopy *Needs INR on arrival;  Surgeon: Shashank Ortiz MD;  Location:  GI     GYN SURGERY  1/10/75    tubal ligation      H ABLATION SVT  5/28/2015    stopped metoprolol     HC BREAST RECONSTRUC W OTHR TECHNIQ  5/8/2013     HERNIA REPAIR  2005     IMPLANT FILTER INFERIOR VENA CAVA  10/8/2012    taken out  1/4/12     INSERT TISSUE EXPANDER BREAST BILATERAL  9/5/2012    Procedure: INSERT TISSUE EXPANDER BREAST BILATERAL;;  Surgeon: Orlando Wall MD;  Location:  OR     MASTECTOMY SIMPLE, SENTINEL NODE, COMBINED  9/5/2012    Procedure: COMBINED MASTECTOMY SIMPLE, SENTINEL NODE;  BILATERAL MASTECTOMY WITH LEFT AXILLARY SENTINEL LYMPH NODE BIOPSY, BILATERAL TISSUE EXPANDER        MASTECTOMY, BILATERAL       PANNICULECTOMY N/A 4/5/2018    Procedure: PANNICULECTOMY;  PANNICULECTOMY ;  Surgeon: Orlando Wall MD;  Location:  OR     RECONSTRUCT BREAST BILATERAL, IMPLANT PROSTHESIS BILATERAL, COMBINED  5/22/2013    Procedure: COMBINED RECONSTRUCT BREAST BILATERAL, IMPLANT PROSTHESIS BILATERAL;  BILATERAL SECOND STAGE BREAST RECONSTRUCTION WITH SILICONE GEL IMPLANTS AND LIPOSUCTION;  Surgeon: Orlando Wall MD;  Location:  [facility]     RECONSTRUCT NIPPLE BILATERAL  8/22/2013    Procedure: RECONSTRUCT NIPPLE BILATERAL;   BILATERAL NIPPLE AREOLAR RECONSTRUCTION;  Surgeon: Orlando Wall MD;  Location: [facility]       Social History     Tobacco Use     Smoking status: Former Smoker     Packs/day: 1.50     Years: 25.00     Pack years: 37.50     Types: Cigarettes     Last attempt to quit: 10/21/1986     Years since quittin.4     Smokeless tobacco: Never Used     Tobacco comment: quit    Substance Use Topics     Alcohol use: No     Alcohol/week: 0.0 oz     Family History   Problem Relation Age of Onset     Cancer - colorectal Mother      Colon Cancer Mother      Prostate Cancer Father      Alzheimer Disease Father      Cancer Paternal Grandmother      Alzheimer Disease Paternal Grandfather      Cancer - colorectal Brother      Diabetes Brother      Crohn's Disease Daughter      Diabetes Brother      Eye Disorder Brother      Diabetes Son      Cancer Maternal Uncle      Cancer Maternal Uncle          Current Outpatient Medications   Medication Sig Dispense Refill     ACE/ARB NOT PRESCRIBED, INTENTIONAL, ACE & ARB not prescribed due to Other: normal tensive, low microalbumuria       acetaminophen (TYLENOL) 500 MG tablet Take 2 tablets (1,000 mg) by mouth every 8 hours as needed for mild pain 100 tablet 0     amoxicillin-clavulanate (AUGMENTIN) 875-125 MG tablet Take 1 tablet by mouth 2 times daily for 7 days 14 tablet 0     blood glucose (NO BRAND SPECIFIED) lancets standard Use to test blood sugars 2 times daily or as directed. Please dispense lancets for Relion Confirm. 2 Box 3     blood glucose monitoring (NO BRAND SPECIFIED) test strip Use to test blood sugars 2 times daily or as directed. Please dispense Relion Confirm test strips 200 each 3     cetirizine (ZYRTEC) 10 MG tablet Take 1 tablet (10 mg) by mouth daily 90 tablet 3     Cholecalciferol (VITAMIN D) 2000 units CAPS Take 1 capsule by mouth daily 90 capsule 3     clobetasol (TEMOVATE) 0.05 % cream Apply sparingly to affected area twice daily as needed.  Do not apply  to face. 270 g 3     cyanocobalamin (VITAMIN B-12) 2500 MCG sublingual tablet Place 2,500 mcg under the tongue daily 90 tablet 3     PARoxetine (PAXIL) 30 MG tablet Take 2 tablets (60 mg) by mouth At Bedtime 180 tablet 3     warfarin (COUMADIN) 5 MG tablet Take 10 mg (2 tabs) on Mon, Thu; 7.5 mg (1.5 tabs) all other days or as directed by INR Clinic 150 tablet 1     Allergies   Allergen Reactions     Bacitracin Unknown     Phenylene      Recent Labs   Lab Test 12/24/18  1157 10/10/18  0835 09/07/18  0926 02/26/18  0747 10/10/17  0845  10/02/17  0918 05/15/17  1252  10/12/16  0915   A1C  --   --  5.7* 5.5  --   --  5.4 5.7  --  6.0   LDL  --   --  93 85  --   --   --   --   --  43   HDL  --   --  58 61  --   --   --   --   --  50   TRIG  --   --  106 88  --   --   --   --   --  152*   ALT  --  22  --  25 28  --  35  --    < >  --    CR 1.27* 1.09*  --  1.15*  --   --  1.33* 1.23*  --  1.14*   GFRESTIMATED 42* 49*  --  46*  --    < > 39* 43*  --  47*   GFRESTBLACK 49* 60*  --  56*  --    < > 48* 52*  --  57*   POTASSIUM 4.7 5.2  --  4.3  --   --  4.8 4.7  --  4.3   TSH 1.89  --   --   --   --   --   --  1.93  --   --     < > = values in this interval not displayed.      BP Readings from Last 3 Encounters:   04/04/19 122/70   03/07/19 148/79   03/06/19 128/72    Wt Readings from Last 3 Encounters:   04/04/19 92.5 kg (204 lb)   03/07/19 92.4 kg (203 lb 11.2 oz)   03/06/19 92.4 kg (203 lb 11.2 oz)                    Reviewed and updated as needed this visit by clinical staff  Tobacco  Allergies  Meds       Reviewed and updated as needed this visit by Provider         ROS:  Constitutional, HEENT, cardiovascular, pulmonary, GI, , musculoskeletal, neuro, skin, endocrine and psych systems are negative, except as otherwise noted.    OBJECTIVE:     /70 (Cuff Size: Adult Large)   Pulse 81   Temp 98.4  F (36.9  C) (Oral)   Wt 92.5 kg (204 lb)   LMP 09/24/1990   SpO2 98%   BMI 35.85 kg/m    Body mass index is  35.85 kg/m .    Physical Exam  General: awake, alert, in no acute distress  HEENT: NCAT, PERRL, EOMI, sclera anicteric, no nasal discharge, MMM, posterior pharynx without erythema or exudates, no cervical lymphadenopathy  CV: RRR, no murmurs noted, peripheral pulses strong  Resp: CTAB, no increased WOB  Breast: Bilateral breasts examined. No axillary LAD or tenderness. Implants palpated. No concerning masses. No pain with palpation.  Abd: Soft, nontender, nondistended, +BS, no rebound or guarding  MSK: trace pitting edema, extremities warm and well perfused, normal pulses  Skin: warm, dry, no jaundice  Neuro: CN II-XII grossly intact. No focal deficits. Alert and oriented x4.      Diagnostic Test Results:  none    ASSESSMENT/PLAN:     1. Acute sinusitis treated with antibiotics in the past 60 days  - amoxicillin-clavulanate (AUGMENTIN) 875-125 MG tablet; Take 1 tablet by mouth 2 times daily for 7 days  Dispense: 14 tablet; Refill: 0  - cetirizine (ZYRTEC) 10 MG tablet; Take 1 tablet (10 mg) by mouth daily  Dispense: 90 tablet; Refill: 3  - Supportive treatment including Mucinex BID, good hydration, saline nasal rinses    2. Breast pain  3. Personal history of malignant neoplasm of breast  Breast pain with hx of breast cancer. Will image to r/o recurrence or issues with implants. Mammogram not indicated due to mastectomies.   - MR Breast Bilateral w/o & w Contrast; Future  - If pain continues despite normal imaging, should follow back up with Oncology or Surgery (wonder if pain might be related to the implants)    FURTHER TESTING:       - MRI breasts    F/up in 1 month if symptoms not improved.    Patient was staffed with attending, Dr. Fabien Sotelo, who agrees with the above assessment and plan.    Alee Wang MD  PGY - 3  Internal Medicine/Pediatrics  Pager [phone]  [facility]    ===========  STAFF NOTE:  Patient discussed with resident physician today.  We discussed larson portions of the  visit and I participated in the evaluation and management of the patient today.     Andrea Sotelo MD

## 2019-04-04 NOTE — PATIENT INSTRUCTIONS
Thank you for coming to clinic today. It was a pleasure to see you and I would be happy to see you back at any time for follow up or for new health issues.    1. MRI bilateral breasts ordered for evaluation.  Depending on results, may need to see your oncologist or your surgeon.  Call your insurance company to see how it would be covered.    2. Augmentin for 7 days for sinuses  - stay hydrated  - Mucinex twice a day  - Daily zyrtec may help  - Daily neti pot or saline nasal rinses with saline or bottled water    Alee Wang MD    Patient Education     Treating Chronic Sinusitis    The sinuses are hollow areas formed by the bones of the face. Sinuses make and drain mucus. This keeps the nasal passages clean and moist. When the sinuses become swollen (inflamed) or infected, the condition is called sinusitis. Symptoms may include:    Thick, discolored drainage from the nose    Nasal congestion    Pain and pressure around the eyes, nose, cheeks, or forehead    Headache    Cough    Thick mucus draining down the back of the throat (postnasal drainage)    Fever    Loss of smell  With chronic sinusitis, the symptoms last more than 12 weeks.     Ongoing prevention  It s important to treat the cause of a sinus problem. If you have allergies, talk with your doctor about treatment. Or ask about getting an evaluation by an allergy specialist. If you re exposed to nasal irritants such as sawdust, use a filter mask. If you smoke, ask your doctor for help with quitting. Smoke irritates the sinuses and can make your sinus problem worse. If you live with smokers, ask them to consider quitting or only smoking outdoors.   Medicine  Medicines for sinusitis may include:    Antibiotic medicines. You may need to take antibiotic medicine for a longer period. If bacteria aren't the cause, antibiotics won't help.    Inhaled corticosteroid medicine. Nasal sprays or drops with steroids are often prescribed.    Other medicines. You may need to  take nasal sprays with antihistamines and decongestants, or saltwater (saline) sprays or drops. Your provider may also prescribe mucolytics or expectorants to loosen and clear mucus.    Allergy shots (immunotherapy). If you have nasal allergies, shots may help reduce your sensitivity to allergens such as pollen, dust mites, or mold.   If your symptoms still do not get better, you may need more testing. This may include a CT scan of the sinuses.  Surgery  If other treatments don t solve the problem, you may need surgery. The type of surgery depends on what is causing your sinusitis. It also depends on which sinuses are involved. Your doctor will tell you more about your options. The types of surgery include:    Endoscopic surgery. This is often used to clear blockages. The sinuses can then heal on their own. During the surgery, the doctor uses a thin, lighted tube (endoscope). The doctor puts the endoscope into your nose to see into the sinuses. This surgery can be done without incisions on the face.    Open surgery. This is often used to clean out a sinus lining that is very damaged. It lets the doctor reach areas that an endoscope may not reach.  Date Last Reviewed: 10/1/2016    [id number] [employer]. [address] [place] [address], [place] [address]. All rights reserved. This information is not intended as a substitute for professional medical care. Always follow your healthcare professional's instructions.

## 2019-04-10 ENCOUNTER — TELEPHONE (OUTPATIENT)
Dept: PEDIATRICS | Facility: CLINIC | Age: 74
End: 2019-04-10

## 2019-04-10 DIAGNOSIS — Z79.01 LONG TERM CURRENT USE OF ANTICOAGULANT THERAPY: ICD-10-CM

## 2019-04-10 DIAGNOSIS — Z86.718 PERSONAL HISTORY OF DVT (DEEP VEIN THROMBOSIS): ICD-10-CM

## 2019-04-10 NOTE — TELEPHONE ENCOUNTER
"Requested Prescriptions   Pending Prescriptions Disp Refills     warfarin (COUMADIN) 5 MG tablet [Pharmacy Med Name: WARFARIN SOD 5MG TABLETS (PEACH)]  Last Written Prescription Date:  10/22/2018  Last Fill Quantity: 150 tablet,  # refills: 1    Last office visit: 4/4/2019 with prescribing provider:  Reina Wang MD        Future Office Visit:     60 tablet 0     Sig: TAKE 2 TABLETS BY MOUTH ON MONDAY AND WEDNESDAY AND 1 1/2 TABLETS ALL OTHER DAYS OR AS DIRECTED BY INR CLINIC       Vitamin K Antagonists Failed - 4/10/2019  3:50 AM        Failed - INR is within goal in the past 6 weeks     Confirm INR is within goal in the past 6 weeks.     Recent Labs   Lab Test 04/04/19  1021   INR 2.4*                       Passed - Recent (12 mo) or future (30 days) visit within the authorizing provider's specialty     Patient had office visit in the last 12 months or has a visit in the next 30 days with authorizing provider or within the authorizing provider's specialty.  See \"Patient Info\" tab in inbasket, or \"Choose Columns\" in Meds & Orders section of the refill encounter.              Passed - Medication is active on med list        Passed - Patient is 18 years of age or older        Passed - Patient is not pregnant        Passed - No positive pregnancy on file in past 12 months          "

## 2019-04-10 NOTE — TELEPHONE ENCOUNTER
Panel Management Review      Patient has the following on her problem list: None      Composite cancer screening  Chart review shows that this patient is due/due soon for the following Mammogram  Summary:    Patient is due/failing the following:   MAMMOGRAM    Action needed:   Patient needs office visit for mammo, wellness.    Type of outreach:    Sent letter.    Questions for provider review:    None                                                                                                                                    Greg Porter CMA

## 2019-04-11 RX ORDER — WARFARIN SODIUM 5 MG/1
TABLET ORAL
Qty: 144 TABLET | Refills: 1 | Status: SHIPPED | OUTPATIENT
Start: 2019-04-11 | End: 2020-05-04

## 2019-04-11 NOTE — TELEPHONE ENCOUNTER
Warfarin 5 mg tablets  Last Written Prescription Date: 3/4/19  Last Fill Qty: 60, # refills: 0  Last Office Visit with [facility], [facility] or [facility] prescribing provider: 1/8/19     Lab Results   Component Value Date    INR 2.4 04/04/2019    INR 3.6 03/25/2019    INR 1.20 09/07/2018    INR 1.06 04/05/2018     Refilled per nurse protocol standing orders.  Rae Cooley RN

## 2019-04-16 ENCOUNTER — HOSPITAL ENCOUNTER (OUTPATIENT)
Dept: MRI IMAGING | Facility: CLINIC | Age: 74
Discharge: HOME | End: 2019-04-16
Attending: INTERNAL MEDICINE | Admitting: INTERNAL MEDICINE
Payer: COMMERCIAL

## 2019-04-16 DIAGNOSIS — Z85.3 PERSONAL HISTORY OF MALIGNANT NEOPLASM OF BREAST: ICD-10-CM

## 2019-04-16 DIAGNOSIS — N64.4 BREAST PAIN: ICD-10-CM

## 2019-04-16 LAB
CREAT BLD-MCNC: 1.3 MG/DL (ref 0.52–1.04)
GFR SERPL CREATININE-BSD FRML MDRD: 40 ML/MIN/{1.73_M2}

## 2019-04-16 PROCEDURE — 77049 MRI BREAST C-+ W/CAD BI: CPT

## 2019-04-16 PROCEDURE — 25500064 ZZH RX 255 OP 636: Performed by: INTERNAL MEDICINE

## 2019-04-16 PROCEDURE — 82565 ASSAY OF CREATININE: CPT

## 2019-04-16 PROCEDURE — A9585 GADOBUTROL INJECTION: HCPCS | Performed by: INTERNAL MEDICINE

## 2019-04-16 RX ORDER — GADOBUTROL 604.72 MG/ML
10 INJECTION INTRAVENOUS ONCE
Status: COMPLETED | OUTPATIENT
Start: 2019-04-16 | End: 2019-04-16

## 2019-04-16 RX ORDER — GADOBUTROL 604.72 MG/ML
10 INJECTION INTRAVENOUS ONCE
Status: DISCONTINUED | OUTPATIENT
Start: 2019-04-16 | End: 2019-04-16

## 2019-04-16 RX ADMIN — GADOBUTROL 9 ML: 604.72 INJECTION INTRAVENOUS at 12:57

## 2019-04-17 ENCOUNTER — TELEPHONE (OUTPATIENT)
Dept: MAMMOGRAPHY | Facility: CLINIC | Age: 74
End: 2019-04-17

## 2019-04-17 NOTE — TELEPHONE ENCOUNTER
Patient notified of within normal limits breast MRI.1. No MRI evidence of malignancy.  2. Implants appear intact.  3. No specific finding to explain breast pain..  Informed her to speak with her PCP If pain persists.

## 2019-05-16 ENCOUNTER — TELEPHONE (OUTPATIENT)
Dept: PEDIATRICS | Facility: CLINIC | Age: 74
End: 2019-05-16

## 2019-05-16 ENCOUNTER — ANTICOAGULATION THERAPY VISIT (OUTPATIENT)
Dept: NURSING | Facility: CLINIC | Age: 74
End: 2019-05-16
Payer: COMMERCIAL

## 2019-05-16 DIAGNOSIS — Z79.01 LONG TERM CURRENT USE OF ANTICOAGULANT THERAPY: Primary | ICD-10-CM

## 2019-05-16 DIAGNOSIS — E11.69 TYPE 2 DIABETES MELLITUS WITH OTHER SPECIFIED COMPLICATION (H): Chronic | ICD-10-CM

## 2019-05-16 DIAGNOSIS — Z86.718 PERSONAL HISTORY OF DVT (DEEP VEIN THROMBOSIS): ICD-10-CM

## 2019-05-16 LAB — INR POINT OF CARE: 2.3 (ref 0.86–1.14)

## 2019-05-16 PROCEDURE — 85610 PROTHROMBIN TIME: CPT | Mod: QW

## 2019-05-16 PROCEDURE — 99207 ZZC NO CHARGE NURSE ONLY: CPT

## 2019-05-16 PROCEDURE — 36416 COLLJ CAPILLARY BLOOD SPEC: CPT

## 2019-05-16 NOTE — TELEPHONE ENCOUNTER
Done.     Please call patient to help assist in a follow up diabetes appointment. Ok to wait until Templates are open.

## 2019-05-16 NOTE — PROGRESS NOTES
ANTICOAGULATION FOLLOW-UP CLINIC VISIT    Patient Name:  Gemma Cowart  Date:  2019  Contact Type:  Face to Face    SUBJECTIVE:  Patient Findings     Comments:   The patient was assessed for   diet, medication,   missed or extra doses,   bruising or bleeding,   with no problem findings.          Clinical Outcomes     Comments:   The patient was assessed for   diet, medication,   missed or extra doses,   bruising or bleeding,   with no problem findings.             OBJECTIVE    INR Protime   Date Value Ref Range Status   2019 2.3 (A) 0.86 - 1.14 Final       ASSESSMENT / PLAN  INR assessment THER    Recheck INR In: 6 WEEKS    INR Location Clinic      Anticoagulation Summary  As of 2019    INR goal:   2.0-3.0   TTR:   59.4 % (3 y)   INR used for dosin.3 (2019)   Warfarin maintenance plan:   10 mg (5 mg x 2) every Mon, Thu; 7.5 mg (5 mg x 1.5) all other days   Full warfarin instructions:   10 mg every Mon, Thu; 7.5 mg all other days   Weekly warfarin total:   57.5 mg   No change documented:   Rae Cooley RN   Plan last modified:   Camila Hernández RN (2018)   Next INR check:   2019   Target end date:   Indefinite    Indications    Personal history of DVT (deep vein thrombosis) [Z86.718]  Long term current use of anticoagulant therapy [Z79.01]             Anticoagulation Episode Summary     INR check location:   Anticoagulation Clinic    Preferred lab:       Send INR reminders to:   [facility]    Comments:   5mg tabs - HS dose // CALENDAR // gastric bypass  // DVT 12 warfarin until 2013 // DVT 9/12/15      Anticoagulation Care Providers     Provider Role Specialty Phone number    Alva Viera MD  Internal Medicine [phone]            See the Encounter Report to view Anticoagulation Flowsheet and Dosing Calendar (Go to Encounters tab in chart review, and find the Anticoagulation Therapy Visit)    INR is therapeutic today. Patient will continue  same maintenance dose. 57.5mg/wk  Follow up in 6 weeks or sooner if needed.        Rae Cooley RN

## 2019-05-16 NOTE — TELEPHONE ENCOUNTER
Reason for Call:  Medication or medication refill:    Do you use a [facility]?  Name of the pharmacy and phone number for the current request:    [facility] in [place]    Name of the medication requested: Lancets and Test Strips.  If both the requests are on the same sheet of paper and the quantity on it will be filled for both of the requests.  Medicare also needs a diagnoised code on it in order for them to fill it.  It different quantity is need then 2 separate scripts much be done.    Other request:     Can we leave a detailed message on this number? YES    Phone number patient can be reached at: [phone] [facility] pharmacy    Best Time: any    Call taken on 5/16/2019 at 1:33 PM by Stacie Rosenbaum

## 2019-05-17 NOTE — TELEPHONE ENCOUNTER
Please redirect Rx's to [place] [place] [facility]. [facility] in [facility]. Pharmacy has canceled this order.

## 2019-05-20 NOTE — TELEPHONE ENCOUNTER
Both test strips & lancets were sent to [facility] in [place] on 5/16/19. No need to resend rx at this time.    Reyna, RN  Triage Nurse

## 2019-05-21 ENCOUNTER — TELEPHONE (OUTPATIENT)
Dept: PEDIATRICS | Facility: CLINIC | Age: 74
End: 2019-05-21

## 2019-05-21 DIAGNOSIS — E11.69 TYPE 2 DIABETES MELLITUS WITH OTHER SPECIFIED COMPLICATION (H): Chronic | ICD-10-CM

## 2019-05-21 NOTE — TELEPHONE ENCOUNTER
Previous order was sent to the [facility] in [place]. Resent to [facility] in [facility].     Spoke to patient and received pharmacy of choice address.     Chikis Osorio RN Flex

## 2019-05-28 DIAGNOSIS — E11.69 TYPE 2 DIABETES MELLITUS WITH OTHER SPECIFIED COMPLICATION, WITHOUT LONG-TERM CURRENT USE OF INSULIN (H): Primary | ICD-10-CM

## 2019-06-26 DIAGNOSIS — E11.69 TYPE 2 DIABETES MELLITUS WITH OTHER SPECIFIED COMPLICATION, WITHOUT LONG-TERM CURRENT USE OF INSULIN (H): ICD-10-CM

## 2019-06-26 NOTE — TELEPHONE ENCOUNTER
6/26/19    Pt called is out and needing refill on a new Glucose Machine, Strips, and Lancets, (for two time a day testing)   Her ins will only cover the ONE [employer] brand. Please send RX for all to the [facility].    blood glucose (NO BRAND SPECIFIED) test strip  11 ordered  EditCancel Reorder       Start: 5/28/2019  Ord/Sold: 5/28/2019 (O)  Report  Adh:   Taking:   Long-term:   Pharmacy: [facility] [id number]  Med Dose History       Summary: Use to test blood sugars 2 times daily or as directed. Type 2 diabetes mellitus with other specified complication (H) [E11.69], Disp-100 each, R-11, E-Prescribe       Patient Sig: Use to test blood sugars 2 times daily or as directed. Type 2 diabetes mellitus with other specified complication (H) [E11.69]       Ordered on: 5/28/2019       Authorized by: [employer]       Dispense: 100 each       Admin Instructions: Use to test blood sugars 2 times daily or as directed. Type 2 diabetes mellitus with other specified complication (H) [E11.69]       Prior Authorization: Request [place]          blood glucose (NO BRAND SPECIFIED) lancets standard  11 ordered  EditCancel Reorder       Start: 5/28/2019  Ord/Sold: 5/28/2019 (O)  Report  Adh:   Taking:   Long-term:   Pharmacy: [facility] [id number] [address]  Med Dose History       Summary: Use to test blood sugars 2 times daily or as directed. Type 2 diabetes mellitus with other specified complication (H) [E11.69]  Disp-100 each, R-11  E-Prescribe       Patient Sig: Use to test blood sugars 2 times daily or as directed. Type 2 diabetes mellitus with other specified complication (H) [E11.69]       Ordered on: 5/28/2019       Authorized by: [employer]       Dispense: 100 each       Admin Instructions: Use to test blood sugars 2 times daily or as directed. Type 2 diabetes mellitus with other specified complication (H) [E11.69]       Prior Authorization: Request

## 2019-06-27 ENCOUNTER — ANTICOAGULATION THERAPY VISIT (OUTPATIENT)
Dept: NURSING | Facility: CLINIC | Age: 74
End: 2019-06-27
Payer: COMMERCIAL

## 2019-06-27 DIAGNOSIS — Z79.01 LONG TERM CURRENT USE OF ANTICOAGULANT THERAPY: Primary | ICD-10-CM

## 2019-06-27 DIAGNOSIS — Z86.718 PERSONAL HISTORY OF DVT (DEEP VEIN THROMBOSIS): ICD-10-CM

## 2019-06-27 LAB — INR POINT OF CARE: 2.8 (ref 0.86–1.14)

## 2019-06-27 PROCEDURE — 99207 ZZC NO CHARGE NURSE ONLY: CPT

## 2019-06-27 PROCEDURE — 85610 PROTHROMBIN TIME: CPT | Mod: QW

## 2019-06-27 PROCEDURE — 36416 COLLJ CAPILLARY BLOOD SPEC: CPT

## 2019-06-27 NOTE — PROGRESS NOTES
ANTICOAGULATION FOLLOW-UP CLINIC VISIT    Patient Name:  Gemma Cowart  Date:  2019  Contact Type:  Face to Face    SUBJECTIVE:  Patient Findings     Comments:   The patient was assessed for   diet, medication,   missed or extra doses,   bruising or bleeding,   with no problem findings.          Clinical Outcomes     Comments:   The patient was assessed for   diet, medication,   missed or extra doses,   bruising or bleeding,   with no problem findings.             OBJECTIVE    INR Protime   Date Value Ref Range Status   2019 2.8 (A) 0.86 - 1.14 Final       ASSESSMENT / PLAN  INR assessment THER    Recheck INR In: 6 WEEKS    INR Location Clinic      Anticoagulation Summary  As of 2019    INR goal:   2.0-3.0   TTR:   60.8 % (3.1 y)   INR used for dosin.8 (2019)   Warfarin maintenance plan:   10 mg (5 mg x 2) every Mon, Thu; 7.5 mg (5 mg x 1.5) all other days   Full warfarin instructions:   10 mg every Mon, Thu; 7.5 mg all other days   Weekly warfarin total:   57.5 mg   No change documented:   Rae Cooley RN   Plan last modified:   Camila Hernández RN (2018)   Next INR check:   2019   Target end date:   Indefinite    Indications    Personal history of DVT (deep vein thrombosis) [Z86.718]  Long term current use of anticoagulant therapy [Z79.01]             Anticoagulation Episode Summary     INR check location:   Anticoagulation Clinic    Preferred lab:       Send INR reminders to:   TOBIAS THOMPSON    Comments:   5mg tabs - HS dose // CALENDAR // gastric bypass  // DVT 12 warfarin until 2013 // DVT 9/12/15      Anticoagulation Care Providers     Provider Role Specialty Phone number    Alva Viera MD  Internal Medicine [phone]            See the Encounter Report to view Anticoagulation Flowsheet and Dosing Calendar (Go to Encounters tab in chart review, and find the Anticoagulation Therapy Visit)    INR is therapeutic today. Patient will continue same  maintenance dose.   Follow up in 6 weeks or sooner if needed.        Rae Cooley RN

## 2019-07-09 ENCOUNTER — PATIENT OUTREACH (OUTPATIENT)
Dept: GERIATRIC MEDICINE | Facility: CLINIC | Age: 74
End: 2019-07-09

## 2019-07-09 NOTE — PROGRESS NOTES
[facility] Care Coordination Contact    Called member to schedule annual HRA home visit. HRA has been scheduled for Thursday, July 11 at 10:30 am..   Nighat Carbajal RN, BSN, PHN  [facility] Care Coordinator  [phone]

## 2019-07-11 ENCOUNTER — PATIENT OUTREACH (OUTPATIENT)
Dept: GERIATRIC MEDICINE | Facility: CLINIC | Age: 74
End: 2019-07-11

## 2019-07-15 ENCOUNTER — PATIENT OUTREACH (OUTPATIENT)
Dept: GERIATRIC MEDICINE | Facility: CLINIC | Age: 74
End: 2019-07-15

## 2019-07-15 ASSESSMENT — ACTIVITIES OF DAILY LIVING (ADL): DEPENDENT_IADLS:: TRANSPORTATION;MEAL PREPARATION

## 2019-07-15 NOTE — LETTER
July 15, 2019      SALUD MARTINEZ  [address] [place] [id number]      Dear Salud:    At [facility], we are dedicated to improving your health and well-being. Enclosed is the Comprehensive Care Plan that we developed with you on 7/11/2019. Please review the Care Plan carefully.    As a reminder, some of the things we discussed at your visit include:    Your physical and mental health    Ways to reduce falls    Health care needs you may have    Don t forget to contact your care coordinator if you:    Have been hospitalized or plan to be hospitalized     Have had a fall     Have experienced a change in physical health    Are experiencing emotional problems     If you do not agree with your Care Plan, have questions about it, or have experienced a change in your needs, please call me at [phone]. If you are hearing impaired, please call the [place] Relay at 449 or [phone] ([email or web address] relay service).    Sincerely,    Nighat Carbajal RN, BSN, PHN    E-mail: [email or web address]   Phone: [phone]      [facility]) is a health plan that contracts with both Medicare and the [place] Medical Assistance (Medicaid) program to provide benefits of both programs to enrollees. Enrollment in [facility] depends on contract renewal.    [id number])     [id number] (11/18)

## 2019-07-15 NOTE — PROGRESS NOTES
[facility] Care Coordination Contact    [facility] Home Visit Assessment     Home visit for Health Risk Assessment with Gemma Cowart completed on July 11, 2019    Type of residence:: Town home  Current living arrangement:: I live in a private home with family     Assessment completed with:: Patient    Current Care Plan  Member currently receiving the following home care services:     Member currently receiving the following community resources: None      Medication Review  Medication reconciliation completed in Epic: Yes  Medication set-up & administration: Independent and sets up on own monthly.  Self-administers medications.  Medication Risk Assessment Medication (1 or more, place referral to MTM): N/A: No risk factors identified  MTM Referral Placed: No: No risk factors idenified    Mental/Behavioral Health   Depression Screening: See PHQ assessment flowsheet.   Mental health DX:: Yes(Anxiety )   Mental health DX how managed:: Medication  Mental Health Diagnosis: Yes: Anxiety   Mental Health Services: Yes: Current mental health services:  Medication    Falls Assessment:   Fallen 2 or more times in the past year?: No   Any fall with injury in the past year?: No    ADL/IADL Dependencies:   Dependent ADLs:: Independent  Dependent IADLs:: Transportation, Meal Preparation    [facility] sponsored benefits: Shared information re: Silver Sneakers/gym memberships, ASA, Calcium +D.    PCA Assessment completed at visit: Not applicable     Elderly Waiver Eligibility: No-does not meet criteria    Care Plan & Recommendations: Will call [facility] to get names of dentists that can look at her ill-fitting dentures.     See [facility] for detailed assessment information.    Follow-Up Plan: Member informed of future contact, plan to f/u with member with a 6 month telephone assessment.  Contact information shared with member and family, encouraged member to call with any questions or concerns at any  time.    [place] care continuum providers: Please refer to Health Care Home on the Epic Problem List to view this patient's [facility] Care Plan Summary.    Nighat Carbajal RN, BSN, PHN  [facility] Care Coordinator  [phone]

## 2019-07-15 NOTE — PROGRESS NOTES
[facility] Care Coordination Contact    Received after visit chart from care coordinator.  Completed following tasks: Mailed copy of care plan to client and Updated services in access  Chart was returned to CC.    and Provider Signature - No POC Shared:  Member indicates that they do not want their POC shared with any [facility] providers.     Ginger Armendariz  Care Management Specialist  [facility]  [phone]

## 2019-07-22 ENCOUNTER — PATIENT OUTREACH (OUTPATIENT)
Dept: GERIATRIC MEDICINE | Facility: CLINIC | Age: 74
End: 2019-07-22

## 2019-07-22 NOTE — PROGRESS NOTES
[facility] Care Coordination Contact    Spoke with [place] (Adams) to discuss issues Gemma is having with her dentures. Client's current dentures were purchased in November of 2016. She is entitled to new dentures every 6 years. Client received her current dentures from [facility] in [place]. Adams stated that other dentists may not be willing to adjust her current dentures because they didn't come from them. He provided CC with some names of dentists. [facility] in [place] ([phone]) would be most likely to adjust another providers dentures. Other dentists are [facility] in [place] ([phone]) and [facility] in [place] ([phone]). Adams stated that if client needs new dentures, the dentist can send a pre-treatment estimate to [facility] for the new dentures and they may be approved.    TC to Gemma to inform her of above. Also advised that since it has been a year since she was at [facility] and have given time to adjust to the dentures and they still aren't working, perhaps they will be willing to try to adjust them again.    Nighat Carbajal, RN, BSN, PHN  [facility] Care Coordinator  [phone]

## 2019-07-23 DIAGNOSIS — E11.69 TYPE 2 DIABETES MELLITUS WITH OTHER SPECIFIED COMPLICATION, WITHOUT LONG-TERM CURRENT USE OF INSULIN (H): ICD-10-CM

## 2019-07-23 NOTE — TELEPHONE ENCOUNTER
"Requested Prescriptions   Pending Prescriptions Disp Refills     blood glucose monitoring (ONE TOUCH ULTRA 2) meter device kit [Pharmacy Med Name: ONE TOUCH ULTRA 2 KIT]    Last Written Prescription Date:  6/24/2019  Last Fill Quantity: 1,  # refills: 1   Last office visit: 4/4/2019 with prescribing provider:  Alva Viera     Future Office Visit:   Next 5 appointments (look out 90 days)    Oct 16, 2019  8:00 AM CDT  Return Visit with  LAB DRAW 1  [facility] ([facility]) [facility]  [address]  [facility] [address]  [phone]   Oct 16, 2019  9:00 AM CDT  Return Visit with Jyoti Naravez MD  [facility] ([facility]) [facility]  [address]  [facility] [address]  [phone]          1 kit 0     Sig: TEST TWICE DAILY       Diabetic Supplies Protocol Passed - 7/23/2019  3:50 AM        Passed - Medication is active on med list        Passed - Patient is 18 years of age or older        Passed - Recent (6 mo) or future (30 days) visit within the authorizing provider's specialty     Patient had office visit in the last 6 months or has a visit in the next 30 days with authorizing provider.  See \"Patient Info\" tab in inbasket, or \"Choose Columns\" in Meds & Orders section of the refill encounter.              "

## 2019-07-24 RX ORDER — BLOOD-GLUCOSE METER
EACH MISCELLANEOUS
Qty: 1 KIT | Refills: 0 | OUTPATIENT
Start: 2019-07-24

## 2019-08-08 ENCOUNTER — ANTICOAGULATION THERAPY VISIT (OUTPATIENT)
Dept: NURSING | Facility: CLINIC | Age: 74
End: 2019-08-08
Payer: COMMERCIAL

## 2019-08-08 DIAGNOSIS — Z86.718 PERSONAL HISTORY OF DVT (DEEP VEIN THROMBOSIS): ICD-10-CM

## 2019-08-08 DIAGNOSIS — Z79.01 LONG TERM CURRENT USE OF ANTICOAGULANT THERAPY: Primary | ICD-10-CM

## 2019-08-08 LAB — INR POINT OF CARE: 3.4 (ref 0.86–1.14)

## 2019-08-08 PROCEDURE — 36416 COLLJ CAPILLARY BLOOD SPEC: CPT

## 2019-08-08 PROCEDURE — 99207 ZZC NO CHARGE NURSE ONLY: CPT

## 2019-08-08 PROCEDURE — 85610 PROTHROMBIN TIME: CPT | Mod: QW

## 2019-08-08 NOTE — PROGRESS NOTES
ANTICOAGULATION FOLLOW-UP CLINIC VISIT    Patient Name:  Gemma Cowart  Date:  8/8/2019  Contact Type:  Face to Face    SUBJECTIVE:  Patient Findings     Positives:   Signs/symptoms of bleeding, Change in diet/appetite    Comments:   Has a subconjunctival hemorrhage in her left eye.  No known injury. Woke with it 2 days ago.      Has been eating pizza every day.  Not eating as many greens as usual.    Patient denies: extra doses, illness, inflammation,   medication changes  or alcohol intake.        INR is supratherapeutic today.   Patient will take 5 mg today which will decrease weekly total   by 8.7%, then resume maintenance dose.   Will follow up in 2 weeks.    Clinical Outcomes     Comments:   Has a subconjunctival hemorrhage in her left eye.  No known injury. Woke with it 2 days ago.      Has been eating pizza every day.  Not eating as many greens as usual.    Patient denies: extra doses, illness, inflammation,   medication changes  or alcohol intake.             OBJECTIVE    INR Protime   Date Value Ref Range Status   08/08/2019 3.4 (A) 0.86 - 1.14 Final       ASSESSMENT / PLAN  INR assessment SUPRA    Recheck INR In: 2 WEEKS    INR Location Clinic      Anticoagulation Summary  As of 8/8/2019    INR goal:   2.0-3.0   TTR:   59.9 % (3.3 y)   INR used for dosing:   3.4! (8/8/2019)   Warfarin maintenance plan:   10 mg (5 mg x 2) every Mon, Thu; 7.5 mg (5 mg x 1.5) all other days   Full warfarin instructions:   8/8: 5 mg; Otherwise 10 mg every Mon, Thu; 7.5 mg all other days   Weekly warfarin total:   57.5 mg   Plan last modified:   Camila Hernández RN (9/11/2018)   Next INR check:   8/22/2019   Target end date:   Indefinite    Indications    Personal history of DVT (deep vein thrombosis) [Z86.718]  Long term current use of anticoagulant therapy [Z79.01]             Anticoagulation Episode Summary     INR check location:   Anticoagulation Clinic    Preferred lab:       Send INR reminders to:   TOBIAS THOMPSON     Comments:   5mg tabs - HS dose // CALENDAR // gastric bypass 2000 // DVT 11/16/12 warfarin until Apr 2013 // DVT 9/12/15      Anticoagulation Care Providers     Provider Role Specialty Phone number    Alva Viera MD  Internal Medicine [phone]            See the Encounter Report to view Anticoagulation Flowsheet and Dosing Calendar (Go to Encounters tab in chart review, and find the Anticoagulation Therapy Visit)    INR is supratherapeutic today.   Patient will take 5 mg today which will decrease weekly total   by 8.7%, then resume maintenance dose.   Will follow up in 2 weeks.    Dosage adjustment made based on physician directed care plan.      Rae Cooley RN

## 2019-08-12 DIAGNOSIS — F41.1 GAD (GENERALIZED ANXIETY DISORDER): ICD-10-CM

## 2019-08-12 NOTE — TELEPHONE ENCOUNTER
"Requested Prescriptions   Pending Prescriptions Disp Refills     PARoxetine (PAXIL) 30 MG tablet [Pharmacy Med Name: PAROXETINE 30MG TABLETS]    Last Written Prescription Date:  9/7/2018  Last Fill Quantity: 180,  # refills: 3   Last office visit: 4/4/2019 with prescribing provider:  Alva Viera     Future Office Visit:   Next 5 appointments (look out 90 days)    Oct 16, 2019  8:00 AM CDT  Return Visit with  LAB DRAW 1  [facility] ([facility]) [facility]  [address]  [facility] [address]  [phone]   Oct 16, 2019  9:00 AM CDT  Return Visit with Jyoti Narvaez MD  [facility] ([facility]) [facility]  [address]  [facility] [address]  [phone]          180 tablet 0     Sig: TAKE 2 TABLETS BY MOUTH AT BEDTIME       SSRIs Protocol Passed - 8/12/2019  3:50 AM        Passed - Recent (12 mo) or future (30 days) visit within the authorizing provider's specialty     Patient had office visit in the last 12 months or has a visit in the next 30 days with authorizing provider or within the authorizing provider's specialty.  See \"Patient Info\" tab in inbasket, or \"Choose Columns\" in Meds & Orders section of the refill encounter.              Passed - Medication is active on med list        Passed - Patient is age 18 or older        Passed - No active pregnancy on record        Passed - No positive pregnancy test in last 12 months          "

## 2019-08-13 RX ORDER — PAROXETINE 30 MG/1
TABLET, FILM COATED ORAL
Qty: 180 TABLET | Refills: 0 | Status: SHIPPED | OUTPATIENT
Start: 2019-08-13 | End: 2019-09-06

## 2019-08-13 NOTE — TELEPHONE ENCOUNTER
Prescription approved per [facility] Refill Protocol.  Nicole Peralta RN

## 2019-08-22 ENCOUNTER — ANTICOAGULATION THERAPY VISIT (OUTPATIENT)
Dept: NURSING | Facility: CLINIC | Age: 74
End: 2019-08-22
Payer: COMMERCIAL

## 2019-08-22 DIAGNOSIS — Z79.01 LONG TERM CURRENT USE OF ANTICOAGULANT THERAPY: Primary | ICD-10-CM

## 2019-08-22 DIAGNOSIS — Z86.718 PERSONAL HISTORY OF DVT (DEEP VEIN THROMBOSIS): ICD-10-CM

## 2019-08-22 LAB — INR POINT OF CARE: 1.8 (ref 0.86–1.14)

## 2019-08-22 PROCEDURE — 36416 COLLJ CAPILLARY BLOOD SPEC: CPT

## 2019-08-22 PROCEDURE — 85610 PROTHROMBIN TIME: CPT | Mod: QW

## 2019-08-22 NOTE — PROGRESS NOTES
ANTICOAGULATION FOLLOW-UP CLINIC VISIT    Patient Name:  Gemma Cowart  Date:  2019  Contact Type:  Face to Face    SUBJECTIVE:  Patient Findings     Positives:   Missed doses    Comments:   Missed dose on Tuesday.    Patient denies: bleeding/bruising,   or signs/symptoms of a clot.          Clinical Outcomes     Comments:   Missed dose on Tuesday.    Patient denies: bleeding/bruising,   or signs/symptoms of a clot.             OBJECTIVE    INR Protime   Date Value Ref Range Status   2019 1.8 (A) 0.86 - 1.14 Final       ASSESSMENT / PLAN  INR assessment SUB    Recheck INR In: 2 WEEKS    INR Location Clinic      Anticoagulation Summary  As of 2019    INR goal:   2.0-3.0   TTR:   59.9 % (3.3 y)   INR used for dosin.8! (2019)   Warfarin maintenance plan:   10 mg (5 mg x 2) every Mon, Thu; 7.5 mg (5 mg x 1.5) all other days   Full warfarin instructions:   10 mg every Mon, Thu; 7.5 mg all other days   Weekly warfarin total:   57.5 mg   Plan last modified:   Camila Hernández RN (2018)   Next INR check:   2019   Target end date:   Indefinite    Indications    Personal history of DVT (deep vein thrombosis) [Z86.718]  Long term current use of anticoagulant therapy [Z79.01]             Anticoagulation Episode Summary     INR check location:   Anticoagulation Clinic    Preferred lab:       Send INR reminders to:   TOBIAS THOMPSON    Comments:   5mg tabs - HS dose // CALENDAR // gastric bypass  // DVT 12 warfarin until 2013 // DVT 9/12/15      Anticoagulation Care Providers     Provider Role Specialty Phone number    Alva Viera MD  Internal Medicine [phone]            See the Encounter Report to view Anticoagulation Flowsheet and Dosing Calendar (Go to Encounters tab in chart review, and find the Anticoagulation Therapy Visit)    INR is subtherapeutic today from missing a dose.   Patient will continue same maintenance dose.   Follow up in 2 weeks.        Rae  VILLA Cooley

## 2019-08-27 ENCOUNTER — TRANSFERRED RECORDS (OUTPATIENT)
Dept: HEALTH INFORMATION MANAGEMENT | Facility: CLINIC | Age: 74
End: 2019-08-27

## 2019-09-05 NOTE — PROGRESS NOTES
"SUBJECTIVE:   Gemma Cowart is a 73 year old female who presents for Preventive Visit.    Are you in the first 12 months of your Medicare coverage?  No - no medicare on file    Healthy Habits:    In general, how would you rate your overall health?  Very good    Frequency of exercise:  None    Do you usually eat at least 4 servings of fruit and vegetables a day, include whole grains    & fiber and avoid regularly eating high fat or \"junk\" foods?  Yes    Taking medications regularly:  Yes    Barriers to taking medications:  Not applicable    Medication side effects:  Not applicable    Ability to successfully perform activities of daily living:  No assistance needed    Home Safety:  Lack of grab bars in the bathroom    Hearing Impairment:  Difficulty following a conversation in a noisy restaurant or crowded room, feel that people are mumbling or not speaking clearly, difficulty following dialogue in the theater, difficult to understand a speaker at a public meeting or Nonden[employer]ational service, need to ask people to speak up or repeat themselves and difficulty understanding soft or whispered speech (has hearing aids - but says they are broken)    In the past 6 months, have you been bothered by leaking of urine?  No    Self-rated mental health: seeng neurology for memory loss.      PHQ-2 Total Score:    Additional concerns today:  Yes (diabetes check)     Concerns today: none  Diabetes check  Kidney testing  Had a cat bite about 3 weeks ago - does she need tetanus?  Wants flu shot today      Sugars typically run 140s-160s. Has been a bit higher lately.    Do you feel safe in your environment? Yes    Do you have a Health Care Directive? No: Advance care planning reviewed with patient; information given to patient to review.      Fall risk  Fallen 2 or more times in the past year?: No  Any fall with injury in the past year?: No    Cognitive Screening Not appropriate  - patient states she is already working ith neurology for " memory issues    Do you have sleep apnea, excessive snoring or daytime drowsiness?: no    Reviewed and updated as needed this visit by clinical staff  Tobacco  Allergies  Med Hx  Surg Hx  Fam Hx  Soc Hx        Reviewed and updated as needed this visit by Provider        Social History     Tobacco Use     Smoking status: Former Smoker     Packs/day: 1.50     Years: 25.00     Pack years: 37.50     Types: Cigarettes     Last attempt to quit: 10/21/1986     Years since quittin.8     Smokeless tobacco: Never Used     Tobacco comment: quit    Substance Use Topics     Alcohol use: No     Alcohol/week: 0.0 oz       Alcohol Use 11/15/2016   Prescreen: >3 drinks/day or >7 drinks/week? The patient does not drink >3 drinks per day nor >7 drinks per week.     -------------------------------------    Current providers sharing in care for this patient include:   Patient Care Team:  Arminda Loyola APRN CNP as PCP - General (Nurse Practitioner)  Nighat Carbajal RN as Lead Care Coordinator  Alva Viera MD as Assigned PCP    The following health maintenance items are reviewed in Epic and correct as of today:  Health Maintenance   Topic Date Due     ANNUAL REVIEW OF HM ORDERS  1945     ZOSTER IMMUNIZATION (1 of 2) 10/16/1996     OP ANNUAL INR REFERRAL  2017     MEDICARE ANNUAL WELLNESS VISIT  11/15/2017     EYE EXAM  09/15/2018     DIABETIC FOOT EXAM  10/16/2018     A1C  2019     INFLUENZA VACCINE (1) 2019     LIPID  2019     MICROALBUMIN  2019     BMP  2019     FALL RISK ASSESSMENT  2020     COLONOSCOPY  10/06/2020     TSH W/FREE T4 REFLEX  2020     ADVANCE CARE PLANNING  2021     DTAP/TDAP/TD IMMUNIZATION (3 - Td) 2023     DEXA  Completed     HEPATITIS C SCREENING  Completed     PHQ-2  Completed     PNEUMOCOCCAL IMMUNIZATION 65+ HIGH/HIGHEST RISK  Completed     IPV IMMUNIZATION  Aged Out     MENINGITIS IMMUNIZATION  Aged Out     Lab  "work is in process      Review of Systems  Constitutional, HEENT, cardiovascular, pulmonary, GI, , musculoskeletal, neuro, skin, endocrine and psych systems are negative, except as otherwise noted.    OBJECTIVE:   /66 (BP Location: Right arm, Cuff Size: Adult Regular)   Pulse 63   Temp 97.6  F (36.4  C) (Tympanic)   Ht 1.607 m (5' 3.25\")   Wt 92.4 kg (203 lb 12.8 oz)   LMP 09/24/1990   SpO2 97%   BMI 35.82 kg/m   Estimated body mass index is 35.82 kg/m  as calculated from the following:    Height as of this encounter: 1.607 m (5' 3.25\").    Weight as of this encounter: 92.4 kg (203 lb 12.8 oz).  Physical Exam  GENERAL: healthy, alert and no distress  EYES: Eyes grossly normal to inspection, PERRL and conjunctivae and sclerae normal  HENT: ear canals and TM's normal, nose and mouth without ulcers or lesions  NECK: no adenopathy, no asymmetry, masses, or scars and thyroid normal to palpation  RESP: lungs clear to auscultation - no rales, rhonchi or wheezes  CV: regular rate and rhythm, normal S1 S2, no S3 or S4, no murmur, click or rub, no peripheral edema and peripheral pulses strong  ABDOMEN: soft, nontender, no hepatosplenomegaly, no masses and bowel sounds normal  MS: no gross musculoskeletal defects noted, no edema  SKIN: Tiny healing scab R forearm, about 3mm x 3mm. Otherwise no suspicious lesions or rashes  NEURO: Normal strength and tone, mentation intact and speech normal  PSYCH: mentation appears normal, affect normal/bright  DIABETIC FOOT EXAM: No skin or nail changes. Pulses +2 bilaterally and feet warm. No lesions. Sensation intact with monofilament exam.    Diagnostic Test Results:  Labs reviewed in Epic    ASSESSMENT / PLAN:   1. Need for prophylactic vaccination and inoculation against influenza  - FLU VACCINE, INCREASED ANTIGEN, PRESV FREE, AGE 65+ [33578]  - ADMIN INFLUENZA (For MEDICARE Patients ONLY) []    4. Type 2 diabetes mellitus with other specified complication, without " long-term current use of insulin (H)  Well controlled with diet.   - HEMOGLOBIN A1C  - Lipid panel reflex to direct LDL Fasting  - Albumin Random Urine Quantitative with Creat Ratio  - FOOT EXAM  - TSH with free T4 reflex  - Basic metabolic panel  -ASA: Confirmed with MTM. Contraindicated in pats over 70 on warfarin  -ACE not indicated given normotensive and no microalbuminuria. Rechecking microalbumin now  -BP controlled  -A1c normal  -Not smoking  -F/U OV 6 months with PCP     6. Vitamin B12 deficiency  -Needs recheck   - vitamin B-12 (CYANOCOBALAMIN) 2500 MCG sublingual tablet; Take 1 tablet (2,500 mcg) by mouth daily  Dispense: 90 tablet; Refill: 3    7. CKD (chronic kidney disease) stage 3, GFR 30-59 ml/min (H)  Worsening. Monitor. Also checked microalbumin.  -Avoid NSAIDs.  -May need to avoid Metformin depending on renal results. Currently not on.  - Basic metabolic panel    8. Health maintenance examination  - HEMOGLOBIN A1C  - Lipid panel reflex to direct LDL Fasting  - Albumin Random Urine Quantitative with Creat Ratio  - FOOT EXAM  - FLU VACCINE, INCREASED ANTIGEN, PRESV FREE, AGE 65+ [51571]  - ADMIN INFLUENZA (For MEDICARE Patients ONLY) []  - TSH with free T4 reflex  - Basic metabolic panel  -Does not qualify for lung CA screening. Quit over 15 years ago.  -Follows with oncology. Hx bilateral mastectomy. No mammo needed    9. Status post bariatric surgery  - HEMOGLOBIN A1C  - Lipid panel reflex to direct LDL Fasting  - TSH with free T4 reflex  - Basic metabolic panel  - CBC with platelets  - Ferritin  - Vitamin D Deficiency  - Vitamin B12    10. S/P gastric bypass  - Cholecalciferol (VITAMIN D) 2000 units CAPS; Take 1 capsule by mouth daily  Dispense: 90 capsule; Refill: 3  - vitamin B-12 (CYANOCOBALAMIN) 2500 MCG sublingual tablet; Take 1 tablet (2,500 mcg) by mouth daily  Dispense: 90 tablet; Refill: 3    11. ELI (generalized anxiety disorder)  Stable. Tolerating meds well.   - PARoxetine (PAXIL)  "30 MG tablet; TAKE 2 TABLETS BY MOUTH AT BEDTIME  Dispense: 180 tablet; Refill: 3    12. Long term current use of anticoagulant therapy  - CBC with platelets    13. Vitamin D deficiency   - Vitamin D Deficiency    15. Cat bite, sequela  Reports her cat (fully vaccinated) punctured her skin over 21 days ago (she reports almost 30 days ago). Healing well. States she had all her shots as a child, but childhood records unavailable. Only 2 shots noted in [facility]. She has no signs of tetanus disease or wound infection. Given that we are well outside the incubation period for tetanus I think it is reasonable not to go the immune globulin. We discussed whether or not to do it and she elects to not have that done. Discussed that we should give her an additional dose of tdap, as it has been over 5 years since immunization. Since she does not have documentation of a full series, it would be helpful to have for future potential cat bites to avoid the need for immune globulin  -Discussed supportive cares and reasons to return, Discussed reasons to seek care urgently.   - TD PRESERV FREE, IM (7+ YRS)    End of Life Planning:  Patient currently has an advanced directive: No.  I have verified the patient's ablity to prepare an advanced directive/make health care decisions.  Literature was provided to assist patient in preparing an advanced directive.    COUNSELING:  Reviewed preventive health counseling, as reflected in patient instructions    Estimated body mass index is 35.82 kg/m  as calculated from the following:    Height as of this encounter: 1.607 m (5' 3.25\").    Weight as of this encounter: 92.4 kg (203 lb 12.8 oz).    Weight management plan: Discussed healthy diet and exercise guidelines     reports that she quit smoking about 32 years ago. Her smoking use included cigarettes. She has a 37.50 pack-year smoking history. She has never used smokeless tobacco.      Appropriate preventive services were discussed with this " patient, including applicable screening as appropriate for cardiovascular disease, diabetes, osteopenia/osteoporosis, and glaucoma.  As appropriate for age/gender, discussed screening for colorectal cancer, prostate cancer, breast cancer, and cervical cancer. Checklist reviewing preventive services available has been given to the patient.    Reviewed patients plan of care and provided an AVS. The Intermediate Care Plan ( asthma action plan, low back pain action plan, and migraine action plan) for Gemma meets the Care Plan requirement. This Care Plan has been established and reviewed with the Patient.    Counseling Resources:  ATP IV Guidelines  Pooled Cohorts Equation Calculator  Breast Cancer Risk Calculator  FRAX Risk Assessment  ICSI Preventive Guidelines  Dietary Guidelines for Americans, 2010  [employer]'s MyPlate  ASA Prophylaxis  Lung CA Screening    ANTON Armas [facility] JAY    Identified Health Risks:    She is at risk for lack of exercise and has been provided with information to increase physical activity for the benefit of her well-being.  The patient was provided with written information regarding signs of hearing loss.  The patient's PHQ-9 score is consistent with mild depression. She was provided with information regarding depression and was advised to schedule a follow up appointment in 53 weeks to further address this issue.

## 2019-09-06 ENCOUNTER — ANTICOAGULATION THERAPY VISIT (OUTPATIENT)
Dept: NURSING | Facility: CLINIC | Age: 74
End: 2019-09-06
Payer: COMMERCIAL

## 2019-09-06 ENCOUNTER — OFFICE VISIT (OUTPATIENT)
Dept: PEDIATRICS | Facility: CLINIC | Age: 74
End: 2019-09-06
Payer: COMMERCIAL

## 2019-09-06 VITALS
SYSTOLIC BLOOD PRESSURE: 108 MMHG | OXYGEN SATURATION: 97 % | BODY MASS INDEX: 36.11 KG/M2 | HEART RATE: 63 BPM | TEMPERATURE: 97.6 F | DIASTOLIC BLOOD PRESSURE: 66 MMHG | HEIGHT: 63 IN | WEIGHT: 203.8 LBS

## 2019-09-06 DIAGNOSIS — E53.8 VITAMIN B12 DEFICIENCY: ICD-10-CM

## 2019-09-06 DIAGNOSIS — Z00.00 HEALTH MAINTENANCE EXAMINATION: ICD-10-CM

## 2019-09-06 DIAGNOSIS — Z86.718 PERSONAL HISTORY OF DVT (DEEP VEIN THROMBOSIS): ICD-10-CM

## 2019-09-06 DIAGNOSIS — Z79.01 LONG TERM CURRENT USE OF ANTICOAGULANT THERAPY: ICD-10-CM

## 2019-09-06 DIAGNOSIS — F41.1 GAD (GENERALIZED ANXIETY DISORDER): ICD-10-CM

## 2019-09-06 DIAGNOSIS — Z98.84 S/P GASTRIC BYPASS: ICD-10-CM

## 2019-09-06 DIAGNOSIS — Z00.00 ENCOUNTER FOR MEDICARE ANNUAL WELLNESS EXAM: ICD-10-CM

## 2019-09-06 DIAGNOSIS — Z23 NEED FOR VACCINATION: ICD-10-CM

## 2019-09-06 DIAGNOSIS — E78.5 HYPERLIPIDEMIA LDL GOAL <100: Chronic | ICD-10-CM

## 2019-09-06 DIAGNOSIS — E55.9 VITAMIN D DEFICIENCY DISEASE: ICD-10-CM

## 2019-09-06 DIAGNOSIS — E55.9 VITAMIN D DEFICIENCY: ICD-10-CM
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
## 2019-09-06 DIAGNOSIS — N18.30 CKD (CHRONIC KIDNEY DISEASE) STAGE 3, GFR 30-59 ML/MIN (H): ICD-10-CM

## 2019-09-06 DIAGNOSIS — K90.49 MALABSORPTION DUE TO INTOLERANCE, NOT ELSEWHERE CLASSIFIED: ICD-10-CM

## 2019-09-06 DIAGNOSIS — Z23 NEED FOR PROPHYLACTIC VACCINATION AND INOCULATION AGAINST INFLUENZA: Primary | ICD-10-CM

## 2019-09-06 DIAGNOSIS — E11.69 TYPE 2 DIABETES MELLITUS WITH OTHER SPECIFIED COMPLICATION, WITHOUT LONG-TERM CURRENT USE OF INSULIN (H): Chronic | ICD-10-CM

## 2019-09-06 DIAGNOSIS — Z98.84 STATUS POST BARIATRIC SURGERY: ICD-10-CM

## 2019-09-06 LAB
ANION GAP SERPL CALCULATED.3IONS-SCNC: 5 MMOL/L (ref 3–14)
BUN SERPL-MCNC: 19 MG/DL (ref 7–30)
CALCIUM SERPL-MCNC: 8.8 MG/DL (ref 8.5–10.1)
CHLORIDE SERPL-SCNC: 112 MMOL/L (ref 94–109)
CHOLEST SERPL-MCNC: 213 MG/DL
CO2 SERPL-SCNC: 24 MMOL/L (ref 20–32)
CREAT SERPL-MCNC: 1.23 MG/DL (ref 0.52–1.04)
ERYTHROCYTE [DISTWIDTH] IN BLOOD BY AUTOMATED COUNT: 14.7 % (ref 10–15)
FERRITIN SERPL-MCNC: 13 NG/ML (ref 8–252)
GFR SERPL CREATININE-BSD FRML MDRD: 44 ML/MIN/{1.73_M2}
GLUCOSE SERPL-MCNC: 94 MG/DL (ref 70–99)
HBA1C MFR BLD: 5.7 % (ref 0–5.6)
HCT VFR BLD AUTO: 44.1 % (ref 35–47)
HDLC SERPL-MCNC: 58 MG/DL
HGB BLD-MCNC: 13.7 G/DL (ref 11.7–15.7)
INR POINT OF CARE: 3.2 (ref 0.86–1.14)
LDLC SERPL CALC-MCNC: 125 MG/DL
MCH RBC QN AUTO: 25.3 PG (ref 26.5–33)
MCHC RBC AUTO-ENTMCNC: 31.1 G/DL (ref 31.5–36.5)
MCV RBC AUTO: 81 FL (ref 78–100)
NONHDLC SERPL-MCNC: 155 MG/DL
PLATELET # BLD AUTO: 219 10E9/L (ref 150–450)
POTASSIUM SERPL-SCNC: 4.6 MMOL/L (ref 3.4–5.3)
RBC # BLD AUTO: 5.42 10E12/L (ref 3.8–5.2)
SODIUM SERPL-SCNC: 141 MMOL/L (ref 133–144)
TRIGL SERPL-MCNC: 149 MG/DL
TSH SERPL DL<=0.005 MIU/L-ACNC: 2.09 MU/L (ref 0.4–4)
VIT B12 SERPL-MCNC: 3314 PG/ML (ref 193–986)
WBC # BLD AUTO: 5.3 10E9/L (ref 4–11)

## 2019-09-06 PROCEDURE — 85027 COMPLETE CBC AUTOMATED: CPT | Performed by: NURSE PRACTITIONER

## 2019-09-06 PROCEDURE — 90662 IIV NO PRSV INCREASED AG IM: CPT | Performed by: NURSE PRACTITIONER

## 2019-09-06 PROCEDURE — 99214 OFFICE O/P EST MOD 30 MIN: CPT | Mod: 25 | Performed by: NURSE PRACTITIONER

## 2019-09-06 PROCEDURE — 99207 C FOOT EXAM  NO CHARGE: CPT | Mod: 25 | Performed by: NURSE PRACTITIONER

## 2019-09-06 PROCEDURE — 82043 UR ALBUMIN QUANTITATIVE: CPT | Performed by: NURSE PRACTITIONER

## 2019-09-06 PROCEDURE — 90714 TD VACC NO PRESV 7 YRS+ IM: CPT | Performed by: NURSE PRACTITIONER

## 2019-09-06 PROCEDURE — 80061 LIPID PANEL: CPT | Performed by: NURSE PRACTITIONER

## 2019-09-06 PROCEDURE — 80048 BASIC METABOLIC PNL TOTAL CA: CPT | Performed by: NURSE PRACTITIONER

## 2019-09-06 PROCEDURE — 90471 IMMUNIZATION ADMIN: CPT | Performed by: NURSE PRACTITIONER

## 2019-09-06 PROCEDURE — 85610 PROTHROMBIN TIME: CPT | Mod: QW

## 2019-09-06 PROCEDURE — 83036 HEMOGLOBIN GLYCOSYLATED A1C: CPT | Performed by: NURSE PRACTITIONER

## 2019-09-06 PROCEDURE — 82607 VITAMIN B-12: CPT | Performed by: NURSE PRACTITIONER

## 2019-09-06 PROCEDURE — 82306 VITAMIN D 25 HYDROXY: CPT | Performed by: NURSE PRACTITIONER

## 2019-09-06 PROCEDURE — 82728 ASSAY OF FERRITIN: CPT | Performed by: NURSE PRACTITIONER

## 2019-09-06 PROCEDURE — G0439 PPPS, SUBSEQ VISIT: HCPCS | Performed by: NURSE PRACTITIONER

## 2019-09-06 PROCEDURE — G0008 ADMIN INFLUENZA VIRUS VAC: HCPCS | Mod: 59 | Performed by: NURSE PRACTITIONER

## 2019-09-06 PROCEDURE — 84443 ASSAY THYROID STIM HORMONE: CPT | Performed by: NURSE PRACTITIONER

## 2019-09-06 PROCEDURE — 36416 COLLJ CAPILLARY BLOOD SPEC: CPT

## 2019-09-06 RX ORDER — PAROXETINE 30 MG/1
TABLET, FILM COATED ORAL
Qty: 180 TABLET | Refills: 3 | Status: SHIPPED | OUTPATIENT
Start: 2019-09-06 | End: 2020-09-08

## 2019-09-06 RX ORDER — MULTIVIT-MIN/IRON/FOLIC ACID/K 18-600-40
1 CAPSULE ORAL DAILY
Qty: 90 CAPSULE | Refills: 3 | Status: SHIPPED | OUTPATIENT
Start: 2019-09-06 | End: 2020-12-30

## 2019-09-06 RX ORDER — CYANOCOBALAMIN (VITAMIN B-12) 2500 MCG
2500 TABLET, SUBLINGUAL SUBLINGUAL DAILY
Qty: 90 TABLET | Refills: 3 | Status: SHIPPED | OUTPATIENT
Start: 2019-09-06 | End: 2020-12-30

## 2019-09-06 ASSESSMENT — ANXIETY QUESTIONNAIRES
IF YOU CHECKED OFF ANY PROBLEMS ON THIS QUESTIONNAIRE, HOW DIFFICULT HAVE THESE PROBLEMS MADE IT FOR YOU TO DO YOUR WORK, TAKE CARE OF THINGS AT HOME, OR GET ALONG WITH OTHER PEOPLE: NOT DIFFICULT AT ALL
1. FEELING NERVOUS, ANXIOUS, OR ON EDGE: SEVERAL DAYS
2. NOT BEING ABLE TO STOP OR CONTROL WORRYING: NOT AT ALL
GAD7 TOTAL SCORE: 2
3. WORRYING TOO MUCH ABOUT DIFFERENT THINGS: NOT AT ALL
6. BECOMING EASILY ANNOYED OR IRRITABLE: SEVERAL DAYS
7. FEELING AFRAID AS IF SOMETHING AWFUL MIGHT HAPPEN: NOT AT ALL
5. BEING SO RESTLESS THAT IT IS HARD TO SIT STILL: NOT AT ALL

## 2019-09-06 ASSESSMENT — PATIENT HEALTH QUESTIONNAIRE - PHQ9
5. POOR APPETITE OR OVEREATING: NOT AT ALL
SUM OF ALL RESPONSES TO PHQ QUESTIONS 1-9: 8

## 2019-09-06 ASSESSMENT — MIFFLIN-ST. JEOR: SCORE: 1407.52

## 2019-09-06 ASSESSMENT — ACTIVITIES OF DAILY LIVING (ADL): CURRENT_FUNCTION: NO ASSISTANCE NEEDED

## 2019-09-06 NOTE — PATIENT INSTRUCTIONS
Tetanus/Shingles shot in pharmacy  Please have your eye doc send us diabetic eye exam results  Patient Education   Personalized Prevention Plan  You are due for the preventive services outlined below.  Your care team is available to assist you in scheduling these services.  If you have already completed any of these items, please share that information with your care team to update in your medical record.  Health Maintenance Due   Topic Date Due     ANNUAL REVIEW OF HM ORDERS  1945     Zoster (Shingles) Vaccine (1 of 2) 10/16/1996     INR CLINIC REFERRAL - yearly  07/12/2017     Annual Wellness Visit  11/15/2017     Eye Exam  09/15/2018     Diabetic Foot Exam  10/16/2018     A1C Lab  03/07/2019     Flu Vaccine (1) 09/01/2019     Cholesterol Lab  09/07/2019     Kidney Microalbumin Urine Test  09/07/2019       Exercise for a Healthier Heart     Exercise with a friend. When activity is fun, you're more likely to stick with it.   You may wonder how you can improve the health of your heart. If you re thinking about exercise, you re on the right track. You don t need to become an athlete, but you do need a certain amount of brisk exercise to help strengthen your heart. If you have been diagnosed with a heart condition, your doctor may recommend exercise to help stabilize your condition. To help make exercise a habit, choose safe, fun activities.  Be sure to check with your healthcare provider before starting an exercise program.   Why exercise?  Exercising regularly offers many healthy rewards. It can help you do all of the following:    Improve your blood cholesterol level to help prevent further heart trouble    Lower your blood pressure to help prevent a stroke or heart attack    Control diabetes, or reduce your risk of getting this disease    Improve your heart and lung function    Reach and maintain a healthy weight    Make your muscles stronger and more limber so you can stay active    Prevent falls and  fractures by slowing the loss of bone mass (osteoporosis)    Manage stress better    Reduce your blood pressure    Improve your sense of self and your body image  Exercise tips  Ease into your routine. Set small goals. Then build on them.  Exercise on most days. Aim for a total of 150 or more minutes of moderate to  vigorous intensity activity each week. Consider 40 minutes, 3 to 4 times a week. For best results, activity should last for 40 minutes on average. It is OK to work up to the 40 minute period over time. Examples of moderate-intensity activity is walking 1 mile in 15 minutes or 30 to 45 minutes of yard work.  Step up your daily activity level. Along with your exercise program, try being more active throughout the day. Walk instead of drive. Do more household tasks or yard work.  Choose one or more activities you enjoy. Walking is one of the easiest things you can do. You can also try swimming, riding a bike, dancing, or taking an exercise class.  Stop exercising and call your doctor if you:    Have chest pain or feel dizzy or lightheaded    Feel burning, tightness, pressure, or heaviness in your chest, neck, shoulders, back, or arms    Have unusual shortness of breath    Have increased joint or muscle pain    Have palpitations or an irregular heartbeat   Date Last Reviewed: 5/1/2016 2000-2018 [employer]. [address] [place] [address]. All rights reserved. This information is not intended as a substitute for professional medical care. Always follow your healthcare professional's instructions.          Signs of Hearing Loss     Hearing much better with one ear can be a sign of hearing loss.     Hearing loss is a problem shared by many people. In fact, it is one of the most common health conditions, particularly as people age. Most people over age 65 have some hearing loss, and by age 80, almost everyone does. Because hearing loss usually occurs slowly over the years, you may not  realize your hearing ability has gotten worse.  Have your hearing checked  Contact your healthcare provider if you:    Have to strain to hear normal conversation    Have to watch other people s faces very carefully to follow what they re saying    Need to ask people to repeat what they ve said    Often misunderstand what people are saying    Turn the volume of the television or radio up so high that others complain    Feel that people are mumbling when they re talking to you    Find that the effort to hear leaves you feeling tired and irritated    Notice, when using the phone, that you hear better with one ear than the other  Date Last Reviewed: 12/1/2016 2000-2018 [employer]. [address] [place] [address], [place] [address]. All rights reserved. This information is not intended as a substitute for professional medical care. Always follow your healthcare professional's instructions.          Depression and Suicide in Older Adults    Nearly 2 million older Americans have some type of depression. Sadly, some of them even take their own lives. Yet depression among older adults is often ignored. Learn the warning signs. You may help spare a loved one needless pain. You may also save a life.  What is depression?  Depression is a serious illness that affects the way you think and feel. It is not a normal part of aging, nor is it a sign of weakness, a character flaw, or something you can snap out of. Most people with depression need treatment to get better. The most common symptom is a feeling of deep sadness. People who are depressed also may seem tired and listless. And nothing seems to give them pleasure. It s normal to grieve or be sad sometimes. But sadness lessens or passes with time. Depression rarely goes away or improves on its own. Other symptoms of depression are:    Sleeping more or less than normal    Eating more or less than normal    Having headaches, stomachaches, or other pains that don t go  away    Feeling nervous,  empty,  or worthless    Crying a great deal    Thinking or talking about suicide or death    Feeling confused or forgetful  What causes it?  The causes of depression aren t fully known. But, it is thought to result from a complex interaction of biochemistry, genetics, environmental factors, and personality. Certain chemicals in the brain play a role. Depression does run in families. And life stresses can also trigger depression in some people. Older adults often face many stressors, such as death of friends or a spouse, health problems, and financial concerns.  How you can help  Often, depressed people may not want to ask for help. When they do, they may be ignored. Or, they may receive the wrong treatment. You can help by showing parents and older friends love and support. If they seem depressed, help them find the right treatment. Talk to your doctor. Or contact a local mental health center, social service agency, or hospital. With modern treatment, no one has to suffer from depression.  If your older friend or family member agrees, you can be an advocate for him or her in the healthcare setting. Many times, older adults have other chronic illnesses such as diabetes, heart disease, or cancer that can cause symptoms of depression. Medicine side effects can also contribute to certain behaviors and feelings. It is important that the older adult's healthcare provider listens and sorts out the causes of any symptoms of depression and makes referrals to mental health specialists when needed. Untreated depression can result in misdiagnosis, including brain disorders such as dementia and Alzheimer's. If the health professional does not take the issue of depression seriously, ask your family member or friend to consider finding another provider.  Resources    National Suicide Prevention Lifeline (crisis hotline)697-153-CAEN (7137)    [place] of Mental  Clufli966-063-1075aon.[facility].nih.gov    National [place] on Mental Idehlmh895-132-8670xpt.[facility].org    Mental Health Gnqiugd012-393-1561fek.[facility].org    National Suicide Pcrjrvc682-669-1800 (800-SUICIDE)   Date Last Reviewed: 2/1/2017 2000-2018 [employer]. [address] [place] [address], [place] [address]. All rights reserved. This information is not intended as a substitute for professional medical care. Always follow your healthcare professional's instructions.

## 2019-09-06 NOTE — PROGRESS NOTES
ANTICOAGULATION FOLLOW-UP CLINIC VISIT    Patient Name:  Gemma Cowart  Date:  9/6/2019  Contact Type:  Face to Face  INR today is in range, no concerns from pt. Advised to continue maintenance dosing & recheck in 2 weeks.    Advised to increase dark leafy veg/salad intake in diet once a week to keep her INR in range. Pt agrees to the plan.    SUBJECTIVE:         OBJECTIVE    INR Protime   Date Value Ref Range Status   09/06/2019 3.2 (A) 0.86 - 1.14 Final       ASSESSMENT / PLAN  INR assessment THER    Recheck INR In: 2 WEEKS    INR Location Clinic      Anticoagulation Summary  As of 9/6/2019    INR goal:   2.0-3.0   TTR:   60.0 % (3.3 y)   INR used for dosing:   3.2! (9/6/2019)   Warfarin maintenance plan:   10 mg (5 mg x 2) every Mon, Thu; 7.5 mg (5 mg x 1.5) all other days   Full warfarin instructions:   10 mg every Mon, Thu; 7.5 mg all other days   Weekly warfarin total:   57.5 mg   No change documented:   Debra Murphy RN   Plan last modified:   Camila Hernández RN (9/11/2018)   Next INR check:   9/20/2019   Target end date:   Indefinite    Indications    Personal history of DVT (deep vein thrombosis) [Z86.718]  Long term current use of anticoagulant therapy [Z79.01]             Anticoagulation Episode Summary     INR check location:   Anticoagulation Clinic    Preferred lab:       Send INR reminders to:   TOBIAS THOMPSON    Comments:   5mg tabs - HS dose // CALENDAR // gastric bypass 2000 // DVT 11/16/12 warfarin until Apr 2013 // DVT 9/12/15      Anticoagulation Care Providers     Provider Role Specialty Phone number    Alva Viera MD  Internal Medicine [phone]            See the Encounter Report to view Anticoagulation Flowsheet and Dosing Calendar (Go to Encounters tab in chart review, and find the Anticoagulation Therapy Visit)    Debra Murphy RN

## 2019-09-06 NOTE — Clinical Note
Hi there, Would you recommend holding asa on this patient? She's diabetic. Currently not on it. She's on warfarin for a history of DVTs. Thanks!Arminda

## 2019-09-07 LAB
CREAT UR-MCNC: 123 MG/DL
MICROALBUMIN UR-MCNC: 6 MG/L
MICROALBUMIN/CREAT UR: 4.72 MG/G CR (ref 0–25)

## 2019-09-07 ASSESSMENT — ANXIETY QUESTIONNAIRES: GAD7 TOTAL SCORE: 2

## 2019-09-09 ENCOUNTER — TELEPHONE (OUTPATIENT)
Dept: PEDIATRICS | Facility: CLINIC | Age: 74
End: 2019-09-09

## 2019-09-09 DIAGNOSIS — E11.69 TYPE 2 DIABETES MELLITUS WITH OTHER SPECIFIED COMPLICATION, WITHOUT LONG-TERM CURRENT USE OF INSULIN (H): Primary | Chronic | ICD-10-CM

## 2019-09-09 LAB — DEPRECATED CALCIDIOL+CALCIFEROL SERPL-MC: 32 UG/L (ref 20–75)

## 2019-09-09 RX ORDER — ATORVASTATIN CALCIUM 10 MG/1
10 TABLET, FILM COATED ORAL DAILY
Qty: 90 TABLET | Refills: 3 | Status: SHIPPED | OUTPATIENT
Start: 2019-09-09 | End: 2020-12-30

## 2019-09-09 NOTE — LETTER
[facility]  [address] [address]                  [phone]   September 10, 2019    Gemma Cowart  [address] [place] [id number]      Dear Gemma,    Here is a summary of your recent test results:    Your iron is normal, but on the low end of normal. I recommend focusing on meat, beans, eggs. We should recheck yearly. If you start to feel fatigued we should recheck your iron.    Otherwise labs stable    Your test results are enclosed.      Please contact me if you have any questions.           Thank you very much for choosing [facility]    Best regards,    Arminda Loyola, DISHA        Results for orders placed or performed in visit on 09/06/19   HEMOGLOBIN A1C   Result Value Ref Range    Hemoglobin A1C 5.7 (H) 0 - 5.6 %   Lipid panel reflex to direct LDL Fasting   Result Value Ref Range    Cholesterol 213 (H) <200 mg/dL    Triglycerides 149 <150 mg/dL    HDL Cholesterol 58 >49 mg/dL    LDL Cholesterol Calculated 125 (H) <100 mg/dL    Non HDL Cholesterol 155 (H) <130 mg/dL   Albumin Random Urine Quantitative with Creat Ratio   Result Value Ref Range    Creatinine Urine 123 mg/dL    Albumin Urine mg/L 6 mg/L    Albumin Urine mg/g Cr 4.72 0 - 25 mg/g Cr   TSH with free T4 reflex   Result Value Ref Range    TSH 2.09 0.40 - 4.00 mU/L   Basic metabolic panel   Result Value Ref Range    Sodium 141 133 - 144 mmol/L    Potassium 4.6 3.4 - 5.3 mmol/L    Chloride 112 (H) 94 - 109 mmol/L    Carbon Dioxide 24 20 - 32 mmol/L    Anion Gap 5 3 - 14 mmol/L    Glucose 94 70 - 99 mg/dL    Urea Nitrogen 19 7 - 30 mg/dL    Creatinine 1.23 (H) 0.52 - 1.04 mg/dL    GFR Estimate 44 (L) >60 mL/min/[1.73_m2]    GFR Estimate If Black 50 (L) >60 mL/min/[1.73_m2]    Calcium 8.8 8.5 - 10.1 mg/dL   CBC with platelets   Result Value Ref Range    WBC 5.3 4.0 - 11.0 10e9/L    RBC Count 5.42 (H) 3.8 - 5.2 10e12/L    Hemoglobin 13.7 11.7 - 15.7 g/dL    Hematocrit 44.1 35.0 -  47.0 %    MCV 81 78 - 100 fl    MCH 25.3 (L) 26.5 - 33.0 pg    MCHC 31.1 (L) 31.5 - 36.5 g/dL    RDW 14.7 10.0 - 15.0 %    Platelet Count 219 150 - 450 10e9/L   Ferritin   Result Value Ref Range    Ferritin 13 8 - 252 ng/mL   Vitamin D Deficiency   Result Value Ref Range    Vitamin D Deficiency screening 32 20 - 75 ug/L   Vitamin B12   Result Value Ref Range    Vitamin B12 3,314 (H) 193 - 986 pg/mL

## 2019-09-09 NOTE — TELEPHONE ENCOUNTER
"Called the pt.    Reviewed lab result notes with the pt:  \"Please call pt. Given that she has diabetes she should be on a cholesterol medication called a statin. Is she willing to try? If so, please let her know I'd like her to start Atorvastatin 10mg. 3% of people get muscle aches or liver problems. Call me for muscle aches or RUQ pain/stool changes.     Your iron is normal, but on the low end of normal. I recommend focusing on meat, beans, eggs. We should recheck yearly. If you start to feel fatigued we should recheck your iron.    Otherwise labs stable\"    Arminda Loyola/Dr Viera: Pt states that she believes she was on a statin in the past and discontinued it, but unsure when or why. Pt willing to try statin again.     Arminda Loyola/Dr Viera: Pt states concerns about their kidney function tests. Informed the pt that their creatinine and albumin are WNL, but pt states that with her Hx of CKD, would like what these results mean for her. Pt denies specific concerns to me.    MA: Please print and mail lab the pt's lab results from 09/06/19. When done, please route to Arminda Loyola and Dr Viera.    - Oliverio \"Yomi\" VILLA Matson  Triage [facility]    "

## 2019-09-09 NOTE — TELEPHONE ENCOUNTER
Please call pt    I have ordered a very low dose statin, the low dose will be less likely to cause muscle aches. Please start. We can consider going on higher dose in the future.    We discussed kidney function at her visit, and nothing has changed since then. Her kidney function is stable. Drink plenty of water and avoid NSAIDs. We will monitor yearly.    Please route back to MA pool to print labs for pt once done

## 2019-09-10 NOTE — TELEPHONE ENCOUNTER
Copy paste lab results to letter form and mailed to patient.       //Maryellen Rea MA// September 10, 2019 9:50 AM

## 2019-09-10 NOTE — TELEPHONE ENCOUNTER
Patient notified of provider message. She states her understanding and will start statin medication.    MA: Per provider note below, please send patient a copy of lab results.       Thank you,   Stephany Mercer RN BSN   [facility]

## 2019-09-20 ENCOUNTER — DOCUMENTATION ONLY (OUTPATIENT)
Dept: PEDIATRICS | Facility: CLINIC | Age: 74
End: 2019-09-20

## 2019-09-20 ENCOUNTER — ANTICOAGULATION THERAPY VISIT (OUTPATIENT)
Dept: NURSING | Facility: CLINIC | Age: 74
End: 2019-09-20
Payer: COMMERCIAL

## 2019-09-20 DIAGNOSIS — Z79.01 LONG TERM CURRENT USE OF ANTICOAGULANT THERAPY: Primary | ICD-10-CM

## 2019-09-20 DIAGNOSIS — Z86.718 PERSONAL HISTORY OF DVT (DEEP VEIN THROMBOSIS): ICD-10-CM

## 2019-09-20 LAB — INR POINT OF CARE: 2 (ref 0.86–1.14)

## 2019-09-20 PROCEDURE — 36416 COLLJ CAPILLARY BLOOD SPEC: CPT

## 2019-09-20 PROCEDURE — 99207 ZZC NO CHARGE NURSE ONLY: CPT

## 2019-09-20 PROCEDURE — 85610 PROTHROMBIN TIME: CPT | Mod: QW

## 2019-09-20 NOTE — PROGRESS NOTES
Reimbursement rules require all INR Clinic patients to have a yearly renewal of an INR Clinic Referral order.  Referral must be signed by the PCP for each patient.      Referral is pended. Please complete and sign.     Rae Cooley RN

## 2019-09-20 NOTE — PROGRESS NOTES
ANTICOAGULATION FOLLOW-UP CLINIC VISIT    Patient Name:  Gemma Cowart  Date:  2019  Contact Type:  Face to Face    SUBJECTIVE:  Patient Findings     Positives:   Signs/symptoms of bleeding    Comments:   Nose bleed 10 days ago.  Stopped quickly.    The patient was assessed for   diet, medication,   missed or extra doses,   bruising,   with no problem findings.              Clinical Outcomes     Comments:   Nose bleed 10 days ago.  Stopped quickly.    The patient was assessed for   diet, medication,   missed or extra doses,   bruising,   with no problem findings.                 OBJECTIVE    INR Protime   Date Value Ref Range Status   2019 2.0 (A) 0.86 - 1.14 Final       ASSESSMENT / PLAN  INR assessment THER    Recheck INR In: 4 WEEKS    INR Location Clinic      Anticoagulation Summary  As of 2019    INR goal:   2.0-3.0   TTR:   60.3 % (3.4 y)   INR used for dosin.0 (2019)   Warfarin maintenance plan:   10 mg (5 mg x 2) every Mon, Thu; 7.5 mg (5 mg x 1.5) all other days   Full warfarin instructions:   10 mg every Mon, Thu; 7.5 mg all other days   Weekly warfarin total:   57.5 mg   No change documented:   Rae Cooley RN   Plan last modified:   Camila Hernández RN (2018)   Next INR check:   10/18/2019   Target end date:   Indefinite    Indications    Personal history of DVT (deep vein thrombosis) [Z86.718]  Long term current use of anticoagulant therapy [Z79.01]             Anticoagulation Episode Summary     INR check location:   Anticoagulation Clinic    Preferred lab:       Send INR reminders to:   TOBIAS THOMPSON    Comments:   5mg tabs - HS dose // CALENDAR // gastric bypass  // DVT 12 warfarin until 2013 // DVT 9/12/15      Anticoagulation Care Providers     Provider Role Specialty Phone number    Alva Viera MD  Internal Medicine [phone]            See the Encounter Report to view Anticoagulation Flowsheet and Dosing Calendar (Go to Encounters tab  in chart review, and find the Anticoagulation Therapy Visit)    INR is therapeutic today.   Patient will continue same maintenance dose.   Follow up in 4 weeks.        Rae Cooley RN

## 2019-09-30 ENCOUNTER — TELEPHONE (OUTPATIENT)
Dept: PEDIATRICS | Facility: CLINIC | Age: 74
End: 2019-09-30

## 2019-09-30 NOTE — TELEPHONE ENCOUNTER
Panel Management Review      Patient has the following on her problem list:     Diabetes    ASA: Not Required     Last A1C  Lab Results   Component Value Date    A1C 5.7 09/06/2019    A1C 5.7 09/07/2018    A1C 5.5 02/26/2018    A1C 5.4 10/02/2017    A1C 5.7 05/15/2017     A1C tested: Passed    Last LDL:    Lab Results   Component Value Date    CHOL 213 09/06/2019     Lab Results   Component Value Date    HDL 58 09/06/2019     Lab Results   Component Value Date     09/06/2019     Lab Results   Component Value Date    TRIG 149 09/06/2019     Lab Results   Component Value Date    CHOLHDLRATIO 2.5 09/25/2015     Lab Results   Component Value Date    NHDL 155 09/06/2019       Is the patient on a Statin? YES             Is the patient on Aspirin? NO    Medications     HMG CoA Reductase Inhibitors     atorvastatin (LIPITOR) 10 MG tablet       Salicylates     ASPIRIN NOT PRESCRIBED (INTENTIONAL)             Last three blood pressure readings:  BP Readings from Last 3 Encounters:   09/06/19 108/66   04/04/19 122/70   03/07/19 148/79       Date of last diabetes office visit: 9/6/19     Tobacco History:     History   Smoking Status     Former Smoker     Packs/day: 1.50     Years: 25.00     Types: Cigarettes     Quit date: 10/21/1986   Smokeless Tobacco     Never Used     Comment: quit 1985           Composite cancer screening  Chart review shows that this patient is due/due soon for the following None  Summary:    Patient is due/failing the following: Needs diabetic eye exam      Action needed:   Diabetic eye exam    Type of outreach:    Sent [employer] message. and Sent letter.    Questions for provider review:    None                                                                                                                                    Josee Trammell LPN       Chart routed to Joese Trammell LPN   .

## 2019-09-30 NOTE — LETTER
[facility]  [address] [place] [address]  [place] [address]  [phone]      September 30, 2019    Gemma Cowart                                                                                                                                                       [address] [place] [address]              Dear Gemma,    I tried to reach you by my-chart, with no success. You recently saw a provider here for your diabetic exam and what we need is the date of your last diabetic eye exam.  Thank you for your time.          Sincerely,  Josee HOLDER LPN

## 2019-10-16 ENCOUNTER — INFUSION THERAPY VISIT (OUTPATIENT)
Dept: INFUSION THERAPY | Facility: CLINIC | Age: 74
End: 2019-10-16
Attending: INTERNAL MEDICINE
Payer: COMMERCIAL

## 2019-10-16 ENCOUNTER — HOSPITAL ENCOUNTER (OUTPATIENT)
Facility: CLINIC | Age: 74
Setting detail: SPECIMEN
Discharge: HOME | End: 2019-10-16
Attending: INTERNAL MEDICINE | Admitting: INTERNAL MEDICINE
Payer: COMMERCIAL

## 2019-10-16 ENCOUNTER — ONCOLOGY VISIT (OUTPATIENT)
Dept: ONCOLOGY | Facility: CLINIC | Age: 74
End: 2019-10-16
Attending: INTERNAL MEDICINE
Payer: COMMERCIAL

## 2019-10-16 VITALS
HEIGHT: 63 IN | WEIGHT: 207.4 LBS | BODY MASS INDEX: 36.75 KG/M2 | DIASTOLIC BLOOD PRESSURE: 79 MMHG | HEART RATE: 71 BPM | TEMPERATURE: 98.3 F | RESPIRATION RATE: 14 BRPM | SYSTOLIC BLOOD PRESSURE: 145 MMHG | OXYGEN SATURATION: 96 %

## 2019-10-16 DIAGNOSIS — Z85.3 HISTORY OF LEFT BREAST CANCER: Primary | ICD-10-CM

## 2019-10-16 DIAGNOSIS — Z86.718 PERSONAL HISTORY OF DVT (DEEP VEIN THROMBOSIS): ICD-10-CM

## 2019-10-16 DIAGNOSIS — Z98.84 BARIATRIC SURGERY STATUS: ICD-10-CM

## 2019-10-16 DIAGNOSIS — M85.80 OSTEOPENIA, UNSPECIFIED LOCATION: ICD-10-CM

## 2019-10-16 DIAGNOSIS — Z85.3 HISTORY OF LEFT BREAST CANCER: ICD-10-CM

## 2019-10-16 LAB
ALBUMIN SERPL-MCNC: 3.5 G/DL (ref 3.4–5)
ALP SERPL-CCNC: 67 U/L (ref 40–150)
ALT SERPL W P-5'-P-CCNC: 28 U/L (ref 0–50)
ANION GAP SERPL CALCULATED.3IONS-SCNC: 2 MMOL/L (ref 3–14)
AST SERPL W P-5'-P-CCNC: 25 U/L (ref 0–45)
BASOPHILS # BLD AUTO: 0.1 10E9/L (ref 0–0.2)
BASOPHILS NFR BLD AUTO: 1 %
BILIRUB SERPL-MCNC: 0.4 MG/DL (ref 0.2–1.3)
BUN SERPL-MCNC: 16 MG/DL (ref 7–30)
CALCIUM SERPL-MCNC: 8.6 MG/DL (ref 8.5–10.1)
CANCER AG27-29 SERPL-ACNC: 17 U/ML (ref 0–39)
CHLORIDE SERPL-SCNC: 113 MMOL/L (ref 94–109)
CO2 SERPL-SCNC: 28 MMOL/L (ref 20–32)
CREAT SERPL-MCNC: 1.25 MG/DL (ref 0.52–1.04)
DIFFERENTIAL METHOD BLD: ABNORMAL
EOSINOPHIL # BLD AUTO: 0.1 10E9/L (ref 0–0.7)
EOSINOPHIL NFR BLD AUTO: 2.4 %
ERYTHROCYTE [DISTWIDTH] IN BLOOD BY AUTOMATED COUNT: 14.5 % (ref 10–15)
GFR SERPL CREATININE-BSD FRML MDRD: 43 ML/MIN/{1.73_M2}
GLUCOSE SERPL-MCNC: 146 MG/DL (ref 70–99)
HCT VFR BLD AUTO: 38.1 % (ref 35–47)
HGB BLD-MCNC: 11.8 G/DL (ref 11.7–15.7)
IMM GRANULOCYTES # BLD: 0 10E9/L (ref 0–0.4)
IMM GRANULOCYTES NFR BLD: 0.2 %
LYMPHOCYTES # BLD AUTO: 1 10E9/L (ref 0.8–5.3)
LYMPHOCYTES NFR BLD AUTO: 21 %
MCH RBC QN AUTO: 25.4 PG (ref 26.5–33)
MCHC RBC AUTO-ENTMCNC: 31 G/DL (ref 31.5–36.5)
MCV RBC AUTO: 82 FL (ref 78–100)
MONOCYTES # BLD AUTO: 0.4 10E9/L (ref 0–1.3)
MONOCYTES NFR BLD AUTO: 7.7 %
NEUTROPHILS # BLD AUTO: 3.4 10E9/L (ref 1.6–8.3)
NEUTROPHILS NFR BLD AUTO: 67.7 %
NRBC # BLD AUTO: 0 10*3/UL
NRBC BLD AUTO-RTO: 0 /100
PLATELET # BLD AUTO: 190 10E9/L (ref 150–450)
POTASSIUM SERPL-SCNC: 4.4 MMOL/L (ref 3.4–5.3)
PROT SERPL-MCNC: 6.7 G/DL (ref 6.8–8.8)
RBC # BLD AUTO: 4.64 10E12/L (ref 3.8–5.2)
SODIUM SERPL-SCNC: 143 MMOL/L (ref 133–144)
WBC # BLD AUTO: 5 10E9/L (ref 4–11)

## 2019-10-16 PROCEDURE — 36415 COLL VENOUS BLD VENIPUNCTURE: CPT

## 2019-10-16 PROCEDURE — 85025 COMPLETE CBC W/AUTO DIFF WBC: CPT | Performed by: INTERNAL MEDICINE

## 2019-10-16 PROCEDURE — 99215 OFFICE O/P EST HI 40 MIN: CPT | Performed by: INTERNAL MEDICINE

## 2019-10-16 PROCEDURE — 86300 IMMUNOASSAY TUMOR CA 15-3: CPT | Performed by: INTERNAL MEDICINE

## 2019-10-16 PROCEDURE — 80053 COMPREHEN METABOLIC PANEL: CPT | Performed by: INTERNAL MEDICINE

## 2019-10-16 PROCEDURE — G0463 HOSPITAL OUTPT CLINIC VISIT: HCPCS

## 2019-10-16 ASSESSMENT — MIFFLIN-ST. JEOR: SCORE: 1418.85

## 2019-10-16 ASSESSMENT — PAIN SCALES - GENERAL: PAINLEVEL: NO PAIN (0)

## 2019-10-16 NOTE — PROGRESS NOTES
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 LN negative disease. Estrogen and progesterone receptors were positive at greater than 90%. HER-2/gibran was negative. She had [id number] disease.   She was started on Femara on 10/10/2012.    It is put on hold in 5/2016 due to memory loss and dysarthria. She then was evaluated by neurologist, got assurance. Her cognitive function did not change by holding femara, it is resumed in July 2016. She finished 5 yrs of it in fall 2017, not willing to continue due to hair loss, bone health and hot flushes.     She had reconstructive surgery done 03/2013 by Dr. Wall.       INTERVAL HISTORY:  She had memory test. She saw neurology and no dx is made. She is advised on vit intake and keto diet.       PMH/ALLERGIES/MEDICATIONS: reviewed in epic    SOCIAL HISTORY: The patient did smoke for several years but has since stopped in 10/1986.     FAMILY HISTORY: Overall noncontributory. + FH of Steven's d/s from the Dad side       ROS:    no problems with fevers or chills, nausea, vomiting or diarrhea.   She has chronic on and off bilateral reconstructed breast pain.       PE  Blood pressure (!) 145/79, pulse 71, temperature 98.3  F (36.8  C), temperature source Oral, resp. rate 14, height 1.607 m (5' 3.25\"), weight 94.1 kg (207 lb 6.4 oz), last menstrual period 09/24/1990, SpO2 96 %, not currently breastfeeding.    ECOG 0    General: alert, oriented, NAD, pleasant  HEENT: no mucositis, sclera is anicteric, THEE,   Neck: supple, no JVD  LN: superficial LNs are not palpable in neck, " supraclavicular, axilla and groin areas, no masses  LUNGS: Clear lung fields to auscultation. No wheezing or ronchi  HEART: Sounds S1, S2 heard, regular. No murmur or gallop  ABDOMEN: Soft, full, obese, nontender without palpable hepatosplenomegaly.   Extremity: bilateral knee pain, no joints deformities or effusion, no edema  Skin: scattered erythematous rash on elbows and knees, palms, feet, face, forehead.   Chest wall: mastectomies scars well healed, no masses.       Current LAB Data Reviewed  Cbc diff is good, Cr 1.25 stable, LFT nl.        Current Image Reviewed  Dexa 10/2017 : Advanced osteopenia of the left hip, moderate osteopenia of the right hip, and normal bone mineral density of the lumbar spine. Stable to minor improvement compare to 2015.     Current image data reviewed  Breast MRI 4/2019 - negative.       Old data reviewed with summary    Dexa 10/2015: osteopenia  CXR/BONE SCAN 5/2015: negative       10/2014 - cbc diff/CMP are fine, except cr 1.15, B12/folate are nl, vit D 24 low    Bone scan 1/2014 - DJD, no mets    CT body 1/2014 - no mets      ASSESSMENT AND PLAN:   1. Left breast cancer s/p double mastectomies, ER/[place]+, her 2 - s/p  5 yrs of Femara till 2017.    We discussed the ER + breast cancer recurrence.     F/u in 12 months with labs.       2. history of gastric bypass surgery with BRETT and low B12.  Continue B12 oral indefinitely, she can do q wk.   We discussed the diet.       3. Osteopenia. Advice vit D proper dosing.      5. Hx of recurrent DVT on coumadin via her PMD.   She is advised on ongoing coumadin/INR monitoring via her PMD office.

## 2019-10-16 NOTE — PROGRESS NOTES
"Oncology Rooming Note    October 16, 2019 8:39 AM   Gemma Cowart is a 74 year old female who presents for:    Chief Complaint   Patient presents with     Oncology Clinic Visit     Initial Vitals: BP (!) 145/79   Pulse 71   Temp 98.3  F (36.8  C) (Oral)   Resp 14   Ht 1.607 m (5' 3.25\")   Wt 94.1 kg (207 lb 6.4 oz)   LMP 09/24/1990   SpO2 96%   BMI 36.45 kg/m   Estimated body mass index is 36.45 kg/m  as calculated from the following:    Height as of this encounter: 1.607 m (5' 3.25\").    Weight as of this encounter: 94.1 kg (207 lb 6.4 oz). Body surface area is 2.05 meters squared.  No Pain (0) Comment: Data Unavailable   Patient's last menstrual period was 09/24/1990.  Allergies reviewed: Yes  Medications reviewed: Yes    Medications: Medication refills not needed today.  Pharmacy name entered into [employer]: [facility][id number] [address] AT [facility] [address]    Clinical concerns: no       Shari J. Schoenberger, CMA            "

## 2019-10-16 NOTE — LETTER
"    10/16/2019         RE: Gemma Cowart  [address] [facility] [address]        Dear Colleague,    Thank you for referring your patient, Gemma Cowart, to the [facility]. Please see a copy of my visit note below.    Oncology Rooming Note    October 16, 2019 8:39 AM   Gemma Cowart is a 74 year old female who presents for:    Chief Complaint   Patient presents with     Oncology Clinic Visit     Initial Vitals: BP (!) 145/79   Pulse 71   Temp 98.3  F (36.8  C) (Oral)   Resp 14   Ht 1.607 m (5' 3.25\")   Wt 94.1 kg (207 lb 6.4 oz)   LMP 09/24/1990   SpO2 96%   BMI 36.45 kg/m    Estimated body mass index is 36.45 kg/m  as calculated from the following:    Height as of this encounter: 1.607 m (5' 3.25\").    Weight as of this encounter: 94.1 kg (207 lb 6.4 oz). Body surface area is 2.05 meters squared.  No Pain (0) Comment: Data Unavailable   Patient's last menstrual period was 09/24/1990.  Allergies reviewed: Yes  Medications reviewed: Yes    Medications: Medication refills not needed today.  Pharmacy name entered into [employer]: [facility] #[id number]  [place], [place] - [facility] [facility] AT [facility] OF [address]    Clinical concerns: no       Shari J. 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 LN negative disease. Estrogen and progesterone receptors were positive at greater than 90%. HER-2/gibran was negative. She had G3fA6K5 disease.   She was started on Femara on 10/10/2012.    It is put on hold in 5/2016 due to memory loss and dysarthria. She then was " "evaluated by neurologist, got assurance. Her cognitive function did not change by holding femara, it is resumed in July 2016. She finished 5 yrs of it in fall 2017, not willing to continue due to hair loss, bone health and hot flushes.     She had reconstructive surgery done 03/2013 by Dr. Wall.       INTERVAL HISTORY:  She had memory test. She saw neurology and no dx is made. She is advised on vit intake and keto diet.       PMH/ALLERGIES/MEDICATIONS: reviewed in [facility]    SOCIAL HISTORY: The patient did smoke for several years but has since stopped in 10/1986.     FAMILY HISTORY: Overall noncontributory. + FH of Steven's d/s from the Dad side       ROS:    no problems with fevers or chills, nausea, vomiting or diarrhea.   She has chronic on and off bilateral reconstructed breast pain.       PE  Blood pressure (!) 145/79, pulse 71, temperature 98.3  F (36.8  C), temperature source Oral, resp. rate 14, height 1.607 m (5' 3.25\"), weight 94.1 kg (207 lb 6.4 oz), last menstrual period 09/24/1990, SpO2 96 %, not currently breastfeeding.    ECOG 0    General: alert, oriented, NAD, pleasant  HEENT: no mucositis, sclera is anicteric, THEE,   Neck: supple, no JVD  LN: superficial LNs are not palpable in neck, supraclavicular, axilla and groin areas, no masses  LUNGS: Clear lung fields to auscultation. No wheezing or ronchi  HEART: Sounds S1, S2 heard, regular. No murmur or gallop  ABDOMEN: Soft, full, obese, nontender without palpable hepatosplenomegaly.   Extremity: bilateral knee pain, no joints deformities or effusion, no edema  Skin: scattered erythematous rash on elbows and knees, palms, feet, face, forehead.   Chest wall: mastectomies scars well healed, no masses.       Current LAB Data Reviewed  Cbc diff is good, Cr 1.25 stable, LFT nl.        Current Image Reviewed  Dexa 10/2017 : Advanced osteopenia of the left hip, moderate osteopenia of the right hip, and normal bone mineral density of the lumbar spine. " Stable to minor improvement compare to 2015.     Current image data reviewed  Breast MRI 4/2019 - negative.       Old data reviewed with summary    Dexa 10/2015: osteopenia  CXR/BONE SCAN 5/2015: negative       10/2014 - cbc diff/CMP are fine, except cr 1.15, B12/folate are nl, vit D 24 low    Bone scan 1/2014 - DJD, no mets    CT body 1/2014 - no mets      ASSESSMENT AND PLAN:   1. Left breast cancer s/p double mastectomies, ER/[place]+, her 2 - s/p  5 yrs of Femara till 2017.    We discussed the ER + breast cancer recurrence.     F/u in 12 months with labs.       2. history of gastric bypass surgery with BRETT and low B12.  Continue B12 oral indefinitely, she can do q wk.   We discussed the diet.       3. Osteopenia. Advice vit D proper dosing.      5. Hx of recurrent DVT on coumadin via her PMD.   She is advised on ongoing coumadin/INR monitoring via her PMD office.         Again, thank you for allowing me to participate in the care of your patient.        Sincerely,        Jyoti Narvaez MD, MD

## 2019-10-16 NOTE — PROGRESS NOTES
Medical Assistant Note:  Gemma Cowart presents today for BLOOD DRAW.    Patient seen by provider today: Yes: GE.   present during visit today: Not Applicable.    Concerns: No Concerns.    Procedure:  Lab draw site: RAC, Needle type: BF, Gauge: 23.    Post Assessment:  Labs drawn without difficulty: Yes.    Discharge Plan:  Departure Mode: Ambulatory.    Face to Face Time: 5MIN  .    Paula Miller, CMA

## 2019-10-18 ENCOUNTER — TELEPHONE (OUTPATIENT)
Dept: ONCOLOGY | Facility: CLINIC | Age: 74
End: 2019-10-18

## 2019-10-18 ENCOUNTER — ANTICOAGULATION THERAPY VISIT (OUTPATIENT)
Dept: NURSING | Facility: CLINIC | Age: 74
End: 2019-10-18
Payer: COMMERCIAL

## 2019-10-18 DIAGNOSIS — Z86.718 PERSONAL HISTORY OF DVT (DEEP VEIN THROMBOSIS): ICD-10-CM

## 2019-10-18 DIAGNOSIS — Z79.01 LONG TERM CURRENT USE OF ANTICOAGULANT THERAPY: ICD-10-CM

## 2019-10-18 LAB — INR POINT OF CARE: 1.7 (ref 0.86–1.14)

## 2019-10-18 PROCEDURE — 85610 PROTHROMBIN TIME: CPT | Mod: QW

## 2019-10-18 PROCEDURE — 36416 COLLJ CAPILLARY BLOOD SPEC: CPT

## 2019-10-18 NOTE — PROGRESS NOTES
ANTICOAGULATION FOLLOW-UP CLINIC VISIT    Patient Name:  Gemma Cowart  Date:  10/18/2019  Contact Type:  Face to Face    SUBJECTIVE:  Patient Findings     Positives:   Change in health (leg pain from more activity), Missed doses (forgot last night's pills), Change in diet/appetite (states no big changes), Bruising (some mild brusing with gardening, resolving)    Comments:   Assessed for S/S bleeding, clotting, medication, diet, health, activity and alcohol changes.          Clinical Outcomes     Negatives:   Major bleeding event, Thromboembolic event, Anticoagulation-related hospital admission, Anticoagulation-related ED visit, Anticoagulation-related fatality    Comments:   Assessed for S/S bleeding, clotting, medication, diet, health, activity and alcohol changes.             OBJECTIVE    INR Protime   Date Value Ref Range Status   10/18/2019 1.7 (A) 0.86 - 1.14 Final       ASSESSMENT / PLAN  INR assessment SUB    Recheck INR In: 3 WEEKS    INR Location Clinic      Anticoagulation Summary  As of 10/18/2019    INR goal:   2.0-3.0   TTR:   59.0 % (3.5 y)   INR used for dosin.7! (10/18/2019)   Warfarin maintenance plan:   10 mg (5 mg x 2) every Mon, Thu, Sat; 7.5 mg (5 mg x 1.5) all other days   Full warfarin instructions:   10/18: 17.5 mg; 10/19: 7.5 mg; Otherwise 10 mg every Mon, Thu, Sat; 7.5 mg all other days   Weekly warfarin total:   60 mg   Plan last modified:   Huyen Keating [facility] (10/18/2019)   Next INR check:   2019   Target end date:   Indefinite    Indications    Personal history of DVT (deep vein thrombosis) [Z86.718]  Long term current use of anticoagulant therapy [Z79.01]             Anticoagulation Episode Summary     INR check location:   Anticoagulation Clinic    Preferred lab:       Send INR reminders to:   TOBIAS THOMPSON    Comments:   5mg tabs - HS dose // CALENDAR // gastric bypass  // DVT 12 warfarin until 2013 // DVT 9/12/15      Anticoagulation Care Providers      Provider Role Specialty Phone number    Alva Viera MD  Internal Medicine [phone]            See the Encounter Report to view Anticoagulation Flowsheet and Dosing Calendar (Go to Encounters tab in chart review, and find the Anticoagulation Therapy Visit)    Missed dose last night, took 10mg this AM, due to subtherapeutic reading will dose 7.5mg this PM as normally planned.  Starting next Saturday will icrease denis to 10mg for ~5% weekly increase, recheck INR 2 weeks after that to avoid interference from bump with missed dose this week in INR result.    Huyen Keating [facility]

## 2019-10-18 NOTE — TELEPHONE ENCOUNTER
Oncology Distress Screening Follow-up  Clinical Social Work  [facility]    Identified Concern and Score From Distress Screenin. How concerned are you about your ability to eat?   0           2. How concerned are you about unintended weight loss or your current weight?   7Abnormal            3. How concerned are you about feeling depressed or very sad?   0           4. How concerned are you about feeling anxious or very scared?   0           5. Do you struggle with the loss of meaning and winnie in your life?       Not at all           6. How concerned are you about work and home life issues that may be affected by your cancer?   0           7. How concerned are you about knowing what resources are available to help you?   5           8. Do you currently have what you would describe as Islam or spiritual struggles?              Not at all           You can also ask to be contacted by one of our Oncology Supportive Care professionals.    9. If you want to be contacted by one of our professionals, I can send a message to them right now.   Oncology Social  Worker  pt would like to know what resources are availble to her and would like a call from the               Date of Distress Screening: 10/16/19    Intervention:   Gemma is a 74-year-old woman with a history of left breast cancer diagnosed , s/p 5 years Femara. Gemma came to clinic for follow-up with Dr. Narvaez. This clinician called Gemma today to follow-up on positive distress screen. Oriented Gemma to role of oncology social worker as support surrounding emotional coping and resource needs. Gemma reports that she feels that she has been coping well from a mood standpoint, and reported that recent concerns have been transportation to [facility] in [place], and cost of supplements. Gemma reports that she uses [employer] for transportation to medical appointments and that daughter assists with grocery shopping. This clinician oriented to transportation  resources available in [place] and [employer]. Unfortunately financial resources that this clinician is aware of for breast cancer specifically are for patients in active treatment, oriented Gemma to contact information for [employer] and [employer] to inquire as to whether there might be additional funding available as supplements are meant to help prevent against memory decline. This clinician also oriented Gemma to Cancer Rehab Services available for memory changes.     Education Provided:   Mailed out: [facility] Senior Resource Directory,  contact information, Cancer Rehab Services    Follow-up Required:  No social work follow-up needed at present time. Gemma has this clinician's contact information and knows to reach out in the case of distress or need.     ANKUR Viramontes, LICSW  Phone: [phone]  Pager: [phone]    [facility]: M, Thu  *every other Tue, 8am-4:30pm  [facility]: W, F, *every other Tue, 8am-4:30pm

## 2019-10-27 ENCOUNTER — HEALTH MAINTENANCE LETTER (OUTPATIENT)
Age: 74
End: 2019-10-27

## 2019-11-05 DIAGNOSIS — Z98.84 S/P GASTRIC BYPASS: ICD-10-CM

## 2019-11-05 DIAGNOSIS — E53.8 VITAMIN B12 DEFICIENCY: ICD-10-CM

## 2019-11-05 NOTE — TELEPHONE ENCOUNTER
Last Written Prescription Date:  9/6/19  Last Fill Quantity: 90,  # refills: 3  Last office visit: 9/6/2019 with prescribing provider:  Arminda Chance RN

## 2019-11-05 NOTE — TELEPHONE ENCOUNTER
"Requested Prescriptions   Pending Prescriptions Disp Refills     cyanocobalamin 2500 MCG SUBL sublingual tablet [Pharmacy Med Name: B-12 2500MCG SUBLINGUAL TABLETS]    Last Written Prescription Date:  2/23/2018  Last Fill Quantity: 90,  # refills: 1   Last office visit: 9/6/2019 with prescribing provider:  Alva Viera     Future Office Visit:     90 tablet 0     Sig: DISSOLVE 1 TABLET UNDER THE TONGUE DAILY       Vitamin Supplements (Adult) Protocol Passed - 11/5/2019  3:51 AM        Passed - High dose Vitamin D not ordered        Passed - Recent (12 mo) or future (30 days) visit within the authorizing provider's specialty     Patient has had an office visit with the authorizing provider or a provider within the authorizing providers department within the previous 12 mos or has a future within next 30 days. See \"Patient Info\" tab in inbasket, or \"Choose Columns\" in Meds & Orders section of the refill encounter.              Passed - Medication is active on med list          "

## 2019-11-08 ENCOUNTER — ANTICOAGULATION THERAPY VISIT (OUTPATIENT)
Dept: NURSING | Facility: CLINIC | Age: 74
End: 2019-11-08
Payer: COMMERCIAL

## 2019-11-08 DIAGNOSIS — Z79.01 LONG TERM CURRENT USE OF ANTICOAGULANT THERAPY: Primary | ICD-10-CM

## 2019-11-08 DIAGNOSIS — Z86.718 PERSONAL HISTORY OF DVT (DEEP VEIN THROMBOSIS): ICD-10-CM

## 2019-11-08 LAB — INR POINT OF CARE: 2.4 (ref 0.86–1.14)

## 2019-11-08 PROCEDURE — 36416 COLLJ CAPILLARY BLOOD SPEC: CPT

## 2019-11-08 PROCEDURE — 85610 PROTHROMBIN TIME: CPT | Mod: QW

## 2019-11-08 NOTE — PROGRESS NOTES
ANTICOAGULATION FOLLOW-UP CLINIC VISIT    Patient Name:  Gemma Cowart  Date:  2019  Contact Type:  Face to Face    SUBJECTIVE:  Patient Findings     Positives:   Missed doses    Comments:   Patient misunderstood the warfarin directions last time.  She has been taking 7.5mg on Sat instead of 10mg as directed.    INR is therapeutic today.   Patient will continue same dose she has been   taking for the past 3 weeks.   Maintenance dose adjusted.   It is an adjustment of 4.2%.  Follow up in 6 weeks.    The patient was assessed for   diet, medication,   bruising or bleeding,   with no problem findings.          Clinical Outcomes     Comments:   Patient misunderstood the warfarin directions last time.  She has been taking 7.5mg on Sat instead of 10mg as directed.    INR is therapeutic today.   Patient will continue same dose she has been   taking for the past 3 weeks.   Maintenance dose adjusted.   It is an adjustment of 4.2%.  Follow up in 6 weeks.    The patient was assessed for   diet, medication,   bruising or bleeding,   with no problem findings.             OBJECTIVE    INR Protime   Date Value Ref Range Status   2019 2.4 (A) 0.86 - 1.14 Final       ASSESSMENT / PLAN  INR assessment THER    Recheck INR In: 6 WEEKS    INR Location Clinic      Anticoagulation Summary  As of 2019    INR goal:   2.0-3.0   TTR:   59.0 % (3.5 y)   INR used for dosin.4 (2019)   Warfarin maintenance plan:   10 mg (5 mg x 2) every Mon, Thu; 7.5 mg (5 mg x 1.5) all other days   Full warfarin instructions:   10 mg every Mon, Thu; 7.5 mg all other days   Weekly warfarin total:   57.5 mg   Plan last modified:   Rae Cooley RN (2019)   Next INR check:   2019   Target end date:   Indefinite    Indications    Personal history of DVT (deep vein thrombosis) [Z86.718]  Long term current use of anticoagulant therapy [Z79.01]             Anticoagulation Episode Summary     INR check location:    Anticoagulation Clinic    Preferred lab:       Send INR reminders to:   TOBIAS THOMPSON    Comments:   5mg tabs - HS dose // CALENDAR // gastric bypass 2000 // DVT 11/16/12 warfarin until Apr 2013 // DVT 9/12/15      Anticoagulation Care Providers     Provider Role Specialty Phone number    Alva Viera MD  Internal Medicine [phone]            See the Encounter Report to view Anticoagulation Flowsheet and Dosing Calendar (Go to Encounters tab in chart review, and find the Anticoagulation Therapy Visit)    INR is therapeutic today.   Patient will continue same dose she has been taking for the past 3 weeks. Maintenance dose adjusted. It is an adjustment of 4.2%.  Follow up in 6 weeks.        Rae Cooley RN

## 2019-11-20 ENCOUNTER — TRANSFERRED RECORDS (OUTPATIENT)
Dept: HEALTH INFORMATION MANAGEMENT | Facility: CLINIC | Age: 74
End: 2019-11-20

## 2019-11-20 DIAGNOSIS — Z98.84 S/P GASTRIC BYPASS: ICD-10-CM

## 2019-11-20 RX ORDER — ACETAMINOPHEN 160 MG
TABLET,DISINTEGRATING ORAL
Qty: 90 CAPSULE | Refills: 0 | OUTPATIENT
Start: 2019-11-20

## 2019-11-20 NOTE — TELEPHONE ENCOUNTER
Not due for a refill.     Cholecalciferol (VITAMIN D) 2000 units CAPS was filled on 9/6/2019, qty 90 with 3 refills.

## 2019-12-17 ENCOUNTER — TRANSFERRED RECORDS (OUTPATIENT)
Dept: HEALTH INFORMATION MANAGEMENT | Facility: CLINIC | Age: 74
End: 2019-12-17

## 2019-12-23 ENCOUNTER — ANTICOAGULATION THERAPY VISIT (OUTPATIENT)
Dept: NURSING | Facility: CLINIC | Age: 74
End: 2019-12-23
Payer: COMMERCIAL

## 2019-12-23 DIAGNOSIS — Z86.718 PERSONAL HISTORY OF DVT (DEEP VEIN THROMBOSIS): ICD-10-CM

## 2019-12-23 DIAGNOSIS — Z79.01 LONG TERM CURRENT USE OF ANTICOAGULANT THERAPY: Primary | ICD-10-CM

## 2019-12-23 LAB — INR POINT OF CARE: 3 (ref 0.86–1.14)

## 2019-12-23 PROCEDURE — 85610 PROTHROMBIN TIME: CPT | Mod: QW

## 2019-12-23 PROCEDURE — 36416 COLLJ CAPILLARY BLOOD SPEC: CPT

## 2019-12-23 NOTE — PROGRESS NOTES
ANTICOAGULATION FOLLOW-UP CLINIC VISIT    Patient Name:  Gemma Cowart  Date:  12/23/2019  Contact Type:  Face to Face    SUBJECTIVE:  Patient Findings     Comments:   The patient was assessed for   diet, medication,   missed or extra doses,   bruising or bleeding,   with no problem findings.          Clinical Outcomes     Comments:   The patient was assessed for   diet, medication,   missed or extra doses,   bruising or bleeding,   with no problem findings.             OBJECTIVE    INR Protime   Date Value Ref Range Status   12/23/2019 3.0 (A) 0.86 - 1.14 Final       ASSESSMENT / PLAN  INR assessment THER    Recheck INR In: 6 WEEKS    INR Location Clinic      Anticoagulation Summary  As of 12/23/2019    INR goal:   2.0-3.0   TTR:   62.9 % (1 y)   INR used for dosing:   3.0 (12/23/2019)   Warfarin maintenance plan:   10 mg (5 mg x 2) every Mon, Thu; 7.5 mg (5 mg x 1.5) all other days   Full warfarin instructions:   10 mg every Mon, Thu; 7.5 mg all other days   Weekly warfarin total:   57.5 mg   No change documented:   Rae Cooley RN   Plan last modified:   Rae Cooley RN (11/8/2019)   Next INR check:   2/3/2020   Target end date:   Indefinite    Indications    Personal history of DVT (deep vein thrombosis) [Z86.718]  Long term current use of anticoagulant therapy [Z79.01]             Anticoagulation Episode Summary     INR check location:   Anticoagulation Clinic    Preferred lab:       Send INR reminders to:   TOBIAS THOMPSON    Comments:   5mg tabs - HS dose // CALENDAR // gastric bypass 2000 // DVT 11/16/12 warfarin until Apr 2013 // DVT 9/12/15      Anticoagulation Care Providers     Provider Role Specialty Phone number    Alva Viera MD  Internal Medicine [phone]            See the Encounter Report to view Anticoagulation Flowsheet and Dosing Calendar (Go to Encounters tab in chart review, and find the Anticoagulation Therapy Visit)    INR is therapeutic today.   Patient will continue  same maintenance dose.   Follow up in 6 weeks.        Rae Cooley RN

## 2020-02-03 ENCOUNTER — ANTICOAGULATION THERAPY VISIT (OUTPATIENT)
Dept: NURSING | Facility: CLINIC | Age: 75
End: 2020-02-03
Payer: COMMERCIAL

## 2020-02-03 DIAGNOSIS — Z79.01 LONG TERM CURRENT USE OF ANTICOAGULANT THERAPY: Primary | ICD-10-CM

## 2020-02-03 DIAGNOSIS — Z86.718 PERSONAL HISTORY OF DVT (DEEP VEIN THROMBOSIS): ICD-10-CM

## 2020-02-03 LAB — INR POINT OF CARE: 2.3 (ref 0.86–1.14)

## 2020-02-03 PROCEDURE — 85610 PROTHROMBIN TIME: CPT | Mod: QW

## 2020-02-03 PROCEDURE — 36416 COLLJ CAPILLARY BLOOD SPEC: CPT

## 2020-02-03 NOTE — PROGRESS NOTES
ANTICOAGULATION FOLLOW-UP CLINIC VISIT    Patient Name:  Gemma Cowart  Date:  2/3/2020  Contact Type:  Face to Face    SUBJECTIVE:  Patient Findings     Comments:   The patient was assessed for   diet, medication,   missed or extra doses,   bruising or bleeding,   with no problem findings.          Clinical Outcomes     Comments:   The patient was assessed for   diet, medication,   missed or extra doses,   bruising or bleeding,   with no problem findings.             OBJECTIVE    INR Protime   Date Value Ref Range Status   2020 2.3 (A) 0.86 - 1.14 Final       ASSESSMENT / PLAN  INR assessment THER    Recheck INR In: 6 WEEKS    INR Location Clinic      Anticoagulation Summary  As of 2/3/2020    INR goal:   2.0-3.0   TTR:   70.5 % (1 y)   INR used for dosin.3 (2/3/2020)   Warfarin maintenance plan:   10 mg (5 mg x 2) every Mon, Thu; 7.5 mg (5 mg x 1.5) all other days   Full warfarin instructions:   10 mg every Mon, Thu; 7.5 mg all other days   Weekly warfarin total:   57.5 mg   No change documented:   Rae Cooley RN   Plan last modified:   Rae Cooley RN (2019)   Next INR check:   3/17/2020   Target end date:   Indefinite    Indications    Personal history of DVT (deep vein thrombosis) [Z86.718]  Long term current use of anticoagulant therapy [Z79.01]             Anticoagulation Episode Summary     INR check location:   Anticoagulation Clinic    Preferred lab:       Send INR reminders to:   TOBIAS THOMPSON    Comments:   5mg tabs - HS dose // CALENDAR // gastric bypass  // DVT 12 warfarin until 2013 // DVT 9/12/15      Anticoagulation Care Providers     Provider Role Specialty Phone number    Alva Viera MD  Internal Medicine [phone]            See the Encounter Report to view Anticoagulation Flowsheet and Dosing Calendar (Go to Encounters tab in chart review, and find the Anticoagulation Therapy Visit)    INR is therapeutic today.   Patient will continue same  maintenance dose.   Follow up in 6 weeks.        Rae Cooley RN

## 2020-02-13 ENCOUNTER — PATIENT OUTREACH (OUTPATIENT)
Dept: GERIATRIC MEDICINE | Facility: CLINIC | Age: 75
End: 2020-02-13

## 2020-02-14 NOTE — PROGRESS NOTES
[facility] Care Coordination Contact      [facility] Six-Month Telephone Assessment    6 month telephone assessment completed on February 13, 2020..    ER visits: No  Hospitalizations: No  TCU stays: No  Significant health status changes: none  Falls/Injuries: No  ADL/IADL changes: No  Changes in services: No    Caregiver Assessment follow up:  NA    Goals: See POC in chart for goal progress documentation.      Member had her dentures looked at. She saw a dentist her neighbor recommended, but not covered under her insurance. They told her that she could probably get by with her upper dentures (although she didn't think so) and replace her lower dentures, but he quoted her around $7000. Discussed seeing a dentist within her [facility] network. CC had provided member with some dental names. She stated she would contact [facility]'s dental line to get a name of someone in network. She lost about 7 pounds and continues to work at it.    Will see member in 6 months for an annual health risk assessment.   Encouraged member to call CC with any questions or concerns in the meantime.     .Nighat Carbajal RN, BSN, PHN  [facility] Care Coordinator  [phone]

## 2020-03-15 ENCOUNTER — HEALTH MAINTENANCE LETTER (OUTPATIENT)
Age: 75
End: 2020-03-15

## 2020-03-23 DIAGNOSIS — Z86.718 PERSONAL HISTORY OF DVT (DEEP VEIN THROMBOSIS): Primary | ICD-10-CM

## 2020-03-30 ENCOUNTER — VIRTUAL VISIT (OUTPATIENT)
Dept: FAMILY MEDICINE | Facility: OTHER | Age: 75
End: 2020-03-30

## 2020-03-30 NOTE — PROGRESS NOTES
"Date: 2020 11:33:32  Clinician: Nelly Palmer  Clinician NPI: [id number]  Patient: Gemma Cowart  Patient : 1945  Patient Address: [address] [place], [place] [address]  Patient Phone: [phone]  Visit Protocol: URI  Patient Summary:  Gemma is a 74 year old ( : 1945 ) female who initiated a Visit for COVID-19 (Coronavirus) evaluation and screening. When asked the question \"Please sign me up to receive news, health information and promotions from [employer].\", Gemma responded \"No\".    Gemma states her symptoms started suddenly 3-6 days ago. After her symptoms started, they improved and then got worse again.   Her symptoms consist of rhinitis, myalgia, chills, a sore throat, a cough, nasal congestion, and malaise. She is experiencing mild difficulty breathing with activities but can speak normally in full sentences. Gemma also feels feverish.   Symptom details     Nasal secretions: The color of her mucus is clear.    Cough: Gemma coughs a few times an hour and her cough is more bothersome at night. Phlegm does not come into her throat when she coughs. She believes her cough is caused by post-nasal drip.     Sore throat: Gemma reports having mild throat pain (1-3 on a 10 point pain scale), does not have exudate on her tonsils, and can swallow liquids. The lymph nodes in her neck are not enlarged. A rash has not appeared on the skin since the sore throat started.     Temperature: Her current temperature is 97.6 degrees Fahrenheit.      Gemma denies having teeth pain, headache, facial pain or pressure, wheezing, enlarged lymph nodes, and ear pain. She also denies having a sinus infection within the past year, taking antibiotic medication for the symptoms, and having recent facial or sinus surgery in the past 60 days.   Precipitating events  Within the past week, Gemma has not been exposed to someone with strep throat. She has not recently been exposed to someone with influenza. Gemma has been in " close contact with the following high risk individuals: people with asthma, heart disease or diabetes and immunocompromised people.   Pertinent COVID-19 (Coronavirus) information  Gemma has not traveled internationally or to the areas where COVID-19 (Coronavirus) is widespread, including cruise ship travel in the last 14 days before the start of her symptoms.   Gemma has not had a close contact with a laboratory-confirmed COVID-19 patient within 14 days of symptom onset. She also has not had a close contact with a suspected COVID-19 patient within 14 days of symptom onset.   Gemma is not a healthcare worker or a  and does not work in a healthcare facility. She does not live with a healthcare worker.   Pertinent medical history  Gemma does not get yeast infections when she takes antibiotics.   Gemma does not need a return to work/school note.   Weight: 202 lbs   Gemma does not smoke or use smokeless tobacco.   Weight: 202 lbs  A synchronous phone visit was initiated by the provider for the following reason: 74 yr warfarin/DM    MEDICATIONS: warfarin oral, paroxetine oral, Vitamin D3 oral, Vitamin B-12 oral, ALLERGIES: bacitracin  Clinician Response:  Dear Gemma,   Based on the information you have provided, you do have symptoms that are consistent with Coronavirus (COVID-19).  The coronavirus causes mild to severe respiratory illness with the most common symptoms including fever, cough and difficulty breathing. Unfortunately, many viruses cause similar symptoms and it can be difficult to distinguish between viruses, especially in mild cases, so we are presuming that anyone with cough or fever has coronavirus at this time.  Coronavirus/COVID-19 has reached the point of community spread in [place], meaning that we are finding the virus in people with no known exposure risk for lio the virus. Given the increasing commonness of coronavirus in the community we are no longer testing patients who  are not critically ill.  If you are a health care worker, you should refer to your employee health office for instructions about testing and returning to work.  For everyone else who has cough or fever, you should assume you are infected with coronavirus. Since you will not be tested but have symptoms that may be consistent with coronavirus, the CDC recommends you stay in self-isolation until these three things have happened:    You have had no fever for at least 72 hours (that is three full days of no fever without the use of medicine that reduces fevers)    AND   Other symptoms have improved (for example, when your cough or shortness of breath have improved)   AND   At least 7 days have passed since your symptoms first appeared.   How to Isolate:   Isolate yourself at home.  Do Not allow any visitors  Do Not go to work or school  Do Not go to [facility],  centers, shopping, or other public places.  Do Not shake hands.  Avoid close contact with others (hugging, kissing).   Protect Others:   Cover Your Mouth and Nose with a mask, disposable tissue or wash cloth to avoid spreading germs to others.  Wash your hands and face frequently with soap and water.   We know it can be scary to hear that you might have COVID-19. Our team can help track your symptoms and make sure you are doing ok over the next two weeks using a program called [employer] to keep in touch. When you receive an email from [employer], please consider enrolling in our monitoring program. There is no cost to you for monitoring. Here is a URL where you can learn more: [email or web address]  Managing Symptoms:   At this time, we primarily recommend Tylenol (Acetaminophen) for fever or pain. If you have liver or kidney problems, contact your primary care provider for instructions on use of tylenol. Adults can take 650 mg (two 325 mg pills) by mouth every 4-6 hours as needed OR 1,000 mg (two 500 mg pills) every 8 hours as needed. MAXIMUM  DAILY DOSE: 3,000mg. For children, refer to dosing on bottle based on age or weight.   If you develop significant shortness of breath that prevents you from doing normal activities, please call 911 or proceed to the nearest emergency room and alert them immediately that you have been in self-isolation for possible coronavirus.  If you have a higher risk medical condition such as cancer, heart failure, end stage renal disease on dialysis or have a transplant, please reach out to your specialist's clinic to advise them of your [facility] visit should you not improve within the next two days.   For more information about COVID19 and options for caring for yourself at home, please visit the CDC website at https://www.cdc.gov/coronavirus/2019-ncov/about/steps-when-sick.htmlFor more options for care at [facility], please visit our website at [email or web address]    Diagnosis: Cough  Diagnosis ICD: R05  Triage Notes: I reviewed the patient's history, verified their identity, and explained the Visit process.    74 year old female on warfarin/DM (hx irregular heart beat, blood clots in the legs and DM diet control) complains of cough with a wheeze, runny nose, myalgia, chills, sore throat 3/10 and nasal congestion for 3 to 6 days. Patient denied being short of breath, chest tightness or lightheaded.      Patient had a procedure on her heart for irregular heart beat (possible for atrial fib-patient was not sure)    Patient states she is worried because her daughter has Crohn's Disease and is concerned she may expose her.  Synchronous Triage: phone, status: completed, duration: 509 seconds

## 2020-04-08 ENCOUNTER — TELEPHONE (OUTPATIENT)
Dept: PEDIATRICS | Facility: CLINIC | Age: 75
End: 2020-04-08

## 2020-04-08 NOTE — TELEPHONE ENCOUNTER
Patient Returning Call  Reason for call:  Call back  Information relayed to caller: Clinic staff will contact to discuss    Caller has additional questions:  Yes  What are your questions/concerns:  Caller has questions about med refill and needing clarification. Did not indicate what med. Please call Iris at [facility] to discuss.   Okay to leave a detailed message?: No at Other phone number:  [phone]

## 2020-04-16 ENCOUNTER — TELEPHONE (OUTPATIENT)
Dept: PEDIATRICS | Facility: CLINIC | Age: 75
End: 2020-04-16

## 2020-04-16 NOTE — TELEPHONE ENCOUNTER
Patient is overdue for INR check.  Spoke to patient by phone.  INR check scheduled at lab.    She had a Virtual Visit on 3/30/20 with [facility] for COVID-19 symptoms.  She had a cough, sore throat, and felt feverish.  All symptoms have resolved since then.    Information given to patient:  In response to the COVID-19 pandemic, the Anticoagulation Clinic ([facility]) program is making changes to keep your safety our top priority.      Warfarin monitoring is important, and we are doing the following to make your monitoring safe:  1. Lab will be doing your fingerstick, to minimize your time in clinic  2. Your result will be routed to a trained [facility] nurse or pharmacist  3. An [facility] nurse or pharmacist will call you to review your results, review the questions we normally ask you in clinic, and give you dosing and a next recheck date.     [facility]

## 2020-04-20 ENCOUNTER — ANTICOAGULATION THERAPY VISIT (OUTPATIENT)
Dept: NURSING | Facility: CLINIC | Age: 75
End: 2020-04-20

## 2020-04-20 DIAGNOSIS — Z79.01 LONG TERM CURRENT USE OF ANTICOAGULANT THERAPY: Primary | ICD-10-CM

## 2020-04-20 DIAGNOSIS — Z86.718 PERSONAL HISTORY OF DVT (DEEP VEIN THROMBOSIS): ICD-10-CM

## 2020-04-20 LAB
CAPILLARY BLOOD COLLECTION: NORMAL
INR PPP: 1.5 (ref 0.86–1.14)

## 2020-04-20 PROCEDURE — 85610 PROTHROMBIN TIME: CPT | Performed by: INTERNAL MEDICINE

## 2020-04-20 PROCEDURE — 36416 COLLJ CAPILLARY BLOOD SPEC: CPT | Performed by: INTERNAL MEDICINE

## 2020-04-20 PROCEDURE — 99207 ZZC NO CHARGE NURSE ONLY: CPT

## 2020-04-20 NOTE — PROGRESS NOTES
ANTICOAGULATION FOLLOW-UP     Patient Name:  Gemma Cowart  Date:  2020  Contact Type:  Telephone    SUBJECTIVE:  Patient Findings     Positives:   Missed doses    Comments:   Thinks she missed a dose on Thursday.    Patient denies:   increased intake of green vegetables,   medication changes, bleeding/bruising,   or signs/symptoms of a clot.    INR is subtherapeutic today.   Patient will take 10 mg today and tomorrow which will increase weekly total   by 4.4%, then resume maintenance dose.   Follow up in 2 weeks.           Clinical Outcomes     Comments:   Thinks she missed a dose on Thursday.    Patient denies:   increased intake of green vegetables,   medication changes, bleeding/bruising,   or signs/symptoms of a clot.    INR is subtherapeutic today.   Patient will take 10 mg today and tomorrow which will increase weekly total   by 4.4%, then resume maintenance dose.   Follow up in 2 weeks.              OBJECTIVE    INR   Date Value Ref Range Status   2020 1.50 (H) 0.86 - 1.14 Final     Comment:     This test is intended for monitoring Coumadin therapy.  Results are not   accurate in patients with prolonged INR due to factor deficiency.         ASSESSMENT / PLAN  INR assessment SUB    Recheck INR In: 2 WEEKS    INR Location Clinic lab     Anticoagulation Summary  As of 2020    INR goal:   2.0-3.0   TTR:   65.9 % (1 y)   INR used for dosin.50! (2020)   Warfarin maintenance plan:   10 mg (5 mg x 2) every Mon, Thu; 7.5 mg (5 mg x 1.5) all other days   Full warfarin instructions:   : 10 mg; Otherwise 10 mg every Mon, Thu; 7.5 mg all other days   Weekly warfarin total:   57.5 mg   Plan last modified:   Rae Cooley RN (2019)   Next INR check:   2020   Priority:   Maintenance   Target end date:   Indefinite    Indications    Personal history of DVT (deep vein thrombosis) [Z86.718]  Long term current use of anticoagulant therapy [Z79.01]             Anticoagulation  Episode Summary     INR check location:   Anticoagulation Clinic    Preferred lab:       Send INR reminders to:   TOBIAS THOMPSON    Comments:   5mg tabs - HS dose // CALENDAR // gastric bypass 2000 // DVT 11/16/12 warfarin until Apr 2013 // DVT 9/12/15      Anticoagulation Care Providers     Provider Role Specialty Phone number    Alva Viera MD  Internal Medicine [phone]            See the Encounter Report to view Anticoagulation Flowsheet and Dosing Calendar (Go to Encounters tab in chart review, and find the Anticoagulation Therapy Visit)    INR is subtherapeutic today.   Patient will take 10 mg today and tomorrow which will increase weekly total   by 4.4%, then resume maintenance dose.   Follow up in 2 weeks.     Dosage adjustment made based on physician directed care plan.      Rae Cooley RN

## 2020-05-04 ENCOUNTER — ANTICOAGULATION THERAPY VISIT (OUTPATIENT)
Dept: NURSING | Facility: CLINIC | Age: 75
End: 2020-05-04
Payer: COMMERCIAL

## 2020-05-04 DIAGNOSIS — Z86.718 PERSONAL HISTORY OF DVT (DEEP VEIN THROMBOSIS): ICD-10-CM

## 2020-05-04 DIAGNOSIS — Z79.01 LONG TERM CURRENT USE OF ANTICOAGULANT THERAPY: ICD-10-CM

## 2020-05-04 LAB
CAPILLARY BLOOD COLLECTION: NORMAL
INR PPP: 2.2 (ref 0.86–1.14)

## 2020-05-04 PROCEDURE — 36416 COLLJ CAPILLARY BLOOD SPEC: CPT | Performed by: INTERNAL MEDICINE

## 2020-05-04 PROCEDURE — 85610 PROTHROMBIN TIME: CPT | Performed by: INTERNAL MEDICINE

## 2020-05-04 PROCEDURE — 99207 ZZC NO CHARGE NURSE ONLY: CPT

## 2020-05-04 RX ORDER — WARFARIN SODIUM 5 MG/1
TABLET ORAL
Qty: 144 TABLET | Refills: 1 | Status: SHIPPED | OUTPATIENT
Start: 2020-05-04 | End: 2020-09-09

## 2020-05-04 NOTE — PROGRESS NOTES
ANTICOAGULATION FOLLOW-UP CLINIC VISIT    Patient Name:  Gemma Cowart  Date:  2020  Contact Type:  Telephone    SUBJECTIVE:  Patient Findings     Comments:   Called patient to discuss today's INR results: The patient was assessed for diet, medication, and activity level changes, missed or extra doses, bruising or bleeding, with no problem findings. Reviewed maintenance warfarin dosing with patient. Patient will remain on the same dose until next INR check. No other questions or concerns. Scheduled next lab-only INR. Patient requested refill of warfarin, 5 mg tablets. Reviewed last prescription. Prescription approved per [facility] Refill Protocol.  Donna BURROWS RN  Anticoagulation Clinic  [place]          Clinical Outcomes     Negatives:   Major bleeding event, Thromboembolic event, Anticoagulation-related hospital admission, Anticoagulation-related ED visit, Anticoagulation-related fatality    Comments:   Called patient to discuss today's INR results: The patient was assessed for diet, medication, and activity level changes, missed or extra doses, bruising or bleeding, with no problem findings. Reviewed maintenance warfarin dosing with patient. Patient will remain on the same dose until next INR check. No other questions or concerns. Scheduled next lab-only INR. Patient requested refill of warfarin, 5 mg tablets. Reviewed last prescription. Prescription approved per [facility] Refill Protocol.  Donna BURROWS RN  Anticoagulation Clinic  [place]             OBJECTIVE    INR   Date Value Ref Range Status   2020 2.20 (H) 0.86 - 1.14 Final     Comment:     This test is intended for monitoring Coumadin therapy.  Results are not   accurate in patients with prolonged INR due to factor deficiency.         ASSESSMENT / PLAN  INR assessment THER    Recheck INR In: 4 WEEKS    INR Location Clinic      Anticoagulation Summary  As of 2020    INR goal:   2.0-3.0   TTR:   63.1 % (1 y)   INR used for dosin.20 (2020)    Warfarin maintenance plan:   10 mg (5 mg x 2) every Mon, Thu; 7.5 mg (5 mg x 1.5) all other days   Full warfarin instructions:   10 mg every Mon, Thu; 7.5 mg all other days   Weekly warfarin total:   57.5 mg   No change documented:   Donna Calabrese RN   Plan last modified:   Rae Cooley RN (11/8/2019)   Next INR check:   6/1/2020   Priority:   Maintenance   Target end date:   Indefinite    Indications    Personal history of DVT (deep vein thrombosis) [Z86.718]  Long term current use of anticoagulant therapy [Z79.01]             Anticoagulation Episode Summary     INR check location:   Anticoagulation Clinic    Preferred lab:       Send INR reminders to:   TOBIAS THOMPSON    Comments:   5mg tabs - HS dose // CALENDAR // gastric bypass 2000 // DVT 11/16/12 warfarin until Apr 2013 // DVT 9/12/15      Anticoagulation Care Providers     Provider Role Specialty Phone number    Alva Viera MD  Internal Medicine [phone]            See the Encounter Report to view Anticoagulation Flowsheet and Dosing Calendar (Go to Encounters tab in chart review, and find the Anticoagulation Therapy Visit)    Donna Calabrese, RN

## 2020-05-29 ENCOUNTER — TRANSFERRED RECORDS (OUTPATIENT)
Dept: HEALTH INFORMATION MANAGEMENT | Facility: CLINIC | Age: 75
End: 2020-05-29

## 2020-06-01 ENCOUNTER — ANTICOAGULATION THERAPY VISIT (OUTPATIENT)
Dept: NURSING | Facility: CLINIC | Age: 75
End: 2020-06-01

## 2020-06-01 ENCOUNTER — TRANSFERRED RECORDS (OUTPATIENT)
Dept: HEALTH INFORMATION MANAGEMENT | Facility: CLINIC | Age: 75
End: 2020-06-01

## 2020-06-01 DIAGNOSIS — Z86.718 PERSONAL HISTORY OF DVT (DEEP VEIN THROMBOSIS): ICD-10-CM

## 2020-06-01 DIAGNOSIS — Z79.01 LONG TERM CURRENT USE OF ANTICOAGULANT THERAPY: ICD-10-CM

## 2020-06-01 LAB
CAPILLARY BLOOD COLLECTION: NORMAL
INR PPP: 5 (ref 0.86–1.14)
RETINOPATHY: NORMAL

## 2020-06-01 PROCEDURE — 36416 COLLJ CAPILLARY BLOOD SPEC: CPT | Performed by: INTERNAL MEDICINE

## 2020-06-01 PROCEDURE — 99207 ZZC NO CHARGE NURSE ONLY: CPT

## 2020-06-01 PROCEDURE — 85610 PROTHROMBIN TIME: CPT | Performed by: INTERNAL MEDICINE

## 2020-06-01 NOTE — PROGRESS NOTES
ANTICOAGULATION FOLLOW-UP     Patient Name:  Gemma Cowart  Date:  2020  Contact Type:  Telephone    SUBJECTIVE:  Patient Findings     Positives:   Change in health    Comments:   Diarrhea x 2 weeks.  Only has one stool a day, but it is liquid.  No blood noted.  She is drinking more water.    Reviewed previous warfarin dosing with patient.  Patient denies: extra doses,    bleeding/bruises, medication changes, diet changes,  or any alcohol intake.    INR is supratherapeutic today.   Patient will hold dose today which will decrease weekly total   by 17.4%, then resume maintenance dose.   Will follow up in 3 days.    Chart CC'd to PCP to notify of high INR.        Clinical Outcomes     Comments:   Diarrhea x 2 weeks.  Only has one stool a day, but it is liquid.  No blood noted.  She is drinking more water.    Reviewed previous warfarin dosing with patient.  Patient denies: extra doses,    bleeding/bruises, medication changes, diet changes,  or any alcohol intake.    INR is supratherapeutic today.   Patient will hold dose today which will decrease weekly total   by 17.4%, then resume maintenance dose.   Will follow up in 3 days.    Chart CC'd to PCP to notify of high INR.           OBJECTIVE    Recent labs: (last 7 days)     20  0957   INR 5.00*       ASSESSMENT / PLAN  INR assessment SUPRA    Recheck INR In: 3 DAYS    INR Location Clinic lab     Anticoagulation Summary  As of 2020    INR goal:   2.0-3.0   TTR:   57.6 % (1 y)   INR used for dosin.00! (2020)   Warfarin maintenance plan:   10 mg (5 mg x 2) every Mon, Thu; 7.5 mg (5 mg x 1.5) all other days   Full warfarin instructions:   : Hold; Otherwise 10 mg every Mon, Thu; 7.5 mg all other days   Weekly warfarin total:   57.5 mg   Plan last modified:   Rae Cooley RN (2019)   Next INR check:   2020   Priority:   Maintenance   Target end date:   Indefinite    Indications    Personal history of DVT (deep vein thrombosis)  [Z86.718]  Long term current use of anticoagulant therapy [Z79.01]             Anticoagulation Episode Summary     INR check location:   Anticoagulation Clinic    Preferred lab:       Send INR reminders to:   TOBIAS THOMPSON    Comments:   5mg tabs - HS dose // CALENDAR // gastric bypass 2000 // DVT 11/16/12 warfarin until Apr 2013 // DVT 9/12/15      Anticoagulation Care Providers     Provider Role Specialty Phone number    Alva Viera MD  Internal Medicine [phone]            See the Encounter Report to view Anticoagulation Flowsheet and Dosing Calendar (Go to Encounters tab in chart review, and find the Anticoagulation Therapy Visit)    INR is supratherapeutic today.   Patient will hold dose today which will decrease weekly total   by 17.4%, then resume maintenance dose.   Will follow up in 3 days.    Dosage adjustment made based on physician directed care plan.      Rae Cooley RN

## 2020-06-04 ENCOUNTER — ANTICOAGULATION THERAPY VISIT (OUTPATIENT)
Dept: NURSING | Facility: CLINIC | Age: 75
End: 2020-06-04

## 2020-06-04 DIAGNOSIS — Z86.718 PERSONAL HISTORY OF DVT (DEEP VEIN THROMBOSIS): ICD-10-CM

## 2020-06-04 DIAGNOSIS — Z79.01 LONG TERM CURRENT USE OF ANTICOAGULANT THERAPY: ICD-10-CM

## 2020-06-04 LAB
CAPILLARY BLOOD COLLECTION: NORMAL
INR PPP: 3.4 (ref 0.86–1.14)

## 2020-06-04 PROCEDURE — 85610 PROTHROMBIN TIME: CPT | Performed by: INTERNAL MEDICINE

## 2020-06-04 PROCEDURE — 36416 COLLJ CAPILLARY BLOOD SPEC: CPT | Performed by: INTERNAL MEDICINE

## 2020-06-04 PROCEDURE — 99207 ZZC NO CHARGE NURSE ONLY: CPT

## 2020-06-04 NOTE — PROGRESS NOTES
ANTICOAGULATION FOLLOW-UP     Patient Name:  Gemma Cowart  Date:  6/4/2020  Contact Type:  Telephone    SUBJECTIVE:  Patient Findings     Positives:   Change in health (Was having diarrhea earlier this week. Had a normal stool today followed by diarrhea. No blood noted.)    Comments:   Reviewed previous warfarin dosing with patient.  Patient denies: extra doses,   bleeding/bruises, medication changes, diet changes,  or any alcohol intake.    INR is supratherapeutic today.   Patient will take 5 mg today, then 7.5 mg daily which will decrease weekly total by 13%.  Will follow up in 1 week.        Clinical Outcomes     Comments:   Reviewed previous warfarin dosing with patient.  Patient denies: extra doses,   bleeding/bruises, medication changes, diet changes,  or any alcohol intake.    INR is supratherapeutic today.   Patient will take 5 mg today, then 7.5 mg daily which will decrease weekly total by 13%.  Will follow up in 1 week.           OBJECTIVE    Recent labs: (last 7 days)     06/04/20  1027   INR 3.40*       ASSESSMENT / PLAN  INR assessment SUPRA    Recheck INR In: 1 WEEK    INR Location Clinic lab     Anticoagulation Summary  As of 6/4/2020    INR goal:   2.0-3.0   TTR:   56.8 % (1 y)   INR used for dosing:   3.40! (6/4/2020)   Warfarin maintenance plan:   10 mg (5 mg x 2) every Mon, Thu; 7.5 mg (5 mg x 1.5) all other days   Full warfarin instructions:   6/4: 5 mg; 6/8: 7.5 mg; Otherwise 10 mg every Mon, Thu; 7.5 mg all other days   Weekly warfarin total:   57.5 mg   Plan last modified:   Rae Cooley RN (11/8/2019)   Next INR check:   6/11/2020   Priority:   Maintenance   Target end date:   Indefinite    Indications    Personal history of DVT (deep vein thrombosis) [Z86.718]  Long term current use of anticoagulant therapy [Z79.01]             Anticoagulation Episode Summary     INR check location:   Anticoagulation Clinic    Preferred lab:       Send INR reminders to:   TOBIAS THOMPSON    Comments:    5mg tabs - HS dose // CALENDAR // gastric bypass 2000 // DVT 11/16/12 warfarin until Apr 2013 // DVT 9/12/15      Anticoagulation Care Providers     Provider Role Specialty Phone number    Alva Viera MD  Internal Medicine [phone]            See the Encounter Report to view Anticoagulation Flowsheet and Dosing Calendar (Go to Encounters tab in chart review, and find the Anticoagulation Therapy Visit)    INR is supratherapeutic today.   Patient will take 5 mg today, then 7.5 mg daily which will decrease weekly total by 13%.  Will follow up in 1 week.    Dosage adjustment made based on physician directed care plan.      Rae Cooley RN

## 2020-06-11 ENCOUNTER — ANTICOAGULATION THERAPY VISIT (OUTPATIENT)
Dept: NURSING | Facility: CLINIC | Age: 75
End: 2020-06-11

## 2020-06-11 DIAGNOSIS — Z86.718 PERSONAL HISTORY OF DVT (DEEP VEIN THROMBOSIS): ICD-10-CM

## 2020-06-11 DIAGNOSIS — Z79.01 LONG TERM CURRENT USE OF ANTICOAGULANT THERAPY: Primary | ICD-10-CM

## 2020-06-11 LAB
CAPILLARY BLOOD COLLECTION: NORMAL
INR PPP: 3.6 (ref 0.86–1.14)

## 2020-06-11 PROCEDURE — 99207 ZZC NO CHARGE NURSE ONLY: CPT

## 2020-06-11 PROCEDURE — 85610 PROTHROMBIN TIME: CPT | Performed by: INTERNAL MEDICINE

## 2020-06-11 PROCEDURE — 36416 COLLJ CAPILLARY BLOOD SPEC: CPT | Performed by: INTERNAL MEDICINE

## 2020-06-11 NOTE — PROGRESS NOTES
ANTICOAGULATION FOLLOW-UP     Patient Name:  Gemma Cowart  Date:  6/11/2020  Contact Type:  Telephone    SUBJECTIVE:  Patient Findings     Positives:   Change in activity    Comments:   She has been working in the garden every day.  Muscles are sore.    Reviewed previous warfarin dosing with patient.  Patient denies: extra doses, illness,   bleeding/bruises, medication changes, diet changes.  Does not drink alcohol.    INR is supratherapeutic today.   Patient will hold dose today, then decrease weekly maintenance dose total by 13%.  Will follow up in 11 days.    AVS calendar mailed to home address.          Clinical Outcomes     Comments:   She has been working in the garden every day.  Muscles are sore.    Reviewed previous warfarin dosing with patient.  Patient denies: extra doses, illness,   bleeding/bruises, medication changes, diet changes.  Does not drink alcohol.    INR is supratherapeutic today.   Patient will hold dose today, then decrease weekly maintenance dose total by 13%.  Will follow up in 11 days.    AVS calendar mailed to home address.             OBJECTIVE    Recent labs: (last 7 days)     06/11/20  0906   INR 3.60*       ASSESSMENT / PLAN  INR assessment THER    Recheck INR In: 11 DAYS    INR Location Clinic lab     Anticoagulation Summary  As of 6/11/2020    INR goal:   2.0-3.0   TTR:   54.9 % (1 y)   INR used for dosing:   3.60! (6/11/2020)   Warfarin maintenance plan:   5 mg (5 mg x 1) every Mon; 7.5 mg (5 mg x 1.5) all other days   Full warfarin instructions:   6/11: Hold; Otherwise 5 mg every Mon; 7.5 mg all other days   Weekly warfarin total:   50 mg   Plan last modified:   Rae Cooley RN (6/11/2020)   Next INR check:   6/22/2020   Priority:   Maintenance   Target end date:   Indefinite    Indications    Personal history of DVT (deep vein thrombosis) [Z86.718]  Long term current use of anticoagulant therapy [Z79.01]             Anticoagulation Episode Summary     INR check  location:   Clinic Lab    Preferred lab:       Send INR reminders to:   TOBIAS THOMPSON    Comments:   5mg tabs - HS dose // CALENDAR // gastric bypass 2000 // DVT 11/16/12 warfarin until Apr 2013 // DVT 9/12/15      Anticoagulation Care Providers     Provider Role Specialty Phone number    Alva Viera MD  Internal Medicine [phone]            See the Encounter Report to view Anticoagulation Flowsheet and Dosing Calendar (Go to Encounters tab in chart review, and find the Anticoagulation Therapy Visit)    INR is supratherapeutic today.   Patient will hold dose today, then decrease weekly maintenance dose total by 13%.  Will follow up in 11 days.    Dosage adjustment made based on physician directed care plan.        Rae Cooley RN

## 2020-06-22 ENCOUNTER — ANTICOAGULATION THERAPY VISIT (OUTPATIENT)
Dept: NURSING | Facility: CLINIC | Age: 75
End: 2020-06-22

## 2020-06-22 ENCOUNTER — TRANSFERRED RECORDS (OUTPATIENT)
Dept: HEALTH INFORMATION MANAGEMENT | Facility: CLINIC | Age: 75
End: 2020-06-22

## 2020-06-22 DIAGNOSIS — Z86.718 PERSONAL HISTORY OF DVT (DEEP VEIN THROMBOSIS): ICD-10-CM

## 2020-06-22 DIAGNOSIS — Z79.01 LONG TERM CURRENT USE OF ANTICOAGULANT THERAPY: Primary | ICD-10-CM

## 2020-06-22 LAB
CAPILLARY BLOOD COLLECTION: NORMAL
INR PPP: 2.7 (ref 0.86–1.14)

## 2020-06-22 PROCEDURE — 36415 COLL VENOUS BLD VENIPUNCTURE: CPT | Performed by: FAMILY MEDICINE

## 2020-06-22 PROCEDURE — 85610 PROTHROMBIN TIME: CPT | Performed by: INTERNAL MEDICINE

## 2020-06-22 PROCEDURE — 86769 SARS-COV-2 COVID-19 ANTIBODY: CPT | Performed by: FAMILY MEDICINE

## 2020-06-22 PROCEDURE — 99207 ZZC NO CHARGE NURSE ONLY: CPT

## 2020-06-22 NOTE — PROGRESS NOTES
ANTICOAGULATION FOLLOW-UP     Patient Name:  Gemma Cowart  Date:  2020  Contact Type:  Telephone    SUBJECTIVE:  Patient Findings     Positives:   Bruising (Bruise on knee. 4 cm x 4 cm. No known injury.)    Comments:   The patient was assessed for   diet, medication,   missed or extra doses,   bleeding,   with no problem findings.  No questions or concerns.  Reviewed previous warfarin dosing with patient.    INR is therapeutic today.   Patient will continue same maintenance dose.   Follow up in 3 weeks.          Clinical Outcomes     Comments:   The patient was assessed for   diet, medication,   missed or extra doses,   bleeding,   with no problem findings.  No questions or concerns.  Reviewed previous warfarin dosing with patient.    INR is therapeutic today.   Patient will continue same maintenance dose.   Follow up in 3 weeks.             OBJECTIVE    Recent labs: (last 7 days)     20  0906   INR 2.70*       ASSESSMENT / PLAN  INR assessment THER    Recheck INR In: 3 WEEKS    INR Location Clinic lab     Anticoagulation Summary  As of 2020    INR goal:   2.0-3.0   TTR:   52.9 % (1 y)   INR used for dosin.70 (2020)   Warfarin maintenance plan:   5 mg (5 mg x 1) every Mon; 7.5 mg (5 mg x 1.5) all other days   Full warfarin instructions:   5 mg every Mon; 7.5 mg all other days   Weekly warfarin total:   50 mg   No change documented:   Rae Cooley RN   Plan last modified:   Rae Cooley RN (2020)   Next INR check:   2020   Priority:   Maintenance   Target end date:   Indefinite    Indications    Personal history of DVT (deep vein thrombosis) [Z86.718]  Long term current use of anticoagulant therapy [Z79.01]             Anticoagulation Episode Summary     INR check location:   Clinic Lab    Preferred lab:       Send INR reminders to:   TOBIAS THOMPSON    Comments:   5mg tabs - HS dose // CALENDAR // gastric bypass  // DVT 12 warfarin until 2013 // DVT  9/12/15      Anticoagulation Care Providers     Provider Role Specialty Phone number    Alva Viera MD  Internal Medicine [phone]            See the Encounter Report to view Anticoagulation Flowsheet and Dosing Calendar (Go to Encounters tab in chart review, and find the Anticoagulation Therapy Visit)    INR is therapeutic today.   Patient will continue same maintenance dose.   Follow up in 3 weeks.        Rae Cooley RN

## 2020-06-22 NOTE — LETTER
June 23, 2020        Gemma EDMUNDO Cowart  [address] [facility] [address]      COVID-19 Antibody, IgG   Date Value Ref Range Status   06/22/2020 Negative NEG^Negative Final     Comment:     Negative results do not rule out SARS-CoV-2 infection, particularly in those   who have been in contact with the virus.  Follow-up testing with a molecular   diagnostic should be considered to rule out infection in these individuals.  Results from antibody testing should not be used as the sole basis to diagnose   or exclude SARS-CoV-2 infection or to inform infection status.           You have tested NEGATIVE for COVID-19 antibodies. This suggests you have not had or been exposed to COVID-19. But it does not mean that for sure.     The test finds antibodies in most people 10 days after they get sick. For some people, it takes longer than 10 days for antibodies to show up. Others may never show antibodies against COVID-19, especially if they have weak immune systems.    If you have COVID-19 symptoms now, please stay home and away from others.     What is antibody testing?    This is a kind of blood test. We take a small sample of your blood, and then test it for something called  antibodies.      Your body makes antibodies to fight infection. If your blood has antibodies for a certain germ, it means you ve been infected with that germ in the past.     Sometimes, antibodies stay in your body for years after you ve had the infection. They can be there even if the germ didn t make you sick. They are a sign that your body fought off the infection.    Will this test find antibodies in everyone who s had COVID-19?    No. The test finds antibodies in most people 10 days after they get sick. For some people, it takes longer than 10 days for antibodies to show up. Others may never show antibodies against COVID-19, especially if they have weak immune systems.    What does it mean if the test finds COVID-19 antibodies?    If we find  these antibodies, it suggests:     This person has had the virus.     Their body s immune system fought the virus.     We don t know if this will help protect someone from getting COVID-19 again. Scientists are still learning about this.    What are the signs of COVID-19?    Signs of COVID-19 can appear from 2 to 14 days (up to 2 weeks) after you re infected. Some people have no symptoms or only mild symptoms. Others get very sick. The most common symptoms are:          Cough    Shortness of breath or trouble breathing  Or at least 2 of these symptoms:    Fever    Chills    Repeated shaking with chills    Muscle pain    Headache    Sore throat    Losing your sense of taste or smell    You may have other symptoms. Please contact your doctor or clinic for any symptoms that worry you.    Where can I get more information?     To learn the [facility] guidelines for staying home, please visit the [place] Department of Health website at https://www.health.[facility].[place]/diseases/coronavirus/basics.html    To learn more about COVID-19 and how to care for yourself at home, please visit the CDC website at https://www.cdc.gov/coronavirus/2019-ncov/about/steps-when-sick.html    For more options for care at [facility], please visit our website at [email or web address]    [facility] ([facility]) COVID-19 Hotline:  [phone]

## 2020-06-23 LAB
COVID-19 ANTIBODY IGG: NEGATIVE
LAB TEST METHOD: NORMAL

## 2020-06-24 ENCOUNTER — PATIENT OUTREACH (OUTPATIENT)
Dept: GERIATRIC MEDICINE | Facility: CLINIC | Age: 75
End: 2020-06-24

## 2020-06-24 NOTE — PROGRESS NOTES
[facility] Care Coordination Contact  6/19/20:  Called member to schedule annual HRA home visit. Left a message requesting a return call to schedule HRA.   Nighat Carbajal RN, BSN, PHN  [facility] Care Coordinator  [phone]

## 2020-06-24 NOTE — PROGRESS NOTES
[facility] Care Coordination Contact    Called member to schedule annual HRA home visit. Left a message requesting a return call to schedule HRA.   Nighat Carbajal RN, BSN, PHN  [facility] Care Coordinator  [phone]

## 2020-06-25 ENCOUNTER — PATIENT OUTREACH (OUTPATIENT)
Dept: GERIATRIC MEDICINE | Facility: CLINIC | Age: 75
End: 2020-06-25

## 2020-06-25 NOTE — PROGRESS NOTES
[facility] Care Coordination Contact    6/25/2020:  Called member to schedule annual HRA home visit. Left a message requesting a return call to schedule HRA.   Nighat Carbajal RN, BSN, PHN  [facility] Care Coordinator  [phone]

## 2020-07-13 ENCOUNTER — ANTICOAGULATION THERAPY VISIT (OUTPATIENT)
Dept: NURSING | Facility: CLINIC | Age: 75
End: 2020-07-13

## 2020-07-13 DIAGNOSIS — Z86.718 PERSONAL HISTORY OF DVT (DEEP VEIN THROMBOSIS): ICD-10-CM

## 2020-07-13 DIAGNOSIS — Z79.01 LONG TERM CURRENT USE OF ANTICOAGULANT THERAPY: Primary | ICD-10-CM

## 2020-07-13 LAB
CAPILLARY BLOOD COLLECTION: NORMAL
INR PPP: 2.4 (ref 0.86–1.14)

## 2020-07-13 PROCEDURE — 36416 COLLJ CAPILLARY BLOOD SPEC: CPT | Performed by: INTERNAL MEDICINE

## 2020-07-13 PROCEDURE — 99207 ZZC NO CHARGE NURSE ONLY: CPT

## 2020-07-13 PROCEDURE — 85610 PROTHROMBIN TIME: CPT | Performed by: INTERNAL MEDICINE

## 2020-07-13 NOTE — PROGRESS NOTES
ANTICOAGULATION FOLLOW-UP     Patient Name:  Gemma Cowart  Date:  2020  Contact Type:  Telephone    SUBJECTIVE:  Patient Findings     Comments:   Unable to reach patient by phone after 3 attempts.  Left detailed message on voicemail regarding warfarin dose and next INR appointment day and time.  Instructed her to call back if she has any changes to diet, meds, or if she has any bleeding/bruising to report.    INR is therapeutic today.   Patient will continue same maintenance dose.   Follow up in 4 weeks.        Clinical Outcomes     Comments:   Unable to reach patient by phone after 3 attempts.  Left detailed message on voicemail regarding warfarin dose and next INR appointment day and time.  Instructed her to call back if she has any changes to diet, meds, or if she has any bleeding/bruising to report.    INR is therapeutic today.   Patient will continue same maintenance dose.   Follow up in 4 weeks.           OBJECTIVE    Recent labs: (last 7 days)     20  0938   INR 2.40*       ASSESSMENT / PLAN  INR assessment THER    Recheck INR In: 4 WEEKS    INR Location Clinic lab     Anticoagulation Summary  As of 2020    INR goal:   2.0-3.0   TTR:   53.6 % (1 y)   INR used for dosin.40 (2020)   Warfarin maintenance plan:   5 mg (5 mg x 1) every Mon; 7.5 mg (5 mg x 1.5) all other days   Full warfarin instructions:   5 mg every Mon; 7.5 mg all other days   Weekly warfarin total:   50 mg   No change documented:   Rae Cooley RN   Plan last modified:   Rae Cooley RN (2020)   Next INR check:   8/10/2020   Priority:   Maintenance   Target end date:   Indefinite    Indications    Personal history of DVT (deep vein thrombosis) [Z86.718]  Long term current use of anticoagulant therapy [Z79.01]             Anticoagulation Episode Summary     INR check location:   Clinic Lab    Preferred lab:       Send INR reminders to:   TOBIAS THOMPSON    Comments:   5mg tabs - HS dose // CALENDAR  // gastric bypass 2000 // DVT 11/16/12 warfarin until Apr 2013 // DVT 9/12/15      Anticoagulation Care Providers     Provider Role Specialty Phone number    Alva Viera MD  Internal Medicine [phone]            See the Encounter Report to view Anticoagulation Flowsheet and Dosing Calendar (Go to Encounters tab in chart review, and find the Anticoagulation Therapy Visit)    INR is therapeutic today.   Patient will continue same maintenance dose.   Follow up in 4 weeks.        Rae Cooley RN

## 2020-07-14 NOTE — PROGRESS NOTES
Was able to reach her by phone.  She was outside all day yesterday working in her garden and didn't hear her phone.  The patient was assessed for   diet, medication,   bruising or bleeding,   with no problem findings.    Reviewed previous warfarin dosing with patient.  She has been taking 7.5 mg daily since her last INR.  Her maintenance dose was 5 mg Mon, 7.5 mg all other days.    INR was therapeutic, so she will continue with 7.5 mg daily.  Recheck INR in 4 weeks.    Rae Cooley RN

## 2020-07-14 NOTE — PROGRESS NOTES
Patient calling asking for Rae to give her a call back when able.  [phone].  Consuelo Drummond, RN  Anticoagulation Nurse - [facility]

## 2020-07-16 ASSESSMENT — ACTIVITIES OF DAILY LIVING (ADL): DEPENDENT_IADLS:: INDEPENDENT

## 2020-07-16 NOTE — PROGRESS NOTES
[facility] Care Coordination Contact    [facility] Home Visit Assessment     Home visit for Health Risk Assessment with Gemma Cowart completed on June 25, 2020    Type of residence:: Town home  Current living arrangement:: I live in a private home with family     Assessment completed with:: Patient    Current Care Plan  Member currently receiving the following home care services:     Member currently receiving the following community resources: County Worker      Medication Review  Medication reconciliation completed in Epic: Yes  Medication set-up & administration: Independent and sets up on own monthly.  Self-administers medications.  Medication Risk Assessment Medication (1 or more, place referral to MTM): N/A: No risk factors identified  MTM Referral Placed: No: No risk factors idenified    Mental/Behavioral Health   Depression Screening:           Mental health DX:: Yes(Anxiety F41.9)   Mental health DX how managed:: Medication    Falls Assessment:   Fallen 2 or more times in the past year?: No   Any fall with injury in the past year?: No    ADL/IADL Dependencies:   Dependent ADLs:: Independent  Dependent IADLs:: Independent    [facility] Plan sponsored benefits: Shared information re: Silver Sneakers/gym memberships, ASA, Calcium +D.    PCA Assessment completed at visit: Not Applicable     Elderly Waiver Eligibility: No-does not meet criteria    Care Plan & Recommendations: Gemma will look into getting a new dentist or getting dentures fixed or replaced. Gemma will continue to look for a low-income senior apartment to move into.    [facility] for detailed assessment information.    Follow-Up Plan: Member informed of future contact, plan to f/u with member with a 6 month telephone assessment.  Contact information shared with member and family, encouraged member to call with any questions or concerns at any time.    [place] care continuum providers: Please refer to Health Care Home on the [facility]  Problem List to view this patient's [facility] Care Plan Summary.    Nighat Carbajal RN, BSN, PHN  [facility] Care Coordinator  [phone]

## 2020-07-17 ENCOUNTER — PATIENT OUTREACH (OUTPATIENT)
Dept: GERIATRIC MEDICINE | Facility: CLINIC | Age: 75
End: 2020-07-17

## 2020-07-17 NOTE — PROGRESS NOTES
[facility] Care Coordination Contact    Received after visit chart from care coordinator.  Completed following tasks: Mailed copy of care plan to client and Updated services in access   and Provider Signature - No POC Shared:  Member indicates that they do not want their POC shared with any [facility] providers.     **THIS ASSESSMENT WAS DONE OVER THE PHONE. SENT POC SIG PAGE TO MEMBER ASKING THEM TO SIGN AND RETURN IN SASE**    Ginger Armendariz  Care Management Specialist  [facility]  [phone]

## 2020-07-17 NOTE — LETTER
July 17, 2020      SALUD MARTINEZ  [address] [place] [id number]      Dear Salud:    At [facility], we are dedicated to improving your health and well-being. Enclosed is the Comprehensive Care Plan that we developed with you on 6/25/2020. Please review the Care Plan carefully.    As a reminder, some of the things we discussed at your visit include:    Your physical and mental health    Ways to reduce falls    Health care needs you may have    Don t forget to contact your care coordinator if you:    Have been hospitalized or plan to be hospitalized     Have had a fall     Have experienced a change in physical health    Are experiencing emotional problems     If you do not agree with your Care Plan, have questions about it, or have experienced a change in your needs, please call me at [phone]. If you are hearing impaired, please call the [place] [employer] at 880 or [phone] (zabeom-hj-ejpzad relay service).    Sincerely,    Nighat Carbajal RN, BSN, PHN    E-mail: [email or web address]   Phone: [phone]      [facility] ([facility]) is a health plan that contracts with both Medicare and the [place] Medical Assistance (Medicaid) program to provide benefits of both programs to enrollees. Enrollment in [facility] depends on contract renewal.    [id number])     [id number] (11/18)

## 2020-07-21 ENCOUNTER — OFFICE VISIT (OUTPATIENT)
Dept: URGENT CARE | Facility: URGENT CARE | Age: 75
End: 2020-07-21
Payer: COMMERCIAL

## 2020-07-21 VITALS
HEART RATE: 72 BPM | SYSTOLIC BLOOD PRESSURE: 117 MMHG | TEMPERATURE: 98.9 F | DIASTOLIC BLOOD PRESSURE: 56 MMHG | OXYGEN SATURATION: 97 % | WEIGHT: 198 LBS | BODY MASS INDEX: 34.8 KG/M2

## 2020-07-21 DIAGNOSIS — Z71.1 CONCERN ABOUT EYE DISEASE WITHOUT DIAGNOSIS: Primary | ICD-10-CM

## 2020-07-21 DIAGNOSIS — L40.9 PSORIASIS: Chronic | ICD-10-CM

## 2020-07-21 PROCEDURE — 99214 OFFICE O/P EST MOD 30 MIN: CPT

## 2020-07-21 NOTE — PROGRESS NOTES
SUBJECTIVE:  Gemma Cowart, a 74 year old female scheduled an appointment to discuss the following issues:  Pt was picking a pimple with a metal object 2 days ago and nicked the lateral aspect of her right eye  No change in vision.  No pain.  Wants to make sure she didn't hurt the eye.    Past Medical History:   Diagnosis Date     Anemia     iron deficiency anemia     Anxiety      Breast cancer (H) 2012    Bilateral Masectomies 9/2012     Complications of gastric bypass surgery      Diabetes mellitus (H)     diet controlled     DVT (deep venous thrombosis) (H)      Eczema      Fracture of left knee region     patella - vertical     History of kidney stones      Obesity      Palpitation     with no treatment     Polyneuropathy      Pruritus     generalized     Rosacea      SVT (supraventricular tachycardia) (H)     s/p ablation 5/28/2015 at [facility] for left lateral AP     Family History   Problem Relation Age of Onset     Cancer - colorectal Mother      Colon Cancer Mother      Prostate Cancer Father      Alzheimer Disease Father      Cancer Paternal Grandmother      Alzheimer Disease Paternal Grandfather      Cancer - colorectal Brother      Diabetes Brother      Crohn's Disease Daughter      Diabetes Brother      Eye Disorder Brother      Diabetes Son      Cancer Maternal Uncle      Cancer Maternal Uncle      Social History     Socioeconomic History     Marital status:      Spouse name: Not on file     Number of children: Not on file     Years of education: Not on file     Highest education level: Not on file   Occupational History     Employer: RETIRED   Social Needs     Financial resource strain: Not on file     Food insecurity     Worry: Not on file     Inability: Not on file     Transportation needs     Medical: Not on file     Non-medical: Not on file   Tobacco Use     Smoking status: Former Smoker     Packs/day: 1.50     Years: 25.00     Pack years: 37.50     Types: Cigarettes     Last  attempt to quit: 10/21/1986     Years since quittin.7     Smokeless tobacco: Never Used     Tobacco comment: quit    Substance and Sexual Activity     Alcohol use: No     Alcohol/week: 0.0 standard drinks     Drug use: No     Sexual activity: Never     Partners: Male   Lifestyle     Physical activity     Days per week: Not on file     Minutes per session: Not on file     Stress: Not on file   Relationships     Social connections     Talks on phone: Not on file     Gets together: Not on file     Attends Church service: Not on file     Active member of club or organization: Not on file     Attends meetings of clubs or organizations: Not on file     Relationship status: Not on file     Intimate partner violence     Fear of current or ex partner: Not on file     Emotionally abused: Not on file     Physically abused: Not on file     Forced sexual activity: Not on file   Other Topics Concern     Parent/sibling w/ CABG, MI or angioplasty before 65F 55M? No      Service Not Asked     Blood Transfusions Not Asked     Caffeine Concern Not Asked     Occupational Exposure Not Asked     Hobby Hazards Not Asked     Sleep Concern Not Asked     Stress Concern Not Asked     Weight Concern Not Asked     Special Diet Not Asked     Back Care Not Asked     Exercise Not Asked     Bike Helmet Yes     Seat Belt Yes     Self-Exams Not Asked   Social History Narrative     Not on file       Medical, social, surgical, and family histories reviewed.    ROS:  CONSTITUTIONAL: NEGATIVE for fever, chills  EYES: NEGATIVE for vision changes   RESP: NEGATIVE for significant cough or SOB  CV: NEGATIVE for chest pain, palpitations   GI: NEGATIVE for nausea, abdominal pain, heartburn, or change in bowel habits  : NEGATIVE for frequency, dysuria, or hematuria  MUSCULOSKELETAL: NEGATIVE for significant arthralgias or myalgia  NEURO: NEGATIVE for weakness, dizziness or paresthesias or headache    OBJECTIVE:  /56 (BP Location:  Right arm, Cuff Size: Adult Regular)   Pulse 72   Temp 98.9  F (37.2  C) (Tympanic)   Wt 89.8 kg (198 lb)   LMP 09/24/1990   SpO2 97%   BMI 34.80 kg/m    EXAM:  GENERAL APPEARANCE: healthy, alert and no distress  EYES: EOMI,  PERRL, no corneal abrasion, snellen 20/20 B/L/R  HENT: ear canals and TM's normal and nose and mouth without ulcers or lesions  RESP: lungs clear to auscultation - no rales, rhonchi or wheezes  CV: regular rates and rhythm, normal S1 S2, no S3 or S4 and no murmur, click or rub -  ABDOMEN:  soft, nontender, no HSM or masses and bowel sounds normal    ASSESSMENT/PLAN:  Eye concern  Pt reassured

## 2020-07-21 NOTE — TELEPHONE ENCOUNTER
Routing refill request to provider for review/approval because:  Drug not on the [facility] refill protocol     Adenike Ellsworth RN   [facility] -- Triage Nurse

## 2020-07-22 RX ORDER — CLOBETASOL PROPIONATE 0.5 MG/G
CREAM TOPICAL
Qty: 270 G | Refills: 0 | Status: SHIPPED | OUTPATIENT
Start: 2020-07-22 | End: 2020-12-30

## 2020-07-30 ENCOUNTER — PATIENT OUTREACH (OUTPATIENT)
Dept: GERIATRIC MEDICINE | Facility: CLINIC | Age: 75
End: 2020-07-30

## 2020-08-10 ENCOUNTER — ANTICOAGULATION THERAPY VISIT (OUTPATIENT)
Dept: ANTICOAGULATION | Facility: CLINIC | Age: 75
End: 2020-08-10
Payer: COMMERCIAL

## 2020-08-10 DIAGNOSIS — Z86.718 PERSONAL HISTORY OF DVT (DEEP VEIN THROMBOSIS): ICD-10-CM

## 2020-08-10 DIAGNOSIS — Z79.01 LONG TERM CURRENT USE OF ANTICOAGULANT THERAPY: ICD-10-CM

## 2020-08-10 LAB
CAPILLARY BLOOD COLLECTION: NORMAL
INR PPP: 1.9 (ref 0.86–1.14)

## 2020-08-10 PROCEDURE — 99207 ZZC NO CHARGE NURSE ONLY: CPT | Performed by: INTERNAL MEDICINE

## 2020-08-10 PROCEDURE — 36416 COLLJ CAPILLARY BLOOD SPEC: CPT | Performed by: INTERNAL MEDICINE

## 2020-08-10 PROCEDURE — 85610 PROTHROMBIN TIME: CPT | Performed by: INTERNAL MEDICINE

## 2020-08-10 NOTE — PROGRESS NOTES
ANTICOAGULATION FOLLOW-UP CLINIC VISIT    Patient Name:  Gemma Cowart  Date:  8/10/2020  Contact Type:  Telephone/ Called patient, she reports diet change, she denies any other changes. Orders discussed with patient, she agrees with plan. Lab INR appointment scheduled on 20.    SUBJECTIVE:  Patient Findings     Positives:   Change in diet/appetite (Patient reports had a lot more green veggies than usual. )    Comments:   The patient was assessed for diet, medication, and activity level changes, missed or extra doses, bruising or bleeding, with no problem findings. Maintenance warfarin dosing was reviewed with patient and will remain on the same dose until next INR check. Patient did not have any questions or concerns.             Clinical Outcomes     Comments:   The patient was assessed for diet, medication, and activity level changes, missed or extra doses, bruising or bleeding, with no problem findings. Maintenance warfarin dosing was reviewed with patient and will remain on the same dose until next INR check. Patient did not have any questions or concerns.                OBJECTIVE    Recent labs: (last 7 days)     08/10/20  0851   INR 1.90*       ASSESSMENT / PLAN  INR assessment SUB    Recheck INR In: 2 WEEKS    INR Location Outside lab      Anticoagulation Summary  As of 8/10/2020    INR goal:   2.0-3.0   TTR:   59.7 % (1 y)   INR used for dosin.90! (8/10/2020)   Warfarin maintenance plan:   7.5 mg (5 mg x 1.5) every day   Full warfarin instructions:   7.5 mg every day   Weekly warfarin total:   52.5 mg   No change documented:   Soraya Keating RN   Plan last modified:   Rae Cooley RN (2020)   Next INR check:   2020   Priority:   Maintenance   Target end date:   Indefinite    Indications    Personal history of DVT (deep vein thrombosis) [Z86.718]  Long term current use of anticoagulant therapy [Z79.01]             Anticoagulation Episode Summary     INR check location:   Clinic  Lab    Preferred lab:       Send INR reminders to:   TOBIAS THOMPSON    Comments:   5mg tabs - HS dose // CALENDAR // gastric bypass 2000 // DVT 11/16/12 warfarin until Apr 2013 // DVT 9/12/15      Anticoagulation Care Providers     Provider Role Specialty Phone number    Alva Viera MD  Internal Medicine [phone]            See the Encounter Report to view Anticoagulation Flowsheet and Dosing Calendar (Go to Encounters tab in chart review, and find the Anticoagulation Therapy Visit)    Dosage adjustment made based on physician directed care plan.    Soraya Keating RN

## 2020-08-17 NOTE — PROGRESS NOTES
[facility] Care Coordination Contact    TC from member. She needs new hearing aids. She had her hearing tested, but the location she went to was unable to provide the hearing aids themselves. She called [facility] and went to another location to get the hearing aids, but they also were not able to provide the hearing aids through her insurance. She called [facility] again and went to a third location to get her hearing aids. They explained to her that in order for them to provide the hearing aids through her MA they need to have an audiologist on staff, and they no longer have one. Gemma was very frustrated and didn't trust [facility] to help her find a location for this. She is also concerned that she will be charged out of pocket for a new hearing test as many locations want to perform their own tests prior to getting member's their hearing aids.     CC reached out to the Clinical Liaison at [facility] for assistance on this. They asked for the names of the locations provided by [facility]. Gemma could only find 2 of the locations she went to:    [facility] ([phone], and  "[facility] in [place] ([phone]).    [facility] came back with the following suggested locations. They also stated that Gemma should contact [facility] for assistance getting the hearing aid appointment set up. Their number is [phone].    [facility] in [place] [phone]    [facility] in [place], [phone]    [facility] in [place] [phone]    Nighat Carbajal, RN, BSN, PHN  [facility] Coordinator  [phone]

## 2020-08-18 DIAGNOSIS — E11.69 TYPE 2 DIABETES MELLITUS WITH OTHER SPECIFIED COMPLICATION, UNSPECIFIED WHETHER LONG TERM INSULIN USE (H): ICD-10-CM

## 2020-08-18 NOTE — TELEPHONE ENCOUNTER
Routing refill request to provider for review/approval because:  Drug not active on patient's medication list    Adenike Ellsworth RN   [facility] -- Triage Nurse

## 2020-08-19 RX ORDER — LANCETS 30 GAUGE
EACH MISCELLANEOUS
Qty: 100 EACH | Status: SHIPPED | OUTPATIENT
Start: 2020-08-19 | End: 2021-07-13

## 2020-08-26 ENCOUNTER — TELEPHONE (OUTPATIENT)
Dept: PEDIATRICS | Facility: CLINIC | Age: 75
End: 2020-08-26

## 2020-08-26 ENCOUNTER — ANTICOAGULATION THERAPY VISIT (OUTPATIENT)
Dept: ANTICOAGULATION | Facility: CLINIC | Age: 75
End: 2020-08-26
Payer: COMMERCIAL

## 2020-08-26 DIAGNOSIS — Z86.718 PERSONAL HISTORY OF DVT (DEEP VEIN THROMBOSIS): ICD-10-CM

## 2020-08-26 DIAGNOSIS — Z79.01 LONG TERM CURRENT USE OF ANTICOAGULANT THERAPY: ICD-10-CM

## 2020-08-26 DIAGNOSIS — Z79.01 LONG TERM CURRENT USE OF ANTICOAGULANT THERAPY: Primary | ICD-10-CM

## 2020-08-26 LAB
CAPILLARY BLOOD COLLECTION: NORMAL
INR PPP: 7.3 (ref 0.86–1.14)

## 2020-08-26 PROCEDURE — 99207 ZZC NO CHARGE NURSE ONLY: CPT | Performed by: INTERNAL MEDICINE

## 2020-08-26 PROCEDURE — 85610 PROTHROMBIN TIME: CPT | Performed by: INTERNAL MEDICINE

## 2020-08-26 PROCEDURE — 36416 COLLJ CAPILLARY BLOOD SPEC: CPT | Performed by: INTERNAL MEDICINE

## 2020-08-26 NOTE — TELEPHONE ENCOUNTER
ANTICOAGULATION MANAGEMENT      Gemma Cowart due for annual renewal of referral to anticoagulation monitoring. Order pended for your review and signature.      ANTICOAGULATION SUMMARY      Warfarin indication(s)     DVT    Heart valve present?  NO       Current goal range   INR: 2.0-3.0     Goal appropriate for indication? Yes, INR 2-3 appropriate for hx of DVT, PE, hypercoagulable state, Afib, LVAD, or bileaflet AVR without risk factors     Current duration of therapy Indefinite/long term therapy   Time in Therapeutic Range (TTR)  (Goal > 60%)   57.2 %       Office visit with referring provider's group within last year yes on 9/6/19       Christelle Giles RN

## 2020-08-26 NOTE — PROGRESS NOTES
ANTICOAGULATION FOLLOW-UP CLINIC VISIT    Patient Name:  Gemma Cowart  Date:  2020  Contact Type:  Telephone/ discussed with patient    SUBJECTIVE:  Patient Findings     Positives:   Change in health (allergies to ragweed are flairing up.  History of IBS.  Has had 1 loose stool daily for about 2 weeks, but this is normal for her.), Change in activity (more activity (moving from daughter's house in about a week.)    Comments:   The patient was assessed for diet, medication, missed or extra doses, bruising or bleeding, with no problem findings.          Clinical Outcomes     Negatives:   Major bleeding event, Thromboembolic event, Anticoagulation-related hospital admission, Anticoagulation-related ED visit, Anticoagulation-related fatality    Comments:   The patient was assessed for diet, medication, missed or extra doses, bruising or bleeding, with no problem findings.             OBJECTIVE    Recent labs: (last 7 days)     20  0915   INR 7.30*       ASSESSMENT / PLAN  INR assessment SUPRA    Recheck INR In: 2 DAYS    INR Location Clinic      Anticoagulation Summary  As of 2020    INR goal:   2.0-3.0   TTR:   57.5 % (1 y)   INR used for dosin.30! (2020)   Warfarin maintenance plan:   7.5 mg (5 mg x 1.5) every day   Full warfarin instructions:   : Hold; : Hold; Otherwise 7.5 mg every day   Weekly warfarin total:   52.5 mg   Plan last modified:   Rae Cooley, RN (2020)   Next INR check:   2020   Priority:   Maintenance   Target end date:   Indefinite    Indications    Personal history of DVT (deep vein thrombosis) [Z86.718]  Long term current use of anticoagulant therapy [Z79.01]             Anticoagulation Episode Summary     INR check location:   Clinic Lab    Preferred lab:       Send INR reminders to:   TOBIAS THOMPSON    Comments:   5mg tabs - HS dose // CALENDAR // gastric bypass  // DVT 12 warfarin until 2013 // DVT 9/12/15      Anticoagulation  Care Providers     Provider Role Specialty Phone number    Alva Viera MD  Internal Medicine [phone]            See the Encounter Report to view Anticoagulation Flowsheet and Dosing Calendar (Go to Encounters tab in chart review, and find the Anticoagulation Therapy Visit)    Dosage adjustment made based on physician directed care plan.    Christelle Giles RN

## 2020-08-28 ENCOUNTER — ANTICOAGULATION THERAPY VISIT (OUTPATIENT)
Dept: NURSING | Facility: CLINIC | Age: 75
End: 2020-08-28
Payer: COMMERCIAL

## 2020-08-28 DIAGNOSIS — Z86.718 PERSONAL HISTORY OF DVT (DEEP VEIN THROMBOSIS): ICD-10-CM

## 2020-08-28 DIAGNOSIS — Z79.01 LONG TERM CURRENT USE OF ANTICOAGULANT THERAPY: ICD-10-CM

## 2020-08-28 LAB
CAPILLARY BLOOD COLLECTION: NORMAL
INR PPP: 2.9 (ref 0.86–1.14)

## 2020-08-28 PROCEDURE — 99207 ZZC NO CHARGE NURSE ONLY: CPT

## 2020-08-28 PROCEDURE — 85610 PROTHROMBIN TIME: CPT | Performed by: INTERNAL MEDICINE

## 2020-08-28 PROCEDURE — 36416 COLLJ CAPILLARY BLOOD SPEC: CPT | Performed by: INTERNAL MEDICINE

## 2020-08-28 NOTE — PROGRESS NOTES
Anticoagulation Management    Unable to reach Gemma today x 2.    Today's INR result of 2.9 is therapeutic (goal INR of 2.0-3.0).  Result received from: Clinic Lab    Follow up required to:   Confirm warfarin dose taken and assess for changes  Discuss dosing instructions and confirm understanding of instructions    Left message to call back.  Left message to continue maintenance dose and follow up in 1 week. Appt on 9/4/2020 already made.      Anticoagulation clinic to follow up    [facility]

## 2020-08-31 NOTE — PROGRESS NOTES
ANTICOAGULATION FOLLOW-UP     Patient Name:  Gemma Cowart  Date:  2020  Contact Type:  Telephone    SUBJECTIVE:  Patient Findings     Positives:   Bruising (Small bruises on arms that are resolving)    Comments:   2020  Unable to reach patient by phone.  Left message on voicemail.    2020  Spoke to patient by phone.  She moved over the weekend to [place] and didn't have her phone handy.  In the future she will probably want to have her INR done closer to home.    She got the message from Friday.  The patient was assessed for   diet, medication,   missed or extra doses,   bleeding,   with no problem findings.  Reviewed previous warfarin dosing with patient.  She took warfarin as instructed.    INR was therapeutic on Friday.   Patient will continue same maintenance dose.   Follow up in 1 week on 2020.          Clinical Outcomes     Comments:   2020  Unable to reach patient by phone.  Left message on voicemail.    2020  Spoke to patient by phone.  She moved over the weekend to [place] and didn't have her phone handy.  In the future she will probably want to have her INR done closer to home.    She got the message from Friday.  The patient was assessed for   diet, medication,   missed or extra doses,   bleeding,   with no problem findings.  Reviewed previous warfarin dosing with patient.  She took warfarin as instructed.    INR was therapeutic on Friday.   Patient will continue same maintenance dose.   Follow up in 1 week on 2020.             OBJECTIVE    Recent labs: (last 7 days)     20  1327   INR 2.90*       ASSESSMENT / PLAN  INR assessment THER    Recheck INR In: 1 WEEK    INR Location Clinic lab     Anticoagulation Summary  As of 2020    INR goal:   2.0-3.0   TTR:   56.9 % (1 y)   INR used for dosin.90 (2020)   Warfarin maintenance plan:   7.5 mg (5 mg x 1.5) every day   Full warfarin instructions:   7.5 mg every day   Weekly warfarin  total:   52.5 mg   Plan last modified:   Rae Cooley RN (7/14/2020)   Next INR check:   9/4/2020   Priority:   Maintenance   Target end date:   Indefinite    Indications    Personal history of DVT (deep vein thrombosis) [Z86.718]  Long term current use of anticoagulant therapy [Z79.01]             Anticoagulation Episode Summary     INR check location:   Clinic Lab    Preferred lab:       Send INR reminders to:   TOBIAS THOMPSON    Comments:   5mg tabs - HS dose // CALENDAR // gastric bypass 2000 // DVT 11/16/12 warfarin until Apr 2013 // DVT 9/12/15      Anticoagulation Care Providers     Provider Role Specialty Phone number    Alva Viera MD Referring Internal Medicine [phone]            See the Encounter Report to view Anticoagulation Flowsheet and Dosing Calendar (Go to Encounters tab in chart review, and find the Anticoagulation Therapy Visit)    INR was therapeutic on Friday.   Patient will continue same maintenance dose.   Follow up in 1 week on Friday 9/4/2020.        Rae Cooley RN

## 2020-09-04 ENCOUNTER — ANTICOAGULATION THERAPY VISIT (OUTPATIENT)
Dept: NURSING | Facility: CLINIC | Age: 75
End: 2020-09-04
Payer: COMMERCIAL

## 2020-09-04 DIAGNOSIS — Z86.718 PERSONAL HISTORY OF DVT (DEEP VEIN THROMBOSIS): ICD-10-CM

## 2020-09-04 DIAGNOSIS — Z79.01 LONG TERM CURRENT USE OF ANTICOAGULANT THERAPY: ICD-10-CM

## 2020-09-04 LAB
CAPILLARY BLOOD COLLECTION: NORMAL
INR PPP: 5.2 (ref 0.86–1.14)

## 2020-09-04 PROCEDURE — 99207 ZZC NO CHARGE NURSE ONLY: CPT

## 2020-09-04 PROCEDURE — 36416 COLLJ CAPILLARY BLOOD SPEC: CPT | Performed by: INTERNAL MEDICINE

## 2020-09-04 PROCEDURE — 85610 PROTHROMBIN TIME: CPT | Performed by: INTERNAL MEDICINE

## 2020-09-04 NOTE — PROGRESS NOTES
"Anticoagulation Management    Unable to reach Gemma today.    Today's INR result of 5.2 is supratherapeutic (goal INR of 2.0-3.0).  Result received from: Clinic Lab    Follow up required to confirm warfarin dose taken and assess for changes    Left message to hold warfarin tonight.   Left VM to call Duglas with transfer to [facility] at: [phone]      Anticoagulation clinic to follow up    Denisse Jefferson RN    ANTICOAGULATION FOLLOW-UP CLINIC VISIT    Patient Name:  Gemma Cowart  Date:  9/4/2020  Contact Type:  Telephone    SUBJECTIVE:  Patient Findings     Positives:   Change in health (Feeling well, no currtent IBS symptoms and ragweed allergies have not bothered her this week.), Missed doses (Intentional hold on 08/26 and 08/27/20 due to supra INR), Change in diet/appetite (Pt ate 2 spinach salads last week and \"a lot\" of cucumbers and green peppers, she also has been eating green beans.  Pt states she typically eats a small amount of greens daily, she will continue.), Bruising (more bruising than usual)    Comments:   2nd supra INR so warfarin maintenance dose decreased 9.5%, OK with CHER MatsonD.  NOTE TO [facility] RN: Huyen would like you to huddle with her if INR is slightly sub-therapeutic or in low 2's (may move 5mg to MF).  Patient finishing moving to her own place today. She will not have a ride to clinic on 09/08 so next INR will be on 09/09 at  lab (patient lives in  now).  Follow up INR visit scheduled for 09/15/20 per suggestion of Darlene Matson.          Clinical Outcomes     Negatives:   Major bleeding event, Thromboembolic event, Anticoagulation-related hospital admission, Anticoagulation-related ED visit, Anticoagulation-related fatality    Comments:   2nd supra INR so warfarin maintenance dose decreased 9.5%, OK with CHER Matson PharmD.  NOTE TO [facility] RN: Huyen would like you to huddle with her if INR is slightly sub-therapeutic or in low 2's (may move 5mg to MF).  Patient " finishing moving to her own place today. She will not have a ride to clinic on  so next INR will be on  at  lab ([place]).  Follow up INR visit scheduled for 09/15/20 per suggestion of Darlene Matson.             OBJECTIVE    Recent labs: (last 7 days)     20  1225   INR 5.20*       ASSESSMENT / PLAN  INR assessment SUPRA    Recheck INR In: 5 DAYS    INR Location Clinic      Anticoagulation Summary  As of 2020    INR goal:   2.0-3.0   TTR:   55.4 % (1 y)   INR used for dosin.20! (2020)   Warfarin maintenance plan:   5 mg (5 mg x 1) every Tue, Sat; 7.5 mg (5 mg x 1.5) all other days   Full warfarin instructions:   : Hold; Otherwise 5 mg every Tue, Sat; 7.5 mg all other days   Weekly warfarin total:   47.5 mg   Plan last modified:   Denisse Jefferson RN (2020)   Next INR check:   2020   Priority:   High   Target end date:   Indefinite    Indications    Personal history of DVT (deep vein thrombosis) [Z86.718]  Long term current use of anticoagulant therapy [Z79.01]             Anticoagulation Episode Summary     INR check location:   Clinic Lab    Preferred lab:       Send INR reminders to:   TOBIAS THOMPSON    Comments:   5mg tabs - HS dose // CALENDAR // gastric bypass  // DVT 12 warfarin until 2013 // DVT 9/12/15      Anticoagulation Care Providers     Provider Role Specialty Phone number    Alva Viera MD Referring Internal Medicine [phone]            See the Encounter Report to view Anticoagulation Flowsheet and Dosing Calendar (Go to Encounters tab in chart review, and find the Anticoagulation Therapy Visit)        Denisse Jefferson, RN

## 2020-09-04 NOTE — PROGRESS NOTES
Patient returned call to [facility], unable to reach [facility] nurse.    Please followup with patient when able.    Thanks,   Ally Lucas RN

## 2020-09-09 ENCOUNTER — ANTICOAGULATION THERAPY VISIT (OUTPATIENT)
Dept: NURSING | Facility: CLINIC | Age: 75
End: 2020-09-09
Payer: COMMERCIAL

## 2020-09-09 DIAGNOSIS — Z86.718 PERSONAL HISTORY OF DVT (DEEP VEIN THROMBOSIS): ICD-10-CM

## 2020-09-09 DIAGNOSIS — Z79.01 LONG TERM CURRENT USE OF ANTICOAGULANT THERAPY: ICD-10-CM

## 2020-09-09 LAB
CAPILLARY BLOOD COLLECTION: NORMAL
INR PPP: 4 (ref 0.86–1.14)

## 2020-09-09 PROCEDURE — 99207 ZZC NO CHARGE NURSE ONLY: CPT

## 2020-09-09 PROCEDURE — 85610 PROTHROMBIN TIME: CPT | Performed by: INTERNAL MEDICINE

## 2020-09-09 PROCEDURE — 36416 COLLJ CAPILLARY BLOOD SPEC: CPT | Performed by: INTERNAL MEDICINE

## 2020-09-09 RX ORDER — WARFARIN SODIUM 5 MG/1
TABLET ORAL
Qty: 144 TABLET | Refills: 1
Start: 2020-09-09 | End: 2020-10-02

## 2020-09-09 NOTE — PROGRESS NOTES
"ANTICOAGULATION FOLLOW-UP CLINIC VISIT    Patient Name:  Gemma Cowart  Date:  9/9/2020  Contact Type:  Telephone    SUBJECTIVE:  Patient Findings     Comments:   Yasmin WOODS, Huyen Keating consulted at last INR, so asked her to weigh in on dosing again this week. Per consultation: Reduce maintenance dose by about 10%, have patient take only 2.5 mg today, then recheck in 10 days. Continue to ask patient about ETOH use, ragweed allergies and IBS symptoms.  Called patient to discuss today's INR results: No diarrhea, allergies are doing \"pretty good\", has some bruising, no increase in alcohol intake (doesn't drink). No other changes or concerns today. Discussed dosing changes above, patient wrote down instructions and will recheck INR 9/18/20.  Donna BURROWS RN  Anticoagulation Clinic  [facility]          Clinical Outcomes     Negatives:   Major bleeding event, Thromboembolic event, Anticoagulation-related hospital admission, Anticoagulation-related ED visit, Anticoagulation-related fatality    Comments:   Yasmin WOODS, Huyen Keating consulted at last INR, so asked her to weigh in on dosing again this week. Per consultation: Reduce maintenance dose by about 10%, have patient take only 2.5 mg today, then recheck in 10 days. Continue to ask patient about ETOH use, ragweed allergies and IBS symptoms.  Called patient to discuss today's INR results: No diarrhea, allergies are doing \"pretty good\", has some bruising, no increase in alcohol intake (doesn't drink). No other changes or concerns today. Discussed dosing changes above, patient wrote down instructions and will recheck INR 9/18/20.  Donna BURROWS RN  Anticoagulation Clinic  [facility]             OBJECTIVE    Recent labs: (last 7 days)     09/09/20  1104   INR 4.00*       ASSESSMENT / PLAN  INR assessment SUPRA    Recheck INR In: 9 DAYS    INR Location Clinic      Anticoagulation Summary  As of 9/9/2020    INR goal:   2.0-3.0   TTR:   55.0 % (1 y)   INR " used for dosin.00! (2020)   Warfarin maintenance plan:   7.5 mg (5 mg x 1.5) every Mon, Wed, Fri; 5 mg (5 mg x 1) all other days   Full warfarin instructions:   : 2.5 mg; Otherwise 7.5 mg every Mon, Wed, Fri; 5 mg all other days   Weekly warfarin total:   42.5 mg   Plan last modified:   Donna Calabrese RN (2020)   Next INR check:   2020   Priority:   High   Target end date:   Indefinite    Indications    Personal history of DVT (deep vein thrombosis) [Z86.718]  Long term current use of anticoagulant therapy [Z79.01]             Anticoagulation Episode Summary     INR check location:   Clinic Lab    Preferred lab:       Send INR reminders to:   TOBIAS THOMPSON    Comments:   5mg tabs - HS dose // CALENDAR // gastric bypass  // DVT 12 warfarin until 2013 // DVT 9/12/15      Anticoagulation Care Providers     Provider Role Specialty Phone number    Alva Viera MD Referring Internal Medicine [phone]            See the Encounter Report to view Anticoagulation Flowsheet and Dosing Calendar (Go to Encounters tab in chart review, and find the Anticoagulation Therapy Visit)    Dosage adjustment made based on physician directed care plan.    Donna Calabrese RN

## 2020-09-18 ENCOUNTER — ANTICOAGULATION THERAPY VISIT (OUTPATIENT)
Dept: NURSING | Facility: CLINIC | Age: 75
End: 2020-09-18
Payer: COMMERCIAL

## 2020-09-18 DIAGNOSIS — Z86.718 PERSONAL HISTORY OF DVT (DEEP VEIN THROMBOSIS): ICD-10-CM

## 2020-09-18 DIAGNOSIS — Z79.01 LONG TERM CURRENT USE OF ANTICOAGULANT THERAPY: ICD-10-CM

## 2020-09-18 LAB
CAPILLARY BLOOD COLLECTION: NORMAL
INR PPP: 2.2 (ref 0.86–1.14)

## 2020-09-18 PROCEDURE — 99207 ZZC NO CHARGE NURSE ONLY: CPT

## 2020-09-18 PROCEDURE — 85610 PROTHROMBIN TIME: CPT | Performed by: PHYSICIAN ASSISTANT

## 2020-09-18 PROCEDURE — 36416 COLLJ CAPILLARY BLOOD SPEC: CPT | Performed by: PHYSICIAN ASSISTANT

## 2020-09-18 NOTE — PROGRESS NOTES
ANTICOAGULATION FOLLOW-UP CLINIC VISIT    Patient Name:  Gemma Cowart  Date:  2020  Contact Type:  Telephone    SUBJECTIVE:  Patient Findings     Positives:   Change in health (Loose stool today due to IBS)        Clinical Outcomes     Negatives:   Major bleeding event, Thromboembolic event, Anticoagulation-related hospital admission, Anticoagulation-related ED visit, Anticoagulation-related fatality           OBJECTIVE    Recent labs: (last 7 days)     20  1403   INR 2.20*       ASSESSMENT / PLAN  INR assessment THER    Recheck INR In: 1 WEEK    INR Location Clinic      Anticoagulation Summary  As of 2020    INR goal:   2.0-3.0   TTR:   53.6 % (1 y)   INR used for dosin.20 (2020)   Warfarin maintenance plan:   7.5 mg (5 mg x 1.5) every Mon, Wed, Fri; 5 mg (5 mg x 1) all other days   Full warfarin instructions:   7.5 mg every Mon, Wed, Fri; 5 mg all other days   Weekly warfarin total:   42.5 mg   Plan last modified:   Denisse Jefferson RN (2020)   Next INR check:   2020   Priority:   High   Target end date:   Indefinite    Indications    Personal history of DVT (deep vein thrombosis) [Z86.718]  Long term current use of anticoagulant therapy [Z79.01]             Anticoagulation Episode Summary     INR check location:   Clinic Lab    Preferred lab:       Send INR reminders to:   TOBIAS THOMPSON    Comments:   5mg tabs - HS dose // CALENDAR // gastric bypass  // DVT 12 warfarin until 2013 // DVT 9/12/15      Anticoagulation Care Providers     Provider Role Specialty Phone number    Alva Viera MD Referring Internal Medicine [phone]            See the Encounter Report to view Anticoagulation Flowsheet and Dosing Calendar (Go to Encounters tab in chart review, and find the Anticoagulation Therapy Visit)        Denisse Jefferson, RN

## 2020-09-24 ENCOUNTER — VIRTUAL VISIT (OUTPATIENT)
Dept: FAMILY MEDICINE | Facility: CLINIC | Age: 75
End: 2020-09-24
Payer: COMMERCIAL

## 2020-09-24 ENCOUNTER — TELEPHONE (OUTPATIENT)
Dept: FAMILY MEDICINE | Facility: CLINIC | Age: 75
End: 2020-09-24

## 2020-09-24 DIAGNOSIS — J06.9 UPPER RESPIRATORY TRACT INFECTION, UNSPECIFIED TYPE: Primary | ICD-10-CM

## 2020-09-24 PROCEDURE — 99213 OFFICE O/P EST LOW 20 MIN: CPT | Mod: 95 | Performed by: PHYSICIAN ASSISTANT

## 2020-09-24 NOTE — TELEPHONE ENCOUNTER
Reason for call:  Other   Patient called regarding (reason for call): ERICA    Additional comments: Gemma was calling to just advise that if [facility] was not able to get her in to  for her covid testing that [facility] can do it but they will need an order.     Phone number to reach patient:  Home number on file [phone] (home)    Best Time:  any    Can we leave a detailed message on this number?  YES    Travel screening: Not Applicable

## 2020-09-24 NOTE — PROGRESS NOTES
"Gemma Cowart is a 74 year old female who is being evaluated via a billable telephone visit.      The patient has been notified of following:     \"This telephone visit will be conducted via a call between you and your physician/provider. We have found that certain health care needs can be provided without the need for a physical exam.  This service lets us provide the care you need with a short phone conversation.  If a prescription is necessary we can send it directly to your pharmacy.  If lab work is needed we can place an order for that and you can then stop by our lab to have the test done at a later time.    Telephone visits are billed at different rates depending on your insurance coverage. During this emergency period, for some insurers they may be billed the same as an in-person visit.  Please reach out to your insurance provider with any questions.    If during the course of the call the physician/provider feels a telephone visit is not appropriate, you will not be charged for this service.\"    Patient has given verbal consent for Telephone visit?  Yes    What phone number would you like to be contacted at? [phone]    How would you like to obtain your AVS? MyChart    Subjective     Gemma Cowart is a 74 year old female who presents via phone visit today for the following health issues:    HPI    Acute Illness   Acute illness concerns: Cold Symptoms   Onset/Duration: This morning   Symptoms:  Fever: no  Chills/Sweats: YES- chills   Headache (location?): YES  Sinus Pressure: YES- mild   Conjunctivitis:  no  Ear Pain: no  Rhinorrhea: YES- mild  Congestion: no  Sore Throat: YES  Cough: no  Wheeze: no  Decreased Appetite: no  Nausea: no  Vomiting: no  Diarrhea: YES- last 3 days,but does have IBS  Dysuria/Freq.: no  Dysuria or Hematuria: no  Fatigue/Achiness: YES- neck and shoulders   Sick/Strep Exposure: no  Therapies tried and outcome: None      Gemam Cowart is a 74 year old female who presents " "today for telephone visit to discuss new onset upper respiratory symptoms  Felt fine yesterday, save for some diarrhea (which has improved; reports hx of IBS)  She woke this morning and had a \"tension headache\"  Later in the morning developed more of a \"plugged head\", sore throat and some body aching - mostly shoulder and neck  There is no light sensitivity  Voice is starting to crack as well  She did check her temperature and it was normal  Is in the process of moving and has had new exposures at her new residence but has been cautious.  Denies any other known exposure        Review of Systems   Constitutional, HEENT, cardiovascular, pulmonary, gi and gu systems are negative, except as otherwise noted.       Objective          Vitals:  No vitals were obtained today due to virtual visit.    healthy, alert and no distress  PSYCH: Alert and oriented times 3; coherent speech, normal   rate and volume, able to articulate logical thoughts, able   to abstract reason, no tangential thoughts, no hallucinations   or delusions  Her affect is normal  RESP: No cough, no audible wheezing, able to talk in full sentences  Remainder of exam unable to be completed due to telephone visits            Assessment/Plan:    Assessment & Plan     Upper respiratory tract infection, unspecified type  New onset symptoms this morning. No fever and able to do all ADLs. Ok for tylenol prn to ehlp with symptoms. Do recommend screening given new exposures and symptoms. Will add to [facility] loop. Plan to delay other routine blood work (INR) until testing results - ideally next week. Follow-up in clinic if symptoms worsen  - Symptomatic COVID-19 Virus (Coronavirus) by PCR; Future     BMI:   Estimated body mass index is 34.8 kg/m  as calculated from the following:    Height as of 10/16/19: 1.607 m (5' 3.25\").    Weight as of 7/21/20: 89.8 kg (198 lb).       Return in about 4 days (around 9/28/2020) for recheck if symptoms are not improving..    Art " James Torres PA-C  [facility]    Phone call duration:  15 minutes

## 2020-09-24 NOTE — PATIENT INSTRUCTIONS
Instructions for Patients  It is recommended that you have a test for coronavirus (COVID-19). This illness can cause fever, cough and trouble breathing. Many people get a mild case and get better on their own. Some people can get very sick.     Please follow these steps:    1. We will call to schedule your test.  2. A member of our care team will ask you some questions. Then, they will use a swab to collect samples from your nose and throat.     Our testing team will send you your test results.    How can I protect others?    Stay home and away from others (self-isolate) until:    You ve had no fever--and no medicine that reduces fever--for 1 full day (24 hours). And      Your other symptoms have resolved (gotten better). For example, your cough or breathing has improved. And     At least 10 days have passed since your symptoms started.    Stay at least 6 feet away from others. (If someone will drive you to your test, stay in the backseat, as far away from the  as you can.)     Don t go to work, school or anywhere else. When it s time for your test, go straight to the testing site. Don t make any stops on the way there or back.     Wash your hands and face often. Use soap and water.     Cover your mouth and nose with a mask, tissue or washcloth.     Don t touch anyone. No hugging, kissing or handshakes.    How can I take care of myself?    1. Get lots of rest. Drink extra fluids (unless a doctor has told you not to).     2. Take Tylenol (acetaminophen) for fever or pain. If you have liver or kidney problems, ask your family doctor if it's okay to take Tylenol.     Adults can take either:     650 mg (two 325 mg pills) every 4 to 6 hours, or     1,000 mg (two 500 mg pills) every 8 hours as needed.     Note: Don't take more than 3,000 mg in one day.   Acetaminophen is found in many medicines (both prescribed and over-the-counter medicines). Read all labels to be sure you don't take too much.   For children, check  the Tylenol bottle for the right dose. The dose is based on  the child's age or weight.    3. If you have other health problems (like cancer, heart failure, an organ transplant or severe kidney disease): Call your specialty clinic if you don't feel better in the next 2 days.    4. Know when to call 911: If your breathing is so bad that it keeps you from doing normal activities, call 911 or go to the emergency room. Tell them that you've been staying home and may have COVID-19.      Thank you for taking steps to prevent the spread of this virus.  o Limit your contact with others.  o Wear a simple mask to cover your cough.  o Wash your hands well and often.  o If you need medical care, go to [email or web address] or contact your health care provider.     For more about COVID-19 and caring for yourself at home, visit the CDC website at https://www.cdc.gov/coronavirus/2019-ncov/about/steps-when-sick.html.     To learn about care at [facility], please go to [email or web address].     [facility] clinical trials (COVID-19 research studies): [email or web address]-clinical-trials.    Below are the COVID-19 hotlines at the [facility] Interpreters are available.     For health questions: Call [phone] or 1-619.440.7301 (7 a.m. to 7 p.m.)    For questions about schools and childcare: Call [phone] or 1-569.168.7741 (7 a.m. to 7 p.m.)

## 2020-10-02 ENCOUNTER — ALLIED HEALTH/NURSE VISIT (OUTPATIENT)
Dept: NURSING | Facility: CLINIC | Age: 75
End: 2020-10-02
Payer: COMMERCIAL

## 2020-10-02 ENCOUNTER — ANTICOAGULATION THERAPY VISIT (OUTPATIENT)
Dept: FAMILY MEDICINE | Facility: CLINIC | Age: 75
End: 2020-10-02
Payer: COMMERCIAL

## 2020-10-02 DIAGNOSIS — Z86.718 PERSONAL HISTORY OF DVT (DEEP VEIN THROMBOSIS): ICD-10-CM

## 2020-10-02 DIAGNOSIS — Z23 NEED FOR PROPHYLACTIC VACCINATION AND INOCULATION AGAINST INFLUENZA: Primary | ICD-10-CM

## 2020-10-02 DIAGNOSIS — Z85.3 HISTORY OF LEFT BREAST CANCER: ICD-10-CM

## 2020-10-02 DIAGNOSIS — Z79.01 LONG TERM CURRENT USE OF ANTICOAGULANT THERAPY: ICD-10-CM

## 2020-10-02 LAB
ALBUMIN SERPL-MCNC: 3.4 G/DL (ref 3.4–5)
ALP SERPL-CCNC: 73 U/L (ref 40–150)
ALT SERPL W P-5'-P-CCNC: 18 U/L (ref 0–50)
ANION GAP SERPL CALCULATED.3IONS-SCNC: 7 MMOL/L (ref 3–14)
AST SERPL W P-5'-P-CCNC: 15 U/L (ref 0–45)
BASOPHILS # BLD AUTO: 0 10E9/L (ref 0–0.2)
BASOPHILS NFR BLD AUTO: 0.5 %
BILIRUB SERPL-MCNC: 0.3 MG/DL (ref 0.2–1.3)
BUN SERPL-MCNC: 20 MG/DL (ref 7–30)
CALCIUM SERPL-MCNC: 8.7 MG/DL (ref 8.5–10.1)
CANCER AG27-29 SERPL-ACNC: 14 U/ML (ref 0–39)
CHLORIDE SERPL-SCNC: 112 MMOL/L (ref 94–109)
CO2 SERPL-SCNC: 23 MMOL/L (ref 20–32)
CREAT SERPL-MCNC: 1.47 MG/DL (ref 0.52–1.04)
DIFFERENTIAL METHOD BLD: ABNORMAL
EOSINOPHIL # BLD AUTO: 0.1 10E9/L (ref 0–0.7)
EOSINOPHIL NFR BLD AUTO: 2.5 %
ERYTHROCYTE [DISTWIDTH] IN BLOOD BY AUTOMATED COUNT: 14.1 % (ref 10–15)
GFR SERPL CREATININE-BSD FRML MDRD: 35 ML/MIN/{1.73_M2}
GLUCOSE SERPL-MCNC: 115 MG/DL (ref 70–99)
HCT VFR BLD AUTO: 39.6 % (ref 35–47)
HGB BLD-MCNC: 11.7 G/DL (ref 11.7–15.7)
INR PPP: 1.8 (ref 0.86–1.14)
LYMPHOCYTES # BLD AUTO: 0.8 10E9/L (ref 0.8–5.3)
LYMPHOCYTES NFR BLD AUTO: 19.1 %
MCH RBC QN AUTO: 24.2 PG (ref 26.5–33)
MCHC RBC AUTO-ENTMCNC: 29.5 G/DL (ref 31.5–36.5)
MCV RBC AUTO: 82 FL (ref 78–100)
MONOCYTES # BLD AUTO: 0.3 10E9/L (ref 0–1.3)
MONOCYTES NFR BLD AUTO: 7.6 %
NEUTROPHILS # BLD AUTO: 3.1 10E9/L (ref 1.6–8.3)
NEUTROPHILS NFR BLD AUTO: 70.3 %
PLATELET # BLD AUTO: 180 10E9/L (ref 150–450)
POTASSIUM SERPL-SCNC: 4.5 MMOL/L (ref 3.4–5.3)
PROT SERPL-MCNC: 6.5 G/DL (ref 6.8–8.8)
RBC # BLD AUTO: 4.83 10E12/L (ref 3.8–5.2)
SODIUM SERPL-SCNC: 142 MMOL/L (ref 133–144)
WBC # BLD AUTO: 4.4 10E9/L (ref 4–11)

## 2020-10-02 PROCEDURE — 36415 COLL VENOUS BLD VENIPUNCTURE: CPT | Performed by: INTERNAL MEDICINE

## 2020-10-02 PROCEDURE — 86300 IMMUNOASSAY TUMOR CA 15-3: CPT | Performed by: INTERNAL MEDICINE

## 2020-10-02 PROCEDURE — G0008 ADMIN INFLUENZA VIRUS VAC: HCPCS

## 2020-10-02 PROCEDURE — 80053 COMPREHEN METABOLIC PANEL: CPT | Performed by: INTERNAL MEDICINE

## 2020-10-02 PROCEDURE — 90662 IIV NO PRSV INCREASED AG IM: CPT

## 2020-10-02 PROCEDURE — 85610 PROTHROMBIN TIME: CPT | Performed by: INTERNAL MEDICINE

## 2020-10-02 PROCEDURE — 85025 COMPLETE CBC W/AUTO DIFF WBC: CPT | Performed by: INTERNAL MEDICINE

## 2020-10-02 PROCEDURE — 99207 PR NO CHARGE NURSE ONLY: CPT | Performed by: PHYSICIAN ASSISTANT

## 2020-10-02 RX ORDER — WARFARIN SODIUM 5 MG/1
TABLET ORAL
Qty: 144 TABLET | Refills: 1
Start: 2020-10-02 | End: 2020-10-13

## 2020-10-02 NOTE — PROGRESS NOTES
ANTICOAGULATION FOLLOW-UP CLINIC VISIT    Patient Name:  Gemma Cowart  Date:  10/2/2020  Contact Type:  Telephone    SUBJECTIVE:  Patient Findings     Comments:  Patient had covid testing one week ago at [facility]. It was negative. She states she had a cold that has since resolved. No changes in diet, meds or activity. No other health changes. This dosing of warfarin is relatively new (started 20) with only 1 in range INR since then. Will increase slightly by 5.9% and recheck in 2 weeks.         Clinical Outcomes     Negatives:  Major bleeding event, Thromboembolic event, Anticoagulation-related hospital admission, Anticoagulation-related ED visit, Anticoagulation-related fatality    Comments:  Patient had covid testing one week ago at [facility]. It was negative. She states she had a cold that has since resolved. No changes in diet, meds or activity. No other health changes. This dosing of warfarin is relatively new (started 20) with only 1 in range INR since then. Will increase slightly by 5.9% and recheck in 2 weeks.            OBJECTIVE    Recent labs: (last 7 days)     10/02/20  0913   INR 1.80*       ASSESSMENT / PLAN  INR assessment SUB    Recheck INR In: 2 WEEKS    INR Location Clinic      Anticoagulation Summary  As of 10/2/2020    INR goal:  2.0-3.0   TTR:  55.1 % (1 y)   INR used for dosin.80 (10/2/2020)   Warfarin maintenance plan:  5 mg (5 mg x 1) every Sun, Tue, Thu; 7.5 mg (5 mg x 1.5) all other days   Full warfarin instructions:  5 mg every Sun, Tue, Thu; 7.5 mg all other days   Weekly warfarin total:  45 mg   Plan last modified:  Carly Davalos RN (10/2/2020)   Next INR check:  10/16/2020   Priority:  High   Target end date:  Indefinite    Indications    Personal history of DVT (deep vein thrombosis) [Z86.718]  Long term current use of anticoagulant therapy [Z79.01]             Anticoagulation Episode Summary     INR check location:  Clinic Lab    Preferred lab:      Send INR  reminders to:  TOBIAS THOMPSON    Comments:  5mg tabs - HS dose // CALENDAR // gastric bypass 2000 // DVT 11/16/12 warfarin until Apr 2013 // DVT 9/12/15      Anticoagulation Care Providers     Provider Role Specialty Phone number    Alva Viera MD Referring Internal Medicine [phone]            See the Encounter Report to view Anticoagulation Flowsheet and Dosing Calendar (Go to Encounters tab in chart review, and find the Anticoagulation Therapy Visit)        Carly Davalos RN

## 2020-10-13 DIAGNOSIS — Z79.01 LONG TERM CURRENT USE OF ANTICOAGULANT THERAPY: ICD-10-CM

## 2020-10-13 DIAGNOSIS — Z86.718 PERSONAL HISTORY OF DVT (DEEP VEIN THROMBOSIS): ICD-10-CM

## 2020-10-13 RX ORDER — WARFARIN SODIUM 5 MG/1
TABLET ORAL
Qty: 120 TABLET | Refills: 1 | Status: SHIPPED | OUTPATIENT
Start: 2020-10-13 | End: 2020-12-30

## 2020-10-13 NOTE — TELEPHONE ENCOUNTER
"Reviewed last warfarin prescription and last INR on 10/2/20, which was subtherapeutic, maintenance dose was increased and next INR scheduled for 10/16/20. Sig updated and prescription approved per [facility] Refill Protocol.    Requested Prescriptions   Pending Prescriptions Disp Refills     warfarin ANTICOAGULANT (COUMADIN) 5 MG tablet [Pharmacy Med Name: WARFARIN SOD 5MG TABLETS (PEACH)] 120 tablet 1     Sig: Take one tablet (5mg) by mouth on Sun, Tue, Thurs; take one and one-half tablets (7.5mg) by mouth all other days; or as instructed by INR Clinic.       Vitamin K Antagonists Failed - 10/13/2020  8:11 AM        Failed - INR is within goal in the past 6 weeks     Confirm INR is within goal in the past 6 weeks.     Recent Labs   Lab Test 10/02/20  0913   INR 1.80*                       Passed - Recent (12 mo) or future (30 days) visit within the authorizing provider's specialty     Patient has had an office visit with the authorizing provider or a provider within the authorizing providers department within the previous 12 mos or has a future within next 30 days. See \"Patient Info\" tab in inbasket, or \"Choose Columns\" in Meds & Orders section of the refill encounter.              Passed - Medication is active on med list        Passed - Patient is 18 years of age or older        Passed - Patient is not pregnant        Passed - No positive pregnancy on file in past 12 months           Donna BURROWS RN  Anticoagulation Clinic  [place]    "

## 2020-10-14 ENCOUNTER — VIRTUAL VISIT (OUTPATIENT)
Dept: ONCOLOGY | Facility: CLINIC | Age: 75
End: 2020-10-14
Attending: INTERNAL MEDICINE
Payer: COMMERCIAL

## 2020-10-14 VITALS — WEIGHT: 188.2 LBS | BODY MASS INDEX: 33.08 KG/M2

## 2020-10-14 DIAGNOSIS — Z85.3 HISTORY OF LEFT BREAST CANCER: Primary | ICD-10-CM

## 2020-10-14 DIAGNOSIS — Z86.718 PERSONAL HISTORY OF DVT (DEEP VEIN THROMBOSIS): ICD-10-CM

## 2020-10-14 DIAGNOSIS — N28.9 RENAL INSUFFICIENCY: ICD-10-CM

## 2020-10-14 DIAGNOSIS — M85.80 OSTEOPENIA, UNSPECIFIED LOCATION: ICD-10-CM

## 2020-10-14 DIAGNOSIS — Z98.84 BARIATRIC SURGERY STATUS: ICD-10-CM

## 2020-10-14 PROCEDURE — 99214 OFFICE O/P EST MOD 30 MIN: CPT | Mod: 95 | Performed by: INTERNAL MEDICINE

## 2020-10-14 PROCEDURE — G0463 HOSPITAL OUTPT CLINIC VISIT: HCPCS

## 2020-10-14 ASSESSMENT — PAIN SCALES - GENERAL: PAINLEVEL: NO PAIN (0)

## 2020-10-14 NOTE — LETTER
"    10/14/2020         RE: Gemma Cowart  [address] [place] [id number]        Dear Colleague,    Thank you for referring your patient, Gemma Cowart, to the [facility]. Please see a copy of my visit note below.    Gemma Cowart is a 74 year old female who is being evaluated via a billable video visit.      The patient has been notified of following:     \"This video visit will be conducted via a call between you and your physician/provider. We have found that certain health care needs can be provided without the need for an in-person physical exam.  This service lets us provide the care you need with a video conversation.  If a prescription is necessary we can send it directly to your pharmacy.  If lab work is needed we can place an order for that and you can then stop by our lab to have the test done at a later time.    Video visits are billed at different rates depending on your insurance coverage.  Please reach out to your insurance provider with any questions.    If during the course of the call the physician/provider feels a video visit is not appropriate, you will not be charged for this service.\"    Patient has given verbal consent for Video visit? Yes  How would you like to obtain your AVS? MyChart  If you are dropped from the video visit, the video invite should be resent to: Text to cell phone: [phone]  Will anyone else be joining your video visit? No        Video-Visit Details    Type of service:  Video Visit    Video Start Time:   Video End Time:      Originating Location (pt. Location): Home    Distant Location (provider location):  [facility]     Platform used for Video Visit:       Harini Villarreal CMA          Type of Visit: Video Visit  Patient has given verbal consent for Video visit.     Video Start Time: 8:40 am  Video End Time: 9 am    Originating Location (pt. 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 LN negative disease. Estrogen and progesterone receptors were positive at greater than 90%. HER-2/gibran was negative. She had D2bY3N4 disease.   She was started on Femara on 10/10/2012.    It is put on hold in 5/2016 due to memory loss and dysarthria. She then was evaluated by neurologist, got assurance. Her cognitive function did not change by holding femara, it is resumed in July 2016. She finished 5 yrs of it in fall 2017, not willing to continue due to hair loss, bone health and hot flushes.     She had reconstructive surgery done 03/2013 by Dr. Wall.       INTERVAL HISTORY:  She had memory test. She saw neurology and no dx is made. She is advised on vit intake and keto diet.       PMH/ALLERGIES/MEDICATIONS: reviewed in [facility]    SOCIAL HISTORY: The patient did smoke for several years but has since stopped in 10/1986.     FAMILY HISTORY: Overall noncontributory. + FH of Alezheimer's d/s from the Dad side       ROS:    no problems with fevers or chills, nausea, vomiting or diarrhea.   She has chronic on and off bilateral reconstructed breast pain.       PHYSICAL EXAMINATION ATTAINABLE DURING VIDEO VISIT:  CONSTITUTIONAL - Pt looks like stated age, pleasant, not in acute distress. Not obese.  NEURO: Oriented to time, person, and places. No tremor. Normal gait.   ENT, MOUTH: Pupils are equal.  Sclerae are anicteric.  Moist oral mucosa. No oral thrush.   NECK:  No jugular venous  distention.  No thyroid enlargement.   RESPIRATORY: talk nl, no sob during conversation, no cough.   MUSCULOSKELETAL/EXTREMITIES:  No edema.  No joint deformity. Normal range of motion.  SKIN:  No petechiae.  No rash.  No signs of cellulitis.   PSYCHIATRIC: Normal mood and affect. Good memory. Proper insight and judgement.   THE REST OF A COMPREHENSIVE PHYSICIAL EXAM IS DEFERRED DUE TO COVID-19 PUBLIC HEALTH EMERGENCY RELATED VIDEO VISIT RESCTRICTION.      Current LAB Data Reviewed today  Cbc diff/marker are good,   Cr 1.47 from 1.25 stable, LFT nl.        Old data reviewed with summary today  Dexa 10/2017 : Advanced osteopenia of the left hip, moderate osteopenia of the right hip, and normal bone mineral density of the lumbar spine. Stable to minor improvement compare to 2015.     Breast MRI 4/2019 - negative.     CXR/BONE SCAN 5/2015: negative     10/2014 - cbc diff/CMP are fine, except cr 1.15, B12/folate are nl, vit D 24 low      ASSESSMENT AND PLAN:   1. Left breast cancer s/p double mastectomies, ER/DE+, her 2 - s/p  5 yrs of Femara till 2017.    We discussed the ER + breast cancer recurrence.     F/u in 12 months with labs.       2. history of gastric bypass surgery with BRETT and low B12.  Continue B12 SL, she can do q wk.   We discussed the diet.       3. Osteopenia. Advice vit D proper dosing.      4. Renal insufficiency is worse a bit.   We discussed the oral fluid intake.        5. Hx of recurrent DVT on coumadin via her PMD.   She is advised on ongoing coumadin/INR monitoring via her PMD office.           Again, thank you for allowing me to participate in the care of your patient.        Sincerely,        Jyoti Narvaez MD, MD

## 2020-10-14 NOTE — PROGRESS NOTES
Type of Visit: Video Visit  Patient has given verbal consent for Video visit.     Video Start Time: 8:40 am  Video End Time: 9 am    Originating Location (pt. 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 LN negative disease. Estrogen and progesterone receptors were positive at greater than 90%. HER-2/gibran was negative. She had S2dK0H5 disease.   She was started on Femara on 10/10/2012.    It is put on hold in 5/2016 due to memory loss and dysarthria. She then was evaluated by neurologist, got assurance. Her cognitive function did not change by holding femara, it is resumed in July 2016. She finished 5 yrs of it in fall 2017, not willing to continue due to hair loss, bone health and hot flushes.     She had reconstructive surgery done 03/2013 by Dr. Wall.       INTERVAL HISTORY:  She had memory test. She saw neurology and no dx is made. She is advised on vit intake and keto diet.       PMH/ALLERGIES/MEDICATIONS: reviewed in [facility]    SOCIAL HISTORY: The patient did smoke for several years but has since stopped in 10/1986.     FAMILY HISTORY: Overall noncontributory. + FH of Steven's d/s from the Dad side       ROS:    no problems with fevers or chills, nausea, vomiting or diarrhea.   She has chronic on and off bilateral reconstructed breast pain.       PHYSICAL EXAMINATION ATTAINABLE DURING VIDEO VISIT:  CONSTITUTIONAL - Pt looks like stated age, pleasant, not in  acute distress. Not obese.  NEURO: Oriented to time, person, and places. No tremor. Normal gait.   ENT, MOUTH: Pupils are equal.  Sclerae are anicteric.  Moist oral mucosa. No oral thrush.   NECK:  No jugular venous distention.  No thyroid enlargement.   RESPIRATORY: talk nl, no sob during conversation, no cough.   MUSCULOSKELETAL/EXTREMITIES:  No edema.  No joint deformity. Normal range of motion.  SKIN:  No petechiae.  No rash.  No signs of cellulitis.   PSYCHIATRIC: Normal mood and affect. Good memory. Proper insight and judgement.   THE REST OF A COMPREHENSIVE PHYSICIAL EXAM IS DEFERRED DUE TO COVID-19 PUBLIC HEALTH EMERGENCY RELATED VIDEO VISIT RESCTRICTION.      Current LAB Data Reviewed today  Cbc diff/marker are good,   Cr 1.47 from 1.25 stable, LFT nl.        Old data reviewed with summary today  Dexa 10/2017 : Advanced osteopenia of the left hip, moderate osteopenia of the right hip, and normal bone mineral density of the lumbar spine. Stable to minor improvement compare to 2015.     Breast MRI 4/2019 - negative.     CXR/BONE SCAN 5/2015: negative     10/2014 - cbc diff/CMP are fine, except cr 1.15, B12/folate are nl, vit D 24 low      ASSESSMENT AND PLAN:   1. Left breast cancer s/p double mastectomies, ER/AL+, her 2 - s/p  5 yrs of Femara till 2017.    We discussed the ER + breast cancer recurrence.     F/u in 12 months with labs.       2. history of gastric bypass surgery with BRETT and low B12.  Continue B12 SL, she can do q wk.   We discussed the diet.       3. Osteopenia. Advice vit D proper dosing.      4. Renal insufficiency is worse a bit.   We discussed the oral fluid intake.        5. Hx of recurrent DVT on coumadin via her PMD.   She is advised on ongoing coumadin/INR monitoring via her PMD office.

## 2020-10-14 NOTE — LETTER
"    10/14/2020         RE: Gemma Cowart  [address] [place] [id number]        Dear Colleague,    Thank you for referring your patient, Gemma Cowart, to the [facility]. Please see a copy of my visit note below.    Gemma Cowart is a 74 year old female who is being evaluated via a billable video visit.      The patient has been notified of following:     \"This video visit will be conducted via a call between you and your physician/provider. We have found that certain health care needs can be provided without the need for an in-person physical exam.  This service lets us provide the care you need with a video conversation.  If a prescription is necessary we can send it directly to your pharmacy.  If lab work is needed we can place an order for that and you can then stop by our lab to have the test done at a later time.    Video visits are billed at different rates depending on your insurance coverage.  Please reach out to your insurance provider with any questions.    If during the course of the call the physician/provider feels a video visit is not appropriate, you will not be charged for this service.\"    Patient has given verbal consent for Video visit? Yes  How would you like to obtain your AVS? MyChart  If you are dropped from the video visit, the video invite should be resent to: Text to cell phone: [phone]  Will anyone else be joining your video visit? No        Video-Visit Details    Type of service:  Video Visit    Video Start Time:   Video End Time:      Originating Location (pt. Location): Home    Distant Location (provider location):  [facility]     Platform used for Video Visit:       Harini Villarreal CMA          Type of Visit: Video Visit  Patient has given verbal consent for Video visit.     Video Start Time: 8:40 am  Video End Time: 9 am    Originating Location (pt. 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 LN negative disease. Estrogen and progesterone receptors were positive at greater than 90%. HER-2/gibran was negative. She had [id number] disease.   She was started on Femara on 10/10/2012.    It is put on hold in 5/2016 due to memory loss and dysarthria. She then was evaluated by neurologist, got assurance. Her cognitive function did not change by holding femara, it is resumed in July 2016. She finished 5 yrs of it in fall 2017, not willing to continue due to hair loss, bone health and hot flushes.     She had reconstructive surgery done 03/2013 by Dr. Wall.       INTERVAL HISTORY:  She had memory test. She saw neurology and no dx is made. She is advised on vit intake and keto diet.       PMH/ALLERGIES/MEDICATIONS: reviewed in [facility]    SOCIAL HISTORY: The patient did smoke for several years but has since stopped in 10/1986.     FAMILY HISTORY: Overall noncontributory. + FH of Alezheimer's d/s from the Dad side       ROS:    no problems with fevers or chills, nausea, vomiting or diarrhea.   She has chronic on and off bilateral reconstructed breast pain.       PHYSICAL EXAMINATION ATTAINABLE DURING VIDEO VISIT:  CONSTITUTIONAL - Pt looks like stated age, pleasant, not in acute distress. Not obese.  NEURO: Oriented to time, person, and places. No tremor. Normal gait.   ENT, MOUTH: Pupils are equal.  Sclerae are anicteric.  Moist oral mucosa. No oral thrush.   NECK:  No jugular venous  distention.  No thyroid enlargement.   RESPIRATORY: talk nl, no sob during conversation, no cough.   MUSCULOSKELETAL/EXTREMITIES:  No edema.  No joint deformity. Normal range of motion.  SKIN:  No petechiae.  No rash.  No signs of cellulitis.   PSYCHIATRIC: Normal mood and affect. Good memory. Proper insight and judgement.   THE REST OF A COMPREHENSIVE PHYSICIAL EXAM IS DEFERRED DUE TO COVID-19 PUBLIC HEALTH EMERGENCY RELATED VIDEO VISIT RESCTRICTION.      Current LAB Data Reviewed today  Cbc diff/marker are good,   Cr 1.47 from 1.25 stable, LFT nl.        Old data reviewed with summary today  Dexa 10/2017 : Advanced osteopenia of the left hip, moderate osteopenia of the right hip, and normal bone mineral density of the lumbar spine. Stable to minor improvement compare to 2015.     Breast MRI 4/2019 - negative.     CXR/BONE SCAN 5/2015: negative     10/2014 - cbc diff/CMP are fine, except cr 1.15, B12/folate are nl, vit D 24 low      ASSESSMENT AND PLAN:   1. Left breast cancer s/p double mastectomies, ER/AZ+, her 2 - s/p  5 yrs of Femara till 2017.    We discussed the ER + breast cancer recurrence.     F/u in 12 months with labs.       2. history of gastric bypass surgery with BRETT and low B12.  Continue B12 SL, she can do q wk.   We discussed the diet.       3. Osteopenia. Advice vit D proper dosing.      4. Renal insufficiency is worse a bit.   We discussed the oral fluid intake.        5. Hx of recurrent DVT on coumadin via her PMD.   She is advised on ongoing coumadin/INR monitoring via her PMD office.           Again, thank you for allowing me to participate in the care of your patient.        Sincerely,        Jyoti Narvaez MD, MD

## 2020-10-14 NOTE — PROGRESS NOTES
"Gemma Cowart is a 74 year old female who is being evaluated via a billable video visit.      The patient has been notified of following:     \"This video visit will be conducted via a call between you and your physician/provider. We have found that certain health care needs can be provided without the need for an in-person physical exam.  This service lets us provide the care you need with a video conversation.  If a prescription is necessary we can send it directly to your pharmacy.  If lab work is needed we can place an order for that and you can then stop by our lab to have the test done at a later time.    Video visits are billed at different rates depending on your insurance coverage.  Please reach out to your insurance provider with any questions.    If during the course of the call the physician/provider feels a video visit is not appropriate, you will not be charged for this service.\"    Patient has given verbal consent for Video visit? Yes  How would you like to obtain your AVS? MyChart  If you are dropped from the video visit, the video invite should be resent to: Text to cell phone: [phone]  Will anyone else be joining your video visit? No        Video-Visit Details    Type of service:  Video Visit    Video Start Time:   Video End Time:      Originating Location (pt. Location): Home    Distant Location (provider location):  [facility]     Platform used for Video Visit:       Harini Villarreal CMA        "

## 2020-10-16 ENCOUNTER — ANTICOAGULATION THERAPY VISIT (OUTPATIENT)
Dept: NURSING | Facility: CLINIC | Age: 75
End: 2020-10-16
Payer: COMMERCIAL

## 2020-10-16 DIAGNOSIS — Z86.718 PERSONAL HISTORY OF DVT (DEEP VEIN THROMBOSIS): ICD-10-CM

## 2020-10-16 DIAGNOSIS — Z79.01 LONG TERM CURRENT USE OF ANTICOAGULANT THERAPY: ICD-10-CM

## 2020-10-16 LAB
CAPILLARY BLOOD COLLECTION: NORMAL
INR PPP: 2.5 (ref 0.86–1.14)

## 2020-10-16 PROCEDURE — 85610 PROTHROMBIN TIME: CPT | Performed by: INTERNAL MEDICINE

## 2020-10-16 PROCEDURE — 36416 COLLJ CAPILLARY BLOOD SPEC: CPT | Performed by: INTERNAL MEDICINE

## 2020-10-16 PROCEDURE — 99207 PR NO CHARGE NURSE ONLY: CPT

## 2020-10-16 NOTE — PROGRESS NOTES
ANTICOAGULATION FOLLOW-UP CLINIC VISIT    Patient Name:  Gemma Cowart  Date:  10/16/2020  Contact Type:  Telephone    SUBJECTIVE:  Patient Findings     Comments:  The patient was assessed for diet, medication, and activity level changes, missed or extra doses, bruising or bleeding, with no problem findings.          Clinical Outcomes     Negatives:  Major bleeding event, Thromboembolic event, Anticoagulation-related hospital admission, Anticoagulation-related ED visit, Anticoagulation-related fatality    Comments:  The patient was assessed for diet, medication, and activity level changes, missed or extra doses, bruising or bleeding, with no problem findings.             OBJECTIVE    Recent labs: (last 7 days)     10/16/20  0849   INR 2.50*       ASSESSMENT / PLAN  INR assessment THER    Recheck INR In: 2 WEEKS    INR Location Clinic      Anticoagulation Summary  As of 10/16/2020    INR goal:  2.0-3.0   TTR:  57.9 % (1 y)   INR used for dosin.50 (10/16/2020)   Warfarin maintenance plan:  5 mg (5 mg x 1) every Sun, Tue, Thu; 7.5 mg (5 mg x 1.5) all other days   Full warfarin instructions:  5 mg every Sun, Tue, Thu; 7.5 mg all other days   Weekly warfarin total:  45 mg   Plan last modified:  Denisse Jefferson, RN (10/16/2020)   Next INR check:  10/30/2020   Priority:  High   Target end date:  Indefinite    Indications    Personal history of DVT (deep vein thrombosis) [Z86.718]  Long term current use of anticoagulant therapy [Z79.01]             Anticoagulation Episode Summary     INR check location:  Clinic Lab    Preferred lab:      Send INR reminders to:  TOBIAS THOMPSON    Comments:  5mg tabs - HS dose //  // gastric bypass  // DVT 12 warfarin until 2013 // DVT 9/12/15      Anticoagulation Care Providers     Provider Role Specialty Phone number    Alva Viera MD Referring Internal Medicine [phone]            See the Encounter Report to view Anticoagulation Flowsheet and Dosing  Calendar (Go to Encounters tab in chart review, and find the Anticoagulation Therapy Visit)        Denisse Jefferson RN

## 2020-10-30 ENCOUNTER — ANTICOAGULATION THERAPY VISIT (OUTPATIENT)
Dept: FAMILY MEDICINE | Facility: CLINIC | Age: 75
End: 2020-10-30

## 2020-10-30 DIAGNOSIS — Z79.01 LONG TERM CURRENT USE OF ANTICOAGULANT THERAPY: ICD-10-CM

## 2020-10-30 DIAGNOSIS — Z86.718 PERSONAL HISTORY OF DVT (DEEP VEIN THROMBOSIS): ICD-10-CM

## 2020-10-30 LAB
CAPILLARY BLOOD COLLECTION: NORMAL
INR PPP: 1.9 (ref 0.86–1.14)

## 2020-10-30 PROCEDURE — 36416 COLLJ CAPILLARY BLOOD SPEC: CPT | Performed by: INTERNAL MEDICINE

## 2020-10-30 PROCEDURE — 85610 PROTHROMBIN TIME: CPT | Performed by: INTERNAL MEDICINE

## 2020-10-30 PROCEDURE — 99207 PR NO CHARGE NURSE ONLY: CPT | Performed by: PHYSICIAN ASSISTANT

## 2020-10-30 NOTE — PROGRESS NOTES
ANTICOAGULATION FOLLOW-UP CLINIC VISIT    Patient Name:  Gemma Cowart  Date:  10/30/2020  Contact Type:  Telephone    SUBJECTIVE:  Patient Findings     Positives:  Change in diet/appetite    Comments:  Writer had a lengthy discussion on importance of consistency with vit K foods. She usually just cooks whatever she wants without paying attention to the K content. She had a spinach/broccoli salad a couple days ago. Writer went over foods high in vit K and the need to be consistent with them to avoid labile INRs based on diet. She has had no medication or health changes. No missed doses. She does not drink protein shakes or v8. Will advise a booster dose today, then recheck in 2 weeks.         Clinical Outcomes     Comments:  Writer had a lengthy discussion on importance of consistency with vit K foods. She usually just cooks whatever she wants without paying attention to the K content. She had a spinach/broccoli salad a couple days ago. Writer went over foods high in vit K and the need to be consistent with them to avoid labile INRs based on diet. She has had no medication or health changes. No missed doses. She does not drink protein shakes or v8. Will advise a booster dose today, then recheck in 2 weeks.            OBJECTIVE    Recent labs: (last 7 days)     10/30/20  0747   INR 1.90*       ASSESSMENT / PLAN  INR assessment SUB    Recheck INR In: 2 WEEKS    INR Location Clinic      Anticoagulation Summary  As of 10/30/2020    INR goal:  2.0-3.0   TTR:  60.0 % (1 y)   INR used for dosin.90 (10/30/2020)   Warfarin maintenance plan:  5 mg (5 mg x 1) every Sun, Tue, Thu; 7.5 mg (5 mg x 1.5) all other days   Full warfarin instructions:  10/30: 10 mg; Otherwise 5 mg every Sun, Tue, Thu; 7.5 mg all other days   Weekly warfarin total:  45 mg   Plan last modified:  Denisse Jefferson RN (10/16/2020)   Next INR check:  2020   Priority:  High   Target end date:  Indefinite    Indications    Personal history of DVT  (deep vein thrombosis) [Z86.718]  Long term current use of anticoagulant therapy [Z79.01]             Anticoagulation Episode Summary     INR check location:  Clinic Lab    Preferred lab:      Send INR reminders to:  TOBIAS THOMPSON    Comments:  5mg tabs - HS dose // CALENDAR // gastric bypass 2000 // DVT 11/16/12 warfarin until Apr 2013 // DVT 9/12/15      Anticoagulation Care Providers     Provider Role Specialty Phone number    Alva Viera MD Referring Internal Medicine - Pediatrics [phone]            See the Encounter Report to view Anticoagulation Flowsheet and Dosing Calendar (Go to Encounters tab in chart review, and find the Anticoagulation Therapy Visit)        Carly Davalos RN

## 2020-11-12 ENCOUNTER — ANTICOAGULATION THERAPY VISIT (OUTPATIENT)
Dept: NURSING | Facility: CLINIC | Age: 75
End: 2020-11-12

## 2020-11-12 DIAGNOSIS — Z86.718 PERSONAL HISTORY OF DVT (DEEP VEIN THROMBOSIS): ICD-10-CM

## 2020-11-12 DIAGNOSIS — Z79.01 LONG TERM CURRENT USE OF ANTICOAGULANT THERAPY: ICD-10-CM

## 2020-11-12 LAB
CAPILLARY BLOOD COLLECTION: NORMAL
INR PPP: 2.4 (ref 0.86–1.14)

## 2020-11-12 PROCEDURE — 85610 PROTHROMBIN TIME: CPT | Performed by: INTERNAL MEDICINE

## 2020-11-12 PROCEDURE — 99207 PR NO CHARGE NURSE ONLY: CPT

## 2020-11-12 PROCEDURE — 36416 COLLJ CAPILLARY BLOOD SPEC: CPT | Performed by: INTERNAL MEDICINE

## 2020-11-12 NOTE — PROGRESS NOTES
ANTICOAGULATION FOLLOW-UP     Patient Name:  Gemma Cowart  Date:  2020  Contact Type:  Telephone    SUBJECTIVE:  Patient Findings     Comments:  The patient was assessed for   diet, medication,   missed or extra doses,   bruising or bleeding,   with no problem findings.  No questions or concerns.  Reviewed previous warfarin dosing with patient.  She took warfarin as instructed.    INR is therapeutic today.   Patient will continue same maintenance dose.   Follow up in 3 weeks.              Clinical Outcomes     Comments:  The patient was assessed for   diet, medication,   missed or extra doses,   bruising or bleeding,   with no problem findings.  No questions or concerns.  Reviewed previous warfarin dosing with patient.  She took warfarin as instructed.    INR is therapeutic today.   Patient will continue same maintenance dose.   Follow up in 3 weeks.                 OBJECTIVE    Recent labs: (last 7 days)     20  0738   INR 2.40*       ASSESSMENT / PLAN  INR assessment THER    Recheck INR In: 3 WEEKS    INR Location Clinic lab     Anticoagulation Summary  As of 2020    INR goal:  2.0-3.0   TTR:  59.3 % (1 y)   INR used for dosin.40 (2020)   Warfarin maintenance plan:  5 mg (5 mg x 1) every Sun, Tue, Thu; 7.5 mg (5 mg x 1.5) all other days   Full warfarin instructions:  5 mg every Sun, Tue, Thu; 7.5 mg all other days   Weekly warfarin total:  45 mg   No change documented:  Rae Cooley RN   Plan last modified:  Denisse Jefferson RN (10/16/2020)   Next INR check:  12/3/2020   Priority:  Maintenance   Target end date:  Indefinite    Indications    Personal history of DVT (deep vein thrombosis) [Z86.718]  Long term current use of anticoagulant therapy [Z79.01]             Anticoagulation Episode Summary     INR check location:  Clinic Lab    Preferred lab:      Send INR reminders to:  TOBIAS THOMPSON    Comments:  5mg tabs - HS dose // CALENDAR // gastric bypass  // DVT 12  warfarin until Apr 2013 // DVT 9/12/15      Anticoagulation Care Providers     Provider Role Specialty Phone number    Alva Viera MD Referring Internal Medicine - Pediatrics [phone]            See the Encounter Report to view Anticoagulation Flowsheet and Dosing Calendar (Go to Encounters tab in chart review, and find the Anticoagulation Therapy Visit)        Rae Cooley RN

## 2020-12-03 ENCOUNTER — ANTICOAGULATION THERAPY VISIT (OUTPATIENT)
Dept: NURSING | Facility: CLINIC | Age: 75
End: 2020-12-03

## 2020-12-03 DIAGNOSIS — Z86.718 PERSONAL HISTORY OF DVT (DEEP VEIN THROMBOSIS): ICD-10-CM

## 2020-12-03 DIAGNOSIS — Z79.01 LONG TERM CURRENT USE OF ANTICOAGULANT THERAPY: ICD-10-CM

## 2020-12-03 LAB
CAPILLARY BLOOD COLLECTION: NORMAL
INR PPP: 1.5 (ref 0.86–1.14)

## 2020-12-03 PROCEDURE — 99207 PR NO CHARGE NURSE ONLY: CPT

## 2020-12-03 PROCEDURE — 36416 COLLJ CAPILLARY BLOOD SPEC: CPT | Performed by: INTERNAL MEDICINE

## 2020-12-03 PROCEDURE — 85610 PROTHROMBIN TIME: CPT | Performed by: INTERNAL MEDICINE

## 2020-12-03 NOTE — PROGRESS NOTES
ANTICOAGULATION FOLLOW-UP     Patient Name:  Gemma Cowart  Date:  12/3/2020  Contact Type:  Telephone    SUBJECTIVE:  Patient Findings     Positives:  Signs/symptoms of bleeding, Missed doses    Comments:  Occasional nose bleeds. They stop quickly.   Recommended:   - Do not blow nose hard.  - Keep mucus membranes moist. Use saline nasal spray or vaseline in nostrils.    Reviewed previous warfarin dosing with patient.  She did not take warfarin as instructed.   She thought she had it memorized, but she reversed the 5mg and 7.5mg days.    Patient denies: medication changes   or signs/symptoms of a clot.    INR is subtherapeutic today.   Patient will take 10 mg today, then take maintenance dose.   Follow up in 2 weeks.     AVS calendar mailed to home address.          Clinical Outcomes     Comments:  Occasional nose bleeds. They stop quickly.   Recommended:   - Do not blow nose hard.  - Keep mucus membranes moist. Use saline nasal spray or vaseline in nostrils.    Reviewed previous warfarin dosing with patient.  She did not take warfarin as instructed.   She thought she had it memorized, but she reversed the 5mg and 7.5mg days.    Patient denies: medication changes   or signs/symptoms of a clot.    INR is subtherapeutic today.   Patient will take 10 mg today, then take maintenance dose.   Follow up in 2 weeks.     AVS calendar mailed to home address.             OBJECTIVE    Recent labs: (last 7 days)     20  0810   INR 1.50*       ASSESSMENT / PLAN  INR assessment SUB    Recheck INR In: 2 WEEKS    INR Location Clinic lab     Anticoagulation Summary  As of 12/3/2020    INR goal:  2.0-3.0   TTR:  56.1 % (1 y)   INR used for dosin.50 (12/3/2020)   Warfarin maintenance plan:  5 mg (5 mg x 1) every Sun, Tue, Thu; 7.5 mg (5 mg x 1.5) all other days   Full warfarin instructions:  12/3: 10 mg; Otherwise 5 mg every Sun, Tue, Thu; 7.5 mg all other days   Weekly warfarin total:  45 mg   Plan last modified:  Li  Denisse WYATT RN (10/16/2020)   Next INR check:  12/17/2020   Priority:  Maintenance   Target end date:  Indefinite    Indications    Personal history of DVT (deep vein thrombosis) [Z86.718]  Long term current use of anticoagulant therapy [Z79.01]             Anticoagulation Episode Summary     INR check location:  Clinic Lab    Preferred lab:      Send INR reminders to:  TOBIAS THOMPSON    Comments:  5mg tabs - HS dose // CALENDAR // gastric bypass 2000 // DVT 11/16/12 warfarin until Apr 2013 // DVT 9/12/15      Anticoagulation Care Providers     Provider Role Specialty Phone number    Alva Viera MD Referring Internal Medicine - Pediatrics [phone]            See the Encounter Report to view Anticoagulation Flowsheet and Dosing Calendar (Go to Encounters tab in chart review, and find the Anticoagulation Therapy Visit)        Dosage adjustment made based on physician directed care plan.      Rae Cooley RN

## 2020-12-03 NOTE — LETTER
[facility]  [address] [place] [address]  [phone]          Gemma Cowart  [address] [place] [address] [address] [place] [id number]            Dear Gemma,     Your INR calendar is enclosed.                                      Sincerely,     VILLA Mcbride   [facility]

## 2020-12-07 ENCOUNTER — PATIENT OUTREACH (OUTPATIENT)
Dept: GERIATRIC MEDICINE | Facility: CLINIC | Age: 75
End: 2020-12-07

## 2020-12-07 DIAGNOSIS — F41.1 GAD (GENERALIZED ANXIETY DISORDER): ICD-10-CM

## 2020-12-08 RX ORDER — PAROXETINE 30 MG/1
TABLET, FILM COATED ORAL
Qty: 60 TABLET | Refills: 0 | Status: SHIPPED | OUTPATIENT
Start: 2020-12-08 | End: 2020-12-30

## 2020-12-08 NOTE — TELEPHONE ENCOUNTER
Will route to the Pt's PCP to review. Not an Steven Pt.     Adenike Ellsworth RN   [facility] -- Triage Nurse

## 2020-12-08 NOTE — TELEPHONE ENCOUNTER
"My name showing as PCP but raman only \"seen\" Gemma once for a virtual upper respiratory visit. Please confirm who PCP is and send that way (or back to me?)  "

## 2020-12-09 NOTE — TELEPHONE ENCOUNTER
Called patient and assisted in scheduling a VV. Closing encounter.     Mansi Rivera MA 11:36 AM 12/9/2020

## 2020-12-17 ENCOUNTER — DOCUMENTATION ONLY (OUTPATIENT)
Dept: LAB | Facility: CLINIC | Age: 75
End: 2020-12-17

## 2020-12-17 ENCOUNTER — ANTICOAGULATION THERAPY VISIT (OUTPATIENT)
Dept: FAMILY MEDICINE | Facility: CLINIC | Age: 75
End: 2020-12-17

## 2020-12-17 DIAGNOSIS — E11.69 TYPE 2 DIABETES MELLITUS WITH OTHER SPECIFIED COMPLICATION, UNSPECIFIED WHETHER LONG TERM INSULIN USE (H): Primary | Chronic | ICD-10-CM

## 2020-12-17 DIAGNOSIS — Z79.01 LONG TERM CURRENT USE OF ANTICOAGULANT THERAPY: ICD-10-CM

## 2020-12-17 DIAGNOSIS — Z86.718 PERSONAL HISTORY OF DVT (DEEP VEIN THROMBOSIS): ICD-10-CM

## 2020-12-17 DIAGNOSIS — E78.5 HYPERLIPIDEMIA LDL GOAL <130: ICD-10-CM

## 2020-12-17 DIAGNOSIS — E11.69 TYPE 2 DIABETES MELLITUS WITH OTHER SPECIFIED COMPLICATION, UNSPECIFIED WHETHER LONG TERM INSULIN USE (H): Chronic | ICD-10-CM

## 2020-12-17 DIAGNOSIS — N18.30 STAGE 3 CHRONIC KIDNEY DISEASE, UNSPECIFIED WHETHER STAGE 3A OR 3B CKD (H): ICD-10-CM

## 2020-12-17 LAB
HBA1C MFR BLD: 5.9 % (ref 0–5.6)
INR PPP: 1.7 (ref 0.86–1.14)

## 2020-12-17 PROCEDURE — 80061 LIPID PANEL: CPT | Performed by: NURSE PRACTITIONER

## 2020-12-17 PROCEDURE — 80048 BASIC METABOLIC PNL TOTAL CA: CPT | Performed by: NURSE PRACTITIONER

## 2020-12-17 PROCEDURE — 82043 UR ALBUMIN QUANTITATIVE: CPT | Performed by: NURSE PRACTITIONER

## 2020-12-17 PROCEDURE — 85610 PROTHROMBIN TIME: CPT | Performed by: INTERNAL MEDICINE

## 2020-12-17 PROCEDURE — 83036 HEMOGLOBIN GLYCOSYLATED A1C: CPT | Performed by: NURSE PRACTITIONER

## 2020-12-17 PROCEDURE — 99207 PR NO CHARGE NURSE ONLY: CPT | Performed by: PHYSICIAN ASSISTANT

## 2020-12-17 PROCEDURE — 36416 COLLJ CAPILLARY BLOOD SPEC: CPT | Performed by: INTERNAL MEDICINE

## 2020-12-17 NOTE — PROGRESS NOTES
ANTICOAGULATION FOLLOW-UP     Patient Name:  Gemma Cowart  Date:  2020  Contact Type:  Telephone    SUBJECTIVE:  Patient Findings     Comments:  The patient was assessed for   diet, medication,   missed or extra doses,   bruising or bleeding,   with no problem findings.  No questions or concerns.  Reviewed previous warfarin dosing with patient.  She took warfarin as instructed.    INR is subtherapeutic today.   Patient will take 7.5 mg today and then increase weekly maintenance dose total by 5.6%.   Follow up in 2 weeks.     AVS calendar mailed to home address.          Clinical Outcomes     Comments:  The patient was assessed for   diet, medication,   missed or extra doses,   bruising or bleeding,   with no problem findings.  No questions or concerns.  Reviewed previous warfarin dosing with patient.  She took warfarin as instructed.    INR is subtherapeutic today.   Patient will take 7.5 mg today and then increase weekly maintenance dose total by 5.6%.   Follow up in 2 weeks.     AVS calendar mailed to home address.             OBJECTIVE    Recent labs: (last 7 days)     20  0806   INR 1.70*       ASSESSMENT / PLAN  INR assessment SUB    Recheck INR In: 2 WEEKS    INR Location Clinic lab     Anticoagulation Summary  As of 2020    INR goal:  2.0-3.0   TTR:  52.3 % (1 y)   INR used for dosin.70 (2020)   Warfarin maintenance plan:  5 mg (5 mg x 1) every Sun, Thu; 7.5 mg (5 mg x 1.5) all other days   Full warfarin instructions:  : 7.5 mg; Otherwise 5 mg every Sun, Thu; 7.5 mg all other days   Weekly warfarin total:  47.5 mg   Plan last modified:  Rae Cooley RN (2020)   Next INR check:  2020   Priority:  Maintenance   Target end date:  Indefinite    Indications    Personal history of DVT (deep vein thrombosis) [Z86.718]  Long term current use of anticoagulant therapy [Z79.01]             Anticoagulation Episode Summary     INR check location:  Clinic Lab     Preferred lab:      Send INR reminders to:  TOBIAS THOMPSON    Comments:  5mg tabs - HS dose // CALENDAR // gastric bypass 2000 // DVT 11/16/12 warfarin until Apr 2013 // DVT 9/12/15      Anticoagulation Care Providers     Provider Role Specialty Phone number    Alva Viera MD Referring Internal Medicine - Pediatrics [phone]            See the Encounter Report to view Anticoagulation Flowsheet and Dosing Calendar (Go to Encounters tab in chart review, and find the Anticoagulation Therapy Visit)        Dosage adjustment made based on physician directed care plan.      Rae Cooley RN

## 2020-12-17 NOTE — PROGRESS NOTES
Gemma had a lab only appointment today, 12/17/2020. The patient came fasting and thought she was suppose to do an A1C for her upcoming phone visit. There were tests in this patient's health maintenance that are due.  I have ordered them as future and pended them.  Please associate a diagnosis and sign these orders if you want them.  We have obtained the sample and are holding it in the lab.  If you do not want these tests then please cancel them.  Thanks, Bonnie

## 2020-12-18 LAB
ANION GAP SERPL CALCULATED.3IONS-SCNC: 3 MMOL/L (ref 3–14)
BUN SERPL-MCNC: 25 MG/DL (ref 7–30)
CALCIUM SERPL-MCNC: 8.3 MG/DL (ref 8.5–10.1)
CHLORIDE SERPL-SCNC: 114 MMOL/L (ref 94–109)
CHOLEST SERPL-MCNC: 186 MG/DL
CO2 SERPL-SCNC: 24 MMOL/L (ref 20–32)
CREAT SERPL-MCNC: 1.32 MG/DL (ref 0.52–1.04)
CREAT UR-MCNC: 90 MG/DL
GFR SERPL CREATININE-BSD FRML MDRD: 40 ML/MIN/{1.73_M2}
GLUCOSE SERPL-MCNC: 128 MG/DL (ref 70–99)
HDLC SERPL-MCNC: 59 MG/DL
LDLC SERPL CALC-MCNC: 107 MG/DL
MICROALBUMIN UR-MCNC: 5 MG/L
MICROALBUMIN/CREAT UR: 5.91 MG/G CR (ref 0–25)
NONHDLC SERPL-MCNC: 127 MG/DL
POTASSIUM SERPL-SCNC: 4.5 MMOL/L (ref 3.4–5.3)
SODIUM SERPL-SCNC: 141 MMOL/L (ref 133–144)
TRIGL SERPL-MCNC: 98 MG/DL

## 2020-12-21 NOTE — PROGRESS NOTES
[facility] Care Coordination Contact      [facility] Six-Month Telephone Assessment    6 month telephone assessment completed on 12/7/2020.    ER visits: No  Hospitalizations: No  TCU stays: No  Significant health status changes: None  Falls/Injuries: No  ADL/IADL changes: No  Changes in services: No    Caregiver Assessment follow up:  NA    Goals: See POC in chart for goal progress documentation.      Gemma moved into her own apartment, and she is very happy there. She stated that she informed the county of her move (9/1/20). She has been dieting a little and is now below 190 lbs. She has been taking in her clothing. Gemma reports that her dentures are the same. She has gone in, but can't get new dentures. CC called [facility]. Gemma isn't able to get new dentures until 12/1/2022. She has been seen at [facility] on [facility] ([phone]). [facility] suggested that the dental clinic appeal the denial for new dentures and provide a detailed claim narrative on why she needs new dentures. TC to [facility]. They stated that they did send the information as to why Gemma needed new dentures, but will re-send this information to [facility]. TM left for Gemma informing her of this.    Will see member in 6 months for an annual health risk assessment.   Encouraged member to call CC with any questions or concerns in the meantime.    Nighat Carbajal RN, BSN, PHN  [facility] Care Coordinator  [phone]

## 2020-12-30 ENCOUNTER — VIRTUAL VISIT (OUTPATIENT)
Dept: PEDIATRICS | Facility: CLINIC | Age: 75
End: 2020-12-30
Payer: COMMERCIAL

## 2020-12-30 ENCOUNTER — TELEPHONE (OUTPATIENT)
Dept: PEDIATRICS | Facility: CLINIC | Age: 75
End: 2020-12-30

## 2020-12-30 DIAGNOSIS — C50.912 MALIGNANT NEOPLASM OF LEFT FEMALE BREAST, UNSPECIFIED ESTROGEN RECEPTOR STATUS, UNSPECIFIED SITE OF BREAST (H): ICD-10-CM

## 2020-12-30 DIAGNOSIS — E11.69 TYPE 2 DIABETES MELLITUS WITH OTHER SPECIFIED COMPLICATION, WITHOUT LONG-TERM CURRENT USE OF INSULIN (H): Primary | ICD-10-CM

## 2020-12-30 DIAGNOSIS — I47.10 SVT (SUPRAVENTRICULAR TACHYCARDIA) (H): ICD-10-CM

## 2020-12-30 DIAGNOSIS — Z11.52 ENCOUNTER FOR SCREENING LABORATORY TESTING FOR COVID-19 VIRUS: Primary | ICD-10-CM

## 2020-12-30 DIAGNOSIS — Z79.01 LONG TERM CURRENT USE OF ANTICOAGULANT THERAPY: ICD-10-CM

## 2020-12-30 DIAGNOSIS — E53.8 VITAMIN B12 DEFICIENCY: ICD-10-CM

## 2020-12-30 DIAGNOSIS — Z98.84 S/P GASTRIC BYPASS: ICD-10-CM

## 2020-12-30 DIAGNOSIS — N18.32 STAGE 3B CHRONIC KIDNEY DISEASE (H): ICD-10-CM

## 2020-12-30 DIAGNOSIS — L40.9 PSORIASIS: Chronic | ICD-10-CM

## 2020-12-30 DIAGNOSIS — Z86.718 PERSONAL HISTORY OF DVT (DEEP VEIN THROMBOSIS): ICD-10-CM

## 2020-12-30 DIAGNOSIS — E66.01 MORBID OBESITY (H): ICD-10-CM

## 2020-12-30 DIAGNOSIS — F41.1 GAD (GENERALIZED ANXIETY DISORDER): ICD-10-CM

## 2020-12-30 DIAGNOSIS — R00.2 PALPITATIONS: ICD-10-CM

## 2020-12-30 PROCEDURE — 99214 OFFICE O/P EST MOD 30 MIN: CPT | Mod: 95 | Performed by: NURSE PRACTITIONER

## 2020-12-30 RX ORDER — CYANOCOBALAMIN (VITAMIN B-12) 2500 MCG
2500 TABLET, SUBLINGUAL SUBLINGUAL DAILY
Qty: 90 TABLET | Refills: 3 | Status: SHIPPED | OUTPATIENT
Start: 2020-12-30 | End: 2021-07-30

## 2020-12-30 RX ORDER — BLOOD SUGAR DIAGNOSTIC
STRIP MISCELLANEOUS
Qty: 100 STRIP | Refills: 4 | Status: SHIPPED | OUTPATIENT
Start: 2020-12-30 | End: 2021-07-13

## 2020-12-30 RX ORDER — WARFARIN SODIUM 5 MG/1
TABLET ORAL
Qty: 120 TABLET | Refills: 3 | Status: SHIPPED | OUTPATIENT
Start: 2020-12-30 | End: 2021-01-14

## 2020-12-30 RX ORDER — PAROXETINE 30 MG/1
TABLET, FILM COATED ORAL
Qty: 180 TABLET | Refills: 3 | Status: SHIPPED | OUTPATIENT
Start: 2020-12-30 | End: 2021-07-13

## 2020-12-30 RX ORDER — MULTIVIT-MIN/IRON/FOLIC ACID/K 18-600-40
1 CAPSULE ORAL DAILY
Qty: 90 CAPSULE | Refills: 3 | Status: SHIPPED | OUTPATIENT
Start: 2020-12-30 | End: 2021-07-13

## 2020-12-30 RX ORDER — CLOBETASOL PROPIONATE 0.5 MG/G
CREAM TOPICAL
Qty: 270 G | Refills: 3 | Status: SHIPPED | OUTPATIENT
Start: 2020-12-30 | End: 2021-07-15

## 2020-12-30 RX ORDER — ATORVASTATIN CALCIUM 10 MG/1
10 TABLET, FILM COATED ORAL DAILY
Qty: 90 TABLET | Refills: 3 | Status: SHIPPED | OUTPATIENT
Start: 2020-12-30 | End: 2021-03-23

## 2020-12-30 NOTE — TELEPHONE ENCOUNTER
I assume she is wanting Covid PCR testing and she is asymptomatic. I had virtual visit with her today and no symptoms were discussed.   I ordered Covid swab though not sure if lab will do them

## 2020-12-30 NOTE — PROGRESS NOTES
"Gemma Cowart is a 75 year old female who is being evaluated via a billable telephone visit.      The patient has been notified of following:     \"This telephone visit will be conducted via a call between you and your physician/provider. We have found that certain health care needs can be provided without the need for a physical exam.  This service lets us provide the care you need with a short phone conversation.  If a prescription is necessary we can send it directly to your pharmacy.  If lab work is needed we can place an order for that and you can then stop by our lab to have the test done at a later time.    Telephone visits are billed at different rates depending on your insurance coverage. During this emergency period, for some insurers they may be billed the same as an in-person visit.  Please reach out to your insurance provider with any questions.    If during the course of the call the physician/provider feels a telephone visit is not appropriate, you will not be charged for this service.\"    Patient has given verbal consent for Telephone visit?  Yes    What phone number would you like to be contacted at? [phone]    How would you like to obtain your AVS? Mail a copy    Subjective     Gemma Cowart is a 75 year old female who presents via phone visit today for the following health issues:    HPI     Diabetes Follow-up    How often are you checking your blood sugar? Two times daily  Blood sugar testing frequency justification:  N/A  What time of day are you checking your blood sugars (select all that apply)?  Before meals and After meals  Have you had any blood sugars above 200?  No  Have you had any blood sugars below 70?  Yes a couple times    What symptoms do you notice when your blood sugar is low?  Shaky, Dizzy and sweating hot    What concerns do you have today about your diabetes? None     Do you have any of these symptoms? (Select all that apply)  Blurry vision    Have you had a diabetic " eye exam in the last 12 months? Yes- Date of last eye exam: 06/2020,  Location: [facility]        BP Readings from Last 2 Encounters:   07/21/20 117/56   10/16/19 (!) 145/79     Hemoglobin A1C (%)   Date Value   12/17/2020 5.9 (H)   09/06/2019 5.7 (H)     LDL Cholesterol Calculated (mg/dL)   Date Value   12/17/2020 107 (H)   09/06/2019 125 (H)         How many servings of fruits and vegetables do you eat daily?  2-3    On average, how many sweetened beverages do you drink each day (Examples: soda, juice, sweet tea, etc.  Do NOT count diet or artificially sweetened beverages)?   0    How many days per week do you exercise enough to make your heart beat faster? 3 or less    How many minutes a day do you exercise enough to make your heart beat faster? 30 - 60    How many days per week do you miss taking your medication? 0    She had labs completed 12/17/20 which we discussed today    DM - A1c 5.9%  Diet controlled  Fasting is usually highest glucose of the day  If > 120, will check again in the afternoon  If has a low, will occur in the afternoon  If she feels sweaty, clammy, will check glucose    Left breast cancer 2012 s/p bilateral mastectomies  Started on Femara 2012, on hold 2016 d/t memory loss and dysarthria. Cleared by neurology then restarted later and completed 5 year treatment in 2017  Follows annually with oncology  Last seen 10/14/20    Recurrent DVT - on warfarin  Follows with our anticoagulation clinic    Last colonoscopy 2017  Repeat in 5 years    Lives 23 miles from pharmacy  They are all dispensed at different times which makes it difficult to go to pharmacy several times a week  She states she has not been taking atorvastatin for over 5 years    The 10-year ASCVD risk score ([place] HERN[place]DE[place] Jr., et al., 2013) is: 22.2%    Values used to calculate the score:      Age: 75 years      Sex: Female      Is Non- : No      Diabetic: Yes      Tobacco smoker: No      Systolic Blood  Pressure: 117 mmHg      Is BP treated: No      HDL Cholesterol: 59 mg/dL      Total Cholesterol: 186 mg/dL    She would like to see a cardiologist  Hx SVT s/p ablation 2015  States palpitations came back   Started occurring 2-3 months ago  Last seconds  Occasionally has some lightheadedness along with it, which lasts a few seconds to a minute      Review of Systems   Constitutional, HEENT, cardiovascular, pulmonary, gi and gu systems are negative, except as otherwise noted.       Objective          Vitals:  No vitals were obtained today due to virtual visit.    healthy, alert and no distress  PSYCH: Alert and oriented times 3; coherent speech, normal   rate and volume, able to articulate logical thoughts, able   to abstract reason, no tangential thoughts, no hallucinations   or delusions  Her affect is normal and pleasant  RESP: No cough, no audible wheezing, able to talk in full sentences  Remainder of exam unable to be completed due to telephone visits    No results found for any visits on 12/30/20.        Assessment/Plan:    Assessment & Plan     Type 2 diabetes mellitus with other specified complication, without long-term current use of insulin (H)  - Diet controlled  - atorvastatin (LIPITOR) 10 MG tablet  Dispense: 90 tablet; Refill: 3  - blood glucose (NO BRAND SPECIFIED) lancets standard  Dispense: 100 each; Refill: 11  - blood glucose (ONETOUCH ULTRA) test strip  Dispense: 100 strip; Refill: 4    Malignant neoplasm of left female breast, unspecified estrogen receptor status, unspecified site of breast (H)  - Follows with oncology    SVT (supraventricular tachycardia) (H)  - Will obtain 2 week ZIO as symptoms of palpitations approximately once a week. Refer to cardiology. I don't think she requires urgent referral at this time. Palpations infrequent and lasting a few seconds to one minute. Did advise to avoid caffeine. If palpitations persist, or symptomatic, call 911   - CARDIOLOGY EVAL ADULT REFERRAL  - Zio  Patch Holter Adult Pediatric Greater than 48 hrs    Morbid obesity (H)  - Some weight loss since last year. Continue to monitor    Long term current use of anticoagulant therapy  - warfarin ANTICOAGULANT (COUMADIN) 5 MG tablet  Dispense: 120 tablet; Refill: 3    Personal history of DVT (deep vein thrombosis)  - warfarin ANTICOAGULANT (COUMADIN) 5 MG tablet  Dispense: 120 tablet; Refill: 3    Stage 3b chronic kidney disease  - Stable    ELI (generalized anxiety disorder)  - PARoxetine (PAXIL) 30 MG tablet  Dispense: 180 tablet; Refill: 3    Palpitations  - CARDIOLOGY EVAL ADULT REFERRAL  - Zio Patch Holter Adult Pediatric Greater than 48 hrs    Vitamin B12 deficiency  - vitamin B-12 (CYANOCOBALAMIN) 2500 MCG sublingual tablet  Dispense: 90 tablet; Refill: 3    S/P gastric bypass  - vitamin B-12 (CYANOCOBALAMIN) 2500 MCG sublingual tablet  Dispense: 90 tablet; Refill: 3  - Cholecalciferol (VITAMIN D) 50 MCG (2000 UT) CAPS  Dispense: 90 capsule; Refill: 3    Psoriasis  - clobetasol (TEMOVATE) 0.05 % external cream  Dispense: 270 g; Refill: 3            Return in about 1 year (around 12/30/2021) for Physical Exam.    ANTON Prado [facility]    Phone call duration:  24 minutes

## 2020-12-30 NOTE — TELEPHONE ENCOUNTER
Reason for call:  Other   Patient called regarding (reason for call): patient is having INR done at [facility], would like to have COVID test do at same time. Requesting orders for COVID test  Additional comments: n/a    Phone number to reach patient:  Home number on file [phone] (home)    Best Time:  any    Can we leave a detailed message on this number?  YES    Travel screening: Not Applicable

## 2020-12-31 ENCOUNTER — ANTICOAGULATION THERAPY VISIT (OUTPATIENT)
Dept: NURSING | Facility: CLINIC | Age: 75
End: 2020-12-31

## 2020-12-31 DIAGNOSIS — Z79.01 LONG TERM CURRENT USE OF ANTICOAGULANT THERAPY: ICD-10-CM

## 2020-12-31 DIAGNOSIS — Z86.718 PERSONAL HISTORY OF DVT (DEEP VEIN THROMBOSIS): ICD-10-CM

## 2020-12-31 LAB
CAPILLARY BLOOD COLLECTION: NORMAL
INR PPP: 1.7 (ref 0.86–1.14)

## 2020-12-31 PROCEDURE — 36416 COLLJ CAPILLARY BLOOD SPEC: CPT | Performed by: INTERNAL MEDICINE

## 2020-12-31 PROCEDURE — 85610 PROTHROMBIN TIME: CPT | Performed by: INTERNAL MEDICINE

## 2020-12-31 PROCEDURE — 99207 PR NO CHARGE NURSE ONLY: CPT

## 2020-12-31 NOTE — PROGRESS NOTES
ANTICOAGULATION FOLLOW-UP     Patient Name:  Gemma Cowart  Date:  2020  Contact Type:  Telephone    SUBJECTIVE:  Patient Findings     Positives:  Missed doses    Comments:  The patient was assessed for   diet, medication,   bruising or bleeding,   with no problem findings.  Reviewed previous warfarin dosing with patient.  She missed doses on Friday and Sat.    INR is subtherapeutic today. Was subtherapeutic last time also.  Patient will increase weekly maintenance dose total by 5.3%.   Follow up in 2 weeks.     AVS calendar mailed to home address.          Clinical Outcomes     Comments:  The patient was assessed for   diet, medication,   bruising or bleeding,   with no problem findings.  Reviewed previous warfarin dosing with patient.  She missed doses on Friday and Sat.    INR is subtherapeutic today. Was subtherapeutic last time also.  Patient will increase weekly maintenance dose total by 5.3%.   Follow up in 2 weeks.     AVS calendar mailed to home address.             OBJECTIVE    Recent labs: (last 7 days)     20  0803   INR 1.70*       ASSESSMENT / PLAN  INR assessment SUB    Recheck INR In: 2 WEEKS    INR Location Clinic lab     Anticoagulation Summary  As of 2020    INR goal:  2.0-3.0   TTR:  48.4 % (1 y)   INR used for dosin.70 (2020)   Warfarin maintenance plan:  5 mg (5 mg x 1) every Sun; 7.5 mg (5 mg x 1.5) all other days   Full warfarin instructions:  : 7.5 mg; Otherwise 5 mg every Sun; 7.5 mg all other days   Weekly warfarin total:  50 mg   Plan last modified:  Rae Cooley RN (2020)   Next INR check:  2021   Priority:  Maintenance   Target end date:  Indefinite    Indications    Personal history of DVT (deep vein thrombosis) [Z86.718]  Long term current use of anticoagulant therapy [Z79.01]             Anticoagulation Episode Summary     INR check location:  Clinic Lab    Preferred lab:      Send INR reminders to:  TOBIAS THOMPSON    Comments:   5mg tabs - HS dose // CALENDAR // gastric bypass 2000 // DVT 11/16/12 warfarin until Apr 2013 // DVT 9/12/15      Anticoagulation Care Providers     Provider Role Specialty Phone number    Alva Viera MD Referring Internal Medicine - Pediatrics [phone]            See the Encounter Report to view Anticoagulation Flowsheet and Dosing Calendar (Go to Encounters tab in chart review, and find the Anticoagulation Therapy Visit)        Dosage adjustment made based on physician directed care plan.      Rae Cooley RN

## 2021-01-02 DIAGNOSIS — Z11.52 ENCOUNTER FOR SCREENING LABORATORY TESTING FOR COVID-19 VIRUS: ICD-10-CM

## 2021-01-02 LAB
SARS-COV-2 RNA SPEC QL NAA+PROBE: NOT DETECTED
SPECIMEN SOURCE: NORMAL

## 2021-01-02 PROCEDURE — U0003 INFECTIOUS AGENT DETECTION BY NUCLEIC ACID (DNA OR RNA); SEVERE ACUTE RESPIRATORY SYNDROME CORONAVIRUS 2 (SARS-COV-2) (CORONAVIRUS DISEASE [COVID-19]), AMPLIFIED PROBE TECHNIQUE, MAKING USE OF HIGH THROUGHPUT TECHNOLOGIES AS DESCRIBED BY CMS-2020-01-R: HCPCS | Performed by: NURSE PRACTITIONER

## 2021-01-02 PROCEDURE — U0005 INFEC AGEN DETEC AMPLI PROBE: HCPCS | Performed by: NURSE PRACTITIONER

## 2021-01-04 ENCOUNTER — HOSPITAL ENCOUNTER (OUTPATIENT)
Dept: CARDIOLOGY | Facility: CLINIC | Age: 76
Discharge: HOME | End: 2021-01-04
Attending: NURSE PRACTITIONER | Admitting: NURSE PRACTITIONER
Payer: COMMERCIAL

## 2021-01-04 ENCOUNTER — TELEPHONE (OUTPATIENT)
Dept: PEDIATRICS | Facility: CLINIC | Age: 76
End: 2021-01-04

## 2021-01-04 DIAGNOSIS — R00.2 PALPITATIONS: ICD-10-CM

## 2021-01-04 DIAGNOSIS — I47.10 SVT (SUPRAVENTRICULAR TACHYCARDIA) (H): ICD-10-CM

## 2021-01-04 PROCEDURE — 93246 EXT ECG>7D<15D RECORDING: CPT

## 2021-01-04 PROCEDURE — 93248 EXT ECG>7D<15D REV&INTERPJ: CPT | Performed by: INTERNAL MEDICINE

## 2021-01-04 NOTE — TELEPHONE ENCOUNTER
Placed Cancer Genetics Requisition form from [facility] on Dr. Hudson's in-basket to be signed.     Mansi Rivera MA 2:23 PM 1/4/2021

## 2021-01-05 NOTE — TELEPHONE ENCOUNTER
Per Dr. Hudson, needs to be signed by either Zulma Mcgee (last seen by) or oncology.     Placing form in Zulma Mcgee's in-basket.     Mansi Rivera MA 5:21 PM 1/5/2021

## 2021-01-14 ENCOUNTER — HEALTH MAINTENANCE LETTER (OUTPATIENT)
Age: 76
End: 2021-01-14

## 2021-01-14 ENCOUNTER — ANTICOAGULATION THERAPY VISIT (OUTPATIENT)
Dept: NURSING | Facility: CLINIC | Age: 76
End: 2021-01-14

## 2021-01-14 DIAGNOSIS — Z86.718 PERSONAL HISTORY OF DVT (DEEP VEIN THROMBOSIS): ICD-10-CM

## 2021-01-14 DIAGNOSIS — Z79.01 LONG TERM CURRENT USE OF ANTICOAGULANT THERAPY: ICD-10-CM

## 2021-01-14 LAB
CAPILLARY BLOOD COLLECTION: NORMAL
INR PPP: 2.7 (ref 0.86–1.14)

## 2021-01-14 PROCEDURE — 85610 PROTHROMBIN TIME: CPT | Performed by: INTERNAL MEDICINE

## 2021-01-14 PROCEDURE — 99207 PR NO CHARGE NURSE ONLY: CPT

## 2021-01-14 PROCEDURE — 36416 COLLJ CAPILLARY BLOOD SPEC: CPT | Performed by: INTERNAL MEDICINE

## 2021-01-14 RX ORDER — WARFARIN SODIUM 7.5 MG/1
TABLET ORAL
Qty: 90 TABLET | Refills: 1 | Status: SHIPPED | OUTPATIENT
Start: 2021-01-14 | End: 2021-07-24

## 2021-01-14 RX ORDER — WARFARIN SODIUM 5 MG/1
TABLET ORAL
Qty: 30 TABLET | Refills: 3
Start: 2021-01-14 | End: 2021-07-13

## 2021-01-14 NOTE — PROGRESS NOTES
ANTICOAGULATION FOLLOW-UP     Patient Name:  Gemma Cowart  Date:  2021  Contact Type:  Telephone    SUBJECTIVE:  Patient Findings     Comments:  The patient was assessed for   diet, medication,   missed or extra doses,   bruising or bleeding,   with no problem findings.  No questions or concerns.  Reviewed previous warfarin dosing with patient.  She took warfarin as instructed.    INR is therapeutic today.   Patient will continue same maintenance dose.   Follow up in 3 weeks.              Clinical Outcomes     Comments:  The patient was assessed for   diet, medication,   missed or extra doses,   bruising or bleeding,   with no problem findings.  No questions or concerns.  Reviewed previous warfarin dosing with patient.  She took warfarin as instructed.    INR is therapeutic today.   Patient will continue same maintenance dose.   Follow up in 3 weeks.                 OBJECTIVE    Recent labs: (last 7 days)     21  0813   INR 2.70*       ASSESSMENT / PLAN  INR assessment THER    Recheck INR In: 3 WEEKS    INR Location Clinic lab     Anticoagulation Summary  As of 2021    INR goal:  2.0-3.0   TTR:  47.3 % (1 y)   INR used for dosin.70 (2021)   Warfarin maintenance plan:  5 mg (5 mg x 1) every Sun; 7.5 mg (5 mg x 1.5) all other days   Full warfarin instructions:  5 mg every Sun; 7.5 mg all other days   Weekly warfarin total:  50 mg   Plan last modified:  Rae Cooley RN (2020)   Next INR check:  2021   Priority:  Maintenance   Target end date:  Indefinite    Indications    Personal history of DVT (deep vein thrombosis) [Z86.718]  Long term current use of anticoagulant therapy [Z79.01]             Anticoagulation Episode Summary     INR check location:  Clinic Lab    Preferred lab:      Send INR reminders to:  TOBIAS THOMPSON    Comments:  5mg tabs - HS dose // CALENDAR // gastric bypass  // DVT 12 warfarin until 2013 // DVT 9/12/15      Anticoagulation Care  Providers     Provider Role Specialty Phone number    Alva Viera MD Referring Internal Medicine - Pediatrics [phone]            See the Encounter Report to view Anticoagulation Flowsheet and Dosing Calendar (Go to Encounters tab in chart review, and find the Anticoagulation Therapy Visit)        Rae Cooley RN

## 2021-01-21 ENCOUNTER — PATIENT OUTREACH (OUTPATIENT)
Dept: GERIATRIC MEDICINE | Facility: CLINIC | Age: 76
End: 2021-01-21

## 2021-01-21 NOTE — LETTER
January 21, 2021    SALUD MARTINEZ  [address] [place] [id number]      Dear Salud:    As a member of [facility] ([facility] ([facility]), you are provided a care coordinator. I will be your new care coordinator as of 02/01/2021. I will be calling you soon to see how you are doing and determine your needs.    If you have any questions, please feel free to call me at [phone]. If you reach my voice mail, please leave a message and your phone number. If you are hearing impaired, please call the [place] Relay at 572 or [phone] ([email or web address]-[email or web address] relay service).    I look forward to speaking with you soon.    Sincerely,    TIM Ruiz, [facility]    E-mail: [email or web address]  Phone:[phone]      [facility] is a health plan that contracts with both Medicare and the [place] Medical Assistance (Medicaid) program to provide benefits of both programs to enrollees. Enrollment in [facility] depends on contract renewal.      [facility]  [id number] DHS Approved ([id number])  [id number] (11/18)

## 2021-01-21 NOTE — PROGRESS NOTES
[facility] Care Coordination Contact    Internal CC change effective 02/01/2021.  Mailed member CC Change letter.  Additional tasks to be completed by CMS include: update database & EPIC, enter CC Change in MMIS, and move member files on Q drive.  Carleen Sidhu  Case Management Specialist  [facility]  [phone]

## 2021-01-21 NOTE — LETTER
January 21, 2021    SALUD MARTINEZ  [address] [place] [id number]      Dear Salud:    As a member of [facility] ([facility] ([facility]), you are provided a care coordinator. I will be your new care coordinator as of 02/01/2021. I will be calling you soon to see how you are doing and determine your needs.    If you have any questions, please feel free to call me at [phone]. If you reach my voice mail, please leave a message and your phone number. If you are hearing impaired, please call the [place] [employer]ay at 146 or [phone] (zvcbzy-xh-vfbjao relay service).    I look forward to speaking with you soon.    Sincerely,    TIM Ruiz, [facility]    E-mail: [email or web address]  Phone:[phone]      [facility] is a health plan that contracts with both Medicare and the [place] Medical Assistance (Medicaid) program to provide benefits of both programs to enrollees. Enrollment in [facility] depends on contract renewal.      [facility]  [id number] DHS Approved ([id number])  [id number] (11/18)

## 2021-01-29 NOTE — RESULT ENCOUNTER NOTE
[facility]  [address]  [place] [address]  Phone: [phone]  Fax: [phone]      01/29/21    Gemma Cowart  [address]   [place] [address]      Dear Gemma,    The results of your recent labs are enclosed.  Your ZIO showed short runs of supraventricular tachycardia within 45 seconds of your reported events. I would like for you to see a cardiologist to discuss further treatment. I see an appointment has been scheduled which is great.  Please call the clinic if you have any concerns.    Sincerely,    ANTON Prado CNP    Your [facility] Care Team

## 2021-02-04 NOTE — PROGRESS NOTES
[facility] Care Coordination Contact    Called member to introduce new Care Coordinator to her. She received Care Coordinator's letter yesterday.  Care Coordination is an internal transfer within [facility] due to member's move to [place].   Updated address and phone number on plan of care.  Everything else remains up-to-date per member.   Member indicates that she has not changed PCPs and is not going to the [facility].  She states that she plans to continue going to the [facility] and plans to see Zulma Mcgee CNP.  Requested that CMS make this change to [facility].  No questions or concerns currently per member.  TIM Ruiz, [facility] Care Coordinator  Tel [phone]  Fax [phone]

## 2021-02-11 ENCOUNTER — ANTICOAGULATION THERAPY VISIT (OUTPATIENT)
Dept: NURSING | Facility: CLINIC | Age: 76
End: 2021-02-11

## 2021-02-11 DIAGNOSIS — Z79.01 LONG TERM CURRENT USE OF ANTICOAGULANT THERAPY: ICD-10-CM

## 2021-02-11 DIAGNOSIS — Z86.718 PERSONAL HISTORY OF DVT (DEEP VEIN THROMBOSIS): ICD-10-CM

## 2021-02-11 LAB
CAPILLARY BLOOD COLLECTION: NORMAL
INR PPP: 2.2 (ref 0.86–1.14)

## 2021-02-11 PROCEDURE — 85610 PROTHROMBIN TIME: CPT | Performed by: INTERNAL MEDICINE

## 2021-02-11 PROCEDURE — 36416 COLLJ CAPILLARY BLOOD SPEC: CPT | Performed by: INTERNAL MEDICINE

## 2021-02-11 NOTE — PROGRESS NOTES
ANTICOAGULATION FOLLOW-UP CLINIC VISIT    Patient Name:  Gemma Cowart  Date:  2021  Contact Type:  Telephone    SUBJECTIVE:  Patient Findings     Comments:  The patient was assessed for diet, medication, and activity level changes, missed or extra doses, bruising or bleeding, with no problem findings.          Clinical Outcomes     Negatives:  Major bleeding event, Thromboembolic event, Anticoagulation-related hospital admission, Anticoagulation-related ED visit, Anticoagulation-related fatality    Comments:  The patient was assessed for diet, medication, and activity level changes, missed or extra doses, bruising or bleeding, with no problem findings.             OBJECTIVE    Recent labs: (last 7 days)     21  0921   INR 2.20*       ASSESSMENT / PLAN  INR assessment THER    Recheck INR In: 4 WEEKS    INR Location Clinic      Anticoagulation Summary  As of 2021    INR goal:  2.0-3.0   TTR:  47.3 % (1 y)   INR used for dosin.20 (2021)   Warfarin maintenance plan:  5 mg (5 mg x 1) every Sun; 7.5 mg (5 mg x 1.5) all other days   Full warfarin instructions:  5 mg every Sun; 7.5 mg all other days   Weekly warfarin total:  50 mg   No change documented:  Denisse Jefferson, RN   Plan last modified:  Rae Cooley, RN (2020)   Next INR check:  3/11/2021   Priority:  Maintenance   Target end date:  Indefinite    Indications    Personal history of DVT (deep vein thrombosis) [Z86.718]  Long term current use of anticoagulant therapy [Z79.01]             Anticoagulation Episode Summary     INR check location:  Clinic Lab    Preferred lab:      Send INR reminders to:  TOBIAS THOMPSON    Comments:  5mg tabs - HS dose // CALENDAR // gastric bypass  // DVT 12 warfarin until 2013 // DVT 9/12/15      Anticoagulation Care Providers     Provider Role Specialty Phone number    Alva Viera MD Referring Internal Medicine - Pediatrics [phone]            See the Encounter Report to view  Anticoagulation Flowsheet and Dosing Calendar (Go to Encounters tab in chart review, and find the Anticoagulation Therapy Visit)        Denisse Jefferson RN

## 2021-03-03 ENCOUNTER — OFFICE VISIT (OUTPATIENT)
Dept: CARDIOLOGY | Facility: CLINIC | Age: 76
End: 2021-03-03
Attending: NURSE PRACTITIONER
Payer: COMMERCIAL

## 2021-03-03 VITALS
HEART RATE: 69 BPM | DIASTOLIC BLOOD PRESSURE: 76 MMHG | HEIGHT: 63 IN | BODY MASS INDEX: 35.03 KG/M2 | SYSTOLIC BLOOD PRESSURE: 128 MMHG | WEIGHT: 197.7 LBS | OXYGEN SATURATION: 97 %

## 2021-03-03 DIAGNOSIS — R00.2 PALPITATIONS: ICD-10-CM

## 2021-03-03 DIAGNOSIS — I47.10 SVT (SUPRAVENTRICULAR TACHYCARDIA) (H): ICD-10-CM

## 2021-03-03 PROCEDURE — 99213 OFFICE O/P EST LOW 20 MIN: CPT | Performed by: INTERNAL MEDICINE

## 2021-03-03 PROCEDURE — 93000 ELECTROCARDIOGRAM COMPLETE: CPT | Performed by: INTERNAL MEDICINE

## 2021-03-03 ASSESSMENT — MIFFLIN-ST. JEOR: SCORE: 1365.89

## 2021-03-03 NOTE — PROGRESS NOTES
HPI and Plan:   See dictation    Orders Placed This Encounter   Procedures     EKG 12-lead complete w/read - Clinics (performed today)       No orders of the defined types were placed in this encounter.      There are no discontinued medications.      Encounter Diagnoses   Name Primary?     Palpitations      SVT (supraventricular tachycardia) (H)        CURRENT MEDICATIONS:  ALLERGIES     Allergies   Allergen Reactions     Bacitracin Unknown     Phenylene        PAST MEDICAL HISTORY:  Past Medical History:   Diagnosis Date     Anemia     iron deficiency anemia     Anxiety      Breast cancer (H) 2012    Bilateral Masectomies 9/2012     Complications of gastric bypass surgery      Diabetes mellitus (H)     diet controlled     DVT (deep venous thrombosis) (H)      Eczema      Fracture of left knee region     patella - vertical     History of kidney stones      Obesity      Palpitation     with no treatment     Polyneuropathy      Pruritus     generalized     Rosacea      SVT (supraventricular tachycardia) (H)     s/p ablation 5/28/2015 at [facility] for left lateral AP       PAST SURGICAL HISTORY:  Past Surgical History:   Procedure Laterality Date     ABDOMEN SURGERY  2000    gastric bypass 2000     COLONOSCOPY  10/11/2012    Procedure: COLONOSCOPY;  colonoscopy;  Surgeon: Gela Cleary MD;  Location:  GI     COLONOSCOPY N/A 10/6/2017    Procedure: COLONOSCOPY;;  Surgeon: Shashank Ortiz MD;  Location:  GI     DENTAL SURGERY  5/2007    all teeth removed - dentures     ENT SURGERY      all teeth pulled     ESOPHAGOSCOPY, GASTROSCOPY, DUODENOSCOPY (EGD), COMBINED N/A 10/6/2017    Procedure: COMBINED ESOPHAGOSCOPY, GASTROSCOPY, DUODENOSCOPY (EGD);  ESOPHAGOSCOPY, GASTROSCOPY, DUODENOSCOPY (EGD) & Colonoscopy *Needs INR on arrival;  Surgeon: Shashank Ortiz MD;  Location:  GI     GYN SURGERY  1/10/75    tubal ligation      H ABLATION SVT  5/28/2015    stopped metoprolol     HERNIA REPAIR  2005      IMPLANT FILTER INFERIOR VENA CAVA  10/8/2012    taken out  1/4/12     INSERT TISSUE EXPANDER BREAST BILATERAL  9/5/2012    Procedure: INSERT TISSUE EXPANDER BREAST BILATERAL;;  Surgeon: Orlando Wall MD;  Location:  OR     MASTECTOMY SIMPLE, SENTINEL NODE, COMBINED  9/5/2012    Procedure: COMBINED MASTECTOMY SIMPLE, SENTINEL NODE;  BILATERAL MASTECTOMY WITH LEFT AXILLARY SENTINEL LYMPH NODE BIOPSY, BILATERAL TISSUE EXPANDER        MASTECTOMY, BILATERAL       PANNICULECTOMY N/A 4/5/2018    Procedure: PANNICULECTOMY;  PANNICULECTOMY ;  Surgeon: Orlando Wall MD;  Location:  OR     RECONSTRUCT BREAST BILATERAL, IMPLANT PROSTHESIS BILATERAL, COMBINED  5/22/2013    Procedure: COMBINED RECONSTRUCT BREAST BILATERAL, IMPLANT PROSTHESIS BILATERAL;  BILATERAL SECOND STAGE BREAST RECONSTRUCTION WITH SILICONE GEL IMPLANTS AND LIPOSUCTION;  Surgeon: Orlando Wall MD;  Location: [facility]     RECONSTRUCT NIPPLE BILATERAL  8/22/2013    Procedure: RECONSTRUCT NIPPLE BILATERAL;  BILATERAL NIPPLE AREOLAR RECONSTRUCTION;  Surgeon: Orlando Wall MD;  Location: [facility]     ZZHC BREAST RECONSTRUC W OTHR TECHNIQ  5/8/2013       FAMILY HISTORY:  Family History   Problem Relation Age of Onset     Cancer - colorectal Mother      Colon Cancer Mother      Prostate Cancer Father      Alzheimer Disease Father      Cancer Paternal Grandmother      Alzheimer Disease Paternal Grandfather      Cancer - colorectal Brother      Diabetes Brother      Crohn's Disease Daughter      Diabetes Brother      Eye Disorder Brother      Diabetes Son      Cancer Maternal Uncle      Cancer Maternal Uncle        SOCIAL HISTORY:  Social History     Socioeconomic History     Marital status:      Spouse name: None     Number of children: None     Years of education: None     Highest education level: None   Occupational History     Employer: RETIRED   Social Needs     Financial resource strain: None     Food insecurity     Worry:  None     Inability: None     Transportation needs     Medical: None     Non-medical: None   Tobacco Use     Smoking status: Former Smoker     Packs/day: 1.50     Years: 25.00     Pack years: 37.50     Types: Cigarettes     Quit date: 10/21/1986     Years since quittin.3     Smokeless tobacco: Never Used     Tobacco comment: quit    Substance and Sexual Activity     Alcohol use: No     Alcohol/week: 0.0 standard drinks     Drug use: No     Sexual activity: Never     Partners: Male   Lifestyle     Physical activity     Days per week: None     Minutes per session: None     Stress: None   Relationships     Social connections     Talks on phone: None     Gets together: None     Attends Yazidi service: None     Active member of club or organization: None     Attends meetings of clubs or organizations: None     Relationship status: None     Intimate partner violence     Fear of current or ex partner: None     Emotionally abused: None     Physically abused: None     Forced sexual activity: None   Other Topics Concern     Parent/sibling w/ CABG, MI or angioplasty before 65F 55M? No      Service Not Asked     Blood Transfusions Not Asked     Caffeine Concern Not Asked     Occupational Exposure Not Asked     Hobby Hazards Not Asked     Sleep Concern Not Asked     Stress Concern Not Asked     Weight Concern Not Asked     Special Diet Not Asked     Back Care Not Asked     Exercise Not Asked     Bike Helmet Yes     Seat Belt Yes     Self-Exams Not Asked   Social History Narrative     None       Review of Systems:  Skin:  Negative       Eyes:  Positive for glasses    ENT:  Positive for hearing loss    Respiratory:  Positive for dyspnea on exertion;shortness of breath     Cardiovascular:    palpitations;chest pain;Positive for;fatigue;dizziness;edema    Gastroenterology: Negative      Genitourinary:  Negative      Musculoskeletal:  Negative      Neurologic:  Negative      Psychiatric:  Negative     "  Heme/Lymph/Imm:  Negative      Endocrine:  Positive for diabetes;thyroid disorder      Physical Exam:  Vitals: /76 (BP Location: Right arm, Patient Position: Sitting, Cuff Size: Adult Regular)   Pulse 69   Ht 1.6 m (5' 3\")   Wt 89.7 kg (197 lb 11.2 oz)   LMP 09/24/1990   SpO2 97%   Breastfeeding No   BMI 35.02 kg/m      Constitutional:  cooperative, alert and oriented, well developed, well nourished, in no acute distress        Skin:  warm and dry to the touch, no apparent skin lesions or masses noted          Head:  normocephalic, no masses or lesions        Eyes:           Lymph:No Cervical lymphadenopathy present     ENT:  no pallor or cyanosis, dentition good        Neck:  carotid pulses are full and equal bilaterally, JVP normal, no carotid bruit        Respiratory:  normal breath sounds, clear to auscultation, normal A-P diameter, normal symmetry, normal respiratory excursion, no use of accessory muscles         Cardiac: regular rhythm, normal S1/S2, no S3 or S4, apical impulse not displaced, no murmurs, gallops or rubs                                                         GI:  abdomen soft, non-tender, BS normoactive, no mass, no HSM, no bruits        Extremities and Muscular Skeletal:  no deformities, clubbing, cyanosis, erythema observed              Neurological:  no gross motor deficits        Psych:  Alert and Oriented x 3        CC  ANTON Dixon CNP  [address] [place] [address]              "

## 2021-03-03 NOTE — PROGRESS NOTES
Service Date: 03/03/2021      PRIMARY CARE PHYSICIAN:  ANTON Boateng CNP      HISTORY OF PRESENT ILLNESS:  I saw Ms. Cowart for a followup for palpitations.  She is a 75-year-old white female, who had SVT ablation in another institution years ago.  I saw her on 03/06/2019 for palpitations.  She was found to have intermittent PVCs and short episodes of atrial arrhythmia.  At that time, I recommended observation only.  Over the last couple of years, she continued to have recurrent palpitations.  Recently, she had a Zio Patch monitor again that reported 15 episodes of atrial tachycardia up to 15 beats per minute.  The atrial tachycardia was relatively slow, about 184 beats per minute.  Her symptoms of palpitation actually did not correlate with arrhythmia.  Overall, the patient stated she has been doing quite well with no major problems.  The palpitation is relatively mild and infrequent, not having every day.      PHYSICAL EXAMINATION:   VITAL SIGNS:  Blood pressure was 128/76, heart rate 69 beats per minute, body weight 197 pounds.  The cardiovascular examination showed no remarkable abnormalities.      The EKG today showed normal sinus rhythm.      ASSESSMENT AND RECOMMENDATIONS:  Ms. Cowart has some occasional minor palpitation.  She has no evidence of recurrent SVT from the previous ablation.  The occasional short-lasting atrial tachycardia is not uncommon in people of her age.  I do not recommend intervention.  The patient is asked to check her radial pulses for 1 minute if she has palpitation in the future.  If she detects occasional skipping of the pulses, she does not need to be concerned about it.  At this point, I do not see restriction for activities.  She does not need routine Cardiology followup.      cc:   ANTON Prado CNP   [facility]   [address] [place] [address]         FERN SANTOYO MD             D: 03/03/2021   T: 03/03/2021   MT: al       Name:     SALUD MARTINEZ   MRN:      [id number]        Account:      [id number]   :      1945           Service Date: 2021      Document: [id number]

## 2021-03-03 NOTE — LETTER
3/3/2021    Zulma Mcgee, ANTON CNP  [address] [facility] [address]    RE: Gemma Cowart       Dear Colleague,    I had the pleasure of seeing Gemma Cowart in the [facility].    HPI and Plan:   See dictation    Orders Placed This Encounter   Procedures     EKG 12-lead complete w/read - Clinics (performed today)       No orders of the defined types were placed in this encounter.      There are no discontinued medications.      Encounter Diagnoses   Name Primary?     Palpitations      SVT (supraventricular tachycardia) (H)        CURRENT MEDICATIONS:  ALLERGIES     Allergies   Allergen Reactions     Bacitracin Unknown     Phenylene        PAST MEDICAL HISTORY:  Past Medical History:   Diagnosis Date     Anemia     iron deficiency anemia     Anxiety      Breast cancer (H) 2012    Bilateral Masectomies 9/2012     Complications of gastric bypass surgery      Diabetes mellitus (H)     diet controlled     DVT (deep venous thrombosis) (H)      Eczema      Fracture of left knee region     patella - vertical     History of kidney stones      Obesity      Palpitation     with no treatment     Polyneuropathy      Pruritus     generalized     Rosacea      SVT (supraventricular tachycardia) (H)     s/p ablation 5/28/2015 at [facility] for left lateral AP       PAST SURGICAL HISTORY:  Past Surgical History:   Procedure Laterality Date     ABDOMEN SURGERY  2000    gastric bypass 2000     COLONOSCOPY  10/11/2012    Procedure: COLONOSCOPY;  colonoscopy;  Surgeon: Gela Cleary MD;  Location:  GI     COLONOSCOPY N/A 10/6/2017    Procedure: COLONOSCOPY;;  Surgeon: Shashank Ortiz MD;  Location:  GI     DENTAL SURGERY  5/2007    all teeth removed - dentures     ENT SURGERY      all teeth pulled     ESOPHAGOSCOPY, GASTROSCOPY, DUODENOSCOPY (EGD), COMBINED N/A 10/6/2017    Procedure: COMBINED ESOPHAGOSCOPY, GASTROSCOPY, DUODENOSCOPY (EGD);   ESOPHAGOSCOPY, GASTROSCOPY, DUODENOSCOPY (EGD) & Colonoscopy *Needs INR on arrival;  Surgeon: Shashank Ortiz MD;  Location:  GI     GYN SURGERY  1/10/75    tubal ligation      H ABLATION SVT  5/28/2015    stopped metoprolol     HERNIA REPAIR  2005     IMPLANT FILTER INFERIOR VENA CAVA  10/8/2012    taken out  1/4/12     INSERT TISSUE EXPANDER BREAST BILATERAL  9/5/2012    Procedure: INSERT TISSUE EXPANDER BREAST BILATERAL;;  Surgeon: Orlando Wall MD;  Location:  OR     MASTECTOMY SIMPLE, SENTINEL NODE, COMBINED  9/5/2012    Procedure: COMBINED MASTECTOMY SIMPLE, SENTINEL NODE;  BILATERAL MASTECTOMY WITH LEFT AXILLARY SENTINEL LYMPH NODE BIOPSY, BILATERAL TISSUE EXPANDER        MASTECTOMY, BILATERAL       PANNICULECTOMY N/A 4/5/2018    Procedure: PANNICULECTOMY;  PANNICULECTOMY ;  Surgeon: Orlando Wall MD;  Location:  OR     RECONSTRUCT BREAST BILATERAL, IMPLANT PROSTHESIS BILATERAL, COMBINED  5/22/2013    Procedure: COMBINED RECONSTRUCT BREAST BILATERAL, IMPLANT PROSTHESIS BILATERAL;  BILATERAL SECOND STAGE BREAST RECONSTRUCTION WITH SILICONE GEL IMPLANTS AND LIPOSUCTION;  Surgeon: Orlando Wall MD;  Location: [facility]     RECONSTRUCT NIPPLE BILATERAL  8/22/2013    Procedure: RECONSTRUCT NIPPLE BILATERAL;  BILATERAL NIPPLE AREOLAR RECONSTRUCTION;  Surgeon: Orlando Wall MD;  Location: [facility]     ZZHC BREAST RECONSTRUC W OTHR TECHNIQ  5/8/2013       FAMILY HISTORY:  Family History   Problem Relation Age of Onset     Cancer - colorectal Mother      Colon Cancer Mother      Prostate Cancer Father      Alzheimer Disease Father      Cancer Paternal Grandmother      Alzheimer Disease Paternal Grandfather      Cancer - colorectal Brother      Diabetes Brother      Crohn's Disease Daughter      Diabetes Brother      Eye Disorder Brother      Diabetes Son      Cancer Maternal Uncle      Cancer Maternal Uncle        SOCIAL HISTORY:  Social History     Socioeconomic History     Marital  status:      Spouse name: None     Number of children: None     Years of education: None     Highest education level: None   Occupational History     Employer: RETIRED   Social Needs     Financial resource strain: None     Food insecurity     Worry: None     Inability: None     Transportation needs     Medical: None     Non-medical: None   Tobacco Use     Smoking status: Former Smoker     Packs/day: 1.50     Years: 25.00     Pack years: 37.50     Types: Cigarettes     Quit date: 10/21/1986     Years since quittin.3     Smokeless tobacco: Never Used     Tobacco comment: quit    Substance and Sexual Activity     Alcohol use: No     Alcohol/week: 0.0 standard drinks     Drug use: No     Sexual activity: Never     Partners: Male   Lifestyle     Physical activity     Days per week: None     Minutes per session: None     Stress: None   Relationships     Social connections     Talks on phone: None     Gets together: None     Attends Muslim service: None     Active member of club or organization: None     Attends meetings of clubs or organizations: None     Relationship status: None     Intimate partner violence     Fear of current or ex partner: None     Emotionally abused: None     Physically abused: None     Forced sexual activity: None   Other Topics Concern     Parent/sibling w/ CABG, MI or angioplasty before 65F 55M? No      Service Not Asked     Blood Transfusions Not Asked     Caffeine Concern Not Asked     Occupational Exposure Not Asked     Hobby Hazards Not Asked     Sleep Concern Not Asked     Stress Concern Not Asked     Weight Concern Not Asked     Special Diet Not Asked     Back Care Not Asked     Exercise Not Asked     Bike Helmet Yes     Seat Belt Yes     Self-Exams Not Asked   Social History Narrative     None       Review of Systems:  Skin:  Negative       Eyes:  Positive for glasses    ENT:  Positive for hearing loss    Respiratory:  Positive for dyspnea on exertion;shortness  "of breath     Cardiovascular:    palpitations;chest pain;Positive for;fatigue;dizziness;edema    Gastroenterology: Negative      Genitourinary:  Negative      Musculoskeletal:  Negative      Neurologic:  Negative      Psychiatric:  Negative      Heme/Lymph/Imm:  Negative      Endocrine:  Positive for diabetes;thyroid disorder      Physical Exam:  Vitals: /76 (BP Location: Right arm, Patient Position: Sitting, Cuff Size: Adult Regular)   Pulse 69   Ht 1.6 m (5' 3\")   Wt 89.7 kg (197 lb 11.2 oz)   LMP 09/24/1990   SpO2 97%   Breastfeeding No   BMI 35.02 kg/m      Constitutional:  cooperative, alert and oriented, well developed, well nourished, in no acute distress        Skin:  warm and dry to the touch, no apparent skin lesions or masses noted          Head:  normocephalic, no masses or lesions        Eyes:           Lymph:No Cervical lymphadenopathy present     ENT:  no pallor or cyanosis, dentition good        Neck:  carotid pulses are full and equal bilaterally, JVP normal, no carotid bruit        Respiratory:  normal breath sounds, clear to auscultation, normal A-P diameter, normal symmetry, normal respiratory excursion, no use of accessory muscles         Cardiac: regular rhythm, normal S1/S2, no S3 or S4, apical impulse not displaced, no murmurs, gallops or rubs                                                         GI:  abdomen soft, non-tender, BS normoactive, no mass, no HSM, no bruits        Extremities and Muscular Skeletal:  no deformities, clubbing, cyanosis, erythema observed              Neurological:  no gross motor deficits        Psych:  Alert and Oriented x 3        CC  ANTON Dixon CNP  [address] [place] [address]    Thank you for allowing me to participate in the care of your patient.      Sincerely,     Inna Montes MD     [facility]  cc:   ANTON Dixon CNP  [address] [facility] [place] [address]

## 2021-03-11 ENCOUNTER — ANTICOAGULATION THERAPY VISIT (OUTPATIENT)
Dept: NURSING | Facility: CLINIC | Age: 76
End: 2021-03-11

## 2021-03-11 DIAGNOSIS — Z86.718 PERSONAL HISTORY OF DVT (DEEP VEIN THROMBOSIS): ICD-10-CM

## 2021-03-11 DIAGNOSIS — Z79.01 LONG TERM CURRENT USE OF ANTICOAGULANT THERAPY: ICD-10-CM

## 2021-03-11 LAB
CAPILLARY BLOOD COLLECTION: NORMAL
INR PPP: 3.3 (ref 0.86–1.14)

## 2021-03-11 PROCEDURE — 36416 COLLJ CAPILLARY BLOOD SPEC: CPT | Performed by: INTERNAL MEDICINE

## 2021-03-11 PROCEDURE — 85610 PROTHROMBIN TIME: CPT | Performed by: INTERNAL MEDICINE

## 2021-03-11 NOTE — PROGRESS NOTES
"ANTICOAGULATION FOLLOW-UP CLINIC VISIT    Patient Name:  Gemma Cowart  Date:  3/11/2021  Contact Type:  Telephone    SUBJECTIVE:  Patient Findings     Positives:  Signs/symptoms of bleeding (Pt states she occasionally has \"blood clots\" in her nose that she believes is from the dry air from the boiler heat in her home.  I informed pt she can try saline nasal spray and/or vaseline in her nostrils, prn.), Extra doses (Pt states she took 7.5mg on 03/07/21 in error)    Comments:  3/3/21 cardiology visit: Ms. Cowart has some occasional minor palpitation.  She has no evidence of recurrent SVT from the previous ablation.  The occasional short-lasting atrial tachycardia is not uncommon in people of her age.  I do not recommend intervention. She does not need routine Cardiology followup.  (warfarin maintenance dose was increased 6% on 12/17/20 and 5% on 12/31/20, past 2 INRs were therapeutic).  Patient has annual physical with PCP on 03/23/21 so next INR will be done then, as well.        Clinical Outcomes     Negatives:  Major bleeding event, Thromboembolic event, Anticoagulation-related hospital admission, Anticoagulation-related ED visit, Anticoagulation-related fatality    Comments:  3/3/21 cardiology visit: Ms. Cowart has some occasional minor palpitation.  She has no evidence of recurrent SVT from the previous ablation.  The occasional short-lasting atrial tachycardia is not uncommon in people of her age.  I do not recommend intervention. She does not need routine Cardiology followup.  (warfarin maintenance dose was increased 6% on 12/17/20 and 5% on 12/31/20, past 2 INRs were therapeutic).  Patient has annual physical with PCP on 03/23/21 so next INR will be done then, as well.           OBJECTIVE    Recent labs: (last 7 days)     03/11/21  0850   INR 3.30*       ASSESSMENT / PLAN  INR assessment SUPRA    Recheck INR In: 10 DAYS    INR Location Clinic      Anticoagulation Summary  As of 3/11/2021    INR goal:  " 2.0-3.0   TTR:  47.3 % (1 y)   INR used for dosing:  3.30 (3/11/2021)   Warfarin maintenance plan:  5 mg (5 mg x 1) every Sun; 7.5 mg (5 mg x 1.5) all other days   Full warfarin instructions:  3/11: 5 mg; Otherwise 5 mg every Sun; 7.5 mg all other days   Weekly warfarin total:  50 mg   Plan last modified:  Rae Cooley RN (12/31/2020)   Next INR check:  3/23/2021   Priority:  High   Target end date:  Indefinite    Indications    Personal history of DVT (deep vein thrombosis) [Z86.718]  Long term current use of anticoagulant therapy [Z79.01]             Anticoagulation Episode Summary     INR check location:  Clinic Lab    Preferred lab:      Send INR reminders to:  TOBIAS THOMPSON    Comments:  5mg tabs - HS dose // CALENDAR // gastric bypass 2000 // DVT 11/16/12 warfarin until Apr 2013 // DVT 9/12/15      Anticoagulation Care Providers     Provider Role Specialty Phone number    Alva Viera MD Referring Internal Medicine - Pediatrics [phone]            See the Encounter Report to view Anticoagulation Flowsheet and Dosing Calendar (Go to Encounters tab in chart review, and find the Anticoagulation Therapy Visit)        Denisse Jefferson RN

## 2021-03-23 ENCOUNTER — ANTICOAGULATION THERAPY VISIT (OUTPATIENT)
Dept: ANTICOAGULATION | Facility: CLINIC | Age: 76
End: 2021-03-23

## 2021-03-23 ENCOUNTER — OFFICE VISIT (OUTPATIENT)
Dept: PEDIATRICS | Facility: CLINIC | Age: 76
End: 2021-03-23
Payer: COMMERCIAL

## 2021-03-23 VITALS
HEIGHT: 63 IN | BODY MASS INDEX: 34.68 KG/M2 | WEIGHT: 195.7 LBS | TEMPERATURE: 98 F | RESPIRATION RATE: 16 BRPM | DIASTOLIC BLOOD PRESSURE: 70 MMHG | SYSTOLIC BLOOD PRESSURE: 122 MMHG | HEART RATE: 64 BPM | OXYGEN SATURATION: 97 %

## 2021-03-23 DIAGNOSIS — Z86.718 PERSONAL HISTORY OF DVT (DEEP VEIN THROMBOSIS): ICD-10-CM

## 2021-03-23 DIAGNOSIS — I47.10 SUPRAVENTRICULAR TACHYCARDIA (H): ICD-10-CM

## 2021-03-23 DIAGNOSIS — C50.912 MALIGNANT NEOPLASM OF LEFT FEMALE BREAST, UNSPECIFIED ESTROGEN RECEPTOR STATUS, UNSPECIFIED SITE OF BREAST (H): ICD-10-CM

## 2021-03-23 DIAGNOSIS — M79.622 LEFT AXILLARY PAIN: ICD-10-CM

## 2021-03-23 DIAGNOSIS — E11.69 TYPE 2 DIABETES MELLITUS WITH OTHER SPECIFIED COMPLICATION, WITHOUT LONG-TERM CURRENT USE OF INSULIN (H): ICD-10-CM

## 2021-03-23 DIAGNOSIS — Z00.00 ENCOUNTER FOR PREVENTATIVE ADULT HEALTH CARE EXAMINATION: Primary | ICD-10-CM

## 2021-03-23 DIAGNOSIS — E66.01 MORBID OBESITY (H): ICD-10-CM

## 2021-03-23 DIAGNOSIS — Z79.01 LONG TERM CURRENT USE OF ANTICOAGULANT THERAPY: ICD-10-CM

## 2021-03-23 DIAGNOSIS — J43.9 PULMONARY EMPHYSEMA, UNSPECIFIED EMPHYSEMA TYPE (H): ICD-10-CM

## 2021-03-23 DIAGNOSIS — N18.32 STAGE 3B CHRONIC KIDNEY DISEASE (H): ICD-10-CM

## 2021-03-23 DIAGNOSIS — F41.1 GAD (GENERALIZED ANXIETY DISORDER): ICD-10-CM

## 2021-03-23 LAB
HBA1C MFR BLD: 6.2 % (ref 0–5.6)
INR PPP: 2.9 (ref 0.86–1.14)

## 2021-03-23 PROCEDURE — 85610 PROTHROMBIN TIME: CPT | Performed by: NURSE PRACTITIONER

## 2021-03-23 PROCEDURE — 83036 HEMOGLOBIN GLYCOSYLATED A1C: CPT | Performed by: NURSE PRACTITIONER

## 2021-03-23 PROCEDURE — 80048 BASIC METABOLIC PNL TOTAL CA: CPT | Performed by: NURSE PRACTITIONER

## 2021-03-23 PROCEDURE — 99397 PER PM REEVAL EST PAT 65+ YR: CPT | Performed by: NURSE PRACTITIONER

## 2021-03-23 PROCEDURE — 99213 OFFICE O/P EST LOW 20 MIN: CPT | Mod: 25 | Performed by: NURSE PRACTITIONER

## 2021-03-23 PROCEDURE — 36415 COLL VENOUS BLD VENIPUNCTURE: CPT | Performed by: NURSE PRACTITIONER

## 2021-03-23 RX ORDER — WARFARIN SODIUM 7.5 MG/1
TABLET ORAL
Qty: 90 TABLET | Refills: 1 | Status: CANCELLED | OUTPATIENT
Start: 2021-03-23

## 2021-03-23 RX ORDER — PAROXETINE 30 MG/1
TABLET, FILM COATED ORAL
Qty: 180 TABLET | Refills: 3 | Status: CANCELLED | OUTPATIENT
Start: 2021-03-23

## 2021-03-23 RX ORDER — ALBUTEROL SULFATE 90 UG/1
2 AEROSOL, METERED RESPIRATORY (INHALATION) EVERY 6 HOURS
Qty: 8.5 G | Refills: 3 | Status: SHIPPED | OUTPATIENT
Start: 2021-03-23 | End: 2021-06-15

## 2021-03-23 RX ORDER — FLUTICASONE PROPIONATE AND SALMETEROL XINAFOATE 115; 21 UG/1; UG/1
2 AEROSOL, METERED RESPIRATORY (INHALATION) 2 TIMES DAILY
Qty: 8 G | Refills: 11 | Status: SHIPPED | OUTPATIENT
Start: 2021-03-23 | End: 2021-04-21 | Stop reason: SINTOL

## 2021-03-23 RX ORDER — ATORVASTATIN CALCIUM 10 MG/1
10 TABLET, FILM COATED ORAL DAILY
Qty: 90 TABLET | Refills: 3 | Status: CANCELLED | OUTPATIENT
Start: 2021-03-23

## 2021-03-23 RX ORDER — WARFARIN SODIUM 5 MG/1
TABLET ORAL
Qty: 30 TABLET | Refills: 3 | Status: CANCELLED | OUTPATIENT
Start: 2021-03-23

## 2021-03-23 SDOH — HEALTH STABILITY: MENTAL HEALTH: HOW OFTEN DO YOU HAVE 6 OR MORE DRINKS ON ONE OCCASION?: NOT ASKED

## 2021-03-23 SDOH — HEALTH STABILITY: MENTAL HEALTH: HOW OFTEN DO YOU HAVE A DRINK CONTAINING ALCOHOL?: NOT ASKED

## 2021-03-23 SDOH — HEALTH STABILITY: MENTAL HEALTH: HOW MANY STANDARD DRINKS CONTAINING ALCOHOL DO YOU HAVE ON A TYPICAL DAY?: NOT ASKED

## 2021-03-23 ASSESSMENT — MIFFLIN-ST. JEOR: SCORE: 1348.88

## 2021-03-23 ASSESSMENT — ENCOUNTER SYMPTOMS
CONSTIPATION: 1
DIARRHEA: 0
MYALGIAS: 1
HEADACHES: 1
NERVOUS/ANXIOUS: 0
JOINT SWELLING: 1
DYSURIA: 0
COUGH: 1
ABDOMINAL PAIN: 1
HEMATURIA: 0
EYE PAIN: 1
ARTHRALGIAS: 1
NAUSEA: 0
PARESTHESIAS: 1
FEVER: 0
BREAST MASS: 0
PALPITATIONS: 1
WEAKNESS: 1
CHILLS: 1
DIZZINESS: 1
SORE THROAT: 0
FREQUENCY: 0
HEARTBURN: 0
HEMATOCHEZIA: 0
SHORTNESS OF BREATH: 1

## 2021-03-23 ASSESSMENT — ACTIVITIES OF DAILY LIVING (ADL): CURRENT_FUNCTION: TRANSPORTATION REQUIRES ASSISTANCE

## 2021-03-23 NOTE — PATIENT INSTRUCTIONS
I started you on 2 new inhalers:  1) Advair is your daily inhaler. 2 puffs twice a daily every day. Rinse out your mouth after using. You won't notice any change right away after using. After using for a few weeks, you should notice your symptoms are much better  2) Albuterol is your rescue inhaler. 2 puffs as needed for wheezing or shortness of breath    Let's do a virtual follow up visit in 1 month    I ordered an ultra sound of your left armpit (axillary region). This can be done at [facility] in [place]. Please call  [phone] to schedule.

## 2021-03-23 NOTE — PROGRESS NOTES
ANTICOAGULATION FOLLOW-UP CLINIC VISIT    Patient Name:  Gemma Cowart  Date:  3/23/2021  Contact Type:  Telephone/ discussed with patient    SUBJECTIVE:  Patient Findings     Positives:  Signs/symptoms of bleeding (continues to have a small clot in her nose in the morning when she gets up.  No active nose bleeds during the day.  Has not tried saline nasal spray or vaseline.)    Comments:  The patient was assessed for diet, medication, and activity level changes, missed or extra doses, or bruising, with no problem findings.          Clinical Outcomes     Negatives:  Major bleeding event, Thromboembolic event, Anticoagulation-related hospital admission, Anticoagulation-related ED visit, Anticoagulation-related fatality    Comments:  The patient was assessed for diet, medication, and activity level changes, missed or extra doses, or bruising, with no problem findings.             OBJECTIVE    Recent labs: (last 7 days)     21  1034   INR 2.90*       ASSESSMENT / PLAN  INR assessment THER    Recheck INR In: 3 WEEKS    INR Location Clinic      Anticoagulation Summary  As of 3/23/2021    INR goal:  2.0-3.0   TTR:  48.1 % (1 y)   INR used for dosin.90 (3/23/2021)   Warfarin maintenance plan:  5 mg (5 mg x 1) every Sun; 7.5 mg (5 mg x 1.5) all other days   Full warfarin instructions:  5 mg every Sun; 7.5 mg all other days   Weekly warfarin total:  50 mg   No change documented:  Christelle Giles RN   Plan last modified:  Rae Cooley, RN (2020)   Next INR check:  2021   Priority:  High   Target end date:  Indefinite    Indications    Personal history of DVT (deep vein thrombosis) [Z86.718]  Long term current use of anticoagulant therapy [Z79.01]             Anticoagulation Episode Summary     INR check location:  Clinic Lab    Preferred lab:      Send INR reminders to:  TOBIAS THOMPSON    Comments:  5mg tabs - HS dose // CALENDAR // gastric bypass  // DVT 12 warfarin until 2013 // DVT  9/12/15      Anticoagulation Care Providers     Provider Role Specialty Phone number    Alva Viera MD Referring Internal Medicine - Pediatrics [phone]            See the Encounter Report to view Anticoagulation Flowsheet and Dosing Calendar (Go to Encounters tab in chart review, and find the Anticoagulation Therapy Visit)    Dosage adjustment made based on physician directed care plan.    Christelle Giles RN

## 2021-03-23 NOTE — PROGRESS NOTES
"SUBJECTIVE:   Gemma Cowart is a 75 year old female who presents for Preventive Visit.      Patient has been advised of split billing requirements and indicates understanding: Yes   Are you in the first 12 months of your Medicare coverage?  No    Healthy Habits:     In general, how would you rate your overall health?  Good    Frequency of exercise:  1 day/week    Duration of exercise:  Less than 15 minutes    Do you usually eat at least 4 servings of fruit and vegetables a day, include whole grains    & fiber and avoid regularly eating high fat or \"junk\" foods?  No    Taking medications regularly:  No    Barriers to taking medications:  Problems remembering to take them    Medication side effects:  None    Ability to successfully perform activities of daily living:  Transportation requires assistance    Home Safety:  No safety concerns identified    Hearing Impairment:  Difficulty following a conversation in a noisy restaurant or crowded room, feel that people are mumbling or not speaking clearly, difficult to understand a speaker at a public meeting or Shinto service, need to ask people to speak up or repeat themselves, difficulty understanding soft or whispered speech and difficulty understanding speech on the telephone    In the past 6 months, have you been bothered by leaking of urine?  No    In general, how would you rate your overall mental or emotional health?  Good      PHQ-2 Total Score: 0    Additional concerns today:  Yes    Do you feel safe in your environment? Yes    Have you ever done Advance Care Planning? (For example, a Health Directive, POLST, or a discussion with a medical provider or your loved ones about your wishes): No, advance care planning information given to patient to review.  Patient plans to discuss their wishes with loved ones or provider.         Fall risk  Fallen 2 or more times in the past year?: No  Any fall with injury in the past year?: No    Cognitive Screening   1) Repeat " 3 items (Leader, Season, Table)    2) Clock draw: NORMAL  3) 3 item recall: Recalls 3 objects  Results: 3 items recalled: COGNITIVE IMPAIRMENT LESS LIKELY    Fomer smoker  Quit   Hyper-inflated lungs seen on xray in 2017  Some SOB with activity  This has been occurring for at least one year  No chest pain  Saw cardiology for palpitations  ZIO showed SVT  No meds recommended    She has been off of Lipitor for at least 1 year  Does not want to take   Lipids are low         Mini-CogTM Copyright ARIEL Patterson. Licensed by the author for use in [facility] [employer]; reprinted with permission (somichelle@[facility]). All rights reserved.      Do you have sleep apnea, excessive snoring or daytime drowsiness?: some daytime drowsiness    Reviewed and updated as needed this visit by clinical staff                 Reviewed and updated as needed this visit by Provider                Social History     Tobacco Use     Smoking status: Former Smoker     Packs/day: 1.50     Years: 25.00     Pack years: 37.50     Types: Cigarettes     Quit date: 10/21/1986     Years since quittin.4     Smokeless tobacco: Never Used     Tobacco comment: quit    Substance Use Topics     Alcohol use: No     Alcohol/week: 0.0 standard drinks     If you drink alcohol do you typically have >3 drinks per day or >7 drinks per week? No    Alcohol Use 11/15/2016   Prescreen: >3 drinks/day or >7 drinks/week? The patient does not drink >3 drinks per day nor >7 drinks per week.         Current providers sharing in care for this patient include:   Patient Care Team:  Zulma Mcgee APRN CNP as PCP - General (Internal Medicine)  Jyoti Narvaez MD as Assigned Cancer Care Provider  Babita Orozco LSW as Lead Care Coordinator (Primary Care - CC)  Zulma Mcgee APRN CNP as Assigned PCP    The following health maintenance items are reviewed in Epic and correct as of today:  Health Maintenance   Topic Date Due     ANNUAL REVIEW OF HM ORDERS  Never  done     ZOSTER IMMUNIZATION (1 of 2) Never done     MEDICARE ANNUAL WELLNESS VISIT  09/06/2020     DIABETIC FOOT EXAM  09/06/2020     PHQ-2  01/01/2021     COVID-19 Vaccine (2 - Pfizer 2-dose series) 04/06/2021     EYE EXAM  06/01/2021     A1C  06/17/2021     FALL RISK ASSESSMENT  06/25/2021     ADVANCE CARE PLANNING  08/19/2021     CBC  10/02/2021     BMP  12/17/2021     LIPID  12/17/2021     MICROALBUMIN  12/17/2021     COLORECTAL CANCER SCREENING  10/06/2022     DTAP/TDAP/TD IMMUNIZATION (5 - Td) 09/06/2029     DEXA  10/10/2032     HEPATITIS C SCREENING  Completed     INFLUENZA VACCINE  Completed     Pneumococcal Vaccine: Pediatrics (0 to 5 Years) and At-Risk Patients (6 to 64 Years)  Completed     Pneumococcal Vaccine: 65+ Years  Completed     IPV IMMUNIZATION  Aged Out     MENINGITIS IMMUNIZATION  Aged Out     Lab work is in process  Labs reviewed in EPIC  BP Readings from Last 3 Encounters:   03/23/21 122/70   03/03/21 128/76   07/21/20 117/56    Wt Readings from Last 3 Encounters:   03/23/21 88.8 kg (195 lb 11.2 oz)   03/03/21 89.7 kg (197 lb 11.2 oz)   10/14/20 85.4 kg (188 lb 3.2 oz)                  Patient Active Problem List   Diagnosis     Nontoxic uninodular goiter     Rosacea     BRETT (iron deficiency anemia)     Complications of gastric bypass surgery     Vitamin D deficiency disease     Vitamin B12 deficiency     Eczema     Dyshydrosis     Hyperlipidemia LDL goal <100     Anxiety     Insomnia     Health Care Home     Type 2 diabetes mellitus with other specified complication (H)     Psoriasis     Long term current use of anticoagulant therapy     Advanced directives, counseling/discussion     Malignant neoplasm of left female breast (H)     Morbid obesity (H)     CKD (chronic kidney disease) stage 3, GFR 30-59 ml/min     Personal history of DVT (deep vein thrombosis)     Postmenopausal bleeding     Past Surgical History:   Procedure Laterality Date     ABDOMEN SURGERY  2000    gastric bypass 2000      COLONOSCOPY  10/11/2012    Procedure: COLONOSCOPY;  colonoscopy;  Surgeon: Gela Cleary MD;  Location:  GI     COLONOSCOPY N/A 10/6/2017    Procedure: COLONOSCOPY;;  Surgeon: Shashank Ortiz MD;  Location:  GI     DENTAL SURGERY  5/2007    all teeth removed - dentures     ENT SURGERY      all teeth pulled     ESOPHAGOSCOPY, GASTROSCOPY, DUODENOSCOPY (EGD), COMBINED N/A 10/6/2017    Procedure: COMBINED ESOPHAGOSCOPY, GASTROSCOPY, DUODENOSCOPY (EGD);  ESOPHAGOSCOPY, GASTROSCOPY, DUODENOSCOPY (EGD) & Colonoscopy *Needs INR on arrival;  Surgeon: Shashank Ortiz MD;  Location:  GI     GYN SURGERY  1/10/75    tubal ligation      H ABLATION SVT  5/28/2015    stopped metoprolol     HERNIA REPAIR  2005     IMPLANT FILTER INFERIOR VENA CAVA  10/8/2012    taken out  1/4/12     INSERT TISSUE EXPANDER BREAST BILATERAL  9/5/2012    Procedure: INSERT TISSUE EXPANDER BREAST BILATERAL;;  Surgeon: Orlando Wall MD;  Location:  OR     MASTECTOMY SIMPLE, SENTINEL NODE, COMBINED  9/5/2012    Procedure: COMBINED MASTECTOMY SIMPLE, SENTINEL NODE;  BILATERAL MASTECTOMY WITH LEFT AXILLARY SENTINEL LYMPH NODE BIOPSY, BILATERAL TISSUE EXPANDER        MASTECTOMY, BILATERAL       PANNICULECTOMY N/A 4/5/2018    Procedure: PANNICULECTOMY;  PANNICULECTOMY ;  Surgeon: Orlando Wall MD;  Location: [facility]     RECONSTRUCT BREAST BILATERAL, IMPLANT PROSTHESIS BILATERAL, COMBINED  5/22/2013    Procedure: COMBINED RECONSTRUCT BREAST BILATERAL, IMPLANT PROSTHESIS BILATERAL;  BILATERAL SECOND STAGE BREAST RECONSTRUCTION WITH SILICONE GEL IMPLANTS AND LIPOSUCTION;  Surgeon: Orlando Wall MD;  Location: [facility]     RECONSTRUCT NIPPLE BILATERAL  8/22/2013    Procedure: RECONSTRUCT NIPPLE BILATERAL;  BILATERAL NIPPLE AREOLAR RECONSTRUCTION;  Surgeon: Orlando Wall MD;  Location: [facility]     ZZHC BREAST RECONSTRUC W OTHR TECHNIQ  5/8/2013       Social History     Tobacco Use     Smoking status: Former Smoker      Packs/day: 1.50     Years: 25.00     Pack years: 37.50     Types: Cigarettes     Quit date: 10/21/1986     Years since quittin.4     Smokeless tobacco: Never Used     Tobacco comment: quit    Substance Use Topics     Alcohol use: No     Comment: 1 time a year     Family History   Problem Relation Age of Onset     Cancer - colorectal Mother      Colon Cancer Mother      Prostate Cancer Father      Alzheimer Disease Father      Cancer Paternal Grandmother      Alzheimer Disease Paternal Grandfather      Cancer - colorectal Brother      Diabetes Brother      Crohn's Disease Daughter      Diabetes Brother      Eye Disorder Brother      Diabetes Son      Cancer Maternal Uncle      Cancer Maternal Uncle          Mammogram Screening: Recommended mammography every 1-2 years with patient discussion and risk factor consideration    Review of Systems   Constitutional: Positive for chills. Negative for fever.   HENT: Positive for hearing loss. Negative for congestion, ear pain and sore throat.    Eyes: Positive for pain and visual disturbance.   Respiratory: Positive for cough and shortness of breath.    Cardiovascular: Positive for palpitations.   Gastrointestinal: Positive for abdominal pain and constipation. Negative for diarrhea, heartburn, hematochezia and nausea.   Breasts:  Positive for tenderness. Negative for breast mass and discharge.   Genitourinary: Positive for pelvic pain and vaginal discharge. Negative for dysuria, frequency, genital sores, hematuria, urgency and vaginal bleeding.   Musculoskeletal: Positive for arthralgias, joint swelling and myalgias.   Skin: Negative for rash.   Neurological: Positive for dizziness, weakness, headaches and paresthesias.   Psychiatric/Behavioral: Negative for mood changes. The patient is not nervous/anxious.          OBJECTIVE:   /70 (BP Location: Right arm, Patient Position: Chair, Cuff Size: Adult Regular)   Pulse 64   Temp 98  F (36.7  C) (Tympanic)   Resp  "16   Ht 1.588 m (5' 2.5\")   Wt 88.8 kg (195 lb 11.2 oz)   LMP 09/24/1990   SpO2 97%   BMI 35.22 kg/m   Estimated body mass index is 35.02 kg/m  as calculated from the following:    Height as of 3/3/21: 1.6 m (5' 3\").    Weight as of 3/3/21: 89.7 kg (197 lb 11.2 oz).  Physical Exam  GENERAL: healthy, alert and no distress  EYES: Eyes grossly normal to inspection, PERRL and conjunctivae and sclerae normal  HENT: ear canals and TM's normal  NECK: no adenopathy, no asymmetry, masses, or scars and thyroid normal to palpation  RESP: lungs clear to auscultation - no rales, rhonchi or wheezes  CV: regular rate and rhythm, normal S1 S2, no S3 or S4, no murmur, click or rub, no peripheral edema and peripheral pulses strong  ABDOMEN: soft, nontender, no hepatosplenomegaly, no masses and bowel sounds normal  MS: no gross musculoskeletal defects noted, no edema  SKIN: no suspicious lesions or rashes  NEURO: Normal strength and tone, mentation intact and speech normal  PSYCH: mentation appears normal, affect normal/bright  Diabetic foot exam: normal DP and PT pulses, no trophic changes or ulcerative lesions and normal sensory exam  BREAST: No axillary lymphadenopathy    Diagnostic Test Results:  Labs reviewed in Epic    ASSESSMENT / PLAN:   1. Encounter for preventative adult health care examination  - REVIEW OF HEALTH MAINTENANCE PROTOCOL ORDERS    2. Type 2 diabetes mellitus with other specified complication, without long-term current use of insulin (H)  - Diet controlled. Declines statin  - ASPIRIN NOT PRESCRIBED (INTENTIONAL); Antiplatelet medication not prescribed intentionally due to Current anticoagulant therapy (warfarin/enoxaparin)  - Hemoglobin A1c  - Basic metabolic panel  (Ca, Cl, CO2, Creat, Gluc, K, Na, BUN)  - STATIN NOT PRESCRIBED (INTENTIONAL); Please choose reason not prescribed from choices below.  Dispense:      3. Morbid obesity (H)  - Healthy diet    4. Supraventricular tachycardia (H)  - Saw " "cardiology. No intervention recommended    5. Malignant neoplasm of left female breast, unspecified estrogen receptor status, unspecified site of breast (H)  - s/p mastectomy 2012. Now with left axillary pain x3 months. Physical exam normal. Recommend US as below    6. Stage 3b chronic kidney disease    7. Long term current use of anticoagulant therapy  - INR    8. ELI (generalized anxiety disorder)  - Well controlled    9. Personal history of DVT (deep vein thrombosis)  - On warfarin    10. Pulmonary emphysema, unspecified emphysema type (H)  - Recommend inhalers. Reviewed chest xray from 2017 showing hyperinflated lungs. Unfortunately, unable to perform spirometry in clinic during Covid pandemic. Virtual f/u in 1 month  - albuterol (PROAIR HFA/PROVENTIL HFA/VENTOLIN HFA) 108 (90 Base) MCG/ACT inhaler; Inhale 2 puffs into the lungs every 6 hours  Dispense: 8.5 g; Refill: 3  - fluticasone-salmeterol (ADVAIR-HFA) 115-21 MCG/ACT inhaler; Inhale 2 puffs into the lungs 2 times daily  Dispense: 8 g; Refill: 11    11. Left axillary pain  - US Axillary Left; Future    Patient has been advised of split billing requirements and indicates understanding: Yes  COUNSELING:  Reviewed preventive health counseling, as reflected in patient instructions       Regular exercise       Healthy diet/nutrition    Estimated body mass index is 35.02 kg/m  as calculated from the following:    Height as of 3/3/21: 1.6 m (5' 3\").    Weight as of 3/3/21: 89.7 kg (197 lb 11.2 oz).    Weight management plan: Discussed healthy diet and exercise guidelines    She reports that she quit smoking about 34 years ago. Her smoking use included cigarettes. She has a 37.50 pack-year smoking history. She has never used smokeless tobacco.      Appropriate preventive services were discussed with this patient, including applicable screening as appropriate for cardiovascular disease, diabetes, osteopenia/osteoporosis, and glaucoma.  As appropriate for age/gender, " discussed screening for colorectal cancer, prostate cancer, breast cancer, and cervical cancer. Checklist reviewing preventive services available has been given to the patient.    Reviewed patients plan of care and provided an AVS. The Basic Care Plan (routine screening as documented in Health Maintenance) for Gemma meets the Care Plan requirement. This Care Plan has been established and reviewed with the Patient.    Counseling Resources:  ATP IV Guidelines  Pooled Cohorts Equation Calculator  Breast Cancer Risk Calculator  Breast Cancer: Medication to Reduce Risk  FRAX Risk Assessment  ICSI Preventive Guidelines  Dietary Guidelines for Americans, 2010  USDA's MyPlate  ASA Prophylaxis  Lung CA Screening    ANTON Prado CNP  M [facility] JAY    Identified Health Risks:

## 2021-03-24 ENCOUNTER — TELEPHONE (OUTPATIENT)
Dept: PEDIATRICS | Facility: CLINIC | Age: 76
End: 2021-03-24

## 2021-03-24 LAB
ANION GAP SERPL CALCULATED.3IONS-SCNC: 5 MMOL/L (ref 3–14)
BUN SERPL-MCNC: 15 MG/DL (ref 7–30)
CALCIUM SERPL-MCNC: 8.7 MG/DL (ref 8.5–10.1)
CHLORIDE SERPL-SCNC: 114 MMOL/L (ref 94–109)
CO2 SERPL-SCNC: 24 MMOL/L (ref 20–32)
CREAT SERPL-MCNC: 1.32 MG/DL (ref 0.52–1.04)
GFR SERPL CREATININE-BSD FRML MDRD: 40 ML/MIN/{1.73_M2}
GLUCOSE SERPL-MCNC: 96 MG/DL (ref 70–99)
POTASSIUM SERPL-SCNC: 4.9 MMOL/L (ref 3.4–5.3)
SODIUM SERPL-SCNC: 143 MMOL/L (ref 133–144)

## 2021-03-24 NOTE — TELEPHONE ENCOUNTER
Reason for Call:  Other call back    Detailed comments: Pt wanted to let the provider know that the letter she received from [employer] was a scam on Medicare, and doesn't want the provider to fill out form and send it in.  Pt said provider can call her back if need be for more information regarding the letter.    Phone Number Patient can be reached at: Home number on file [phone] (home)    Best Time: any    Can we leave a detailed message on this number? YES    Call taken on 3/24/2021 at 3:00 PM by Kristina Duron

## 2021-03-24 NOTE — TELEPHONE ENCOUNTER
I did not complete it. I put in Team A outbox with note to look into it more as it seemed suspicious. Should be in Team outbox. Please shred

## 2021-03-24 NOTE — TELEPHONE ENCOUNTER
Just checked station A outbox did not see anything. Will postpone until tomorrow to see if the rest of the staff has seen it?  Chelly Portillo MA

## 2021-03-26 ENCOUNTER — HOSPITAL ENCOUNTER (OUTPATIENT)
Dept: ULTRASOUND IMAGING | Facility: CLINIC | Age: 76
Discharge: HOME | End: 2021-03-26
Attending: NURSE PRACTITIONER | Admitting: NURSE PRACTITIONER
Payer: COMMERCIAL

## 2021-03-26 DIAGNOSIS — M79.622 LEFT AXILLARY PAIN: ICD-10-CM

## 2021-03-26 PROCEDURE — 76882 US LMTD JT/FCL EVL NVASC XTR: CPT | Mod: LT

## 2021-04-01 ENCOUNTER — TRANSFERRED RECORDS (OUTPATIENT)
Dept: HEALTH INFORMATION MANAGEMENT | Facility: CLINIC | Age: 76
End: 2021-04-01

## 2021-04-01 LAB — RETINOPATHY: NORMAL

## 2021-04-15 ENCOUNTER — TELEPHONE (OUTPATIENT)
Dept: PEDIATRICS | Facility: CLINIC | Age: 76
End: 2021-04-15

## 2021-04-15 NOTE — TELEPHONE ENCOUNTER
ANTICOAGULATION     Gemma Cowart is overdue for INR check.     Spoke with Gemma and scheduled lab appointment on 4/21/21.    Ingrid Haynes RN

## 2021-04-18 ENCOUNTER — NURSE TRIAGE (OUTPATIENT)
Dept: NURSING | Facility: CLINIC | Age: 76
End: 2021-04-18

## 2021-04-18 NOTE — TELEPHONE ENCOUNTER
Questions about risk of exposure two weeks after having completed vaccination. Discussed markedly decreased risk but not 100%.

## 2021-04-21 ENCOUNTER — VIRTUAL VISIT (OUTPATIENT)
Dept: PEDIATRICS | Facility: CLINIC | Age: 76
End: 2021-04-21
Payer: COMMERCIAL

## 2021-04-21 DIAGNOSIS — R06.09 DYSPNEA ON EXERTION: Primary | ICD-10-CM

## 2021-04-21 DIAGNOSIS — J44.9 CHRONIC OBSTRUCTIVE PULMONARY DISEASE, UNSPECIFIED COPD TYPE (H): ICD-10-CM

## 2021-04-21 PROCEDURE — 99214 OFFICE O/P EST MOD 30 MIN: CPT | Mod: 95 | Performed by: NURSE PRACTITIONER

## 2021-04-21 NOTE — PROGRESS NOTES
Gemma is a 75 year old who is being evaluated via a billable video visit.      How would you like to obtain your AVS? MyChart  If the video visit is dropped, the invitation should be resent by: Text to cell phone: [phone]  Will anyone else be joining your video visit? No      Video Start Time: 9:02 AM    Assessment & Plan     Dyspnea on exertion  - She reports improving though she doesn't think it was from inhaler. Discussed role of JUAN JOSE PRN. Bring on walks and use if SOB. If using with activity, then recommend using 15 minutes before exercise. If not relieving SOB, she will notify me and then consider stress testing    Chronic obstructive pulmonary disease, unspecified COPD type (H)  - JUAN JOSE PRN. If using frequently, consider trial of different daily inhaler  - Consider PFT in future if symptoms persist and still unable to perform spirometry in clinic      35 minutes spent in patient chart on day of visit during chart review, patient visit and documentation    Return in about 1 month (around 5/21/2021) for Physical Exam.    [facility]ado [facility]    Angel Menendez is a 75 year old who presents for the following health issues     HPI     F/u Emphysema: Patient states she is doing      Concern - Follow-up emphysema     Description: pt is doing a follow-up on her emphysema. She feels she does not feel she has it. She did stop the advair     I saw her for annual exam 3/23/21  Recommended starting Advair and albuterol PRN for dyspnea on exertion (hyperinflated lungs seen on previous imaging)  She is a former smoker  She saw cardiology 3/3/21 for palpitations, history of SVT. ZIO showed 15 episodes of atrial tachycardia. Recommended monitoring and no activity restrictions  No discussion about dyspnea with cardiology    She picked up the inhaler  She used the Advair twice daily for approximately 3 weeks. Mouth filled with canker sores even with rinsing mouth out every time after  use  Stopped using a few days ago  She states she did not notice any improvement in breathing  She thinks breathing got better on its own  Thinks SOB from sitting around too much this winter  Has not used albuterol    She notes with the warmer weather she has been walking outside  Did not have any SOB or chest pain with walking  Last year was walking 1 mile/day        Review of Systems   Constitutional, HEENT, cardiovascular, pulmonary, gi and gu systems are negative, except as otherwise noted.      Objective           Vitals:  No vitals were obtained today due to virtual visit.    Physical Exam   GENERAL: Healthy, alert and no distress  EYES: Eyes grossly normal to inspection.  No discharge or erythema, or obvious scleral/conjunctival abnormalities.  RESP: No audible wheeze, cough, or visible cyanosis.  No visible retractions or increased work of breathing.    SKIN: Visible skin clear. No significant rash, abnormal pigmentation or lesions.  NEURO: Cranial nerves grossly intact.  Mentation and speech appropriate for age.  PSYCH: Mentation appears normal, affect normal/bright, judgement and insight intact, normal speech and appearance well-groomed.    Office Visit on 03/23/2021   Component Date Value Ref Range Status     Hemoglobin A1C 03/23/2021 6.2* 0 - 5.6 % Final    Comment: Normal <5.7% Prediabetes 5.7-6.4%  Diabetes 6.5% or higher - adopted from ADA   consensus guidelines.       INR 03/23/2021 2.90* 0.86 - 1.14 Final    Comment: This test is intended for monitoring Coumadin therapy.  Results are not   accurate in patients with prolonged INR due to factor deficiency.       Sodium 03/23/2021 143  133 - 144 mmol/L Final     Potassium 03/23/2021 4.9  3.4 - 5.3 mmol/L Final     Chloride 03/23/2021 114* 94 - 109 mmol/L Final     Carbon Dioxide 03/23/2021 24  20 - 32 mmol/L Final     Anion Gap 03/23/2021 5  3 - 14 mmol/L Final     Glucose 03/23/2021 96  70 - 99 mg/dL Final     Urea Nitrogen 03/23/2021 15  7 - 30 mg/dL  Final     Creatinine 03/23/2021 1.32* 0.52 - 1.04 mg/dL Final     GFR Estimate 03/23/2021 40* >60 mL/min/[1.73_m2] Final    Comment: Non  GFR Calc  Starting 12/18/2018, serum creatinine based estimated GFR (eGFR) will be   calculated using the Chronic Kidney Disease Epidemiology Collaboration   (CKD-EPI) equation.       GFR Estimate If Black 03/23/2021 46* >60 mL/min/[1.73_m2] Final    Comment:  GFR Calc  Starting 12/18/2018, serum creatinine based estimated GFR (eGFR) will be   calculated using the Chronic Kidney Disease Epidemiology Collaboration   (CKD-EPI) equation.       Calcium 03/23/2021 8.7  8.5 - 10.1 mg/dL Final           Patient unable to get video visit to work, so done by phone visit. Phone conversation 18 minutes

## 2021-04-23 ENCOUNTER — TELEPHONE (OUTPATIENT)
Dept: PEDIATRICS | Facility: CLINIC | Age: 76
End: 2021-04-23

## 2021-04-23 NOTE — TELEPHONE ENCOUNTER
ANTICOAGULATION     Gemma WYATT Cowart is overdue for INR check.      spoke with Gemma who declined to schedule a lab appointment at this time. If calling back please schedule as soon as possible.     She was exposed to Covid-19 and is quarantining herself.  Her friend who exposed her is still in the hospital after 4 days.    She will schedule a lab appointment as soon as she feels safe to come in.     Rae Cooley RN

## 2021-05-03 ENCOUNTER — PATIENT OUTREACH (OUTPATIENT)
Dept: GERIATRIC MEDICINE | Facility: CLINIC | Age: 76
End: 2021-05-03

## 2021-05-03 NOTE — PROGRESS NOTES
[facility] Care Coordination Contact    Called member to schedule annual HRA home visit. HRA has been scheduled for May 18th at 9 am..   TIM Ruiz, [facility] Care Coordinator  Tel [phone]  Fax [phone]

## 2021-05-12 ENCOUNTER — TELEPHONE (OUTPATIENT)
Dept: NURSING | Facility: CLINIC | Age: 76
End: 2021-05-12

## 2021-05-12 NOTE — LETTER
May 12, 2021      Gemma Cowart  [address] [place] [id number]      Dear Gemma,    You are currently under the care of [facility] for your warfarin (Coumadin ) therapy.  We are contacting you because our records show you were due for an INR on 4/21/21.    There are potentially serious risks when taking warfarin without careful monitoring and we want to make sure you are safely managed.  Routine INR monitoring is required for warfarin refills.     Please call [phone] as soon as possible to schedule an appointment.  If there has been a change in your care or other concerns, please let us know so we can help and or update our records.     Sincerely,       [facility]

## 2021-05-12 NOTE — TELEPHONE ENCOUNTER
ANTICOAGULATION     Gemma Cowart is overdue for INR check.      Reminder letter sent    Christelle Giles RN

## 2021-05-18 NOTE — PROGRESS NOTES
[facility] Care Coordination Contact    Called member at scheduled time to complete annual HRA by telephone but no answer.  LM and called again and left second message re: return call to reschedule.  TIM Ruiz, [facility] Care Coordinator  Tel [phone]  Fax [phone]

## 2021-05-19 ENCOUNTER — PATIENT OUTREACH (OUTPATIENT)
Dept: GERIATRIC MEDICINE | Facility: CLINIC | Age: 76
End: 2021-05-19

## 2021-05-19 SDOH — ECONOMIC STABILITY: FOOD INSECURITY: WITHIN THE PAST 12 MONTHS, YOU WORRIED THAT YOUR FOOD WOULD RUN OUT BEFORE YOU GOT MONEY TO BUY MORE.: NEVER TRUE

## 2021-05-19 SDOH — ECONOMIC STABILITY: TRANSPORTATION INSECURITY
IN THE PAST 12 MONTHS, HAS THE LACK OF TRANSPORTATION KEPT YOU FROM MEDICAL APPOINTMENTS OR FROM GETTING MEDICATIONS?: NO

## 2021-05-19 SDOH — SOCIAL STABILITY: SOCIAL NETWORK: HOW OFTEN DO YOU GET TOGETHER WITH FRIENDS OR RELATIVES?: MORE THAN THREE TIMES A WEEK

## 2021-05-19 SDOH — HEALTH STABILITY: MENTAL HEALTH
STRESS IS WHEN SOMEONE FEELS TENSE, NERVOUS, ANXIOUS, OR CAN'T SLEEP AT NIGHT BECAUSE THEIR MIND IS TROUBLED. HOW STRESSED ARE YOU?: ONLY A LITTLE

## 2021-05-19 SDOH — HEALTH STABILITY: PHYSICAL HEALTH: ON AVERAGE, HOW MANY DAYS PER WEEK DO YOU ENGAGE IN MODERATE TO STRENUOUS EXERCISE (LIKE A BRISK WALK)?: 3 DAYS

## 2021-05-19 SDOH — SOCIAL STABILITY: SOCIAL NETWORK
IN A TYPICAL WEEK, HOW MANY TIMES DO YOU TALK ON THE PHONE WITH FAMILY, FRIENDS, OR NEIGHBORS?: MORE THAN THREE TIMES A WEEK

## 2021-05-19 SDOH — HEALTH STABILITY: PHYSICAL HEALTH: ON AVERAGE, HOW MANY MINUTES DO YOU ENGAGE IN EXERCISE AT THIS LEVEL?: 30 MIN

## 2021-05-19 ASSESSMENT — ACTIVITIES OF DAILY LIVING (ADL): DEPENDENT_IADLS:: INDEPENDENT

## 2021-05-19 NOTE — PROGRESS NOTES
[facility] Care Coordination Contact    [facility] Home Visit Assessment     Home visit for Health Risk Assessment with Gemma Cowart completed on May 19, 2021.  Assessment completed by telephone due to Covid 19.    Type of residence:: Apartment - handicap accessible  Current living arrangement:: I live alone     Assessment completed with:: Patient    Current Care Plan  Member currently receiving the following home care services:   none  Member currently receiving the following community resources: [facility]    Medication Review  Medication reconciliation completed in Epic: No assessment was completed by telephone   Medication set-up & administration: Independent and sets up on own monthly.  Self-administers medications.  Medication Risk Assessment Medication (1 or more, place referral to MTM): N/A: No risk factors identified  MTM Referral Placed: No: No risk factors idenified    Mental/Behavioral Health   Depression Screening:   PHQ-2 Total Score (Adult) - Positive if 3 or more points; Administer PHQ-9 if positive: 0       Mental health DX:: Yes(anxiety and depression)   Mental health DX how managed:: Medication    Falls Assessment:   Fallen 2 or more times in the past year?: No   Any fall with injury in the past year?: No    ADL/IADL Dependencies:   Dependent ADLs:: Independent  Dependent IADLs:: Independent    [facility] Plan sponsored benefits: Shared information re: Silver Sneakers/gym memberships, ASA, Calcium +D.    PCA Assessment completed at visit: No     Elderly Waiver Eligibility: No-does not meet criteria    Care Plan & Recommendations: Member denies need for services or equipment.  She likes living independently in her own apartment.  She would like to complete a health care directive at some point but states that she would need help with it. She also would like to lose 20 lbs in the next year and plans to get more exercise and reduce calories.    See [facility]CC for detailed assessment  information.    Follow-Up Plan: Member informed of future contact, plan to f/u with member with a 6 month telephone assessment.  Contact information shared with member and family, encouraged member to call with any questions or concerns at any time.    [facility] care continuum providers: Please refer to Health Care Home on the Epic Problem List to view this patient's [facility] Care Plan Summary.  TIM Ruiz, [facility] Care Coordinator  Tel [phone]  Fax [phone]

## 2021-05-20 ENCOUNTER — PATIENT OUTREACH (OUTPATIENT)
Dept: GERIATRIC MEDICINE | Facility: CLINIC | Age: 76
End: 2021-05-20

## 2021-05-20 ENCOUNTER — TELEPHONE (OUTPATIENT)
Dept: PEDIATRICS | Facility: CLINIC | Age: 76
End: 2021-05-20

## 2021-05-20 NOTE — LETTER
May 20, 2021      SALUD MARTINEZ  [address] [place] [id number]      Dear Salud:    At [facility], we are dedicated to improving your health and well-being. Enclosed is the Comprehensive Care Plan that we developed with you on 05/19/2021. Please review the Care Plan carefully.    As a reminder, some of the things we discussed at your visit include:    Your physical and mental health    Ways to reduce falls    Health care needs you may have    Don t forget to contact your care coordinator if you:    Have been hospitalized or plan to be hospitalized     Have had a fall     Have experienced a change in physical health    Are experiencing emotional problems     If you do not agree with your Care Plan, have questions about it, or have experienced a change in your needs, please call me at [phone]. If you are hearing impaired, please call the [place] Relay at 972 or [phone] (gdkege-dc-zfqquz relay service).    Sincerely,    TIM Ruiz, [facility]    E-mail: [email or web address]  Phone:[phone]      [facility] ([facility]) is a health plan that contracts with both Medicare and the [place] Medical Assistance (Medicaid) program to provide benefits of both programs to enrollees. Enrollment in [facility] depends on contract renewal.    [id number])     [id number] (11/18)

## 2021-05-20 NOTE — PROGRESS NOTES
[facility] Care Coordination Contact    Received after visit chart from care coordinator.  Completed following tasks: Mailed copy of care plan to client and Updated services in access     Gemma was mailed a copy of the POC signature sheet to sign and return mail with a SASE.  This assessment was completed telephonically due to Covid-19.    Queta Bella  Care Management Specialist  [facility]  [phone]

## 2021-05-20 NOTE — TELEPHONE ENCOUNTER
I called and spoke to the clinic and the pt declined making an appointment at this time. She states that she is switching clinics.   Rosa Maria Ngo on 5/20/2021 at 3:52 PM

## 2021-05-20 NOTE — TELEPHONE ENCOUNTER
Please call patient regarding INR. It looks like last check in March and INR clinic couldn't get ahold of her though I see the care coordinator has been in contact with her. Can we get her scheduled for INR check/anticoag visit?

## 2021-05-26 ENCOUNTER — TELEPHONE (OUTPATIENT)
Dept: PEDIATRICS | Facility: CLINIC | Age: 76
End: 2021-05-26

## 2021-05-26 DIAGNOSIS — Z86.718 PERSONAL HISTORY OF DVT (DEEP VEIN THROMBOSIS): ICD-10-CM

## 2021-05-26 DIAGNOSIS — Z79.01 LONG TERM CURRENT USE OF ANTICOAGULANT THERAPY: ICD-10-CM

## 2021-05-26 NOTE — TELEPHONE ENCOUNTER
ANTICOAGULATION     Gemma Cowart is overdue for INR check.      Contacted patient. She declined to schedule as she will be switching facilites and start being manged elsewhere. Discussed warfarin refills will need to come from managing clinic. Patient verbalized understanding. Episode closed. Routed to PCP.     Due 4/13/21    Harini Oh RN

## 2021-05-27 ENCOUNTER — TELEPHONE (OUTPATIENT)
Dept: FAMILY MEDICINE | Facility: CLINIC | Age: 76
End: 2021-05-27

## 2021-05-27 NOTE — TELEPHONE ENCOUNTER
Reason for Call:  Other call back    Detailed comments: Patient calling wants to establish care with Dr Remy. I did let the patient know Dr Remy has a closed practice, but she said her care coordinator at the clinic recommended she see her. Please advise. Thank you    Phone Number Patient can be reached at: Home number on file [phone] (home)    Best Time: any    Can we leave a detailed message on this number? YES    Call taken on 5/27/2021 at 10:48 AM by Ladi Paez

## 2021-06-02 ENCOUNTER — TELEPHONE (OUTPATIENT)
Dept: FAMILY MEDICINE | Facility: CLINIC | Age: 76
End: 2021-06-02

## 2021-06-02 DIAGNOSIS — H91.90 DECREASED HEARING, UNSPECIFIED LATERALITY: Primary | ICD-10-CM

## 2021-06-02 NOTE — TELEPHONE ENCOUNTER
Reason for call:  Order   Order or referral being requested: hearing test   Reason for request: Requested by [facility]   Date needed: as soon as possible  Has the patient been seen by the PCP for this problem? NO    Additional comments: Patient is going [facility] [place] [phone]. Would like the referral faxed over    Phone number to reach patient:  Home number on file [phone] (home)    Best Time:  any    Can we leave a detailed message on this number?  YES    Travel screening: Not Applicable

## 2021-06-02 NOTE — TELEPHONE ENCOUNTER
Routing to PCP to place referral, then fax to [facility] at [phone].  -Alva Norris

## 2021-06-15 ENCOUNTER — DOCUMENTATION ONLY (OUTPATIENT)
Dept: LAB | Facility: CLINIC | Age: 76
End: 2021-06-15

## 2021-06-15 ENCOUNTER — ANTICOAGULATION THERAPY VISIT (OUTPATIENT)
Dept: FAMILY MEDICINE | Facility: CLINIC | Age: 76
End: 2021-06-15

## 2021-06-15 ENCOUNTER — OFFICE VISIT (OUTPATIENT)
Dept: FAMILY MEDICINE | Facility: CLINIC | Age: 76
End: 2021-06-15
Payer: COMMERCIAL

## 2021-06-15 VITALS
OXYGEN SATURATION: 98 % | BODY MASS INDEX: 35.22 KG/M2 | HEIGHT: 63 IN | DIASTOLIC BLOOD PRESSURE: 70 MMHG | TEMPERATURE: 98.4 F | SYSTOLIC BLOOD PRESSURE: 124 MMHG | RESPIRATION RATE: 17 BRPM | HEART RATE: 63 BPM | WEIGHT: 198.8 LBS

## 2021-06-15 DIAGNOSIS — K91.89 COMPLICATIONS OF GASTRIC BYPASS SURGERY: ICD-10-CM

## 2021-06-15 DIAGNOSIS — Z79.01 CHRONIC ANTICOAGULATION: ICD-10-CM
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
## 2021-06-15 DIAGNOSIS — Z86.718 PERSONAL HISTORY OF DVT (DEEP VEIN THROMBOSIS): ICD-10-CM

## 2021-06-15 DIAGNOSIS — F41.9 ANXIETY: ICD-10-CM

## 2021-06-15 DIAGNOSIS — L30.9 DERMATITIS: ICD-10-CM

## 2021-06-15 DIAGNOSIS — E08.22 DIABETES MELLITUS DUE TO UNDERLYING CONDITION WITH CHRONIC KIDNEY DISEASE, WITHOUT LONG-TERM CURRENT USE OF INSULIN, UNSPECIFIED CKD STAGE (H): Primary | ICD-10-CM

## 2021-06-15 DIAGNOSIS — E11.69 TYPE 2 DIABETES MELLITUS WITH OTHER SPECIFIED COMPLICATION, WITHOUT LONG-TERM CURRENT USE OF INSULIN (H): ICD-10-CM

## 2021-06-15 DIAGNOSIS — E53.8 VITAMIN B12 DEFICIENCY: ICD-10-CM

## 2021-06-15 DIAGNOSIS — Z79.01 LONG TERM CURRENT USE OF ANTICOAGULANT THERAPY: Primary | ICD-10-CM

## 2021-06-15 DIAGNOSIS — J34.89 SINUS PRESSURE: ICD-10-CM

## 2021-06-15 LAB
CAPILLARY BLOOD COLLECTION: NORMAL
INR PPP: 1.6 (ref 0.86–1.14)

## 2021-06-15 PROCEDURE — 99215 OFFICE O/P EST HI 40 MIN: CPT | Performed by: INTERNAL MEDICINE

## 2021-06-15 PROCEDURE — 85610 PROTHROMBIN TIME: CPT | Performed by: INTERNAL MEDICINE

## 2021-06-15 PROCEDURE — 99207 PR NO CHARGE NURSE ONLY: CPT | Performed by: INTERNAL MEDICINE

## 2021-06-15 PROCEDURE — 36416 COLLJ CAPILLARY BLOOD SPEC: CPT | Performed by: INTERNAL MEDICINE

## 2021-06-15 RX ORDER — DOXYCYCLINE 100 MG/1
100 CAPSULE ORAL 2 TIMES DAILY
Qty: 28 CAPSULE | Refills: 0 | Status: SHIPPED | OUTPATIENT
Start: 2021-06-15 | End: 2022-01-07

## 2021-06-15 ASSESSMENT — ANXIETY QUESTIONNAIRES
1. FEELING NERVOUS, ANXIOUS, OR ON EDGE: NOT AT ALL
5. BEING SO RESTLESS THAT IT IS HARD TO SIT STILL: NOT AT ALL
3. WORRYING TOO MUCH ABOUT DIFFERENT THINGS: SEVERAL DAYS
IF YOU CHECKED OFF ANY PROBLEMS ON THIS QUESTIONNAIRE, HOW DIFFICULT HAVE THESE PROBLEMS MADE IT FOR YOU TO DO YOUR WORK, TAKE CARE OF THINGS AT HOME, OR GET ALONG WITH OTHER PEOPLE: NOT DIFFICULT AT ALL
GAD7 TOTAL SCORE: 3
7. FEELING AFRAID AS IF SOMETHING AWFUL MIGHT HAPPEN: NOT AT ALL
6. BECOMING EASILY ANNOYED OR IRRITABLE: SEVERAL DAYS
2. NOT BEING ABLE TO STOP OR CONTROL WORRYING: SEVERAL DAYS

## 2021-06-15 ASSESSMENT — MIFFLIN-ST. JEOR: SCORE: 1364.53

## 2021-06-15 ASSESSMENT — PATIENT HEALTH QUESTIONNAIRE - PHQ9
5. POOR APPETITE OR OVEREATING: NOT AT ALL
SUM OF ALL RESPONSES TO PHQ QUESTIONS 1-9: 3

## 2021-06-15 NOTE — PROGRESS NOTES
Orders placed.    Megan Oconnor RN - [facility] Anticoagulation Clinic

## 2021-06-15 NOTE — PROGRESS NOTES
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 Past use of Metformin;   Plan: Capillary Blood Collection          (Z86.718) Personal history of DVT (deep vein thrombosis)  Comment: 2 separate DVT episodes in left lower leg; maintaining lifelong Warfarin.  Plan: INR, ANTICOAGULATION CLINIC REFERRAL          (E11.69) Type 2 diabetes mellitus with other specified complication, without long-term current use of insulin (H)  Comment: Controlled with diet and exercise. Past use of Metformin;   Plan:   Lab Results   Component Value Date    A1C 6.2 03/23/2021    A1C 5.9 12/17/2020      well controlled; continue with diet and exercise.     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  Plan: doxycycline hyclate (VIBRAMYCIN) 100 MG capsule          (Z79.01) Chronic anticoagulation  Comment: hx of 2 episodes of DVT; chronic anticoagulation  Plan: INR, ANTICOAGULATION CLINIC REFERRAL            50 minutes spent on the date of the encounter doing chart review, history and exam, documentation and further activities per the note     MEDICATIONS:  Continue current medications without change  CONSULTATION/REFERRAL to update INR referral  Regular exercise    Return in about 9 months (around 3/23/2022) for Wellness  visit.    Cece Foster MD  Internal Medicine   [facility] RAKESH Menendez is a 75 year old who presents for the following health issues     HPI     Diabetes Follow-up    How often are you checking your blood sugar? One time daily  What time of day are you checking your blood sugars (select all that apply)?  Before meals  Have you had any blood sugars above 200?  No  Have you had any blood sugars below 70?  Yes 47    What symptoms do you notice when your blood sugar is low?  Shaky, Weak and Other: rapid heart rate, sweaty     What concerns do you have today about your diabetes? None     Do you have any of these symptoms? (Select all that apply)  No numbness or tingling in feet.  No redness, sores or blisters on feet.  No complaints of excessive thirst.  No reports of blurry vision.  No significant changes to weight.    Have you had a diabetic eye exam in the last 12 months? Yes- Date of last eye exam: 2021,  Location: [facility]         BP Readings from Last 2 Encounters:   06/15/21 124/70   21 122/70     Hemoglobin A1C (%)   Date Value   2021 6.2 (H)   2020 5.9 (H)     LDL Cholesterol Calculated (mg/dL)   Date Value   2020 107 (H)   2019 125 (H)         Anxiety Follow-Up    How are you doing with your anxiety since your last visit? Stable     Are you having other symptoms that might be associated with anxiety? No    Have you had a significant life event? No     Are you feeling depressed? No    Do you have any concerns with your use of alcohol or other drugs? No    Social History     Tobacco Use     Smoking status: Former Smoker     Packs/day: 1.50     Years: 25.00     Pack years: 37.50     Types: Cigarettes     Quit date: 10/21/1986     Years since quittin.6     Smokeless tobacco: Never Used     Tobacco comment: quit    Substance Use Topics     Alcohol use: No     Comment: 1 time a year     Drug use: No      ELI-7 SCORE 10/16/2017 3/2/2018 9/6/2019   Total Score - - -   Total Score 2 14 2     PHQ 10/16/2017 3/2/2018 9/6/2019   PHQ-9 Total Score 4 8 8   Q9: Thoughts of better off dead/self-harm past 2 weeks Not at all Not at all Not at all     Last PHQ-9 6/15/2021   1.  Little interest or pleasure in doing things 0   2.  Feeling down, depressed, or hopeless 0   3.  Trouble falling or staying asleep, or sleeping too much 1   4.  Feeling tired or having little energy 1   5.  Poor appetite or overeating 1   6.  Feeling bad about yourself 0   7.  Trouble concentrating 0   8.  Moving slowly or restless 0   Q9: Thoughts of better off dead/self-harm past 2 weeks 0   PHQ-9 Total Score 3   Difficulty at work, home, or with people Not difficult at all     ELI-7  6/15/2021   1. Feeling nervous, anxious, or on edge 0   2. Not being able to stop or control worrying 1   3. Worrying too much about different things 1   4. Trouble relaxing 0   5. Being so restless that it is hard to sit still 0   6. Becoming easily annoyed or irritable 1   7. Feeling afraid, as if something awful might happen 0   ELI-7 Total Score 3   If you checked any problems, how difficult have they made it for you to do your work, take care of things at home, or get along with other people? Not difficult at all         How many servings of fruits and vegetables do you eat daily?  2-3    On average, how many sweetened beverages do you drink each day (Examples: soda, juice, sweet tea, etc.  Do NOT count diet or artificially sweetened beverages)?   0    How many days per week do you exercise enough to make your heart beat faster? 3 or less    How many minutes a day do you exercise enough to make your heart beat faster? 10 - 19    How many days per week do you miss taking your medication? 0    Review of Systems   CONSTITUTIONAL: NEGATIVE for fever, chills, change in weight  ENT/MOUTH: NEGATIVE for ear, mouth and throat problems; sinus symptoms  RESP: NEGATIVE for  "significant cough or SOB  CV: NEGATIVE for chest pain, palpitations or peripheral edema  GI: NEGATIVE for nausea, abdominal pain, heartburn, or change in bowel habits  MUSCULOSKELETAL: NEGATIVE for significant arthralgias or myalgia  ENDOCRINE: diabetes- controlled with diet and exercise and lipids reviewed  HEME/ALLERGY/IMMUNE: chronic anticoagulation- Warfarin due to hx of 2 lower extremity DVT  PSYCHIATRIC: anxiety and worry- no meds.      Objective    /70   Pulse 63   Temp 98.4  F (36.9  C) (Oral)   Resp 17   Ht 1.59 m (5' 2.6\")   Wt 90.2 kg (198 lb 12.8 oz)   LMP 09/24/1990   SpO2 98%   BMI 35.67 kg/m    Body mass index is 35.67 kg/m .  Physical Exam   GENERAL: healthy, alert and no distress  NECK: no adenopathy, no asymmetry, masses, or scars and thyroid normal to palpation  HENT: sinus tenderness R>L maxillary sinus; nasal congestion with drainage noted R>L  RESP: lungs clear to auscultation - no rales, rhonchi or wheezes  CV: regular rates and rhythm, normal S1 S2, no S3 or S4, no murmur, click or rub, peripheral pulses strong and no peripheral edema  ABDOMEN: soft, nontender and bowel sounds normal  MS: no gross musculoskeletal defects noted, no edema  SKIN: no suspicious lesions or rashes  NEURO: Normal strength and tone, mentation intact and speech normal  PSYCH: mentation appears normal, affect normal/bright          "

## 2021-06-15 NOTE — PROGRESS NOTES
Please place standing INR order. The patient is now seeing Dr. Foster as her provider. Thanks, Bonnie

## 2021-06-16 ASSESSMENT — ANXIETY QUESTIONNAIRES: GAD7 TOTAL SCORE: 3

## 2021-06-21 ENCOUNTER — ANTICOAGULATION THERAPY VISIT (OUTPATIENT)
Dept: FAMILY MEDICINE | Facility: CLINIC | Age: 76
End: 2021-06-21

## 2021-06-21 ENCOUNTER — TELEPHONE (OUTPATIENT)
Dept: FAMILY MEDICINE | Facility: CLINIC | Age: 76
End: 2021-06-21

## 2021-06-21 DIAGNOSIS — Z79.01 CHRONIC ANTICOAGULATION: ICD-10-CM

## 2021-06-21 DIAGNOSIS — Z79.01 LONG TERM CURRENT USE OF ANTICOAGULANT THERAPY: ICD-10-CM

## 2021-06-21 DIAGNOSIS — Z86.718 PERSONAL HISTORY OF DVT (DEEP VEIN THROMBOSIS): ICD-10-CM

## 2021-06-21 LAB
CAPILLARY BLOOD COLLECTION: NORMAL
INR PPP: 1.8 (ref 0.86–1.14)

## 2021-06-21 PROCEDURE — 85610 PROTHROMBIN TIME: CPT | Performed by: INTERNAL MEDICINE

## 2021-06-21 PROCEDURE — 99207 PR NO CHARGE NURSE ONLY: CPT | Performed by: INTERNAL MEDICINE

## 2021-06-21 PROCEDURE — 36416 COLLJ CAPILLARY BLOOD SPEC: CPT | Performed by: INTERNAL MEDICINE

## 2021-06-21 NOTE — PROGRESS NOTES
ANTICOAGULATION FOLLOW-UP CLINIC VISIT    Patient Name:  Gemma Cowart  Date:  2021  Contact Type:  Telephone    SUBJECTIVE:  Patient Findings     Comments:  Called patient to discuss today's INR results: She continues with the Vibromycin, but it does not appear to be raising her INR. She also continues on her increased amount of green veggies. No other changes or concerns. Per protocol, will give another maintenance dosing increase of about 9% and recheck in 2 weeks.  Donna CABALLERO RN  Anticoagulation Team          Clinical Outcomes     Negatives:  Major bleeding event, Thromboembolic event, Anticoagulation-related hospital admission, Anticoagulation-related ED visit, Anticoagulation-related fatality    Comments:  Called patient to discuss today's INR results: She continues with the Vibromycin, but it does not appear to be raising her INR. She also continues on her increased amount of green veggies. No other changes or concerns. Per protocol, will give another maintenance dosing increase of about 9% and recheck in 2 weeks.  Donna CABALLERO RN  Anticoagulation Team             OBJECTIVE    Recent labs: (last 7 days)     21  1008   INR 1.80*       ASSESSMENT / PLAN  INR assessment SUB    Recheck INR In: 2 WEEKS    INR Location Clinic      Anticoagulation Summary  As of 2021    INR goal:  2.0-3.0   TTR:  --   INR used for dosin.80 (2021)   Warfarin maintenance plan:  10 mg (5 mg x 2) every Mon, Wed, Fri; 7.5 mg (5 mg x 1.5) all other days   Full warfarin instructions:  10 mg every Mon, Wed, Fri; 7.5 mg all other days   Weekly warfarin total:  60 mg   Plan last modified:  Donna Calabrese RN (2021)   Next INR check:  2021   Priority:  Maintenance   Target end date:  6/15/2022    Indications    Chronic anticoagulation [Z79.01]  Personal history of DVT (deep vein thrombosis) [Z86.718]             Anticoagulation Episode Summary     INR check location:      Preferred lab:      Send INR  reminders to:  TOBIAS CAMERON    Comments:  DVT 9/12/2015, started warfarin      Anticoagulation Care Providers     Provider Role Specialty Phone number    Cece Foster MD Referring Internal Medicine [phone]            See the Encounter Report to view Anticoagulation Flowsheet and Dosing Calendar (Go to Encounters tab in chart review, and find the Anticoagulation Therapy Visit)    Dosage adjustment made based on physician directed care plan.    Donna Calabrese RN

## 2021-06-21 NOTE — TELEPHONE ENCOUNTER
Patient left a  at 1:35pm requesting a call back.    Lolita Madden RN  [facility] Anticoagulation [facility]

## 2021-07-05 ENCOUNTER — ANTICOAGULATION THERAPY VISIT (OUTPATIENT)
Dept: FAMILY MEDICINE | Facility: CLINIC | Age: 76
End: 2021-07-05

## 2021-07-05 DIAGNOSIS — Z79.01 LONG TERM CURRENT USE OF ANTICOAGULANT THERAPY: Primary | ICD-10-CM

## 2021-07-05 DIAGNOSIS — Z86.718 PERSONAL HISTORY OF DVT (DEEP VEIN THROMBOSIS): ICD-10-CM

## 2021-07-05 DIAGNOSIS — Z79.01 CHRONIC ANTICOAGULATION: ICD-10-CM

## 2021-07-05 DIAGNOSIS — Z79.01 LONG TERM CURRENT USE OF ANTICOAGULANT THERAPY: ICD-10-CM

## 2021-07-05 LAB
CAPILLARY BLOOD COLLECTION: NORMAL
INR PPP: 4.3 (ref 0.86–1.14)

## 2021-07-05 PROCEDURE — 85610 PROTHROMBIN TIME: CPT | Performed by: INTERNAL MEDICINE

## 2021-07-05 PROCEDURE — 36416 COLLJ CAPILLARY BLOOD SPEC: CPT | Performed by: INTERNAL MEDICINE

## 2021-07-05 NOTE — PROGRESS NOTES
Anticoagulation Management    Unable to reach [place] today.    Today's INR result of 4.3 is supratherapeutic (goal INR of 2.0-3.0).  Result received from: Clinic Lab    Follow up required to discuss out of range INR  . Patient recently finished prescription of Vibramycin for sinus infection and dermatitis. Needs assessment.     No answer. Left vm to call [phone]. Can transfer to [facility] at [phone]. Left message to take reduced dose of warfarin, 2.5 mg tonight.    Anticoagulation clinic to follow up    Donna Calabrese RN

## 2021-07-06 NOTE — PROGRESS NOTES
ANTICOAGULATION MANAGEMENT     Gemma Cowart 75 year old female is on warfarin with supratherapeutic INR result. (Goal INR 2.0-3.0)    Recent labs: (last 7 days)     07/05/21  0959   INR 4.30*       ASSESSMENT     Source(s): Chart Review and Patient/Caregiver Call       Warfarin doses taken: Warfarin taken as instructed    Diet: Decreased greens/vitamin K in diet; plans to resume previous intake    New illness, injury, or hospitalization: No    Medication/supplement changes: finished course of vibramycin 6/29/21    Signs or symptoms of bleeding or clotting: No    Previous INR: Subtherapeutic    Additional findings: None     PLAN     Recommended plan for temporary change(s) affecting INR     Dosing Instructions: Partial hold then continue your current warfarin dose with next INR in 10 days       Summary  As of 7/5/2021    Full warfarin instructions:  7/5: 5 mg; 7/6: 5 mg; Otherwise 10 mg every Mon, Wed, Fri; 7.5 mg all other days   Next INR check:  7/16/2021             Telephone call with Gemma who agrees to plan and repeated back plan correctly    Lab visit scheduled    Education provided: Please call back if any changes to your diet, medications or how you've been taking warfarin, Importance of consistent vitamin K intake, Impact of vitamin K foods on INR, Potential interaction between warfarin and vibramycin, should be resolving, Monitoring for bleeding signs and symptoms, Monitoring for clotting signs and symptoms and When to seek medical attention/emergency care    Plan made per [facility] anticoagulation protocol    Donna Calabrese, RN  Anticoagulation Clinic  7/6/2021    _______________________________________________________________________     Anticoagulation Episode Summary     Current INR goal:  2.0-3.0   TTR:  30.1 % (1.4 wk)   Target end date:  6/15/2022   Send INR reminders to:  TOBIAS CAMERON    Indications    Chronic anticoagulation [Z79.01]  Personal history of DVT (deep vein thrombosis)  [Z96.065]           Comments:  DVT 9/12/2015, started warfarin         Anticoagulation Care Providers     Provider Role Specialty Phone number    Cece Foster MD Referring Internal Medicine [phone]

## 2021-07-13 DIAGNOSIS — Z86.718 PERSONAL HISTORY OF DVT (DEEP VEIN THROMBOSIS): ICD-10-CM

## 2021-07-13 DIAGNOSIS — F41.1 GAD (GENERALIZED ANXIETY DISORDER): ICD-10-CM

## 2021-07-13 DIAGNOSIS — Z79.01 LONG TERM CURRENT USE OF ANTICOAGULANT THERAPY: ICD-10-CM

## 2021-07-13 DIAGNOSIS — R21 RASH: Primary | ICD-10-CM

## 2021-07-13 DIAGNOSIS — Z98.84 S/P GASTRIC BYPASS: ICD-10-CM

## 2021-07-13 DIAGNOSIS — E11.69 TYPE 2 DIABETES MELLITUS WITH OTHER SPECIFIED COMPLICATION, UNSPECIFIED WHETHER LONG TERM INSULIN USE (H): ICD-10-CM

## 2021-07-13 DIAGNOSIS — L40.9 PSORIASIS: Chronic | ICD-10-CM

## 2021-07-13 DIAGNOSIS — E11.69 TYPE 2 DIABETES MELLITUS WITH OTHER SPECIFIED COMPLICATION, WITHOUT LONG-TERM CURRENT USE OF INSULIN (H): ICD-10-CM

## 2021-07-13 RX ORDER — MULTIVIT-MIN/IRON/FOLIC ACID/K 18-600-40
1 CAPSULE ORAL DAILY
Qty: 90 CAPSULE | Refills: 2 | Status: SHIPPED | OUTPATIENT
Start: 2021-07-13 | End: 2022-09-06

## 2021-07-13 RX ORDER — LANCETS 30 GAUGE
1 EACH MISCELLANEOUS DAILY
Qty: 100 EACH | Refills: 3 | Status: SHIPPED | OUTPATIENT
Start: 2021-07-13 | End: 2023-05-23

## 2021-07-13 RX ORDER — WARFARIN SODIUM 5 MG/1
TABLET ORAL
Qty: 180 TABLET | Refills: 1 | Status: SHIPPED | OUTPATIENT
Start: 2021-07-13 | End: 2021-07-24

## 2021-07-13 RX ORDER — ACETAMINOPHEN 500 MG
1000 TABLET ORAL EVERY 8 HOURS PRN
Qty: 100 TABLET | Refills: 3 | Status: ON HOLD | OUTPATIENT
Start: 2021-07-13 | End: 2023-11-14

## 2021-07-13 RX ORDER — PAROXETINE 30 MG/1
TABLET, FILM COATED ORAL
Qty: 180 TABLET | Refills: 2 | Status: SHIPPED | OUTPATIENT
Start: 2021-07-13 | End: 2022-02-01

## 2021-07-13 RX ORDER — BLOOD SUGAR DIAGNOSTIC
STRIP MISCELLANEOUS
Qty: 100 STRIP | Refills: 4 | Status: SHIPPED | OUTPATIENT
Start: 2021-07-13 | End: 2022-03-31

## 2021-07-13 NOTE — TELEPHONE ENCOUNTER
1.  Will route Warfarin refill to Anticoagulation Team.    2.  Will route Clobetasol refill to PCP-  Routing refill request to provider for review/approval because:  Drug not on the [facility] refill protocol     Zohreh Palmer RN

## 2021-07-13 NOTE — TELEPHONE ENCOUNTER
Spoke with Gemma would like rx sent to [facility] for   Vitamin B, Paroxetine, Clobetasol, Vitamin D 3, Warfarin, One touch test strips, one touch delica lancets, and tylenol     Dacia Thornton-

## 2021-07-13 NOTE — TELEPHONE ENCOUNTER
Reason for Call:  Other      Detailed comments:  Patient is asking why Dr. Foster is not apporving the prescription for  [facility]    Phone Number Patient can be reached at: Home number on file [phone] (home)    Best Time:  Anytime    Can we leave a detailed message on this number? YES    Call taken on 7/13/2021 at 3:22 PM by Shara Duron

## 2021-07-13 NOTE — TELEPHONE ENCOUNTER
Refill Request  Warfarin 5 mg  Current Dosing Instructions: 10 mg MWF and 7.5 mg ROW  Tabs: 180  Refills: 1  Last INR: 7/5  Next INR due: 7/16  Last OV with responsible provider: 6/15/21

## 2021-07-13 NOTE — TELEPHONE ENCOUNTER
Prescription approved per [facility] Refill Protocol.  Zohreh Palmer RN

## 2021-07-15 RX ORDER — CLOBETASOL PROPIONATE 0.5 MG/G
CREAM TOPICAL
Qty: 60 G | Refills: 0 | Status: SHIPPED | OUTPATIENT
Start: 2021-07-15 | End: 2022-02-01

## 2021-07-15 NOTE — TELEPHONE ENCOUNTER
"Pt recently transferred care.   No active skin lesions at this time. She was unaware of what meds needed to be filled at that time.   The requested steroid Rx is more than what would be expected for intermittent use. More information is needed about need and use of topical steroid.  If needing this amount, perhaps further evaluation is warranted to better understand underlying cause and other treatment options.  ?referral to Dermatology?     Chart reviewed. Last filled 12/30/2020  Rx appropriately instructs to \"use sparingly and not to be used on the face\"  More information is needed.   "

## 2021-07-15 NOTE — TELEPHONE ENCOUNTER
Called Patient- Patient stated she uses one tube a month and usually gets worse in the summer months.  She has not seen dermatology for this but is willing to see them for this and would like a referral. Referral placed, per below.     Eva Ramesh RN on 7/15/2021 at 10:36 AM

## 2021-07-16 ENCOUNTER — ANTICOAGULATION THERAPY VISIT (OUTPATIENT)
Dept: ANTICOAGULATION | Facility: CLINIC | Age: 76
End: 2021-07-16

## 2021-07-16 ENCOUNTER — LAB (OUTPATIENT)
Dept: LAB | Facility: CLINIC | Age: 76
End: 2021-07-16
Payer: COMMERCIAL

## 2021-07-16 DIAGNOSIS — Z86.718 PERSONAL HISTORY OF DVT (DEEP VEIN THROMBOSIS): ICD-10-CM

## 2021-07-16 DIAGNOSIS — Z79.01 LONG TERM CURRENT USE OF ANTICOAGULANT THERAPY: ICD-10-CM

## 2021-07-16 DIAGNOSIS — Z79.01 CHRONIC ANTICOAGULATION: Primary | ICD-10-CM

## 2021-07-16 LAB — INR BLD: 3.5 (ref 0.9–1.1)

## 2021-07-16 PROCEDURE — 36416 COLLJ CAPILLARY BLOOD SPEC: CPT

## 2021-07-16 PROCEDURE — 85610 PROTHROMBIN TIME: CPT

## 2021-07-16 NOTE — PROGRESS NOTES
ANTICOAGULATION MANAGEMENT     Gemma Cowart 75 year old female is on warfarin with supratherapeutic INR result. (Goal INR 2.0-3.0)    Recent labs: (last 7 days)     07/16/21  1247   INR 3.5*       ASSESSMENT     Source(s): Patient/Caregiver Call       Warfarin doses taken: Warfarin taken as instructed    Diet: Has resumed previous green intake     New illness, injury, or hospitalization: No    Medication/supplement changes: None noted    Signs or symptoms of bleeding or clotting: No    Previous INR: Supratherapeutic    Additional findings: None     PLAN     Recommended plan for ongoing change(s) affecting INR     Dosing Instructions: Partial hold then Decrease your warfarin dose (4.2% change) with next INR in 10 days. Has had quite a few maintenance dose changes recently which is why a small one was made today.       Summary  As of 7/16/2021    Full warfarin instructions:  7/16: 5 mg; Otherwise 10 mg every Mon, Fri; 7.5 mg all other days   Next INR check:  7/26/2021             Telephone call with Gemma who verbalizes understanding and agrees to plan    Lab visit scheduled    Education provided: Please call back if any changes to your diet, medications or how you've been taking warfarin    Plan made per [facility] anticoagulation protocol    Matt Betancur RN  Anticoagulation Clinic  7/16/2021    _______________________________________________________________________     Anticoagulation Episode Summary     Current INR goal:  2.0-3.0   TTR:  14.6 % (3 wk)   Target end date:  6/15/2022   Send INR reminders to:  ANT[facility]    Indications    Chronic anticoagulation [Z79.01]  Personal history of DVT (deep vein thrombosis) [Z86.718]           Comments:  DVT 9/12/2015, started warfarin         Anticoagulation Care Providers     Provider Role Specialty Phone number    Cece Foster MD Referring Internal Medicine [phone]

## 2021-07-19 ENCOUNTER — MYC MEDICAL ADVICE (OUTPATIENT)
Dept: FAMILY MEDICINE | Facility: CLINIC | Age: 76
End: 2021-07-19

## 2021-07-21 NOTE — TELEPHONE ENCOUNTER
Patient returned call. She recently accessed her My Chart and had many questions about her lab values and what they mean. This writer talked her through viewing labs on My Chart via her mobile phone and then how to write a message to her provider about what those lab values mean. Patient now understands that her INR will always look like it's out of range but in reality, the 2-3 range is exactly where she should be since she is anticoagulated.     Donna CABALLERO RN  Anticoagulation Team

## 2021-07-21 NOTE — TELEPHONE ENCOUNTER
Pt has INR request.  Defer this request to INR team.   INR team is in contact with pt after EVERY INR

## 2021-07-21 NOTE — TELEPHONE ENCOUNTER
Called patient to see if she had any f/u questions. Looks like her warfarin dose was adjusted at last INR check which was out of range at 3.5, and she is scheduled for another INR 7/26/21 at 9:45 a.m.  No answer. Left vm to call [phone].     Donna CABALLERO RN  Anticoagulation Team

## 2021-07-24 ENCOUNTER — APPOINTMENT (OUTPATIENT)
Dept: ULTRASOUND IMAGING | Facility: CLINIC | Age: 76
End: 2021-07-24
Attending: EMERGENCY MEDICINE
Payer: COMMERCIAL

## 2021-07-24 ENCOUNTER — HOSPITAL ENCOUNTER (EMERGENCY)
Facility: CLINIC | Age: 76
Discharge: HOME | End: 2021-07-24
Attending: EMERGENCY MEDICINE | Admitting: EMERGENCY MEDICINE
Payer: COMMERCIAL

## 2021-07-24 ENCOUNTER — NURSE TRIAGE (OUTPATIENT)
Dept: NURSING | Facility: CLINIC | Age: 76
End: 2021-07-24

## 2021-07-24 VITALS
SYSTOLIC BLOOD PRESSURE: 129 MMHG | HEART RATE: 66 BPM | TEMPERATURE: 97.8 F | DIASTOLIC BLOOD PRESSURE: 92 MMHG | OXYGEN SATURATION: 98 % | RESPIRATION RATE: 18 BRPM

## 2021-07-24 DIAGNOSIS — I82.402 RECURRENT ACUTE DEEP VEIN THROMBOSIS (DVT) OF LEFT LOWER EXTREMITY (H): Primary | ICD-10-CM

## 2021-07-24 DIAGNOSIS — Z79.01 CHRONIC ANTICOAGULATION: ICD-10-CM

## 2021-07-24 LAB
ANION GAP SERPL CALCULATED.3IONS-SCNC: 1 MMOL/L (ref 3–14)
BASOPHILS # BLD AUTO: 0 10E3/UL (ref 0–0.2)
BASOPHILS NFR BLD AUTO: 1 %
BUN SERPL-MCNC: 16 MG/DL (ref 7–30)
CALCIUM SERPL-MCNC: 8.2 MG/DL (ref 8.5–10.1)
CHLORIDE BLD-SCNC: 112 MMOL/L (ref 94–109)
CO2 SERPL-SCNC: 27 MMOL/L (ref 20–32)
CREAT SERPL-MCNC: 1.35 MG/DL (ref 0.52–1.04)
D DIMER PPP FEU-MCNC: 0.44 UG/ML FEU (ref 0–0.5)
EOSINOPHIL # BLD AUTO: 0.1 10E3/UL (ref 0–0.7)
EOSINOPHIL NFR BLD AUTO: 2 %
ERYTHROCYTE [DISTWIDTH] IN BLOOD BY AUTOMATED COUNT: 15.8 % (ref 10–15)
GFR SERPL CREATININE-BSD FRML MDRD: 39 ML/MIN/1.73M2
GLUCOSE BLD-MCNC: 85 MG/DL (ref 70–99)
HCT VFR BLD AUTO: 33.9 % (ref 35–47)
HGB BLD-MCNC: 10 G/DL (ref 11.7–15.7)
IMM GRANULOCYTES # BLD: 0 10E3/UL
IMM GRANULOCYTES NFR BLD: 0 %
INR PPP: 2.08 (ref 0.85–1.15)
LYMPHOCYTES # BLD AUTO: 0.9 10E3/UL (ref 0.8–5.3)
LYMPHOCYTES NFR BLD AUTO: 19 %
MCH RBC QN AUTO: 23.1 PG (ref 26.5–33)
MCHC RBC AUTO-ENTMCNC: 29.5 G/DL (ref 31.5–36.5)
MCV RBC AUTO: 79 FL (ref 78–100)
MONOCYTES # BLD AUTO: 0.5 10E3/UL (ref 0–1.3)
MONOCYTES NFR BLD AUTO: 11 %
NEUTROPHILS # BLD AUTO: 3.3 10E3/UL (ref 1.6–8.3)
NEUTROPHILS NFR BLD AUTO: 67 %
NRBC # BLD AUTO: 0 10E3/UL
NRBC BLD AUTO-RTO: 0 /100
PLATELET # BLD AUTO: 185 10E3/UL (ref 150–450)
POTASSIUM BLD-SCNC: 4.5 MMOL/L (ref 3.4–5.3)
RBC # BLD AUTO: 4.32 10E6/UL (ref 3.8–5.2)
SODIUM SERPL-SCNC: 140 MMOL/L (ref 133–144)
WBC # BLD AUTO: 4.8 10E3/UL (ref 4–11)

## 2021-07-24 PROCEDURE — 86147 CARDIOLIPIN ANTIBODY EA IG: CPT | Performed by: EMERGENCY MEDICINE

## 2021-07-24 PROCEDURE — 84165 PROTEIN E-PHORESIS SERUM: CPT | Mod: TC | Performed by: PATHOLOGY

## 2021-07-24 PROCEDURE — 85613 RUSSELL VIPER VENOM DILUTED: CPT | Performed by: EMERGENCY MEDICINE

## 2021-07-24 PROCEDURE — 85379 FIBRIN DEGRADATION QUANT: CPT | Performed by: EMERGENCY MEDICINE

## 2021-07-24 PROCEDURE — 80048 BASIC METABOLIC PNL TOTAL CA: CPT | Performed by: EMERGENCY MEDICINE

## 2021-07-24 PROCEDURE — 250N000013 HC RX MED GY IP 250 OP 250 PS 637: Performed by: EMERGENCY MEDICINE

## 2021-07-24 PROCEDURE — 85390 FIBRINOLYSINS SCREEN I&R: CPT | Mod: 26 | Performed by: PATHOLOGY

## 2021-07-24 PROCEDURE — 99284 EMERGENCY DEPT VISIT MOD MDM: CPT | Mod: 25

## 2021-07-24 PROCEDURE — 84165 PROTEIN E-PHORESIS SERUM: CPT | Mod: 26 | Performed by: PATHOLOGY

## 2021-07-24 PROCEDURE — 85610 PROTHROMBIN TIME: CPT | Performed by: EMERGENCY MEDICINE

## 2021-07-24 PROCEDURE — 36415 COLL VENOUS BLD VENIPUNCTURE: CPT | Performed by: EMERGENCY MEDICINE

## 2021-07-24 PROCEDURE — 86146 BETA-2 GLYCOPROTEIN ANTIBODY: CPT | Performed by: INTERNAL MEDICINE

## 2021-07-24 PROCEDURE — 93971 EXTREMITY STUDY: CPT | Mod: LT

## 2021-07-24 PROCEDURE — 85025 COMPLETE CBC W/AUTO DIFF WBC: CPT | Performed by: EMERGENCY MEDICINE

## 2021-07-24 PROCEDURE — 86146 BETA-2 GLYCOPROTEIN ANTIBODY: CPT | Mod: 59 | Performed by: EMERGENCY MEDICINE

## 2021-07-24 PROCEDURE — 84155 ASSAY OF PROTEIN SERUM: CPT | Performed by: EMERGENCY MEDICINE

## 2021-07-24 RX ADMIN — APIXABAN 10 MG: 5 TABLET, FILM COATED ORAL at 23:04

## 2021-07-24 ASSESSMENT — ENCOUNTER SYMPTOMS
SHORTNESS OF BREATH: 1
CHEST TIGHTNESS: 1
COUGH: 0
NUMBNESS: 0
FEVER: 0

## 2021-07-24 NOTE — TELEPHONE ENCOUNTER
Pt is calling in about increased swelling in her left leg all the way up from her feet to her knee that started a few days ago. Pt denies any pain or redness in the leg. Pt is able to walk, and she denies chest pain, or difficulty breathing. Pt reports the feet are the same color, and not cool to touch. Pt denies any red streak or fever. Pt occasionally has difficulty breathing with exertion, but she has a diagnosis of COPD.     Care advice given, elevate legs, laying down twice daily for 20 minutes, and per protocol pt should be evaluated a physician within 24 hours. Pt agrees with plan and will have her son drive her to the ER, because the clinic is closed. Pt was advised to call back if she develops difficulty breathing, increased swelling, or chest pain. Pt verbalized understanding.     Sunday Thurman RN on 7/24/2021 at 5:35 PM      Reason for Disposition    [1] MODERATE leg swelling (e.g., swelling extends up to knees) AND [2] new onset or worsening    Additional Information    Negative: Severe difficulty breathing (e.g., struggling for each breath, speaks in single words)    Negative: Looks like a broken bone or dislocated joint (e.g., crooked or deformed)    Negative: Sounds like a life-threatening emergency to the triager    Negative: Chest pain    Negative: Followed a leg injury    Negative: [1] Small area of swelling AND [2] followed an insect bite to the area    Negative: Swelling of one ankle joint    Negative: Swelling of knee is main symptom    Negative: Pregnant    Negative: Postpartum (from 0 to 6 weeks after delivery)    Negative: Difficulty breathing at rest    Negative: Entire foot is cool or blue in comparison to other side    Negative: [1] Can't walk or can barely walk AND [2] new onset    Negative: [1] Difficulty breathing with exertion (e.g., walking) AND [2] new onset or worsening    Negative: [1] Red area or streak AND [2] fever    Negative: [1] Swelling is painful to touch AND [2]  fever    Negative: [1] Cast on leg or ankle AND [2] now increased pain    Negative: Patient sounds very sick or weak to the triager    Negative: SEVERE leg swelling (e.g., swelling extends above knee, entire leg is swollen, weeping fluid)    Negative: [1] Red area or streak [2] large (> 2 in. or 5 cm)    Negative: [1] Thigh or calf pain AND [2] only 1 side AND [3] present > 1 hour    Negative: [1] Thigh, calf, or ankle swelling AND [2] only 1 side    Negative: [1] Thigh, calf, or ankle swelling AND [2] bilateral AND [3] 1 side is more swollen    Protocols used: LEG SWELLING AND EDEMA-A-AH

## 2021-07-25 ENCOUNTER — TELEPHONE (OUTPATIENT)
Dept: FAMILY MEDICINE | Facility: CLINIC | Age: 76
End: 2021-07-25

## 2021-07-25 LAB — TOTAL PROTEIN SERUM FOR ELP: 5.8 G/DL (ref 6.8–8.8)

## 2021-07-25 NOTE — ED PROVIDER NOTES
History     Chief Complaint:  Leg Swelling      The history is provided by the patient.      Gemma Cowart is a 75 year old female with a history of breast cancer, DVT on chronic warfarin therapy who presents with leg swelling. The patient first started noticing left leg swelling 2-3 days ago. She is feeling a sharp pain in the same leg, but there is no redness and it is not radiating heat. Pain and swelling located in the left calf. The patient had 2 blood clots in the same leg a while ago. She also reports a little shortness of breath and chest tightness but notes that this is common with her COPD. No pleuritic pain.  Her last INR check was 2 weeks ago. She denies recent trauma or injury to the leg. No numbness or tingling, cough, fever, or chest pain. She denies any other symptoms.      Review of Systems   Constitutional: Negative for fever.   Respiratory: Positive for chest tightness and shortness of breath. Negative for cough.    Cardiovascular: Positive for leg swelling. Negative for chest pain.   Neurological: Negative for numbness.   All other systems reviewed and are negative.      Allergies:  Bacitracin  Phenylene    Medications:    Temovate   Paxil   Coumadin   Vibramycin     Past Medical History:    Anemia   Anxiety   Breast cancer (H)   Complications of gastric bypass surgery   COPD (chronic obstructive pulmonary disease) (H)   Diabetes mellitus type 2  DVT (deep venous thrombosis) (H)   Eczema   Fracture of left knee region   History of kidney stones   Obesity   Palpitation   Polyneuropathy   Pruritus   Rosacea   SVT   CKD stage 3   Psoriasis   Eczema   Insomnia   Dyshydrosis   BRETT  Vitamin D deficiency   Vitamin B12 deficiency   nontoxic uninodular goiter   Rosacea      Past Surgical History:    Gastric bypass   Mastectomy   Tubal ligation   Hernia repair   [place] teeth extraction   Inferior vena cava filter implant   Panniculectomy   Breast reconstruction   Nipple reconstruction     Family  History:    Mother - colorectal cancer   Father - prostate cancer, alzheimer disease   Brother - colorectal cancer, diabetes, eye disorder,   Daughter - crohn's disease     Social History:  Patient was unaccompanied to the ED.  Former smoker     Physical Exam     Patient Vitals for the past 24 hrs:   BP Temp Temp src Pulse Resp SpO2   07/24/21 2225 -- -- -- -- -- 98 %   07/24/21 2220 (!) 129/92 -- -- 66 -- --   07/24/21 1852 (!) 145/85 97.8  F (36.6  C) Temporal 72 18 98 %       Physical Exam  General: Well appearing, nontoxic.  Resting comfortably  Head:  Scalp, face, and head appear normal  Eyes:  Pupils are equal, round    Conjunctivae non-injected and sclerae white  ENT:    The external nose is normal    Pinnae are normal  Neck:  Normal range of motion    There is no rigidity noted    Trachea is in the midline  CV:  Regular rate and rhythm     Normal S1/S2, no S3/S4    No murmur or rub. Radial pulses 2+ bilaterally.  Resp:  Lungs are clear and equal bilaterally  There is no tachypnea    No increased work of breathing    No rales, wheezing, or rhonchi  GI:  Abdomen is soft, obese, no rigidity or guarding    No distension, or mass    No tenderness or rebound tenderness   MS:  Normal muscular tone    Symmetric motor strength    2+ pitting edema bilateral ankles. Nonpitting left lower extremity swelling with increased calf and ankle circumference. No tenderness to palpation of the left lower extremity. CMS intact left lower extremity.   Skin:  No rash or acute skin lesions noted  Neuro: Awake and alert  Speech is normal and fluent  Moves all extremities spontaneously  Psych:  Normal affect. Appropriate interactions.    Emergency Department Course   Imaging:  US Lower Extremity Venous Duplex Left  Final Result  IMPRESSION:  1.  Exam is positive for DVT. Small focus of DVT involving one of the posterior tibial veins within the calf.  2.  Findings discussed with Dr. Floyd at 2105 hours.  Per Radiology      Laboratory:  INR: 2.08 (H)    BMP: Chloride - 112 (H), Anion gap - 1 (L), Calcium - 8.2 (L), GFR Estimate 39 (L) o/w WNL (Creatinine 1.35 (H))     CBC: WBC 4.8, HGB 10 (L),      Ddimer: 0.44    Beta 2 Glycoprotein 1 Antibody IgG: Pending     Protein Electrophoresis Serum: Pending     Cardiolipin Brianna IgG and IgM: Pending     Total Protein: Pending     Lupus Anticoagulant Panel: Pending     Emergency Department Course:    Reviewed:  I reviewed nursing notes, vitals, past history and care everywhere    Assessments:  2015 I obtained history and examined the patient as noted above.   2222 I rechecked the patient and explained findings.     Consults:   2143 I consulted with hospitalist services.   2207 I consulted with Dr. Salamanca of oncology.     Interventions:  2304 Eliquis 10mg PO    Disposition:  The patient was discharged to home.    Impression & Plan    Medical Decision Making:  Gemma Cowart is a 75 year old female with a history of prior breast cancer and DVTs now on warfarin therapy presents with acute onset of left leg/calf pain and swelling concerning for recurrent DVT.  On my evaluation the patient is well-appearing, hemodynamically stable and afebrile.  No increased work of breathing, respiratory distress, hypoxia or tachycardia.  Signs and symptoms not consistent with pulmonary embolism.  Venous duplex ultrasound left lower extremity does does reveal a nonobstructive DVT in the peroneal vein of the left calf.  This represents treatment failure given that she is therapeutic on her INR at 2.08.  And actually recently earlier this month with supratherapeutic with INR is as high as 4.  No definite new inciting factors.  No known recurrence of malignancy.  Patient with CKD and creatinine at baseline.  Given the findings patient will likely need to switch off of warfarin to a new form of anticoagulation.  The case was discussed with the ED pharmacist, hospitalist as well as Dr. Salamanca from hematology  oncology who recommended the patient be switched to DOAC for anticoagulation.  The patient normally takes her warfarin at night and she is approximately 24 hours since her last dose.  Given her CKD Eliquis would be the better choice therefore she was started on 10 mg of oral Eliquis.  She should take 10 mg twice daily for 1 week followed by 5 mg twice daily thereafter.  Prescriptions were provided for this.  No dictation for hospitalization at this time.  Patient should follow-up closely in the hematology oncology clinic this week for further evaluation and follow-up. I recommended frequent ambulation, elevation of the leg.  The patient is agreeable to plan of care.  Close return precautions were provided and she was discharged in stable condition.    Covid-19  Gemma Cowart was evaluated during a global COVID-19 pandemic, which necessitated consideration that the patient might be at risk for infection with the SARS-CoV-2 virus that causes COVID-19.   Applicable protocols for evaluation were followed during the patient's care.     Diagnosis:    ICD-10-CM    1. 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 (H)  I82.402 Beta 2 Glycoprotein 1 Antibody IgG     Beta 2 Glycoprotein 1 Antibody IgM     Factor 10 chromogenic     Protein C chromogenic     Protein S Antigen Free     Antithrombin III     Protein Immunofixation Serum     Beta 2 Glycoprotein Antibodies IGG IGM     Cardiolipin Brianna IgG and IgM     D dimer quantitative     Lupus Anticoagulant Panel     Protein electrophoresis     Beta 2 Glycoprotein Antibodies IGG IGM     Cardiolipin Brianna IgG and IgM     D dimer quantitative     Lupus Anticoagulant Panel     Protein electrophoresis     Beta 2 Glycoprotein 1 Antibody IgG     Beta 2 Glycoprotein 1 Antibody IgG     Beta 2 Glycoprotein 1 Antibody IgM     Beta 2 Glycoprotein 1 Antibody IgM     CANCELED: D dimer quantitative     CANCELED: Protein electrophoresis     CANCELED: Lupus Anticoagulant  Panel     CANCELED: Cardiolipin Brianna IgG and IgM     CANCELED: Beta 2 Glycoprotein Antibodies IGG IGM     CANCELED: D dimer quantitative     CANCELED: Protein electrophoresis     CANCELED: Lupus Anticoagulant Panel   2. Chronic anticoagulation  Z79.01 Beta 2 Glycoprotein 1 Antibody IgG     Beta 2 Glycoprotein 1 Antibody IgM     Factor 10 chromogenic     Protein C chromogenic     Protein S Antigen Free     Antithrombin III     Protein Immunofixation Serum     Beta 2 Glycoprotein Antibodies IGG IGM     Cardiolipin Brianna IgG and IgM     D dimer quantitative     Lupus Anticoagulant Panel     Protein electrophoresis     Beta 2 Glycoprotein Antibodies IGG IGM     Cardiolipin Brianna IgG and IgM     D dimer quantitative     Lupus Anticoagulant Panel     Protein electrophoresis     Beta 2 Glycoprotein 1 Antibody IgG     Beta 2 Glycoprotein 1 Antibody IgG     Beta 2 Glycoprotein 1 Antibody IgM     Beta 2 Glycoprotein 1 Antibody IgM     CANCELED: D dimer quantitative     CANCELED: Protein electrophoresis     CANCELED: Lupus Anticoagulant Panel     CANCELED: Cardiolipin Brianna IgG and IgM     CANCELED: Beta 2 Glycoprotein Antibodies IGG IGM     CANCELED: D dimer quantitative     CANCELED: Protein electrophoresis     CANCELED: Lupus Anticoagulant Panel       Discharge Medications:  New Prescriptions    APIXABAN ANTICOAGULANT (ELIQUIS ANTICOAGULANT) 5 MG TABLET    Take 2 tablets (10 mg) by mouth 2 times daily for 7 days    APIXABAN ANTICOAGULANT (ELIQUIS ANTICOAGULANT) 5 MG TABLET    Take 1 tablet (5 mg) by mouth 2 times daily         Scribe Disclosure:  I, Sushil Cordova, am serving as a scribe at 8:08 PM on 7/24/2021 to document services personally performed by Heladio Floyd MD based on my observations and the provider's statements to me.      Heladio Floyd MD  07/25/21 [id number]

## 2021-07-25 NOTE — TELEPHONE ENCOUNTER
Reason for call:  Other   Patient called regarding (reason for call): appointment  Additional comments: Patient was told by ER on 7/24 evening to be seen on 7/27 by primary care provider Cece Foster for a hospital follow up. Please call patient back to schedule this, thank you.    Phone number to reach patient:  Cell number on file:    Telephone Information:   Mobile [phone]       Best Time:      Can we leave a detailed message on this number?  YES    Travel screening: Not Applicable

## 2021-07-26 ENCOUNTER — PATIENT OUTREACH (OUTPATIENT)
Dept: GERIATRIC MEDICINE | Facility: CLINIC | Age: 76
End: 2021-07-26

## 2021-07-26 ENCOUNTER — TELEPHONE (OUTPATIENT)
Dept: ONCOLOGY | Facility: CLINIC | Age: 76
End: 2021-07-26

## 2021-07-26 ENCOUNTER — NURSE TRIAGE (OUTPATIENT)
Dept: NURSING | Facility: CLINIC | Age: 76
End: 2021-07-26

## 2021-07-26 ENCOUNTER — DOCUMENTATION ONLY (OUTPATIENT)
Dept: ANTICOAGULATION | Facility: CLINIC | Age: 76
End: 2021-07-26

## 2021-07-26 DIAGNOSIS — Z79.01 CHRONIC ANTICOAGULATION: Primary | ICD-10-CM

## 2021-07-26 DIAGNOSIS — Z86.718 PERSONAL HISTORY OF DVT (DEEP VEIN THROMBOSIS): ICD-10-CM

## 2021-07-26 LAB
ALBUMIN SERPL ELPH-MCNC: 3.4 G/DL (ref 3.7–5.1)
ALPHA1 GLOB SERPL ELPH-MCNC: 0.3 G/DL (ref 0.2–0.4)
ALPHA2 GLOB SERPL ELPH-MCNC: 0.8 G/DL (ref 0.5–0.9)
B-GLOBULIN SERPL ELPH-MCNC: 0.7 G/DL (ref 0.6–1)
DRVVT CONFIRM NORMALIZED RATIO: 1.14
DRVVT SCREEN MIX RATIO: 1.09
DRVVT SCREEN RATIO: 1.49
GAMMA GLOB SERPL ELPH-MCNC: 0.6 G/DL (ref 0.7–1.6)
INR PPP: 2.18 (ref 0.85–1.15)
LA PPP-IMP: NEGATIVE
LUPUS INTERPRETATION: ABNORMAL
M PROTEIN SERPL ELPH-MCNC: 0 G/DL
PROT PATTERN SERPL ELPH-IMP: ABNORMAL
PTT RATIO: 1.05
REVIEWING PATHOLOGIST:: ABNORMAL
THROMBIN TIME: 17.1 SECONDS (ref 13–19)

## 2021-07-26 NOTE — PROGRESS NOTES
Patient called and she said she was at the ED and she was told she must see a Hematologist this week of 7/26/21.  Checking with Lenny MANNING to see which Hematologist can see patient this week.

## 2021-07-26 NOTE — TELEPHONE ENCOUNTER
Huddled with PCP, PCP recommends patient that patient F/U with specialties first and then F/U with PCP. Scheduled on 8/19/21 with PCP.  -Alva Norris

## 2021-07-26 NOTE — TELEPHONE ENCOUNTER
Pt is calling.    Was in the ED Saturday night.  Blood clot in the lower left leg.  For the last 2 hours, her leg where the ultrasound was done, is very sore. Starting to look bruised. Swelling is decreasing now.  No redness. It is not hot the touch.   Blood thinner was changed to Eliquis. She was on a different one before this.  Denies chest pain, shortness of breath, headache, visual changes.  I advised her that bruising is more common when on blood thinners.  Care advice reviewed. She verbalized understanding.    Additional Information    Negative: Shock suspected (e.g., cold/pale/clammy skin, too weak to stand, low BP, rapid pulse)    Negative: Sounds like a life-threatening emergency to the triager    Negative: Bruises with fever    Negative: Tiny bruises (spots or dots) of unknown cause    Negative: Bruise(s) of forehead or head    Negative: Bruise(s) of face or jaw    Negative: Followed an injury, and triager doesn't know which injury guideline to use first    Negative: Post-operative bruising    Negative: Dizziness or lightheadedness    Negative: [1] Bruise on head/face, chest, or abdomen AND [2]  taking Coumadin (warfarin) or other strong blood thinner, or known bleeding disorder (e.g., thrombocytopenia)    Negative: Unexplained bleeding from another site (e.g., gums, nose, urine) as well    Negative: Patient sounds very sick or weak to the triager    Negative: [1] Not caused by an injury AND [2] 5 or more bruises now    Negative: [1] Raised bruise AND [2] size > 2 inches (5 cm) AND [3] getting bigger    Negative: [1] SEVERE pain AND [2] not improved 2 hours after pain medicine/ice packs    Negative: Suspicious history for the injury    Taking Coumadin (warfarin) or other strong blood thinner, or known bleeding disorder (e.g., thrombocytopenia)    Protocols used: WILLARD-ANANTH-LENA Worthington RN  [facility] Nurse Advisor  7/26/2021 at 6:46 PM

## 2021-07-26 NOTE — PROGRESS NOTES
[facility] Care Coordination Contact  CC received notification of Emergency Room visit.  ER visit occurred on 7-24-21 at [facility] with Dx of DVT.    CC contacted member and reviewed discharge summary.  Member has a follow-up appointment with PCP:  Member has call in to clinic as she was told to see PCP within two days.  She called yesterday and is waiting for return call.  Member has had a change in condition: No  New referrals placed: No  Home Visit Needed: No  Care plan reviewed and updated.  PCP notified of ED visit via EMR.  TIM Ruiz, [facility] Care Coordinator  Tel [phone]  Fax [phone]  Cell [phone]

## 2021-07-26 NOTE — PROGRESS NOTES
ANTICOAGULATION  MANAGEMENT: Discharge Review    Gemma Cowart chart reviewed for anticoagulation continuity of care    Emergency room visit on 7/24/21 for left leg swelling/pain/redness (+ for LLE nonobstructive DVT).    Discharge disposition: Home    Results:    Recent labs: (last 7 days)     07/24/21  2046   INR 2.08*     Anticoagulation inpatient management:     warfarin discontinued and started Eliquis    Anticoagulation discharge instructions:     Warfarin dosing: warfarin discontinued   Bridging: No   INR goal change: Yes: INRs no longer needed since warfarin therapy has been discontinued      Medication changes affecting anticoagulation: Yes: warfarin discontinued and Eliquis started    Additional factors affecting anticoagulation: No    Plan     Agree with dosing adjustment on discharge    Patient not contacted    Anticoagulation Calendar updated. Episode of care resolved. Standing INR lab orders discontinued.    Donna Calabrese RN

## 2021-07-27 ENCOUNTER — TELEPHONE (OUTPATIENT)
Dept: ONCOLOGY | Facility: CLINIC | Age: 76
End: 2021-07-27

## 2021-07-27 LAB
B2 GLYCOPROT1 IGG SERPL IA-ACNC: 0.9 U/ML
B2 GLYCOPROT1 IGG SERPL IA-ACNC: <0.8 U/ML
B2 GLYCOPROT1 IGM SERPL IA-ACNC: <2.4 U/ML
B2 GLYCOPROT1 IGM SERPL IA-ACNC: <2.4 U/ML
CARDIOLIPIN IGG SER IA-ACNC: <2 GPL-U/ML
CARDIOLIPIN IGG SER IA-ACNC: NEGATIVE
CARDIOLIPIN IGM SER IA-ACNC: <2 MPL-U/ML
CARDIOLIPIN IGM SER IA-ACNC: NEGATIVE

## 2021-07-27 NOTE — PROGRESS NOTES
Spoke to patient to explain Clinic Supervisor has message out to the Hematologists in the clinic and checking on soonest availability for New Hematology Visit.  Will contact patient as soon as we have information. Patient has transportation issue so she needs one day in advance notice for appointment to make arrangements with transport.

## 2021-07-28 ENCOUNTER — VIRTUAL VISIT (OUTPATIENT)
Dept: ONCOLOGY | Facility: CLINIC | Age: 76
End: 2021-07-28
Attending: INTERNAL MEDICINE
Payer: COMMERCIAL

## 2021-07-28 ENCOUNTER — TELEPHONE (OUTPATIENT)
Dept: ONCOLOGY | Facility: CLINIC | Age: 76
End: 2021-07-28

## 2021-07-28 DIAGNOSIS — D50.9 IRON DEFICIENCY ANEMIA, UNSPECIFIED IRON DEFICIENCY ANEMIA TYPE: Primary | ICD-10-CM

## 2021-07-28 DIAGNOSIS — I82.462 ACUTE DEEP VEIN THROMBOSIS (DVT) OF CALF MUSCLE VEIN OF LEFT LOWER EXTREMITY (H): ICD-10-CM

## 2021-07-28 PROCEDURE — 99214 OFFICE O/P EST MOD 30 MIN: CPT | Performed by: INTERNAL MEDICINE

## 2021-07-28 NOTE — LETTER
7/28/2021         RE: Gemma Cowart  [address]  [place] [address]        Dear Colleague,    Thank you for referring your patient, Gemma Cowart, to the [facility]. Please see a copy of my visit note below.    SUBJECTIVE:  Ms. Cowart is a 75-year-old female with recurrent thrombosis.  She is being seen today as she had another DVT.  I reviewed her previous investigations.  1.  On 10/06/2012 had revealed bilateral DVT.  -She had IVC filter placed on 10/10/2012.  It was removed on 01/04/2013.  -Patient took warfarin for 6 months.  2.  Lower extremity ultrasound on 06/14/2013 was negative for DVT.  3.  On 09/12/2015, left leg ultrasound revealed DVT in posterior tibial vein below the level of the left knee.  -The patient was started on warfarin and has been continued on that.     About a week ago, she started to have swelling in the left leg.  There was some pain.  Because of this, she presented to the Emergency Room on 07/24/2021 and had multiple investigations done.  -Hemoglobin of 10 with MCV of 79. Normal WBC and platelets.  -Creatinine of 1.35.  -Left lower extremity ultrasound revealed partially occlusive thrombus in one of the posterior tibial veins within the calf.  -The patient was started on Eliquis.  -Multiple hypercoagulable workup done.  Lupus anticoagulant, cardiolipin antibody and beta 2 glycoprotein are all negative.     I discussed regarding provoking factor.  No provoking factor.  No trauma.  She has not gone any long car ride or any plane ride.  Not on any medication that can cause it.    Left breast cancer was diagnosed in 08/2012 via screening MA.   -She underwent bilateral mastectomies on 09/05/2012. Left breast revealed a 2 cm, grade 1, invasive ductal adenocarcinoma. LN negative disease. Estrogen and progesterone receptors were positive at greater than 90%. HER-2/gibran was negative. She had P0iH3O3 disease.   -She took Femara between 10/10/2012  and fall of 2017.      REVIEW OF SYSTEMS:  She has mild fatigue.  No headache.  No dizziness.  No chest pain.  No shortness of breath at rest.  No coughing up blood. No nausea or vomiting.    All other review of systems negative.     ASSESSMENT:    1.  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  2.  Left breast cancer diagnosed in 2012.  3.  History of gastric bypass surgery.  4.  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     PLAN:    1.  I had a long discussion with the patient via telephone visit.  Discussed regarding her recurrent thrombosis.  Different causes of thrombosis discussed.       I explained to the patient that she needs lifelong anticoagulation because of recurrent thrombosis.  She is agreeable for it.  She is on Eliquis.  She is tolerating it well.  Complications including bleeding were discussed.  I advised her to go to emergency if she has bleeding from any site, trauma/fall, blood in the stool, black stool, worsening weakness or unexplained pain.    2.  The patient has some swelling and discomfort in the leg.  She is keeping his legs elevated.  She is also wearing compression stocking.  I told her leg swelling and pain should improve over the next few days.    3.  The patient wants to make sure that her leg thrombosis goes away.  We will get left leg ultrasound in second week of September.    4.  The patient is anemic.  We will get CBC, iron, ferritin, vitamin B12, and folate in September.    5.  The patient is doing well from breast cancer, which was diagnosed in 2012.  No investigation needed except for annual history and physical.  She had bilateral mastectomy.  No mammogram needed.    6.  She had a few questions, which were all answered.  I will see her in September with labs and leg ultrasound.     Total telephone visit time of 25 minutes.       This office note has been  dictated.          Again, thank you for allowing me to participate in the care of your patient.        Sincerely,        Nicki Lopez MD

## 2021-07-28 NOTE — TELEPHONE ENCOUNTER
----- Message from Aaron Sam RN sent at 7/28/2021 10:43 AM CDT -----  Regarding: FW: ED Follow up visit.  Aida,     Please see below regarding the patient you forwarded to me.   If she calls back, Consuelo can discuss follow up with her.      I noticed that she is scheduled with Dr. Rodriguez in October.  Can you see if there is an earlier visit we can get her in with Dr. Rodriguez to see her for both issues?   If not, if there is one for Dr. Salamanca that would be good.      Thanks,   Lenny     ----- Message -----  From: Aaron Sam RN  Sent: 7/28/2021  10:33 AM CDT  To: Gaetano Salamanca MD, Consuelo Martin RN  Subject: ED Follow up visit.                              Consuelo,   Can you please call Gemma and relay the follow per Dr. Salamanca: There is no urgency to be seen this week and she can be seen in a few weeks.   Dr. Salamanca has ordered test before the visit.   The ED provider will schedule a hematology consult and it will be schedule din the coming weeks.      Thank you,   Lenny

## 2021-07-28 NOTE — PATIENT INSTRUCTIONS
1.  Continue Eliquis.  2.  Ultrasound of left leg and labs in second week of September.  3.  Follow-up after ultrasound of leg.

## 2021-07-28 NOTE — LETTER
7/28/2021         RE: Gemma Cowart  [address]  [facility] [address]        Dear Colleague,    Thank you for referring your patient, Gemma Cowart, to the [facility] [place]. Please see a copy of my visit note below.    SUBJECTIVE:  Ms. Cowart is a 75-year-old female with recurrent thrombosis.  She is being seen today as she had another DVT.  I reviewed her previous investigations.  1.  On 10/06/2012 had revealed bilateral DVT.  -She had IVC filter placed on 10/10/2012.  It was removed on 01/04/2013.  -Patient took warfarin for 6 months.  2.  Lower extremity ultrasound on 06/14/2013 was negative for DVT.  3.  On 09/12/2015, left leg ultrasound revealed DVT in posterior tibial vein below the level of the left knee.  -The patient was started on warfarin and has been continued on that.     About a week ago, she started to have swelling in the left leg.  There was some pain.  Because of this, she presented to the Emergency Room on 07/24/2021 and had multiple investigations done.  -Hemoglobin of 10 with MCV of 79. Normal WBC and platelets.  -Creatinine of 1.35.  -Left lower extremity ultrasound revealed partially occlusive thrombus in one of the posterior tibial veins within the calf.  -The patient was started on Eliquis.  -Multiple hypercoagulable workup done.  Lupus anticoagulant, cardiolipin antibody and beta 2 glycoprotein are all negative.     I discussed regarding provoking factor.  No provoking factor.  No trauma.  She has not gone any long car ride or any plane ride.  Not on any medication that can cause it.    Left breast cancer was diagnosed in 08/2012 via screening MA.   -She underwent bilateral mastectomies on 09/05/2012. Left breast revealed a 2 cm, grade 1, invasive ductal adenocarcinoma. LN negative disease. Estrogen and progesterone receptors were positive at greater than 90%. HER-2/gibran was negative. She had [id number] disease.   -She took Femara between 10/10/2012  and fall of 2017.      REVIEW OF SYSTEMS:  She has mild fatigue.  No headache.  No dizziness.  No chest pain.  No shortness of breath at rest.  No coughing up blood. No nausea or vomiting.    All other review of systems negative.     ASSESSMENT:    1.  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  2.  Left breast cancer diagnosed in 2012.  3.  History of gastric bypass surgery.  4.  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     PLAN:    1.  I had a long discussion with the patient via telephone visit.  Discussed regarding her recurrent thrombosis.  Different causes of thrombosis discussed.       I explained to the patient that she needs lifelong anticoagulation because of recurrent thrombosis.  She is agreeable for it.  She is on Eliquis.  She is tolerating it well.  Complications including bleeding were discussed.  I advised her to go to emergency if she has bleeding from any site, trauma/fall, blood in the stool, black stool, worsening weakness or unexplained pain.    2.  The patient has some swelling and discomfort in the leg.  She is keeping his legs elevated.  She is also wearing compression stocking.  I told her leg swelling and pain should improve over the next few days.    3.  The patient wants to make sure that her leg thrombosis goes away.  We will get left leg ultrasound in second week of September.    4.  The patient is anemic.  We will get CBC, iron, ferritin, vitamin B12, and folate in September.    5.  The patient is doing well from breast cancer, which was diagnosed in 2012.  No investigation needed except for annual history and physical.  She had bilateral mastectomy.  No mammogram needed.    6.  She had a few questions, which were all answered.  I will see her in September with labs and leg ultrasound.     Total telephone visit time of 25 minutes.       This office note has been  dictated.          Again, thank you for allowing me to participate in the care of your patient.        Sincerely,        Nicki Lopez MD

## 2021-07-29 DIAGNOSIS — E53.8 VITAMIN B12 DEFICIENCY: ICD-10-CM

## 2021-07-29 DIAGNOSIS — Z98.84 S/P GASTRIC BYPASS: ICD-10-CM

## 2021-07-30 RX ORDER — CYANOCOBALAMIN (VITAMIN B-12) 2500 MCG
2500 TABLET, SUBLINGUAL SUBLINGUAL DAILY
Qty: 90 TABLET | Refills: 0 | Status: SHIPPED | OUTPATIENT
Start: 2021-07-30 | End: 2022-11-18

## 2021-07-30 NOTE — TELEPHONE ENCOUNTER
Prescription approved per [facility] protocol.    Eva Ramesh RN on 7/30/2021 at 10:37 AM

## 2021-08-01 NOTE — PROGRESS NOTES
SUBJECTIVE:  Ms. Cowart is a 75-year-old female with recurrent thrombosis.  She is being seen today as she had another DVT.  I reviewed her previous investigations.  1.  On 10/06/2012 had revealed bilateral DVT.  -She had IVC filter placed on 10/10/2012.  It was removed on 01/04/2013.  -Patient took warfarin for 6 months.  2.  Lower extremity ultrasound on 06/14/2013 was negative for DVT.  3.  On 09/12/2015, left leg ultrasound revealed DVT in posterior tibial vein below the level of the left knee.  -The patient was started on warfarin and has been continued on that.     About a week ago, she started to have swelling in the left leg.  There was some pain.  Because of this, she presented to the Emergency Room on 07/24/2021 and had multiple investigations done.  -Hemoglobin of 10 with MCV of 79. Normal WBC and platelets.  -Creatinine of 1.35.  -Left lower extremity ultrasound revealed partially occlusive thrombus in one of the posterior tibial veins within the calf.  -The patient was started on Eliquis.  -Multiple hypercoagulable workup done.  Lupus anticoagulant, cardiolipin antibody and beta 2 glycoprotein are all negative.     I discussed regarding provoking factor.  No provoking factor.  No trauma.  She has not gone any long car ride or any plane ride.  Not on any medication that can cause it.    Left breast cancer was diagnosed in 08/2012 via screening MA.   -She underwent bilateral mastectomies on 09/05/2012. Left breast revealed a 2 cm, grade 1, invasive ductal adenocarcinoma. LN negative disease. Estrogen and progesterone receptors were positive at greater than 90%. HER-2/gibran was negative. She had [id number] disease.   -She took Femara between 10/10/2012 and fall of 2017.      REVIEW OF SYSTEMS:  She has mild fatigue.  No headache.  No dizziness.  No chest pain.  No shortness of breath at rest.  No coughing up blood. No nausea or vomiting.    All other review of systems negative.     ASSESSMENT:    1.  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  2.  Left breast cancer diagnosed in 2012.  3.  History of gastric bypass surgery.  4.  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     PLAN:    1.  I had a long discussion with the patient via telephone visit.  Discussed regarding her recurrent thrombosis.  Different causes of thrombosis discussed.       I explained to the patient that she needs lifelong anticoagulation because of recurrent thrombosis.  She is agreeable for it.  She is on Eliquis.  She is tolerating it well.  Complications including bleeding were discussed.  I advised her to go to emergency if she has bleeding from any site, trauma/fall, blood in the stool, black stool, worsening weakness or unexplained pain.    2.  The patient has some swelling and discomfort in the leg.  She is keeping his legs elevated.  She is also wearing compression stocking.  I told her leg swelling and pain should improve over the next few days.    3.  The patient wants to make sure that her leg thrombosis goes away.  We will get left leg ultrasound in second week of September.    4.  The patient is anemic.  We will get CBC, iron, ferritin, vitamin B12, and folate in September.    5.  The patient is doing well from breast cancer, which was diagnosed in 2012.  No investigation needed except for annual history and physical.  She had bilateral mastectomy.  No mammogram needed.    6.  She had a few questions, which were all answered.  I will see her in September with labs and leg ultrasound.     Total telephone visit time of 25 minutes.

## 2021-10-05 ENCOUNTER — TRANSFERRED RECORDS (OUTPATIENT)
Dept: HEALTH INFORMATION MANAGEMENT | Facility: CLINIC | Age: 76
End: 2021-10-05

## 2021-10-05 ENCOUNTER — PATIENT OUTREACH (OUTPATIENT)
Dept: GERIATRIC MEDICINE | Facility: CLINIC | Age: 76
End: 2021-10-05

## 2021-10-05 NOTE — PROGRESS NOTES
[facility] Care Coordination Contact    Called member to complete six month assessment and left a message requesting a return call.  TIM Ruiz, [facility] Care Coordinator  Tel [phone]  Fax [phone]  Cell [phone]

## 2021-10-07 ENCOUNTER — PATIENT OUTREACH (OUTPATIENT)
Dept: GERIATRIC MEDICINE | Facility: CLINIC | Age: 76
End: 2021-10-07

## 2021-10-07 NOTE — PROGRESS NOTES
[facility] Care Coordination Contact    Called member to complete six month assessment and left a message requesting a return call.  TIM Ruiz, [facility] Care Coordinator  Tel [phone]  Fax [phone]  Cell [phone]

## 2021-10-07 NOTE — PROGRESS NOTES
[facility] Care Coordination Contact  CC received notification of Emergency Room visit.  ER visit occurred on 10/5/21 at [facility] in [place] with Dx of right knee sprain.    CC contacted member and left a message requesting a return call.  Member has a follow-up appointment with PCP: No: Offered Assistance with setting up a follow up appointment  Member has had a change in condition: No  New referrals placed: No  Home Visit Needed: No  Care plan reviewed and updated.  PCP notified of ED visit via EMR.  TIM Ruiz, [facility] Care Coordinator  Tel [phone]  Fax [phone]  Cell [phone]

## 2021-10-12 NOTE — PROGRESS NOTES
[facility] Care Coordination Contact      [facility] Six-Month Telephone Assessment    6 month telephone assessment completed on 10/12/21.    ER visits: Yes -  [facility] in [place]. 10/5/21 Fell on knee when trying to get up from sofa.  No fractures.  She reports it is still bruised but getting better.    [facility] 7/24/21 DVT.  Hospitalizations: No  TCU stays: NoSignificant health status changes: no change  Falls/Injuries: Yes: one mentioned above.  ADL/IADL changes: No  Changes in services: No services.    Caregiver Assessment follow up:  NA    Goals: See POC in chart for goal progress documentation.    Member moved to [place].  She thinks address is changed with [facility].  Care Coordinator sent [phone].    Will see member in 6 months for an annual health risk assessment.   Encouraged member to call CC with any questions or concerns in the meantime.   TIM Ruiz, CCM  [facility] Care Coordinator  Tel [phone]  Fax [phone]  Cell [phone]

## 2021-10-22 ENCOUNTER — HOSPITAL ENCOUNTER (OUTPATIENT)
Dept: ULTRASOUND IMAGING | Facility: CLINIC | Age: 76
Discharge: HOME | End: 2021-10-22
Attending: INTERNAL MEDICINE | Admitting: INTERNAL MEDICINE
Payer: COMMERCIAL

## 2021-10-22 DIAGNOSIS — I82.462 ACUTE DEEP VEIN THROMBOSIS (DVT) OF CALF MUSCLE VEIN OF LEFT LOWER EXTREMITY (H): ICD-10-CM

## 2021-10-22 DIAGNOSIS — D50.9 IRON DEFICIENCY ANEMIA, UNSPECIFIED IRON DEFICIENCY ANEMIA TYPE: ICD-10-CM

## 2021-10-22 PROCEDURE — 93971 EXTREMITY STUDY: CPT | Mod: LT

## 2021-10-23 ENCOUNTER — HEALTH MAINTENANCE LETTER (OUTPATIENT)
Age: 76
End: 2021-10-23

## 2021-10-27 ENCOUNTER — LAB (OUTPATIENT)
Dept: INFUSION THERAPY | Facility: CLINIC | Age: 76
End: 2021-10-27
Attending: INTERNAL MEDICINE
Payer: COMMERCIAL

## 2021-10-27 ENCOUNTER — ONCOLOGY VISIT (OUTPATIENT)
Dept: ONCOLOGY | Facility: CLINIC | Age: 76
End: 2021-10-27
Attending: INTERNAL MEDICINE
Payer: COMMERCIAL

## 2021-10-27 VITALS
DIASTOLIC BLOOD PRESSURE: 71 MMHG | OXYGEN SATURATION: 97 % | WEIGHT: 200.2 LBS | TEMPERATURE: 97.4 F | BODY MASS INDEX: 35.92 KG/M2 | HEART RATE: 83 BPM | SYSTOLIC BLOOD PRESSURE: 127 MMHG | RESPIRATION RATE: 16 BRPM

## 2021-10-27 DIAGNOSIS — I82.462 ACUTE DEEP VEIN THROMBOSIS (DVT) OF CALF MUSCLE VEIN OF LEFT LOWER EXTREMITY (H): ICD-10-CM

## 2021-10-27 DIAGNOSIS — D50.9 IRON DEFICIENCY ANEMIA, UNSPECIFIED IRON DEFICIENCY ANEMIA TYPE: ICD-10-CM

## 2021-10-27 DIAGNOSIS — Z79.01 CHRONIC ANTICOAGULATION: ICD-10-CM

## 2021-10-27 DIAGNOSIS — I82.402 RECURRENT ACUTE DEEP VEIN THROMBOSIS (DVT) OF LEFT LOWER EXTREMITY (H): ICD-10-CM

## 2021-10-27 DIAGNOSIS — Z85.3 HISTORY OF LEFT BREAST CANCER: ICD-10-CM

## 2021-10-27 DIAGNOSIS — D50.9 IRON DEFICIENCY ANEMIA, UNSPECIFIED IRON DEFICIENCY ANEMIA TYPE: Primary | ICD-10-CM

## 2021-10-27 LAB
ALBUMIN SERPL-MCNC: 3.1 G/DL (ref 3.4–5)
ALP SERPL-CCNC: 93 U/L (ref 40–150)
ALT SERPL W P-5'-P-CCNC: 20 U/L (ref 0–50)
ANION GAP SERPL CALCULATED.3IONS-SCNC: 5 MMOL/L (ref 3–14)
AST SERPL W P-5'-P-CCNC: 17 U/L (ref 0–45)
BILIRUB SERPL-MCNC: 0.3 MG/DL (ref 0.2–1.3)
BUN SERPL-MCNC: 19 MG/DL (ref 7–30)
CALCIUM SERPL-MCNC: 8.1 MG/DL (ref 8.5–10.1)
CHLORIDE BLD-SCNC: 114 MMOL/L (ref 94–109)
CO2 SERPL-SCNC: 23 MMOL/L (ref 20–32)
CREAT SERPL-MCNC: 1.36 MG/DL (ref 0.52–1.04)
ERYTHROCYTE [DISTWIDTH] IN BLOOD BY AUTOMATED COUNT: 15.1 % (ref 10–15)
FERRITIN SERPL-MCNC: 10 NG/ML (ref 8–252)
FOLATE SERPL-MCNC: 18.5 NG/ML
GFR SERPL CREATININE-BSD FRML MDRD: 38 ML/MIN/1.73M2
GLUCOSE BLD-MCNC: 154 MG/DL (ref 70–99)
HCT VFR BLD AUTO: 31.8 % (ref 35–47)
HGB BLD-MCNC: 9.3 G/DL (ref 11.7–15.7)
IRON SATN MFR SERPL: 5 % (ref 15–46)
IRON SERPL-MCNC: 21 UG/DL (ref 35–180)
MCH RBC QN AUTO: 22 PG (ref 26.5–33)
MCHC RBC AUTO-ENTMCNC: 29.2 G/DL (ref 31.5–36.5)
MCV RBC AUTO: 75 FL (ref 78–100)
PLATELET # BLD AUTO: 227 10E3/UL (ref 150–450)
POTASSIUM BLD-SCNC: 4.1 MMOL/L (ref 3.4–5.3)
PROT SERPL-MCNC: 6.4 G/DL (ref 6.8–8.8)
RBC # BLD AUTO: 4.23 10E6/UL (ref 3.8–5.2)
SODIUM SERPL-SCNC: 142 MMOL/L (ref 133–144)
TIBC SERPL-MCNC: 398 UG/DL (ref 240–430)
VIT B12 SERPL-MCNC: 580 PG/ML (ref 193–986)
WBC # BLD AUTO: 4.2 10E3/UL (ref 4–11)

## 2021-10-27 PROCEDURE — 83550 IRON BINDING TEST: CPT | Performed by: INTERNAL MEDICINE

## 2021-10-27 PROCEDURE — 82728 ASSAY OF FERRITIN: CPT | Performed by: INTERNAL MEDICINE

## 2021-10-27 PROCEDURE — 85306 CLOT INHIBIT PROT S FREE: CPT | Performed by: INTERNAL MEDICINE

## 2021-10-27 PROCEDURE — 85303 CLOT INHIBIT PROT C ACTIVITY: CPT | Performed by: INTERNAL MEDICINE

## 2021-10-27 PROCEDURE — 86300 IMMUNOASSAY TUMOR CA 15-3: CPT | Performed by: INTERNAL MEDICINE

## 2021-10-27 PROCEDURE — 85260 CLOT FACTOR X STUART-POWER: CPT | Performed by: INTERNAL MEDICINE

## 2021-10-27 PROCEDURE — 85300 ANTITHROMBIN III ACTIVITY: CPT | Performed by: INTERNAL MEDICINE

## 2021-10-27 PROCEDURE — 36415 COLL VENOUS BLD VENIPUNCTURE: CPT

## 2021-10-27 PROCEDURE — 82040 ASSAY OF SERUM ALBUMIN: CPT | Performed by: INTERNAL MEDICINE

## 2021-10-27 PROCEDURE — 36415 COLL VENOUS BLD VENIPUNCTURE: CPT | Performed by: INTERNAL MEDICINE

## 2021-10-27 PROCEDURE — 85027 COMPLETE CBC AUTOMATED: CPT | Performed by: INTERNAL MEDICINE

## 2021-10-27 PROCEDURE — G0463 HOSPITAL OUTPT CLINIC VISIT: HCPCS

## 2021-10-27 PROCEDURE — 82746 ASSAY OF FOLIC ACID SERUM: CPT | Performed by: INTERNAL MEDICINE

## 2021-10-27 PROCEDURE — 86334 IMMUNOFIX E-PHORESIS SERUM: CPT | Mod: TC | Performed by: INTERNAL MEDICINE

## 2021-10-27 PROCEDURE — 82607 VITAMIN B-12: CPT | Performed by: INTERNAL MEDICINE

## 2021-10-27 PROCEDURE — 86334 IMMUNOFIX E-PHORESIS SERUM: CPT | Mod: 26 | Performed by: PATHOLOGY

## 2021-10-27 RX ORDER — ALBUTEROL SULFATE 0.83 MG/ML
2.5 SOLUTION RESPIRATORY (INHALATION)
Status: CANCELLED | OUTPATIENT
Start: 2021-11-03

## 2021-10-27 RX ORDER — ALBUTEROL SULFATE 90 UG/1
1-2 AEROSOL, METERED RESPIRATORY (INHALATION)
Status: CANCELLED
Start: 2021-11-03

## 2021-10-27 RX ORDER — NALOXONE HYDROCHLORIDE 0.4 MG/ML
0.2 INJECTION, SOLUTION INTRAMUSCULAR; INTRAVENOUS; SUBCUTANEOUS
Status: CANCELLED | OUTPATIENT
Start: 2021-11-03

## 2021-10-27 RX ORDER — MEPERIDINE HYDROCHLORIDE 25 MG/ML
25 INJECTION INTRAMUSCULAR; INTRAVENOUS; SUBCUTANEOUS EVERY 30 MIN PRN
Status: CANCELLED | OUTPATIENT
Start: 2021-11-03

## 2021-10-27 RX ORDER — METHYLPREDNISOLONE SODIUM SUCCINATE 125 MG/2ML
125 INJECTION, POWDER, LYOPHILIZED, FOR SOLUTION INTRAMUSCULAR; INTRAVENOUS
Status: CANCELLED
Start: 2021-11-03

## 2021-10-27 RX ORDER — HEPARIN SODIUM (PORCINE) LOCK FLUSH IV SOLN 100 UNIT/ML 100 UNIT/ML
5 SOLUTION INTRAVENOUS
Status: CANCELLED | OUTPATIENT
Start: 2021-11-03

## 2021-10-27 RX ORDER — HEPARIN SODIUM,PORCINE 10 UNIT/ML
5 VIAL (ML) INTRAVENOUS
Status: CANCELLED | OUTPATIENT
Start: 2021-11-03

## 2021-10-27 RX ORDER — DIPHENHYDRAMINE HYDROCHLORIDE 50 MG/ML
50 INJECTION INTRAMUSCULAR; INTRAVENOUS
Status: CANCELLED
Start: 2021-11-03

## 2021-10-27 RX ORDER — EPINEPHRINE 1 MG/ML
0.3 INJECTION, SOLUTION INTRAMUSCULAR; SUBCUTANEOUS EVERY 5 MIN PRN
Status: CANCELLED | OUTPATIENT
Start: 2021-11-03

## 2021-10-27 ASSESSMENT — PAIN SCALES - GENERAL: PAINLEVEL: NO PAIN (0)

## 2021-10-27 NOTE — LETTER
"    10/27/2021         RE: Gemma Cowart  [address] [place] [address]        Dear Colleague,    Thank you for referring your patient, Gemma Cowart, to the [facility]. Please see a copy of my visit note below.    Oncology Rooming Note    October 27, 2021 12:48 PM   Gemma Cowart is a 76 year old female who presents for:    Chief Complaint   Patient presents with     Oncology Clinic Visit     Malignant neoplasm of left female breast (H)     Initial Vitals: /71 (BP Location: Right arm, Patient Position: Sitting, Cuff Size: Adult Regular)   Pulse 83   Temp 97.4  F (36.3  C) (Oral)   Resp 16   Wt 90.8 kg (200 lb 3.2 oz)   LMP 09/24/1990   SpO2 97%   BMI 35.92 kg/m   Estimated body mass index is 35.92 kg/m  as calculated from the following:    Height as of 6/15/21: 1.59 m (5' 2.6\").    Weight as of this encounter: 90.8 kg (200 lb 3.2 oz). Body surface area is 2 meters squared.  No Pain (0) Comment: Data Unavailable   Patient's last menstrual period was 09/24/1990.  Allergies reviewed: Yes  Medications reviewed: Yes    Medications: MEDICATION REFILLS NEEDED TODAY. Provider was notified. Refill ELIQUIS  Pharmacy name entered into [employer]:    [id number] - [place], [place] - [address]  [id number] - [place], [place] - [address]  [facility] - [place], [place] - [address] [place] [address]    Clinical concerns: no     Harini Villarreal, TRISH                [facility]    Hematology/Oncology Established Patient Follow-up Note      Today's Date: 10/27/21    Reason for Follow-up: left breast cancer, history of DVT  Left Breast cancer in 2012 s/p 5 yrs of femara  Hx BRETT, B12 deficiency due to gastric bypass  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  She was a patient of Dr. Narvaez.    Her breast cancer was diagnosed in 08/2012 via screening MA. 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 LN negative disease. Estrogen and progesterone receptors were positive at greater than 90%. HER-2/gibran was negative. She had [id number] disease.   She was started on Femara on 10/10/2012.    It is put on hold in 5/2016 due to memory loss and dysarthria. She then was evaluated by neurologist, got assurance. Her cognitive function did not change by holding femara, it is resumed in July 2016. She finished 5 yrs of it in fall 2017, not willing to continue due to hair loss, bone health and hot flushes.      She had reconstructive surgery done 03/2013 by Dr. Wall.          INTERIM HISTORY: Gemma says that she feels well.  She is on Eliquis.  Her left leg is chronically more swollen than the right due to history of blood clot there.        REVIEW OF SYSTEMS:   14 point ROS was reviewed and is negative other than as noted above in HPI.       HOME MEDICATIONS:  Current Outpatient Medications   Medication Sig Dispense Refill     ACE/ARB NOT PRESCRIBED, INTENTIONAL, ACE & ARB not prescribed due to Other: normal tensive, low microalbumuria       acetaminophen (TYLENOL) 500 MG tablet Take 2 tablets (1,000 mg) by mouth every 8 hours as needed for mild pain 100 tablet 3     apixaban ANTICOAGULANT (ELIQUIS ANTICOAGULANT) 5 MG tablet Take 1 tablet (5 mg) by mouth 2 times daily 180 tablet 0     ASPIRIN NOT PRESCRIBED (INTENTIONAL) Antiplatelet medication not prescribed intentionally due to Current anticoagulant therapy (warfarin/enoxaparin)       blood glucose (NO BRAND SPECIFIED) lancets standard Use to test blood sugars 2 times daily or as directed. Type 2 diabetes mellitus with other specified complication (H) [E11.69] 100 each 11     blood glucose (ONETOUCH ULTRA) test  "strip TEST ONCE DAILY 100 strip 4     blood glucose monitoring (NO BRAND SPECIFIED) meter device kit Use to test blood sugar 2 times daily or as directed. 1 kit 1     Cholecalciferol (VITAMIN D) 50 MCG (2000 UT) CAPS Take 1 capsule by mouth daily 90 capsule 2     clobetasol (TEMOVATE) 0.05 % external cream Apply twice daily to rash (thigh, knees, hands, feet) twice daily for 7 days AS NEEDED \"use sparingly and not to be used on the face\" Follow up recommended. 60 g 0     doxycycline hyclate (VIBRAMYCIN) 100 MG capsule Take 1 capsule (100 mg) by mouth 2 times daily 28 capsule 0     Lancets (ONETOUCH DELICA PLUS [id number]) MISC 1 lancet daily 100 each 3     PARoxetine (PAXIL) 30 MG tablet TAKE 2 TABLETS BY MOUTH AT BEDTIME 180 tablet 2     vitamin B-12 (CYANOCOBALAMIN) 2500 MCG sublingual tablet Take 1 tablet (2,500 mcg) by mouth daily 90 tablet 0     STATIN NOT PRESCRIBED (INTENTIONAL) Please choose reason not prescribed from choices below. (Patient not taking: Reported on 10/27/2021)           ALLERGIES:  Allergies   Allergen Reactions     Bacitracin Unknown     Phenylene          PAST MEDICAL HISTORY:  Past Medical History:   Diagnosis Date     Anemia     iron deficiency anemia     Anxiety      Breast cancer (H) 2012    Bilateral Masectomies 9/2012     Complications of gastric bypass surgery      COPD (chronic obstructive pulmonary disease) (H) 4/21/2021     Diabetes mellitus (H)     diet controlled     DVT (deep venous thrombosis) (H)      Eczema      Fracture of left knee region     patella - vertical     History of kidney stones      Obesity      Palpitation     with no treatment     Polyneuropathy      Pruritus     generalized     Rosacea      SVT (supraventricular tachycardia) (H)     s/p ablation 5/28/2015 at [facility] for left lateral AP         PAST SURGICAL HISTORY:  Past Surgical History:   Procedure Laterality Date     ABDOMEN SURGERY  2000    gastric bypass 2000     COLONOSCOPY  10/11/2012    " Procedure: COLONOSCOPY;  colonoscopy;  Surgeon: Gela Cleary MD;  Location:  GI     COLONOSCOPY N/A 10/6/2017    Procedure: COLONOSCOPY;;  Surgeon: Shashank Ortiz MD;  Location:  GI     DENTAL SURGERY  5/2007    all teeth removed - dentures     ENT SURGERY      all teeth pulled     ESOPHAGOSCOPY, GASTROSCOPY, DUODENOSCOPY (EGD), COMBINED N/A 10/6/2017    Procedure: COMBINED ESOPHAGOSCOPY, GASTROSCOPY, DUODENOSCOPY (EGD);  ESOPHAGOSCOPY, GASTROSCOPY, DUODENOSCOPY (EGD) & Colonoscopy *Needs INR on arrival;  Surgeon: Shashank Ortiz MD;  Location:  GI     GYN SURGERY  1/10/75    tubal ligation      H ABLATION SVT  5/28/2015    stopped metoprolol     HERNIA REPAIR  2005     IMPLANT FILTER INFERIOR VENA CAVA  10/8/2012    taken out  1/4/12     INSERT TISSUE EXPANDER BREAST BILATERAL  9/5/2012    Procedure: INSERT TISSUE EXPANDER BREAST BILATERAL;;  Surgeon: Orlando Wall MD;  Location:  OR     MASTECTOMY SIMPLE, SENTINEL NODE, COMBINED  9/5/2012    Procedure: COMBINED MASTECTOMY SIMPLE, SENTINEL NODE;  BILATERAL MASTECTOMY WITH LEFT AXILLARY SENTINEL LYMPH NODE BIOPSY, BILATERAL TISSUE EXPANDER        MASTECTOMY, BILATERAL       PANNICULECTOMY N/A 4/5/2018    Procedure: PANNICULECTOMY;  PANNICULECTOMY ;  Surgeon: Orlando Wall MD;  Location: [facility]     RECONSTRUCT BREAST BILATERAL, IMPLANT PROSTHESIS BILATERAL, COMBINED  5/22/2013    Procedure: COMBINED RECONSTRUCT BREAST BILATERAL, IMPLANT PROSTHESIS BILATERAL;  BILATERAL SECOND STAGE BREAST RECONSTRUCTION WITH SILICONE GEL IMPLANTS AND LIPOSUCTION;  Surgeon: Orlando Wall MD;  Location: [facility]     RECONSTRUCT NIPPLE BILATERAL  8/22/2013    Procedure: RECONSTRUCT NIPPLE BILATERAL;  BILATERAL NIPPLE AREOLAR RECONSTRUCTION;  Surgeon: Orlando Wall MD;  Location: [facility]     ZZHC BREAST RECONSTRUC W OTHR TECHNIQ  5/8/2013         SOCIAL HISTORY:  Social History     Socioeconomic History     Marital status:       Spouse name: Not on file     Number of children: Not on file     Years of education: Not on file     Highest education level: Not on file   Occupational History     Employer: RETIRED   Tobacco Use     Smoking status: Former Smoker     Packs/day: 1.50     Years: 25.00     Pack years: 37.50     Types: Cigarettes     Quit date: 10/21/1986     Years since quittin.0     Smokeless tobacco: Never Used     Tobacco comment: quit    Substance and Sexual Activity     Alcohol use: No     Comment: 1 time a year     Drug use: No     Sexual activity: Not Currently     Partners: Male   Other Topics Concern     Parent/sibling w/ CABG, MI or angioplasty before 65F 55M? No      Service Not Asked     Blood Transfusions Not Asked     Caffeine Concern Not Asked     Occupational Exposure Not Asked     Hobby Hazards Not Asked     Sleep Concern Not Asked     Stress Concern Not Asked     Weight Concern Not Asked     Special Diet Not Asked     Back Care Not Asked     Exercise Not Asked     Bike Helmet Yes     Seat Belt Yes     Self-Exams Not Asked   Social History Narrative     Not on file     Social Determinants of Health     Financial Resource Strain:      Difficulty of Paying Living Expenses:    Food Insecurity: Unknown     Worried About Running Out of Food in the Last Year: Never true     Ran Out of Food in the Last Year: Not on file   Transportation Needs: Unknown     Lack of Transportation (Medical): No     Lack of Transportation (Non-Medical): Not on file   Physical Activity: Insufficiently Active     Days of Exercise per Week: 3 days     Minutes of Exercise per Session: 30 min   Stress: No Stress Concern Present     Feeling of Stress : Only a little   Social Connections: Unknown     Frequency of Communication with Friends and Family: More than three times a week     Frequency of Social Gatherings with Friends and Family: More than three times a week     Attends Taoism Services: Not on file     Active Member of  Clubs or Organizations: Not on file     Attends Club or Organization Meetings: Not on file     Marital Status: Not on file   Intimate Partner Violence:      Fear of Current or Ex-Partner:      Emotionally Abused:      Physically Abused:      Sexually Abused:          FAMILY HISTORY:  Family History   Problem Relation Age of Onset     Cancer - colorectal Mother      Colon Cancer Mother      Prostate Cancer Father      Alzheimer Disease Father      Cancer Paternal Grandmother      Alzheimer Disease Paternal Grandfather      Cancer - colorectal Brother      Diabetes Brother      Crohn's Disease Daughter      Diabetes Brother      Eye Disorder Brother      Diabetes Son      Cancer Maternal Uncle      Cancer Maternal Uncle          PHYSICAL EXAM:  Vital signs:  /71 (BP Location: Right arm, Patient Position: Sitting, Cuff Size: Adult Regular)   Pulse 83   Temp 97.4  F (36.3  C) (Oral)   Resp 16   Wt 90.8 kg (200 lb 3.2 oz)   LMP 1990   SpO2 97%   BMI 35.92 kg/m     ECO  GENERAL/CONSTITUTIONAL: No acute distress.  EYES: No scleral icterus.  LYMPH: No anterior cervical, posterior cervical, supraclavicular, or axillary adenopathy.   RESPIRATORY: Clear to auscultation bilaterally. No crackles or wheezing.   CARDIOVASCULAR: Regular rate and rhythm.  GASTROINTESTINAL: No tenderness.  No guarding.    BREAST: s/p bilateral mastectomies s/p reconstruction.  MUSCULOSKELETAL: Warm and well-perfused.  Left lower extremity chronically more swollen than right lower extremity.  NEUROLOGIC: Alert, oriented, answers questions appropriately.  INTEGUMENTARY: No jaundice.      LABS:  CBC RESULTS: Recent Labs   Lab Test 10/27/21  1233   WBC 4.2   RBC 4.23   HGB 9.3*   HCT 31.8*   MCV 75*   MCH 22.0*   MCHC 29.2*   RDW 15.1*        Recent Labs   Lab Test 10/27/21  1302 21  2046    140   POTASSIUM 4.1 4.5   CHLORIDE 114* 112*   CO2 23 27   ANIONGAP 5 1*   * 85   BUN 19 16   CR 1.36* 1.35*   LADI  8.1* 8.2*     Lab Results   Component Value Date    AST 17 10/27/2021    AST 15 10/02/2020     Lab Results   Component Value Date    ALT 20 10/27/2021    ALT 18 10/02/2020     Lab Results   Component Value Date    BILICONJ 0.0 10/12/2012      Lab Results   Component Value Date    BILITOTAL 0.3 10/27/2021    BILITOTAL 0.3 10/02/2020     Lab Results   Component Value Date    ALBUMIN 3.1 10/27/2021    ALBUMIN 3.4 10/02/2020     Lab Results   Component Value Date    PROTTOTAL 6.4 10/27/2021    PROTTOTAL 6.5 10/02/2020      Lab Results   Component Value Date    ALKPHOS 93 10/27/2021    ALKPHOS 73 10/02/2020     Component      Latest Ref Rng & Units 10/12/2016 10/2/2017 9/6/2019 10/27/2021                Iron      35 - 180 ug/dL    21 (L)   Iron Binding Cap      240 - 430 ug/dL    398   Iron Saturation Index      15 - 46 %    5 (L)   Ferritin      8 - 252 ng/mL   13 10   Vitamin B12      193 - 986 pg/mL 1,642 (H) 1,241 (H) 3,314 (H)    Folate      >5.4 ng/mL           IMAGING:  Left lower extremity ultrasound 7/24/21:  FINDINGS: Exam includes the common femoral, femoral, popliteal, and contralateral common femoral veins as well as segmentally visualized deep calf veins and greater saphenous vein.      LEFT: No DVT from groin through popliteal fossa. However, one of the posterior tibial veins within the calf demonstrates partially occlusive thrombus.  No superficial thrombophlebitis. No popliteal cyst.    IMPRESSION:  Exam is positive for DVT. Small focus of DVT involving one of the posterior tibial veins within the calf.    Left lower extremity ultrasound 10/22/21:  FINDINGS:  Examination of the deep veins with graded compression and  color flow Doppler with spectral wave form analysis was performed.       There is no evidence for acute DVT. There is a small amount of  residual nonocclusive DVT in one of the 2 distal left posterior tibial  veins.                                                                       IMPRESSION: Small amount of residual nonocclusive chronic DVT in one  of the 2 distal left posterior tibial veins. No new DVT.        ASSESSMENT/PLAN:  Gemma Cowart is a 76 year old female with:    1) Left breast cancer s/p double mastectomies, ER/OR+, her 2 - s/p  5 yrs of Femara till 2017.     -she is on annual follow-up for this     2) History of gastric bypass surgery with iron-deficiency anemia and low B12.  -she takes sublingual vitamin B12  -check ferritin and iron labs today   -she has a microcytic anemia  -will set up Injectafer x 2 doses, a week apart  -RTC in 3 months, with repeat CBC, ferritin, iron     3) Osteopenia:  -follow-up with PCP     4) CKD:  -follow-up with PCP     5) Hx of recurrent DVT:  She saw Dr. Nicki Lopez on 7/28/21.    -On 10/06/2012 she was found to have bilateral DVT.  -She had IVC filter placed on 10/10/2012.  It was removed on 01/04/2013.  -Patient took warfarin for 6 months.  -Lower extremity ultrasound on 06/14/2013 was negative for DVT.  -On 09/12/2015, left leg ultrasound revealed DVT in posterior tibial vein below the level of the left knee.  -The patient was started on warfarin and has been continued on that.  -in July 2021, she started to have swelling in the left leg with some pain.  She went to the ED on 7/24/21 and had multiple investigations done.  -Hemoglobin of 10 with MCV of 79. Normal WBC and platelets.  -Creatinine of 1.35.  -Left lower extremity ultrasound revealed partially occlusive thrombus in one of the posterior tibial veins within the calf.  -The patient was started on Eliquis.  -Multiple hypercoagulable workup done.  Lupus anticoagulant, cardiolipin antibody and beta 2 glycoprotein are all negative.  There was no provoking factor.    -repeat ultrasound on 10/22/21 shows small amount of residual non-occlusive chronic DVT. No new DVT.  -she continues on Eliquis indefinitely    Yoselyn Rodriguez MD  Hematology/Oncology  [facility]  [facility]    Total time spent on day of visit, including review of tests, obtaining/reviewing separately obtained history, ordering medications/tests/procedures, communicating with PCP/consultants, and documenting in electronic medical record: 30 minutes        Again, thank you for allowing me to participate in the care of your patient.        Sincerely,        Yoselyn Rodriguez MD

## 2021-10-27 NOTE — PROGRESS NOTES
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  She was a patient of Dr. Narvaez.    Her breast cancer was diagnosed in 08/2012 via screening MA. 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 LN negative disease. Estrogen and progesterone receptors were positive at greater than 90%. HER-2/gibran was negative. She had [id number] disease.   She was started on Femara on 10/10/2012.    It is put on hold in 5/2016 due to memory loss and dysarthria. She then was evaluated by neurologist, got assurance. Her cognitive function did not change by holding femara, it is resumed in July 2016. She finished 5 yrs of it in fall 2017, not willing to continue due to hair loss, bone health and hot flushes.      She had reconstructive surgery done 03/2013 by Dr. Wall.          INTERIM HISTORY: Gemma says that she feels well.  She is on Eliquis.  Her left leg is chronically more swollen than the right due to history of blood clot there.        REVIEW OF SYSTEMS:   14 point ROS was reviewed and is negative other than as noted above in HPI.       HOME MEDICATIONS:  Current Outpatient Medications   Medication Sig Dispense Refill     ACE/ARB NOT PRESCRIBED, INTENTIONAL, ACE & ARB not prescribed due to Other: normal tensive, low microalbumuria       acetaminophen (TYLENOL) 500 MG tablet Take 2 tablets (1,000 mg) by mouth every 8 hours as needed for mild pain 100 tablet 3     apixaban ANTICOAGULANT (ELIQUIS ANTICOAGULANT) 5  "MG tablet Take 1 tablet (5 mg) by mouth 2 times daily 180 tablet 0     ASPIRIN NOT PRESCRIBED (INTENTIONAL) Antiplatelet medication not prescribed intentionally due to Current anticoagulant therapy (warfarin/enoxaparin)       blood glucose (NO BRAND SPECIFIED) lancets standard Use to test blood sugars 2 times daily or as directed. Type 2 diabetes mellitus with other specified complication (H) [E11.69] 100 each 11     blood glucose (ONETOUCH ULTRA) test strip TEST ONCE DAILY 100 strip 4     blood glucose monitoring (NO BRAND SPECIFIED) meter device kit Use to test blood sugar 2 times daily or as directed. 1 kit 1     Cholecalciferol (VITAMIN D) 50 MCG (2000 UT) CAPS Take 1 capsule by mouth daily 90 capsule 2     clobetasol (TEMOVATE) 0.05 % external cream Apply twice daily to rash (thigh, knees, hands, feet) twice daily for 7 days AS NEEDED \"use sparingly and not to be used on the face\" Follow up recommended. 60 g 0     doxycycline hyclate (VIBRAMYCIN) 100 MG capsule Take 1 capsule (100 mg) by mouth 2 times daily 28 capsule 0     Lancets (ONETOUCH DELICA PLUS [id number]) MISC 1 lancet daily 100 each 3     PARoxetine (PAXIL) 30 MG tablet TAKE 2 TABLETS BY MOUTH AT BEDTIME 180 tablet 2     vitamin B-12 (CYANOCOBALAMIN) 2500 MCG sublingual tablet Take 1 tablet (2,500 mcg) by mouth daily 90 tablet 0     STATIN NOT PRESCRIBED (INTENTIONAL) Please choose reason not prescribed from choices below. (Patient not taking: Reported on 10/27/2021)           ALLERGIES:  Allergies   Allergen Reactions     Bacitracin Unknown     Phenylene          PAST MEDICAL HISTORY:  Past Medical History:   Diagnosis Date     Anemia     iron deficiency anemia     Anxiety      Breast cancer (H) 2012    Bilateral Masectomies 9/2012     Complications of gastric bypass surgery      COPD (chronic obstructive pulmonary disease) (H) 4/21/2021     Diabetes mellitus (H)     diet controlled     DVT (deep venous thrombosis) (H)      Eczema      Fracture of " left knee region     patella - vertical     History of kidney stones      Obesity      Palpitation     with no treatment     Polyneuropathy      Pruritus     generalized     Rosacea      SVT (supraventricular tachycardia) (H)     s/p ablation 5/28/2015 at [facility] for left lateral AP         PAST SURGICAL HISTORY:  Past Surgical History:   Procedure Laterality Date     ABDOMEN SURGERY  2000    gastric bypass 2000     COLONOSCOPY  10/11/2012    Procedure: COLONOSCOPY;  colonoscopy;  Surgeon: Gela Cleary MD;  Location:  GI     COLONOSCOPY N/A 10/6/2017    Procedure: COLONOSCOPY;;  Surgeon: Shashank Ortiz MD;  Location:  GI     DENTAL SURGERY  5/2007    all teeth removed - dentures     ENT SURGERY      all teeth pulled     ESOPHAGOSCOPY, GASTROSCOPY, DUODENOSCOPY (EGD), COMBINED N/A 10/6/2017    Procedure: COMBINED ESOPHAGOSCOPY, GASTROSCOPY, DUODENOSCOPY (EGD);  ESOPHAGOSCOPY, GASTROSCOPY, DUODENOSCOPY (EGD) & Colonoscopy *Needs INR on arrival;  Surgeon: Shashank Ortiz MD;  Location:  GI     GYN SURGERY  1/10/75    tubal ligation      H ABLATION SVT  5/28/2015    stopped metoprolol     HERNIA REPAIR  2005     IMPLANT FILTER INFERIOR VENA CAVA  10/8/2012    taken out  1/4/12     INSERT TISSUE EXPANDER BREAST BILATERAL  9/5/2012    Procedure: INSERT TISSUE EXPANDER BREAST BILATERAL;;  Surgeon: Orlando Wall MD;  Location:  OR     MASTECTOMY SIMPLE, SENTINEL NODE, COMBINED  9/5/2012    Procedure: COMBINED MASTECTOMY SIMPLE, SENTINEL NODE;  BILATERAL MASTECTOMY WITH LEFT AXILLARY SENTINEL LYMPH NODE BIOPSY, BILATERAL TISSUE EXPANDER        MASTECTOMY, BILATERAL       PANNICULECTOMY N/A 4/5/2018    Procedure: PANNICULECTOMY;  PANNICULECTOMY ;  Surgeon: Orlando Wall MD;  Location:  OR     RECONSTRUCT BREAST BILATERAL, IMPLANT PROSTHESIS BILATERAL, COMBINED  5/22/2013    Procedure: COMBINED RECONSTRUCT BREAST BILATERAL, IMPLANT PROSTHESIS BILATERAL;  BILATERAL SECOND STAGE  BREAST RECONSTRUCTION WITH SILICONE GEL IMPLANTS AND LIPOSUCTION;  Surgeon: Orlando Wall MD;  Location: [facility]     RECONSTRUCT NIPPLE BILATERAL  2013    Procedure: RECONSTRUCT NIPPLE BILATERAL;  BILATERAL NIPPLE AREOLAR RECONSTRUCTION;  Surgeon: Orlando Wall MD;  Location: [facility]     ZZHC BREAST RECONSTRUC W OTHR TECHNIQ  2013         SOCIAL HISTORY:  Social History     Socioeconomic History     Marital status:      Spouse name: Not on file     Number of children: Not on file     Years of education: Not on file     Highest education level: Not on file   Occupational History     Employer: RETIRED   Tobacco Use     Smoking status: Former Smoker     Packs/day: 1.50     Years: 25.00     Pack years: 37.50     Types: Cigarettes     Quit date: 10/21/1986     Years since quittin.0     Smokeless tobacco: Never Used     Tobacco comment: quit    Substance and Sexual Activity     Alcohol use: No     Comment: 1 time a year     Drug use: No     Sexual activity: Not Currently     Partners: Male   Other Topics Concern     Parent/sibling w/ CABG, MI or angioplasty before 65F 55M? No      Service Not Asked     Blood Transfusions Not Asked     Caffeine Concern Not Asked     Occupational Exposure Not Asked     Hobby Hazards Not Asked     Sleep Concern Not Asked     Stress Concern Not Asked     Weight Concern Not Asked     Special Diet Not Asked     Back Care Not Asked     Exercise Not Asked     Bike Helmet Yes     Seat Belt Yes     Self-Exams Not Asked   Social History Narrative     Not on file     Social Determinants of Health     Financial Resource Strain:      Difficulty of Paying Living Expenses:    Food Insecurity: Unknown     Worried About Running Out of Food in the Last Year: Never true     Ran Out of Food in the Last Year: Not on file   Transportation Needs: Unknown     Lack of Transportation (Medical): No     Lack of Transportation (Non-Medical): Not on file   Physical Activity:  Insufficiently Active     Days of Exercise per Week: 3 days     Minutes of Exercise per Session: 30 min   Stress: No Stress Concern Present     Feeling of Stress : Only a little   Social Connections: Unknown     Frequency of Communication with Friends and Family: More than three times a week     Frequency of Social Gatherings with Friends and Family: More than three times a week     Attends Scientology Services: Not on file     Active Member of Clubs or Organizations: Not on file     Attends Club or Organization Meetings: Not on file     Marital Status: Not on file   Intimate Partner Violence:      Fear of Current or Ex-Partner:      Emotionally Abused:      Physically Abused:      Sexually Abused:          FAMILY HISTORY:  Family History   Problem Relation Age of Onset     Cancer - colorectal Mother      Colon Cancer Mother      Prostate Cancer Father      Alzheimer Disease Father      Cancer Paternal Grandmother      Alzheimer Disease Paternal Grandfather      Cancer - colorectal Brother      Diabetes Brother      Crohn's Disease Daughter      Diabetes Brother      Eye Disorder Brother      Diabetes Son      Cancer Maternal Uncle      Cancer Maternal Uncle          PHYSICAL EXAM:  Vital signs:  /71 (BP Location: Right arm, Patient Position: Sitting, Cuff Size: Adult Regular)   Pulse 83   Temp 97.4  F (36.3  C) (Oral)   Resp 16   Wt 90.8 kg (200 lb 3.2 oz)   LMP 1990   SpO2 97%   BMI 35.92 kg/m     ECO  GENERAL/CONSTITUTIONAL: No acute distress.  EYES: No scleral icterus.  LYMPH: No anterior cervical, posterior cervical, supraclavicular, or axillary adenopathy.   RESPIRATORY: Clear to auscultation bilaterally. No crackles or wheezing.   CARDIOVASCULAR: Regular rate and rhythm.  GASTROINTESTINAL: No tenderness.  No guarding.    BREAST: s/p bilateral mastectomies s/p reconstruction.  MUSCULOSKELETAL: Warm and well-perfused.  Left lower extremity chronically more swollen than right lower  extremity.  NEUROLOGIC: Alert, oriented, answers questions appropriately.  INTEGUMENTARY: No jaundice.      LABS:  CBC RESULTS: Recent Labs   Lab Test 10/27/21  1233   WBC 4.2   RBC 4.23   HGB 9.3*   HCT 31.8*   MCV 75*   MCH 22.0*   MCHC 29.2*   RDW 15.1*        Recent Labs   Lab Test 10/27/21  1302 07/24/21  2046    140   POTASSIUM 4.1 4.5   CHLORIDE 114* 112*   CO2 23 27   ANIONGAP 5 1*   * 85   BUN 19 16   CR 1.36* 1.35*   LADI 8.1* 8.2*     Lab Results   Component Value Date    AST 17 10/27/2021    AST 15 10/02/2020     Lab Results   Component Value Date    ALT 20 10/27/2021    ALT 18 10/02/2020     Lab Results   Component Value Date    BILICONJ 0.0 10/12/2012      Lab Results   Component Value Date    BILITOTAL 0.3 10/27/2021    BILITOTAL 0.3 10/02/2020     Lab Results   Component Value Date    ALBUMIN 3.1 10/27/2021    ALBUMIN 3.4 10/02/2020     Lab Results   Component Value Date    PROTTOTAL 6.4 10/27/2021    PROTTOTAL 6.5 10/02/2020      Lab Results   Component Value Date    ALKPHOS 93 10/27/2021    ALKPHOS 73 10/02/2020     Component      Latest Ref Rng & Units 10/12/2016 10/2/2017 9/6/2019 10/27/2021                Iron      35 - 180 ug/dL    21 (L)   Iron Binding Cap      240 - 430 ug/dL    398   Iron Saturation Index      15 - 46 %    5 (L)   Ferritin      8 - 252 ng/mL   13 10   Vitamin B12      193 - 986 pg/mL 1,642 (H) 1,241 (H) 3,314 (H)    Folate      >5.4 ng/mL           IMAGING:  Left lower extremity ultrasound 7/24/21:  FINDINGS: Exam includes the common femoral, femoral, popliteal, and contralateral common femoral veins as well as segmentally visualized deep calf veins and greater saphenous vein.      LEFT: No DVT from groin through popliteal fossa. However, one of the posterior tibial veins within the calf demonstrates partially occlusive thrombus.  No superficial thrombophlebitis. No popliteal cyst.    IMPRESSION:  Exam is positive for DVT. Small focus of DVT involving one  of the posterior tibial veins within the calf.    Left lower extremity ultrasound 10/22/21:  FINDINGS:  Examination of the deep veins with graded compression and  color flow Doppler with spectral wave form analysis was performed.       There is no evidence for acute DVT. There is a small amount of  residual nonocclusive DVT in one of the 2 distal left posterior tibial  veins.                                                                      IMPRESSION: Small amount of residual nonocclusive chronic DVT in one  of the 2 distal left posterior tibial veins. No new DVT.        ASSESSMENT/PLAN:  Gemma Cowart is a 76 year old female with:    1) Left breast cancer s/p double mastectomies, ER/IN+, her 2 - s/p  5 yrs of Femara till 2017.     -she is on annual follow-up for this     2) History of gastric bypass surgery with iron-deficiency anemia and low B12.  -she takes sublingual vitamin B12  -check ferritin and iron labs today   -she has a microcytic anemia  -will set up Injectafer x 2 doses, a week apart  -RTC in 3 months, with repeat CBC, ferritin, iron     3) Osteopenia:  -follow-up with PCP     4) CKD:  -follow-up with PCP     5) Hx of recurrent DVT:  She saw Dr. Nicki Lopez on 7/28/21.    -On 10/06/2012 she was found to have bilateral DVT.  -She had IVC filter placed on 10/10/2012.  It was removed on 01/04/2013.  -Patient took warfarin for 6 months.  -Lower extremity ultrasound on 06/14/2013 was negative for DVT.  -On 09/12/2015, left leg ultrasound revealed DVT in posterior tibial vein below the level of the left knee.  -The patient was started on warfarin and has been continued on that.  -in July 2021, she started to have swelling in the left leg with some pain.  She went to the ED on 7/24/21 and had multiple investigations done.  -Hemoglobin of 10 with MCV of 79. Normal WBC and platelets.  -Creatinine of 1.35.  -Left lower extremity ultrasound revealed partially occlusive thrombus in one of the posterior  tibial veins within the calf.  -The patient was started on Eliquis.  -Multiple hypercoagulable workup done.  Lupus anticoagulant, cardiolipin antibody and beta 2 glycoprotein are all negative.  There was no provoking factor.    -repeat ultrasound on 10/22/21 shows small amount of residual non-occlusive chronic DVT. No new DVT.  -she continues on Eliquis indefinitely    Yoselyn Rodriguez MD  Hematology/Oncology  [facility]    Total time spent on day of visit, including review of tests, obtaining/reviewing separately obtained history, ordering medications/tests/procedures, communicating with PCP/consultants, and documenting in electronic medical record: 30 minutes

## 2021-10-27 NOTE — PROGRESS NOTES
"Oncology Rooming Note    October 27, 2021 12:48 PM   Gemma Cowart is a 76 year old female who presents for:    Chief Complaint   Patient presents with     Oncology Clinic Visit     Malignant neoplasm of left female breast (H)     Initial Vitals: /71 (BP Location: Right arm, Patient Position: Sitting, Cuff Size: Adult Regular)   Pulse 83   Temp 97.4  F (36.3  C) (Oral)   Resp 16   Wt 90.8 kg (200 lb 3.2 oz)   LMP 09/24/1990   SpO2 97%   BMI 35.92 kg/m   Estimated body mass index is 35.92 kg/m  as calculated from the following:    Height as of 6/15/21: 1.59 m (5' 2.6\").    Weight as of this encounter: 90.8 kg (200 lb 3.2 oz). Body surface area is 2 meters squared.  No Pain (0) Comment: Data Unavailable   Patient's last menstrual period was 09/24/1990.  Allergies reviewed: Yes  Medications reviewed: Yes    Medications: MEDICATION REFILLS NEEDED TODAY. Provider was notified. Refill ELIQUIS  Pharmacy name entered into [facility]:    [id number] - [place], [place] - [address]  [id number] - [place], [place] - [address]  [facility] - [facility], [place] - [address] [place] [address]    Clinical concerns: no     Harini Villarreal CMA              "

## 2021-10-27 NOTE — PROGRESS NOTES
Medical Assistant Note:  Gemma Cowart presents today for lab draw.    Patient seen by provider today: Yes: Dr Rodriguez.   present during visit today: Not Applicable.    Concerns: No Concerns.    Procedure:  Lab draw site: RAC, Needle type: BF, Gauge: 21. Gauze and coban applied  Post Assessment:  Labs drawn without difficulty: Yes.    Discharge Plan:  Departure Mode: Ambulatory.    Face to Face Time: 5.    Harini Villarreal CMA

## 2021-10-28 LAB
AT III ACT/NOR PPP CHRO: 101 % (ref 85–135)
CANCER AG27-29 SERPL-ACNC: 13 U/ML (ref 0–39)
FACT X ACT/NOR PPP CHRO: 67 % (ref 70–130)
PROT C ACT/NOR PPP CHRO: 84 % (ref 70–170)
PROT PATTERN SERPL IFE-IMP: NORMAL
PROT S FREE AG ACT/NOR PPP IA: 90 % (ref 55–125)

## 2021-11-02 ENCOUNTER — TELEPHONE (OUTPATIENT)
Dept: ONCOLOGY | Facility: CLINIC | Age: 76
End: 2021-11-02

## 2021-11-02 NOTE — TELEPHONE ENCOUNTER
I called patient regarding her lab results.  She has iron-deficiency anemia and will be set up for iron infusions and colonoscopy.  She will follow-up again in 3 months with labs, as previously discussed.

## 2021-11-26 DIAGNOSIS — Z11.59 SPECIAL SCREENING EXAMINATION FOR VIRAL DISEASE: Primary | ICD-10-CM

## 2021-11-29 ENCOUNTER — HOSPITAL ENCOUNTER (OUTPATIENT)
Facility: CLINIC | Age: 76
End: 2021-11-29
Attending: SPECIALIST | Admitting: SPECIALIST
Payer: COMMERCIAL

## 2021-11-29 ENCOUNTER — TELEPHONE (OUTPATIENT)
Dept: GASTROENTEROLOGY | Facility: CLINIC | Age: 76
End: 2021-11-29
Payer: COMMERCIAL

## 2021-11-29 DIAGNOSIS — Z11.59 ENCOUNTER FOR SCREENING FOR OTHER VIRAL DISEASES: ICD-10-CM

## 2021-11-29 NOTE — TELEPHONE ENCOUNTER
Screening Questions  1. Are you active on mychart? NO    2. What insurance is in the chart? [employer]    2.  Ordering/Referring Provider: Yoselyn Rodriguez MD    3. BMI 34.2, If greater than 40 review exclusion criteria    4.  Respiratory Screening (If yes to any of the following Hospital setting only):     Do you use daily home oxygen? NO  Do you have mod to severe Obstructive Sleep Apnea? NO   Do you have Pulmonary Hypertension? NO   Do you have UNCONTROLLED asthma? NO    5. Have you had a heart or lung transplant (If yes, please review exclusion criteria) ? NO    6. Are you currently on dialysis or have chronic kidney disease? STAGE 3 KIDNEY DIS    7. Have you had a stroke or Transient ischemic attack (TIA) within 6 months? NO    8. In the past 6 months, have you had any heart related issues including cardiomyopathy or heart attack? NO                 If yes, did it require cardiac stenting or other implantable device?NO      9. Do you have any implantable devices in your body (pacemaker, defib, LVAD)? NO    10. Do you take nitroglycerin? If yes, how often? NO    11. Are you currently taking any blood thinners?YES    12. Are you a diabetic? YES    13. (Females) Are you currently pregnant? NA  If yes, how many weeks?      15. Are you taking any prescription pain medications on a routine schedule? NO If yes, MAC sedation.    16. Do you have any chemical dependencies such as alcohol, street drugs, or methadone? NOIf yes, MAC sedation.    17. Do you have any history of post-traumatic stress syndrome, severe anxiety or history of psychosis? ANXIETY    18. Do you transfer independently? YES    19.  Do you have any issues with constipation? YES    20. Preferred Pharmacy for Pre Prescription [facility]     Scheduling Details    Which Colonoscopy Prep was Sent?: EXTENDED  Procedure Scheduled: COLON  Surgeon: MIKO  Date of Procedure: 12/14/2021  Location:   Caller (Please ask for phone number if not scheduled  by patient): SALUD      Sedation Type: CS  Conscious Sedation- Needs  for 6 hours after the procedure  MAC/General-Needs  for 24 hours after procedure    Pre-op Required at [facility] [place], [place] and OR for MAC sedation:   (if yes advise patient they will need a pre-op prior to procedure)      Is patient on blood thinners? -NO (If yes- inform patient to follow up with PCP or provider for follow up instructions)     Informed patient they will need an adult  YES  Cannot take any type of public or medical transportation alone    Pre-Procedure Covid test to be completed at [facility] [place] or Externally: [facility] 12/10/2021 AT 9AM     Confirmed Nurse will call to complete assessment YES    Additional comments:

## 2021-12-02 DIAGNOSIS — Z86.718 PERSONAL HISTORY OF DVT (DEEP VEIN THROMBOSIS): Primary | ICD-10-CM

## 2021-12-02 NOTE — TELEPHONE ENCOUNTER
"Arminda Loyola/Zulma Mcgee: I received a call from this patient requesting a refill for elquis prescribed while in the ED (initially in July, then again in October) for DVT. Pt was advised to follow-up with PCP for hospital follow-up. Pt never scheduled.    Its now been 2 months since ED visit and now she's running out of Eliquis (has 3 days worth left).    Pt saw [place] in March, and hasn't seen Milla since 2019, but pt requesting that Milla be PCP.    I saw not ED F/U appts available for next week. Virtual seems inappropriate. I did inform the pt that this is a serious medication that should involve a visit and assessment for continued use.    Please advise.      - Oliverio \"Yomi\" VILLA Matson - Patient Advocate Liason (PAL)  [facility]    "

## 2021-12-03 ENCOUNTER — NURSE TRIAGE (OUTPATIENT)
Dept: NURSING | Facility: CLINIC | Age: 76
End: 2021-12-03
Payer: COMMERCIAL

## 2021-12-03 NOTE — TELEPHONE ENCOUNTER
Scheduled with Arminda, first available which was 2/1/22.    Margarita Daily on 12/3/2021 at 10:46 AM

## 2021-12-03 NOTE — TELEPHONE ENCOUNTER
"Zulma Mcgee: Are you able to send in a short supply for her to  at [facility] until she can have other Rx is mailed to her (from [facility]), usually taking 5-10 days for delivery?      - Oliverio \"Yomi\" Huyen, VILLA - Patient Advocate Liason (PAL)  [facility]    "

## 2021-12-03 NOTE — TELEPHONE ENCOUNTER
Gemma reports she was with her brother on 11/23. Brother tested positive for COVID yesterday 12/2. He did not have any symptoms, COVID testing was done as pre-procedure requirement. She has not seen brother since 11/23.  She is asymptomatic.    FNA advised that she did not meet criteria for close contact. Even if she did, today is Day 10 of possible exposure. No testing and no quarantine is necessary.  Patient verbalized understanding.    Daria Brown RN/Landing Nurse Advisor            Reason for Disposition    [1] Caller concerned that exposure to COVID-19 occurred BUT [2] does not meet COVID-19 EXPOSURE criteria from CDC    Protocols used: CORONAVIRUS (COVID-19) EXPOSURE-A- 8.25.2021

## 2021-12-10 ENCOUNTER — LAB (OUTPATIENT)
Dept: URGENT CARE | Facility: URGENT CARE | Age: 76
End: 2021-12-10
Attending: SPECIALIST
Payer: COMMERCIAL

## 2021-12-10 DIAGNOSIS — Z11.59 ENCOUNTER FOR SCREENING FOR OTHER VIRAL DISEASES: ICD-10-CM

## 2021-12-10 PROCEDURE — U0005 INFEC AGEN DETEC AMPLI PROBE: HCPCS

## 2021-12-10 PROCEDURE — U0003 INFECTIOUS AGENT DETECTION BY NUCLEIC ACID (DNA OR RNA); SEVERE ACUTE RESPIRATORY SYNDROME CORONAVIRUS 2 (SARS-COV-2) (CORONAVIRUS DISEASE [COVID-19]), AMPLIFIED PROBE TECHNIQUE, MAKING USE OF HIGH THROUGHPUT TECHNOLOGIES AS DESCRIBED BY CMS-2020-01-R: HCPCS

## 2021-12-11 LAB — SARS-COV-2 RNA RESP QL NAA+PROBE: NEGATIVE

## 2021-12-13 ENCOUNTER — HOSPITAL ENCOUNTER (OUTPATIENT)
Facility: CLINIC | Age: 76
End: 2021-12-13
Attending: SPECIALIST | Admitting: SPECIALIST

## 2021-12-13 DIAGNOSIS — Z11.59 ENCOUNTER FOR SCREENING FOR OTHER VIRAL DISEASES: ICD-10-CM

## 2021-12-13 DIAGNOSIS — D50.9 IRON DEFICIENCY ANEMIA, UNSPECIFIED IRON DEFICIENCY ANEMIA TYPE: Primary | ICD-10-CM

## 2021-12-13 NOTE — OR NURSING
Pt called for precall for colonoscopy tomorrow. Pt states just received the info in mail today and is not prepared with  or  Bowel  Prep today. Pt on Eliquis and reminded about having to be off blood thinner proir to coming for procedure but to check with her MD about this. Pt also told she would need to do covid test again. Pt understands. Given  number and wants to proceed after the holidays

## 2021-12-13 NOTE — PLAN OF CARE
Rescheduled for colonoscopy on Jan 4, 2022 with Dr Pandey, she will use the same prep instructions, Rx sent to pharmacy and will be mailed for Extended Golytely Prep.  Patient was notified that she will need another Covid test and she has the number to call to get it scheduled.

## 2021-12-30 ENCOUNTER — PATIENT OUTREACH (OUTPATIENT)
Dept: GERIATRIC MEDICINE | Facility: CLINIC | Age: 76
End: 2021-12-30

## 2021-12-30 NOTE — PROGRESS NOTES
[facility] Care Coordination Contact  CC received notification of Emergency Room visit.  ER visit occurred on 12/29/21 at [facility] with Dx of suspected COVID. Member shares that she spent time with her family on multiple occassions over the holidays, and everyone expect one person has tested positive for COVID 19. Member developed symptoms yesterday and went to be tested. She is still waiting on results - was told it could take 2-3 days.   Member has a follow-up appointment with PCP: No - waiting on results of COVID test.   Member has had a change in condition: No  New referrals placed: No  Home Visit Needed: No  PCP notified of ED visit via EMR.    Member has a pre-procedure COVID test scheduled for tomorrow. Provided number to call about this appointment - discussed they may want her to cancel if she is suspected to have COVID. Reviewed that colonoscopy may need to be rescheduled as well. Member would like to wait as long as possible because if she doesn't have COVID she wants to have it done. Encouraged that she call and discuss this - she has the phone number for a nurse who called to review the procedure and prep.     Provided writer's number if she needs further assistance. Primary CC will be back on 1/3.     Maegan Russell RN  [facility]  [phone]

## 2022-01-03 ENCOUNTER — TELEPHONE (OUTPATIENT)
Dept: GASTROENTEROLOGY | Facility: CLINIC | Age: 77
End: 2022-01-03
Payer: COMMERCIAL

## 2022-01-03 NOTE — TELEPHONE ENCOUNTER
Caller: Gemma Cowart    Procedure: Colon    Date, Location, and Surgeon of Procedure Cancelled: Katherin, 1/4/22     Ordering Provider:Michael    Reason for cancel (please be detailed, any staff messages or encounters to note?): Has covid and just retested on 1/2/22 and was positive again. Will call back to reschedule once no more symptoms

## 2022-01-07 ENCOUNTER — NURSE TRIAGE (OUTPATIENT)
Dept: PEDIATRICS | Facility: CLINIC | Age: 77
End: 2022-01-07
Payer: COMMERCIAL

## 2022-01-07 ENCOUNTER — TELEPHONE (OUTPATIENT)
Dept: PEDIATRICS | Facility: CLINIC | Age: 77
End: 2022-01-07

## 2022-01-07 ENCOUNTER — VIRTUAL VISIT (OUTPATIENT)
Dept: PEDIATRICS | Facility: CLINIC | Age: 77
End: 2022-01-07
Payer: COMMERCIAL

## 2022-01-07 DIAGNOSIS — R06.2 WHEEZING: ICD-10-CM

## 2022-01-07 DIAGNOSIS — J44.9 CHRONIC OBSTRUCTIVE PULMONARY DISEASE, UNSPECIFIED COPD TYPE (H): ICD-10-CM

## 2022-01-07 DIAGNOSIS — U07.1 INFECTION DUE TO 2019 NOVEL CORONAVIRUS: ICD-10-CM

## 2022-01-07 DIAGNOSIS — U07.1 INFECTION DUE TO 2019 NOVEL CORONAVIRUS: Primary | ICD-10-CM

## 2022-01-07 DIAGNOSIS — E11.69 TYPE 2 DIABETES MELLITUS WITH OTHER SPECIFIED COMPLICATION, WITHOUT LONG-TERM CURRENT USE OF INSULIN (H): Chronic | ICD-10-CM

## 2022-01-07 PROCEDURE — 99214 OFFICE O/P EST MOD 30 MIN: CPT | Mod: 95 | Performed by: NURSE PRACTITIONER

## 2022-01-07 RX ORDER — ALBUTEROL SULFATE 90 UG/1
2 AEROSOL, METERED RESPIRATORY (INHALATION) EVERY 6 HOURS
Qty: 18 G | Refills: 0 | Status: SHIPPED | OUTPATIENT
Start: 2022-01-07 | End: 2022-02-01

## 2022-01-07 NOTE — PATIENT INSTRUCTIONS
"Use your albuterol inhaler scheduled every 4 -6 hours for the next 2-3 days, then use every 4-6 hours as needed for cough, wheeze, or shortness of breath.  Fill out the form at [facility] for monoclonal antibodies-you may be too late to be considered but I would still complete the form.  Discharge Instructions for COVID-19 Patients  You have--or may have--COVID-19. Please follow the instructions listed below.   If you have a weakened immune system, discuss with your doctor any other actions you need to take.  How can I protect others?  If you have symptoms (fever, cough, body aches or trouble breathing):    Stay home and away from others (self-isolate) until:  ? Your other symptoms have resolved (gotten better). And   ? You've had no fever--and no medicine that reduces fever--for 1 full day (24 hours). And   ? At least 10 days have passed since your symptoms started. (You may need to wait 20 days. Follow the advice of your care team.)  If you don't show symptoms, but testing showed that you have COVID-19:    Stay home and away from others (self-isolate) until at least 10 days have passed since the date of your first positive COVID-19 test.  During this time    Stay in your own room, even for meals. Use your own bathroom if you can.    Stay away from others in your home. No hugging, kissing or shaking hands. No visitors.    Don't go to work, school or anywhere else.    Clean \"high touch\" surfaces often (doorknobs, counters, handles). Use household cleaning spray or wipes.    You'll find a full list of  on the EPA website: www.epa.gov/pesticide-registration/list-n-disinfectants-use-against-sars-cov-2.    Cover your mouth and nose with a mask or other face covering to avoid spreading germs.    Wash your hands and face often. Use soap and water.    Caregivers in these groups are at risk for severe illness due to COVID-19:  ? People 65 years and older  ? People who live in a nursing home or long-term care " facility  ? People with chronic disease (lung, heart, cancer, diabetes, kidney, liver, immunologic)  ? People who have a weakened immune system, including those who:    Are in cancer treatment    Take medicine that weakens the immune system, such as corticosteroids    Had a bone marrow or organ transplant    Have an immune deficiency    Have poorly controlled HIV or AIDS    Are obese (body mass index of 40 or higher)    Smoke regularly    Caregivers should wear gloves while washing dishes, handling laundry and cleaning bedrooms and bathrooms.    Use caution when washing and drying laundry: Don't shake dirty laundry and use the warmest water setting that you can.    For more tips on managing your health at home, go to www.cdc.gov/coronavirus/2019-ncov/downloads/10Things.pdf.  How can I take care of myself at home?  1. Get lots of rest. Drink extra fluids (unless a doctor has told you not to).  2. Take Tylenol (acetaminophen) for fever or pain. If you have liver or kidney problems, ask your family doctor if it's okay to take Tylenol.   Adults can take either:   ? 650 mg (two 325 mg pills) every 4 to 6 hours, or   ? 1,000 mg (two 500 mg pills) every 8 hours as needed.  ? Note: Don't take more than 3,000 mg in one day. Acetaminophen is found in many medicines (both prescribed and over-the-counter medicines). Read all labels to be sure you don't take too much.   For children, check the Tylenol bottle for the right dose. The dose is based on the child's age or weight.  3. If you have other health problems (like cancer, heart failure, an organ transplant or severe kidney disease): Call your specialty clinic if you don't feel better in the next 2 days.  4. Know when to call 911. Emergency warning signs include:  ? Trouble breathing or shortness of breath  ? Pain or pressure in the chest that doesn't go away  ? Feeling confused like you haven't felt before, or not being able to wake up  ? Bluish-colored lips or face  5. Your  doctor may have prescribed a blood thinner medicine. Follow their instructions.  Where can I get more information?    [facility] - About COVID-19:   [email or web address]    CDC - What to Do If You're Sick: www.cdc.gov/coronavirus/2019-ncov/about/steps-when-sick.html    CDC - Ending Home Isolation: www.cdc.gov/coronavirus/2019-ncov/hcp/disposition-in-home-patients.html    CDC - Caring for Someone: www.cdc.gov/coronavirus/2019-ncov/if-you-are-sick/care-for-someone.html    [facility] - Interim Guidance for Hospital Discharge to Home: www.health.[facility].[place]/diseases/coronavirus/hcp/hospdischarge.pdf    Below are the COVID-19 hotlines at the [place] Department of Health ([facility]). Interpreters are available.  ? For health questions: Call 441-173-8326 or 1-891.359.5539 (7 a.m. to 7 p.m.)  ? For questions about schools and childcare: Call 682-801-9299 or 1-831.739.8518 (7 a.m. to 7 p.m.)    For informational purposes only. Not to replace the advice of your health care provider. Clinically reviewed by Dr. Brock Boss.   Copyright   2020 [employer]. All rights reserved. [employer] [id number] - REV 01/05/21.

## 2022-01-07 NOTE — PROGRESS NOTES
Gemma is a 76 year old who is being evaluated via a billable video visit.      How would you like to obtain your AVS? Mail a copy  If the video visit is dropped, the invitation should be resent by: Text to cell phone: [phone]  Will anyone else be joining your video visit? No    Video Start Time: 1110 am    Assessment & Plan     Infection due to 2019 novel coronavirus  Based on our virtual visit, feel comfortable having her continue to manage her symptoms at home. Her symptoms had improved yesterday but today are consistent with previous and improving so feel unlikely secondary pneumonia/infection. Reassured she is afebrile and home O2 sats are stable. Will have RN reach out to her on Monday and we discussed s/s to watch for that would warrant emergency care.  - albuterol (PROAIR HFA/PROVENTIL HFA/VENTOLIN HFA) 108 (90 Base) MCG/ACT inhaler; Inhale 2 puffs into the lungs every 6 hours  - COVID-19 GetWell Loop Referral    Wheezing  Chronic obstructive pulmonary disease, unspecified COPD type (H)  Symptoms improving but due to ongoing wheeze/chest tightness/pressure will have her use albuterol scheduled for the next 2-3 days. Not on a daily inhaler currently.  - albuterol (PROAIR HFA/PROVENTIL HFA/VENTOLIN HFA) 108 (90 Base) MCG/ACT inhaler; Inhale 2 puffs into the lungs every 6 hours    Type 2 diabetes mellitus with other specified complication, without long-term current use of insulin (H)  Continue to monitor blood sugars, we discussed the relationship between infection and risk for hyperglycemia. She should seek care if blood sugars consistently >300.  - blood glucose monitoring (NO BRAND SPECIFIED) meter device kit; Use to test blood sugar 2 times daily or as directed.    Patient Instructions   Use your albuterol inhaler scheduled every 4 -6 hours for the next 2-3 days, then use every 4-6 hours as needed for cough, wheeze, or shortness of breath.  Fill out the form at [facility] for monoclonal antibodies-you may be  "too late to be considered but I would still complete the form.  Discharge Instructions for COVID-19 Patients  You have--or may have--COVID-19. Please follow the instructions listed below.   If you have a weakened immune system, discuss with your doctor any other actions you need to take.  How can I protect others?  If you have symptoms (fever, cough, body aches or trouble breathing):    Stay home and away from others (self-isolate) until:  ? Your other symptoms have resolved (gotten better). And   ? You've had no fever--and no medicine that reduces fever--for 1 full day (24 hours). And   ? At least 10 days have passed since your symptoms started. (You may need to wait 20 days. Follow the advice of your care team.)  If you don't show symptoms, but testing showed that you have COVID-19:    Stay home and away from others (self-isolate) until at least 10 days have passed since the date of your first positive COVID-19 test.  During this time    Stay in your own room, even for meals. Use your own bathroom if you can.    Stay away from others in your home. No hugging, kissing or shaking hands. No visitors.    Don't go to work, school or anywhere else.    Clean \"high touch\" surfaces often (doorknobs, counters, handles). Use household cleaning spray or wipes.    You'll find a full list of  on the EPA website: www.epa.gov/pesticide-registration/list-n-disinfectants-use-against-sars-cov-2.    Cover your mouth and nose with a mask or other face covering to avoid spreading germs.    Wash your hands and face often. Use soap and water.    Caregivers in these groups are at risk for severe illness due to COVID-19:  ? People 65 years and older  ? People who live in a nursing home or long-term care facility  ? People with chronic disease (lung, heart, cancer, diabetes, kidney, liver, immunologic)  ? People who have a weakened immune system, including those who:    Are in cancer treatment    Take medicine that weakens the " immune system, such as corticosteroids    Had a bone marrow or organ transplant    Have an immune deficiency    Have poorly controlled HIV or AIDS    Are obese (body mass index of 40 or higher)    Smoke regularly    Caregivers should wear gloves while washing dishes, handling laundry and cleaning bedrooms and bathrooms.    Use caution when washing and drying laundry: Don't shake dirty laundry and use the warmest water setting that you can.    For more tips on managing your health at home, go to www.cdc.gov/coronavirus/2019-ncov/downloads/10Things.pdf.  How can I take care of myself at home?  1. Get lots of rest. Drink extra fluids (unless a doctor has told you not to).  2. Take Tylenol (acetaminophen) for fever or pain. If you have liver or kidney problems, ask your family doctor if it's okay to take Tylenol.   Adults can take either:   ? 650 mg (two 325 mg pills) every 4 to 6 hours, or   ? 1,000 mg (two 500 mg pills) every 8 hours as needed.  ? Note: Don't take more than 3,000 mg in one day. Acetaminophen is found in many medicines (both prescribed and over-the-counter medicines). Read all labels to be sure you don't take too much.   For children, check the Tylenol bottle for the right dose. The dose is based on the child's age or weight.  3. If you have other health problems (like cancer, heart failure, an organ transplant or severe kidney disease): Call your specialty clinic if you don't feel better in the next 2 days.  4. Know when to call 911. Emergency warning signs include:  ? Trouble breathing or shortness of breath  ? Pain or pressure in the chest that doesn't go away  ? Feeling confused like you haven't felt before, or not being able to wake up  ? Bluish-colored lips or face  5. Your doctor may have prescribed a blood thinner medicine. Follow their instructions.  Where can I get more information?     [employer] - About COVID-19:   [email or web address]    CDC - What to Do If You're  Sick: www.cdc.gov/coronavirus/2019-ncov/about/steps-when-sick.html    CDC - Ending Home Isolation: www.cdc.gov/coronavirus/2019-ncov/hcp/disposition-in-home-patients.html    CDC - Caring for Someone: www.cdc.gov/coronavirus/2019-ncov/if-you-are-sick/care-for-someone.html    [facility] - Interim Guidance for Hospital Discharge to Home: www.health.[facility].[place]./diseases/coronavirus/hcp/hospdischarge.pdf    Below are the COVID-19 hotlines at the [place] Department of Health ([facility]). Interpreters are available.  ? For health questions: Call 269-165-2515 or 1-447.706.1033 (7 a.m. to 7 p.m.)  ? For questions about schools and childcare: Call 059-085-5785 or 1-310.419.9408 (7 a.m. to 7 p.m.)    For informational purposes only. Not to replace the advice of your health care provider. Clinically reviewed by Dr. Brock Boss.   Copyright   2020 [facility]. All rights reserved. [employer] 107960 - REV 01/05/21.            No follow-ups on file.    Anne Marie Quintero NP  [facility] JAY Menendez is a 76 year old who presents for the following health issues     HPI   Concern for COVID-19  About how many days ago did these symptoms start? 1 week - tested positive   Is this your first visit for this illness? Yes  In the 14 days before your symptoms started, have you had close contact with someone with COVID-19 (Coronavirus)? Yes, I have been in contact with someone who has COVID-19/Coronavirus (confirmed by lab test). and Yes, I have been in contact with someone who has symptoms of COVID-19/Coronavirus (no lab test done or waiting on results).  Do you have a fever or chills? No  Are you having new or worsening difficulty breathing? No  Do you have new or worsening cough? No  Have you had any new or unexplained body aches? YES    Have you experienced any of the following NEW symptoms?    Headache: YES    Sore throat: No    Loss of taste or smell: YES    Chest pain: No    Diarrhea: No    Rash: No  What  treatments have you tried? none  Who do you live with? Myself in senior building   Are you, or a household member, a healthcare worker or a ? No  Do you live in a nursing home, group home, or shelter? YES  Do you have a way to get food/medications if quarantined? Yes, I have a friend or family member who can help me.    Onset of symptoms on 12/28/21 and didn't feel well -sore throat, sinus pressure, chest pressure, rhinorrhea, cough dry hacking  Seen in ER on 12/29 and tested neg for COVID, went home later that day and had positive at home-test.  Thinks she was exposed over the holidays-multiple family members tested pos for COVID.  Yesterday felt better and then today return of sinus pressure on forehead and behind eyes  Pressure/tightness in her chest, better than before  Cough is better than before, now productive    Hx of COPD/emphysema-no inhaler use. Didn't tolerate due to thrush. Slight wheeze last night, notes wheezing with cough.    Checks blood sugars-137, 223, 162    Hx of DVT-on eliquis. Denies any redness or worsening swelling of legs    Home O2 sats during visit-  98-99%  HR 72    Review of Systems   Constitutional, HEENT, cardiovascular, pulmonary, gi and gu systems are negative, except as otherwise noted.      Objective         Vitals:  No vitals were obtained today due to virtual visit.    Physical Exam   GENERAL: Healthy, alert and no distress  EYES: Eyes grossly normal to inspection.  No discharge or erythema, or obvious scleral/conjunctival abnormalities.  RESP: No audible wheeze, cough, or visible cyanosis.  No visible retractions or increased work of breathing.    SKIN: Visible skin clear. No significant rash, abnormal pigmentation or lesions.  NEURO: Cranial nerves grossly intact.  Mentation and speech appropriate for age.  PSYCH: Mentation appears normal, affect normal/bright, judgement and insight intact, normal speech and appearance well-groomed.    No results found for this  or any previous visit (from the past 24 hour(s)).            Video-Visit Details    Type of service:  Video Visit    Video End Time:1130 am    Originating Location (pt. Location): Home    Distant Location (provider location):  [facility]     Platform used for Video Visit: [employer]

## 2022-01-07 NOTE — TELEPHONE ENCOUNTER
"Huddled with Arminda Loyola.     Patient should be seen for a video visit today. Called patient and scheduled patient for a video visit today.     Answer Assessment - Initial Assessment Questions  1. COVID-19 DIAGNOSIS: \"Who made your Coronavirus (COVID-19) diagnosis?\" \"Was it confirmed by a positive lab test?\" If not diagnosed by a HCP, ask \"Are there lots of cases (community spread) where you live?\" (See [facility] health department website, if unsure)      Completed a home test for COVID on 12/29/21.   2. COVID-19 EXPOSURE: \"Was there any known exposure to COVID before the symptoms began?\" CDC Definition of close contact: within 6 feet (2 meters) for a total of 15 minutes or more over a 24-hour period.       Yes exposed to someone from [place] on Christmas Eve  3. ONSET: \"When did the COVID-19 symptoms start?\"       Started 12/29/22  4. WORST SYMPTOM: \"What is your worst symptom?\" (e.g., cough, fever, shortness of breath, muscle aches)      Pressure in her head  5. COUGH: \"Do you have a cough?\" If Yes, ask: \"How bad is the cough?\"        Slight cough  6. FEVER: \"Do you have a fever?\" If Yes, ask: \"What is your temperature, how was it measured, and when did it start?\"     Does not feel feverish.   7. RESPIRATORY STATUS: \"Describe your breathing?\" (e.g., shortness of breath, wheezing, unable to speak)       Wheezing slightly when coughing. Slightly SOB when talking.   8. BETTER-SAME-WORSE: \"Are you getting better, staying the same or getting worse compared to yesterday?\"  If getting worse, ask, \"In what way?\"      worse  9. HIGH RISK DISEASE: \"Do you have any chronic medical problems?\" (e.g., asthma, heart or lung disease, weak immune system, obesity, etc.)      Diabetic, overweight, COPD history  10. PREGNANCY: \"Is there any chance you are pregnant?\" \"When was your last menstrual period?\"        no  11. OTHER SYMPTOMS: \"Do you have any other symptoms?\"  (e.g., chills, fatigue, headache, loss of smell or taste, muscle " pain, sore throat; new loss of smell or taste especially support the diagnosis of COVID-19)        Fatigue, change in taste, joint pain of knees.    Protocols used: CORONAVIRUS (COVID-19) DIAGNOSED OR [id number]-F-ID 8.25.2021    Susan Fry RN on 1/7/2022 at 9:15 AM

## 2022-01-07 NOTE — TELEPHONE ENCOUNTER
Call pt on 1/10 and check on symptoms-specifically her O2 sats, chest tightness .  She tested pos for COVID on 12/29/21-has a lot of risk factors.  ANTON Mkceon, CNP

## 2022-01-10 NOTE — TELEPHONE ENCOUNTER
"Called the pt.    States that she is overall feeling better. Still tired, has low energy, and headaches, but denies chest pain, wheezing, worsening symptoms.    Pt reports lowest O2 Sat readings of 97%.    Pt using albuterol inhaler q6 hours for significant relief. Denies need to use more often.    Reviewed concerning symptoms and reason to call PAL or clinic back, or seek ER.      - Oliverio \"Yomi\" Huyen (he/him/his), RN - Patient Advocate Liason (PAL)  [facility]    "

## 2022-01-11 RX ORDER — ALBUTEROL SULFATE 90 UG/1
2 AEROSOL, METERED RESPIRATORY (INHALATION) EVERY 6 HOURS
Qty: 54 G | OUTPATIENT
Start: 2022-01-11

## 2022-02-01 ENCOUNTER — HOSPITAL ENCOUNTER (OUTPATIENT)
Dept: ULTRASOUND IMAGING | Facility: CLINIC | Age: 77
Discharge: HOME | End: 2022-02-01
Attending: NURSE PRACTITIONER | Admitting: NURSE PRACTITIONER
Payer: COMMERCIAL

## 2022-02-01 ENCOUNTER — OFFICE VISIT (OUTPATIENT)
Dept: PEDIATRICS | Facility: CLINIC | Age: 77
End: 2022-02-01
Payer: COMMERCIAL

## 2022-02-01 VITALS
DIASTOLIC BLOOD PRESSURE: 80 MMHG | HEART RATE: 72 BPM | RESPIRATION RATE: 20 BRPM | OXYGEN SATURATION: 98 % | WEIGHT: 194.8 LBS | SYSTOLIC BLOOD PRESSURE: 134 MMHG | BODY MASS INDEX: 34.52 KG/M2 | HEIGHT: 63 IN | TEMPERATURE: 98.6 F

## 2022-02-01 DIAGNOSIS — E61.1 IRON DEFICIENCY: ICD-10-CM

## 2022-02-01 DIAGNOSIS — L40.9 PSORIASIS: Chronic | ICD-10-CM

## 2022-02-01 DIAGNOSIS — R06.2 WHEEZING: ICD-10-CM

## 2022-02-01 DIAGNOSIS — U07.1 INFECTION DUE TO 2019 NOVEL CORONAVIRUS: ICD-10-CM

## 2022-02-01 DIAGNOSIS — N18.32 STAGE 3B CHRONIC KIDNEY DISEASE (H): ICD-10-CM

## 2022-02-01 DIAGNOSIS — F41.1 GAD (GENERALIZED ANXIETY DISORDER): ICD-10-CM

## 2022-02-01 DIAGNOSIS — E66.01 MORBID OBESITY (H): ICD-10-CM

## 2022-02-01 DIAGNOSIS — M79.89 LEFT LEG SWELLING: ICD-10-CM

## 2022-02-01 DIAGNOSIS — R41.3 MEMORY LOSS: ICD-10-CM

## 2022-02-01 DIAGNOSIS — R51.9 HEADACHE, CHRONIC DAILY: ICD-10-CM

## 2022-02-01 DIAGNOSIS — Z86.718 PERSONAL HISTORY OF DVT (DEEP VEIN THROMBOSIS): ICD-10-CM

## 2022-02-01 DIAGNOSIS — Z00.00 HEALTHCARE MAINTENANCE: Primary | ICD-10-CM

## 2022-02-01 DIAGNOSIS — E11.69 TYPE 2 DIABETES MELLITUS WITH OTHER SPECIFIED COMPLICATION, WITHOUT LONG-TERM CURRENT USE OF INSULIN (H): ICD-10-CM

## 2022-02-01 DIAGNOSIS — N64.4 NIPPLE PAIN: ICD-10-CM

## 2022-02-01 DIAGNOSIS — R79.89 ELEVATED PARATHYROID HORMONE: ICD-10-CM

## 2022-02-01 DIAGNOSIS — R53.83 OTHER FATIGUE: ICD-10-CM

## 2022-02-01 DIAGNOSIS — J44.9 CHRONIC OBSTRUCTIVE PULMONARY DISEASE, UNSPECIFIED COPD TYPE (H): ICD-10-CM

## 2022-02-01 LAB
ANION GAP SERPL CALCULATED.3IONS-SCNC: 6 MMOL/L (ref 3–14)
BUN SERPL-MCNC: 17 MG/DL (ref 7–30)
CALCIUM SERPL-MCNC: 8.7 MG/DL (ref 8.5–10.1)
CHLORIDE BLD-SCNC: 113 MMOL/L (ref 94–109)
CHOLEST SERPL-MCNC: 184 MG/DL
CO2 SERPL-SCNC: 23 MMOL/L (ref 20–32)
CREAT SERPL-MCNC: 1.4 MG/DL (ref 0.52–1.04)
CREAT UR-MCNC: 138 MG/DL
DEPRECATED CALCIDIOL+CALCIFEROL SERPL-MC: 21 UG/L (ref 20–75)
ERYTHROCYTE [DISTWIDTH] IN BLOOD BY AUTOMATED COUNT: 16 % (ref 10–15)
FASTING STATUS PATIENT QL REPORTED: YES
FERRITIN SERPL-MCNC: 7 NG/ML (ref 8–252)
FOLATE SERPL-MCNC: 14.3 NG/ML
GFR SERPL CREATININE-BSD FRML MDRD: 39 ML/MIN/1.73M2
GLUCOSE BLD-MCNC: 118 MG/DL (ref 70–99)
HBA1C MFR BLD: 6.5 % (ref 0–5.6)
HCT VFR BLD AUTO: 33.2 % (ref 35–47)
HDLC SERPL-MCNC: 62 MG/DL
HGB BLD-MCNC: 9.3 G/DL (ref 11.7–15.7)
IRON SATN MFR SERPL: 5 % (ref 15–46)
IRON SERPL-MCNC: 20 UG/DL (ref 35–180)
LDLC SERPL CALC-MCNC: 101 MG/DL
MCH RBC QN AUTO: 20.2 PG (ref 26.5–33)
MCHC RBC AUTO-ENTMCNC: 28 G/DL (ref 31.5–36.5)
MCV RBC AUTO: 72 FL (ref 78–100)
MICROALBUMIN UR-MCNC: 19 MG/L
MICROALBUMIN/CREAT UR: 13.77 MG/G CR (ref 0–25)
NONHDLC SERPL-MCNC: 122 MG/DL
PLATELET # BLD AUTO: 221 10E3/UL (ref 150–450)
POTASSIUM BLD-SCNC: 4.6 MMOL/L (ref 3.4–5.3)
PTH-INTACT SERPL-MCNC: 143 PG/ML (ref 18–80)
RBC # BLD AUTO: 4.6 10E6/UL (ref 3.8–5.2)
SODIUM SERPL-SCNC: 142 MMOL/L (ref 133–144)
TIBC SERPL-MCNC: 431 UG/DL (ref 240–430)
TRIGL SERPL-MCNC: 104 MG/DL
TSH SERPL DL<=0.005 MIU/L-ACNC: 2.15 MU/L (ref 0.4–4)
VIT B12 SERPL-MCNC: 699 PG/ML (ref 193–986)
WBC # BLD AUTO: 4.5 10E3/UL (ref 4–11)

## 2022-02-01 PROCEDURE — 93971 EXTREMITY STUDY: CPT | Mod: LT

## 2022-02-01 PROCEDURE — 83970 ASSAY OF PARATHORMONE: CPT | Performed by: NURSE PRACTITIONER

## 2022-02-01 PROCEDURE — 82746 ASSAY OF FOLIC ACID SERUM: CPT | Performed by: NURSE PRACTITIONER

## 2022-02-01 PROCEDURE — 99214 OFFICE O/P EST MOD 30 MIN: CPT | Mod: 25 | Performed by: NURSE PRACTITIONER

## 2022-02-01 PROCEDURE — 82306 VITAMIN D 25 HYDROXY: CPT | Performed by: NURSE PRACTITIONER

## 2022-02-01 PROCEDURE — 83550 IRON BINDING TEST: CPT | Performed by: NURSE PRACTITIONER

## 2022-02-01 PROCEDURE — 80061 LIPID PANEL: CPT | Performed by: NURSE PRACTITIONER

## 2022-02-01 PROCEDURE — 99397 PER PM REEVAL EST PAT 65+ YR: CPT | Performed by: NURSE PRACTITIONER

## 2022-02-01 PROCEDURE — 36415 COLL VENOUS BLD VENIPUNCTURE: CPT | Performed by: NURSE PRACTITIONER

## 2022-02-01 PROCEDURE — 83036 HEMOGLOBIN GLYCOSYLATED A1C: CPT | Performed by: NURSE PRACTITIONER

## 2022-02-01 PROCEDURE — 82728 ASSAY OF FERRITIN: CPT | Performed by: NURSE PRACTITIONER

## 2022-02-01 PROCEDURE — 82607 VITAMIN B-12: CPT | Performed by: NURSE PRACTITIONER

## 2022-02-01 PROCEDURE — 80048 BASIC METABOLIC PNL TOTAL CA: CPT | Performed by: NURSE PRACTITIONER

## 2022-02-01 PROCEDURE — 85027 COMPLETE CBC AUTOMATED: CPT | Performed by: NURSE PRACTITIONER

## 2022-02-01 PROCEDURE — 82043 UR ALBUMIN QUANTITATIVE: CPT | Performed by: NURSE PRACTITIONER

## 2022-02-01 PROCEDURE — 84443 ASSAY THYROID STIM HORMONE: CPT | Performed by: NURSE PRACTITIONER

## 2022-02-01 RX ORDER — PAROXETINE 30 MG/1
TABLET, FILM COATED ORAL
Qty: 180 TABLET | Refills: 3 | Status: ON HOLD | OUTPATIENT
Start: 2022-02-01 | End: 2022-03-07

## 2022-02-01 RX ORDER — ALBUTEROL SULFATE 90 UG/1
2 AEROSOL, METERED RESPIRATORY (INHALATION) EVERY 6 HOURS
Qty: 18 G | Refills: 0
Start: 2022-02-01 | End: 2022-02-01

## 2022-02-01 RX ORDER — CLOBETASOL PROPIONATE 0.5 MG/G
CREAM TOPICAL
Qty: 60 G | Refills: 3 | Status: SHIPPED | OUTPATIENT
Start: 2022-02-01 | End: 2022-09-26

## 2022-02-01 RX ORDER — ALBUTEROL SULFATE 90 UG/1
2 AEROSOL, METERED RESPIRATORY (INHALATION) EVERY 6 HOURS
Qty: 18 G | Refills: 0 | Status: SHIPPED | OUTPATIENT
Start: 2022-02-01 | End: 2022-09-06

## 2022-02-01 SDOH — HEALTH STABILITY: PHYSICAL HEALTH: ON AVERAGE, HOW MANY MINUTES DO YOU ENGAGE IN EXERCISE AT THIS LEVEL?: 0 MIN

## 2022-02-01 SDOH — ECONOMIC STABILITY: FOOD INSECURITY: WITHIN THE PAST 12 MONTHS, YOU WORRIED THAT YOUR FOOD WOULD RUN OUT BEFORE YOU GOT MONEY TO BUY MORE.: PATIENT DECLINED

## 2022-02-01 SDOH — HEALTH STABILITY: PHYSICAL HEALTH: ON AVERAGE, HOW MANY DAYS PER WEEK DO YOU ENGAGE IN MODERATE TO STRENUOUS EXERCISE (LIKE A BRISK WALK)?: 0 DAYS

## 2022-02-01 SDOH — ECONOMIC STABILITY: TRANSPORTATION INSECURITY
IN THE PAST 12 MONTHS, HAS LACK OF TRANSPORTATION KEPT YOU FROM MEETINGS, WORK, OR FROM GETTING THINGS NEEDED FOR DAILY LIVING?: NO

## 2022-02-01 SDOH — ECONOMIC STABILITY: INCOME INSECURITY: IN THE LAST 12 MONTHS, WAS THERE A TIME WHEN YOU WERE NOT ABLE TO PAY THE MORTGAGE OR RENT ON TIME?: PATIENT REFUSED

## 2022-02-01 SDOH — ECONOMIC STABILITY: FOOD INSECURITY: WITHIN THE PAST 12 MONTHS, THE FOOD YOU BOUGHT JUST DIDN'T LAST AND YOU DIDN'T HAVE MONEY TO GET MORE.: PATIENT DECLINED

## 2022-02-01 SDOH — ECONOMIC STABILITY: INCOME INSECURITY: HOW HARD IS IT FOR YOU TO PAY FOR THE VERY BASICS LIKE FOOD, HOUSING, MEDICAL CARE, AND HEATING?: PATIENT DECLINED

## 2022-02-01 ASSESSMENT — ENCOUNTER SYMPTOMS
BREAST MASS: 0
HEMATOCHEZIA: 0
SORE THROAT: 0
PALPITATIONS: 1
ARTHRALGIAS: 1
MYALGIAS: 1
NAUSEA: 1
CONSTIPATION: 1
SHORTNESS OF BREATH: 1
NERVOUS/ANXIOUS: 1
COUGH: 1
WEAKNESS: 1
HEADACHES: 1
HEMATURIA: 0
ABDOMINAL PAIN: 1
DIARRHEA: 1
DIZZINESS: 1
FREQUENCY: 1
EYE PAIN: 1
JOINT SWELLING: 1
DYSURIA: 0
HEARTBURN: 1
CHILLS: 1
FEVER: 0
PARESTHESIAS: 1

## 2022-02-01 ASSESSMENT — SOCIAL DETERMINANTS OF HEALTH (SDOH)
HOW OFTEN DO YOU GET TOGETHER WITH FRIENDS OR RELATIVES?: MORE THAN THREE TIMES A WEEK
HOW OFTEN DO YOU ATTEND CHURCH OR RELIGIOUS SERVICES?: 1 TO 4 TIMES PER YEAR
DO YOU BELONG TO ANY CLUBS OR ORGANIZATIONS SUCH AS CHURCH GROUPS UNIONS, FRATERNAL OR ATHLETIC GROUPS, OR SCHOOL GROUPS?: YES
IN A TYPICAL WEEK, HOW MANY TIMES DO YOU TALK ON THE PHONE WITH FAMILY, FRIENDS, OR NEIGHBORS?: TWICE A WEEK

## 2022-02-01 ASSESSMENT — LIFESTYLE VARIABLES
HOW OFTEN DO YOU HAVE A DRINK CONTAINING ALCOHOL: NEVER
HOW MANY STANDARD DRINKS CONTAINING ALCOHOL DO YOU HAVE ON A TYPICAL DAY: PATIENT DECLINED
HOW OFTEN DO YOU HAVE SIX OR MORE DRINKS ON ONE OCCASION: NEVER

## 2022-02-01 ASSESSMENT — ACTIVITIES OF DAILY LIVING (ADL): CURRENT_FUNCTION: SHOPPING REQUIRES ASSISTANCE

## 2022-02-01 ASSESSMENT — MIFFLIN-ST. JEOR: SCORE: 1347.74

## 2022-02-01 NOTE — PROGRESS NOTES
"SUBJECTIVE:   Gemma Cowart is a 76 year old female who presents for Preventive Visit.      Patient has been advised of split billing requirements and indicates understanding: Yes  Are you in the first 12 months of your Medicare coverage?  No    Healthy Habits:     In general, how would you rate your overall health?  Fair    Frequency of exercise:  1 day/week    Duration of exercise:  N/A    Do you usually eat at least 4 servings of fruit and vegetables a day, include whole grains    & fiber and avoid regularly eating high fat or \"junk\" foods?  No    Taking medications regularly:  Yes    Barriers to taking medications:  None    Medication side effects:  None    Ability to successfully perform activities of daily living:  Shopping requires assistance    Home Safety:  No safety concerns identified    Hearing Impairment:  Difficulty following a conversation in a noisy restaurant or crowded room    In the past 6 months, have you been bothered by leaking of urine?  No    In general, how would you rate your overall mental or emotional health?  Fair      PHQ-2 Total Score: 2    Additional concerns today:  No    Do you feel safe in your environment? Yes    Have you ever done Advance Care Planning? (For example, a Health Directive, POLST, or a discussion with a medical provider or your loved ones about your wishes): Yes, advance care planning is on file.    Pt wears hearing aids   Fall risk  Fallen 2 or more times in the past year?: No  Any fall with injury in the past year?: No    Cognitive Screening   1) Repeat 3 items (Leader, Season, Table)    2) Clock draw: NORMAL  3) 3 item recall: Recalls 3 objects  Results: 3 items recalled: COGNITIVE IMPAIRMENT LESS LIKELY    Mini-CogTM Copyright ARIEL Patterson. Licensed by the author for use in [facility]; reprinted with permission (brice@.[facility]). All rights reserved.      Do you have sleep apnea, excessive snoring or daytime drowsiness?: no    Reviewed and updated as " needed this visit by clinical staff   Allergies  Meds             Reviewed and updated as needed this visit by Provider               Social History     Tobacco Use     Smoking status: Former Smoker     Packs/day: 1.50     Years: 25.00     Pack years: 37.50     Types: Cigarettes     Quit date: 10/21/1986     Years since quittin.3     Smokeless tobacco: Never Used     Tobacco comment: quit    Substance Use Topics     Alcohol use: No     Comment: 1 time a year     If you drink alcohol do you typically have >3 drinks per day or >7 drinks per week? No    Alcohol Use 2022   Prescreen: >3 drinks/day or >7 drinks/week? Not Applicable   Prescreen: >3 drinks/day or >7 drinks/week? -   No flowsheet data found.        Current providers sharing in care for this patient include:   Patient Care Team:  Babita Orozco LSW as Lead Care Coordinator (Primary Care - CC)  Zulma Mcgee APRN CNP as Assigned PCP  Inna Montes MD as Assigned Heart and Vascular Provider  Yoselyn Rodriguez MD as Assigned Cancer Care Provider    The following health maintenance items are reviewed in Epic and correct as of today:  Health Maintenance Due   Topic Date Due     SPIROMETRY  Never done     COPD ACTION PLAN  Never done     ZOSTER IMMUNIZATION (1 of 2) Never done     COVID-19 Vaccine (3 - Booster for Pfizer series) 2021     A1C  2021     LIPID  2021     MICROALBUMIN  2021     Lab work is in process    Pertinent mammograms are reviewed under the imaging tab.    Review of Systems   Constitutional: Positive for chills. Negative for fever.   HENT: Positive for congestion and hearing loss. Negative for ear pain and sore throat.    Eyes: Positive for pain and visual disturbance.   Respiratory: Positive for cough and shortness of breath.    Cardiovascular: Positive for chest pain, palpitations and peripheral edema.   Gastrointestinal: Positive for abdominal pain, constipation, diarrhea, heartburn and  "nausea. Negative for hematochezia.   Breasts:  Positive for tenderness. Negative for breast mass and discharge.   Genitourinary: Positive for frequency and pelvic pain. Negative for dysuria, genital sores, hematuria, urgency, vaginal bleeding and vaginal discharge.   Musculoskeletal: Positive for arthralgias, joint swelling and myalgias.   Skin: Positive for rash.   Neurological: Positive for dizziness, weakness, headaches and paresthesias.   Psychiatric/Behavioral: Positive for mood changes. The patient is nervous/anxious.          OBJECTIVE:   LMP 09/24/1990  Estimated body mass index is 35.92 kg/m  as calculated from the following:    Height as of 6/15/21: 1.59 m (5' 2.6\").    Weight as of 10/27/21: 90.8 kg (200 lb 3.2 oz).  Physical Exam  GENERAL APPEARANCE: healthy, alert and no distress  EYES: Eyes grossly normal to inspection, PERRL and conjunctivae and sclerae normal  HENT: ear canals and TM's normal, nose and mouth without ulcers or lesions, oropharynx clear and oral mucous membranes moist  NECK: no adenopathy, no asymmetry, masses, or scars and thyroid normal to palpation  RESP: lungs clear to auscultation - no rales, rhonchi or wheezes  CV: regular rate and rhythm, normal S1 S2, no S3 or S4, no murmur, click or rub, LLE swelling +2 up to lower calf, peripheral pulses strong  ABDOMEN: soft, nontender, no hepatosplenomegaly, no masses and bowel sounds normal  MS: no musculoskeletal defects are noted and gait is age appropriate without ataxia  SKIN: no suspicious lesions or rashes  NEURO: Normal strength and tone, sensory exam grossly normal, mentation intact and speech normal  PSYCH: mentation appears normal and affect normal/bright  AXILLA: NO lymphadenopathy  Diagnostic Test Results:  Labs reviewed in Epic    ASSESSMENT / PLAN:   (Z00.00) Healthcare maintenance  (primary encounter diagnosis)  Comment: Pt presents with myriad issues today, unable to get through it all. We focused on health maintenance " items and evaluating her for possible DVT. We scheduled follow-up for some of the other issues.   Plan: Parathyroid Hormone Intact, Basic metabolic         panel, Ferritin, Iron & Iron Binding Capacity,         Vitamin B12, Folate, Vitamin D Deficiency, CBC         with platelets, TSH with free T4 reflex, Lipid         panel reflex to direct LDL Fasting, Adult         Gastro Ref - Procedure Only  -Declines zoster today. Will set up at her pharmacy      (E11.69) Type 2 diabetes mellitus with other specified complication, without long-term current use of insulin (H)  Comment: Unclear control. A1c may not be accurate given anemia. States blood sugar readings are erratic. Will take it 3 times within a 10 minute period and get variable results.   Plan: Hemoglobin A1c, Albumin Random Urine         Quantitative with Creat Ratio  -Did not have time to discuss today. [facility] to [facility] for evaluation and management of diabetes given myriad other concerns today.     (M79.89) Left leg swelling  Comment: Is on anticoagulation so DVT unlikely, but does have +2 pitting edema in that leg currently  Plan: US Lower Extremity Venous Duplex Left, US Lower        Extremity Venous Duplex Left    (Z86.288) Personal history of DVT (deep vein thrombosis)  Plan: apixaban ANTICOAGULANT (ELIQUIS ANTICOAGULANT)         5 MG tablet    (L40.9) Psoriasis  Comment: Poor control  Plan: clobetasol (TEMOVATE) 0.05 % external cream  Asked her to follow-up with derm    (F41.1) ELI (generalized anxiety disorder)  Comment: Very poor control. Children are not doing well. No SI/HI  Plan: PARoxetine (PAXIL) 30 MG tablet        Set up phone visit to discuss.     (R06.2) Wheezing  Comment: Unclear history here. She asked for a refill today on her JUAN JOSE and we didn't have time to discuss.Upon chart review, it sounds like she was having dyspnea on exertion. Was thought to be related to COPD given hyperinflation seen on imaging. To my knowledge no stress test  was done. Was started on Advair but it caused mouth sores, so she stopped. Did get some improvement with the albuterol   Plan: albuterol (PROAIR HFA/PROVENTIL HFA/VENTOLIN         HFA) 108 (90 Base) MCG/ACT inhaler,         DISCONTINUED: albuterol (PROAIR HFA/PROVENTIL         HFA/VENTOLIN HFA) 108 (90 Base) MCG/ACT inhaler  -Will discuss at upcoming visit. Asked her to get PFTs in the mean time. May also consider stress test.     (J44.9) Chronic obstructive pulmonary disease, unspecified COPD type (H)  Comment: See above.   Plan: albuterol (PROAIR HFA/PROVENTIL HFA/VENTOLIN         HFA) 108 (90 Base) MCG/ACT inhaler,         DISCONTINUED: albuterol (PROAIR HFA/PROVENTIL         HFA/VENTOLIN HFA) 108 (90 Base) MCG/ACT inhaler    (U07.1) Infection due to 2019 novel coronavirus  Comment: Recent infection  Plan: albuterol (PROAIR HFA/PROVENTIL HFA/VENTOLIN         HFA) 108 (90 Base) MCG/ACT inhaler,         DISCONTINUED: albuterol (PROAIR HFA/PROVENTIL         HFA/VENTOLIN HFA) 108 (90 Base) MCG/ACT inhaler    (E66.01) Morbid obesity (H)  Comment: Reviewed diet/exercise    (N18.32) Stage 3b chronic kidney disease (H)  Comment: Stable  Plan: Avoid NSAIDs    (E34.9) Elevated parathyroid hormone  Comment: With low to normal calcium   Plan: Adult Endocrinology  Referral    (N64.4) Nipple pain  Comment: H/O bilateral mastectomy for breast cancer. C/O bilateral twinges of pain over where both nipples used to be, and occasionally pain in the right axilla. No masses noted, no axillary or epitrochlear lymphadenopathy. Does have breast reconstruction  Plan:   -Did not have time today to fully discuss  -Asked her to follow-up with her oncologist    (R51.9) Headache, chronic daily  Comment: Notes daily pressure behind her eyes for years. More recently has had band like headaches. Has had more stress. No neuro changes  Plan:   -Did not have time to fully discuss  -Asked her to keep a headache journal  -Scheduled follow-up  "to discuss    (R53.83) Other fatigue  Comment: since covid  Plan: Did draw some labs to see if there are other contributing factors. Iron is low and missed infusion due to covid.  -Asked her to call her oncologist to set up another infusion    (R41.3) Memory loss  Comment: Notes this over the past few years. Reportedly spoke with a neurologist who did testing-normal for age.   Plan:   -May need to be on something other than paxil as it's not ideal for the geriatric population. Consider geropsych or neuropsych testing if worsening    (E61.1) Iron deficiency  Comment: Follow-up with hematology/oncology    Patient has been advised of split billing requirements and indicates understanding: Yes    COUNSELING:  Reviewed preventive health counseling, as reflected in patient instructions    Estimated body mass index is 35.92 kg/m  as calculated from the following:    Height as of 6/15/21: 1.59 m (5' 2.6\").    Weight as of 10/27/21: 90.8 kg (200 lb 3.2 oz).    Weight management plan: Discussed healthy diet and exercise guidelines    She reports that she quit smoking about 35 years ago. Her smoking use included cigarettes. She has a 37.50 pack-year smoking history. She has never used smokeless tobacco.      Appropriate preventive services were discussed with this patient, including applicable screening as appropriate for cardiovascular disease, diabetes, osteopenia/osteoporosis, and glaucoma.  As appropriate for age/gender, discussed screening for colorectal cancer, prostate cancer, breast cancer, and cervical cancer. Checklist reviewing preventive services available has been given to the patient.    Reviewed patients plan of care and provided an AVS. The Basic Care Plan (routine screening as documented in Health Maintenance) for Gemma meets the Care Plan requirement. This Care Plan has been established and reviewed with the Patient.    Counseling Resources:  ATP IV Guidelines  Pooled Cohorts Equation Calculator  Breast " Cancer Risk Calculator  Breast Cancer: Medication to Reduce Risk  FRAX Risk Assessment  ICSI Preventive Guidelines  Dietary Guidelines for Americans, 2010  [employer]'s MyPlate  ASA Prophylaxis  Lung CA Screening    ANTON Armas [facility] JAY    Identified Health Risks:

## 2022-02-07 ENCOUNTER — TELEPHONE (OUTPATIENT)
Dept: PEDIATRICS | Facility: CLINIC | Age: 77
End: 2022-02-07

## 2022-02-09 ENCOUNTER — TELEPHONE (OUTPATIENT)
Dept: PHARMACY | Facility: CLINIC | Age: 77
End: 2022-02-09
Payer: COMMERCIAL

## 2022-02-09 NOTE — TELEPHONE ENCOUNTER
Called patient x2 LVM to call back. No for initial MTM visit. Letter sent today.    Jo Vásquez PharmD  Medication Therapy Management Pharmacist  Pager#: [phone]

## 2022-02-14 ENCOUNTER — TELEPHONE (OUTPATIENT)
Dept: PEDIATRICS | Facility: CLINIC | Age: 77
End: 2022-02-14

## 2022-02-14 ENCOUNTER — VIRTUAL VISIT (OUTPATIENT)
Dept: PEDIATRICS | Facility: CLINIC | Age: 77
End: 2022-02-14
Payer: COMMERCIAL

## 2022-02-14 DIAGNOSIS — R51.9 NONINTRACTABLE HEADACHE, UNSPECIFIED CHRONICITY PATTERN, UNSPECIFIED HEADACHE TYPE: ICD-10-CM

## 2022-02-14 DIAGNOSIS — M79.89 LEFT LEG SWELLING: ICD-10-CM

## 2022-02-14 DIAGNOSIS — R79.89 ELEVATED PARATHYROID HORMONE: ICD-10-CM

## 2022-02-14 DIAGNOSIS — E11.69 TYPE 2 DIABETES MELLITUS WITH OTHER SPECIFIED COMPLICATION, WITHOUT LONG-TERM CURRENT USE OF INSULIN (H): ICD-10-CM

## 2022-02-14 DIAGNOSIS — R00.2 PALPITATIONS: ICD-10-CM

## 2022-02-14 DIAGNOSIS — R41.3 MEMORY LOSS: ICD-10-CM

## 2022-02-14 DIAGNOSIS — F41.9 ANXIETY: Primary | ICD-10-CM

## 2022-02-14 PROCEDURE — 99443 PR PHYSICIAN TELEPHONE EVALUATION 21-30 MIN: CPT | Mod: 95 | Performed by: NURSE PRACTITIONER

## 2022-02-14 RX ORDER — ATORVASTATIN CALCIUM 10 MG/1
10 TABLET, FILM COATED ORAL DAILY
Qty: 90 TABLET | Refills: 3 | Status: SHIPPED | OUTPATIENT
Start: 2022-02-14 | End: 2022-07-29

## 2022-02-14 RX ORDER — BUSPIRONE HYDROCHLORIDE 5 MG/1
TABLET ORAL
Qty: 346 TABLET | Refills: 0 | Status: SHIPPED | OUTPATIENT
Start: 2022-02-14 | End: 2022-03-31

## 2022-02-14 NOTE — PROGRESS NOTES
Gemma is a 76 year old who is being evaluated via a billable telephone visit.        Assessment & Plan     (F41.9) Anxiety  (primary encounter diagnosis)  Comment: Ongoing issue, worse lately. I suspect Paxil could be contributing to memory loss and fatigue. Goal to reduce Paxil while improving anxiety  Plan: busPIRone (BUSPAR) 5 MG tablet   -Start buspar  -Follow-up 6 weeks  -Consider weaning Paxil or switching to a medication that causes less sedation and is safer for elderly. Consider therapy in the future.     (R41.3) Memory loss  Comment: Noted around the time she was started on Femara post mastectomy. However, holding the medication in 2016 didn't improve her memory. I can see a referral in for \[facility]\"" clinic of neurology to be evaluated, and patient stated she was evaluated by a neurologist who reassured her that her memory was normal for her age.   Plan:   -Pt seeing neuro about her headaches, asked her to have them send us the old records about the memory loss so I can review. Also asked her to bring this up with neuro again.   -I suspect Paxil could be contributing. Will start Buspar with the goal of weaning the Paxil.     (R00.2) Palpitations  Comment: Pt asking about this. Already seen by cardiology and no changes in sx since then.   Plan: Reassured pt. Discussed reasons to seek care again about this.     (E11.69) Type 2 diabetes mellitus with other specified complication, without long-term current use of insulin (H)  Comment: Well controlled but unclear if A1c accurate given low hemoglobin. Her meter is wildly inconsistent.   Plan: atorvastatin (LIPITOR) 10 MG tablet          -Asked her to re-start lipitor (there was confusion about what this medication was for)  -ASA not prescribed intentionally  -Not smoking  -BP controlled  -Follow-up with MTM for further evaluation of her blood sugars    (M79.89) Left leg swelling  Comment: Discussed that her many years of LLE swelling was likely due to having a DVT x  3 in that leg. However, she states the swelling precedes the DVTs. She is insistent on seeing a vascular specialist.   Plan: Vascular Medicine Referral            (R51.9) Nonintractable headache, unspecified chronicity pattern, unspecified headache type  Comment: At the top of her head, describes a few episodes weekly of a flash of sharp pain lasting only a second 5/10. No associated vision changes or other changes.   Plan: Adult Neurology  Referral    (E34.9) Elevated parathyroid hormone  Comment: With normal calcium.   Plan: Has upcoming endo appt            No follow-ups on file.    ANTON Armas CNP  M [facility] JAY Menendez is a 76 year old who presents for the following health issues     A couple of years has headaches: Lasts a few seconds. 5/10 in intensity. Used to happen every few months. Now happening weekly.    Memory issues for at least 5 years. Can't remember names or places. States she saw someone for an evaluation who wanted her to keep coming back and taking expensive supplements.    Has been on the paxil for 20 years or more  HPI     Review of Systems   Constitutional, HEENT, cardiovascular, pulmonary, gi and gu systems are negative, except as otherwise noted.      Objective           Vitals:  No vitals were obtained today due to virtual visit.    Physical Exam   No distress via phone    Phone call duration: 35 minutes      50 minutes spent with patient, over 1/2 in counseling and coordination of care regarding the above diagnoses

## 2022-02-14 NOTE — TELEPHONE ENCOUNTER
Please send letter along with most recent bloodwork    Hi there,    Here's a summary of what we talked about:    Left leg swelling:  -Likely from blood clots or varicose veins  -Vascular [phone].    Memory  -It's possible this is related to the paxil.   -Neurology: Please ask them about the headaches AND memory. They will call you.    Abnormal hormone labs (parathyroid hormone)  -Endocrinology appointment you already have 4/12 to discuss parathyroid hormone    Low iron:  -Reschedule iron infusion with the cancer clinic. Please call them    Diabetes:  -Follow-up with [facility] pharmacist . I'll have them call you to schedule  -Re-start the atorvastatin. This reduces your risk of heart attack and stroke

## 2022-02-14 NOTE — PROGRESS NOTES
The pt is aware and scheduled for her upcoming appointment.   Rosa Maria Ngo on 2/14/2022 at 12:58 PM

## 2022-02-14 NOTE — LETTER
February 14, 2022      Gemma Cowart  [address] [place] [address]        Dear Mayank Menendez there,     Here's a summary of what we talked about:     Left leg swelling:  -Likely from blood clots or varicose veins  -Vascular [phone].     Memory  -It's possible this is related to the paxil.   -Neurology: Please ask them about the headaches AND memory. They will call you.     Abnormal hormone labs (parathyroid hormone)  -Endocrinology appointment you already have 4/12 to discuss parathyroid hormone     Low iron:  -Reschedule iron infusion with the cancer clinic. Please call them     Diabetes:  -Re-start the atorvastatin. This reduces your risk of heart attack and stroke      Sincerely,     Your care team

## 2022-02-14 NOTE — TELEPHONE ENCOUNTER
Letter created per provider and most recent blood work sent.   Rosa Maria Ngo on 2/14/2022 at 1:11 PM

## 2022-02-17 ENCOUNTER — APPOINTMENT (OUTPATIENT)
Dept: LAB | Facility: CLINIC | Age: 77
End: 2022-02-17
Attending: NURSE PRACTITIONER
Payer: COMMERCIAL

## 2022-02-17 DIAGNOSIS — D50.9 IRON DEFICIENCY ANEMIA, UNSPECIFIED IRON DEFICIENCY ANEMIA TYPE: Primary | ICD-10-CM

## 2022-02-17 RX ORDER — FERROUS SULFATE 325(65) MG
325 TABLET ORAL
Qty: 90 TABLET | Refills: 0 | Status: SHIPPED | OUTPATIENT
Start: 2022-02-17 | End: 2022-06-01

## 2022-02-17 NOTE — TELEPHONE ENCOUNTER
"Called the pt.    Nuzhat Surya: States that she is not currenlty taking a iron supplement. Pt would like provider to order in if possible, as she believes her insurance will cover. [facility] in [place], [place].    Pt has not yet scheduled colonoscopy yet. Provided pt with scheduling number and plans to call tomorrow.    Pt has not scheduled iron infusion yet, but plans to call tomorrow.    Pt has not rescheduled PFT testing yet (that was cancelled today), but plans to call tomorrow.    Asked pt if they needed assistance scheduling either appointments, but states she doesn't help. Pt did agree to have nurse follow-up with her tomorrow afternoon.    Krysta/Jt PALs: Can you please call the pt tomorrow (02/18/22) and see if she was able to schedule appts. Also, update her on Rx for iron supplement if signed. Otherwise, its available OTC, can ask pharmacist for help if needed.        - Oliverio \"Yomi\" Huyen (he/him/his), RN - Patient Advocate Liason (PAL)  [facility]    "

## 2022-02-17 NOTE — TELEPHONE ENCOUNTER
"Received voicemail from cardiopulmonary at [facility].    Wanted to report a hemoglobin of 8.6.    Arminda Loyola: Hemoglobin worsened since last visit. Pt was to have PFT performed, but their location had some tech issues and will be rescheduled. So they ran the lab test. Is pt suppose to be seen by [facility]?      - Oliverio \"Yomi\" Huyen (he/him/his), RN - Patient Advocate Liason (PAL)  [facility]    "

## 2022-02-17 NOTE — TELEPHONE ENCOUNTER
Can you clarify if she is scheduled for colonoscoy please? Looks like Arminda had placed order after last hgb result. See if she is taking iron supplements orally (if not, ok to tell her to do this - can cause stool changes). Will await colonoscoy results

## 2022-02-18 DIAGNOSIS — Z11.59 ENCOUNTER FOR SCREENING FOR OTHER VIRAL DISEASES: Primary | ICD-10-CM

## 2022-02-23 NOTE — PROGRESS NOTES
Medication Therapy Management (MTM) Encounter    ASSESSMENT:                            Medication Adherence/Access: No issues identified    Diabetes: Patient is meeting A1c goal of < 7%. Low hemoglobin levels may be causing this lab to be falsely high. Home blood glucose readings were difficult to obtain during visit due to patient being unable to find recorded readings in meter. Would benefit from completing a diagnostic CGM to better track blood glucose trends before making medication changes.    Hyperlipidemia: Patient will be restarting moderate intensity statin which is indicated based on 2019 ACC/AHA guidelines for lipid management.      Leg swelling (hx of DVT x3): Stable.    Headache: Would benefit from scheduling a neurology appointment to follow up on headache concerns.    Depression/Anxiety:  Patient is meeting PHQ9 goal of <5 and GAD7 goal of <4. Patient's stress and anxiety has changed significantly since last screening. Due for repeat GAD7 and PHQ9. Per 2019 Beers criteria, paroxetine should be avoided in patients over 65 years due to increased risk of memory impairment and anticholinergic side effects (dry mouth, constipation, etc.). Would benefit from changing to an selective serotonin reuptake inhibitor that does not have anticholinergic side effects such as sertraline or escitalopram.    Anemia: Would benefit from completing hemoglobin and ferritin recheck in 2 weeks and determining efficacy of medication at that time.    Vitamin D/B12 Deficiency: Patient is experiencing bothersome increased fatigue. Labs completed recently were in normal range, however vitamin D level was at the lower end of normal. Per ASMBS 2020 guidelines, patients who have completed bariatric surgery should receive vitamin D supplementation of 3000 international unit(s) daily until levels are greater than 30 ng/mL. Recommend increasing daily vitamin D supplement to 3000 international unit(s) daily.    Skin Rash: Medication is  "effective when needed.    COPD: Would benefit from completing ordered spirometry tests. Could consider CAT assessment to determine correct inhaler choice once COPD is confirmed through PFTs.    Vaccines: Per ACIP guidelines, patient is eligible for covid booster and Shingrix.    PLAN:                            1. I will talk to Arminda Loyola about  -- Referral for Diabetes Education for diagnostic CGM placement and read  -- Changing your paroxetine to sertraline.   Week 1:  Take paroxetine 50 mg daily   Week 2:  Take paroxetine 40 mg daily, start sertraline 25mg daily   Week 2:  Take paroxetine 30 mg daily, increase sertraline to 50mg daily   Week 3:  Take paroxetine 20 mg daily, increase sertraline 75 mg daily   Week 4:  Take paroxetine to 10 mg daily, increase to 100mg daily   Week 5:  Stop  paroxetine, continue sertraline 100mg every day  -- Increasing vitamin D to 3000 units daily    Arminda Loyola agrees with plan. Pt. notified and orders sent.    2. Call [facility] and request that they fill and deliver your atorvastatin 10 mg. Take this medication daily.  3. Continue to take your iron tablet (ferrous sulfate). This may help with your fatigue.  4. Consider if you would like a referral to see a behavioral health provider.    Future considerations:  1. Start glipizide 5 mg at largest meal if blood sugars are elevated at largest meal, GLP-1 if blood sugars are consistently elevated.  2. Consider CAT assessment at next visit and starting maintenance inhaler if needed.  3. Repeat GAD7 and PHQ9 at next assessment.  4. Schedule covid booster.    Follow-up: Return in about 13 days (around 3/9/2022) for MTM Pharmacist Visit.    SUBJECTIVE/OBJECTIVE:                          Gemma Cowart is a 76 year old female called for an initial visit. She was referred to me from Arminda Loyola for diabetes management.      Reason for visit: Diabetes - sugars are too high and are \"wacky\"    Allergies/ADRs: Reviewed in chart  Past " Medical History: Reviewed in chart - elevated PTHi has future endo appointment for this.  Tobacco: She reports that she quit smoking about 35 years ago. Her smoking use included cigarettes. She has a 37.50 pack-year smoking history. She has never used smokeless tobacco.  Alcohol: Less than 1 beverage / month    Medication Adherence/Access: Patient takes medications directly from bottles and uses pill box(es).  Patient takes medications 2 time(s) per day.   Per patient, misses medication 0-1 times per week.   Medication barriers: none.   The patient fills medications at [place]: NO, fills medications at [facility] in [place]. Reports they will both fill and deliver medications to her which is very convenient.    Type 2 Diabetes:  Currently taking none. Reports she is having erratic blood sugars sometimes they are in the 200s other times they are low in the 40s or 60s. She has been diet controlling her diabetes for a long time but all of a sudden she has been having both highs and lows. Thinks she has been having these odd readings since November/December - around the time when she had covid.  Blood sugar monitorin-3 time(s) daily. Ranges: Reports she had a 300 after a meal a couple days ago. She cannot find the recorded blood sugars in her meter other than from the last 2 days. Pt reports she has difficultly looking up things in her meter and the date and time are wrong as well.  Date FBG/ 2hours post Lunch/2hours post Dinner /2hours post     187       136 134, right after eating - was shaky, sweaty after eating       Symptoms of low blood sugar? shaky, dizzy, sweaty (Meter said 42-47 when this happened in December, most recently happened yesterday with blood sugar of 134)  Symptoms of high blood sugar? polydipsia and fatigue - Reports her sugars have been in the 200s a lot recently  Eye exam: up to date  Foot exam: up to date  Diet/Exercise: Eats a balanced diet. Trys to limit carbohydrates the best she  can.  Aspirin: No  Statin: Yes: Atorvastatin 10 mg   ACEi/ARB: No.   Urine Albumin:   Lab Results   Component Value Date    UMALCR 13.77 2022      Lab Results   Component Value Date    A1C 6.5 2022    A1C 6.2 2021    A1C 5.9 2020    A1C 5.7 2019    A1C 5.7 2018    A1C 5.5 2018     Creatinine   Date Value Ref Range Status   2022 1.40 (H) 0.52 - 1.04 mg/dL Final   2021 1.32 (H) 0.52 - 1.04 mg/dL Final     GFR Estimate   Date Value Ref Range Status   2022 39 (L) >60 mL/min/1.73m2 Final     Comment:     Effective 2021 eGFRcr in adults is calculated using the  CKD-EPI creatinine equation which includes age and gender (Stephanie alexis al., [place], DOI: 10.1056/YHGOjk3754005)   2021 40 (L) >60 mL/min/[1.73_m2] Final     Comment:     Non  GFR Calc  Starting 2018, serum creatinine based estimated GFR (eGFR) will be   calculated using the Chronic Kidney Disease Epidemiology Collaboration   (CKD-EPI) equation.       GFR Estimate If Black   Date Value Ref Range Status   2021 46 (L) >60 mL/min/[1.73_m2] Final     Comment:      GFR Calc  Starting 2018, serum creatinine based estimated GFR (eGFR) will be   calculated using the Chronic Kidney Disease Epidemiology Collaboration   (CKD-EPI) equation.       Estimated Creatinine Clearance: 36.6 mL/min (A) (based on SCr of 1.4 mg/dL (H)).    Hyperlipidemia: Current therapy includes atorvastatin 10 mg daily. Reports she has not been taking this recently. Her prescription  at the pharmacy and she was unaware a new one had been sent in. Patient reports no previous significant myalgias or other side effects.  The 10-year ASCVD risk score (Samuelpallavi HERNÁNDEZ Jr., et al., 2013) is: 30.8%    Values used to calculate the score:      Age: 76 years      Sex: Female      Is Non- : No      Diabetic: Yes      Tobacco smoker: No      Systolic Blood Pressure:  134 mmHg      Is BP treated: No      HDL Cholesterol: 62 mg/dL      Total Cholesterol: 184 mg/dL  Recent Labs   Lab Test 02/01/22  0850 12/17/20  0806 10/12/16  0915 09/25/15  0939 08/08/15  1047   CHOL 184 186   < > 147 188   HDL 62 59   < > 60 54   * 107*   < > 63 106   TRIG 104 98   < > 121 138   CHOLHDLRATIO  --   --   --  2.5 3.5    < > = values in this interval not displayed.     Leg swelling (hx of DVT x3): Currently on Eliquis 5mg twice daily. No bleeding or bruising reported.    Per Arminda Milla note from 2/14, Likely from blood clots or varicose veins. Referred to Vascular.    Headache: Currently taking Tylenol 500 mg as needed. Feels she only uses this at night when she as a headache. Thinks it is effective, she typically takes Tylenol then goes to bed and the headache is gone in the morning. No concerns.    2/14 PCP Note: At the top of her head, describes a few episodes weekly of a flash of sharp pain lasting only a second 5/10. No associated vision changes or other changes. neurology referral  -Neurology: Please ask them about the headaches AND memory. They will call you.    Anxiety/Depression: Currently taking paroxetine 30 mg twice daily. Was also prescribed buspirone 5 mg twice daily but it caused upset stomach (even after taking with food) and a burning sensation so she stopped. Reports side effects of dry mouth, some constipation, memory impairment, and fatigue. States she is under extreme stress due to son being started on dialysis, her own health conditions, and a recent move to be closer to her family. Has never tried counseling. Does not feel that paroxetine is managing her symptoms well. Reports she feels like crying the instant someone says anything about her, even if it isn't negative.    2/14 PCP Note: Ongoing issue, worse lately. I suspect Paxil could be contributing to memory loss and fatigue. Goal to reduce Paxil while improving anxiety. Taking one in the am, 1 in pm    PHQ-9 SCORE  3/2/2018 9/6/2019 6/15/2021   PHQ-9 Total Score - - -   PHQ-9 Total Score 8 8 3     ELI-7 SCORE 3/2/2018 9/6/2019 6/15/2021   Total Score - - -   Total Score 14 2 3       Anemia: Currently taking ferrous sulfate 325mg daily. Reports medication just arrived yesterday and she took her first dose at breakfast today. No concerns or side effects.  Hemoglobin   Date Value Ref Range Status   02/17/2022 8.6 (L) 11.7 - 15.7 g/dL Final   02/01/2022 9.3 (L) 11.7 - 15.7 g/dL Final   10/02/2020 11.7 11.7 - 15.7 g/dL Final     Comment:     Results confirmed by repeat test   10/16/2019 11.8 11.7 - 15.7 g/dL Final     Ferritin   Date Value Ref Range Status   02/01/2022 7 (L) 8 - 252 ng/mL Final   09/06/2019 13 8 - 252 ng/mL Final     Iron   Date Value Ref Range Status   02/01/2022 20 (L) 35 - 180 ug/dL Final   09/24/2013 60 35 - 180 ug/dL Final     Iron Binding Cap   Date Value Ref Range Status   09/24/2013 307 240 - 430 ug/dL Final     Iron Binding Capacity   Date Value Ref Range Status   02/01/2022 431 (H) 240 - 430 ug/dL Final     Vitamin D/B12 Deficiency: Currently taking  vitamin D 2000 units daily , Vitamin B12 2500 mcg sublingual tab daily. Reports she had a katie en y nearly 20 years ago and was told she needs to continue Vitamin B12 for the rest of her life. Reports she is feeling very fatigued lately and was wondering if she needs to increase her doses.  Lab Results   Component Value Date    B12 699 02/01/2022     Lab Results   Component Value Date    VITDT 21 02/01/2022    VITDT 32 09/06/2019    VITDT 29 10/12/2016    VITDT 24 (L) 10/27/2014    VITDT 18 (L) 12/21/2012     Skin rash: Currently using clobetasol 0.05% as needed. Has a very itchy rash that develops on her hands and legs. States she thinks it may be coming from her anxiety. Feels cream is very effective and stops the itching after about 20 minutes. Uses cream 1-2 times weekly.    COPD/Post-Covid: Current medications: Short-Acting Bronchodilator: Albuterol MDI.       Patient reports the following symptoms: increased need of albuterol. Previously took medication a few times a week but has been taking daily recently. Reports that when she had covid she was taking it 3-4 times daily. Unsure if she has residual breathing problems from covid or if her breathing issues are from cold weather and dust from her apartment. Reports she is allergic to dust and it always makes her breathing worse. Reports feeling very fatigued which has not improved since having covid.  Patient does not have an COPD Action Plan on file.   Has spirometry been completed: No - orders have been placed  No history of completing CAT assessment.    Immunization History   Administered Date(s) Administered     COVID-19,PF,Pfizer (12+ Yrs) 03/16/2021, 04/02/2021     FLU 6-35 months 10/05/2007     Influenza (High Dose) 3 valent vaccine 10/07/2013, 10/09/2015, 10/12/2016, 10/02/2017, 09/07/2018, 09/06/2019     Influenza (IIV3) PF 10/19/2012     Influenza, Quad, High Dose, Pf, 65yr+ (Fluzone HD) 10/02/2020     Pneumo Conj 13-V (2010&after) 10/09/2015     Pneumococcal 23 valent 04/23/2013     TD (ADULT, 7+) 01/01/2006, 09/06/2019     TDAP Vaccine (Adacel) 04/03/2008, 04/23/2013     Today's Vitals: LMP 09/24/1990   ----------------    I spent 60 minutes with this patient today. I offer these suggestions for consideration by Arminda Loyola. A copy of the visit note was provided to the patient's provider(s).    The patient was mailed a summary of these recommendations.     Adenike Mason PharmD  Medication Therapy Management Resident  Pager: [phone]    Gemma Cowart was seen independently by Dr. Mason. I have reviewed and agree with the resident note and plan of care.      Christelle Yañez PharmD [facility]  Medication Therapy Management Provider  Pager #[phone]    Telemedicine Visit Details  Type of service:  Telephone visit  Start Time: 8:00 AM  End Time: 9:00 AM  Originating Location (patient location):  Home  Distant Location (provider location):  [facility]     Medication Therapy Recommendations  Anxiety    Current Medication: PARoxetine (PAXIL) 30 MG tablet   Rationale: Undesirable effect - Adverse medication event - Safety   Recommendation: Change Medication - sertraline 50 MG tablet   Status: Accepted per Provider         Hyperlipidemia LDL goal <100    Current Medication: atorvastatin (LIPITOR) 10 MG tablet   Rationale: Does not understand instructions - Adherence - Adherence   Recommendation: Provide Adherence Intervention   Status: Patient Agreed - Adherence/Education         Vitamin D deficiency disease    Current Medication: Cholecalciferol (VITAMIN D) 50 MCG (2000 UT) CAPS   Rationale: Dose too low - Dosage too low - Effectiveness   Recommendation: Increase Dose - Vitamin D3 75 MCG (3000 UT) Tabs   Status: Accepted - no CPA Needed

## 2022-02-24 ENCOUNTER — VIRTUAL VISIT (OUTPATIENT)
Dept: PHARMACY | Facility: CLINIC | Age: 77
End: 2022-02-24
Payer: COMMERCIAL

## 2022-02-24 DIAGNOSIS — Z86.718 PERSONAL HISTORY OF DVT (DEEP VEIN THROMBOSIS): ICD-10-CM

## 2022-02-24 DIAGNOSIS — E78.5 HYPERLIPIDEMIA LDL GOAL <100: ICD-10-CM

## 2022-02-24 DIAGNOSIS — F32.A DEPRESSION, UNSPECIFIED DEPRESSION TYPE: ICD-10-CM

## 2022-02-24 DIAGNOSIS — E53.8 VITAMIN B12 DEFICIENCY: ICD-10-CM

## 2022-02-24 DIAGNOSIS — E11.69 TYPE 2 DIABETES MELLITUS WITH OTHER SPECIFIED COMPLICATION, WITHOUT LONG-TERM CURRENT USE OF INSULIN (H): Primary | ICD-10-CM

## 2022-02-24 DIAGNOSIS — F41.9 ANXIETY: ICD-10-CM

## 2022-02-24 DIAGNOSIS — E55.9 VITAMIN D DEFICIENCY DISEASE: ICD-10-CM

## 2022-02-24 DIAGNOSIS — D50.9 IRON DEFICIENCY ANEMIA, UNSPECIFIED IRON DEFICIENCY ANEMIA TYPE: ICD-10-CM

## 2022-02-24 DIAGNOSIS — G44.219 EPISODIC TENSION-TYPE HEADACHE, NOT INTRACTABLE: ICD-10-CM

## 2022-02-24 DIAGNOSIS — J44.9 CHRONIC OBSTRUCTIVE PULMONARY DISEASE, UNSPECIFIED COPD TYPE (H): ICD-10-CM

## 2022-02-24 DIAGNOSIS — R21 RASH: ICD-10-CM

## 2022-02-24 PROCEDURE — 99605 MTMS BY PHARM NP 15 MIN: CPT

## 2022-02-24 PROCEDURE — 99607 MTMS BY PHARM ADDL 15 MIN: CPT

## 2022-02-24 NOTE — LETTER
_  Medication List        Prepared on: 2/24/2022     Bring your Medication List when you go to the doctor, hospital, or   emergency room. And, share it with your family or caregivers.     Note any changes to how you take your medications.  Cross out medications when you no longer use them.    Medication How I take it Why I use it Prescriber   acetaminophen (TYLENOL) 500 MG tablet Take 2 tablets (1,000 mg) by mouth every 8 hours as needed for mild pain Type 2 diabetes mellitus with other specified complication, unspecified whether long term insulin use (H) Cece Foster MD   albuterol (PROAIR HFA/PROVENTIL HFA/VENTOLIN HFA) 108 (90 Base) MCG/ACT inhaler Inhale 2 puffs into the lungs every 6 hours Chronic obstructive pulmonary disease, unspecified COPD type (H); Infection due to 2019 Novel Coronavirus; Wheezing ANTON Armas CNP   apixaban ANTICOAGULANT (ELIQUIS ANTICOAGULANT) 5 MG tablet Take 1 tablet (5 mg) by mouth 2 times daily Personal History of DVT (Deep Vein Thrombosis) ANTON Armas CNP   atorvastatin (LIPITOR) 10 MG tablet Take 1 tablet (10 mg) by mouth daily Type 2 diabetes mellitus with other specified complication, without long-term current use of insulin (H) ANTON Armas CNP   blood glucose (NO BRAND SPECIFIED) lancets standard Use to test blood sugars 2 times daily or as directed. Type 2 diabetes mellitus with other specified complication (H) [E11.69] Type 2 diabetes mellitus with other specified complication, without long-term current use of insulin (H) ANTON Dixon CNP   blood glucose (ONETOUCH ULTRA) test strip TEST ONCE DAILY Type 2 diabetes mellitus with other specified complication, without long-term current use of insulin (H) Cece Foster MD   blood glucose monitoring (NO BRAND SPECIFIED) meter device kit Use to test blood sugar 2 times daily or as directed. Type 2 diabetes mellitus with other specified complication, without  "long-term current use of insulin (H) Anne Marie Quintero, ROCIO   busPIRone (BUSPAR) 5 MG tablet Take 1 tablet (5 mg) by mouth 2 times daily for 7 days, THEN 2 tablets (10 mg) 2 times daily. Anxiety ANTON Armas CNP   Cholecalciferol (VITAMIN D) 50 MCG (2000 UT) CAPS Take 1 capsule by mouth daily S/P Gastric Bypass Cece Foster MD   clobetasol (TEMOVATE) 0.05 % external cream Apply twice daily to rash (thigh, knees, hands, feet) twice daily for 7 days AS NEEDED \"use sparingly and not to be used on the face\" Follow up recommended. Psoriasis ANTON Armas CNP   ferrous sulfate (FEROSUL) 325 (65 Fe) MG tablet Take 1 tablet (325 mg) by mouth daily (with breakfast) Iron deficiency anemia, unspecified iron deficiency anemia type ANTON Belcher CNP   Lancets (ONETOUCH DELICA PLUS [id number]) MISC 1 lancet daily Type 2 diabetes mellitus with other specified complication, unspecified whether long term insulin use (H) Cece Foster MD   PARoxetine (PAXIL) 30 MG tablet TAKE 2 TABLETS BY MOUTH AT BEDTIME ELI (Generalized Anxiety Disorder) ANTON Armas CNP   polyethylene glycol (GOLYTELY) 236 g suspension Take 4,000 mLs by mouth once for 1 dose At 4 pm the day before your procedure: drink one 8 oz glass of Golytely solution every 15 minutes until about half the bottle is gone or approximately eight glasses.  The day of the procedure, six hours before, drink one 8 oz glass of the solution until the remainder of the solution is gone. Iron deficiency anemia, unspecified iron deficiency anemia type Mil Torres MD   vitamin B-12 (CYANOCOBALAMIN) 2500 MCG sublingual tablet Take 1 tablet (2,500 mcg) by mouth daily Vitamin B12 Deficiency; S/P Gastric Bypass Cece Foster MD         Add new medications, over-the-counter drugs, herbals, vitamins, or  minerals in the blank rows below.    Medication How I take it Why I use it Prescriber                          " Allergies:      bacitracin; phenylene        Side effects I have had:              Other Information:             My notes and questions:

## 2022-02-24 NOTE — PATIENT INSTRUCTIONS
Recommendations from today's MTM visit:                                                    MTM (medication therapy management) is a service provided by a clinical pharmacist designed to help you get the most of out of your medicines.   Today we reviewed what your medicines are for, how to know if they are working, that your medicines are safe and how to make your medicine regimen as easy as possible.        1. I placed a referral for Diabetes Education for diagnostic CGM placement and read. Diabetes education will be contacting you to schedule this.  2. We will change your paroxetine to sertraline. We would start one as you are decreasing your dose of paroxetine - follow the directions below:  Week 1:  Take paroxetine 50 mg daily   Week 2:  Take paroxetine 40 mg daily, start sertraline 25mg daily   Week 2:  Take paroxetine 30 mg daily, increase sertraline to 50mg daily   Week 3:  Take paroxetine 20 mg daily, increase sertraline 75 mg daily   Week 4:  Take paroxetine to 10 mg daily, increase to 100mg daily   Week 5:  Stop  paroxetine, continue sertraline 100mg every day  3. Increase your vitamin D to 3000 units daily  4. Call [facility] and request that they fill and deliver your atorvastatin 10 mg. Take this medication daily.  5. Continue to take your iron tablet (ferrous sulfate). This may help with your fatigue.  6. Consider if you would like a referral to see a behavioral health provider.    Follow-up: Return in about 13 days (around 3/9/2022) for MTM Pharmacist Visit.    It was great to speak with you today.  I value your experience and would be very thankful for your time with providing feedback on our clinic survey. You may receive a survey via email or text message in the next few days.     To schedule another MTM appointment, please call the clinic directly or you may call the MTM scheduling line at [phone] or toll-free at [phone].     My Clinical Pharmacist's contact information:                                                       Please feel free to contact me with any questions or concerns you have.      Adenike Mason, PharmD  Medication Therapy Management Resident  Pager: [phone]

## 2022-02-24 NOTE — LETTER
February 24, 2022  Gemma Cowart  [address] [place]  [place] [address]    Dear Ms. Cowart, [facility]UN[facility]        Thank you for talking with me on Feb 24, 2022 about your health and medications. As a follow-up to our conversation, I have included two documents:      1. Your Recommended To-Do List has steps you should take to get the best results from your medications.  2. Your Medication List will help you keep track of your medications and how to take them.    If you want to talk about these documents, please call MAUREEN ZULUAGA RPH at phone: [phone], Monday-Friday 8-4:30pm.    I look forward to working with you and your doctors to make sure your medications work well for you.    Sincerely,    MAUREEN ZULUAGA RPH

## 2022-02-24 NOTE — LETTER
"Recommended To-Do List      Prepared on: 2/24/2022     You can get the best results from your medications by completing the items on this \"To-Do List.\"      Bring your To-Do List when you go to your doctor. And, share it with your family or caregivers.    My To-Do List:  What we talked about: What I should do:   An issue with your medication    Change the medication you are taking from PARoxetine (PAXIL) to sertraline (ZOLOFT)          What we talked about: What I should do:   Your medication dosage being too low    Increase your dosage of vitamin D to 3000 units daily          What we talked about: What I should do:                     "

## 2022-02-26 RX ORDER — PAROXETINE 10 MG/1
TABLET, FILM COATED ORAL
Qty: 42 TABLET | Refills: 0 | Status: SHIPPED | OUTPATIENT
Start: 2022-02-26 | End: 2022-06-01

## 2022-02-26 RX ORDER — SERTRALINE HYDROCHLORIDE 100 MG/1
100 TABLET, FILM COATED ORAL DAILY
Qty: 90 TABLET | Refills: 1 | Status: ON HOLD | OUTPATIENT
Start: 2022-02-26 | End: 2022-03-07

## 2022-02-28 ENCOUNTER — TELEPHONE (OUTPATIENT)
Dept: PEDIATRICS | Facility: CLINIC | Age: 77
End: 2022-02-28
Payer: COMMERCIAL

## 2022-02-28 NOTE — TELEPHONE ENCOUNTER
Routing to ANTON Flores to advise on hold orders, if appropriate, for Eliquis for colonoscopy scheduled for 3/7/2022.     Patient is taking for h/o DVT.

## 2022-02-28 NOTE — TELEPHONE ENCOUNTER
Reason for Call:  Other call back    Detailed comments: Pt is having a colonoscopy on 03/07/22, @[facility], they need to know that it's okay for pt to be w/out her blood thinners for 3 days before procedure.  Please call pt to advise.     Phone Number Patient can be reached at: Cell number on file:    Telephone Information:   Mobile [phone]       Best Time: ANY    Can we leave a detailed message on this number? YES    Call taken on 2/28/2022 at 1:36 PM by Ginger Cole

## 2022-03-01 ENCOUNTER — INFUSION THERAPY VISIT (OUTPATIENT)
Dept: INFUSION THERAPY | Facility: CLINIC | Age: 77
End: 2022-03-01
Attending: INTERNAL MEDICINE
Payer: COMMERCIAL

## 2022-03-01 VITALS — HEART RATE: 82 BPM | TEMPERATURE: 98.2 F | OXYGEN SATURATION: 98 % | RESPIRATION RATE: 20 BRPM

## 2022-03-01 DIAGNOSIS — D50.9 IRON DEFICIENCY ANEMIA, UNSPECIFIED IRON DEFICIENCY ANEMIA TYPE: Primary | ICD-10-CM

## 2022-03-01 PROCEDURE — 250N000011 HC RX IP 250 OP 636: Performed by: INTERNAL MEDICINE

## 2022-03-01 PROCEDURE — 258N000003 HC RX IP 258 OP 636: Performed by: INTERNAL MEDICINE

## 2022-03-01 PROCEDURE — 96374 THER/PROPH/DIAG INJ IV PUSH: CPT

## 2022-03-01 RX ORDER — DIPHENHYDRAMINE HYDROCHLORIDE 50 MG/ML
50 INJECTION INTRAMUSCULAR; INTRAVENOUS
Status: CANCELLED
Start: 2022-03-08

## 2022-03-01 RX ORDER — ALBUTEROL SULFATE 0.83 MG/ML
2.5 SOLUTION RESPIRATORY (INHALATION)
Status: CANCELLED | OUTPATIENT
Start: 2022-03-08

## 2022-03-01 RX ORDER — METHYLPREDNISOLONE SODIUM SUCCINATE 125 MG/2ML
125 INJECTION, POWDER, LYOPHILIZED, FOR SOLUTION INTRAMUSCULAR; INTRAVENOUS
Status: CANCELLED
Start: 2022-03-08

## 2022-03-01 RX ORDER — ALBUTEROL SULFATE 90 UG/1
1-2 AEROSOL, METERED RESPIRATORY (INHALATION)
Status: CANCELLED
Start: 2022-03-08

## 2022-03-01 RX ORDER — NALOXONE HYDROCHLORIDE 0.4 MG/ML
0.2 INJECTION, SOLUTION INTRAMUSCULAR; INTRAVENOUS; SUBCUTANEOUS
Status: CANCELLED | OUTPATIENT
Start: 2022-03-08

## 2022-03-01 RX ORDER — HEPARIN SODIUM (PORCINE) LOCK FLUSH IV SOLN 100 UNIT/ML 100 UNIT/ML
5 SOLUTION INTRAVENOUS
Status: CANCELLED | OUTPATIENT
Start: 2022-03-08

## 2022-03-01 RX ORDER — HEPARIN SODIUM,PORCINE 10 UNIT/ML
5 VIAL (ML) INTRAVENOUS
Status: CANCELLED | OUTPATIENT
Start: 2022-03-08

## 2022-03-01 RX ORDER — MEPERIDINE HYDROCHLORIDE 25 MG/ML
25 INJECTION INTRAMUSCULAR; INTRAVENOUS; SUBCUTANEOUS EVERY 30 MIN PRN
Status: CANCELLED | OUTPATIENT
Start: 2022-03-08

## 2022-03-01 RX ORDER — EPINEPHRINE 1 MG/ML
0.3 INJECTION, SOLUTION INTRAMUSCULAR; SUBCUTANEOUS EVERY 5 MIN PRN
Status: CANCELLED | OUTPATIENT
Start: 2022-03-08

## 2022-03-01 RX ADMIN — SODIUM CHLORIDE 250 ML: 9 INJECTION, SOLUTION INTRAVENOUS at 14:28

## 2022-03-01 RX ADMIN — FERRIC CARBOXYMALTOSE INJECTION 750 MG: 50 INJECTION, SOLUTION INTRAVENOUS at 14:28

## 2022-03-01 ASSESSMENT — PAIN SCALES - GENERAL: PAINLEVEL: NO PAIN (0)

## 2022-03-01 NOTE — PROGRESS NOTES
Infusion Nursing Note:  Gemma Cowart presents today for Injectafer infusion.    Patient seen by provider today: No   present during visit today: Not Applicable.    Note: NA      Intravenous Access:  Peripheral IV placed.    Treatment Conditions:  Not Applicable.      Post Infusion Assessment:  Patient tolerated infusion without incident.  Patient observed for 30 minutes post Injectafer per protocol.  Access discontinued per protocol.       Discharge Plan:   Discharge instructions reviewed with: Patient.  Patient and/or family verbalized understanding of discharge instructions and all questions answered.  Copy of AVS reviewed with patient and/or family.  Patient will return as scheduled for next appointment.  Patient discharged in stable condition accompanied by: self.  Departure Mode: Ambulatory.      Mayi Alexandre RN

## 2022-03-02 ENCOUNTER — TRANSFERRED RECORDS (OUTPATIENT)
Dept: HEALTH INFORMATION MANAGEMENT | Facility: CLINIC | Age: 77
End: 2022-03-02
Payer: COMMERCIAL

## 2022-03-03 NOTE — TELEPHONE ENCOUNTER
Called and spoke to Donna, patient's daughter (consent to communicate on file).    Relayed provider message on Eliquis. She stated patient was having issues with her phone. She will see patient to day and will relay message.    Provided PAL phone number to call if any questions.  Krysta DIANE RN, BSN

## 2022-03-04 ENCOUNTER — LAB (OUTPATIENT)
Dept: LAB | Facility: CLINIC | Age: 77
End: 2022-03-04
Attending: INTERNAL MEDICINE
Payer: COMMERCIAL

## 2022-03-04 DIAGNOSIS — Z11.59 ENCOUNTER FOR SCREENING FOR OTHER VIRAL DISEASES: ICD-10-CM

## 2022-03-04 PROCEDURE — U0003 INFECTIOUS AGENT DETECTION BY NUCLEIC ACID (DNA OR RNA); SEVERE ACUTE RESPIRATORY SYNDROME CORONAVIRUS 2 (SARS-COV-2) (CORONAVIRUS DISEASE [COVID-19]), AMPLIFIED PROBE TECHNIQUE, MAKING USE OF HIGH THROUGHPUT TECHNOLOGIES AS DESCRIBED BY CMS-2020-01-R: HCPCS

## 2022-03-05 LAB — SARS-COV-2 RNA RESP QL NAA+PROBE: NEGATIVE

## 2022-03-07 ENCOUNTER — HOSPITAL ENCOUNTER (OUTPATIENT)
Facility: CLINIC | Age: 77
Discharge: HOME | End: 2022-03-07
Attending: INTERNAL MEDICINE | Admitting: INTERNAL MEDICINE
Payer: COMMERCIAL

## 2022-03-07 VITALS
SYSTOLIC BLOOD PRESSURE: 139 MMHG | HEART RATE: 70 BPM | OXYGEN SATURATION: 97 % | DIASTOLIC BLOOD PRESSURE: 107 MMHG | RESPIRATION RATE: 14 BRPM

## 2022-03-07 DIAGNOSIS — D50.9 IRON DEFICIENCY ANEMIA, UNSPECIFIED IRON DEFICIENCY ANEMIA TYPE: Primary | ICD-10-CM

## 2022-03-07 LAB
COLONOSCOPY: NORMAL
GLUCOSE BLDC GLUCOMTR-MCNC: 113 MG/DL (ref 70–99)

## 2022-03-07 PROCEDURE — G0500 MOD SEDAT ENDO SERVICE >5YRS: HCPCS | Performed by: INTERNAL MEDICINE

## 2022-03-07 PROCEDURE — 82962 GLUCOSE BLOOD TEST: CPT

## 2022-03-07 PROCEDURE — G0105 COLORECTAL SCRN; HI RISK IND: HCPCS | Performed by: INTERNAL MEDICINE

## 2022-03-07 PROCEDURE — 250N000011 HC RX IP 250 OP 636: Performed by: INTERNAL MEDICINE

## 2022-03-07 PROCEDURE — 45378 DIAGNOSTIC COLONOSCOPY: CPT | Performed by: INTERNAL MEDICINE

## 2022-03-07 RX ORDER — FLUMAZENIL 0.1 MG/ML
0.2 INJECTION, SOLUTION INTRAVENOUS
Status: DISCONTINUED | OUTPATIENT
Start: 2022-03-07 | End: 2022-03-07 | Stop reason: HOSPADM

## 2022-03-07 RX ORDER — ATROPINE SULFATE 0.4 MG/ML
0.4 AMPUL (ML) INJECTION
Status: DISCONTINUED | OUTPATIENT
Start: 2022-03-07 | End: 2022-03-07 | Stop reason: HOSPADM

## 2022-03-07 RX ORDER — ONDANSETRON 4 MG/1
4 TABLET, ORALLY DISINTEGRATING ORAL EVERY 6 HOURS PRN
Status: DISCONTINUED | OUTPATIENT
Start: 2022-03-07 | End: 2022-03-07 | Stop reason: HOSPADM

## 2022-03-07 RX ORDER — ONDANSETRON 2 MG/ML
4 INJECTION INTRAMUSCULAR; INTRAVENOUS
Status: DISCONTINUED | OUTPATIENT
Start: 2022-03-07 | End: 2022-03-07 | Stop reason: HOSPADM

## 2022-03-07 RX ORDER — FENTANYL CITRATE 0.05 MG/ML
25-50 INJECTION, SOLUTION INTRAMUSCULAR; INTRAVENOUS
Status: COMPLETED | OUTPATIENT
Start: 2022-03-07 | End: 2022-03-07

## 2022-03-07 RX ORDER — SIMETHICONE 40MG/0.6ML
133 SUSPENSION, DROPS(FINAL DOSAGE FORM)(ML) ORAL
Status: DISCONTINUED | OUTPATIENT
Start: 2022-03-07 | End: 2022-03-07 | Stop reason: HOSPADM

## 2022-03-07 RX ORDER — FENTANYL CITRATE 0.05 MG/ML
25 INJECTION, SOLUTION INTRAMUSCULAR; INTRAVENOUS
Status: DISCONTINUED | OUTPATIENT
Start: 2022-03-07 | End: 2022-03-07 | Stop reason: HOSPADM

## 2022-03-07 RX ORDER — NALOXONE HYDROCHLORIDE 0.4 MG/ML
0.4 INJECTION, SOLUTION INTRAMUSCULAR; INTRAVENOUS; SUBCUTANEOUS
Status: DISCONTINUED | OUTPATIENT
Start: 2022-03-07 | End: 2022-03-07 | Stop reason: HOSPADM

## 2022-03-07 RX ORDER — ONDANSETRON 2 MG/ML
4 INJECTION INTRAMUSCULAR; INTRAVENOUS EVERY 6 HOURS PRN
Status: DISCONTINUED | OUTPATIENT
Start: 2022-03-07 | End: 2022-03-07 | Stop reason: HOSPADM

## 2022-03-07 RX ORDER — NALOXONE HYDROCHLORIDE 0.4 MG/ML
0.2 INJECTION, SOLUTION INTRAMUSCULAR; INTRAVENOUS; SUBCUTANEOUS
Status: DISCONTINUED | OUTPATIENT
Start: 2022-03-07 | End: 2022-03-07 | Stop reason: HOSPADM

## 2022-03-07 RX ORDER — EPINEPHRINE 1 MG/ML
0.1 INJECTION, SOLUTION INTRAMUSCULAR; SUBCUTANEOUS
Status: DISCONTINUED | OUTPATIENT
Start: 2022-03-07 | End: 2022-03-07 | Stop reason: HOSPADM

## 2022-03-07 RX ORDER — LIDOCAINE 40 MG/G
CREAM TOPICAL
Status: DISCONTINUED | OUTPATIENT
Start: 2022-03-07 | End: 2022-03-07 | Stop reason: HOSPADM

## 2022-03-07 RX ORDER — PROCHLORPERAZINE MALEATE 5 MG
5 TABLET ORAL EVERY 6 HOURS PRN
Status: DISCONTINUED | OUTPATIENT
Start: 2022-03-07 | End: 2022-03-07 | Stop reason: HOSPADM

## 2022-03-07 RX ADMIN — MIDAZOLAM 1 MG: 1 INJECTION INTRAMUSCULAR; INTRAVENOUS at 09:16

## 2022-03-07 RX ADMIN — MIDAZOLAM 1 MG: 1 INJECTION INTRAMUSCULAR; INTRAVENOUS at 09:21

## 2022-03-07 RX ADMIN — FENTANYL CITRATE 50 MCG: 0.05 INJECTION, SOLUTION INTRAMUSCULAR; INTRAVENOUS at 09:16

## 2022-03-07 RX ADMIN — FENTANYL CITRATE 50 MCG: 0.05 INJECTION, SOLUTION INTRAMUSCULAR; INTRAVENOUS at 09:22

## 2022-03-07 NOTE — LETTER
February 22, 2022      Gemma Cowart  [address] [place] [id number]        Dear Gemma,     Thank you for choosing [facility]. You are scheduled for the following service(s).   Please be aware that coverage of these services is subject to the terms and limitations of your health insurance plan.  Call member services at your health plan with any benefit or coverage questions.  Date:  3/7/2022       Procedure: COLONOSCOPY  Doctor:   Dr. Daniel         Arrival Time:  8:15 AM   *Enter and check in at the Main Hospital Entrance  Procedure Time:  9:00 AM    Location:   [facility]        Endoscopy Department, First Floor *         [address] [place], [place] [address]      [phone] or [phone] (Formerly [facility]) to reschedule                    NuLYTELY  DIVIDED PREP  Colonoscopy is the most accurate test to detect colon polyps and colon cancer; and the only test where polyps can be removed. During this procedure, a doctor examines the lining of your large intestine and rectum through a flexible tube.       Transportation  You must arrange for a ride for the day of your procedure with a responsible adult. A taxi , Uber, etc, is not an option unless you are accompanied by a responsible adult. If you fail to arrange transportation with a responsible adult, your procedure will be cancelled and rescheduled.    We have sent your prescription for NuLYTELY  to the pharmacy on file. Please call our office at [phone] if you did not receive a prescription.      COLONOSCOPY PRE-PROCEDURE CHECKLIST  If you have diabetes, ask your regular doctor for diet and medication restrictions.  If you take an anticoagulant or anti-platelet medication (such as Coumadin , Lovenox , Pradaxa , Xarelto , Eliquis , etc.), please call your primary doctor for advice on holding this medication.  If you take aspirin you may continue to do so.  If you are or may be pregnant, please  discuss the risks and benefits of this procedure with your doctor.    PREPARATION FOR COLONOSCOPY  7 days before:    Discontinue fiber supplements and medications containing iron. This includes Metamucil  and Fibercon ; and multivitamins with iron.  3 days before:    Begin a low-fiber diet. A low-fiber diet helps making the cleanout more effective. For additional details on low-fiber diet, please refer to the table on the last page.  2 days before:    Continue the low-fiber diet.     Drink at least 8 glasses of water throughout the day.     Stop eating solid foods at 11:45 pm.  1 day before:    In the morning: begin a clear liquid diet (liquids you can see through).     Examples of a clear liquid diet include: water, clear broth or bouillon, Gatorade, Pedialyte or Powerade, carbonated and non-carbonated soft drinks (Sprite , 7-Up , ginger ale), strained fruit juices without pulp (apple, white grape, white cranberry), Jell-O  and popsicles.     The following are not allowed on a clear liquid diet: red liquids, alcoholic beverages,  dairy products (milk, creamer, and yogurt), protein shakes,  juice with pulp and chewing tobacco.    At 4pm: drink 1 (one) 8 oz glass of NuLYTELY  solution every 15 minutes (approximately 8 glasses of 8 oz or half the bottle). Keep the solution refrigerated. Do not drink any other liquids while you are drinking the NuLYTELY  solution.    Over the course of the evening, drink an additional   liter of clear liquids and continue clear liquid diet.    Day of procedure:    6 hours before the procedure: drink 1 (one) 8 oz glass of NuLYTELY  solution every 15 minutes until the last half of the bottle (approximately 8 glasses of 8 oz) is gone. Keep the solution refrigerated. Do not drink any other liquids while you are drinking the NuLYTELY  solution. After that, you may continue the clear liquid diet until 4 hours before the procedure.      You may take all of your morning medications including  blood pressure medications, blood thinners (if you have not been instructed to stop these by our office), methadone, and anti-seizure medications with sips of water 4 hours prior to your procedure or earlier. Do not take insulin or vitamins prior to your procedure.       4 hours prior:   o STOP consuming all liquids   o Do not take anything by mouth during this time.   o Allow extra time to travel to your procedure as you may need to stop and use a restroom along the way.  You are ready for the procedure, if you followed all instructions and your stool is no longer formed, but clear or yellow liquid. If you are unsure whether your colon is clean, please call our office at [phone] before you leave for your appointment.      COLON CLEANSING TIPS: drink adequate amounts of fluids before and after your colon cleansing to prevent dehydration. Stay near a toilet because you will have diarrhea. Even if you are sitting on the toilet, continue to drink the cleansing solution every 15 minutes. If you feel nauseous or vomit, rinse your mouth with water, take a 15 to 30-minute-break and then continue drinking the solution. You will be uncomfortable until the stool has flushed from your colon (in about 2 to 4 hours). You may feel chilled.    Bring the following to your procedure:  - Insurance Card/Photo ID.   - List of current medications including over-the-counter medications and supplements.   - Your rescue inhaler if you currently use one to control asthma.    Canceling or rescheduling your appointment:   If you must cancel or reschedule your appointment, please call [phone] as soon as possible. If you need to cancel your appointment within 24 hours of your scheduled time please call [phone].          What happens during a colonoscopy?    Plan to spend up to two hours, starting at registration time, at the endoscopy center the day of your procedure. The colonoscopy takes an average of 15 to 30 minutes. Recovery  time is about 30 minutes.    Before the exam:    You will change into a gown.    Your medical history and medication list will be reviewed with you, unless that has been done over the phone prior to the procedure.     A nurse will insert an intravenous (IV) line into your hand or arm.    The doctor will meet with you and will give you a consent form to sign.    During the exam:     Medicine will be given through the IV line to help you relax.     Your heart rate and oxygen levels will be monitored. If your blood pressure is low, you may be given fluids through the IV line.     The doctor will insert a flexible hollow tube, called a colonoscope, into your rectum. The scope will be advanced slowly through the large intestine (colon).    You may have a feeling of fullness or pressure.     If an abnormal tissue or a polyp is found, the doctor may remove it through the endoscope for closer examination, or biopsy. Tissue removal is painless.    After the exam:           Any tissue samples removed during the exam will be sent to a lab for evaluation. It may take 5-7 working days for you to be notified of the results.     A nurse will provide you with complete discharge instructions before you leave the endoscopy center. Be sure to ask the nurse for specific instructions if you take blood thinners such as Aspirin, Coumadin or Plavix.     The doctor will prepare a full report for you and for the physician who referred you for the procedure.     Your doctor will talk with you about the initial results of your exam.      Medication given during the exam will prohibit you from driving for the rest of the day.     Following the exam, you may resume your normal diet. Your first meal should be light, no greasy foods. Avoid alcohol until the next day.     You may resume your regular activities the day after the procedure.       LOW-FIBER DIET  Foods RECOMMENDED Foods to AVOID   Breads, Cereal, Rice and Pasta:   White bread, rolls,  biscuits, croissant and esteban toast.   Waffles, Yoruba toast and pancakes.   White rice, noodles, pasta, macaroni and peeled cooked potatoes.   Plain crackers and saltines.   Cooked cereals: farina, cream of rice.   Cold cereals: Puffed Rice , Rice Krispies , Corn Flakes  and Special K    Breads, Cereal, Rice and Pasta:   Breads or rolls with nuts, seeds or fruit.   Whole wheat, pumpernickel, rye breads and cornbread.   Potatoes with skin, brown or wild rice, and kasha (buckwheat).     Vegetables:   Tender cooked and canned vegetables without seeds: carrots, asparagus tips, green or wax beans, pumpkin, spinach, lima beans. Vegetables:   Raw or steamed vegetables.   Vegetables with seeds.   Sauerkraut.   Winter squash, peas, broccoli, Brussel sprouts, cabbage, onions, cauliflower, baked beans, peas and corn.   Fruits:   Strained fruit juice.   Canned fruit, except pineapple.   Ripe bananas and melon. Fruits:   Prunes and prune juice.   Raw fruits.   Dried fruits: figs, dates and raisins.   Milk/Dairy:   Milk: plain or flavored.   Yogurt, custard and ice cream.   Cheese and cottage cheese Milk/Dairy:     Meat and other proteins:   ground, well-cooked tender beef, lamb, ham, veal, pork, fish, poultry and organ meats.   Eggs.   Peanut butter without nuts. Meat and other proteins:   Tough, fibrous meats with gristle.   Dry beans, peas and lentils.   Peanut butter with nuts.   Tofu.   Fats, Snack, Sweets, Condiments and Beverages:   Margarine, butter, oils, mayonnaise, sour cream and salad dressing, plain gravy.   Sugar, hard candy, clear jelly, honey and syrup.   Spices, cooked herbs, bouillon, broth and soups made with allowed vegetable, ketchup and mustard.   Coffee, tea and carbonated drinks.   Plain cakes, cookies and pretzels.   Gelatin, plain puddings, custard, ice cream, sherbet and popsicles. Fats, Snack, Sweets, Condiments and Beverages:   Nuts, seeds and coconut.   Jam, marmalade and preserves.   Pickles,  olives, relish and horseradish.   All desserts containing nuts, seeds, dried fruit and coconut; or made from whole grains or bran.   Candy made with nuts or seeds.   Popcorn.                       DIRECTIONS TO THE ENDOSCOPY DEPARTMENT    From the north ([facility])  Take [address] South, exit on [address] . Get into the left hand barrett, turn left (east), go one-half mile to [address] and turn left. Take [address] to the Main Entrance  From the south ([facility])  Take 35N to the 35E split and exit on [address] . On [address] , turn left (west) to [address]. Turn right (north) on [address]. Go north to the second  stoplight, take a right and follow [address] to the Main Entrance.  From the east via 35E ([facility])  Take [address]E south to [place] [address] exit. Turn right on [address]. Go west to [address]. Turn right (north) on [address]. Go to the first stoplight, take a right on [address]  to the Main Entrance   From the east via [address] ([facility])ollow on [address]  to the Main Entrance  Take [address] to [address]. Turn left (south) on [address] to [employer]. Turn left (east) on on [address]  to the Main Entrance  From the west via [address] ([place], [place])  Take [address] east to [address]. Turn right (south) on [address] to [employer]. Turn left (east) on [address]  to the Main Entrance

## 2022-03-07 NOTE — H&P
Pre-Endoscopy History and Physical     Gemma Cowart MRN# [id number]   YOB: 1945 Age: 76 year old     Date of Procedure: 3/7/2022  Primary care provider: Arminda Loyola  Type of Endoscopy: Colonoscopy with possible biopsy, possible polypectomy  Reason for Procedure: anemia  Type of Anesthesia Anticipated: Conscious Sedation    HPI:    Gemma is a 76 year old female who will be undergoing the above procedure.      A history and physical has been performed. The patient's medications and allergies have been reviewed. The risks and benefits of the procedure and the sedation options and risks were discussed with the patient.  All questions were answered and informed consent was obtained.      She denies a personal or family history of anesthesia complications or bleeding disorders.     Patient Active Problem List   Diagnosis     Nontoxic uninodular goiter     Rosacea     Vitamin D deficiency disease     Vitamin B12 deficiency     Eczema     Dyshydrosis     Hyperlipidemia LDL goal <100     Anxiety     Insomnia     Type 2 diabetes mellitus with other specified complication (H)     Psoriasis     Long term current use of anticoagulant therapy     Morbid obesity (H)     CKD (chronic kidney disease) stage 3, GFR 30-59 ml/min (H)     Personal history of DVT (deep vein thrombosis)     Postmenopausal bleeding     COPD (chronic obstructive pulmonary disease) (H)     Chronic anticoagulation     Iron deficiency anemia, unspecified iron deficiency anemia type        Past Medical History:   Diagnosis Date     Anemia     iron deficiency anemia     Anxiety      Breast cancer (H) 2012    Bilateral Masectomies 9/2012     Complications of gastric bypass surgery      COPD (chronic obstructive pulmonary disease) (H) 4/21/2021     Diabetes mellitus (H)     diet controlled     DVT (deep venous thrombosis) (H)      Eczema      Fracture of left knee region     patella - vertical     History of kidney stones      Obesity       Palpitation     with no treatment     Polyneuropathy      Pruritus     generalized     Rosacea      SVT (supraventricular tachycardia) (H)     s/p ablation 5/28/2015 at [facility] for left lateral AP        Past Surgical History:   Procedure Laterality Date     ABDOMEN SURGERY  2000    gastric bypass 2000     COLONOSCOPY  10/11/2012    Procedure: COLONOSCOPY;  colonoscopy;  Surgeon: Gela Cleary MD;  Location:  GI     COLONOSCOPY N/A 10/6/2017    Procedure: COLONOSCOPY;;  Surgeon: Shashank Ortiz MD;  Location:  GI     DENTAL SURGERY  5/2007    all teeth removed - dentures     ENT SURGERY      all teeth pulled     ESOPHAGOSCOPY, GASTROSCOPY, DUODENOSCOPY (EGD), COMBINED N/A 10/6/2017    Procedure: COMBINED ESOPHAGOSCOPY, GASTROSCOPY, DUODENOSCOPY (EGD);  ESOPHAGOSCOPY, GASTROSCOPY, DUODENOSCOPY (EGD) & Colonoscopy *Needs INR on arrival;  Surgeon: Shashank Ortiz MD;  Location:  GI     GYN SURGERY  1/10/75    tubal ligation      H ABLATION SVT  5/28/2015    stopped metoprolol     HERNIA REPAIR  2005     IMPLANT FILTER INFERIOR VENA CAVA  10/8/2012    taken out  1/4/12     INSERT TISSUE EXPANDER BREAST BILATERAL  9/5/2012    Procedure: INSERT TISSUE EXPANDER BREAST BILATERAL;;  Surgeon: Orlando Wall MD;  Location:  OR     MASTECTOMY SIMPLE, SENTINEL NODE, COMBINED  9/5/2012    Procedure: COMBINED MASTECTOMY SIMPLE, SENTINEL NODE;  BILATERAL MASTECTOMY WITH LEFT AXILLARY SENTINEL LYMPH NODE BIOPSY, BILATERAL TISSUE EXPANDER        MASTECTOMY, BILATERAL       PANNICULECTOMY N/A 4/5/2018    Procedure: PANNICULECTOMY;  PANNICULECTOMY ;  Surgeon: Orlando Wall MD;  Location:  OR     RECONSTRUCT BREAST BILATERAL, IMPLANT PROSTHESIS BILATERAL, COMBINED  5/22/2013    Procedure: COMBINED RECONSTRUCT BREAST BILATERAL, IMPLANT PROSTHESIS BILATERAL;  BILATERAL SECOND STAGE BREAST RECONSTRUCTION WITH SILICONE GEL IMPLANTS AND LIPOSUCTION;  Surgeon: Orlando Wall MD;   Location: [facility]     RECONSTRUCT NIPPLE BILATERAL  2013    Procedure: RECONSTRUCT NIPPLE BILATERAL;  BILATERAL NIPPLE AREOLAR RECONSTRUCTION;  Surgeon: Orlando Wall MD;  Location: [facility]     ZZHC BREAST RECONSTRUC W OTHR TECHNIQ  2013       Social History     Tobacco Use     Smoking status: Former Smoker     Packs/day: 1.50     Years: 25.00     Pack years: 37.50     Types: Cigarettes     Quit date: 10/21/1986     Years since quittin.4     Smokeless tobacco: Never Used     Tobacco comment: quit    Substance Use Topics     Alcohol use: No     Comment: 1 time a year       Family History   Problem Relation Age of Onset     Cancer - colorectal Mother      Colon Cancer Mother      Prostate Cancer Father      Alzheimer Disease Father      Cancer Paternal Grandmother      Alzheimer Disease Paternal Grandfather      Cancer - colorectal Brother      Diabetes Brother      Crohn's Disease Daughter      Diabetes Brother      Eye Disorder Brother      Diabetes Son      Cancer Maternal Uncle      Cancer Maternal Uncle        Prior to Admission medications    Medication Sig Start Date End Date Taking? Authorizing Provider   acetaminophen (TYLENOL) 500 MG tablet Take 2 tablets (1,000 mg) by mouth every 8 hours as needed for mild pain 21  Yes Cece Foster MD   albuterol (PROAIR HFA/PROVENTIL HFA/VENTOLIN HFA) 108 (90 Base) MCG/ACT inhaler Inhale 2 puffs into the lungs every 6 hours  Patient taking differently: Inhale 2 puffs into the lungs every 6 hours as needed  22  Yes Arminda Loyola APRN CNP   apixaban ANTICOAGULANT (ELIQUIS ANTICOAGULANT) 5 MG tablet Take 1 tablet (5 mg) by mouth 2 times daily 22  Yes Arminda Loyola APRN CNP   Cholecalciferol (VITAMIN D) 50 MCG (2000 UT) CAPS Take 1 capsule by mouth daily 21  Yes Cece Foster MD   clobetasol (TEMOVATE) 0.05 % external cream Apply twice daily to rash (thigh, knees, hands, feet) twice daily for 7 days AS  "NEEDED \"use sparingly and not to be used on the face\" Follow up recommended. 2/1/22  Yes Arminda Loyola APRN CNP   ferrous sulfate (FEROSUL) 325 (65 Fe) MG tablet Take 1 tablet (325 mg) by mouth daily (with breakfast) 2/17/22  Yes Nuzhat London APRN CNP   PARoxetine (PAXIL) 10 MG tablet Take as directed. 2/26/22  Yes Arminda Loyola APRN CNP   vitamin B-12 (CYANOCOBALAMIN) 2500 MCG sublingual tablet Take 1 tablet (2,500 mcg) by mouth daily 7/30/21  Yes Cece Foster MD   ACE/ARB NOT PRESCRIBED, INTENTIONAL, ACE & ARB not prescribed due to Other: normal tensive, low microalbumuria    Alva Viera MD   atorvastatin (LIPITOR) 10 MG tablet Take 1 tablet (10 mg) by mouth daily  Patient not taking: Reported on 2/24/2022 2/14/22   Arminda Loyola APRN CNP   blood glucose (NO BRAND SPECIFIED) lancets standard Use to test blood sugars 2 times daily or as directed. Type 2 diabetes mellitus with other specified complication (H) [E11.69] 12/30/20   Zulma Mcgee APRN CNP   blood glucose (ONETOUCH ULTRA) test strip TEST ONCE DAILY 7/13/21   Cece Foster MD   blood glucose monitoring (NO BRAND SPECIFIED) meter device kit Use to test blood sugar 2 times daily or as directed. 1/7/22   Anne Marie Quintero NP   busPIRone (BUSPAR) 5 MG tablet Take 1 tablet (5 mg) by mouth 2 times daily for 7 days, THEN 2 tablets (10 mg) 2 times daily.  Patient not taking: Reported on 2/24/2022 2/14/22 5/15/22  Arminda Loyola APRN CNP   Lancets (ONETOUCH DELICA PLUS [id number]) MISC 1 lancet daily 7/13/21   Cece Foster MD       Allergies   Allergen Reactions     Bacitracin Unknown     Dust Mites      Nickel      Phenylene         REVIEW OF SYSTEMS:   5 point ROS negative except as noted above in HPI, including Gen., Resp., CV, GI &  system review.    PHYSICAL EXAM:   LMP 09/24/1990  Estimated body mass index is 34.51 kg/m  as calculated from the following:    Height as " "of 2/1/22: 1.6 m (5' 3\").    Weight as of 2/1/22: 88.4 kg (194 lb 12.8 oz).   GENERAL APPEARANCE: alert, and oriented  MENTAL STATUS: alert  AIRWAY EXAM: Mallampatti Class I (visualization of the soft palate, fauces, uvula, anterior and posterior pillars)  RESP: lungs clear to auscultation - no rales, rhonchi or wheezes  CV: regular rates and rhythm  DIAGNOSTICS:    Not indicated    IMPRESSION   ASA Class 2 - Mild systemic disease    PLAN:   Plan for Colonoscopy with possible biopsy, possible polypectomy. We discussed the risks, benefits and alternatives and the patient wished to proceed.    The above has been forwarded to the consulting provider.      Signed Electronically by: Andrea Daniel MD  March 7, 2022          "

## 2022-03-07 NOTE — DISCHARGE INSTRUCTIONS
Understanding Diverticulosis and Diverticulitis     Pouches or diverticula usually occur in the lower part of the colon called the sigmoid.      Diverticulitis occurs when the pouches become inflamed.     The colon (large intestine) is the last part of the digestive tract. It absorbs water from stool and changes it from a liquid to a solid. In certain cases, small pouches called diverticula can form in the colon wall. This condition is called diverticulosis. The pouches can become infected. If this happens, it becomes a more serious problem called diverticulitis. These problems can be painful. But they can be managed.   Managing Your Condition  Diet changes or taking medications are often tried first. These may be enough to bring relief. If the case is bad, surgery may be done. You and your doctor can discuss the plan that is best for you.  If You Have Diverticulosis  Diet changes are often enough to control symptoms. The main changes are adding fiber (roughage) and drinking more water. Fiber absorbs water as it travels through your colon. This helps your stool stay soft and move smoothly. Water helps this process. If needed, you may be told to take over-the-counter stool softeners. To help relieve pain, antispasmodic medications may be prescribed.  If You Have Diverticulitis  Treatment depends on how bad your symptoms are.  For mild symptoms: You may be put on a liquid diet for a short time. You may also be prescribed antibiotics. If these two steps relieve your symptoms, you may then be prescribed a high-fiber diet. If you still have symptoms, your doctor will discuss further treatment options with you.  For severe symptoms: You may need to be admitted to the hospital. There, you can be given IV antibiotics and fluids. Once symptoms are under control, the above treatments may be tried. If these don t control your condition, your doctor may discuss the option of having surgery with you.  [place] to Colon  Health  Help keep your colon healthy with a diet that includes plenty of high-fiber fruits, vegetables, and whole grains. Drink plenty of liquids like water and juice. Your doctor may also recommend avoiding seeds and nuts.          [id number] [facility], [address] [place] [address], [place] [address]. All rights reserved. This information is not intended as a substitute for professional medical care. Always follow your healthcare professional's instructions.    Eating a High-Fiber Diet  Fiber is what gives strength and structure to plants. Most grains, beans, vegetables, and fruits contain fiber. Foods rich in fiber are often low in calories and fat, and they fill you up more. They may also reduce your risks for certain health problems. To find out the amount of fiber in canned, packaged, or frozen foods, read the  Nutrition Facts  label. It tells you how much fiber is in a serving.      Types of Fiber and Their Benefits  There are two types of fiber: insoluble and soluble. They both aid digestion and help you maintain a healthy weight.  Insoluble fiber: This is found in whole grains, cereals, certain fruits and vegetables (such as apple skin, corn, and carrots). Insoluble fiber may prevent constipation and reduce the risk of certain types of cancer.   Soluble fiber: This type of fiber is in oats, beans, and certain fruits and vegetables (such as strawberries and peas). Soluble fiber can reduce cholesterol (which may help lower the risk of heart disease), and helps control blood sugar levels.  Look for High-Fiber Foods  Whole-grain breads and cereals: Try to eat 6-8 ounces a day. Include wheat and oat bran cereals, whole-wheat muffins or toast, and corn tortillas in your meals.  Fruits: Try to eat 2 cups a day. Apples, oranges, strawberries, pears, and bananas are good sources. (Note: Fruit juice is low in fiber.)  Vegetables: Try to eat 3 cups a day. Add asparagus, carrots, broccoli, peas, and corn to your  meals.  Legumes (beans): One cup of cooked lentils gives you over 15 grams of fiber. Try navy beans, lentils, and chickpeas.  Seeds:  A small handful of seeds gives you about 3 grams of fiber. Try sunflower seeds.    Keep Track of Your Fiber  A healthy diet includes 31 grams of fiber a day if you have a 2,000-calorie diet. Keep track of how much fiber you eat. Start by reading food labels. Then eat a variety of foods high in fiber. Ask your doctor about supplemental fiber products.            6556-6504 [employer], [address] [place] [address]. All rights reserved. This information is not intended as a substitute for professional medical care. Always follow your healthcare professional's instructions.    HEMORRHOIDS, External      A hemorrhoid is a local swelling of the veins around the rectum. These most often occur from repeated forceful straining during bowel movements or heavy lifting. It may also occur in the last few months of pregnancy. A hemorrhoid feels like a soft lump. It may itch from time to time. When it is inflamed it becomes hard and very painful.    HOME CARE:  1. SITZ BATHS: Sit in a tub filled with about 6 inches of hot water. Allow the water to run in order to keep it hot for a total of 10-15 minutes. Repeat this three times a day until pain is relieved.  2. Keep your stools soft to avoid the need to strain when having a bowel movement. Unless another medicine was prescribed, try the following:  IF YOU ARE CONSTIPATED: You may use over-the-counter laxatives such as MILK OF MAGNESIA (mild acting) or, DULCOLAX (if stronger action is needed).  IF YOU ARE NOT CONSTIPATED but stools are hard, try taking Colace (docusate sodium) which is a stool softener. This will soften stools without producing diarrhea. Drinking extra fluids may also help.  3. The use of creams applied to the hemorrhoid itself, such as ANUSOL or PREPARATION H, will be helpful to reduce pain and itching, and speed  healing.    PREVENTION:  Avoid straining on the toilet by keeping stools soft. Increasing FIBER in your diet (fruits, cereals, vegetables and grains) will promote healthy bowel movement. If this is not working, you may use METAMUCIL and similar products. These are over-the-counter fiber supplements. You must drink extra fluids when taking these to avoid constipation.  FOLLOW UP with your doctor if you do not begin to respond to the above treatment within the next few days.    GET PROMPT MEDICAL ATTENTION if any of the following occur:      Large amount of rectal bleeding (more than 1 cup of blood in 24 hours)    Increasing rectal pain or rectal pain that continues for more than three days of treatment    Weakness, dizziness or fainting    Vomiting blood (red or black color)          [id number] [facility], [address] [place] [address], [place] [address]. All rights reserved. This information is not intended as a substitute for professional medical care. Always follow your healthcare professional's instructions.

## 2022-03-08 ENCOUNTER — INFUSION THERAPY VISIT (OUTPATIENT)
Dept: INFUSION THERAPY | Facility: CLINIC | Age: 77
End: 2022-03-08
Attending: INTERNAL MEDICINE
Payer: COMMERCIAL

## 2022-03-08 ENCOUNTER — TELEPHONE (OUTPATIENT)
Dept: PEDIATRICS | Facility: CLINIC | Age: 77
End: 2022-03-08

## 2022-03-08 VITALS
DIASTOLIC BLOOD PRESSURE: 70 MMHG | SYSTOLIC BLOOD PRESSURE: 138 MMHG | TEMPERATURE: 98.4 F | HEART RATE: 83 BPM | RESPIRATION RATE: 18 BRPM

## 2022-03-08 DIAGNOSIS — D50.9 IRON DEFICIENCY ANEMIA, UNSPECIFIED IRON DEFICIENCY ANEMIA TYPE: Primary | ICD-10-CM

## 2022-03-08 PROCEDURE — 258N000003 HC RX IP 258 OP 636: Performed by: INTERNAL MEDICINE

## 2022-03-08 PROCEDURE — 96365 THER/PROPH/DIAG IV INF INIT: CPT

## 2022-03-08 PROCEDURE — 250N000011 HC RX IP 250 OP 636: Performed by: INTERNAL MEDICINE

## 2022-03-08 RX ORDER — HEPARIN SODIUM,PORCINE 10 UNIT/ML
5 VIAL (ML) INTRAVENOUS
Status: CANCELLED | OUTPATIENT
Start: 2022-03-08

## 2022-03-08 RX ORDER — METHYLPREDNISOLONE SODIUM SUCCINATE 125 MG/2ML
125 INJECTION, POWDER, LYOPHILIZED, FOR SOLUTION INTRAMUSCULAR; INTRAVENOUS
Status: CANCELLED
Start: 2022-03-08

## 2022-03-08 RX ORDER — EPINEPHRINE 1 MG/ML
0.3 INJECTION, SOLUTION INTRAMUSCULAR; SUBCUTANEOUS EVERY 5 MIN PRN
Status: CANCELLED | OUTPATIENT
Start: 2022-03-08

## 2022-03-08 RX ORDER — NALOXONE HYDROCHLORIDE 0.4 MG/ML
0.2 INJECTION, SOLUTION INTRAMUSCULAR; INTRAVENOUS; SUBCUTANEOUS
Status: CANCELLED | OUTPATIENT
Start: 2022-03-08

## 2022-03-08 RX ORDER — ALBUTEROL SULFATE 90 UG/1
1-2 AEROSOL, METERED RESPIRATORY (INHALATION)
Status: CANCELLED
Start: 2022-03-08

## 2022-03-08 RX ORDER — ALBUTEROL SULFATE 0.83 MG/ML
2.5 SOLUTION RESPIRATORY (INHALATION)
Status: CANCELLED | OUTPATIENT
Start: 2022-03-08

## 2022-03-08 RX ORDER — DIPHENHYDRAMINE HYDROCHLORIDE 50 MG/ML
50 INJECTION INTRAMUSCULAR; INTRAVENOUS
Status: CANCELLED
Start: 2022-03-08

## 2022-03-08 RX ORDER — HEPARIN SODIUM (PORCINE) LOCK FLUSH IV SOLN 100 UNIT/ML 100 UNIT/ML
5 SOLUTION INTRAVENOUS
Status: CANCELLED | OUTPATIENT
Start: 2022-03-08

## 2022-03-08 RX ORDER — MEPERIDINE HYDROCHLORIDE 25 MG/ML
25 INJECTION INTRAMUSCULAR; INTRAVENOUS; SUBCUTANEOUS EVERY 30 MIN PRN
Status: CANCELLED | OUTPATIENT
Start: 2022-03-08

## 2022-03-08 RX ADMIN — FERRIC CARBOXYMALTOSE INJECTION 750 MG: 50 INJECTION, SOLUTION INTRAVENOUS at 13:05

## 2022-03-08 RX ADMIN — SODIUM CHLORIDE 250 ML: 9 INJECTION, SOLUTION INTRAVENOUS at 13:04

## 2022-03-08 NOTE — TELEPHONE ENCOUNTER
"Received medical clearance form paperwork from \"[employer]\" which includes a question about what her hearing loss diagnosis code. I'm confused as to why the hearing specialist would be asking me for the diagnosis code, and I don't know it. Please ask the patient.  If she cannot tell you, please call [employer] to try to get more information. Paperwork in my inbox.   "

## 2022-03-08 NOTE — TELEPHONE ENCOUNTER
I called and spoke with [employer] the correct code has been added. The completed forms have been faxed and received.   Rosa Maria Ngo on 3/8/2022 at 1:36 PM

## 2022-03-08 NOTE — PROGRESS NOTES
Infusion Nursing Note:  Gemma Cowart presents today for injectafer.    Patient seen by provider today: No   present during visit today: Not Applicable.    Note: N/A.      Intravenous Access:  Peripheral IV placed.    Treatment Conditions:  Not Applicable.      Post Infusion Assessment:  Patient tolerated infusion without incident.  Blood return noted pre and post infusion.  Site patent and intact, free from redness, edema or discomfort.  No evidence of extravasations.  Access discontinued per protocol.       Discharge Plan:   Discharge instructions reviewed with: Patient.  Patient and/or family verbalized understanding of discharge instructions and all questions answered.  Patient discharged in stable condition accompanied by: self.  Departure Mode: Ambulatory.      Adenike Goldman RN

## 2022-03-09 ENCOUNTER — OFFICE VISIT (OUTPATIENT)
Dept: PHARMACY | Facility: CLINIC | Age: 77
End: 2022-03-09
Payer: COMMERCIAL

## 2022-03-09 VITALS — DIASTOLIC BLOOD PRESSURE: 70 MMHG | BODY MASS INDEX: 34.47 KG/M2 | WEIGHT: 194.6 LBS | SYSTOLIC BLOOD PRESSURE: 116 MMHG

## 2022-03-09 DIAGNOSIS — E11.69 TYPE 2 DIABETES MELLITUS WITH OTHER SPECIFIED COMPLICATION, WITHOUT LONG-TERM CURRENT USE OF INSULIN (H): Primary | ICD-10-CM

## 2022-03-09 DIAGNOSIS — E78.5 HYPERLIPIDEMIA LDL GOAL <100: ICD-10-CM

## 2022-03-09 DIAGNOSIS — J44.9 CHRONIC OBSTRUCTIVE PULMONARY DISEASE, UNSPECIFIED COPD TYPE (H): ICD-10-CM

## 2022-03-09 DIAGNOSIS — U09.9 POST COVID-19 CONDITION, UNSPECIFIED: ICD-10-CM

## 2022-03-09 DIAGNOSIS — F32.A DEPRESSION, UNSPECIFIED DEPRESSION TYPE: ICD-10-CM

## 2022-03-09 DIAGNOSIS — Z86.718 PERSONAL HISTORY OF DVT (DEEP VEIN THROMBOSIS): ICD-10-CM

## 2022-03-09 DIAGNOSIS — F41.9 ANXIETY: ICD-10-CM

## 2022-03-09 PROCEDURE — 99607 MTMS BY PHARM ADDL 15 MIN: CPT

## 2022-03-09 PROCEDURE — 99606 MTMS BY PHARM EST 15 MIN: CPT

## 2022-03-09 RX ORDER — BLOOD SUGAR DIAGNOSTIC
STRIP MISCELLANEOUS
Qty: 100 STRIP | Refills: 3 | Status: CANCELLED | OUTPATIENT
Start: 2022-03-09

## 2022-03-09 RX ORDER — SERTRALINE HYDROCHLORIDE 100 MG/1
100 TABLET, FILM COATED ORAL DAILY
COMMUNITY
End: 2022-06-08

## 2022-03-09 RX ORDER — BLOOD-GLUCOSE METER
1 EACH MISCELLANEOUS ONCE
Qty: 1 KIT | Refills: 0 | Status: CANCELLED | OUTPATIENT
Start: 2022-03-09 | End: 2022-03-09

## 2022-03-09 ASSESSMENT — PATIENT HEALTH QUESTIONNAIRE - PHQ9
5. POOR APPETITE OR OVEREATING: NOT AT ALL
SUM OF ALL RESPONSES TO PHQ QUESTIONS 1-9: 11

## 2022-03-09 ASSESSMENT — ANXIETY QUESTIONNAIRES
GAD7 TOTAL SCORE: 11
6. BECOMING EASILY ANNOYED OR IRRITABLE: NEARLY EVERY DAY
1. FEELING NERVOUS, ANXIOUS, OR ON EDGE: SEVERAL DAYS
2. NOT BEING ABLE TO STOP OR CONTROL WORRYING: SEVERAL DAYS
3. WORRYING TOO MUCH ABOUT DIFFERENT THINGS: NEARLY EVERY DAY
5. BEING SO RESTLESS THAT IT IS HARD TO SIT STILL: SEVERAL DAYS
7. FEELING AFRAID AS IF SOMETHING AWFUL MIGHT HAPPEN: MORE THAN HALF THE DAYS
IF YOU CHECKED OFF ANY PROBLEMS ON THIS QUESTIONNAIRE, HOW DIFFICULT HAVE THESE PROBLEMS MADE IT FOR YOU TO DO YOUR WORK, TAKE CARE OF THINGS AT HOME, OR GET ALONG WITH OTHER PEOPLE: NOT DIFFICULT AT ALL

## 2022-03-09 NOTE — Clinical Note
Mayank Hilliard,    I saw Gemma yesterday and I really am not sure why but both of her meters are not working correctly. Do you mind if I send in a new system for her to use? Also we were looking at her COPD symptoms and she would qualify for a LAMA inhaler (Spiriva) to help with her breathing. Do you agree with this plan? I appreciate your input on this!    Thanks!    Adenike Mason, PharmD  Medication Therapy Management Resident  Pager: [phone]

## 2022-03-09 NOTE — PATIENT INSTRUCTIONS
Recommendations from today's MTM visit:                                                       1. Start these instructions to switch your antidepressant from paroxetine to sertraline.  Week 1:  Take paroxetine 50 mg daily (One 30 mg tablet + two 10 mg tablets)  Week 2:  Take paroxetine 40 mg daily (One 30 mg tablet + one 10 mg tablet), start sertraline 25mg daily (One-half 50 mg tablet)  Week 2:  Take paroxetine 30 mg daily (One 30 mg tablet), increase sertraline to 50mg daily (One tablet)  Week 3:  Take paroxetine 20 mg daily (Two 10 mg tablets), increase sertraline 75 mg daily (One and one half of the 50 mg tablets)  Week 4:  Take paroxetine to 10 mg daily (One 10 mg tablet), increase to 100mg daily (One 100 mg tablet)  Week 5:  Stop  paroxetine, continue sertraline 100mg every day    2. I will ask Arminda about starting Spiriva for COPD/breathing.  3. I will ask Arminda to send in a new meter OneTouch Verio Flex for you to use.     Follow-up: Return in about 15 days (around 3/24/2022) for MTM Pharmacist Visit, using a phone visit.    It was great to speak with you today.  I value your experience and would be very thankful for your time with providing feedback on our clinic survey. You may receive a survey via email or text message in the next few days.     To schedule another MTM appointment, please call the clinic directly or you may call the MTM scheduling line at [phone] or toll-free at [phone].     My Clinical Pharmacist's contact information:                                                      Please feel free to contact me with any questions or concerns you have.      Adenike Mason, PharmD  Medication Therapy Management Resident  Pager: [phone]

## 2022-03-09 NOTE — PROGRESS NOTES
Medication Therapy Management (MTM) Encounter    ASSESSMENT:                            Medication Adherence/Access: No issues identified    Diabetes: Patient is meeting A1c goal of < 7%. Unclear what fasting blood sugar readings are. Both OneTouch Ultra 2 meters appear to be incorrectly reading blood sugar. Control solution unavailable to use at appointment today and test strips were in date. Would benefit from a new meter system and test strips and control solution to take accurate SMBG readings.    Hyperlipidemia: Would benefit from restarting atorvastatin daily.    Leg swelling (hx of DVT x3): Stable.    Depression/Anxiety: Patient is not meeting PHQ9 goal <5 or GAD7 goal of <4. Patient would benefit from starting cross taper to sertraline to improve symptoms and minimize side effects from paroxetine.    COPD/Post-COVID: Based on GOLD Guideline recommendations, patient should be using a LAMA or LABA for COPD management due to CAT score being >10. Would benefit from starting Spiriva Respimat 2.5 mcg 2 puffs daily to help with symptoms. Would also benefit from completing spirometry to confirm diagnosis.    PLAN:                            1. Start these instructions to switch your antidepressant from paroxetine to sertraline.  Week 1:  Take paroxetine 50 mg daily (One 30 mg tablet + two 10 mg tablets)  Week 2:  Take paroxetine 40 mg daily (One 30 mg tablet + one 10 mg tablet), start sertraline 25mg daily (One-half 50 mg tablet)  Week 2:  Take paroxetine 30 mg daily (One 30 mg tablet), increase sertraline to 50mg daily (One tablet)  Week 3:  Take paroxetine 20 mg daily (Two 10 mg tablets), increase sertraline 75 mg daily (One and one half of the 50 mg tablets)  Week 4:  Take paroxetine to 10 mg daily (One 10 mg tablet), increase to 100mg daily (One 100 mg tablet)  Week 5:  Stop  paroxetine, continue sertraline 100mg every day    2. I will ask Arminda about starting Spiriva for COPD/breathing.  3. I will ask Arminda to send  in a new meter OneTouch Verio Flex and control solution for you to use.   - Arminda Loyola agrees with plan. Orders sent, patient notified.    Follow-up: Return in about 15 days (around 3/24/2022) for MTM Pharmacist Visit, using a phone visit.    SUBJECTIVE/OBJECTIVE:                          Gemma Cowart is a 76 year old female coming in for a follow-up visit.  Today's visit is a follow-up MTM visit from 22.     Reason for visit: Diabetes follow up    Allergies/ADRs: Reviewed in chart  Past Medical History: Reviewed in chart  Tobacco: She reports that she quit smoking about 35 years ago. Her smoking use included cigarettes. She has a 37.50 pack-year smoking history. She has never used smokeless tobacco.  Alcohol: Less than 1 beverage / month  Activity: Walks for at least 15 minutes daily.    Medication Adherence/Access: Patient takes medications directly from bottles and uses pill box(es).  Patient takes medications 2 time(s) per day.   Per patient, misses medication 0-1 times per week.   Medication barriers: none.   The patient fills medications at [employer]: NO, fills medications at [facility] in [place]. Reports they will both fill and deliver medications to her which is very convenient.    Type 2 Diabetes:  Currently taking none. Meter is not working well. Takes her blood sugar and it is in the 200s then takes 1 minute later and is in the 140s. Has been happening consistently. She has been diet controlling her diabetes for a long time but all of a sudden she has been having both highs and lows. Did get a new OneTouch Ultra 2 meter from the pharmacy and she is still having readings nearly 100 points apart when she tests 1 minute apart. Does not have control solution. Patient is unsure what the right number is and is concerned that her blood sugars are out of range.  Blood sugar monitorin-3 time(s) daily. Ranges:   Date FBG/ 2hours post Lunch/2hours post Dinner /2hours post   3/9 162     3/6 160/173     3/5  249/148     3/3 155/239  147   3/2 129/219/221     2/27 252/250/273       Symptoms of low blood sugar? shaky, dizzy, sweaty. Had symptoms last week but meter showed a blood sugar over 100. Pt treated with OJ and felt better within 15 minutes.  Symptoms of high blood sugar? polydipsia and fatigue. Does have some polyuria but this is mainly overnight and she is unsure if it is related to blood sugars.  Eye exam: up to date  Foot exam: up to date  Diet/Exercise: Eats a balanced diet. Trys to limit carbohydrates the best she can.  Aspirin: No  Statin: Yes: Atorvastatin 10 mg   ACEi/ARB: No.   Urine Albumin:   Lab Results   Component Value Date    UMALCR 13.77 02/01/2022      Lab Results   Component Value Date    A1C 6.5 02/01/2022    A1C 6.2 03/23/2021    A1C 5.9 12/17/2020    A1C 5.7 09/06/2019    A1C 5.7 09/07/2018    A1C 5.5 02/26/2018     Creatinine   Date Value Ref Range Status   02/01/2022 1.40 (H) 0.52 - 1.04 mg/dL Final   03/23/2021 1.32 (H) 0.52 - 1.04 mg/dL Final     GFR Estimate   Date Value Ref Range Status   02/01/2022 39 (L) >60 mL/min/1.73m2 Final     Comment:     Effective December 21, 2021 eGFRcr in adults is calculated using the 2021 CKD-EPI creatinine equation which includes age and gender (Stephanie alexis al., NEJM, DOI: 10.1056/AKQDmk3690781)   03/23/2021 40 (L) >60 mL/min/[1.73_m2] Final     Comment:     Non  GFR Calc  Starting 12/18/2018, serum creatinine based estimated GFR (eGFR) will be   calculated using the Chronic Kidney Disease Epidemiology Collaboration   (CKD-EPI) equation.       GFR Estimate If Black   Date Value Ref Range Status   03/23/2021 46 (L) >60 mL/min/[1.73_m2] Final     Comment:      GFR Calc  Starting 12/18/2018, serum creatinine based estimated GFR (eGFR) will be   calculated using the Chronic Kidney Disease Epidemiology Collaboration   (CKD-EPI) equation.       Estimated Creatinine Clearance: 36.6 mL/min (A) (based on SCr of 1.4 mg/dL  (H)).    Hyperlipidemia: Current therapy includes atorvastatin 10 mg daily. Was able to get this medication from the pharmacy but cannot remember if she started taking it again or not. Patient reports no previous significant myalgias or other side effects.  The 10-year ASCVD risk score (Samuel HERNÁNDEZ Jr., et al., 2013) is: 30.8%    Values used to calculate the score:      Age: 76 years      Sex: Female      Is Non- : No      Diabetic: Yes      Tobacco smoker: No      Systolic Blood Pressure: 134 mmHg      Is BP treated: No      HDL Cholesterol: 62 mg/dL      Total Cholesterol: 184 mg/dL  Recent Labs   Lab Test 02/01/22  0850 12/17/20  0806 10/12/16  0915 09/25/15  0939 08/08/15  1047   CHOL 184 186   < > 147 188   HDL 62 59   < > 60 54   * 107*   < > 63 106   TRIG 104 98   < > 121 138   CHOLHDLRATIO  --   --   --  2.5 3.5    < > = values in this interval not displayed.     Leg swelling (hx of DVT x3): Currently on Eliquis 5mg twice daily. No bleeding or bruising reported.    Per Arminda Milla note from 2/14, Likely from blood clots or varicose veins. Referred to Vascular.    Anxiety/Depression: Currently taking paroxetine 30 mg twice daily. Plans to start new instructions tomorrow to switch to sertraline. Reports side effects of dry mouth, some constipation, memory impairment, and fatigue still. States she is under extreme stress due to son being started on dialysis, her own health conditions, and a recent move to be closer to her family. Has never tried counseling. Does not feel that paroxetine is managing her symptoms well. Reports she feels like crying the instant someone says anything about her, even if it isn't negative.    Week 1:  Take paroxetine 50 mg daily   Week 2:  Take paroxetine 40 mg daily, start sertraline 25mg daily   Week 2:  Take paroxetine 30 mg daily, increase sertraline to 50mg daily   Week 3:  Take paroxetine 20 mg daily, increase sertraline 75 mg daily   Week 4:  Take  paroxetine to 10 mg daily, increase to 100mg daily   Week 5:  Stop  paroxetine, continue sertraline 100mg every day    2/14 PCP Note: Ongoing issue, worse lately. I suspect Paxil could be contributing to memory loss and fatigue. Goal to reduce Paxil while improving anxiety. Taking one in the am, 1 in pm    PHQ 9/6/2019 6/15/2021 3/9/2022   PHQ-9 Total Score 8 3 11   Q9: Thoughts of better off dead/self-harm past 2 weeks Not at all Not at all Not at all     ELI-7 SCORE 9/6/2019 6/15/2021 3/9/2022   Total Score - - -   Total Score 2 3 11     COPD/Post-Covid: Current medications: Short-Acting Bronchodilator: Albuterol MDI.      Patient reports the following symptoms: Reports feeling very fatigued which has not improved since having covid. Has had some shortness of breath but no wheezing the last 3 days. Only uses albuterol inhaler when she is wheezing (3-4 times weekly right now). Does note that shortness of breath has gotten worse. States her apartment is small but she still has to sit and rest to catch her breath between vacuuming the living room then her bedroom.   Patient does not have an COPD Action Plan on file.   Has spirometry been completed: No - orders have been placed  Patient supplied answers from flow sheet for:  COPD Assessment Test (CAT)  2009 [facility]. All rights reserved.  COPD assessment test (CAT) 3/9/2022   Cough 1   Phlegm 1   Chest tightness 2   Walk up hill 5   Limited activities 2   Leaving my home 0   Sleep 0   Energy 4   Total Score 15      CAT Key:  The CAT consist of 8 items which are each scored 0-5. The total score ranges from 0-40 with higher scores representing a poorer health status. When interpreting CAT scores, the individual s disease severity should be considered.   Low impact  (1-9)  Medium impact  (10-20)  High impact  (21-30)  Very high impact  (31-40)      Immunization History   Administered Date(s) Administered     COVID-19,PF,Pfizer (12+ Yrs) 03/16/2021, 04/02/2021, 02/25/2022      FLU 6-35 months 10/05/2007     Influenza (High Dose) 3 valent vaccine 10/07/2013, 10/09/2015, 10/12/2016, 10/02/2017, 09/07/2018, 09/06/2019     Influenza (IIV3) PF 10/19/2012     Influenza, Quad, High Dose, Pf, 65yr+ (Fluzone HD) 10/02/2020, 10/21/2021     Pneumo Conj 13-V (2010&after) 10/09/2015     Pneumococcal 23 valent 04/03/2008, 04/23/2013     TD (ADULT, 7+) 01/01/2006, 09/06/2019     TDAP Vaccine (Adacel) 04/03/2008, 04/23/2013     Zoster vaccine recombinant adjuvanted (SHINGRIX) 02/25/2022       Today's Vitals: /70   Wt 194 lb 9.6 oz (88.3 kg)   LMP 09/24/1990   BMI 34.47 kg/m    ----------------    I spent 60 minutes with this patient today. I offer these suggestions for consideration by Arminda Loyola. A copy of the visit note was provided to the patient's provider(s).    The patient was given a summary of these recommendations.     Adenike Mason PharmD  Medication Therapy Management Resident  Pager: [phone]    Gemma Cowart was seen independently by Dr. Mason. I have reviewed and agree with the resident note and plan of care.      Christelle Yañez PharmD [facility]  Medication Therapy Management Provider  Pager #[phone]     Medication Therapy Recommendations  COPD (chronic obstructive pulmonary disease) (H)    Current Medication: albuterol (PROAIR HFA/PROVENTIL HFA/VENTOLIN HFA) 108 (90 Base) MCG/ACT inhaler   Rationale: Synergistic therapy - Needs additional medication therapy - Indication   Recommendation: Start Medication - Spiriva Respimat 2.5 MCG/ACT Aers   Status: Accepted per Provider         Type 2 diabetes mellitus with other specified complication (H)    Current Medication: blood glucose (ONETOUCH ULTRA) test strip   Rationale: Incorrect administration - Dosage too low - Effectiveness   Recommendation: Change Medication - OneTouch Verio Flex System w/Device Kit - Also order control solution to determine efficacy or appropriate use   Status: Accepted per CPA

## 2022-03-10 RX ORDER — BLOOD-GLUCOSE CONTROL, NORMAL
EACH MISCELLANEOUS
Qty: 1 EACH | Refills: 0 | Status: SHIPPED | OUTPATIENT
Start: 2022-03-10 | End: 2023-05-23

## 2022-03-10 RX ORDER — BLOOD SUGAR DIAGNOSTIC
STRIP MISCELLANEOUS
Qty: 100 STRIP | Refills: 3 | Status: SHIPPED | OUTPATIENT
Start: 2022-03-10 | End: 2022-06-08

## 2022-03-10 RX ORDER — BLOOD-GLUCOSE METER
1 EACH MISCELLANEOUS ONCE
Qty: 1 KIT | Refills: 0 | Status: SHIPPED | OUTPATIENT
Start: 2022-03-10 | End: 2023-11-30

## 2022-03-10 ASSESSMENT — ANXIETY QUESTIONNAIRES: GAD7 TOTAL SCORE: 11

## 2022-03-17 ENCOUNTER — ALLIED HEALTH/NURSE VISIT (OUTPATIENT)
Dept: EDUCATION SERVICES | Facility: CLINIC | Age: 77
End: 2022-03-17
Payer: COMMERCIAL

## 2022-03-17 DIAGNOSIS — E11.69 TYPE 2 DIABETES MELLITUS WITH OTHER SPECIFIED COMPLICATION, WITHOUT LONG-TERM CURRENT USE OF INSULIN (H): ICD-10-CM

## 2022-03-17 PROCEDURE — 95250 CONT GLUC MNTR PHYS/QHP EQP: CPT

## 2022-03-17 NOTE — PATIENT INSTRUCTIONS
1. Plan to wear the LibrePro sensor for 14 days. It is okay to shower, bathe, and swim (up to 3 feet deep for 30 minutes)    2. Continue with your usual diabetes care plan - check blood sugars and take medicines, as prescribed.    3. Keep a log of what you eat and drink, when you take your medications and how much you take, and exercise you do while you are wearing the sensor.    4. Do not cover the sensor with extra adhesive (the small hole in the center of the sensor must remain uncovered)    5. Use a little extra care, especially when getting dressed or exercising, to avoid accidentally loosening or removing the sensor.     6. Remove the sensor if you need to have an MRI or CT scan.     If the LibrePro sensor comes off early, place it in a plastic bag or envelope and call your diabetes educator or bring it with you to your follow-up visit.     Return the sensor to the [facility] on March 31, 2022.    Follow-up appointment: March 31 at 1pm.    [facility] for the [facility] Area:  For Your Diabetes Education and Nutrition Appointments Call:  [phone]   For Diabetes Education or Nutrition Related Questions:   Phone: [phone]  Send [employer] Message   If you need a medication refill please contact your pharmacy. Please allow 3 business days for your refills to be completed.

## 2022-03-17 NOTE — Clinical Note
Arminda,    Can you please place a new diabetes education referral for Gemma. The one Adenike placed lists her as the provider and it needs to list you as the provider even if she placed the referral. Thanks in advance for the help!    Latosha Elmore RN, [facility] [place]

## 2022-03-17 NOTE — PROGRESS NOTES
Diabetes Self-Management Education & Support  Professional Continuous Glucose Monitor Insertion    SUBJECTIVE/OBJECTIVE:     Gemma WYATT Sofya presents for professional Continuous Glucose Monitor Insertion.     Patient comments/concerns: BG meters giving very different results, even tried a new BG meter and same difference. Up to 100mg/dL off at times.    Lab Results:  Lab Results   Component Value Date    A1C 6.5 02/01/2022    A1C 6.2 03/23/2021      Lab Results   Component Value Date     03/07/2022     02/01/2022    GLC 96 03/23/2021       Medication:      ASSESSMENT:    CGM study indicated for: Unexplained fluctuations in glucose values     INTERVENTION:   Sensor started today.     Sensor Type: LibrePro  Lot #: [id number]  Serial #: [id number]  Expiration Date: 06/30/22       Sensor was inserted with no resistance or bleeding at insertion site.      Pt will plan to wear the sensor through 3-31-22.    WRITTEN AND VERBAL INFORMATION GIVEN TO SUPPORT UNDERSTANDING OF:  LibrePro CGM: Sensor insertion, intention of monitoring for 14 days. Keep records of BG, food intake, exercise, and medication dosing during wear.       Patient verbalizes understanding of how to remove sensor, if needed, and all instructions provided.     Educational and other materials:  Food/exercise/medication log sheets  Contact information    PLAN:  Pt was given instructions for tracking BG, medications, food intake and activity.  Patient to return all items associated with the professional Continuous Glucose Monitor System.  See Patient Instructions, AVS printed and provided to patient today.    Follow-up:    Follow up on 3-31-22.    Latosha Elmore RN, [facility]      Time spent in DSMT: 0 minutes   Time spent in CGM insertion: 25 minutes, in addition to time spent in DSMT  Encounter Type: Individual

## 2022-03-17 NOTE — LETTER
3/17/2022         RE: Gemma Cowart  [address] [place] [address] [place] [id number]        Dear Colleague,    Thank you for referring your patient, Gemma Cowart, to the [facility]. Please see a copy of my visit note below.    Diabetes Self-Management Education & Support  Professional Continuous Glucose Monitor Insertion    SUBJECTIVE/OBJECTIVE:     Gemma Cowart presents for professional Continuous Glucose Monitor Insertion.     Patient comments/concerns: BG meters giving very different results, even tried a new BG meter and same difference. Up to 100mg/dL off at times.    Lab Results:  Lab Results   Component Value Date    A1C 6.5 02/01/2022    A1C 6.2 03/23/2021      Lab Results   Component Value Date     03/07/2022     02/01/2022    GLC 96 03/23/2021       Medication:      ASSESSMENT:    CGM study indicated for: Unexplained fluctuations in glucose values     INTERVENTION:   Sensor started today.     Sensor Type: LibrePro  Lot #: [id number]  Serial #: [id number]  Expiration Date: 06/30/22       Sensor was inserted with no resistance or bleeding at insertion site.      Pt will plan to wear the sensor through 3-31-22.    WRITTEN AND VERBAL INFORMATION GIVEN TO SUPPORT UNDERSTANDING OF:  LibrePro CGM: Sensor insertion, intention of monitoring for 14 days. Keep records of BG, food intake, exercise, and medication dosing during wear.       Patient verbalizes understanding of how to remove sensor, if needed, and all instructions provided.     Educational and other materials:  Food/exercise/medication log sheets  Contact information    PLAN:  Pt was given instructions for tracking BG, medications, food intake and activity.  Patient to return all items associated with the professional Continuous Glucose Monitor System.  See Patient Instructions, AVS printed and provided to patient today.    Follow-up:    Follow up on 3-31-22.    Latosha Elmore RN, [facility]      Time spent in DSMT: 0  minutes   Time spent in CGM insertion: 25 minutes, in addition to time spent in DSMT  Encounter Type: Individual

## 2022-03-28 ENCOUNTER — PATIENT OUTREACH (OUTPATIENT)
Dept: GERIATRIC MEDICINE | Facility: CLINIC | Age: 77
End: 2022-03-28
Payer: COMMERCIAL

## 2022-03-28 NOTE — LETTER
March 28, 2022    SALUD MARTINEZ  [address] [place]  [place] [address]      Dear Salud:    As a member of [facility] ([facility] ([facility]), you are provided a care coordinator. I will be your new care coordinator as of 4/1/2022. I will be calling you soon to see how you are doing and determine your needs.    If you have any questions, please feel free to call me at [phone]. If you reach my voice mail, please leave a message and your phone number. If you are hearing impaired, please call the [place] Relay at [phone] or [phone] ([email or web address] relay service).    I look forward to speaking with you soon.    Sincerely,      Ifrah Emerson RN    E-Mail:  [email or web address]  Phone: [phone]      [facility] is a health plan that contracts with both Medicare and the [place] Medical Assistance (Medicaid) program to provide benefits of both programs to enrollees. Enrollment in [facility] depends on contract renewal.      [facility]  [id number] DHS Approved ([id number])  [id number] (11/18)

## 2022-03-28 NOTE — PROGRESS NOTES
[facility] Care Coordination Contact    Internal CC change effective 4/1/22.  Mailed member CC Change letter.  Additional tasks to be completed by CMS include: update database & EPIC, enter CC Change in MMIS, and move member files on Q drive.  Carleen Sidhu  Case Management Specialist  [facility]  [phone]

## 2022-03-29 ENCOUNTER — LAB (OUTPATIENT)
Dept: INFUSION THERAPY | Facility: CLINIC | Age: 77
End: 2022-03-29
Attending: INTERNAL MEDICINE
Payer: COMMERCIAL

## 2022-03-29 DIAGNOSIS — D50.9 IRON DEFICIENCY ANEMIA, UNSPECIFIED IRON DEFICIENCY ANEMIA TYPE: ICD-10-CM

## 2022-03-29 LAB
BASOPHILS # BLD AUTO: 0.1 10E3/UL (ref 0–0.2)
BASOPHILS NFR BLD AUTO: 1 %
EOSINOPHIL # BLD AUTO: 0.1 10E3/UL (ref 0–0.7)
EOSINOPHIL NFR BLD AUTO: 3 %
ERYTHROCYTE [DISTWIDTH] IN BLOOD BY AUTOMATED COUNT: 24.3 % (ref 10–15)
FERRITIN SERPL-MCNC: 282 NG/ML (ref 8–252)
HCT VFR BLD AUTO: 41.6 % (ref 35–47)
HGB BLD-MCNC: 12.2 G/DL (ref 11.7–15.7)
IMM GRANULOCYTES # BLD: 0 10E3/UL
IMM GRANULOCYTES NFR BLD: 0 %
IRON SATN MFR SERPL: 22 % (ref 15–46)
IRON SERPL-MCNC: 65 UG/DL (ref 35–180)
LYMPHOCYTES # BLD AUTO: 0.9 10E3/UL (ref 0.8–5.3)
LYMPHOCYTES NFR BLD AUTO: 19 %
MCH RBC QN AUTO: 23.3 PG (ref 26.5–33)
MCHC RBC AUTO-ENTMCNC: 29.3 G/DL (ref 31.5–36.5)
MCV RBC AUTO: 79 FL (ref 78–100)
MONOCYTES # BLD AUTO: 0.3 10E3/UL (ref 0–1.3)
MONOCYTES NFR BLD AUTO: 7 %
NEUTROPHILS # BLD AUTO: 3.1 10E3/UL (ref 1.6–8.3)
NEUTROPHILS NFR BLD AUTO: 70 %
NRBC # BLD AUTO: 0 10E3/UL
NRBC BLD AUTO-RTO: 0 /100
PLATELET # BLD AUTO: 193 10E3/UL (ref 150–450)
RBC # BLD AUTO: 5.24 10E6/UL (ref 3.8–5.2)
TIBC SERPL-MCNC: 294 UG/DL (ref 240–430)
WBC # BLD AUTO: 4.4 10E3/UL (ref 4–11)

## 2022-03-29 PROCEDURE — 83550 IRON BINDING TEST: CPT | Performed by: INTERNAL MEDICINE

## 2022-03-29 PROCEDURE — 36415 COLL VENOUS BLD VENIPUNCTURE: CPT

## 2022-03-29 PROCEDURE — 85025 COMPLETE CBC W/AUTO DIFF WBC: CPT | Performed by: INTERNAL MEDICINE

## 2022-03-29 PROCEDURE — 82728 ASSAY OF FERRITIN: CPT | Performed by: INTERNAL MEDICINE

## 2022-03-29 NOTE — PROGRESS NOTES
Medical Assistant Note:  Gemma Cowart presents today for blood draw.    Patient seen by provider today: No.   present during visit today: Not Applicable.    Concerns: No Concerns.    Procedure:  Lab draw site: rac, Needle type: bf, Gauge: 23.    Post Assessment:  Labs drawn without difficulty: Yes.    Discharge Plan:  Departure Mode: Ambulatory.    Face to Face Time: 5 min.    Paula Miller, CMA

## 2022-03-30 ENCOUNTER — TELEPHONE (OUTPATIENT)
Dept: OTHER | Facility: CLINIC | Age: 77
End: 2022-03-30
Payer: COMMERCIAL

## 2022-03-30 NOTE — TELEPHONE ENCOUNTER
Referral made on 2/14/22 and did not drop into Vascular Workqueue until 03/30/22.    Pt referred to [facility] by Arminda Loyola APRN CNP for left leg swelling.    Pt needs to be scheduled for in person consult with Vascular Medicine.  Will route to scheduling to coordinate an appointment at next available.    Appt note: Ref by ANTON Armas CNP for left leg swelling; notes in Epic.    YENI LindsayN, RN-[facility] [place]

## 2022-03-31 ENCOUNTER — ALLIED HEALTH/NURSE VISIT (OUTPATIENT)
Dept: EDUCATION SERVICES | Facility: CLINIC | Age: 77
End: 2022-03-31
Payer: COMMERCIAL

## 2022-03-31 ENCOUNTER — OFFICE VISIT (OUTPATIENT)
Dept: PHARMACY | Facility: CLINIC | Age: 77
End: 2022-03-31
Payer: COMMERCIAL

## 2022-03-31 DIAGNOSIS — M54.9 BACK PAIN, UNSPECIFIED BACK LOCATION, UNSPECIFIED BACK PAIN LATERALITY, UNSPECIFIED CHRONICITY: ICD-10-CM

## 2022-03-31 DIAGNOSIS — D50.9 IRON DEFICIENCY ANEMIA, UNSPECIFIED IRON DEFICIENCY ANEMIA TYPE: ICD-10-CM

## 2022-03-31 DIAGNOSIS — E11.69 TYPE 2 DIABETES MELLITUS WITH OTHER SPECIFIED COMPLICATION, WITHOUT LONG-TERM CURRENT USE OF INSULIN (H): Primary | ICD-10-CM

## 2022-03-31 DIAGNOSIS — F32.A DEPRESSION, UNSPECIFIED DEPRESSION TYPE: ICD-10-CM

## 2022-03-31 DIAGNOSIS — U09.9 POST COVID-19 CONDITION, UNSPECIFIED: ICD-10-CM

## 2022-03-31 DIAGNOSIS — J44.9 CHRONIC OBSTRUCTIVE PULMONARY DISEASE, UNSPECIFIED COPD TYPE (H): ICD-10-CM

## 2022-03-31 DIAGNOSIS — F41.9 ANXIETY: ICD-10-CM

## 2022-03-31 DIAGNOSIS — Z86.718 PERSONAL HISTORY OF DVT (DEEP VEIN THROMBOSIS): ICD-10-CM

## 2022-03-31 DIAGNOSIS — E78.5 HYPERLIPIDEMIA LDL GOAL <100: ICD-10-CM

## 2022-03-31 PROCEDURE — 99606 MTMS BY PHARM EST 15 MIN: CPT

## 2022-03-31 PROCEDURE — 99607 MTMS BY PHARM ADDL 15 MIN: CPT

## 2022-03-31 PROCEDURE — G0108 DIAB MANAGE TRN  PER INDIV: HCPCS

## 2022-03-31 RX ORDER — MULTIVIT-MIN/IRON/FOLIC ACID/K 18-600-40
1 CAPSULE ORAL DAILY
COMMUNITY
End: 2022-06-01

## 2022-03-31 NOTE — PROGRESS NOTES
Medication Therapy Management (MTM) Encounter    ASSESSMENT:                            Medication Adherence/Access: No issues identified    Diabetes: Patient is meeting A1c goal of < 7%. Self monitoring of blood glucose is at goal of time in target >70%. Professional CGM shows good blood glucose levels. Erratic blood sugar readings on meter were inaccurate and likely caused by a faulty meter. Would benefit from using new blood sugar meter for testing and maintaining good diet and exercise habits.    Hyperlipidemia: Patient is unsure if she is taking atorvastatin daily. Would benefit from taking atorvastatin daily to help reduce LDL.    Back Pain: Patient is having new bothersome back pain. Would benefit from taking acetaminophen 1000 mg three times daily as needed as prescribed and following up with PCP if pain worsens.    Leg swelling (hx of DVT x3): Stable.    Depression/Anxiety: Patient is tolerating cross taper of antidepressants well. Would benefit from continuing to taper for 3 more weeks until paroxetine can be discontinued.    COPD/Post-Covid: Patient is still experiencing some wheezing symptoms and is using rescue inhaler several times per week. Patient has not started using prescribed maintenance inhaler yet but was able to get it from the pharmacy. Would benefit from starting Spiriva to improve breathing symptoms.      Anemia: Patient does not know if iron should be continued after completing labs that show improvement in iron levels. Would benefit from completing follow up appointment with Dr. Rodriguez and clarifying plan for iron supplementation at that visit.    PLAN:                            1. Take Tylenol if you need it for your back muscle pain. Please make an appointment with Arminda if your pain worsens.  2. Make sure you are taking atorvastatin for your cholesterol.  3. Start taking Spiriva inhaler every day to help with breathing.  4. Ask Dr. Rodriguez at your appointment tomorrow if you can stop  taking the iron or if you should continue this.    Follow-up: Return in about 2 months (around 5/31/2022) for MTM Pharmacist Visit. Follow up adherence phone call in 1 week.    SUBJECTIVE/OBJECTIVE:                          Gemma Cowart is a 76 year old female coming in for a follow-up visit.  Today's visit is a follow-up MTM visit from 3/9/22.     Reason for visit: COPD and antidepressant cross taper follow up    Allergies/ADRs: Reviewed in chart  Past Medical History: Reviewed in chart  Tobacco: She reports that she quit smoking about 35 years ago. Her smoking use included cigarettes. She has a 37.50 pack-year smoking history. She has never used smokeless tobacco.  Alcohol: Less than 1 beverage / month    Medication Adherence/Access: Patient takes medications directly from bottles and uses pill box(es).  Patient takes medications 2 time(s) per day.   Per patient, misses medication 0-1 times per week. Has a calendar that she is using to keep track of medications and doctors appointments. Feels this is working well to remember everything that is going on.  Medication barriers: none.   The patient fills medications at [facility]: NO, fills medications at [facility] in [place]. Reports they will both fill and deliver medications to her which is very convenient.    Type 2 Diabetes:  Currently taking none. Patients meters were not reading appropriately at last visit. Reports she took them into the pharmacy because [facility] offered to test them with control solution for her. Both meters did not display correctly when the solution was used. A new meter was sent into the pharmacy but patient states she has not started using it yet due to having diagnostic CGM. Scheduled to see diabetes education after visit today. Results of diagnostic CGM below:           Symptoms of low blood sugar? shaky, dizzy, sweaty. No hypoglycemic events recently.  Symptoms of high blood sugar? polydipsia and fatigue. Does have some polyuria but this  "is mainly overnight and she is unsure if it is related to blood sugars. Thinks fatigue is related to post-covid.  Eye exam: up to date  Foot exam: Due  Diet/Exercise: Eats a balanced diet. Trys to limit carbohydrates the best she can.  Aspirin: No  Statin: Yes: Atorvastatin 10 mg   ACEi/ARB: No.   Urine Albumin:   Lab Results   Component Value Date    UMALCR 13.77 02/01/2022      Lab Results   Component Value Date    A1C 6.5 02/01/2022    A1C 6.2 03/23/2021    A1C 5.9 12/17/2020    A1C 5.7 09/06/2019    A1C 5.7 09/07/2018    A1C 5.5 02/26/2018     Creatinine   Date Value Ref Range Status   02/01/2022 1.40 (H) 0.52 - 1.04 mg/dL Final   03/23/2021 1.32 (H) 0.52 - 1.04 mg/dL Final     GFR Estimate   Date Value Ref Range Status   02/01/2022 39 (L) >60 mL/min/1.73m2 Final     Comment:     Effective December 21, 2021 eGFRcr in adults is calculated using the 2021 CKD-EPI creatinine equation which includes age and gender (Stephanie et al., NEJ, DOI: 10.1056/PXGDxu2281013)   03/23/2021 40 (L) >60 mL/min/[1.73_m2] Final     Comment:     Non  GFR Calc  Starting 12/18/2018, serum creatinine based estimated GFR (eGFR) will be   calculated using the Chronic Kidney Disease Epidemiology Collaboration   (CKD-EPI) equation.       GFR Estimate If Black   Date Value Ref Range Status   03/23/2021 46 (L) >60 mL/min/[1.73_m2] Final     Comment:      GFR Calc  Starting 12/18/2018, serum creatinine based estimated GFR (eGFR) will be   calculated using the Chronic Kidney Disease Epidemiology Collaboration   (CKD-EPI) equation.       Estimated Creatinine Clearance: 36.6 mL/min (A) (based on SCr of 1.4 mg/dL (H)).    Hyperlipidemia: Current therapy includes atorvastatin 10 mg daily. Was able to get this medication from the pharmacy but cannot remember if she started taking it again or not. Reports she is \"fairly sure\" she is taking it because she remembers the pharmacy delivered it and she placed it with her other " medications. Patient reports pain in her back and starting to wrap around her side but does not think it is due to the atorvastatin.  Had previously taken statins before but stopped because she was at LDL goal and felt she did not need them.  The 10-year ASCVD risk score (Samuel HERNÁNDEZ Jr., et al., 2013) is: 30.8%    Values used to calculate the score:      Age: 76 years      Sex: Female      Is Non- : No      Diabetic: Yes      Tobacco smoker: No      Systolic Blood Pressure: 134 mmHg      Is BP treated: No      HDL Cholesterol: 62 mg/dL      Total Cholesterol: 184 mg/dL  Recent Labs   Lab Test 02/01/22  0850 12/17/20  0806 10/12/16  0915 09/25/15  0939 08/08/15  1047   CHOL 184 186   < > 147 188   HDL 62 59   < > 60 54   * 107*   < > 63 106   TRIG 104 98   < > 121 138   CHOLHDLRATIO  --   --   --  2.5 3.5    < > = values in this interval not displayed.     Back Pain: Currently taking acetaminophen 1000 mg three times daily as needed (uses rarely). Reports some new lower back pain that is starting to wrap around her side. Thinks this may be due to a new kidney stone due to pain being similar to previous kidney stones.    Leg swelling (hx of DVT x3): Currently on Eliquis 5mg twice daily. No bleeding or bruising reported.    Per Arminda Milla note from 2/14, Likely from blood clots or varicose veins. Referred to Vascular.    Anxiety/Depression: Currently taking paroxetine 30 mg daily and sertaline 50 mg daily. Reports side effects of dry mouth, some constipation, memory impairment, and fatigue still. States she is under extreme stress due to son being started on dialysis, her own health conditions, and a recent move to be closer to her family. Has never tried counseling. Reports she has not noticed her mood changing any even with the changing doses of medication.    Week 1:  Take paroxetine 50 mg daily   Week 2:  Take paroxetine 40 mg daily, start sertraline 25mg daily   Week 3:  Take  paroxetine 30 mg daily, increase sertraline to 50mg daily   Week 4:  Take paroxetine 20 mg daily, increase sertraline 75 mg daily   Week 5:  Take paroxetine to 10 mg daily, increase to 100mg daily   Week 6:  Stop  paroxetine, continue sertraline 100mg every day    2/14 PCP Note: Ongoing issue, worse lately. I suspect Paxil could be contributing to memory loss and fatigue. Goal to reduce Paxil while improving anxiety. Taking one in the am, 1 in pm    PHQ 9/6/2019 6/15/2021 3/9/2022   PHQ-9 Total Score 8 3 11   Q9: Thoughts of better off dead/self-harm past 2 weeks Not at all Not at all Not at all     ELI-7 SCORE 9/6/2019 6/15/2021 3/9/2022   Total Score - - -   Total Score 2 3 11     COPD/Post-Covid: Current medications: Short-Acting Bronchodilator: Albuterol MDI, Spiriva daily. Has Spirivia at home but has not started it yet. Plans to start this tomorrow. Reports that the pharmacy gave her 2 albuterol inhalers but they look different.     Patient reports the following symptoms: Reports feeling very fatigued which has not improved since having covid. Only uses albuterol inhaler when she is wheezing (3-4 times weekly right now). Does note that shortness of breath has gotten worse. States her apartment is small but she still has to sit and rest to catch her breath between vacuuming the living room then her bedroom.   Patient does have an COPD Action Plan on file - completed today   Has spirometry been completed: No - orders have been placed  Patient supplied answers from flow sheet for:  COPD Assessment Test (CAT)  2009 [employer]. All rights reserved.  COPD assessment test (CAT) 3/9/2022   Cough 1   Phlegm 1   Chest tightness 2   Walk up hill 5   Limited activities 2   Leaving my home 0   Sleep 0   Energy 4   Total Score 15      CAT Key:  The CAT consist of 8 items which are each scored 0-5. The total score ranges from 0-40 with higher scores representing a poorer health status. When interpreting CAT scores, the individual s  disease severity should be considered.   Low impact  (1-9)  Medium impact  (10-20)  High impact  (21-30)  Very high impact  (31-40)    Anemia: Currently taking ferrous sulfate 325mg daily. Reports medication is causing upset stomach even when she takes it with food. Denies other side effects. Wondering when she can stop this medication. States she received 2 iron infusions prior to having labs done this week.  Hemoglobin   Date Value Ref Range Status   03/29/2022 12.2 11.7 - 15.7 g/dL Final   10/02/2020 11.7 11.7 - 15.7 g/dL Final     Comment:     Results confirmed by repeat test     Ferritin   Date Value Ref Range Status   03/29/2022 282 (H) 8 - 252 ng/mL Final   09/06/2019 13 8 - 252 ng/mL Final     Iron   Date Value Ref Range Status   03/29/2022 65 35 - 180 ug/dL Final   09/24/2013 60 35 - 180 ug/dL Final     Iron Binding Cap   Date Value Ref Range Status   09/24/2013 307 240 - 430 ug/dL Final     Iron Binding Capacity   Date Value Ref Range Status   03/29/2022 294 240 - 430 ug/dL Final       Today's Vitals: LMP 09/24/1990  Declined weight and blood pressure checks today.  ----------------    I spent 40 minutes with this patient today. I offer these suggestions for consideration by Arminda WALTON. A copy of the visit note was provided to the patient's provider(s).    The patient was given a summary of these recommendations.     Adenike Mason PharmD  Medication Therapy Management Resident  Pager: [phone]    Preceptor cosignature: Gemma Cowart was seen independently by Dr. Mason. I have reviewed the assessment and plan. Catrina Lugo, Darlene, CHINTAN, BCACP     Medication Therapy Recommendations  Back pain, unspecified back location, unspecified back pain laterality, unspecified chronicity    Current Medication: acetaminophen (TYLENOL) 500 MG tablet   Rationale: Does not understand instructions - Adherence - Adherence   Recommendation: Provide Education   Status: Patient Agreed -  Adherence/Education         COPD (chronic obstructive pulmonary disease) (H)    Current Medication: tiotropium (SPIRIVA RESPIMAT) 2.5 MCG/ACT inhaler   Rationale: Does not understand instructions - Adherence - Adherence   Recommendation: Provide Education   Status: Patient Agreed - Adherence/Education         Hyperlipidemia LDL goal <100    Current Medication: atorvastatin (LIPITOR) 10 MG tablet   Rationale: Does not understand instructions - Adherence - Adherence   Recommendation: Provide Education   Status: Patient Agreed - Adherence/Education

## 2022-03-31 NOTE — LETTER
My COPD Action Plan     Name: Gemma Cowart    YOB: 1945   Date: 3/31/2022    My doctor: MAUREEN ZULUAGA RP   My clinic: [facility]    [address] [place] [address]  [address]  [place] [address] [phone]  My Controller Medicine: Tiotropium (Spiriva)   Dose: 2.5 mcg (2 puffs) daily     My Rescue Medicine: Albuterol (Proair/Ventolin/Proventil) inhaler   Dose: 2 puffs every 6 hours if needed       My COPD Severity: COPD not scored yet      Use of Oxygen: Oxygen Not Prescribed      Make sure you've had your pneumonia   vaccines.          GREEN ZONE       Doing well today      Usual level of activity and exercise    Usual amount of cough and mucus    No shortness of breath    Usual level of health (thinking clearly, sleeping well, feel like eating) Actions:      Take daily medicines    Use oxygen as prescribed    Follow regular exercise and diet plan    Avoid cigarette smoke and other irritants that harm the lungs           YELLOW ZONE          Having a bad day or flare up      Short of breath more than usual    A lot more sputum (mucus) than usual    Sputum looks yellow, green, tan, brown or bloody    More coughing or wheezing    Fever or chills    Less energy; trouble completing activities    Trouble thinking or focusing    Using quick relief inhaler or nebulizer more often    Poor sleep; symptoms wake me up    Do not feel like eating Actions:      Get plenty of rest    Take daily medicines    Use quick relief inhaler every 6 hours    If you use oxygen, call you doctor to see if you should adjust your oxygen    Do breathing exercises or other things to help you relax    Let a loved one, friend or neighbor know you are feeling worse    Call your care team if you have 2 or more symptoms.  Start taking steroids or antibiotics if directed by your care team           RED ZONE       Need medical care now      Severe shortness of breath (feel you can't breathe)    Fever,  chills    Not enough breath to do any activity    Trouble coughing up mucus, walking or talking    Blood in mucus    Frequent coughing   Rescue medicines are not working    Not able to sleep because of breathing    Feel confused or drowsy    Chest pain    Actions:      Call your health care team.  If you cannot reach your care team, call 911 or go to the emergency room.        Annual Reminders:  Meet with Care Team, Flu Shot every Fall  [facility] [address] [place], [place] [phone] [facility]

## 2022-03-31 NOTE — PROGRESS NOTES
"Diabetes Self-Management Education & Support    Presents for: CGM Review    SUBJECTIVE/OBJECTIVE:  Presents for: CGM Review  Accompanied by: Self  Diabetes education in the past 24mo: Yes  Focus of Visit: CGM, Monitoring  Type of CGM visit: Professional CGM  Diabetes type: Type 2  Other concerns:: None  Cultural Influences/Ethnic Background:  American      Diabetes Symptoms & Complications:     Complications assessed today?: No    Patient Problem List and Family Medical History reviewed for relevant medical history, current medical status, and diabetes risk factors.    Vitals:  LMP 09/24/1990   Estimated body mass index is 34.47 kg/m  as calculated from the following:    Height as of 2/1/22: 1.6 m (5' 3\").    Weight as of 3/9/22: 88.3 kg (194 lb 9.6 oz).   Last 3 BP:   BP Readings from Last 3 Encounters:   03/09/22 116/70   03/08/22 138/70   03/07/22 (!) 139/107       History   Smoking Status     Former Smoker     Packs/day: 1.50     Years: 25.00     Types: Cigarettes     Quit date: 10/21/1986   Smokeless Tobacco     Never Used     Comment: quit 1985       Labs:  Lab Results   Component Value Date    A1C 6.5 02/01/2022    A1C 6.2 03/23/2021     Lab Results   Component Value Date     03/07/2022     02/01/2022    GLC 96 03/23/2021     Lab Results   Component Value Date     02/01/2022     12/17/2020     HDL Cholesterol   Date Value Ref Range Status   12/17/2020 59 >49 mg/dL Final     Direct Measure HDL   Date Value Ref Range Status   02/01/2022 62 >=50 mg/dL Final   ]  GFR Estimate   Date Value Ref Range Status   02/01/2022 39 (L) >60 mL/min/1.73m2 Final     Comment:     Effective December 21, 2021 eGFRcr in adults is calculated using the 2021 CKD-EPI creatinine equation which includes age and gender (Stephanie alexis al., NEJM, DOI: 10.1056/VTPYvi9342069)   03/23/2021 40 (L) >60 mL/min/[1.73_m2] Final     Comment:     Non  GFR Calc  Starting 12/18/2018, serum creatinine based " estimated GFR (eGFR) will be   calculated using the Chronic Kidney Disease Epidemiology Collaboration   (CKD-EPI) equation.       GFR Estimate If Black   Date Value Ref Range Status   03/23/2021 46 (L) >60 mL/min/[1.73_m2] Final     Comment:      GFR Calc  Starting 12/18/2018, serum creatinine based estimated GFR (eGFR) will be   calculated using the Chronic Kidney Disease Epidemiology Collaboration   (CKD-EPI) equation.       Lab Results   Component Value Date    CR 1.40 02/01/2022    CR 1.32 03/23/2021     No results found for: MICROALBUMIN    Healthy Eating:  Healthy Eating Assessed Today: Yes  Cultural/Faith diet restrictions?: No  Meal planning/habits: Low carb, Keeps food records  Meals include: Breakfast, Dinner, Evening Snack  Beverages: Water              Being Active:  Being Active Assessed Today: No    Monitoring:  Monitoring Assessed Today: Yes  Did patient bring glucose meter to appointment? : No (did not test during the CGM study, but has a new ONE TOUCH VERIO meter she will start using)  Blood Glucose Meter: One Touch  Blood glucose trend: Fluctuating    Taking Medications:  Taking Medication Assessed Today: No    Problem Solving:  Problem Solving Assessed Today: Yes  Is the patient at risk for hypoglycemia?: No  Is the patient at risk for DKA?: No  Does patient have severe weather/disaster plan for diabetes management?: Not Needed  Does patient have sick day plan for diabetes management?: Not Needed      Reducing Risks:  Reducing Risks Assessed Today: No    Healthy Coping:  Healthy Coping Assessed Today: Yes  Emotional response to diabetes: Ready to learn, Concern for health and well-being  Informal Support system:: Family, Friends  Stage of change: MAINTENANCE (Working to maintain change, with risk of relapse)  Patient Activation Measure Survey Score:  RICARDO Score (Last Two) 3/18/2014 10/9/2015   RICARDO Raw Score 34 38   Activation Score 43.4 52.9   RICARDO Level 1 2       Diabetes  knowledge and skills assessment:   Patient is knowledgeable in diabetes management concepts related to: Healthy Eating, Monitoring, Problem Solving, Reducing Risks and Healthy Coping    Patient needs further education on the following diabetes management concepts: Healthy Eating, Being Active, Problem Solving and Reducing Risks    Based on learning assessment above, most appropriate setting for further diabetes education would be: Individual setting.      INTERVENTIONS:    REPORTS:              Education provided today on:  AADE Self-Care Behaviors:  Healthy Eating: consistency in amount, composition, and timing of food intake  Monitoring: individual blood glucose targets  Problem Solving: high blood glucose - causes, signs/symptoms, treatment and prevention, low blood glucose - causes, signs/symptoms, treatment and prevention and when to call health care provider    Pt verbalized understanding of concepts discussed and recommendations provided today.       Education Materials Provided:  No new materials provided today    ASSESSMENT:  I met with Gemma today to download and review her Tutto pro CGM.  Gemma had been getting very variable results on her home blood glucose meter that did not match with what her hemoglobin A1c was.  The CGM study confirmed that her hemoglobin A1c reading was correct.  94% of her blood glucose numbers were in target range.  Her average blood glucose for the 2 weeks study was 116 mg/dL.  In reviewing the CGM data and her food records we did note that her blood sugar levels do tend to be higher on days where she does not eat lunch.  We reviewed trying to eat a little something with carbohydrates in the middle of the day to help prevent those spikes.  Gemma noted that she did have a low blood glucose 1 day while playing bingo and she treated this by eating an orange.  Since Gemma is not on any diabetes medication a low blood glucose reading would be anything under 60 mg/dL.  She notes the  only time she did feel low was during that bingo game.  We also noted a few times after eating a sweet treat, like donut holes, she did have a rise in her blood glucose level for short time.  Overall, her diabetes is well controlled with her current plan, but we did discuss having a little food in the middle of the day if she is not going to eat lunch.  Gemma has a new One Touch VERIO blood glucose meter that she will start using to test her blood sugar at home since her previous One Touch ULTRA blood glucose meters were not giving correct blood glucose readings.  Glucose Patterns & Trends:  Occasional high blood glucose levels related to eating sweets or skipping meals.  Very minimal hypoglycemia under 60 mg/dL since patient is not on any diabetes medication that could cause hypoglycemia.      PLAN  See Patient Instructions for co-developed, patient-stated behavior change goals.  AVS printed and provided to patient today. See Follow-Up section for recommended follow-up.    Latosha Elmore RN, [facility]    Time Spent: 60 minutes  Encounter Type: Individual    Any diabetes medication dose changes were made via the CDE Protocol and Collaborative Practice Agreement with the patient's primary care provider. A copy of this encounter was shared with the provider.

## 2022-03-31 NOTE — Clinical Note
Dr. Loyola,    Please see diabetes education progress note for continuous glucose monitoring (CGM) reports, assessment and recommendations.     As a provider, you can bill for a non face-to-face interpretation of the sensor report. If you would like to bill for this service, please create an encounter noting your interpretation and assessment of CGM reports, your recommended plan, and bill for this CGM interpretation using code [id number].    Latosha Elmore RN, [facility]

## 2022-03-31 NOTE — PATIENT INSTRUCTIONS
MY DIABETES TODAY:    1)  Goal A1C is under <7.0  Mine is:      Lab Results   Component Value Date    A1C 6.5 02/01/2022    A1C 6.2 03/23/2021       2)  Goal LDL (bad cholesterol) under 100  (measured at least yearly)- I am currently at:   Lab Results   Component Value Date     02/01/2022     12/17/2020       3)  Goal blood pressure under 130/80- mine was Data Unavailable today    Care Plan:  Try having a little something with carbs in the middle of the day to help keep the blood sugars steady.    Follow up:  Follow-up for annual diabetes education review in 1 year or sooner, if needed.     Bring blood glucose meter and logbook with you to all doctor and follow-up appointments.     [facility] for the [facility]:  For Your Diabetes or Nutrition Education Appointments Call:  [phone]   For Diabetes or Nutrition Related Questions:   [phone]  Send [employer] Message   If you need a medication refill please contact your pharmacy. Please allow 3 business days for your refills to be completed.

## 2022-03-31 NOTE — LETTER
"    3/31/2022         RE: Gemma Cowart  [address] [place] [address] [place] [id number]        Dear Colleague,    Thank you for referring your patient, Gemma Cowart, to the [facility]. Please see a copy of my visit note below.    Diabetes Self-Management Education & Support    Presents for: CGM Review    SUBJECTIVE/OBJECTIVE:  Presents for: CGM Review  Accompanied by: Self  Diabetes education in the past 24mo: Yes  Focus of Visit: CGM, Monitoring  Type of CGM visit: Professional CGM  Diabetes type: Type 2  Other concerns:: None  Cultural Influences/Ethnic Background:  American      Diabetes Symptoms & Complications:     Complications assessed today?: No    Patient Problem List and Family Medical History reviewed for relevant medical history, current medical status, and diabetes risk factors.    Vitals:  LMP 09/24/1990   Estimated body mass index is 34.47 kg/m  as calculated from the following:    Height as of 2/1/22: 1.6 m (5' 3\").    Weight as of 3/9/22: 88.3 kg (194 lb 9.6 oz).   Last 3 BP:   BP Readings from Last 3 Encounters:   03/09/22 116/70   03/08/22 138/70   03/07/22 (!) 139/107       History   Smoking Status     Former Smoker     Packs/day: 1.50     Years: 25.00     Types: Cigarettes     Quit date: 10/21/1986   Smokeless Tobacco     Never Used     Comment: quit 1985       Labs:  Lab Results   Component Value Date    A1C 6.5 02/01/2022    A1C 6.2 03/23/2021     Lab Results   Component Value Date     03/07/2022     02/01/2022    GLC 96 03/23/2021     Lab Results   Component Value Date     02/01/2022     12/17/2020     HDL Cholesterol   Date Value Ref Range Status   12/17/2020 59 >49 mg/dL Final     Direct Measure HDL   Date Value Ref Range Status   02/01/2022 62 >=50 mg/dL Final   ]  GFR Estimate   Date Value Ref Range Status   02/01/2022 39 (L) >60 mL/min/1.73m2 Final     Comment:     Effective December 21, 2021 eGFRcr in adults is calculated using the 2021 " CKD-EPI creatinine equation which includes age and gender (Stephanie alexis al., NEJ, DOI: 10.1056/[id number])   03/23/2021 40 (L) >60 mL/min/[1.73_m2] Final     Comment:     Non  GFR Calc  Starting 12/18/2018, serum creatinine based estimated GFR (eGFR) will be   calculated using the Chronic Kidney Disease Epidemiology Collaboration   (CKD-EPI) equation.       GFR Estimate If Black   Date Value Ref Range Status   03/23/2021 46 (L) >60 mL/min/[1.73_m2] Final     Comment:      GFR Calc  Starting 12/18/2018, serum creatinine based estimated GFR (eGFR) will be   calculated using the Chronic Kidney Disease Epidemiology Collaboration   (CKD-EPI) equation.       Lab Results   Component Value Date    CR 1.40 02/01/2022    CR 1.32 03/23/2021     No results found for: MICROALBUMIN    Healthy Eating:  Healthy Eating Assessed Today: Yes  Cultural/Tenriism diet restrictions?: No  Meal planning/habits: Low carb, Keeps food records  Meals include: Breakfast, Dinner, Evening Snack  Beverages: Water              Being Active:  Being Active Assessed Today: No    Monitoring:  Monitoring Assessed Today: Yes  Did patient bring glucose meter to appointment? : No (did not test during the CGM study, but has a new ONE TOUCH VERIO meter she will start using)  Blood Glucose Meter: One Touch  Blood glucose trend: Fluctuating    Taking Medications:  Taking Medication Assessed Today: No    Problem Solving:  Problem Solving Assessed Today: Yes  Is the patient at risk for hypoglycemia?: No  Is the patient at risk for DKA?: No  Does patient have severe weather/disaster plan for diabetes management?: Not Needed  Does patient have sick day plan for diabetes management?: Not Needed      Reducing Risks:  Reducing Risks Assessed Today: No    Healthy Coping:  Healthy Coping Assessed Today: Yes  Emotional response to diabetes: Ready to learn, Concern for health and well-being  Informal Support system:: Family, Friends  Stage  of change: MAINTENANCE (Working to maintain change, with risk of relapse)  Patient Activation Measure Survey Score:  RICARDO Score (Last Two) 3/18/2014 10/9/2015   RICARDO Raw Score 34 38   Activation Score 43.4 52.9   RICARDO Level 1 2       Diabetes knowledge and skills assessment:   Patient is knowledgeable in diabetes management concepts related to: Healthy Eating, Monitoring, Problem Solving, Reducing Risks and Healthy Coping    Patient needs further education on the following diabetes management concepts: Healthy Eating, Being Active, Problem Solving and Reducing Risks    Based on learning assessment above, most appropriate setting for further diabetes education would be: Individual setting.      INTERVENTIONS:    REPORTS:              Education provided today on:  AADE Self-Care Behaviors:  Healthy Eating: consistency in amount, composition, and timing of food intake  Monitoring: individual blood glucose targets  Problem Solving: high blood glucose - causes, signs/symptoms, treatment and prevention, low blood glucose - causes, signs/symptoms, treatment and prevention and when to call health care provider    Pt verbalized understanding of concepts discussed and recommendations provided today.       Education Materials Provided:  No new materials provided today    ASSESSMENT:  I met with Gemma today to download and review her kayla pro CGM.  Gemma had been getting very variable results on her home blood glucose meter that did not match with what her hemoglobin A1c was.  The CGM study confirmed that her hemoglobin A1c reading was correct.  94% of her blood glucose numbers were in target range.  Her average blood glucose for the 2 weeks study was 116 mg/dL.  In reviewing the CGM data and her food records we did note that her blood sugar levels do tend to be higher on days where she does not eat lunch.  We reviewed trying to eat a little something with carbohydrates in the middle of the day to help prevent those spikes.   Gemma noted that she did have a low blood glucose 1 day while playing [employer] and she treated this by eating an orange.  Since Gemma is not on any diabetes medication a low blood glucose reading would be anything under 60 mg/dL.  She notes the only time she did feel low was during that bingo game.  We also noted a few times after eating a sweet treat, like donut holes, she did have a rise in her blood glucose level for short time.  Overall, her diabetes is well controlled with her current plan, but we did discuss having a little food in the middle of the day if she is not going to eat lunch.  Gemma has a new One Touch VERIO blood glucose meter that she will start using to test her blood sugar at home since her previous One Touch ULTRA blood glucose meters were not giving correct blood glucose readings.  Glucose Patterns & Trends:  Occasional high blood glucose levels related to eating sweets or skipping meals.  Very minimal hypoglycemia under 60 mg/dL since patient is not on any diabetes medication that could cause hypoglycemia.      PLAN  See Patient Instructions for co-developed, patient-stated behavior change goals.  AVS printed and provided to patient today. See Follow-Up section for recommended follow-up.    Latosha Elmore RN, [facility]    Time Spent: 60 minutes  Encounter Type: Individual    Any diabetes medication dose changes were made via the CDE Protocol and Collaborative Practice Agreement with the patient's primary care provider. A copy of this encounter was shared with the provider.

## 2022-03-31 NOTE — TELEPHONE ENCOUNTER
Left voicemail    Bryce Bashir I   [facility]   [address] [place] [id number] [phone]

## 2022-03-31 NOTE — PATIENT INSTRUCTIONS
Recommendations from today's MTM visit:                                                       1. Take Tylenol if you need it for your back muscle pain. Please make an appointment with [facility] if your pain worsens.  2. Make sure you are taking atorvastatin for your cholesterol.  3. Start taking Spiriva inhaler every day to help with breathing.  4. Ask Dr. Rodriguez at your appointment tomorrow if you can stop taking the iron or if you should continue this.    Follow-up: Return in about 2 months (around 5/31/2022) for MTM Pharmacist Visit.    It was great to speak with you today.  I value your experience and would be very thankful for your time with providing feedback on our clinic survey. You may receive a survey via email or text message in the next few days.     To schedule another MTM appointment, please call the clinic directly or you may call the MTM scheduling line at [phone] or toll-free at [phone].     My Clinical Pharmacist's contact information:                                                      Please feel free to contact me with any questions or concerns you have.      Adenike Mason, PharmD  Medication Therapy Management Resident  Pager: [phone]

## 2022-04-01 ENCOUNTER — ONCOLOGY VISIT (OUTPATIENT)
Dept: ONCOLOGY | Facility: CLINIC | Age: 77
End: 2022-04-01
Attending: INTERNAL MEDICINE
Payer: COMMERCIAL

## 2022-04-01 VITALS
DIASTOLIC BLOOD PRESSURE: 84 MMHG | WEIGHT: 196 LBS | TEMPERATURE: 98.1 F | HEIGHT: 62 IN | SYSTOLIC BLOOD PRESSURE: 149 MMHG | HEART RATE: 85 BPM | OXYGEN SATURATION: 98 % | BODY MASS INDEX: 36.07 KG/M2 | RESPIRATION RATE: 16 BRPM

## 2022-04-01 DIAGNOSIS — D50.9 IRON DEFICIENCY ANEMIA, UNSPECIFIED IRON DEFICIENCY ANEMIA TYPE: Primary | ICD-10-CM

## 2022-04-01 PROCEDURE — 99214 OFFICE O/P EST MOD 30 MIN: CPT | Performed by: INTERNAL MEDICINE

## 2022-04-01 PROCEDURE — G0463 HOSPITAL OUTPT CLINIC VISIT: HCPCS

## 2022-04-01 ASSESSMENT — PAIN SCALES - GENERAL: PAINLEVEL: NO PAIN (0)

## 2022-04-01 NOTE — PROGRESS NOTES
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  She was a patient of Dr. Narvaez.    Her breast cancer was diagnosed in 08/2012 via screening MA. 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 LN negative disease. Estrogen and progesterone receptors were positive at greater than 90%. HER-2/gibran was negative. She had G6hP8D4 disease.   She was started on Femara on 10/10/2012.    It is put on hold in 5/2016 due to memory loss and dysarthria. She then was evaluated by neurologist, got assurance. Her cognitive function did not change by holding femara, it is resumed in July 2016. She finished 5 yrs of it in fall 2017, not willing to continue due to hair loss, bone health and hot flushes.      She had reconstructive surgery done 03/2013 by Dr. Wall.          INTERIM HISTORY: Gemma says that she feels well.  She says that she feels sleepy a lot, but thinks it may be due to post-COVID.        REVIEW OF SYSTEMS:   14 point ROS was reviewed and is negative other than as noted above in HPI.       HOME MEDICATIONS:  Current Outpatient Medications   Medication Sig Dispense Refill     ACE/ARB NOT PRESCRIBED, INTENTIONAL, ACE & ARB not prescribed due to Other: normal tensive, low microalbumuria       acetaminophen (TYLENOL) 500 MG tablet Take 2 tablets (1,000 mg) by mouth every 8 hours as needed for mild pain 100 tablet 3     albuterol (PROAIR HFA/PROVENTIL HFA/VENTOLIN HFA) 108 (90 Base) MCG/ACT inhaler  "Inhale 2 puffs into the lungs every 6 hours (Patient taking differently: Inhale 2 puffs into the lungs every 6 hours as needed ) 18 g 0     apixaban ANTICOAGULANT (ELIQUIS ANTICOAGULANT) 5 MG tablet Take 1 tablet (5 mg) by mouth 2 times daily 180 tablet 3     atorvastatin (LIPITOR) 10 MG tablet Take 1 tablet (10 mg) by mouth daily 90 tablet 3     blood glucose (ONETOUCH VERIO IQ) test strip Use to test blood sugar 2 times daily or as directed. 100 strip 3     blood glucose calibration (NO BRAND SPECIFIED) solution Use to calibrate blood glucose monitor as needed as directed. 1 each 0     blood glucose monitoring (NO BRAND SPECIFIED) meter device kit Use to test blood sugar 2 times daily or as directed. 1 kit 1     Cholecalciferol (VITAMIN D) 50 MCG (2000 UT) CAPS Take 1 capsule by mouth daily 90 capsule 2     clobetasol (TEMOVATE) 0.05 % external cream Apply twice daily to rash (thigh, knees, hands, feet) twice daily for 7 days AS NEEDED \"use sparingly and not to be used on the face\" Follow up recommended. 60 g 3     ferrous sulfate (FEROSUL) 325 (65 Fe) MG tablet Take 1 tablet (325 mg) by mouth daily (with breakfast) 90 tablet 0     Lancets (ONETOUCH DELICA PLUS [id number]) MISC 1 lancet daily 100 each 3     PARoxetine (PAXIL) 10 MG tablet Take as directed. 42 tablet 0     sertraline (ZOLOFT) 100 MG tablet Take 100 mg by mouth daily Start daily after cross taper       tiotropium (SPIRIVA RESPIMAT) 2.5 MCG/ACT inhaler Inhale 2 puffs into the lungs daily 4 g 11     vitamin B-12 (CYANOCOBALAMIN) 2500 MCG sublingual tablet Take 1 tablet (2,500 mcg) by mouth daily 90 tablet 0     Vitamin D, Cholecalciferol, 25 MCG (1000 UT) TABS Take 1 tablet by mouth daily Take with 2000 unit tablet           ALLERGIES:  Allergies   Allergen Reactions     Bacitracin Unknown     Dust Mites      Nickel      Phenylene          PAST MEDICAL HISTORY:  Past Medical History:   Diagnosis Date     Anemia     iron deficiency anemia     Anxiety  "     Breast cancer (H) 2012    Bilateral Masectomies 9/2012     Complications of gastric bypass surgery      COPD (chronic obstructive pulmonary disease) (H) 4/21/2021     Diabetes mellitus (H)     diet controlled     DVT (deep venous thrombosis) (H)      Eczema      Fracture of left knee region     patella - vertical     History of kidney stones      Obesity      Palpitation     with no treatment     Polyneuropathy      Pruritus     generalized     Rosacea      SVT (supraventricular tachycardia) (H)     s/p ablation 5/28/2015 at [facility] for left lateral AP         PAST SURGICAL HISTORY:  Past Surgical History:   Procedure Laterality Date     ABDOMEN SURGERY  2000    gastric bypass 2000     COLONOSCOPY  10/11/2012    Procedure: COLONOSCOPY;  colonoscopy;  Surgeon: Gela Cleary MD;  Location:  GI     COLONOSCOPY N/A 10/6/2017    Procedure: COLONOSCOPY;;  Surgeon: Shashank Ortiz MD;  Location:  GI     COLONOSCOPY N/A 3/7/2022    Procedure: COLONOSCOPY;  Surgeon: Andrea Daniel MD;  Location:  GI     DENTAL SURGERY  5/2007    all teeth removed - dentures     ENT SURGERY      all teeth pulled     ESOPHAGOSCOPY, GASTROSCOPY, DUODENOSCOPY (EGD), COMBINED N/A 10/6/2017    Procedure: COMBINED ESOPHAGOSCOPY, GASTROSCOPY, DUODENOSCOPY (EGD);  ESOPHAGOSCOPY, GASTROSCOPY, DUODENOSCOPY (EGD) & Colonoscopy *Needs INR on arrival;  Surgeon: Shashank Ortiz MD;  Location:  GI     GYN SURGERY  1/10/75    tubal ligation      H ABLATION SVT  5/28/2015    stopped metoprolol     HERNIA REPAIR  2005     IMPLANT FILTER INFERIOR VENA CAVA  10/8/2012    taken out  1/4/12     INSERT TISSUE EXPANDER BREAST BILATERAL  9/5/2012    Procedure: INSERT TISSUE EXPANDER BREAST BILATERAL;;  Surgeon: Orlando Wall MD;  Location:  OR     MASTECTOMY SIMPLE, SENTINEL NODE, COMBINED  9/5/2012    Procedure: COMBINED MASTECTOMY SIMPLE, SENTINEL NODE;  BILATERAL MASTECTOMY WITH LEFT AXILLARY SENTINEL LYMPH NODE  BIOPSY, BILATERAL TISSUE EXPANDER        MASTECTOMY, BILATERAL       PANNICULECTOMY N/A 2018    Procedure: PANNICULECTOMY;  PANNICULECTOMY ;  Surgeon: Orlando Wall MD;  Location:  OR     RECONSTRUCT BREAST BILATERAL, IMPLANT PROSTHESIS BILATERAL, COMBINED  2013    Procedure: COMBINED RECONSTRUCT BREAST BILATERAL, IMPLANT PROSTHESIS BILATERAL;  BILATERAL SECOND STAGE BREAST RECONSTRUCTION WITH SILICONE GEL IMPLANTS AND LIPOSUCTION;  Surgeon: Orlando Wall MD;  Location: [facility]     RECONSTRUCT NIPPLE BILATERAL  2013    Procedure: RECONSTRUCT NIPPLE BILATERAL;  BILATERAL NIPPLE AREOLAR RECONSTRUCTION;  Surgeon: Orlando Wall MD;  Location: [facility]     ZZHC BREAST RECONSTRUC W OTHR TECHNIQ  2013         SOCIAL HISTORY:  Social History     Socioeconomic History     Marital status:      Spouse name: Not on file     Number of children: Not on file     Years of education: Not on file     Highest education level: Not on file   Occupational History     Employer: RETIRED   Tobacco Use     Smoking status: Former Smoker     Packs/day: 1.50     Years: 25.00     Pack years: 37.50     Types: Cigarettes     Quit date: 10/21/1986     Years since quittin.4     Smokeless tobacco: Never Used     Tobacco comment: quit    Vaping Use     Vaping Use: Never used   Substance and Sexual Activity     Alcohol use: No     Comment: 1 time a year     Drug use: No     Sexual activity: Not Currently     Partners: Male   Other Topics Concern     Parent/sibling w/ CABG, MI or angioplasty before 65F 55M? No      Service Not Asked     Blood Transfusions Not Asked     Caffeine Concern Not Asked     Occupational Exposure Not Asked     Hobby Hazards Not Asked     Sleep Concern Not Asked     Stress Concern Not Asked     Weight Concern Not Asked     Special Diet Not Asked     Back Care Not Asked     Exercise Not Asked     Bike Helmet Yes     Seat Belt Yes     Self-Exams Not Asked   Social History  "Narrative     Not on file     Social Determinants of Health     Financial Resource Strain: Unknown     Difficulty of Paying Living Expenses: Patient refused   Food Insecurity: Unknown     Worried About Running Out of Food in the Last Year: Patient refused     Ran Out of Food in the Last Year: Patient refused   Transportation Needs: No Transportation Needs     Lack of Transportation (Medical): No     Lack of Transportation (Non-Medical): No   Physical Activity: Inactive     Days of Exercise per Week: 0 days     Minutes of Exercise per Session: 0 min   Stress: Stress Concern Present     Feeling of Stress : To some extent   Social Connections: Moderately Integrated     Frequency of Communication with Friends and Family: Twice a week     Frequency of Social Gatherings with Friends and Family: More than three times a week     Attends Confucianist Services: 1 to 4 times per year     Active Member of Clubs or Organizations: Yes     Attends Club or Organization Meetings: Not on file     Marital Status:    Intimate Partner Violence: Not on file   Housing Stability: Unknown     Unable to Pay for Housing in the Last Year: Patient refused     Number of Places Lived in the Last Year: 2     Unstable Housing in the Last Year: No         FAMILY HISTORY:  Family History   Problem Relation Age of Onset     Cancer - colorectal Mother      Colon Cancer Mother      Prostate Cancer Father      Alzheimer Disease Father      Cancer Paternal Grandmother      Alzheimer Disease Paternal Grandfather      Cancer - colorectal Brother      Diabetes Brother      Crohn's Disease Daughter      Diabetes Brother      Eye Disorder Brother      Diabetes Son      Cancer Maternal Uncle      Cancer Maternal Uncle          PHYSICAL EXAM:  Vital signs:  BP (!) 149/84   Pulse 85   Temp 98.1  F (36.7  C)   Resp 16   Ht 1.575 m (5' 2\")   Wt 88.9 kg (196 lb)   LMP 1990   SpO2 98%   BMI 35.85 kg/m     ECO  GENERAL/CONSTITUTIONAL: No acute " distress.  EYES: No scleral icterus.  MUSCULOSKELETAL: Warm and well-perfused.  Left lower extremity chronically more swollen than right lower extremity.  NEUROLOGIC: Alert, oriented, answers questions appropriately.  INTEGUMENTARY: No jaundice.      LABS:  CBC RESULTS: Recent Labs   Lab Test 03/29/22  1044   WBC 4.4   RBC 5.24*   HGB 12.2   HCT 41.6   MCV 79   MCH 23.3*   MCHC 29.3*   RDW 24.3*          Component      Latest Ref Rng & Units 9/6/2019 10/27/2021 2/1/2022 3/29/2022   Iron      35 - 180 ug/dL  21 (L) 20 (L) 65   Iron Binding Cap      240 - 430 ug/dL  398 431 (H) 294   Iron Saturation Index      15 - 46 %  5 (L) 5 (L) 22   Vitamin B12      193 - 986 pg/mL 3,314 (H) 580 699    Ferritin      8 - 252 ng/mL 13 10 7 (L) 282 (H)   Folate      >=5.4 ng/mL  18.5 14.3        IMAGING:  Left lower extremity ultrasound 7/24/21:  FINDINGS: Exam includes the common femoral, femoral, popliteal, and contralateral common femoral veins as well as segmentally visualized deep calf veins and greater saphenous vein.      LEFT: No DVT from groin through popliteal fossa. However, one of the posterior tibial veins within the calf demonstrates partially occlusive thrombus.  No superficial thrombophlebitis. No popliteal cyst.    IMPRESSION:  Exam is positive for DVT. Small focus of DVT involving one of the posterior tibial veins within the calf.    Left lower extremity ultrasound 10/22/21:  FINDINGS:  Examination of the deep veins with graded compression and  color flow Doppler with spectral wave form analysis was performed.       There is no evidence for acute DVT. There is a small amount of  residual nonocclusive DVT in one of the 2 distal left posterior tibial  veins.                                                                      IMPRESSION: Small amount of residual nonocclusive chronic DVT in one  of the 2 distal left posterior tibial veins. No new DVT.        ASSESSMENT/PLAN:  Gemma Cowart is a 76 year old  female with:    1) Left breast cancer s/p double mastectomies, ER/IA+, her 2 - s/p  5 yrs of Femara till 2017.     -she is on annual follow-up for this     2) History of gastric bypass surgery with iron-deficiency anemia and low B12.  She received Injectafer on 3/1/22 and 3/8/22.  Ferritin and iron are improved.  Hemoglobin is normalized.  She doesn't take oral iron, as it gives her GI upset.    -she takes sublingual vitamin B12  -RTC in 6 months, with repeat CBC, ferritin, iron, vitamin B12, folate     3) Osteopenia:  -follow-up with PCP     4) CKD:  -follow-up with PCP     5) Hx of recurrent DVT:  She saw Dr. Nicki Lopez on 7/28/21.    -On 10/06/2012 she was found to have bilateral DVT.  -She had IVC filter placed on 10/10/2012.  It was removed on 01/04/2013.  -Patient took warfarin for 6 months.  -Lower extremity ultrasound on 06/14/2013 was negative for DVT.  -On 09/12/2015, left leg ultrasound revealed DVT in posterior tibial vein below the level of the left knee.  -The patient was started on warfarin and has been continued on that.  -in July 2021, she started to have swelling in the left leg with some pain.  She went to the ED on 7/24/21 and had multiple investigations done.  -Left lower extremity ultrasound revealed partially occlusive thrombus in one of the posterior tibial veins within the calf.  -The patient was started on Eliquis.  -Multiple hypercoagulable workup done.  Lupus anticoagulant, cardiolipin antibody and beta 2 glycoprotein are all negative.  There was no provoking factor.    -repeat ultrasound on 10/22/21 shows small amount of residual non-occlusive chronic DVT. No new DVT.  -she continues on Eliquis indefinitely    Yoselyn Rodriguez MD  Hematology/Oncology  [facility]    Total time spent on day of visit, including review of tests, obtaining/reviewing separately obtained history, ordering medications/tests/procedures, communicating with PCP/consultants, and  documenting in electronic medical record: 20 minutes

## 2022-04-01 NOTE — PROGRESS NOTES
"Oncology Rooming Note    April 1, 2022 1:55 PM   Gemma Cowart is a 76 year old female who presents for:    Chief Complaint   Patient presents with     Oncology Clinic Visit     Initial Vitals: LMP 09/24/1990  Estimated body mass index is 34.47 kg/m  as calculated from the following:    Height as of 2/1/22: 1.6 m (5' 3\").    Weight as of 3/9/22: 88.3 kg (194 lb 9.6 oz). There is no height or weight on file to calculate BSA.  Data Unavailable Comment: Data Unavailable   Patient's last menstrual period was 09/24/1990.  Allergies reviewed: Yes  Medications reviewed: Yes    Medications: Medication refills not needed today.  Pharmacy name entered into [employer]: [id number] - [place], [place] - [phone] [facility]    Clinical concerns:  doctor was notified.      Alexandra Poon MA            "

## 2022-04-01 NOTE — LETTER
4/1/2022         RE: Gemma Cowart  [address] [place] [address] [place] [id number]        Dear Colleague,    Thank you for referring your patient, Gemma Cowart, to the [facility]. Please see a copy of my visit note below.    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  She was a patient of Dr. Narvaez.    Her breast cancer was diagnosed in 08/2012 via screening MA. 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 LN negative disease. Estrogen and progesterone receptors were positive at greater than 90%. HER-2/gibran was negative. She had [id number] disease.   She was started on Femara on 10/10/2012.    It is put on hold in 5/2016 due to memory loss and dysarthria. She then was evaluated by neurologist, got assurance. Her cognitive function did not change by holding femara, it is resumed in July 2016. She finished 5 yrs of it in fall 2017, not willing to continue due to hair loss, bone health and hot flushes.      She had reconstructive surgery done 03/2013 by Dr. Wall.          INTERIM HISTORY: Gemma says that she feels well.  She says that she feels sleepy a lot, but thinks it may be due to post-COVID.        REVIEW OF SYSTEMS:   14 point ROS was reviewed and is negative other than as noted above in HPI.       HOME MEDICATIONS:  Current Outpatient Medications   Medication Sig Dispense Refill     ACE/ARB NOT PRESCRIBED, INTENTIONAL, ACE & ARB not prescribed due to  "Other: normal tensive, low microalbumuria       acetaminophen (TYLENOL) 500 MG tablet Take 2 tablets (1,000 mg) by mouth every 8 hours as needed for mild pain 100 tablet 3     albuterol (PROAIR HFA/PROVENTIL HFA/VENTOLIN HFA) 108 (90 Base) MCG/ACT inhaler Inhale 2 puffs into the lungs every 6 hours (Patient taking differently: Inhale 2 puffs into the lungs every 6 hours as needed ) 18 g 0     apixaban ANTICOAGULANT (ELIQUIS ANTICOAGULANT) 5 MG tablet Take 1 tablet (5 mg) by mouth 2 times daily 180 tablet 3     atorvastatin (LIPITOR) 10 MG tablet Take 1 tablet (10 mg) by mouth daily 90 tablet 3     blood glucose (ONETOUCH VERIO IQ) test strip Use to test blood sugar 2 times daily or as directed. 100 strip 3     blood glucose calibration (NO BRAND SPECIFIED) solution Use to calibrate blood glucose monitor as needed as directed. 1 each 0     blood glucose monitoring (NO BRAND SPECIFIED) meter device kit Use to test blood sugar 2 times daily or as directed. 1 kit 1     Cholecalciferol (VITAMIN D) 50 MCG (2000 UT) CAPS Take 1 capsule by mouth daily 90 capsule 2     clobetasol (TEMOVATE) 0.05 % external cream Apply twice daily to rash (thigh, knees, hands, feet) twice daily for 7 days AS NEEDED \"use sparingly and not to be used on the face\" Follow up recommended. 60 g 3     ferrous sulfate (FEROSUL) 325 (65 Fe) MG tablet Take 1 tablet (325 mg) by mouth daily (with breakfast) 90 tablet 0     Lancets (ONETOUCH DELICA PLUS [id number]) MISC 1 lancet daily 100 each 3     PARoxetine (PAXIL) 10 MG tablet Take as directed. 42 tablet 0     sertraline (ZOLOFT) 100 MG tablet Take 100 mg by mouth daily Start daily after cross taper       tiotropium (SPIRIVA RESPIMAT) 2.5 MCG/ACT inhaler Inhale 2 puffs into the lungs daily 4 g 11     vitamin B-12 (CYANOCOBALAMIN) 2500 MCG sublingual tablet Take 1 tablet (2,500 mcg) by mouth daily 90 tablet 0     Vitamin D, Cholecalciferol, 25 MCG (1000 UT) TABS Take 1 tablet by mouth daily Take with " 2000 unit tablet           ALLERGIES:  Allergies   Allergen Reactions     Bacitracin Unknown     Dust Mites      Nickel      Phenylene          PAST MEDICAL HISTORY:  Past Medical History:   Diagnosis Date     Anemia     iron deficiency anemia     Anxiety      Breast cancer (H) 2012    Bilateral Masectomies 9/2012     Complications of gastric bypass surgery      COPD (chronic obstructive pulmonary disease) (H) 4/21/2021     Diabetes mellitus (H)     diet controlled     DVT (deep venous thrombosis) (H)      Eczema      Fracture of left knee region     patella - vertical     History of kidney stones      Obesity      Palpitation     with no treatment     Polyneuropathy      Pruritus     generalized     Rosacea      SVT (supraventricular tachycardia) (H)     s/p ablation 5/28/2015 at [facility] for left lateral AP         PAST SURGICAL HISTORY:  Past Surgical History:   Procedure Laterality Date     ABDOMEN SURGERY  2000    gastric bypass 2000     COLONOSCOPY  10/11/2012    Procedure: COLONOSCOPY;  colonoscopy;  Surgeon: Gela Cleary MD;  Location:  GI     COLONOSCOPY N/A 10/6/2017    Procedure: COLONOSCOPY;;  Surgeon: Shashank Ortiz MD;  Location: [facility]     COLONOSCOPY N/A 3/7/2022    Procedure: COLONOSCOPY;  Surgeon: Andrea Daniel MD;  Location:  GI     DENTAL SURGERY  5/2007    all teeth removed - dentures     ENT SURGERY      all teeth pulled     ESOPHAGOSCOPY, GASTROSCOPY, DUODENOSCOPY (EGD), COMBINED N/A 10/6/2017    Procedure: COMBINED ESOPHAGOSCOPY, GASTROSCOPY, DUODENOSCOPY (EGD);  ESOPHAGOSCOPY, GASTROSCOPY, DUODENOSCOPY (EGD) & Colonoscopy *Needs INR on arrival;  Surgeon: Shashank Ortiz MD;  Location:  GI     GYN SURGERY  1/10/75    tubal ligation      H ABLATION SVT  5/28/2015    stopped metoprolol     HERNIA REPAIR  2005     IMPLANT FILTER INFERIOR VENA CAVA  10/8/2012    taken out  1/4/12     INSERT TISSUE EXPANDER BREAST BILATERAL  9/5/2012    Procedure: INSERT TISSUE  EXPANDER BREAST BILATERAL;;  Surgeon: Orlando Wall MD;  Location:  OR     MASTECTOMY SIMPLE, SENTINEL NODE, COMBINED  2012    Procedure: COMBINED MASTECTOMY SIMPLE, SENTINEL NODE;  BILATERAL MASTECTOMY WITH LEFT AXILLARY SENTINEL LYMPH NODE BIOPSY, BILATERAL TISSUE EXPANDER        MASTECTOMY, BILATERAL       PANNICULECTOMY N/A 2018    Procedure: PANNICULECTOMY;  PANNICULECTOMY ;  Surgeon: Orlando Wall MD;  Location:  OR     RECONSTRUCT BREAST BILATERAL, IMPLANT PROSTHESIS BILATERAL, COMBINED  2013    Procedure: COMBINED RECONSTRUCT BREAST BILATERAL, IMPLANT PROSTHESIS BILATERAL;  BILATERAL SECOND STAGE BREAST RECONSTRUCTION WITH SILICONE GEL IMPLANTS AND LIPOSUCTION;  Surgeon: Orlando Wall MD;  Location: [facility]     RECONSTRUCT NIPPLE BILATERAL  2013    Procedure: RECONSTRUCT NIPPLE BILATERAL;  BILATERAL NIPPLE AREOLAR RECONSTRUCTION;  Surgeon: Orlando Wall MD;  Location: [facility]     ZZHC BREAST RECONSTRUC W OTHR TECHNIQ  2013         SOCIAL HISTORY:  Social History     Socioeconomic History     Marital status:      Spouse name: Not on file     Number of children: Not on file     Years of education: Not on file     Highest education level: Not on file   Occupational History     Employer: RETIRED   Tobacco Use     Smoking status: Former Smoker     Packs/day: 1.50     Years: 25.00     Pack years: 37.50     Types: Cigarettes     Quit date: 10/21/1986     Years since quittin.4     Smokeless tobacco: Never Used     Tobacco comment: quit    Vaping Use     Vaping Use: Never used   Substance and Sexual Activity     Alcohol use: No     Comment: 1 time a year     Drug use: No     Sexual activity: Not Currently     Partners: Male   Other Topics Concern     Parent/sibling w/ CABG, MI or angioplasty before 65F 55M? No      Service Not Asked     Blood Transfusions Not Asked     Caffeine Concern Not Asked     Occupational Exposure Not Asked     Hobby  Hazards Not Asked     Sleep Concern Not Asked     Stress Concern Not Asked     Weight Concern Not Asked     Special Diet Not Asked     Back Care Not Asked     Exercise Not Asked     Bike Helmet Yes     Seat Belt Yes     Self-Exams Not Asked   Social History Narrative     Not on file     Social Determinants of Health     Financial Resource Strain: Unknown     Difficulty of Paying Living Expenses: Patient refused   Food Insecurity: Unknown     Worried About Running Out of Food in the Last Year: Patient refused     Ran Out of Food in the Last Year: Patient refused   Transportation Needs: No Transportation Needs     Lack of Transportation (Medical): No     Lack of Transportation (Non-Medical): No   Physical Activity: Inactive     Days of Exercise per Week: 0 days     Minutes of Exercise per Session: 0 min   Stress: Stress Concern Present     Feeling of Stress : To some extent   Social Connections: Moderately Integrated     Frequency of Communication with Friends and Family: Twice a week     Frequency of Social Gatherings with Friends and Family: More than three times a week     Attends Tenriism Services: 1 to 4 times per year     Active Member of Clubs or Organizations: Yes     Attends Club or Organization Meetings: Not on file     Marital Status:    Intimate Partner Violence: Not on file   Housing Stability: Unknown     Unable to Pay for Housing in the Last Year: Patient refused     Number of Places Lived in the Last Year: 2     Unstable Housing in the Last Year: No         FAMILY HISTORY:  Family History   Problem Relation Age of Onset     Cancer - colorectal Mother      Colon Cancer Mother      Prostate Cancer Father      Alzheimer Disease Father      Cancer Paternal Grandmother      Alzheimer Disease Paternal Grandfather      Cancer - colorectal Brother      Diabetes Brother      Crohn's Disease Daughter      Diabetes Brother      Eye Disorder Brother      Diabetes Son      Cancer Maternal Uncle      Cancer  "Maternal Uncle          PHYSICAL EXAM:  Vital signs:  BP (!) 149/84   Pulse 85   Temp 98.1  F (36.7  C)   Resp 16   Ht 1.575 m (5' 2\")   Wt 88.9 kg (196 lb)   LMP 1990   SpO2 98%   BMI 35.85 kg/m     ECO  GENERAL/CONSTITUTIONAL: No acute distress.  EYES: No scleral icterus.  MUSCULOSKELETAL: Warm and well-perfused.  Left lower extremity chronically more swollen than right lower extremity.  NEUROLOGIC: Alert, oriented, answers questions appropriately.  INTEGUMENTARY: No jaundice.      LABS:  CBC RESULTS: Recent Labs   Lab Test 22  1044   WBC 4.4   RBC 5.24*   HGB 12.2   HCT 41.6   MCV 79   MCH 23.3*   MCHC 29.3*   RDW 24.3*          Component      Latest Ref Rng & Units 2019 10/27/2021 2022 3/29/2022   Iron      35 - 180 ug/dL  21 (L) 20 (L) 65   Iron Binding Cap      240 - 430 ug/dL  398 431 (H) 294   Iron Saturation Index      15 - 46 %  5 (L) 5 (L) 22   Vitamin B12      193 - 986 pg/mL 3,314 (H) 580 699    Ferritin      8 - 252 ng/mL 13 10 7 (L) 282 (H)   Folate      >=5.4 ng/mL  18.5 14.3        IMAGING:  Left lower extremity ultrasound 21:  FINDINGS: Exam includes the common femoral, femoral, popliteal, and contralateral common femoral veins as well as segmentally visualized deep calf veins and greater saphenous vein.      LEFT: No DVT from groin through popliteal fossa. However, one of the posterior tibial veins within the calf demonstrates partially occlusive thrombus.  No superficial thrombophlebitis. No popliteal cyst.    IMPRESSION:  Exam is positive for DVT. Small focus of DVT involving one of the posterior tibial veins within the calf.    Left lower extremity ultrasound 10/22/21:  FINDINGS:  Examination of the deep veins with graded compression and  color flow Doppler with spectral wave form analysis was performed.       There is no evidence for acute DVT. There is a small amount of  residual nonocclusive DVT in one of the 2 distal left posterior " tibial  veins.                                                                      IMPRESSION: Small amount of residual nonocclusive chronic DVT in one  of the 2 distal left posterior tibial veins. No new DVT.        ASSESSMENT/PLAN:  Gemma Cowart is a 76 year old female with:    1) Left breast cancer s/p double mastectomies, [place] her 2 - s/p  5 yrs of Femara till 2017.     -she is on annual follow-up for this     2) History of gastric bypass surgery with iron-deficiency anemia and low B12.  She received Injectafer on 3/1/22 and 3/8/22.  Ferritin and iron are improved.  Hemoglobin is normalized.  She doesn't take oral iron, as it gives her GI upset.    -she takes sublingual vitamin B12  -RTC in 6 months, with repeat CBC, ferritin, iron, vitamin B12, folate     3) Osteopenia:  -follow-up with PCP     4) CKD:  -follow-up with PCP     5) Hx of recurrent DVT:  She saw Dr. Nicki Lopez on 7/28/21.    -On 10/06/2012 she was found to have bilateral DVT.  -She had IVC filter placed on 10/10/2012.  It was removed on 01/04/2013.  -Patient took warfarin for 6 months.  -Lower extremity ultrasound on 06/14/2013 was negative for DVT.  -On 09/12/2015, left leg ultrasound revealed DVT in posterior tibial vein below the level of the left knee.  -The patient was started on warfarin and has been continued on that.  -in July 2021, she started to have swelling in the left leg with some pain.  She went to the ED on 7/24/21 and had multiple investigations done.  -Left lower extremity ultrasound revealed partially occlusive thrombus in one of the posterior tibial veins within the calf.  -The patient was started on Eliquis.  -Multiple hypercoagulable workup done.  Lupus anticoagulant, cardiolipin antibody and beta 2 glycoprotein are all negative.  There was no provoking factor.    -repeat ultrasound on 10/22/21 shows small amount of residual non-occlusive chronic DVT. No new DVT.  -she continues on Eliquis indefinitely    Yoselyn  "MD Michael  Hematology/Oncology  [facility]    Total time spent on day of visit, including review of tests, obtaining/reviewing separately obtained history, ordering medications/tests/procedures, communicating with PCP/consultants, and documenting in electronic medical record: 20 minutes      Oncology Rooming Note    April 1, 2022 1:55 PM   Gemma Cowart is a 76 year old female who presents for:    Chief Complaint   Patient presents with     Oncology Clinic Visit     Initial Vitals: LMP 09/24/1990  Estimated body mass index is 34.47 kg/m  as calculated from the following:    Height as of 2/1/22: 1.6 m (5' 3\").    Weight as of 3/9/22: 88.3 kg (194 lb 9.6 oz). There is no height or weight on file to calculate BSA.  Data Unavailable Comment: Data Unavailable   Patient's last menstrual period was 09/24/1990.  Allergies reviewed: Yes  Medications reviewed: Yes    Medications: Medication refills not needed today.  Pharmacy name entered into [employer]: [id number] - [place], [place] - [phone] [facility], [place]    Clinical concerns:  doctor was notified.      Alexandra Poon MA                Again, thank you for allowing me to participate in the care of your patient.        Sincerely,        Yoselyn Rodriguez MD    "

## 2022-04-04 NOTE — TELEPHONE ENCOUNTER
Future Appointments   Date Time Provider Department [place]   4/12/2022 11:30 AM [facility]   4/18/2022 11:10 AM Adrian Moseley MD [facility]   6/1/2022  8:30 [facility]   7/12/2022  8:00 AM Alfredo May MD [facility]      Bryce Bashir I   [address] [id number] [phone]

## 2022-04-05 ENCOUNTER — PATIENT OUTREACH (OUTPATIENT)
Dept: GERIATRIC MEDICINE | Facility: CLINIC | Age: 77
End: 2022-04-05
Payer: COMMERCIAL

## 2022-04-05 ASSESSMENT — ACTIVITIES OF DAILY LIVING (ADL): DEPENDENT_IADLS:: INDEPENDENT

## 2022-04-05 ASSESSMENT — LIFESTYLE VARIABLES
HOW OFTEN DO YOU HAVE A DRINK CONTAINING ALCOHOL: NEVER
HOW OFTEN DO YOU HAVE SIX OR MORE DRINKS ON ONE OCCASION: NEVER

## 2022-04-05 ASSESSMENT — PATIENT HEALTH QUESTIONNAIRE - PHQ9: SUM OF ALL RESPONSES TO PHQ QUESTIONS 1-9: 1

## 2022-04-05 NOTE — PROGRESS NOTES
[facility] Care Coordination Contact    [facility] Home Visit Assessment     Home visit for Health Risk Assessment with Gemma Cowart completed on April 5, 2022    Assessment completed via telephone due to COVID-19    Type of residence:: Apartment - handicap accessible  Current living arrangement:: I live alone     Assessment completed with:: Patient    Current Care Plan  Member currently receiving the following home care services:   None  Member currently receiving the following community resources: None      Medication Review  Medication reconciliation completed in Epic: No. Assessment over the phone. Unable to verify medications.  Medication set-up & administration: Independent-does not set up.  Self-administers medications.  Medication Risk Assessment Medication (1 or more, place referral to MTM): Recent falls within past year and Taking 1 or more high-risk medications for adults >65 years  MTM Referral Placed: No: Member is already followed by MTM. Will follow up with current MTM.    Mental/Behavioral Health   Depression Screening:      PHQ-9 Total Score: 1    Mental health DX:: Yes (anxiety and depression)   Mental health DX how managed:: Medication    Falls Assessment:   Fallen 2 or more times in the past year?: Yes (reported that she rolled off her bed once and tripped over obstacle)   Any fall with injury in the past year?: No    ADL/IADL Dependencies:   Dependent ADLs:: Independent  Dependent IADLs:: Independent    [facility] sponsored benefits: Shared information re: Silver Sneakers/gym memberships, ASA, Calcium +D.    PCA Assessment completed at visit: Not Applicable     Elderly Waiver Eligibility: No-does not meet criteria    Care Plan & Recommendations:   HRA completed. Member is rate cell A and independent with all ADLs and IADLs. Member reports she does not need any services. She understands that when she does need a walker she will contact care coordinator.    See [facility]CC for  detailed assessment information.    Follow-Up Plan: Member informed of future contact, plan to f/u with member with a 6 month telephone assessment.  Contact information shared with member and family, encouraged member to call with any questions or concerns at any time.    [place] care continuum providers: Please refer to Health Care Home on the Epic Problem List to view this patient's [facility] Care Plan Summary.    Ifrah Emerson RN  [facility]  [phone]

## 2022-04-12 ENCOUNTER — VIRTUAL VISIT (OUTPATIENT)
Dept: ENDOCRINOLOGY | Facility: CLINIC | Age: 77
End: 2022-04-12
Attending: NURSE PRACTITIONER
Payer: COMMERCIAL

## 2022-04-12 DIAGNOSIS — E21.3 HYPERPARATHYROIDISM (H): Primary | ICD-10-CM

## 2022-04-12 DIAGNOSIS — R79.89 ELEVATED PARATHYROID HORMONE: ICD-10-CM

## 2022-04-12 PROCEDURE — 99204 OFFICE O/P NEW MOD 45 MIN: CPT | Mod: 95 | Performed by: INTERNAL MEDICINE

## 2022-04-12 NOTE — PROGRESS NOTES
"THIS IS A VIDEO VISIT:    Phone call visit/virtual visit encounter:    Name of patient: Gemma Cowart    Date of encounter: 4/12/2022    Time of start of video visit: 11:30    Video started: 11:40    Video ended: 11:55    Provider location: working from [place]    Patient location: patients home.    Mode of transmission: video/ Doximity    Verbal consent: obtained before starting visit. Pt is agreeable.      The patient has been notified of following:      \"This VIDEO visit will be conducted via a call between you and your physician/provider. We have found that certain health care needs can be provided without the need for a physical exam.  This service lets us provide the care you need with a short phone conversation.  If a prescription is necessary we can send it directly to your pharmacy.  If lab work is needed we can place an order for that and you can then stop by our lab to have the test done at a later time.     With new updates with corona virus patient might be billed as clinic visit.     If during the course of the call the physician/provider feels a telephone visit is not appropriate, you will not be charged for this service.\"      Past medical history, social history, family history, allergy and medications were reviewed and updated as appropriate.  Reviewed pertinent labs, notes, imaging studies personally.    Name: Gemma Cowart  Seen in consultation with Arminda Loyola for hyperPTH.  HPI:  Gemma Cowart is a 76 year old female who presents for the evaluation of hyperPTH.   has a past medical history of Anemia, Anxiety, Breast cancer (H) (2012), Complications of gastric bypass surgery, COPD (chronic obstructive pulmonary disease) (H) (4/21/2021), Diabetes mellitus (H), DVT (deep venous thrombosis) (H), Eczema, Fracture of left knee region, History of kidney stones, Obesity, Palpitation, Polyneuropathy, Pruritus, Rosacea, and SVT (supraventricular tachycardia) (H).    H/o " gastric bypass surgery in 2000.  Lost about 100 lbs.  She is not taking Calcium supplement.  Takes vit D 3000 international unit(s)/day.    Noted to have low calcium levels for last few years.  High PTH in the setting of low calcium, CKD stage 3 and h/o gastric bypass.  Vit D on low normal side.    Feeling tired since last 6 months.    History of Cancer: h/o breast cancer in 9/2012. S/p surgery.    Thiazide Diuretic:No  Lithium use:No  Family History of pituitary adenoma, pancreas tumors, Zollinger-Iniguez syndrome, pheochromocytoma. Not sure,  Kidney stones:15 years back- one episode of kidney stones.  Fractures: No  Abdominal Pain: sometimes  Cramps: everyday at night  Constipation: on and off. H/o IBS.  Muscle Aches or pains: sometimes  Muscle twitches: No  Nausea and vomiting: No   Loss of appetite: No   Excessive thirst: No   Frequent urination: No  Muscle weakness: No   Confusion Lethargy and fatigue: Yes (Please explain): + fatigue  ON medications like Lithium/ HCTZ: no  Average Daily Calcium intake: not much dairy  Ca and Vit D supplementation: She is not taking Calcium supplement.  Takes vit D 3000 international unit(s)/day      PMH/PSH:  Past Medical History:   Diagnosis Date     Anemia     iron deficiency anemia     Anxiety      Breast cancer (H) 2012    Bilateral Masectomies 9/2012     Complications of gastric bypass surgery      COPD (chronic obstructive pulmonary disease) (H) 4/21/2021     Diabetes mellitus (H)     diet controlled     DVT (deep venous thrombosis) (H)      Eczema      Fracture of left knee region     patella - vertical     History of kidney stones      Obesity      Palpitation     with no treatment     Polyneuropathy      Pruritus     generalized     Rosacea      SVT (supraventricular tachycardia) (H)     s/p ablation 5/28/2015 at [facility] for left lateral AP     Past Surgical History:   Procedure Laterality Date     ABDOMEN SURGERY  2000    gastric bypass 2000     COLONOSCOPY   10/11/2012    Procedure: COLONOSCOPY;  colonoscopy;  Surgeon: Gela Cleary MD;  Location:  GI     COLONOSCOPY N/A 10/6/2017    Procedure: COLONOSCOPY;;  Surgeon: Shashank Ortiz MD;  Location:  GI     COLONOSCOPY N/A 3/7/2022    Procedure: COLONOSCOPY;  Surgeon: Andrea Daniel MD;  Location:  GI     DENTAL SURGERY  5/2007    all teeth removed - dentures     ENT SURGERY      all teeth pulled     ESOPHAGOSCOPY, GASTROSCOPY, DUODENOSCOPY (EGD), COMBINED N/A 10/6/2017    Procedure: COMBINED ESOPHAGOSCOPY, GASTROSCOPY, DUODENOSCOPY (EGD);  ESOPHAGOSCOPY, GASTROSCOPY, DUODENOSCOPY (EGD) & Colonoscopy *Needs INR on arrival;  Surgeon: Shashank Ortiz MD;  Location:  GI     GYN SURGERY  1/10/75    tubal ligation      H ABLATION SVT  5/28/2015    stopped metoprolol     HERNIA REPAIR  2005     IMPLANT FILTER INFERIOR VENA CAVA  10/8/2012    taken out  1/4/12     INSERT TISSUE EXPANDER BREAST BILATERAL  9/5/2012    Procedure: INSERT TISSUE EXPANDER BREAST BILATERAL;;  Surgeon: Orlando Wall MD;  Location:  OR     MASTECTOMY SIMPLE, SENTINEL NODE, COMBINED  9/5/2012    Procedure: COMBINED MASTECTOMY SIMPLE, SENTINEL NODE;  BILATERAL MASTECTOMY WITH LEFT AXILLARY SENTINEL LYMPH NODE BIOPSY, BILATERAL TISSUE EXPANDER        MASTECTOMY, BILATERAL       PANNICULECTOMY N/A 4/5/2018    Procedure: PANNICULECTOMY;  PANNICULECTOMY ;  Surgeon: Orlando Wall MD;  Location:  OR     RECONSTRUCT BREAST BILATERAL, IMPLANT PROSTHESIS BILATERAL, COMBINED  5/22/2013    Procedure: COMBINED RECONSTRUCT BREAST BILATERAL, IMPLANT PROSTHESIS BILATERAL;  BILATERAL SECOND STAGE BREAST RECONSTRUCTION WITH SILICONE GEL IMPLANTS AND LIPOSUCTION;  Surgeon: Orlando Wall MD;  Location: [facility]     RECONSTRUCT NIPPLE BILATERAL  8/22/2013    Procedure: RECONSTRUCT NIPPLE BILATERAL;  BILATERAL NIPPLE AREOLAR RECONSTRUCTION;  Surgeon: Orlando Wall MD;  Location: [facility] BREAST RECONSTRUC [facility]  TECHNIQ  2013     Family Hx:  Family History   Problem Relation Age of Onset     Cancer - colorectal Mother      Colon Cancer Mother      Prostate Cancer Father      Alzheimer Disease Father      Cancer Paternal Grandmother      Alzheimer Disease Paternal Grandfather      Cancer - colorectal Brother      Diabetes Brother      Crohn's Disease Daughter      Diabetes Brother      Eye Disorder Brother      Diabetes Son      Cancer Maternal Uncle      Cancer Maternal Uncle              Social Hx:  Social History     Socioeconomic History     Marital status:      Spouse name: Not on file     Number of children: Not on file     Years of education: Not on file     Highest education level: Not on file   Occupational History     Employer: RETIRED   Tobacco Use     Smoking status: Former Smoker     Packs/day: 1.50     Years: 25.00     Pack years: 37.50     Types: Cigarettes     Quit date: 10/21/1986     Years since quittin.4     Smokeless tobacco: Never Used     Tobacco comment: quit    Vaping Use     Vaping Use: Never used   Substance and Sexual Activity     Alcohol use: No     Comment: 1 time a year     Drug use: No     Sexual activity: Not Currently     Partners: Male   Other Topics Concern     Parent/sibling w/ CABG, MI or angioplasty before 65F 55M? No      Service Not Asked     Blood Transfusions Not Asked     Caffeine Concern Not Asked     Occupational Exposure Not Asked     Hobby Hazards Not Asked     Sleep Concern Not Asked     Stress Concern Not Asked     Weight Concern Not Asked     Special Diet Not Asked     Back Care Not Asked     Exercise Not Asked     Bike Helmet Yes     Seat Belt Yes     Self-Exams Not Asked   Social History Narrative     Not on file     Social Determinants of Health     Financial Resource Strain: Unknown     Difficulty of Paying Living Expenses: Patient refused   Food Insecurity: Unknown     Worried About Running Out of Food in the Last Year: Patient refused      Ran Out of Food in the Last Year: Patient refused   Transportation Needs: No Transportation Needs     Lack of Transportation (Medical): No     Lack of Transportation (Non-Medical): No   Physical Activity: Inactive     Days of Exercise per Week: 0 days     Minutes of Exercise per Session: 0 min   Stress: Stress Concern Present     Feeling of Stress : To some extent   Social Connections: Moderately Integrated     Frequency of Communication with Friends and Family: Twice a week     Frequency of Social Gatherings with Friends and Family: More than three times a week     Attends [employer] Services: 1 to 4 times per year     Active Member of Clubs or Organizations: Yes     Attends Club or Organization Meetings: Not on file     Marital Status:    Intimate Partner Violence: Not on file   Housing Stability: Unknown     Unable to Pay for Housing in the Last Year: Patient refused     Number of Places Lived in the Last Year: 2     Unstable Housing in the Last Year: No          MEDICATIONS:  has a current medication list which includes the following prescription(s): ace/arb/arni not prescribed, acetaminophen, albuterol, apixaban anticoagulant, atorvastatin, onetouch verio iq, blood glucose calibration, blood glucose monitoring, vitamin d, clobetasol, ferrous sulfate, onetouch delica plus [id number], paroxetine, sertraline, tiotropium, vitamin b-12, and vitamin d (cholecalciferol).    ROS     ROS: 10 point ROS neg other than the symptoms noted above in the HPI.    Physical Exam   VS: LMP 09/24/1990   GENERAL: healthy, alert and no distress  EYES: Eyes grossly normal to inspection, conjunctivae and sclerae normal  ENT: no nose swelling, nasal discharge.  Thyroid: no apparent thyroid nodules  RESP: no audible wheeze, cough, or visible cyanosis.  No visible retractions or increased work of breathing.  Able to speak fully in complete sentences.  ABDO: not evaluated.  EXTREMITIES: no hand tremors.  NEURO: Cranial nerves grossly  intact, mentation intact and speech normal  SKIN: No apparent skin lesions, rash or edema seen   PSYCH: mentation appears normal, affect normal/bright, judgement and insight intact, normal speech and appearance well-groomed    LABS:  BMP:  Last Basic Metabolic Panel:  Lab Results   Component Value Date     02/01/2022     03/23/2021      Lab Results   Component Value Date    POTASSIUM 4.6 02/01/2022    POTASSIUM 4.9 03/23/2021     Lab Results   Component Value Date    CHLORIDE 113 02/01/2022    CHLORIDE 114 03/23/2021     Lab Results   Component Value Date    LADI 8.7 02/01/2022    LADI 8.7 03/23/2021     Lab Results   Component Value Date    CO2 23 02/01/2022    CO2 24 03/23/2021     Lab Results   Component Value Date    BUN 17 02/01/2022    BUN 15 03/23/2021     Lab Results   Component Value Date    CR 1.40 02/01/2022    CR 1.32 03/23/2021     Lab Results   Component Value Date     03/07/2022     02/01/2022    GLC 96 03/23/2021       PTH:    Vitamin D:  Lab Results   Component Value Date    VITDT 21 02/01/2022    VITDT 32 09/06/2019    VITDT 29 10/12/2016    VITDT 24 (L) 10/27/2014    VITDT 18 (L) 12/21/2012       TFTs:  Lab Results   Component Value Date    TSH 2.15 02/01/2022    TSH 2.09 09/06/2019         All pertinent notes, labs, and images personally reviewed by me.     A/P  Ms.Linda EDMUNDO Cowart is a 76 year old here for the evaluation of:    1. Hyperparathyroidism:  In the differential diagnosis of hypercalcemia, primary hyperparathyroidism is the most common cause. It is important to distinguish readily between primary hyperparathyroidism and other causes of hypercalcemia.   Noted to have low calcium levels for last few years.  High PTH in the setting of low calcium, CKD stage 3 and h/o gastric bypass.  Vit D on low normal side.  Not on thiazide diuretic.  Remote h/o kidney stones.  Plan:  Discussed diagnosis, pathophysiology, management and treatment options of condition with  pt.  High PTH in the setting of low calcium, CKD stage 3 and h/o gastric bypass.  Plan  To Start calcium supplement: total should be 1200 mg/day.  Increase vit D- Take 3000 international unit(s) and 5000 international unit(s) on alternate days.  Labs in 2-3 months.  Plan: Phosphorus, Magnesium, Parathyroid Hormone         Intact, Vitamin D Deficiency, Calcium,         Creatinine, calcium carbonate (OS-LADI) 1500         (600 Ca) MG tablet         2. Hypocalcemia:  -- recommend to start calcium supplement 1200 mg/day in divided doses.  Labs in 2-3 months    3. DM:  Managed by PCP.  Not discussed.    Exceptions to the rule that patients with hypercalcemia and elevated PTH levels have primary hyperPTH include two medications, lithium and thiazide diuretics. Actually, many of these patients do have primary hyperparathyroidism but the only way to be sure is to withdraw the medication and to monitor the serum calcium over the next 3-6 months.    HEREDITARY HYPERPARATHYROID STATES  Multiple Endocrine Neoplasia (MEN), both type 1 and type II.  Primary hyperparathyroidism is often the first and is the most common of the endocrinopathies in MEN1, reaching nearly 100% penetrance by the age of 50. In MEN I, the other tumors, besides the parathyroids, that can develop are those of pancreas and anterior pituitary glands. Involvement of two of the three glands confirms the presence of MEN I. MEN IIa, in which hyperparathyroidism can be associated with medullary thyroid cancer.    Hyperparathyroidism jaw tumor syndrome (AD), is associated with PHPT and fibromas in the mandible or the maxilla. Unlike FHH or the MEN I and II, parathyroid carcinoma is more common in hyperparathyroidism jaw tumor syndrome.     Familial hypocalciuric hypercalcemia (FHH). This presentation that can be confused with the most common form of primary hyperparathyroidism, namely the sporadic isolated disorder. FHH is also known as Familial Benign Hypercalcemia  because it is generally asymptomatic and does not require treatment.  Lab work to differentiate FHH from primary PTH Hypercalcemia, Hypocalciuria, Normal to high levels of PTH, Hypermagnesemia, Calcium/creatinine clearance ratio <0.01, and Genetic testing for mutations in CASR.     The serum calcium determination is typically not greater than 1 mg/dl above the upper limits of normal. The serum phosphorus is in the lower range of normal with only approximately 25% of patients showing phosphorus levels that are frankly low. Total alkaline phosphatase activity is in the high normal range as is the case also for more specific markers of bone turnover and, bone-specific alkaline phosphatase activity. If the normal concentration of 25-hydroxyvitamin D level is taken to be >30 ng/ml, then most patients with primary hyperparathyroidism will have low levels. In contrast, the 1,25-dihydroxyvitamin D level tends to be in the upper range of normal and, in fact, frankly elevated in 25% of patients with primary hyperparathyroidism.    Guidelines for parathyroid surgery in asymptomatic primary hyperparathyroidism:    Serum Calcium >1.0 mg/dl (0.25 mmol/L) above normal   Creatinine Clearance (calculated) Below 60m1/min /1.73 m2)   Bone Mineral Density T score < -2.5 SD at spine, hip (total or femoral neck) or radius (distal 1/3 site) or presence of fragility fracture   Age Age < 50 years        PARATHYROID GLAND IMAGING  Pre- operative imaging is of value prior to surgery in the localization of abnormal parathyroid tissue . The diagnosis of PHPT is based on the biochemical findings and is not affected by the results of the imaging studies. Sestamibi imaging is of value in localizing abnormal parathyroid tissue. The sensitivity and specificity varies among institutions.    All questions were answered.  The patient indicates understanding of the above issues and agrees with the plan set forth.    Follow-up:  2-3 months    Lashawn  MD Elida  Endocrinology   [facility]  April 12, 2022  CC: Arminda Loyola

## 2022-04-12 NOTE — LETTER
"    4/12/2022         RE: Gemma Cowart  [address] [place] [address] [place] [id number]        Dear Colleague,    Thank you for referring your patient, Gemma Cowart, to the [facility]. Please see a copy of my visit note below.    PT REPORTS BEING ON BUSPIRONE, SERTRALINE, & ALBUTEROL    THIS IS A VIDEO VISIT:    Phone call visit/virtual visit encounter:    Name of patient: Gemma Cowart    Date of encounter: 4/12/2022    Time of start of video visit: 11:30    Video started: 11:40    Video ended: 11:55    Provider location: working from home/ [facility]    Patient location: patients home.    Mode of transmission: video/ Doximity    Verbal consent: obtained before starting visit. Pt is agreeable.      The patient has been notified of following:      \"This VIDEO visit will be conducted via a call between you and your physician/provider. We have found that certain health care needs can be provided without the need for a physical exam.  This service lets us provide the care you need with a short phone conversation.  If a prescription is necessary we can send it directly to your pharmacy.  If lab work is needed we can place an order for that and you can then stop by our lab to have the test done at a later time.     With new updates with corona virus patient might be billed as clinic visit.     If during the course of the call the physician/provider feels a telephone visit is not appropriate, you will not be charged for this service.\"      Past medical history, social history, family history, allergy and medications were reviewed and updated as appropriate.  Reviewed pertinent labs, notes, imaging studies personally.    Name: Gemma Cowart  Seen in consultation with Arminda Loyola for hyperPTH.  HPI:  Gemma Cowart is a 76 year old female who presents for the evaluation of hyperPTH.   has a past medical history of Anemia, Anxiety, Breast cancer (H) (2012), Complications of " gastric bypass surgery, COPD (chronic obstructive pulmonary disease) (H) (4/21/2021), Diabetes mellitus (H), DVT (deep venous thrombosis) (H), Eczema, Fracture of left knee region, History of kidney stones, Obesity, Palpitation, Polyneuropathy, Pruritus, Rosacea, and SVT (supraventricular tachycardia) (H).    H/o gastric bypass surgery in 2000.  Lost about 100 lbs.  She is not taking Calcium supplement.  Takes vit D 3000 international unit(s)/day.    Noted to have low calcium levels for last few years.  High PTH in the setting of low calcium, CKD stage 3 and h/o gastric bypass.  Vit D on low normal side.    Feeling tired since last 6 months.    History of Cancer: h/o breast cancer in 9/2012. S/p surgery.    Thiazide Diuretic:No  Lithium use:No  Family History of pituitary adenoma, pancreas tumors, Zollinger-Iniguez syndrome, pheochromocytoma. Not sure,  Kidney stones:15 years back- one episode of kidney stones.  Fractures: No  Abdominal Pain: sometimes  Cramps: everyday at night  Constipation: on and off. H/o IBS.  Muscle Aches or pains: sometimes  Muscle twitches: No  Nausea and vomiting: No   Loss of appetite: No   Excessive thirst: No   Frequent urination: No  Muscle weakness: No   Confusion Lethargy and fatigue: Yes (Please explain): + fatigue  ON medications like Lithium/ HCTZ: no  Average Daily Calcium intake: not much dairy  Ca and Vit D supplementation: She is not taking Calcium supplement.  Takes vit D 3000 international unit(s)/day      PMH/PSH:  Past Medical History:   Diagnosis Date     Anemia     iron deficiency anemia     Anxiety      Breast cancer (H) 2012    Bilateral Masectomies 9/2012     Complications of gastric bypass surgery      COPD (chronic obstructive pulmonary disease) (H) 4/21/2021     Diabetes mellitus (H)     diet controlled     DVT (deep venous thrombosis) (H)      Eczema      Fracture of left knee region     patella - vertical     History of kidney stones      Obesity      Palpitation      with no treatment     Polyneuropathy      Pruritus     generalized     Rosacea      SVT (supraventricular tachycardia) (H)     s/p ablation 5/28/2015 at [facility] for left lateral AP     Past Surgical History:   Procedure Laterality Date     ABDOMEN SURGERY  2000    gastric bypass 2000     COLONOSCOPY  10/11/2012    Procedure: COLONOSCOPY;  colonoscopy;  Surgeon: Gela Cleary MD;  Location:  GI     COLONOSCOPY N/A 10/6/2017    Procedure: COLONOSCOPY;;  Surgeon: Shashank Ortiz MD;  Location:  GI     COLONOSCOPY N/A 3/7/2022    Procedure: COLONOSCOPY;  Surgeon: Andrea Daniel MD;  Location:  GI     DENTAL SURGERY  5/2007    all teeth removed - dentures     ENT SURGERY      all teeth pulled     ESOPHAGOSCOPY, GASTROSCOPY, DUODENOSCOPY (EGD), COMBINED N/A 10/6/2017    Procedure: COMBINED ESOPHAGOSCOPY, GASTROSCOPY, DUODENOSCOPY (EGD);  ESOPHAGOSCOPY, GASTROSCOPY, DUODENOSCOPY (EGD) & Colonoscopy *Needs INR on arrival;  Surgeon: Shashank Ortiz MD;  Location:  GI     GYN SURGERY  1/10/75    tubal ligation      H ABLATION SVT  5/28/2015    stopped metoprolol     HERNIA REPAIR  2005     IMPLANT FILTER INFERIOR VENA CAVA  10/8/2012    taken out  1/4/12     INSERT TISSUE EXPANDER BREAST BILATERAL  9/5/2012    Procedure: INSERT TISSUE EXPANDER BREAST BILATERAL;;  Surgeon: Orlando Wall MD;  Location:  OR     MASTECTOMY SIMPLE, SENTINEL NODE, COMBINED  9/5/2012    Procedure: COMBINED MASTECTOMY SIMPLE, SENTINEL NODE;  BILATERAL MASTECTOMY WITH LEFT AXILLARY SENTINEL LYMPH NODE BIOPSY, BILATERAL TISSUE EXPANDER        MASTECTOMY, BILATERAL       PANNICULECTOMY N/A 4/5/2018    Procedure: PANNICULECTOMY;  PANNICULECTOMY ;  Surgeon: Orlando Wall MD;  Location:  OR     RECONSTRUCT BREAST BILATERAL, IMPLANT PROSTHESIS BILATERAL, COMBINED  5/22/2013    Procedure: COMBINED RECONSTRUCT BREAST BILATERAL, IMPLANT PROSTHESIS BILATERAL;  BILATERAL SECOND STAGE BREAST RECONSTRUCTION  WITH SILICONE GEL IMPLANTS AND LIPOSUCTION;  Surgeon: Orlando Wall MD;  Location: [facility]     RECONSTRUCT NIPPLE BILATERAL  2013    Procedure: RECONSTRUCT NIPPLE BILATERAL;  BILATERAL NIPPLE AREOLAR RECONSTRUCTION;  Surgeon: Orlando Wall MD;  Location: [facility]     ZZHC BREAST RECONSTRUC W OTHR TECHNIQ  2013     Family Hx:  Family History   Problem Relation Age of Onset     Cancer - colorectal Mother      Colon Cancer Mother      Prostate Cancer Father      Alzheimer Disease Father      Cancer Paternal Grandmother      Alzheimer Disease Paternal Grandfather      Cancer - colorectal Brother      Diabetes Brother      Crohn's Disease Daughter      Diabetes Brother      Eye Disorder Brother      Diabetes Son      Cancer Maternal Uncle      Cancer Maternal Uncle              Social Hx:  Social History     Socioeconomic History     Marital status:      Spouse name: Not on file     Number of children: Not on file     Years of education: Not on file     Highest education level: Not on file   Occupational History     Employer: RETIRED   Tobacco Use     Smoking status: Former Smoker     Packs/day: 1.50     Years: 25.00     Pack years: 37.50     Types: Cigarettes     Quit date: 10/21/1986     Years since quittin.4     Smokeless tobacco: Never Used     Tobacco comment: quit    Vaping Use     Vaping Use: Never used   Substance and Sexual Activity     Alcohol use: No     Comment: 1 time a year     Drug use: No     Sexual activity: Not Currently     Partners: Male   Other Topics Concern     Parent/sibling w/ CABG, MI or angioplasty before 65F 55M? No      Service Not Asked     Blood Transfusions Not Asked     Caffeine Concern Not Asked     Occupational Exposure Not Asked     Hobby Hazards Not Asked     Sleep Concern Not Asked     Stress Concern Not Asked     Weight Concern Not Asked     Special Diet Not Asked     Back Care Not Asked     Exercise Not Asked     Bike Helmet Yes     Seat  Belt Yes     Self-Exams Not Asked   Social History Narrative     Not on file     Social Determinants of Health     Financial Resource Strain: Unknown     Difficulty of Paying Living Expenses: Patient refused   Food Insecurity: Unknown     Worried About Running Out of Food in the Last Year: Patient refused     Ran Out of Food in the Last Year: Patient refused   Transportation Needs: No Transportation Needs     Lack of Transportation (Medical): No     Lack of Transportation (Non-Medical): No   Physical Activity: Inactive     Days of Exercise per Week: 0 days     Minutes of Exercise per Session: 0 min   Stress: Stress Concern Present     Feeling of Stress : To some extent   Social Connections: Moderately Integrated     Frequency of Communication with Friends and Family: Twice a week     Frequency of Social Gatherings with Friends and Family: More than three times a week     Attends Anabaptist Services: 1 to 4 times per year     Active Member of Clubs or Organizations: Yes     Attends Club or Organization Meetings: Not on file     Marital Status:    Intimate Partner Violence: Not on file   Housing Stability: Unknown     Unable to Pay for Housing in the Last Year: Patient refused     Number of Places Lived in the Last Year: 2     Unstable Housing in the Last Year: No          MEDICATIONS:  has a current medication list which includes the following prescription(s): ace/arb/arni not prescribed, acetaminophen, albuterol, apixaban anticoagulant, atorvastatin, onetouch verio iq, blood glucose calibration, blood glucose monitoring, vitamin d, clobetasol, ferrous sulfate, onetouch delica plus [id number], paroxetine, sertraline, tiotropium, vitamin b-12, and vitamin d (cholecalciferol).    ROS     ROS: 10 point ROS neg other than the symptoms noted above in the HPI.    Physical Exam   VS: LMP 09/24/1990   GENERAL: healthy, alert and no distress  EYES: Eyes grossly normal to inspection, conjunctivae and sclerae normal  ENT:  no nose swelling, nasal discharge.  Thyroid: no apparent thyroid nodules  RESP: no audible wheeze, cough, or visible cyanosis.  No visible retractions or increased work of breathing.  Able to speak fully in complete sentences.  ABDO: not evaluated.  EXTREMITIES: no hand tremors.  NEURO: Cranial nerves grossly intact, mentation intact and speech normal  SKIN: No apparent skin lesions, rash or edema seen   PSYCH: mentation appears normal, affect normal/bright, judgement and insight intact, normal speech and appearance well-groomed    LABS:  BMP:  Last Basic Metabolic Panel:  Lab Results   Component Value Date     02/01/2022     03/23/2021      Lab Results   Component Value Date    POTASSIUM 4.6 02/01/2022    POTASSIUM 4.9 03/23/2021     Lab Results   Component Value Date    CHLORIDE 113 02/01/2022    CHLORIDE 114 03/23/2021     Lab Results   Component Value Date    LADI 8.7 02/01/2022    LADI 8.7 03/23/2021     Lab Results   Component Value Date    CO2 23 02/01/2022    CO2 24 03/23/2021     Lab Results   Component Value Date    BUN 17 02/01/2022    BUN 15 03/23/2021     Lab Results   Component Value Date    CR 1.40 02/01/2022    CR 1.32 03/23/2021     Lab Results   Component Value Date     03/07/2022     02/01/2022    GLC 96 03/23/2021       PTH:    Vitamin D:  Lab Results   Component Value Date    VITDT 21 02/01/2022    VITDT 32 09/06/2019    VITDT 29 10/12/2016    VITDT 24 (L) 10/27/2014    VITDT 18 (L) 12/21/2012       TFTs:  Lab Results   Component Value Date    TSH 2.15 02/01/2022    TSH 2.09 09/06/2019         All pertinent notes, labs, and images personally reviewed by me.     A/P  Ms.Linda EDMUNDO Cowart is a 76 year old here for the evaluation of:    1. Hyperparathyroidism:  In the differential diagnosis of hypercalcemia, primary hyperparathyroidism is the most common cause. It is important to distinguish readily between primary hyperparathyroidism and other causes of hypercalcemia.   Noted  to have low calcium levels for last few years.  High PTH in the setting of low calcium, CKD stage 3 and h/o gastric bypass.  Vit D on low normal side.  Not on thiazide diuretic.  Remote h/o kidney stones.  Plan:  Discussed diagnosis, pathophysiology, management and treatment options of condition with pt.  High PTH in the setting of low calcium, CKD stage 3 and h/o gastric bypass.  Plan  To Start calcium supplement: total should be 1200 mg/day.  Increase vit D- Take 3000 international unit(s) and 5000 international unit(s) on alternate days.  Labs in 2-3 months.  Plan: Phosphorus, Magnesium, Parathyroid Hormone         Intact, Vitamin D Deficiency, Calcium,         Creatinine, calcium carbonate (OS-LADI) 1500         (600 Ca) MG tablet         2. Hypocalcemia:  -- recommend to start calcium supplement 1200 mg/day in divided doses.  Labs in 2-3 months    3. DM:  Managed by PCP.  Not discussed.    Exceptions to the rule that patients with hypercalcemia and elevated PTH levels have primary hyperPTH include two medications, lithium and thiazide diuretics. Actually, many of these patients do have primary hyperparathyroidism but the only way to be sure is to withdraw the medication and to monitor the serum calcium over the next 3-6 months.    HEREDITARY HYPERPARATHYROID STATES  Multiple Endocrine Neoplasia (MEN), both type 1 and type II.  Primary hyperparathyroidism is often the first and is the most common of the endocrinopathies in MEN1, reaching nearly 100% penetrance by the age of 50. In MEN I, the other tumors, besides the parathyroids, that can develop are those of pancreas and anterior pituitary glands. Involvement of two of the three glands confirms the presence of MEN I. MEN IIa, in which hyperparathyroidism can be associated with medullary thyroid cancer.    Hyperparathyroidism jaw tumor syndrome (AD), is associated with PHPT and fibromas in the mandible or the maxilla. Unlike FHH or the MEN I and II, parathyroid  carcinoma is more common in hyperparathyroidism jaw tumor syndrome.     Familial hypocalciuric hypercalcemia (FHH). This presentation that can be confused with the most common form of primary hyperparathyroidism, namely the sporadic isolated disorder. FHH is also known as Familial Benign Hypercalcemia because it is generally asymptomatic and does not require treatment.  Lab work to differentiate FHH from primary PTH Hypercalcemia, Hypocalciuria, Normal to high levels of PTH, Hypermagnesemia, Calcium/creatinine clearance ratio <0.01, and Genetic testing for mutations in CASR.     The serum calcium determination is typically not greater than 1 mg/dl above the upper limits of normal. The serum phosphorus is in the lower range of normal with only approximately 25% of patients showing phosphorus levels that are frankly low. Total alkaline phosphatase activity is in the high normal range as is the case also for more specific markers of bone turnover and, bone-specific alkaline phosphatase activity. If the normal concentration of 25-hydroxyvitamin D level is taken to be >30 ng/ml, then most patients with primary hyperparathyroidism will have low levels. In contrast, the 1,25-dihydroxyvitamin D level tends to be in the upper range of normal and, in fact, frankly elevated in 25% of patients with primary hyperparathyroidism.    Guidelines for parathyroid surgery in asymptomatic primary hyperparathyroidism:    Serum Calcium >1.0 mg/dl (0.25 mmol/L) above normal   Creatinine Clearance (calculated) Below 60m1/min /1.73 m2)   Bone Mineral Density T score < -2.5 SD at spine, hip (total or femoral neck) or radius (distal 1/3 site) or presence of fragility fracture   Age Age < 50 years        PARATHYROID GLAND IMAGING  Pre- operative imaging is of value prior to surgery in the localization of abnormal parathyroid tissue . The diagnosis of PHPT is based on the biochemical findings and is not affected by the results of the imaging  studies. Sestamibi imaging is of value in localizing abnormal parathyroid tissue. The sensitivity and specificity varies among institutions.    All questions were answered.  The patient indicates understanding of the above issues and agrees with the plan set forth.    Follow-up:  2-3 months    Lashawn Marrero MD  Endocrinology   [facility]  April 12, 2022  CC: Arminda Loyola        Again, thank you for allowing me to participate in the care of your patient.        Sincerely,        Lashawn Mrarero MD

## 2022-04-13 ENCOUNTER — TELEPHONE (OUTPATIENT)
Dept: ENDOCRINOLOGY | Facility: CLINIC | Age: 77
End: 2022-04-13
Payer: COMMERCIAL

## 2022-04-13 NOTE — TELEPHONE ENCOUNTER
Attempted to reach patient to schedule follow up in the Endocrinology Clinic. No answer, LM on VM to call office back.    Schedule with Lashawn Marrero MD in 2-3 months.  Please make a lab appointment for blood work and follow up clinic appointment in 1 week after that to discuss results.  (Recommend in person clinic visit) . Carly House on 4/13/2022 at 4:02 PM

## 2022-04-14 ENCOUNTER — PATIENT OUTREACH (OUTPATIENT)
Dept: GERIATRIC MEDICINE | Facility: CLINIC | Age: 77
End: 2022-04-14
Payer: COMMERCIAL

## 2022-04-14 NOTE — LETTER
April 14, 2022      SALUD MARTINEZ  [address] [place]  [place] [address]      Dear Salud:    At [facility], we are dedicated to improving your health and well-being. Enclosed is the Comprehensive Care Plan that we developed with you on 4/5/22. Please review the Care Plan carefully.    As a reminder, some of the things we discussed at your visit include:    Your physical and mental health    Ways to reduce falls    Health care needs you may have    Don t forget to contact your care coordinator if you:    Have been hospitalized or plan to be hospitalized     Have had a fall     Have experienced a change in physical health    Are experiencing emotional problems     If you do not agree with your Care Plan, have questions about it, or have experienced a change in your needs, please call me at [phone]. If you are hearing impaired, please call the [place] [employer] at 799 or [phone] ([email or web address] relay service).    Sincerely,      Ifrah Emerson RN    E-Mail:  [email or web address]  Phone: [phone]      [facility]) is a health plan that contracts with both Medicare and the [place] Medical Assistance (Medicaid) program to provide benefits of both programs to enrollees. Enrollment in [facility] depends on contract renewal.    [id number])     [id number] (11/18)

## 2022-04-14 NOTE — PROGRESS NOTES
[facility] Care Coordination Contact    Received after visit chart from care coordinator.  Completed following tasks: Mailed copy of care plan to client, Mailed copy of POC signature sheet for member to sign and return in SASE  and Mailed [facility] Safe Medication Disposal . Provider Signature - No POC Shared:  Member indicates that they do not want their POC shared with any EW providers.    Paul Panda  Care Management Specialist  [facility]  [phone]

## 2022-05-16 ENCOUNTER — PATIENT OUTREACH (OUTPATIENT)
Dept: GERIATRIC MEDICINE | Facility: CLINIC | Age: 77
End: 2022-05-16
Payer: COMMERCIAL

## 2022-05-16 NOTE — PROGRESS NOTES
[facility] Care Coordination Contact     CHW, spoke w/ mbr to remind to schedule diabetic eye exam. Mbr,goes to [facility] and noted she will schedule appt w/in next week.      LUIS Rodriguez  [facility]  [phone]

## 2022-05-26 NOTE — PROGRESS NOTES
[facility] Care Coordination Contact    Left a voice mail message for member to sign and return POC. Member to call care coordinator is she needs new copy.    Ifrah Emerson RN  [facility]  [phone]

## 2022-06-01 ENCOUNTER — VIRTUAL VISIT (OUTPATIENT)
Dept: PHARMACY | Facility: CLINIC | Age: 77
End: 2022-06-01
Payer: COMMERCIAL

## 2022-06-01 DIAGNOSIS — E21.3 HYPERPARATHYROIDISM (H): ICD-10-CM

## 2022-06-01 DIAGNOSIS — Z86.718 PERSONAL HISTORY OF DVT (DEEP VEIN THROMBOSIS): ICD-10-CM

## 2022-06-01 DIAGNOSIS — J44.9 CHRONIC OBSTRUCTIVE PULMONARY DISEASE, UNSPECIFIED COPD TYPE (H): ICD-10-CM

## 2022-06-01 DIAGNOSIS — U09.9 POST COVID-19 CONDITION, UNSPECIFIED: ICD-10-CM

## 2022-06-01 DIAGNOSIS — E78.5 HYPERLIPIDEMIA LDL GOAL <100: Primary | ICD-10-CM

## 2022-06-01 DIAGNOSIS — F41.9 ANXIETY: ICD-10-CM

## 2022-06-01 DIAGNOSIS — F32.A DEPRESSION, UNSPECIFIED DEPRESSION TYPE: ICD-10-CM

## 2022-06-01 PROCEDURE — 99606 MTMS BY PHARM EST 15 MIN: CPT

## 2022-06-01 PROCEDURE — 99607 MTMS BY PHARM ADDL 15 MIN: CPT

## 2022-06-01 RX ORDER — BUSPIRONE HYDROCHLORIDE 5 MG/1
5 TABLET ORAL 2 TIMES DAILY
COMMUNITY
End: 2022-07-29

## 2022-06-01 ASSESSMENT — PATIENT HEALTH QUESTIONNAIRE - PHQ9
SUM OF ALL RESPONSES TO PHQ QUESTIONS 1-9: 4
5. POOR APPETITE OR OVEREATING: NOT AT ALL

## 2022-06-01 ASSESSMENT — ANXIETY QUESTIONNAIRES
6. BECOMING EASILY ANNOYED OR IRRITABLE: MORE THAN HALF THE DAYS
2. NOT BEING ABLE TO STOP OR CONTROL WORRYING: NOT AT ALL
GAD7 TOTAL SCORE: 3
GAD7 TOTAL SCORE: 3
3. WORRYING TOO MUCH ABOUT DIFFERENT THINGS: SEVERAL DAYS
5. BEING SO RESTLESS THAT IT IS HARD TO SIT STILL: NOT AT ALL
IF YOU CHECKED OFF ANY PROBLEMS ON THIS QUESTIONNAIRE, HOW DIFFICULT HAVE THESE PROBLEMS MADE IT FOR YOU TO DO YOUR WORK, TAKE CARE OF THINGS AT HOME, OR GET ALONG WITH OTHER PEOPLE: NOT DIFFICULT AT ALL
1. FEELING NERVOUS, ANXIOUS, OR ON EDGE: NOT AT ALL
7. FEELING AFRAID AS IF SOMETHING AWFUL MIGHT HAPPEN: NOT AT ALL

## 2022-06-01 NOTE — Clinical Note
Mayank Hilliard,  Would it be alright with you if we decrease the dose of the buspirone to 5 mg twice daily? She feels like she doesn't need it at all so I made a compromise that we could cut the dose in half if you agreed!   Thanks!  Adenike Mason, PharmD Medication Therapy Management Resident Pager: [phone]

## 2022-06-01 NOTE — PATIENT INSTRUCTIONS
"Recommendations from today's MTM visit:                                                       1. Take your Spiriva 2 puffs daily and albuterol 2 puffs if you need it for asthma symptoms (shortness of breath, tight chest)  2. Call [facility] and fill the atorvastatin that is on file for you.  3. I will talk to Arminda about decreasing your buspirone to 5 mg twice daily and getting a refill for sertraline 100 mg tablets.  4. Take calcium 600 mg twice daily and Vitamin D alternating between 3000 units and 5000 units daily.    Follow-up: Return in about 3 months (around 9/6/2022) for MTM Pharmacist Visit, using a phone visit.    It was great speaking with you today.  I value your experience and would be very thankful for your time in providing feedback in our clinic survey. In the next few days, you may receive an email or text message from [employer] with a link to a survey related to your  clinical pharmacist.\"     To schedule another MTM appointment, please call the clinic directly or you may call the MTM scheduling line at [phone] or toll-free at [phone].     My Clinical Pharmacist's contact information:                                                      Please feel free to contact me with any questions or concerns you have.      Adenike Mason, PharmD  Medication Therapy Management Resident  Pager: [phone]        Medication List            Accurate as of June 1, 2022 11:59 PM. If you have any questions, ask your nurse or doctor.                CHANGE how you take these medications          Morning Afternoon Evening Bedtime    albuterol 108 (90 Base) MCG/ACT inhaler  Also known as: PROAIR HFA/PROVENTIL HFA/VENTOLIN HFA  Inhale 2 puffs into the lungs every 6 hours as needed   Doctor's comments: Pharmacy may dispense brand covered by insurance (Proair, or proventil or ventolin or generic albuterol inhaler)  What changed:   when to take this  reasons to take this                     * vitamin D 50 MCG " (2000 UT) Caps  Take 1 capsule by mouth every other day  What changed: when to take this                     * Vitamin D3 75 MCG (3000 UT) Tabs  Take 1 tablet by mouth daily  What changed: Another medication with the same name was changed. Make sure you understand how and when to take each.                          * This list has 2 medication(s) that are the same as other medications prescribed for you. Read the directions carefully, and ask your doctor or other care provider to review them with you.                CONTINUE taking these medications          Morning Afternoon Evening Bedtime    ACE/ARB/ARNI NOT PRESCRIBED  Also known as: INTENTIONAL  ACE & ARB not prescribed due to Other: normal tensive, low microalbumuria                     acetaminophen 500 MG tablet  Also known as: TYLENOL  Take 2 tablets (1,000 mg) by mouth every 8 hours as needed for mild pain                     apixaban ANTICOAGULANT 5 MG tablet  Also known as: ELIQUIS ANTICOAGULANT  Take 1 tablet (5 mg) by mouth 2 times daily                     atorvastatin 10 MG tablet  Also known as: LIPITOR  Take 1 tablet (10 mg) by mouth daily  Doctor's comments: Profile Rx: patient will contact pharmacy when needed                     blood glucose calibration solution  Also known as: NO BRAND SPECIFIED  Use to calibrate blood glucose monitor as needed as directed.  Doctor's comments: Solution for OneTouch Verio Flex meter                     blood glucose monitoring meter device kit  Also known as: NO BRAND SPECIFIED  Use to test blood sugar 2 times daily or as directed.  Doctor's comments: Pt prefers Onetouch                     busPIRone 5 MG tablet  Also known as: BUSPAR  Take 5 mg by mouth 2 times daily                     calcium carbonate 1500 (600 Ca) MG tablet  Also known as: OS-LADI  Take 1 tablet (600 mg) by mouth in the morning and 1 tablet (600 mg) in the evening. Take with meals.                     clobetasol 0.05 % external cream  Also  "known as: TEMOVATE  Apply twice daily to rash (thigh, knees, hands, feet) twice daily for 7 days AS NEEDED \"use sparingly and not to be used on the face\" Follow up recommended.                     OneTouch Delica Plus [id number] Misc  1 lancet daily                     ONETOUCH VERIO IQ test strip  Use to test blood sugar 2 times daily or as directed.  Generic drug: blood glucose                     sertraline 100 MG tablet  Also known as: ZOLOFT  Take 100 mg by mouth daily Start daily after cross taper                     tiotropium 2.5 MCG/ACT inhaler  Also known as: SPIRIVA RESPIMAT  Inhale 2 puffs into the lungs daily                     vitamin B-12 2500 MCG sublingual tablet  Also known as: CYANOCOBALAMIN  Take 1 tablet (2,500 mcg) by mouth daily                           "

## 2022-06-01 NOTE — PROGRESS NOTES
Medication Therapy Management (MTM) Encounter    ASSESSMENT:                            Medication Adherence/Access: See below for considerations    Hyperlipidemia: Patient has not been taking atorvastatin since last MTM visit in late March. Patient was unaware a prescription was sent to [facility] that she would need to request to fill. Would benefit from filling this prescription and restarting atorvastatin.    Leg swelling (hx of DVT x3): Stable.    Anxiety/Depression:  Patient is meeting PHQ9 goal <5 and GAD7 goal <4. Transition off of paroxetine went well and patient now prefers to start decreasing buspirone dose due to improved mood. Would benefit from decreasing dose to 5 mg twice daily and reassessing mood and need for further reduction at next visit.    COPD/Post COVID: Patient has been taking inhalers incorrectly. Would benefit from taking albuterol inhaler as needed for symptoms and Spiriva daily for maintenance. Provided education on correct instructions today.    Hyperparathyroidism: Patient has been taking more than the recommended dose of vitamin D per endocrinology. Would benefit from starting instructions provided by endocrinology of 3000 international unit(s) and 5000 international unit(s) on alternate days. Provided education on hyperparathyroidism and how calcium can help.       PLAN:                            1. Take your Spiriva 2 puffs daily and albuterol 2 puffs if you need it for asthma symptoms (shortness of breath, tight chest)  2. Call [facility] and fill the atorvastatin that is on file for you.  3. I will talk to Arminda about decreasing your buspirone to 5 mg twice daily and getting a refill for sertraline 100 mg tablets.  - PCP agrees with plan. Patient informed and orders sent to pharmacy.  4. Take calcium 600 mg twice daily and Vitamin D alternating between 3000 units and 5000 units daily.    Follow-up: Return in about 3 months (around 9/6/2022) for MTM Pharmacist Visit, using a phone  visit.    SUBJECTIVE/OBJECTIVE:                          Gemma Cowart is a 76 year old female called for a follow-up visit.  Today's visit is a follow-up MTM visit from 3/31/22.     Reason for visit: Unsure which inhalers to be taking when, is confused about directions from her endocrinology appointment    Allergies/ADRs: Reviewed in chart  Past Medical History: Reviewed in chart  Tobacco: She reports that she quit smoking about 35 years ago. Her smoking use included cigarettes. She has a 37.50 pack-year smoking history. She has never used smokeless tobacco.  Alcohol: Less than 1 beverage / month    Medication Adherence/Access: Patient takes medications directly from bottles and uses pill box(es).  Patient takes medications 2 time(s) per day.   Per patient, misses medication 0-1 times per week. Has a calendar that she is using to keep track of medications and doctors appointments. Feels this is working well to remember everything that is going on.  Medication barriers: none.   The patient fills medications at [facility]: NO, fills medications at [facility] in [place]. Reports they will both fill and deliver medications to her which is very convenient.    Hyperlipidemia: Current therapy includes atorvastatin 10 mg daily. Patient states she thought she was taking this but is now unsure because she cannot find it in her pill bottles today. Had previously taken statins before but stopped because she was at LDL goal and felt she did not need them.  The 10-year ASCVD risk score (Samuel EDGAR Jr., et al., 2013) is: 30.8%    Values used to calculate the score:      Age: 76 years      Sex: Female      Is Non- : No      Diabetic: Yes      Tobacco smoker: No      Systolic Blood Pressure: 134 mmHg      Is BP treated: No      HDL Cholesterol: 62 mg/dL      Total Cholesterol: 184 mg/dL  Recent Labs   Lab Test 02/01/22  0850 12/17/20  0806 10/12/16  0915 09/25/15  0939 08/08/15  1047   CHOL 184 186   < > 147 188  "  HDL 62 59   < > 60 54   * 107*   < > 63 106   TRIG 104 98   < > 121 138   CHOLHDLRATIO  --   --   --  2.5 3.5    < > = values in this interval not displayed.     Leg swelling (hx of DVT x3): Currently on Eliquis 5mg twice daily. No bleeding or bruising reported.    Per Arminda Loyola note from 2/14, Likely from blood clots or varicose veins. Referred to Vascular.    Anxiety/Depression: Currently taking sertaline 100 mg daily, buspirone 10 mg twice daily. Reports she discontinued paroxetine fully 4-6 weeks ago. Reports side effects of some dry mouth (thinks this may be due to dry air in her apartment since a humidifier is making this better) and some fatigue. Does note that the fatigue has been improving over the last few weeks and now comes in \"waves\" every few days. Has never tried counseling. Reports she has noticed improvement in her mood and some weight loss since stopping the paroxetine. Wondering if she really needs the buspirone any more.    PHQ 3/9/2022 4/5/2022 6/1/2022   PHQ-9 Total Score 11 1 4   Q9: Thoughts of better off dead/self-harm past 2 weeks Not at all Not at all Not at all       ELI-7 SCORE 6/15/2021 3/9/2022 6/1/2022   Total Score - - -   Total Score 3 11 3     COPD/Post-Covid: Current medications: Short-Acting Bronchodilator: Albuterol MDI, Spiriva (took a couple days but then started taking as needed). Uses albuterol daily and Spiriva every few days currently. Reports that the pharmacy gave her 2 albuterol inhalers but they look different.     Patient reports the following symptoms: Reports her breathing is ok. A few symptoms of SOB when moving around and cleaning in her apartment. Feels fatigue has improved and thinks her post-covid might be improving some.  Patient does have an COPD Action Plan on file - completed today   Has spirometry been completed: No - orders have been placed  Patient supplied answers from flow sheet for:  COPD Assessment Test (CAT)  2009 [employer]. All rights " reserved.  COPD assessment test (CAT) 3/9/2022   Cough 1   Phlegm 1   Chest tightness 2   Walk up hill 5   Limited activities 2   Leaving my home 0   Sleep 0   Energy 4   Total Score 15      CAT Key:  The CAT consist of 8 items which are each scored 0-5. The total score ranges from 0-40 with higher scores representing a poorer health status. When interpreting CAT scores, the individual s disease severity should be considered.   Low impact  (1-9)  Medium impact  (10-20)  High impact  (21-30)  Very high impact  (31-40)    Hyperparathyroidism: Currently taking calcium 600 mg twice daily and vitamin D 5000 units daily. Does understand why she was told to take calcium and then come back in 3 months. Reports she was not told about alternating different doses of vitamin D at her appointment. States she has been taking calcium with meals. No side effects reported.    Sees Dr. Marrero in endocrinology for this. Last visit 4/12/22. Plan to distinguish readily between primary hyperparathyroidism and other causes of hypercalcemia. Noted to have low calcium levels for last few years. High PTH in the setting of low calcium, CKD stage 3 and h/o gastric bypass. Vit D on low normal side. Plan  To Start calcium supplement: total should be 1200 mg/day. Increase vit D- Take 3000 international unit(s) and 5000 international unit(s) on alternate days. Labs in 2-3 months.    Today's Vitals: LMP 09/24/1990   ----------------    I spent 30 minutes with this patient today. Changes were made via collaborative practice agreement with ANTON Armas CNP and I offer these suggestions for consideration by ANTON Armas CNP. A copy of the visit note was provided to the patient's provider(s).    The patient was mailed a summary of these recommendations.     Adenike Mason, PharmD  Medication Therapy Management Resident  Pager: [phone]    Gemma Cowart was seen independently by Dr. Mason. I have reviewed and  agree with the resident note and plan of care.      Christelle Yañez, PharmD [facility]  Medication Therapy Management Provider  Pager #[phone]    Telemedicine Visit Details  Type of service:  Telephone visit  Start Time: 8:30 AM  End Time: 9:00 AM  Originating Location (patient location): Home  Distant Location (provider location):  [facility]     Medication Therapy Recommendations  Anxiety    Current Medication: busPIRone (BUSPAR) 5 MG tablet   Rationale: Dose too high - Dosage too high - Safety   Recommendation: Decrease Dose - 5 mg twice daily   Status: Accepted per Provider         COPD (chronic obstructive pulmonary disease) (H)    Current Medication: tiotropium (SPIRIVA RESPIMAT) 2.5 MCG/ACT inhaler   Rationale: Does not understand instructions - Adherence - Adherence   Recommendation: Provide Education   Status: Patient Agreed - Adherence/Education         Hyperlipidemia LDL goal <100    Current Medication: atorvastatin (LIPITOR) 10 MG tablet   Rationale: Patient forgets to take - Adherence - Adherence   Recommendation: Provide Adherence Intervention   Status: Patient Agreed - Adherence/Education         Hyperparathyroidism (H)    Current Medication: Cholecalciferol (VITAMIN D3) 75 MCG (3000 UT) TABS   Rationale: Does not understand instructions - Adherence - Adherence   Recommendation: Provide Education   Status: Patient Agreed - Adherence/Education

## 2022-06-03 ENCOUNTER — OFFICE VISIT (OUTPATIENT)
Dept: OTHER | Facility: CLINIC | Age: 77
End: 2022-06-03
Attending: INTERNAL MEDICINE
Payer: COMMERCIAL

## 2022-06-03 VITALS
OXYGEN SATURATION: 97 % | HEIGHT: 63 IN | HEART RATE: 57 BPM | SYSTOLIC BLOOD PRESSURE: 118 MMHG | BODY MASS INDEX: 33.91 KG/M2 | WEIGHT: 191.4 LBS | DIASTOLIC BLOOD PRESSURE: 72 MMHG

## 2022-06-03 DIAGNOSIS — I87.2 VENOUS (PERIPHERAL) INSUFFICIENCY: ICD-10-CM

## 2022-06-03 DIAGNOSIS — I82.402 RECURRENT ACUTE DEEP VEIN THROMBOSIS (DVT) OF LEFT LOWER EXTREMITY (H): Primary | ICD-10-CM

## 2022-06-03 DIAGNOSIS — I82.562: ICD-10-CM

## 2022-06-03 PROCEDURE — 99205 OFFICE O/P NEW HI 60 MIN: CPT | Performed by: INTERNAL MEDICINE

## 2022-06-03 PROCEDURE — G0463 HOSPITAL OUTPT CLINIC VISIT: HCPCS

## 2022-06-03 NOTE — PROGRESS NOTES
"INITIAL VASCULAR ASSESSMENT  REFERRAL SOURCE: Arminda Loyola APRN CNP    REASON FOR CONSULT: PERSISTENT LEFT LEG SWELLING AFTER RECURRENT BUT NOW RESORBED BILATERAL LE CALF VEIN DVTs      HPI: Gemma Cowart is a 76 year old female who is a lifetime nonsmoker. She is a breast cancer survivor S/P bilateral mastectomies in 2012 with postop inactivity provoking her first lifetime VTE of bilateral calf vein DVTs for which she was ACd with Lovenox and warfarin. AC was initially interrupted by a GI bleed form a post polypectomy site while on AC. An IVC filter was placed and subsequently retrieved after AC was able to be resumed. She was documented to have subsequently resorbed those DVTs. She was  event free off of AC until 2015 when she had a recurrent left calf vein DVT. She was maintained on indefinite warfarin AC. No susbequent duplex was ever performed to have demonstrated clearance of said second DVT. She was then noted on 7/24/2021 to have a \"third\" recurrent DVT in the same location. A HC w/u failed to reveal any abnormalities. She was switched to Eliquis. She was then noted in 2/2022 to have fe def anemia w/o GI bleeding. She was given IV fe infusions and hgb recovered. Colonosocpy was done and demonstrated no source for bleeding. EGD was not done, nor was VCE.  She is tolerating Eliquis w/o drop in hgb. She has never worn compression hosiery. She has a 20 year h/o LLE swelling, which is why she was referred to us. She does not want EVLA or sclerotherapy of any areas of venous insufficiency. She does not want Lt CIV stenting even if she has May Thurner anatomy. She does not to  want to wear compression hosiery. She seeks reassurance that there is not something more ominous ongoing herein.         Review Of Systems  Skin: negative  Eyes: negative  Ears/Nose/Throat: negative  Respiratory: No shortness of breath, dyspnea on exertion, cough, or hemoptysis  Cardiovascular: negative  Gastrointestinal: " negative  Genitourinary: negative  Musculoskeletal: positive for left leg swelling  Neurologic: negative  Psychiatric: negative  Hematologic/Lymphatic/Immunologic: negative  Endocrine: negative      PAST MEDICAL HISTORY:                  Past Medical History:   Diagnosis Date     Anemia     iron deficiency anemia     Anxiety      Breast cancer (H) 2012    Bilateral Masectomies 9/2012     Complications of gastric bypass surgery      COPD (chronic obstructive pulmonary disease) (H) 4/21/2021     Diabetes mellitus (H)     diet controlled     DVT (deep venous thrombosis) (H)      Eczema      Fracture of left knee region     patella - vertical     History of kidney stones      Obesity      Palpitation     with no treatment     Polyneuropathy      Pruritus     generalized     Rosacea      SVT (supraventricular tachycardia) (H)     s/p ablation 5/28/2015 at [facility] for left lateral AP       PAST SURGICAL HISTORY:                  Past Surgical History:   Procedure Laterality Date     ABDOMEN SURGERY  2000    gastric bypass 2000     COLONOSCOPY  10/11/2012    Procedure: COLONOSCOPY;  colonoscopy;  Surgeon: Gela Cleary MD;  Location:  GI     COLONOSCOPY N/A 10/6/2017    Procedure: COLONOSCOPY;;  Surgeon: Shashank Ortiz MD;  Location: [facility]     COLONOSCOPY N/A 3/7/2022    Procedure: COLONOSCOPY;  Surgeon: Andrea Daniel MD;  Location: [facility]     DENTAL SURGERY  5/2007    all teeth removed - dentures     ENT SURGERY      all teeth pulled     ESOPHAGOSCOPY, GASTROSCOPY, DUODENOSCOPY (EGD), COMBINED N/A 10/6/2017    Procedure: COMBINED ESOPHAGOSCOPY, GASTROSCOPY, DUODENOSCOPY (EGD);  ESOPHAGOSCOPY, GASTROSCOPY, DUODENOSCOPY (EGD) & Colonoscopy *Needs INR on arrival;  Surgeon: Shashank Ortiz MD;  Location:  GI     GYN SURGERY  1/10/75    tubal ligation      H ABLATION SVT  5/28/2015    stopped metoprolol     HERNIA REPAIR  2005     IMPLANT FILTER INFERIOR VENA CAVA  10/8/2012    taken out   1/4/12     INSERT TISSUE EXPANDER BREAST BILATERAL  9/5/2012    Procedure: INSERT TISSUE EXPANDER BREAST BILATERAL;;  Surgeon: Orlando Wall MD;  Location:  OR     MASTECTOMY SIMPLE, SENTINEL NODE, COMBINED  9/5/2012    Procedure: COMBINED MASTECTOMY SIMPLE, SENTINEL NODE;  BILATERAL MASTECTOMY WITH LEFT AXILLARY SENTINEL LYMPH NODE BIOPSY, BILATERAL TISSUE EXPANDER        MASTECTOMY, BILATERAL       PANNICULECTOMY N/A 4/5/2018    Procedure: PANNICULECTOMY;  PANNICULECTOMY ;  Surgeon: Orlando Wall MD;  Location:  OR     RECONSTRUCT BREAST BILATERAL, IMPLANT PROSTHESIS BILATERAL, COMBINED  5/22/2013    Procedure: COMBINED RECONSTRUCT BREAST BILATERAL, IMPLANT PROSTHESIS BILATERAL;  BILATERAL SECOND STAGE BREAST RECONSTRUCTION WITH SILICONE GEL IMPLANTS AND LIPOSUCTION;  Surgeon: Orlando Wall MD;  Location: [facility]     RECONSTRUCT NIPPLE BILATERAL  8/22/2013    Procedure: RECONSTRUCT NIPPLE BILATERAL;  BILATERAL NIPPLE AREOLAR RECONSTRUCTION;  Surgeon: Orlando Wall MD;  Location: [facility]     ZZHC BREAST RECONSTRUC W OTHR TECHNIQ  5/8/2013       CURRENT MEDICATIONS:                  Current Outpatient Medications   Medication Sig Dispense Refill     ACE/ARB NOT PRESCRIBED, INTENTIONAL, ACE & ARB not prescribed due to Other: normal tensive, low microalbumuria       acetaminophen (TYLENOL) 500 MG tablet Take 2 tablets (1,000 mg) by mouth every 8 hours as needed for mild pain 100 tablet 3     albuterol (PROAIR HFA/PROVENTIL HFA/VENTOLIN HFA) 108 (90 Base) MCG/ACT inhaler Inhale 2 puffs into the lungs every 6 hours (Patient taking differently: Inhale 2 puffs into the lungs every 6 hours as needed) 18 g 0     apixaban ANTICOAGULANT (ELIQUIS ANTICOAGULANT) 5 MG tablet Take 1 tablet (5 mg) by mouth 2 times daily 180 tablet 3     blood glucose (ONETOUCH VERIO IQ) test strip Use to test blood sugar 2 times daily or as directed. 100 strip 3     blood glucose calibration (NO BRAND SPECIFIED)  "solution Use to calibrate blood glucose monitor as needed as directed. 1 each 0     blood glucose monitoring (NO BRAND SPECIFIED) meter device kit Use to test blood sugar 2 times daily or as directed. 1 kit 1     busPIRone (BUSPAR) 5 MG tablet Take 5 mg by mouth 2 times daily       calcium carbonate (OS-LADI) 1500 (600 Ca) MG tablet Take 1 tablet (600 mg) by mouth in the morning and 1 tablet (600 mg) in the evening. Take with meals. 180 tablet 0     Cholecalciferol (VITAMIN D3) 75 MCG (3000 UT) TABS Take 1 tablet by mouth daily 3000 UT one day and 5000 UT the next, alternating daily.       clobetasol (TEMOVATE) 0.05 % external cream Apply twice daily to rash (thigh, knees, hands, feet) twice daily for 7 days AS NEEDED \"use sparingly and not to be used on the face\" Follow up recommended. 60 g 3     Lancets (ONETOUCH DELICA PLUS [id number]) MISC 1 lancet daily 100 each 3     sertraline (ZOLOFT) 100 MG tablet Take 100 mg by mouth daily Start daily after cross taper       tiotropium (SPIRIVA RESPIMAT) 2.5 MCG/ACT inhaler Inhale 2 puffs into the lungs daily 4 g 11     vitamin B-12 (CYANOCOBALAMIN) 2500 MCG sublingual tablet Take 1 tablet (2,500 mcg) by mouth daily 90 tablet 0     atorvastatin (LIPITOR) 10 MG tablet Take 1 tablet (10 mg) by mouth daily (Patient not taking: No sig reported) 90 tablet 3     Cholecalciferol (VITAMIN D) 50 MCG (2000 UT) CAPS Take 1 capsule by mouth daily (Patient not taking: Reported on 6/3/2022) 90 capsule 2       ALLERGIES:                  Allergies   Allergen Reactions     Bacitracin Unknown     Dust Mites      Nickel      Phenylene        SOCIAL HISTORY:                  Social History     Socioeconomic History     Marital status:      Spouse name: Not on file     Number of children: Not on file     Years of education: Not on file     Highest education level: Not on file   Occupational History     Employer: RETIRED   Tobacco Use     Smoking status: Former Smoker     Packs/day: 1.50 "     Years: 25.00     Pack years: 37.50     Types: Cigarettes     Quit date: 10/21/1986     Years since quittin.6     Smokeless tobacco: Never Used     Tobacco comment: quit    Vaping Use     Vaping Use: Never used   Substance and Sexual Activity     Alcohol use: No     Comment: 1 time a year     Drug use: No     Sexual activity: Not Currently     Partners: Male   Other Topics Concern     Parent/sibling w/ CABG, MI or angioplasty before 65F 55M? No      Service Not Asked     Blood Transfusions Not Asked     Caffeine Concern Not Asked     Occupational Exposure Not Asked     Hobby Hazards Not Asked     Sleep Concern Not Asked     Stress Concern Not Asked     Weight Concern Not Asked     Special Diet Not Asked     Back Care Not Asked     Exercise Not Asked     Bike Helmet Yes     Seat Belt Yes     Self-Exams Not Asked   Social History Narrative     Not on file     Social Determinants of Health     Financial Resource Strain: Unknown     Difficulty of Paying Living Expenses: Patient refused   Food Insecurity: Unknown     Worried About Running Out of Food in the Last Year: Patient refused     Ran Out of Food in the Last Year: Patient refused   Transportation Needs: No Transportation Needs     Lack of Transportation (Medical): No     Lack of Transportation (Non-Medical): No   Physical Activity: Inactive     Days of Exercise per Week: 0 days     Minutes of Exercise per Session: 0 min   Stress: Stress Concern Present     Feeling of Stress : To some extent   Social Connections: Moderately Integrated     Frequency of Communication with Friends and Family: Twice a week     Frequency of Social Gatherings with Friends and Family: More than three times a week     Attends [employer] Services: 1 to 4 times per year     Active Member of Clubs or Organizations: Yes     Attends Club or Organization Meetings: Not on file     Marital Status:    Intimate Partner Violence: Not on file   Housing Stability: Unknown      Unable to Pay for Housing in the Last Year: Patient refused     Number of Places Lived in the Last Year: 2     Unstable Housing in the Last Year: No       FAMILY HISTORY:                   Family History   Problem Relation Age of Onset     Cancer - colorectal Mother      Colon Cancer Mother      Prostate Cancer Father      Alzheimer Disease Father      Cancer Paternal Grandmother      Alzheimer Disease Paternal Grandfather      Cancer - colorectal Brother      Diabetes Brother      Crohn's Disease Daughter      Diabetes Brother      Eye Disorder Brother      Diabetes Son      Cancer Maternal Uncle      Cancer Maternal Uncle          Physical exam Reveals:    O/P: WNL  HEENT: WNL  NECK: No JVD, thyromegaly, or lymphadenopathy  HEART: RRR, no murmurs, gallops, or rubs  LUNGS: CTA bilaterally without rales, wheezes, or rhonchi  GI: NABS, nondistended, nontender, soft  EXT:without cyanosis, clubbing; trace bilateral pretibial edema; CEAP C2 varicosities bialterally; LLE calf circumference 42 cm; Rt calf circumference 38 cm  NEURO: nonfocal  : no flank tenderness            [facility]   _______________________________________________________________________________   Patient Name: Gemma Cowart        Procedure Date: 3/7/2022 8:44 AM   MRN: [id number]                       Account Number: [id number]   YOB: 1945             Admit Type: Outpatient   Age: 76                               Gender: Female   Attending MD: Andrea Daniel MD   Total Sedation Time: 16_minutes continuous    bedside 1:1   Instrument Name: 257 - Pediatric Colonoscope   _______________________________________________________________________________       Procedure:                Colonoscopy   Indications:              High risk colon cancer surveillance: Personal                             history of colonic polyps   Providers:                Andrea Daniel MD (Doctor)   Referring MD:                Medicines:                Midazolam 2 mg IV, Fentanyl 100 micrograms IV   Complications:            No immediate complications.   _______________________________________________________________________________   Procedure:                Pre-Anesthesia Assessment:                             - Prior to the procedure, a History and Physical                             was performed, and patient medications and                             allergies were reviewed. The patient is competent.                             The risks and benefits of the procedure and the                             sedation options and risks were discussed with the                             patient. All questions were answered and informed                             consent was obtained. Patient identification and                             proposed procedure were verified by the physician                             in the pre-procedure area. Mental Status                             Examination: alert and oriented. Airway                             Examination: normal oropharyngeal airway and neck                             mobility. Respiratory Examination: clear to                             auscultation. CV Examination: normal. Prophylactic                             Antibiotics: The patient does not require                             prophylactic antibiotics. Prior Anticoagulants: The                             patient has taken Eliquis (apixaban), last dose was                             5 days prior to procedure. ASA Grade Assessment: II                             - A patient with mild systemic disease. After                             reviewing the risks and benefits, the patient was                             deemed in satisfactory condition to undergo the                             procedure. The anesthesia plan was to use moderate                             sedation / analgesia (conscious sedation).                              Immediately prior to administration of medications,                             the patient was re-assessed for adequacy to receive                             sedatives. The heart rate, respiratory rate, oxygen                             saturations, blood pressure, adequacy of pulmonary                             ventilation, and response to care were monitored                             throughout the procedure. The physical status of                             the patient was re-assessed after the procedure.                             After obtaining informed consent, the colonoscope                             was passed under direct vision. Throughout the                             procedure, the patient's blood pressure, pulse, and                             oxygen saturations were monitored continuously. The                             Olympus Pediatric Colonoscope Model # [id number]-[id number],                             Endora # 257, SN # [id number] was introduced through                             the anus and advanced to the cecum, identified by                             appendiceal orifice and ileocecal valve. The                             colonoscopy was performed without difficulty. The                             patient tolerated the procedure well. The quality                             of the bowel preparation was good. The ileocecal                             valve, appendiceal orifice, and rectum were                             photographed.                                                                                     Findings:        The perianal and digital rectal examinations were normal.        Many small and large-mouthed diverticula were found in the sigmoid colon        and descending colon.        Non-bleeding internal hemorrhoids were found during retroflexion. The        hemorrhoids were moderate and Grade II (internal hemorrhoids that        prolapse but reduce  spontaneously).        The exam was otherwise without abnormality on direct and retroflexion        views.                                                                                     Impression:               - Diverticulosis in the sigmoid colon and in the                             descending colon.                             - Non-bleeding internal hemorrhoids.                             - The examination was otherwise normal on direct                             and retroflexion views.                             - No specimens collected.   Recommendation:           - No repeat colonoscopy.                                                                                     Procedure Code(s):     VENOUS ULTRASOUND LEFT LEG  2/1/2022 10:51 AM      HISTORY: Left leg swelling     COMPARISON: None.     FINDINGS:  Examination of the deep veins with graded compression and  color flow Doppler with spectral wave form analysis was performed.  No  evidence for DVT in the left lower extremity.                                                                      IMPRESSION: No evidence of deep venous thrombosis.     HI MILLER MD              VENOUS ULTRASOUND LEFT LEG  10/22/2021 2:27 PM      HISTORY: Acute deep vein thrombosis (DVT) of calf muscle vein of left  lower extremity (H); Iron deficiency anemia, unspecified iron  deficiency anemia type     COMPARISON: 7/24/2021     FINDINGS:  Examination of the deep veins with graded compression and  color flow Doppler with spectral wave form analysis was performed.       There is no evidence for acute DVT. There is a small amount of  residual nonocclusive DVT in one of the 2 distal left posterior tibial  veins.                                                                      IMPRESSION: Small amount of residual nonocclusive chronic DVT in one  of the 2 distal left posterior tibial veins. No new DVT.     HI MILLER MD         EXAM: US LOWER EXTREMITY  VENOUS DUPLEX LEFT  LOCATION: [facility]  DATE/TIME: 7/24/2021 8:21 PM     INDICATION: r/o blood clot  COMPARISON: 10/1/2014  TECHNIQUE: Venous Duplex ultrasound of the left lower extremity with and without compression, augmentation and duplex. Color flow and spectral Doppler with waveform analysis performed.     FINDINGS: Exam includes the common femoral, femoral, popliteal, and contralateral common femoral veins as well as segmentally visualized deep calf veins and greater saphenous vein.      LEFT: No DVT from groin through popliteal fossa. However, one of the posterior tibial veins within the calf demonstrates partially occlusive thrombus.  No superficial thrombophlebitis. No popliteal cyst.                                                                      IMPRESSION:  1.  Exam is positive for DVT. Small focus of DVT involving one of the posterior tibial veins within the calf.  2.  Findings discussed with Dr. Floyd at 2105 hours.        [facility]   _______________________________________________________________________________   Patient Name: Gemma Cowart        Procedure Date: 10/6/2017 7:21 AM   MRN: [id number]                       Account Number: [id number]   YOB: 1945             Admit Type: Outpatient   Age: 71                               Gender: Female   Attending MD: Shashank Ortiz MD Total Sedation Time: 31 minutes   Instrument Name: 130                     _______________________________________________________________________________       Procedure:                Upper GI endoscopy   Indications:              Vomiting   Providers:                Shashank Ortiz MD (Doctor)   Referring MD:             Alva Casas (Referring MD)   Medicines:                Midazolam 1 mg IV, Benzocaine spray, See                             colonoscopy procedure note for documentation of the                             previously administered  medications   Complications:            No immediate complications.   _______________________________________________________________________________   Procedure:                Pre-Anesthesia Assessment:                             - Prior to the procedure, a History and Physical                             was performed, and patient medications and                             allergies were reviewed. The patient is competent.                             The risks and benefits of the procedure and the                             sedation options and risks were discussed with the                             patient. All questions were answered and informed                             consent was obtained. Patient identification and                             proposed procedure were verified by the physician                             in the pre-procedure area. Mental Status                             Examination: alert and oriented. Airway                             Examination: normal oropharyngeal airway and neck                             mobility. Respiratory Examination: clear to                             auscultation. CV Examination: normal. Prophylactic                             Antibiotics: The patient does not require                             prophylactic antibiotics. Prior Anticoagulants: The                             patient has taken Coumadin (warfarin), last dose                             was 5 days prior to procedure. ASA Grade                             Assessment: II - A patient with mild systemic                             disease. After reviewing the risks and benefits,                             the patient was deemed in satisfactory condition to                             undergo the procedure. The anesthesia plan was to                             use moderate sedation / analgesia (conscious                             sedation). Immediately prior to administration of                              medications, the patient was re-assessed for                             adequacy to receive sedatives. The heart rate,                             respiratory rate, oxygen saturations, blood                             pressure, adequacy of pulmonary ventilation, and                             response to care were monitored throughout the                             procedure. The physical status of the patient was                             re-assessed after the procedure.                             After obtaining informed consent, the endoscope was                             passed under direct vision. Throughout the                             procedure, the patient's blood pressure, pulse, and                             oxygen saturations were monitored continuously. The                             Olympus Gastroscope Model# [id number]-[id number], Endora #130,                             SN#[id number] was introduced through the mouth, and                             advanced to the afferent jejunal loop. The upper GI                             endoscopy was accomplished without difficulty. The                             patient tolerated the procedure well.                                                                                     Findings:        The esophagus was normal. The z-line was located at 40cm. There was no        esophagitis or evidence of stenosis.        Evidence of a Myla-en-Y gastrojejunostomy was found. The gastrojejunal        anastomosis was characterized by healthy appearing mucosa. No evidence        of anastomotic ulceration or stenosis. The jejunojejunal anastomosis was        reached and also characterized by healthy appearing mucosa.        The examined segment of the jejunum was normal.                                                                                     Impression:               - Normal esophagus.                             - Myla-en-Y  gastrojejunostomy with gastrojejunal                             anastomosis characterized by healthy appearing                             mucosa.                             - Normal examined jejunum.                             - No specimens collected.   Recommendation:           - No findings on this exam to explain episodes of                             vomiting.                             - If symptoms are becoming persistent I would                             considera a consultation with a bariatric surgeon.                             - Restart Coumadin (warfarin) today.                                                                        US VENOUS DUPLEX [facility] [facility]   9/12/2015 10:52 PM     INDICATION: Pain and swelling.   TECHNIQUE: Routine exam without and with compression, augmentation and   duplex utilizing 2D gray-scale imaging, Doppler interrogation with   color-flow and spectral waveform analysis.   COMPARISON: None.     FINDINGS: The common femoral, femoral, popliteal, and segmentally   visualized calf veins were evaluated. The opposite CFV was also included in    the evaluation.     The left common femoral, femoral and popliteal veins demonstrate normal   compressibility, augmentation and flow. Below the level of the left knee   there is occlusive thrombosis involving proximal to mid posterior tibial   vein. There is a 2 x 2.2 x 5.5 cm left popliteal cyst. Edema in the distal   soft tissues. Images of the right common femoral vein demonstrate normal   flow.     CONCLUSION:   1.  Deep vein thrombosis posterior tibial vein level below the level of the    left knee.   2.  Left popliteal cyst.       US VENOUS LEFT LOWER EXTREMITY  6/14/2013 7:29 PM       HISTORY: Left leg swelling and pain.       TECHNIQUE: Left lower extremity venous Doppler.       FINDINGS: The common femoral vein, femoral vein, popliteal vein,   posterior tibial vein, greater saphenous vein show normal    compressibility, augmentation, and flow characteristics. There is no   evidence for deep venous thrombosis.       Impression   IMPRESSION: Negative left lower extremity venous Doppler.       PILLO FREEMAN MD           [facility]13[facility] BILATERAL LE VENOUS DUPLEX SUMMARY      IMP: RESOLVED BILATERAL DVT      CT CHEST/ABDOMEN/PELVIS WITH CONTRAST 3/5/2013 9:48 AM       HISTORY: Evaluate abdominal mass. History of bilateral mastectomies.   Breast cancer. Previous herniorrhaphy and gastric bypass.       TECHNIQUE: Scans were obtained from the lung apices through the   pelvis with oral and IV contrast. 100 mL of Isovue 370 injected.       COMPARISON: CT abdomen and pelvis 11/27/12. PET/CT 12/3/12.       FINDINGS:   Chest: Bilateral mammary implants. No mediastinal or hilar adenopathy   or mass. Prominent calcified granuloma in the right lung base. The   tiny indeterminate nodules identified in both lungs on the PET/CT of   12/3/12 are not apparent today. No worrisome intrapulmonary mass   identified.       Abdomen and pelvis: Liver, spleen, pancreas, and both kidneys are   normal. Scattered diverticula in the sigmoid colon without evidence   of diverticulitis. Colon and small bowel are otherwise normal.   Appendix is normal. Since 12/3/12, the IVC filter has been removed.   The retrocaval density seen on the PET/CT is not identified today and   may have been secondary to a small amount of hemorrhage secondary to   the IVC filter. No abdominal or pelvic adenopathy. No evidence for   skeletal metastases. Remainder of the scan is negative.       Impression   IMPRESSION:   1. IVC filter seen on 12/3/12 has been removed. The small retrocaval   density seen previously has also resolved.   2. No evidence for recurrent or metastatic disease.   3. Remainder of the scan is unchanged.       LENIN CHAPARRO MD             IVC FILTER REMOVAL  1/4/2013 10:57 AM       HISTORY: Patient had an IVC filter placed on 10/10/2012.  Patient is   now satisfactorily anticoagulated and filter no longer necessary.       Comparison: CT dated 11/27/2012       Description of procedure: After obtaining informed consent, the   patient was placed in a supine position on the fluoroscopy table. The   right neck was prepped and draped in the usual sterile manner. 1%   lidocaine was injected for local anesthesia. Ultrasound was used to   evaluate and document patency of the right internal jugular vein.   Under sterile ultrasound guidance, access into the right internal   jugular vein was obtained. An image was saved for documentation. An   11 Sammarinese sheath was placed. Through the sheath, a 12-25 mm snare was   passed and used to grasp the superior aspects of the IVC filter. The   sheath was then pushed over the filter. The legs detach from the IVC.   The filter was then pulled out completely through the sheath. A   venogram was performed following filter removal. There was a mild   narrowing within the IVC at site of the filter. This did not   contribute to any significant obstruction to flow. There is a   question of a small tear in the wall of the IVC but no gross   extravasation. The sheath was removed. Pressure was held at the   puncture site for 10 minutes with good hemostasis.       The patient tolerated the procedure well. There were no immediate   postprocedure complications. The patient's vital signs were monitored   by radiology nursing staff under my supervision and remained stable   throughout the study.       Medications: Versed 3.5 mg, fentanyl 175 mcg       Sedation time: 43 minutes       Fluoroscopy time: 7.5 minutes       Contrast: 17 cc Isovue       Impression   Impression: IVC filter removed as described above.       HI MILLER MD             PET SCAN 12/03/2012    A/P: (SUMMARY)  1-INDETERMINATE NODULE ADJACENT TO IVC FILTER, RECOMMEND REPEAT CT IN 3 MONTHS.      CT CHEST/ABDOMEN/PELVIS WITH CONTRAST 11/27/2012 8:49 PM        HISTORY: Acute lower extremity thrombosis. Rule out PE. Rule out   abdominal or pelvic mass.       TECHNIQUE: Scans were obtained from the lung apices through the   pelvis with oral and IV contrast. 100mL of Isovue 370 injected.       COMPARISON: None.       FINDINGS:   Chest: Bilateral mass effect mise with tissue expanders bilaterally.   Pulmonary arteries are well-opacified and appear patent without   evidence of pulmonary embolus. Thoracic aorta is normal without   evidence of aneurysm or dissection. No mediastinal or hilar   adenopathy or mass. Calcified granulomas in the both lungs. Multiple   tiny nodular densities identified in both lungs most marked on the   right which are indeterminate. Continued followup is recommended.       Abdomen and pelvis: Liver, spleen, pancreas, and both kidneys are   normal. Infrarenal IVC filter identified in good position. 2.1 x 3.1   cm solid mass identified just posterior to the vena cava at the level   of the filter which could represent a metastatic lymph node. PET/CT   might be helpful for further evaluation. Colon and small bowel are   negative. No other abdominal or pelvic adenopathy identified. No   evidence for skeletal metastases. Remainder of the scan is negative.       Impression   IMPRESSION:   1. No evidence for pulmonary embolus.   2. Multiple tiny nodular densities in both lungs which are   indeterminate. Continued followup is recommended.   3. Solid mass adjacent to the inferior vena cava at the level of the   IVC filter. This could represent a metastatic deposit. PET/CT may be   helpful for further evaluation. Remainder of the scan is negative.       LENIN CHAPARRO MD       [facility] Endoscopy Department   _______________________________________________________________________________   Patient Name: Gemma Cowart           Procedure Date: 10/11/2012 12:15:40 PM       MRN: [id number]                       Account  Number: [id number]                   YOB: 1945             Admit Type: Inpatient                       Age: 66                               Room: 2                                     Note Status: Finalized                Attending MD: Gela Bernal MD             Pause for the Cause: Time Out done by MD.     _______________________________________________________________________________       Procedure:                Colonoscopy   Indications:              Hematochezia, Previous polyp removed ascending colon   Providers:                Gela Cleary MD, Ana Ramos RN   Referring MD:             Yue Martin MD   Medicines:                Fentanyl 100 micrograms IV, Midazolam 2 mg IV   Complications:            No immediate complications   _______________________________________________________________________________   Procedure:                Pre-Anesthesia Assessment:                             - Prior to the procedure, a History and Physical                             was performed, and patient medications and                             allergies were reviewed. The patient is competent.                             The risks and benefits of the procedure and the                             sedation options and risks were discussed with the                             patient. All questions were answered and informed                             consent was obtained. Patient identification and                             proposed procedure were verified by the physician                             in the procedure room. Mental Status Examination:                             alert and oriented. Airway Examination: normal                             oropharyngeal airway and neck mobility. Respiratory                             Examination: clear to auscultation. CV Examination:                             normal. Prophylactic Antibiotics: The patient does                             not  require prophylactic antibiotics. Prior                             Anticoagulants: The patient has taken no previous                             anticoagulant or antiplatelet agents. ASA Grade                             Assessment: II - A patient with mild systemic                             disease. After reviewing the risks and benefits,                             the patient was deemed in satisfactory condition to                             undergo the procedure. The anesthesia plan was to                             use moderate sedation / analgesia (conscious                             sedation). Immediately prior to administration of                             medications, the patient was re-assessed for                             adequacy to receive sedatives. The heart rate,                             respiratory rate, oxygen saturations, blood                             pressure, adequacy of pulmonary ventilation, and                             response to care were monitored throughout the                             procedure. The physical status of the patient was                             re-assessed after the procedure.                             After obtaining informed consent, the colonoscope                             was passed under direct vision. Throughout the                             procedure, the patient's blood pressure, pulse, and                             oxygen saturations were monitored continuously. The                             Colonoscope was introduced through the anus and                             advanced to the cecum, identified by appendiceal                             orifice & ileocecal valve. The colonoscopy was                             performed without difficulty. The patient tolerated                             the procedure well. The quality of the bowel                             preparation was good.                                                                                     Findings:        A tattoo was seen in the ascending colon. A post-polypectomy scar was        found at the tattoo site.No evidence of bleeding or visible vessel The        exam was otherwise without abnormality.                                                                                    Impression:               - A tattoo was seen ascending colon. A                             post-polypectomy scar was found at the tattoo site.                             - The examination was otherwise normal.                             No evidence of ongoing bleed   Recommendation:           - Regular diet. Suggest remain off anticoagulation                             for another 5-7 days.                             - Return patient to [facility] for ongoing care.                                                                                      Signed electronically by Gela Ramos MD   ____________________   Gela Cleary MD   Signed Date: 10/11/2012 1:15:08 PM   Number of Addenda: 0   I was physically present for the entire viewing portion of the exam.   Note Initiated On: 10/11/2012 12:15:40 PM   Scope Withdrawal Time: 0 hours 0 minutes 0 seconds   Total Procedure Duration: 0 hours 0 minutes 0 seconds         IVC FILTER PLACEMENT 10/10/2012 [facility]      CLINICAL HISTORY: 66-year-old female with bilateral lower extremity   DVT. Patient was anticoagulated however, developed a gastrointestinal   bleed. Therefore, IVC filter is requested as the patient cannot be   continued on anticoagulation.       DESCRIPTION OF PROCEDURE: After obtaining informed consent, the   patient was placed in a supine position on the fluoroscopy table. The   right neck was prepped and draped in the usual sterile manner. 1%   lidocaine was injected for local anesthesia. Ultrasound was used to   evaluate and document patency of the right internal jugular vein.   Under sterile ultrasound guidance,  a puncture was made into the right   internal jugular vein. A permanent copy image was obtained for the   patient's records.       An 8.5 Serbian vascular sheath was placed and taken into the lower   IVC. A venogram was performed. The renal vein inflow was identified.   No thrombus in the IVC was identified.       A retrievable Misael tulip filter was placed with apex positioned at   the level of the renal vein inflow. The right IJ sheath was pulled.   Pressure was held at the puncture site for 7 minutes with good   hemostasis.       The patient tolerated the procedure well. There were no immediate   postprocedure complications. The patient's vital signs were monitored   by radiology nursing staff under my supervision and remained stable   throughout the study.       Fluoroscopy time: 1.7 minutes       Medications: 2 mg Versed, 100 mcg fentanyl       Sedation time: 30 minutes       Contrast: 35 cc Isovue       IMPRESSION:  IVC filter placed as above.      [facility] LE DUPLEX 10/6/2012    Interface, In Card And Rad Results - 10/06/2012  3:08 PM CDT   ULTRASOUND VENOUS DUPLEX LOWER EXTREMITY BILATERAL   Oct 06, 2012 02:24:00 PM     INDICATION: Pain. Shortness of breath.   TECHNIQUE: Routine with compression, augmentation, and duplex. Gray-scale   and duplex including color and spectral imaging.   COMPARISON:  None.     FINDINGS:   Ultrasound of the leg veins is positive for DVT involving right peroneal   veins which is occlusive and nonocclusive as well as left calf deep veins,   peroneal and posterior tibial which have occlusive thrombus within them.   Posterior tibial venous clot extends just into popliteal vein. Superficial   venous thrombus also noted.  3.6 x 1.7 x 1 cm left popliteal fossa probable    Baker's cyst.     IMPRESSION:   Exam positive for bilateral DVT. Results called to patient's nurseMesha   at 3:00 p.m. She will call the covering clinician.      Component      Latest Ref Rng & Units  2/1/2022 3/29/2022   WBC      4.0 - 11.0 10e3/uL 4.5 4.4   RBC Count      3.80 - 5.20 10e6/uL 4.60 5.24 (H)   Hemoglobin      11.7 - 15.7 g/dL 9.3 (L) 12.2   Hematocrit      35.0 - 47.0 % 33.2 (L) 41.6   MCV      78 - 100 fL 72 (L) 79   MCH      26.5 - 33.0 pg 20.2 (L) 23.3 (L)   MCHC      31.5 - 36.5 g/dL 28.0 (L) 29.3 (L)   RDW      10.0 - 15.0 % 16.0 (H) 24.3 (H)   Platelet Count      150 - 450 10e3/uL 221 193   % Neutrophils      %  70   % Lymphocytes      %  19   % Monocytes      %  7   % Eosinophils      %  3   % Basophils      %  1   % Immature Granulocytes      %  0   NRBCs per 100 WBC      <1 /100  0   Absolute Neutrophils      1.6 - 8.3 10e3/uL  3.1   Absolute Lymphocytes      0.8 - 5.3 10e3/uL  0.9   Absolute Monocytes      0.0 - 1.3 10e3/uL  0.3   Absolute Eosinophils      0.0 - 0.7 10e3/uL  0.1   Absolute Basophils      0.0 - 0.2 10e3/uL  0.1   Absolute Immature Granulocytes      <=0.4 10e3/uL  0.0   Absolute NRBCs      10e3/uL  0.0   Sodium      133 - 144 mmol/L 142    Potassium      3.4 - 5.3 mmol/L 4.6    Chloride      94 - 109 mmol/L 113 (H)    Carbon Dioxide      20 - 32 mmol/L 23    Anion Gap      3 - 14 mmol/L 6    Urea Nitrogen      7 - 30 mg/dL 17    Creatinine      0.52 - 1.04 mg/dL 1.40 (H)    Calcium      8.5 - 10.1 mg/dL 8.7    Glucose      70 - 99 mg/dL 118 (H)    GFR Estimate      >60 mL/min/1.73m2 39 (L)    Cholesterol      <200 mg/dL 184    Triglycerides      <150 mg/dL 104    HDL Cholesterol      >=50 mg/dL 62    LDL Cholesterol Calculated      <=100 mg/dL 101 (H)    Non HDL Cholesterol      <130 mg/dL 122    Patient Fasting > 8hrs?       Yes    Iron      35 - 180 ug/dL 20 (L) 65   Iron Binding Cap      240 - 430 ug/dL 431 (H) 294   Iron Saturation Index      15 - 46 % 5 (L) 22   Creatinine Urine      mg/dL 138    Albumin Urine mg/L      mg/L 19    Albumin Urine mg/g Cr      0.00 - 25.00 mg/g Cr 13.77    Parathyroid Hormone Intact      18 - 80 pg/mL 143 (H)    Ferritin      8 - 252  ng/mL 7 (L) 282 (H)   Vitamin B12      193 - 986 pg/mL 699    Folate      >=5.4 ng/mL 14.3    Vitamin D Deficiency screening      20 - 75 ug/L 21    TSH      0.40 - 4.00 mU/L 2.15    Hemoglobin A1C      0.0 - 5.6 % 6.5 (H)            A/P;    (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

## 2022-06-03 NOTE — PROGRESS NOTES
"Patient is here to discuss consult.    /72 (BP Location: Right arm, Patient Position: Chair, Cuff Size: Adult Regular)   Pulse 57   Ht 5' 2.5\" (1.588 m)   Wt 191 lb 6.4 oz (86.8 kg)   LMP 09/24/1990   SpO2 97%   BMI 34.45 kg/m      Questions patient would like addressed today are: N/A.    Refills are needed: N/A    Has homecare services and agency name:  [facility]  "

## 2022-06-07 ENCOUNTER — TELEPHONE (OUTPATIENT)
Dept: PEDIATRICS | Facility: CLINIC | Age: 77
End: 2022-06-07
Payer: COMMERCIAL

## 2022-06-07 DIAGNOSIS — E11.69 TYPE 2 DIABETES MELLITUS WITH OTHER SPECIFIED COMPLICATION, WITHOUT LONG-TERM CURRENT USE OF INSULIN (H): ICD-10-CM

## 2022-06-07 NOTE — TELEPHONE ENCOUNTER
Called and discussed concern with pt. Patient reports she was reading the drug information material provided by the pharmacy and noted that Spirivia should not be used in patients with kidney disease. Per uptodate, CrCl ?60 mL/minute: No dosage adjustment necessary; use with caution and closely for anticholinergic adverse events.    CrCl based on 2/1/22 Scr: 29 mL/min    Patient is not currently having any side effects of dry mouth, dry eyes, constipation, memory impairment, or balance concerns. Patient also feels that breathing is easier now that she is taking the medication. Will continue to monitor at [facility] follow up in September.     Adenike Mason, PharmD  Medication Therapy Management Resident  Pager: [phone]

## 2022-06-07 NOTE — TELEPHONE ENCOUNTER
Reason for Call:  Medication or medication refill:    Do you use a [facility]?  Name of the pharmacy and phone number for the current request:   [id number]  in [place] phone  [phone] and fax number is [phone].    Name of the medication requested: One Touch Verio Flex, Lancets and test strips. She has the unit but doesn't have any lancets or test strips.    Other request:     Can we leave a detailed message on this number? YES    Phone number patient can be reached at: Home number on file [phone] (home)    Best Time: any    Call taken on 6/7/2022 at 10:00 AM by Nusrat Guzmán

## 2022-06-07 NOTE — TELEPHONE ENCOUNTER
She has stage 3 CKD. She was prescribed Spiriva and it says she should not take it if she has kidney problems. She wants to clarify this is ok. Ping Mcclendon RN on 6/7/2022 at 10:33 AM

## 2022-06-08 ENCOUNTER — TRANSFERRED RECORDS (OUTPATIENT)
Dept: HEALTH INFORMATION MANAGEMENT | Facility: CLINIC | Age: 77
End: 2022-06-08

## 2022-06-08 LAB — RETINOPATHY: NEGATIVE

## 2022-06-08 RX ORDER — SERTRALINE HYDROCHLORIDE 100 MG/1
100 TABLET, FILM COATED ORAL DAILY
Qty: 90 TABLET | Refills: 1 | Status: SHIPPED | OUTPATIENT
Start: 2022-06-08 | End: 2023-05-23

## 2022-06-08 RX ORDER — BLOOD SUGAR DIAGNOSTIC
STRIP MISCELLANEOUS
Qty: 100 STRIP | Refills: 3 | Status: SHIPPED | OUTPATIENT
Start: 2022-06-08 | End: 2023-05-23

## 2022-06-08 NOTE — TELEPHONE ENCOUNTER
Routing refill request to provider for review/approval because:  Drug not on the [facility] refill protocol     Zain ROSE RN

## 2022-06-16 ENCOUNTER — TELEPHONE (OUTPATIENT)
Dept: OTHER | Facility: CLINIC | Age: 77
End: 2022-06-16

## 2022-06-16 NOTE — TELEPHONE ENCOUNTER
Ambar from CT is wondering what they should do - Pt is here now for her CT and has abnormal labs. Pt's GFR has dropped since February 2022 to 34.  Ambar can be reached at [phone].

## 2022-06-21 ENCOUNTER — TELEPHONE (OUTPATIENT)
Dept: OTHER | Facility: CLINIC | Age: 77
End: 2022-06-21
Payer: COMMERCIAL

## 2022-06-21 ENCOUNTER — HOSPITAL ENCOUNTER (OUTPATIENT)
Facility: CLINIC | Age: 77
Discharge: HOME | End: 2022-06-21
Payer: COMMERCIAL

## 2022-06-21 ENCOUNTER — HOSPITAL ENCOUNTER (OUTPATIENT)
Dept: CT IMAGING | Facility: CLINIC | Age: 77
Discharge: HOME | End: 2022-06-21
Attending: INTERNAL MEDICINE
Payer: COMMERCIAL

## 2022-06-21 DIAGNOSIS — N18.30 STAGE 3 CHRONIC KIDNEY DISEASE, UNSPECIFIED WHETHER STAGE 3A OR 3B CKD (H): Primary | ICD-10-CM

## 2022-06-21 PROCEDURE — 74174 CTA ABD&PLVS W/CONTRAST: CPT

## 2022-06-21 PROCEDURE — 250N000011 HC RX IP 250 OP 636: Performed by: INTERNAL MEDICINE

## 2022-06-21 PROCEDURE — 96360 HYDRATION IV INFUSION INIT: CPT

## 2022-06-21 PROCEDURE — 258N000003 HC RX IP 258 OP 636: Performed by: INTERNAL MEDICINE

## 2022-06-21 PROCEDURE — 250N000009 HC RX 250: Performed by: INTERNAL MEDICINE

## 2022-06-21 PROCEDURE — 999N000154 HC STATISTIC RADIOLOGY XRAY, US, CT, MAR, NM

## 2022-06-21 PROCEDURE — 96361 HYDRATE IV INFUSION ADD-ON: CPT

## 2022-06-21 RX ORDER — SODIUM CHLORIDE 9 MG/ML
INJECTION, SOLUTION INTRAVENOUS CONTINUOUS
Status: DISCONTINUED | OUTPATIENT
Start: 2022-06-21 | End: 2022-06-21 | Stop reason: HOSPADM

## 2022-06-21 RX ORDER — IOPAMIDOL 755 MG/ML
80 INJECTION, SOLUTION INTRAVASCULAR ONCE
Status: COMPLETED | OUTPATIENT
Start: 2022-06-21 | End: 2022-06-21

## 2022-06-21 RX ADMIN — SODIUM CHLORIDE: 9 INJECTION, SOLUTION INTRAVENOUS at 08:34

## 2022-06-21 RX ADMIN — IOPAMIDOL 80 ML: 755 INJECTION, SOLUTION INTRAVENOUS at 09:31

## 2022-06-21 RX ADMIN — SODIUM CHLORIDE 80 ML: 9 INJECTION, SOLUTION INTRAVENOUS at 09:33

## 2022-06-21 NOTE — TELEPHONE ENCOUNTER
Future Appointments   Date Time Provider Department Center   6/21/2022  9:00 AM [facility] ROCIO Avila with [facility] is requesting IV hydration orders for this patients CT scheduled for TODAY. Lab 6/16     Component Ref Range & Units 5 d ago     Creatinine POCT 0.5 - 1.0 mg/dL 1.5 High      GFR, ESTIMATED POCT >60 mL/min/1.73m2 36 Low

## 2022-06-21 NOTE — PROGRESS NOTES
[facility] Discharge Nursing Note    Patient Information  Name: Gemma Cowart  Age: 76 year old    Discharge Education:  Discharge instructions reviewed: Yes  Additional education/resources provided: NA  Patient/patient representative verbalizes understanding: Yes  Patient discharging on new medications: No  Medication education completed: N/A    Discharge Plans:   Discharge location: home  Discharge ride contacted: N/A  Approximate discharge time: 1240    Discharge Criteria:  Discharge criteria met and vital signs stable: Yes    Patient Belongs:  Patient belongings returned to patient: Yes    Jewel Marshall RN

## 2022-06-21 NOTE — PROGRESS NOTES
PATIENT/VISITOR WELLNESS SCREENING    Step 1 Patient Screening    1. In the last month, have you been in contact with someone who was confirmed or suspected to have Coronavirus/COVID-19? No    2. Do you have the following symptoms?  Fever/Chills? No   Cough? No   Shortness of breath? No   New loss of taste or smell? No  Sore throat? No  Muscle or body aches? No  Headaches? No  Fatigue? No  Vomiting or diarrhea? No    Step 2 Visitor Screening    1. Name of Visitor (1 visitor per patient): none    2. In the last month, have you been in contact with someone who was confirmed or suspected to have Coronavirus/COVID-19? No    3. Do you have the following symptoms?  Fever/Chills? No   Cough? No   Shortness of breath? No   Skin rash? No   Loss of taste or smell? No  Sore throat? No  Runny or stuffy nose? No  Muscle or body aches? No  Headaches? No  Fatigue? No  Vomiting or diarrhea? No    If the visitor has positive symptoms, notify supervisor/manger  Per policy, the visitor will need to leave the facility     Step 3 Refer to logic grid below for actions    NO SYMPTOM(S)    ACTIONS:  1. Standard rooming process  2. Provider to assess per normal protocol  3. Implement precautions as needed and per guidelines     POSITIVE SYMPTOM(S)  If positive for ANY of the following symptoms: fever, cough, shortness of breath, rash    ACTION:  1. Continue to have the patient wear a mask   2. Room patient as soon as possible  3. Don appropriate PPE when entering room  4. Provider evaluation      Care Suites Admission Nursing Note    Patient Information  Name: Gemma Cowart  Age: 76 year old  Reason for admission: IV Hydration  Care Suites arrival time: 0820    Visitor Information  Name: none  Informed of visitor restrictions: Yes  1 visitor allowed per patient   Visitor must screen negative for COVID symptoms   Visitor must wear a mask  Waiting rooms closed to visitors    Patient Admission/Assessment   Pre-procedure assessment complete:  Yes  If abnormal assessment/labs, provider notified: N/A  NPO: N/A  Medications held per instructions/orders: N/A  Consent: NA  If applicable, pregnancy test status: NA  Patient oriented to room: Yes  Education/questions answered: Yes  Plan/other: proceed    Discharge Planning  Discharge name/phone number: NA  Overnight post sedation caregiver: NA  Discharge location: home    Jewel Marshall RN

## 2022-06-22 ENCOUNTER — ANCILLARY PROCEDURE (OUTPATIENT)
Dept: ULTRASOUND IMAGING | Facility: CLINIC | Age: 77
End: 2022-06-22
Attending: INTERNAL MEDICINE
Payer: COMMERCIAL

## 2022-06-22 DIAGNOSIS — I87.2 VENOUS (PERIPHERAL) INSUFFICIENCY: ICD-10-CM

## 2022-06-22 DIAGNOSIS — I82.562: ICD-10-CM

## 2022-06-22 PROCEDURE — 93970 EXTREMITY STUDY: CPT | Performed by: SURGERY

## 2022-07-11 ENCOUNTER — OFFICE VISIT (OUTPATIENT)
Dept: OTHER | Facility: CLINIC | Age: 77
End: 2022-07-11
Attending: INTERNAL MEDICINE
Payer: COMMERCIAL

## 2022-07-11 VITALS
OXYGEN SATURATION: 96 % | HEART RATE: 61 BPM | HEIGHT: 63 IN | DIASTOLIC BLOOD PRESSURE: 76 MMHG | WEIGHT: 187.4 LBS | SYSTOLIC BLOOD PRESSURE: 123 MMHG | BODY MASS INDEX: 33.2 KG/M2

## 2022-07-11 DIAGNOSIS — I82.402 RECURRENT ACUTE DEEP VEIN THROMBOSIS (DVT) OF LEFT LOWER EXTREMITY (H): ICD-10-CM

## 2022-07-11 DIAGNOSIS — I87.2 VENOUS (PERIPHERAL) INSUFFICIENCY: Primary | ICD-10-CM

## 2022-07-11 PROCEDURE — 99214 OFFICE O/P EST MOD 30 MIN: CPT | Performed by: INTERNAL MEDICINE

## 2022-07-11 PROCEDURE — G0463 HOSPITAL OUTPT CLINIC VISIT: HCPCS

## 2022-07-11 NOTE — PROGRESS NOTES
"Patient is here to discuss follow up.    /76 (BP Location: Right arm, Patient Position: Chair, Cuff Size: Adult Regular)   Pulse 61   Ht 5' 2.5\" (1.588 m)   Wt 187 lb 6.4 oz (85 kg)   LMP 09/24/1990   SpO2 96%   BMI 33.73 kg/m      Questions patient would like addressed today are: N/A.    Refills are needed: Yes:  Eliquis and Sertraline HCl.    Has homecare services and agency name:  [facility]  "

## 2022-07-11 NOTE — PROGRESS NOTES
"  HPI: Gemma Cowart is a 76 year old female who is a lifetime nonsmoker. She is a breast cancer survivor S/P bilateral mastectomies in 2012 with postop inactivity provoking her first lifetime VTE of bilateral calf vein DVTs for which she was ACd with Lovenox and warfarin. AC was initially interrupted by a GI bleed form a post polypectomy site while on AC. An IVC filter was placed and subsequently retrieved after AC was able to be resumed. She was documented to have subsequently resorbed those DVTs. She was  event free off of AC until 2015 when she had a recurrent left calf vein DVT. She was maintained on indefinite warfarin AC. No susbequent duplex was ever performed to have demonstrated clearance of said second DVT. She was then noted on 7/24/2021 to have a \"third\" recurrent DVT in the same location. A HC w/u failed to reveal any abnormalities. She was switched to Eliquis. She was then noted in 2/2022 to have fe def anemia w/o GI bleeding. She was given IV fe infusions and hgb recovered. Colonosocpy was done and demonstrated no source for bleeding. EGD was not done, nor was VCE.  She is tolerating Eliquis w/o drop in hgb. She has never worn compression hosiery. She has a 20 year h/o LLE swelling, which is why she was referred to us. She does not want EVLA or sclerotherapy of any areas of venous insufficiency. She does not want Lt CIV stenting even if she has May Thurner anatomy. She does not to  want to wear compression hosiery. She seeks reassurance that there is not something more ominous ongoing herein.            Review Of Systems  Skin: negative  Eyes: negative  Ears/Nose/Throat: negative  Respiratory: No shortness of breath, dyspnea on exertion, cough, or hemoptysis  Cardiovascular: negative  Gastrointestinal: negative  Genitourinary: negative  Musculoskeletal: positive for left leg swelling  Neurologic: negative  Psychiatric: negative  Hematologic/Lymphatic/Immunologic: negative  Endocrine: " negative        PAST MEDICAL HISTORY:                  Past Medical History        Past Medical History:   Diagnosis Date     Anemia       iron deficiency anemia     Anxiety       Breast cancer (H) 2012     Bilateral Masectomies 9/2012     Complications of gastric bypass surgery       COPD (chronic obstructive pulmonary disease) (H) 4/21/2021     Diabetes mellitus (H)       diet controlled     DVT (deep venous thrombosis) (H)       Eczema       Fracture of left knee region       patella - vertical     History of kidney stones       Obesity       Palpitation       with no treatment     Polyneuropathy       Pruritus       generalized     Rosacea       SVT (supraventricular tachycardia) (H)       s/p ablation 5/28/2015 at [facility] for left lateral AP            PAST SURGICAL HISTORY:                  Past Surgical History         Past Surgical History:   Procedure Laterality Date     ABDOMEN SURGERY   2000     gastric bypass 2000     COLONOSCOPY   10/11/2012     Procedure: COLONOSCOPY;  colonoscopy;  Surgeon: Gela Cleary MD;  Location:  GI     COLONOSCOPY N/A 10/6/2017     Procedure: COLONOSCOPY;;  Surgeon: Shashank Ortiz MD;  Location:  GI     COLONOSCOPY N/A 3/7/2022     Procedure: COLONOSCOPY;  Surgeon: Andrea Daniel MD;  Location:  GI     DENTAL SURGERY   5/2007     all teeth removed - dentures     ENT SURGERY         all teeth pulled     ESOPHAGOSCOPY, GASTROSCOPY, DUODENOSCOPY (EGD), COMBINED N/A 10/6/2017     Procedure: COMBINED ESOPHAGOSCOPY, GASTROSCOPY, DUODENOSCOPY (EGD);  ESOPHAGOSCOPY, GASTROSCOPY, DUODENOSCOPY (EGD) & Colonoscopy *Needs INR on arrival;  Surgeon: Shashank Ortiz MD;  Location:  GI     GYN SURGERY   1/10/75     tubal ligation      H ABLATION SVT   5/28/2015     stopped metoprolol     HERNIA REPAIR   2005     IMPLANT FILTER INFERIOR VENA CAVA   10/8/2012     taken out  1/4/12     INSERT TISSUE EXPANDER BREAST BILATERAL   9/5/2012     Procedure: INSERT  TISSUE EXPANDER BREAST BILATERAL;;  Surgeon: Orlando Wall MD;  Location:  OR     MASTECTOMY SIMPLE, SENTINEL NODE, COMBINED   9/5/2012     Procedure: COMBINED MASTECTOMY SIMPLE, SENTINEL NODE;  BILATERAL MASTECTOMY WITH LEFT AXILLARY SENTINEL LYMPH NODE BIOPSY, BILATERAL TISSUE EXPANDER         MASTECTOMY, BILATERAL         PANNICULECTOMY N/A 4/5/2018     Procedure: PANNICULECTOMY;  PANNICULECTOMY ;  Surgeon: Orlando Wall MD;  Location:  OR     RECONSTRUCT BREAST BILATERAL, IMPLANT PROSTHESIS BILATERAL, COMBINED   5/22/2013     Procedure: COMBINED RECONSTRUCT BREAST BILATERAL, IMPLANT PROSTHESIS BILATERAL;  BILATERAL SECOND STAGE BREAST RECONSTRUCTION WITH SILICONE GEL IMPLANTS AND LIPOSUCTION;  Surgeon: Orlando Wall MD;  Location: [facility]     RECONSTRUCT NIPPLE BILATERAL   8/22/2013     Procedure: RECONSTRUCT NIPPLE BILATERAL;  BILATERAL NIPPLE AREOLAR RECONSTRUCTION;  Surgeon: Orlando Wall MD;  Location: [facility]     ZZHC BREAST RECONSTRUC W OTHR TECHNIQ   5/8/2013            CURRENT MEDICATIONS:                  Current Outpatient Prescriptions          Current Outpatient Medications   Medication Sig Dispense Refill     ACE/ARB NOT PRESCRIBED, INTENTIONAL, ACE & ARB not prescribed due to Other: normal tensive, low microalbumuria         acetaminophen (TYLENOL) 500 MG tablet Take 2 tablets (1,000 mg) by mouth every 8 hours as needed for mild pain 100 tablet 3     albuterol (PROAIR HFA/PROVENTIL HFA/VENTOLIN HFA) 108 (90 Base) MCG/ACT inhaler Inhale 2 puffs into the lungs every 6 hours (Patient taking differently: Inhale 2 puffs into the lungs every 6 hours as needed) 18 g 0     apixaban ANTICOAGULANT (ELIQUIS ANTICOAGULANT) 5 MG tablet Take 1 tablet (5 mg) by mouth 2 times daily 180 tablet 3     blood glucose (ONETOUCH VERIO IQ) test strip Use to test blood sugar 2 times daily or as directed. 100 strip 3     blood glucose calibration (NO BRAND SPECIFIED) solution Use to calibrate  "blood glucose monitor as needed as directed. 1 each 0     blood glucose monitoring (NO BRAND SPECIFIED) meter device kit Use to test blood sugar 2 times daily or as directed. 1 kit 1     busPIRone (BUSPAR) 5 MG tablet Take 5 mg by mouth 2 times daily         calcium carbonate (OS-LADI) 1500 (600 Ca) MG tablet Take 1 tablet (600 mg) by mouth in the morning and 1 tablet (600 mg) in the evening. Take with meals. 180 tablet 0     Cholecalciferol (VITAMIN D3) 75 MCG (3000 UT) TABS Take 1 tablet by mouth daily 3000 UT one day and 5000 UT the next, alternating daily.         clobetasol (TEMOVATE) 0.05 % external cream Apply twice daily to rash (thigh, knees, hands, feet) twice daily for 7 days AS NEEDED \"use sparingly and not to be used on the face\" Follow up recommended. 60 g 3     Lancets (ONETOUCH DELICA PLUS [id number]) MISC 1 lancet daily 100 each 3     sertraline (ZOLOFT) 100 MG tablet Take 100 mg by mouth daily Start daily after cross taper         tiotropium (SPIRIVA RESPIMAT) 2.5 MCG/ACT inhaler Inhale 2 puffs into the lungs daily 4 g 11     vitamin B-12 (CYANOCOBALAMIN) 2500 MCG sublingual tablet Take 1 tablet (2,500 mcg) by mouth daily 90 tablet 0     atorvastatin (LIPITOR) 10 MG tablet Take 1 tablet (10 mg) by mouth daily (Patient not taking: No sig reported) 90 tablet 3     Cholecalciferol (VITAMIN D) 50 MCG (2000 UT) CAPS Take 1 capsule by mouth daily (Patient not taking: Reported on 6/3/2022) 90 capsule 2            ALLERGIES:                       Allergies   Allergen Reactions     Bacitracin Unknown     Dust Mites       Nickel       Phenylene           SOCIAL HISTORY:                  Social History   Social History            Socioeconomic History     Marital status:        Spouse name: Not on file     Number of children: Not on file     Years of education: Not on file     Highest education level: Not on file   Occupational History       Employer: RETIRED   Tobacco Use     Smoking status: Former " Smoker       Packs/day: 1.50       Years: 25.00       Pack years: 37.50       Types: Cigarettes       Quit date: 10/21/1986       Years since quittin.6     Smokeless tobacco: Never Used     Tobacco comment: quit    Vaping Use     Vaping Use: Never used   Substance and Sexual Activity     Alcohol use: No       Comment: 1 time a year     Drug use: No     Sexual activity: Not Currently       Partners: Male   Other Topics Concern     Parent/sibling w/ CABG, MI or angioplasty before 65F 55M? No      Service Not Asked     Blood Transfusions Not Asked     Caffeine Concern Not Asked     Occupational Exposure Not Asked     Hobby Hazards Not Asked     Sleep Concern Not Asked     Stress Concern Not Asked     Weight Concern Not Asked     Special Diet Not Asked     Back Care Not Asked     Exercise Not Asked     Bike Helmet Yes     Seat Belt Yes     Self-Exams Not Asked   Social History Narrative     Not on file      Social Determinants of Health          Financial Resource Strain: Unknown     Difficulty of Paying Living Expenses: Patient refused   Food Insecurity: Unknown     Worried About Running Out of Food in the Last Year: Patient refused     Ran Out of Food in the Last Year: Patient refused   Transportation Needs: No Transportation Needs     Lack of Transportation (Medical): No     Lack of Transportation (Non-Medical): No   Physical Activity: Inactive     Days of Exercise per Week: 0 days     Minutes of Exercise per Session: 0 min   Stress: Stress Concern Present     Feeling of Stress : To some extent   Social Connections: Moderately Integrated     Frequency of Communication with Friends and Family: Twice a week     Frequency of Social Gatherings with Friends and Family: More than three times a week     Attends [employer] Services: 1 to 4 times per year     Active Member of Clubs or Organizations: Yes     Attends Club or Organization Meetings: Not on file     Marital Status:    Intimate Partner  Violence: Not on file   Housing Stability: Unknown     Unable to Pay for Housing in the Last Year: Patient refused     Number of Places Lived in the Last Year: 2     Unstable Housing in the Last Year: No            FAMILY HISTORY:                   Family History         Family History   Problem Relation Age of Onset     Cancer - colorectal Mother       Colon Cancer Mother       Prostate Cancer Father       Alzheimer Disease Father       Cancer Paternal Grandmother       Alzheimer Disease Paternal Grandfather       Cancer - colorectal Brother       Diabetes Brother       Crohn's Disease Daughter       Diabetes Brother       Eye Disorder Brother       Diabetes Son       Cancer Maternal Uncle       Cancer Maternal Uncle                 Physical exam Reveals:     O/P: WNL  HEENT: WNL  NECK: No JVD, thyromegaly, or lymphadenopathy  HEART: RRR, no murmurs, gallops, or rubs  LUNGS: CTA bilaterally without rales, wheezes, or rhonchi  GI: NABS, nondistended, nontender, soft  EXT:without cyanosis, clubbing; trace bilateral pretibial edema; CEAP C2 varicosities bialterally; LLE calf circumference 42 cm; Rt calf circumference 38 cm  NEURO: nonfocal  : no flank tenderness                 [facility]   _______________________________________________________________________________   Patient Name: Gemma Cowart        Procedure Date: 3/7/2022 8:44 AM   MRN: [id number]                       Account Number: [id number]   YOB: 1945             Admit Type: Outpatient   Age: 76                               Gender: Female   Attending MD: Andrea Daniel MD   Total Sedation Time: 16_minutes continuous    bedside 1:1   Instrument Name: 257 - Pediatric Colonoscope   _______________________________________________________________________________       Procedure:                Colonoscopy   Indications:              High risk colon cancer surveillance: Personal                              history of colonic polyps   Providers:                Andrea Daniel MD (Doctor)   Referring MD:               Medicines:                Midazolam 2 mg IV, Fentanyl 100 micrograms IV   Complications:            No immediate complications.   _______________________________________________________________________________   Procedure:                Pre-Anesthesia Assessment:                             - Prior to the procedure, a History and Physical                             was performed, and patient medications and                             allergies were reviewed. The patient is competent.                             The risks and benefits of the procedure and the                             sedation options and risks were discussed with the                             patient. All questions were answered and informed                             consent was obtained. Patient identification and                             proposed procedure were verified by the physician                             in the pre-procedure area. Mental Status                             Examination: alert and oriented. Airway                             Examination: normal oropharyngeal airway and neck                             mobility. Respiratory Examination: clear to                             auscultation. CV Examination: normal. Prophylactic                             Antibiotics: The patient does not require                             prophylactic antibiotics. Prior Anticoagulants: The                             patient has taken Eliquis (apixaban), last dose was                             5 days prior to procedure. ASA Grade Assessment: II                             - A patient with mild systemic disease. After                             reviewing the risks and benefits, the patient was                             deemed in satisfactory condition to undergo the                             procedure. The  anesthesia plan was to use moderate                             sedation / analgesia (conscious sedation).                             Immediately prior to administration of medications,                             the patient was re-assessed for adequacy to receive                             sedatives. The heart rate, respiratory rate, oxygen                             saturations, blood pressure, adequacy of pulmonary                             ventilation, and response to care were monitored                             throughout the procedure. The physical status of                             the patient was re-assessed after the procedure.                             After obtaining informed consent, the colonoscope                             was passed under direct vision. Throughout the                             procedure, the patient's blood pressure, pulse, and                             oxygen saturations were monitored continuously. The                             Olympus Pediatric Colonoscope Model # [id number]-LW120N,                             Endora # 257, SN # [id number] was introduced through                             the anus and advanced to the cecum, identified by                             appendiceal orifice and ileocecal valve. The                             colonoscopy was performed without difficulty. The                             patient tolerated the procedure well. The quality                             of the bowel preparation was good. The ileocecal                             valve, appendiceal orifice, and rectum were                             photographed.                                                                                     Findings:        The perianal and digital rectal examinations were normal.        Many small and large-mouthed diverticula were found in the sigmoid colon        and descending colon.        Non-bleeding internal hemorrhoids were found  during retroflexion. The        hemorrhoids were moderate and Grade II (internal hemorrhoids that        prolapse but reduce spontaneously).        The exam was otherwise without abnormality on direct and retroflexion        views.                                                                                     Impression:               - Diverticulosis in the sigmoid colon and in the                             descending colon.                             - Non-bleeding internal hemorrhoids.                             - The examination was otherwise normal on direct                             and retroflexion views.                             - No specimens collected.   Recommendation:           - No repeat colonoscopy.                                                                                     Procedure Code(s):      VENOUS ULTRASOUND LEFT LEG  2/1/2022 10:51 AM      HISTORY: Left leg swelling     COMPARISON: None.     FINDINGS:  Examination of the deep veins with graded compression and  color flow Doppler with spectral wave form analysis was performed.  No  evidence for DVT in the left lower extremity.                                                                      IMPRESSION: No evidence of deep venous thrombosis.     HI MILLER MD                  VENOUS ULTRASOUND LEFT LEG  10/22/2021 2:27 PM      HISTORY: Acute deep vein thrombosis (DVT) of calf muscle vein of left  lower extremity (H); Iron deficiency anemia, unspecified iron  deficiency anemia type     COMPARISON: 7/24/2021     FINDINGS:  Examination of the deep veins with graded compression and  color flow Doppler with spectral wave form analysis was performed.       There is no evidence for acute DVT. There is a small amount of  residual nonocclusive DVT in one of the 2 distal left posterior tibial  veins.                                                                      IMPRESSION: Small amount of residual nonocclusive  chronic DVT in one  of the 2 distal left posterior tibial veins. No new DVT.     HI MILLER MD            EXAM: US LOWER EXTREMITY VENOUS DUPLEX LEFT  LOCATION: [facility]  DATE/TIME: 7/24/2021 8:21 PM     INDICATION: r/o blood clot  COMPARISON: 10/1/2014  TECHNIQUE: Venous Duplex ultrasound of the left lower extremity with and without compression, augmentation and duplex. Color flow and spectral Doppler with waveform analysis performed.     FINDINGS: Exam includes the common femoral, femoral, popliteal, and contralateral common femoral veins as well as segmentally visualized deep calf veins and greater saphenous vein.      LEFT: No DVT from groin through popliteal fossa. However, one of the posterior tibial veins within the calf demonstrates partially occlusive thrombus.  No superficial thrombophlebitis. No popliteal cyst.                                                                      IMPRESSION:  1.  Exam is positive for DVT. Small focus of DVT involving one of the posterior tibial veins within the calf.  2.  Findings discussed with Dr. Floyd at 2105 hours.           [facility]   _______________________________________________________________________________   Patient Name: Gemma Cowart        Procedure Date: 10/6/2017 7:21 AM   MRN: [id number]                       Account Number: [id number]   YOB: 1945             Admit Type: Outpatient   Age: 71                               Gender: Female   Attending MD: Shashank Ortiz MD Total Sedation Time: 31 minutes   Instrument Name: 130                     _______________________________________________________________________________       Procedure:                Upper GI endoscopy   Indications:              Vomiting   Providers:                Shashank Ortiz MD (Doctor)   Referring MD:             Alva Casas (Referring MD)   Medicines:                Midazolam 1 mg IV, Benzocaine  spray, See                             colonoscopy procedure note for documentation of the                             previously administered medications   Complications:            No immediate complications.   _______________________________________________________________________________   Procedure:                Pre-Anesthesia Assessment:                             - Prior to the procedure, a History and Physical                             was performed, and patient medications and                             allergies were reviewed. The patient is competent.                             The risks and benefits of the procedure and the                             sedation options and risks were discussed with the                             patient. All questions were answered and informed                             consent was obtained. Patient identification and                             proposed procedure were verified by the physician                             in the pre-procedure area. Mental Status                             Examination: alert and oriented. Airway                             Examination: normal oropharyngeal airway and neck                             mobility. Respiratory Examination: clear to                             auscultation. CV Examination: normal. Prophylactic                             Antibiotics: The patient does not require                             prophylactic antibiotics. Prior Anticoagulants: The                             patient has taken Coumadin (warfarin), last dose                             was 5 days prior to procedure. ASA Grade                             Assessment: II - A patient with mild systemic                             disease. After reviewing the risks and benefits,                             the patient was deemed in satisfactory condition to                             undergo the procedure. The anesthesia plan was to                              use moderate sedation / analgesia (conscious                             sedation). Immediately prior to administration of                             medications, the patient was re-assessed for                             adequacy to receive sedatives. The heart rate,                             respiratory rate, oxygen saturations, blood                             pressure, adequacy of pulmonary ventilation, and                             response to care were monitored throughout the                             procedure. The physical status of the patient was                             re-assessed after the procedure.                             After obtaining informed consent, the endoscope was                             passed under direct vision. Throughout the                             procedure, the patient's blood pressure, pulse, and                             oxygen saturations were monitored continuously. The                             Olympus Gastroscope Model# GIF-H190, Endora #130,                             SN#[id number] was introduced through the mouth, and                             advanced to the afferent jejunal loop. The upper GI                             endoscopy was accomplished without difficulty. The                             patient tolerated the procedure well.                                                                                     Findings:        The esophagus was normal. The z-line was located at 40cm. There was no        esophagitis or evidence of stenosis.        Evidence of a Myla-en-Y gastrojejunostomy was found. The gastrojejunal        anastomosis was characterized by healthy appearing mucosa. No evidence        of anastomotic ulceration or stenosis. The jejunojejunal anastomosis was        reached and also characterized by healthy appearing mucosa.        The examined segment of the jejunum was normal.                                                                                      Impression:               - Normal esophagus.                             - Myla-en-Y gastrojejunostomy with gastrojejunal                             anastomosis characterized by healthy appearing                             mucosa.                             - Normal examined jejunum.                             - No specimens collected.   Recommendation:           - No findings on this exam to explain episodes of                             vomiting.                             - If symptoms are becoming persistent I would                             considera a consultation with a bariatric surgeon.                             - Restart Coumadin (warfarin) today.                                                                         US VENOUS DUPLEX [facility]   9/12/2015 10:52 PM     INDICATION: Pain and swelling.   TECHNIQUE: Routine exam without and with compression, augmentation and   duplex utilizing 2D gray-scale imaging, Doppler interrogation with   color-flow and spectral waveform analysis.   COMPARISON: None.     FINDINGS: The common femoral, femoral, popliteal, and segmentally   visualized calf veins were evaluated. The opposite CFV was also included in    the evaluation.     The left common femoral, femoral and popliteal veins demonstrate normal   compressibility, augmentation and flow. Below the level of the left knee   there is occlusive thrombosis involving proximal to mid posterior tibial   vein. There is a 2 x 2.2 x 5.5 cm left popliteal cyst. Edema in the distal   soft tissues. Images of the right common femoral vein demonstrate normal   flow.     CONCLUSION:   1.  Deep vein thrombosis posterior tibial vein level below the level of the    left knee.   2.  Left popliteal cyst.         US VENOUS LEFT LOWER EXTREMITY  6/14/2013 7:29 PM       HISTORY: Left leg swelling and pain.       TECHNIQUE: Left lower extremity venous Doppler.        FINDINGS: The common femoral vein, femoral vein, popliteal vein,   posterior tibial vein, greater saphenous vein show normal   compressibility, augmentation, and flow characteristics. There is no   evidence for deep venous thrombosis.       Impression   IMPRESSION: Negative left lower extremity venous Doppler.       PILLO FREEMAN MD               [facility] IMAGING 5/13/2013 BILATERAL LE VENOUS DUPLEX SUMMARY        IMP: RESOLVED BILATERAL DVT        CT CHEST/ABDOMEN/PELVIS WITH CONTRAST 3/5/2013 9:48 AM       HISTORY: Evaluate abdominal mass. History of bilateral mastectomies.   Breast cancer. Previous herniorrhaphy and gastric bypass.       TECHNIQUE: Scans were obtained from the lung apices through the   pelvis with oral and IV contrast. 100 mL of Isovue 370 injected.       COMPARISON: CT abdomen and pelvis 11/27/12. PET/CT 12/3/12.       FINDINGS:   Chest: Bilateral mammary implants. No mediastinal or hilar adenopathy   or mass. Prominent calcified granuloma in the right lung base. The   tiny indeterminate nodules identified in both lungs on the PET/CT of   12/3/12 are not apparent today. No worrisome intrapulmonary mass   identified.       Abdomen and pelvis: Liver, spleen, pancreas, and both kidneys are   normal. Scattered diverticula in the sigmoid colon without evidence   of diverticulitis. Colon and small bowel are otherwise normal.   Appendix is normal. Since 12/3/12, the IVC filter has been removed.   The retrocaval density seen on the PET/CT is not identified today and   may have been secondary to a small amount of hemorrhage secondary to   the IVC filter. No abdominal or pelvic adenopathy. No evidence for   skeletal metastases. Remainder of the scan is negative.       Impression   IMPRESSION:   1. IVC filter seen on 12/3/12 has been removed. The small retrocaval   density seen previously has also resolved.   2. No evidence for recurrent or metastatic disease.   3. Remainder of the scan is unchanged.        LENIN CHAPARRO MD                  IVC FILTER REMOVAL  1/4/2013 10:57 AM       HISTORY: Patient had an IVC filter placed on 10/10/2012. Patient is   now satisfactorily anticoagulated and filter no longer necessary.       Comparison: CT dated 11/27/2012       Description of procedure: After obtaining informed consent, the   patient was placed in a supine position on the fluoroscopy table. The   right neck was prepped and draped in the usual sterile manner. 1%   lidocaine was injected for local anesthesia. Ultrasound was used to   evaluate and document patency of the right internal jugular vein.   Under sterile ultrasound guidance, access into the right internal   jugular vein was obtained. An image was saved for documentation. An   11 Lithuanian sheath was placed. Through the sheath, a 12-25 mm snare was   passed and used to grasp the superior aspects of the IVC filter. The   sheath was then pushed over the filter. The legs detach from the IVC.   The filter was then pulled out completely through the sheath. A   venogram was performed following filter removal. There was a mild   narrowing within the IVC at site of the filter. This did not   contribute to any significant obstruction to flow. There is a   question of a small tear in the wall of the IVC but no gross   extravasation. The sheath was removed. Pressure was held at the   puncture site for 10 minutes with good hemostasis.       The patient tolerated the procedure well. There were no immediate   postprocedure complications. The patient's vital signs were monitored   by radiology nursing staff under my supervision and remained stable   throughout the study.       Medications: Versed 3.5 mg, fentanyl 175 mcg       Sedation time: 43 minutes       Fluoroscopy time: 7.5 minutes       Contrast: 17 cc Isovue       Impression   Impression: IVC filter removed as described above.       HI MILLER MD                  PET SCAN 12/03/2012     A/P:  (SUMMARY)  1-INDETERMINATE NODULE ADJACENT TO IVC FILTER, RECOMMEND REPEAT CT IN 3 MONTHS.        CT CHEST/ABDOMEN/PELVIS WITH CONTRAST 11/27/2012 8:49 PM       HISTORY: Acute lower extremity thrombosis. Rule out PE. Rule out   abdominal or pelvic mass.       TECHNIQUE: Scans were obtained from the lung apices through the   pelvis with oral and IV contrast. 100mL of Isovue 370 injected.       COMPARISON: None.       FINDINGS:   Chest: Bilateral mass effect mise with tissue expanders bilaterally.   Pulmonary arteries are well-opacified and appear patent without   evidence of pulmonary embolus. Thoracic aorta is normal without   evidence of aneurysm or dissection. No mediastinal or hilar   adenopathy or mass. Calcified granulomas in the both lungs. Multiple   tiny nodular densities identified in both lungs most marked on the   right which are indeterminate. Continued followup is recommended.       Abdomen and pelvis: Liver, spleen, pancreas, and both kidneys are   normal. Infrarenal IVC filter identified in good position. 2.1 x 3.1   cm solid mass identified just posterior to the vena cava at the level   of the filter which could represent a metastatic lymph node. PET/CT   might be helpful for further evaluation. Colon and small bowel are   negative. No other abdominal or pelvic adenopathy identified. No   evidence for skeletal metastases. Remainder of the scan is negative.       Impression   IMPRESSION:   1. No evidence for pulmonary embolus.   2. Multiple tiny nodular densities in both lungs which are   indeterminate. Continued followup is recommended.   3. Solid mass adjacent to the inferior vena cava at the level of the   IVC filter. This could represent a metastatic deposit. PET/CT may be   helpful for further evaluation. Remainder of the scan is negative.       LENIN CHAPARRO MD         [facility] Endoscopy Department    _______________________________________________________________________________   Patient Name: Gemma Cowart           Procedure Date: 10/11/2012 12:15:40 PM       MRN: [id number]                       Account Number: [id number]                   YOB: 1945             Admit Type: Inpatient                       Age: 66                               Room: 2                                     Note Status: Finalized                Attending MD: Gela Bernal MD             Pause for the Cause: Time Out done by MD.     _______________________________________________________________________________       Procedure:                Colonoscopy   Indications:              Hematochezia, Previous polyp removed ascending colon   Providers:                Gela Cleary MD, Ana Ramos RN   Referring MD:             Yue Martin MD   Medicines:                Fentanyl 100 micrograms IV, Midazolam 2 mg IV   Complications:            No immediate complications   _______________________________________________________________________________   Procedure:                Pre-Anesthesia Assessment:                             - Prior to the procedure, a History and Physical                             was performed, and patient medications and                             allergies were reviewed. The patient is competent.                             The risks and benefits of the procedure and the                             sedation options and risks were discussed with the                             patient. All questions were answered and informed                             consent was obtained. Patient identification and                             proposed procedure were verified by the physician                             in the procedure room. Mental Status Examination:                             alert and oriented. Airway Examination: normal                             oropharyngeal airway and neck  mobility. Respiratory                             Examination: clear to auscultation. CV Examination:                             normal. Prophylactic Antibiotics: The patient does                             not require prophylactic antibiotics. Prior                             Anticoagulants: The patient has taken no previous                             anticoagulant or antiplatelet agents. ASA Grade                             Assessment: II - A patient with mild systemic                             disease. After reviewing the risks and benefits,                             the patient was deemed in satisfactory condition to                             undergo the procedure. The anesthesia plan was to                             use moderate sedation / analgesia (conscious                             sedation). Immediately prior to administration of                             medications, the patient was re-assessed for                             adequacy to receive sedatives. The heart rate,                             respiratory rate, oxygen saturations, blood                             pressure, adequacy of pulmonary ventilation, and                             response to care were monitored throughout the                             procedure. The physical status of the patient was                             re-assessed after the procedure.                             After obtaining informed consent, the colonoscope                             was passed under direct vision. Throughout the                             procedure, the patient's blood pressure, pulse, and                             oxygen saturations were monitored continuously. The                             Colonoscope was introduced through the anus and                             advanced to the cecum, identified by appendiceal                             orifice & ileocecal valve. The colonoscopy was                              performed without difficulty. The patient tolerated                             the procedure well. The quality of the bowel                             preparation was good.                                                                                    Findings:        A tattoo was seen in the ascending colon. A post-polypectomy scar was        found at the tattoo site.No evidence of bleeding or visible vessel The        exam was otherwise without abnormality.                                                                                    Impression:               - A tattoo was seen ascending colon. A                             post-polypectomy scar was found at the tattoo site.                             - The examination was otherwise normal.                             No evidence of ongoing bleed   Recommendation:           - Regular diet. Suggest remain off anticoagulation                             for another 5-7 days.                             - Return patient to [facility] for ongoing care.                                                                                      Signed electronically by Gela Ramos MD   ____________________   Gela Cleary MD   Signed Date: 10/11/2012 1:15:08 PM   Number of Addenda: 0   I was physically present for the entire viewing portion of the exam.   Note Initiated On: 10/11/2012 12:15:40 PM   Scope Withdrawal Time: 0 hours 0 minutes 0 seconds   Total Procedure Duration: 0 hours 0 minutes 0 seconds          IVC FILTER PLACEMENT 10/10/2012 [facility]      CLINICAL HISTORY: 66-year-old female with bilateral lower extremity   DVT. Patient was anticoagulated however, developed a gastrointestinal   bleed. Therefore, IVC filter is requested as the patient cannot be   continued on anticoagulation.       DESCRIPTION OF PROCEDURE: After obtaining informed consent, the   patient was placed in a supine position on the fluoroscopy table. The   right neck was prepped  and draped in the usual sterile manner. 1%   lidocaine was injected for local anesthesia. Ultrasound was used to   evaluate and document patency of the right internal jugular vein.   Under sterile ultrasound guidance, a puncture was made into the right   internal jugular vein. A permanent copy image was obtained for the   patient's records.       An 8.5 Estonian vascular sheath was placed and taken into the lower   IVC. A venogram was performed. The renal vein inflow was identified.   No thrombus in the IVC was identified.       A retrievable Misael tulip filter was placed with apex positioned at   the level of the renal vein inflow. The right IJ sheath was pulled.   Pressure was held at the puncture site for 7 minutes with good   hemostasis.       The patient tolerated the procedure well. There were no immediate   postprocedure complications. The patient's vital signs were monitored   by radiology nursing staff under my supervision and remained stable   throughout the study.       Fluoroscopy time: 1.7 minutes       Medications: 2 mg Versed, 100 mcg fentanyl       Sedation time: 30 minutes       Contrast: 35 cc Isovue       IMPRESSION:  IVC filter placed as above.        [facility] DUPLEX 10/6/2012     Interface, In Card And Rad Results - 10/06/2012  3:08 PM CDT   ULTRASOUND VENOUS DUPLEX LOWER EXTREMITY BILATERAL   Oct 06, 2012 02:24:00 PM     INDICATION: Pain. Shortness of breath.   TECHNIQUE: Routine with compression, augmentation, and duplex. Gray-scale   and duplex including color and spectral imaging.   COMPARISON:  None.     FINDINGS:   Ultrasound of the leg veins is positive for DVT involving right peroneal   veins which is occlusive and nonocclusive as well as left calf deep veins,   peroneal and posterior tibial which have occlusive thrombus within them.   Posterior tibial venous clot extends just into popliteal vein. Superficial   venous thrombus also noted.  3.6 x 1.7 x 1 cm left popliteal fossa  probable    Santos's cyst.     IMPRESSION:   Exam positive for bilateral DVT. Results called to patient's nurse, Mesha   at 3:00 p.m. She will call the covering clinician.        Component      Latest Ref Rng & Units 2/1/2022 3/29/2022   WBC      4.0 - 11.0 10e3/uL 4.5 4.4   RBC Count      3.80 - 5.20 10e6/uL 4.60 5.24 (H)   Hemoglobin      11.7 - 15.7 g/dL 9.3 (L) 12.2   Hematocrit      35.0 - 47.0 % 33.2 (L) 41.6   MCV      78 - 100 fL 72 (L) 79   MCH      26.5 - 33.0 pg 20.2 (L) 23.3 (L)   MCHC      31.5 - 36.5 g/dL 28.0 (L) 29.3 (L)   RDW      10.0 - 15.0 % 16.0 (H) 24.3 (H)   Platelet Count      150 - 450 10e3/uL 221 193   % Neutrophils      %   70   % Lymphocytes      %   19   % Monocytes      %   7   % Eosinophils      %   3   % Basophils      %   1   % Immature Granulocytes      %   0   NRBCs per 100 WBC      <1 /100   0   Absolute Neutrophils      1.6 - 8.3 10e3/uL   3.1   Absolute Lymphocytes      0.8 - 5.3 10e3/uL   0.9   Absolute Monocytes      0.0 - 1.3 10e3/uL   0.3   Absolute Eosinophils      0.0 - 0.7 10e3/uL   0.1   Absolute Basophils      0.0 - 0.2 10e3/uL   0.1   Absolute Immature Granulocytes      <=0.4 10e3/uL   0.0   Absolute NRBCs      10e3/uL   0.0   Sodium      133 - 144 mmol/L 142     Potassium      3.4 - 5.3 mmol/L 4.6     Chloride      94 - 109 mmol/L 113 (H)     Carbon Dioxide      20 - 32 mmol/L 23     Anion Gap      3 - 14 mmol/L 6     Urea Nitrogen      7 - 30 mg/dL 17     Creatinine      0.52 - 1.04 mg/dL 1.40 (H)     Calcium      8.5 - 10.1 mg/dL 8.7     Glucose      70 - 99 mg/dL 118 (H)     GFR Estimate      >60 mL/min/1.73m2 39 (L)     Cholesterol      <200 mg/dL 184     Triglycerides      <150 mg/dL 104     HDL Cholesterol      >=50 mg/dL 62     LDL Cholesterol Calculated      <=100 mg/dL 101 (H)     Non HDL Cholesterol      <130 mg/dL 122     Patient Fasting > 8hrs?       Yes     Iron      35 - 180 ug/dL 20 (L) 65   Iron Binding Cap      240 - 430 ug/dL 431 (H) 294   Iron  Saturation Index      15 - 46 % 5 (L) 22   Creatinine Urine      mg/dL 138     Albumin Urine mg/L      mg/L 19     Albumin Urine mg/g Cr      0.00 - 25.00 mg/g Cr 13.77     Parathyroid Hormone Intact      18 - 80 pg/mL 143 (H)     Ferritin      8 - 252 ng/mL 7 (L) 282 (H)   Vitamin B12      193 - 986 pg/mL 699     Folate      >=5.4 ng/mL 14.3     Vitamin D Deficiency screening      20 - 75 ug/L 21     TSH      0.40 - 4.00 mU/L 2.15     Hemoglobin A1C      0.0 - 5.6 % 6.5 (H)         Name:  Gemma Cowart                                                       Patient ID: [id number]  Date: 2022                                                               : 1945  Sex: female                                                                             Examined by: MICHAEL Sagastume RVT  Age:  76 year old                                                                     Reading MD: KEVEN     INDICATION:  Chronic thrombus and embolism of left calf muscle vein.  Evaluate for venous insufficiency.     EXAM TYPE  BILATERAL LOWER EXTREMITY VENOUS DUPLEX FOR VENOUS INSUFFICIENCY  TECHNICAL SUMMARY     A duplex ultrasound study using color flow was performed, to evaluate the bilateral lower extremity veins for valvular incompetence with the patient in a steep reversed trendelenberg.     RIGHT:     The deep veins demonstrate phasic flow, compress, and respond to augmentations.  There is no reflux or DVT.        The GSV demonstrates phasic flow, compresses, and responds to augmentations from the saphenofemoral junction to the ankle with no evidence of thrombus. The great saphenous vein measures 6.5 mm at the saphenofemoral junction, 5.6 mm in the proximal thigh, and 4.1 mm at the knee. The GSV is incompetent at the SFJ with a reflux time of 1650 milliseconds.       The AASV is competent ( 2.4 mm) draining into the saphenofemoral junction.      The Giacomini vein is competent ( 1.0 mm) communicating with the small  saphenous vein at the knee level.      The SSV demonstrates phasic flow, compresses, and responds to augmentations from the popliteal space to the ankle.  No thrombus is seen. The saphenopopliteal junction is absent. The SSV is incompetent in the proximal calf with a reflux time of 3662 milliseconds.       Perforators: There is no evidence of incompetent  veins at any level.      LEFT:     The common femoral, femoral, popliteal, and peroneal veins demonstrate phasic flow, compress, and respond to augmentations.  Chronic non-occlusive thrombus is noted in one of the paired posterior tibial veins with filling defects on color doppler. The common femoral and popliteal veins are incompetent and free of thrombus. The remaining deep veins are competent and free of thrombus.      The GSV demonstrates phasic flow, compresses, and responds to augmentations from the saphenofemoral junction to the ankle with no evidence of thrombus. The great saphenous vein measures 10.5 mm at the saphenofemoral junction, 6.4 mm in the proximal thigh, and 4.3 mm at the knee.  The GSV is incompetent from the proximal to mid calf with the greatest reflux time of 1254 milliseconds.  The GSV gives rise to multiple incompetent varicose veins, the largest measures 5.3 mm off the mid calf that courses medially with a reflux time of 1930 milliseconds.      The AASV is competent ( 2.8 mm) draining into the saphenofemoral junction.      The Giacomini vein is incompetent ( 3.1 mm) communicating with the small saphenous vein and courses deep to the fascia in the mid thigh.     The SSV demonstrates phasic flow, compresses, and responds to augmentations from the popliteal space to the ankle.  No thrombus is seen. The saphenopopliteal junction is absent. The SSV is incompetent from the proximal to mid calf with a reflux time of 2046 milliseconds.       Perforators: There is no evidence of incompetent  veins at any level.     FINAL  SUMMARY:  1.         Chronic non-occlusive thrombus is noted in one of the left paired posterior tibial veins with filling defects on color doppler.  2.         No right deep vein incompetence.  Left common femoral and popliteal vein incompetence.  3.         Right SFJ incompetence.  4.         Right proximal small saphenous vein incompetence.  5.         Left greater saphenous vein incompetence.  6.         Left distal Giacomini vein incompetence.  7.         Left small saphenous vein incompetence.  8.         Left varicose vein incompetence.  9.         Superficial and perforating veins time of incompetence is >500 ms and >1000 ms for deep vein incompetence.         CTV ABDOMEN AND PELVIS WITH CONTRAST 6/21/2022 10:06 AM     HISTORY:   76-year-old woman with recurrent left lower extremity DVT.  Patient has remote history of breast cancer. Request made for  evaluation of outflow obstruction of the left lower extremity venous  system within the pelvis such as May-Thurner anatomy or  intra-abdominal mass.     COMPARISON: October 5, 2017.     TECHNIQUE: Multiplanar and multiformatted CTV images were obtained  from the lung bases through the abdomen and pelvis after the  uneventful administration of Isovue 370 intravenous contrast given for  a total of 80 mL. 3-D images not obtained.     FINDINGS: Focal calcification in the right lower lobe, likely  granuloma. Scoliotic curvature and degenerative changes in the visible  thoracolumbar spine. Heart size is normal, though significant  calcified mitral valve.     Nonspecific hypodensity in the spleen is 3 Hounsfield units and 1.4  cm, not apparent on previous exam. The adrenal glands and pancreas are  unremarkable. Postsurgical changes noted at the stomach. Dense  exophytic collection is noted in the right kidney measuring 9 mm on  today's exam, previously 9 mm and 82 Hounsfield units, nonspecific,  could be a small mass or dense/complex cyst. Reassuring given  similarity  in size. No hydronephrosis or nephrolithiasis. No  intraperitoneal fluid or air. The bladder is minimally distended and  unremarkable. The uterus and adnexa are unremarkable. No dilated loops  of bowel.     Moderate nearly circumferential atherosclerotic calcification in the  infrarenal abdominal aorta, though no aneurysmal dilatation. The  inferior vena cava is patent. Both common iliac, external iliac, and  common femoral veins are patent. No extrinsic compression on the iliac  veins. Specifically, no evidence of May-Thurner anatomy and no mass  associated with compression of the venous systems.                                                                      IMPRESSION:  1. Patent iliac veins. Negative for May-Thurner anatomy or extrinsic  compression related to masses.  2. Small exophytic hyperdensity arising from the lateral right kidney,  though similar in size compared to 2017. This is nonspecific and could  be a complex cyst, mass, though reassuring size is similar.  3. Hypodensity in the spleen new from previous exam, though measures  as simple fluid, likely a small 1.4 cm cyst.     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  A new splenic cyst was seen. We also checked a venous competency study, which revealed insufficiency intot the thighs bilaterally.  Plan: F/U with PCP regarding renal and splenic masses. Wear thigh high compression hosiery.             (I87.2) Venous (peripheral) insufficiency  Comment: as above  Plan:Compression hosiery                     33 minutes total medical care  on today's date given need to review twelve years of medical records across multiple systems in an individual with poor memory for medical details, need for pre and post charting, interview and exam of patietn, as well as formulation of and communication of above plan with the patient.

## 2022-07-12 ENCOUNTER — OFFICE VISIT (OUTPATIENT)
Dept: NEUROLOGY | Facility: CLINIC | Age: 77
End: 2022-07-12
Attending: NURSE PRACTITIONER
Payer: COMMERCIAL

## 2022-07-12 VITALS
WEIGHT: 187 LBS | OXYGEN SATURATION: 99 % | HEIGHT: 63 IN | SYSTOLIC BLOOD PRESSURE: 134 MMHG | BODY MASS INDEX: 33.13 KG/M2 | DIASTOLIC BLOOD PRESSURE: 81 MMHG | HEART RATE: 60 BPM

## 2022-07-12 DIAGNOSIS — R51.9 HEADACHE DISORDER: Primary | ICD-10-CM

## 2022-07-12 PROCEDURE — 99205 OFFICE O/P NEW HI 60 MIN: CPT | Performed by: PSYCHIATRY & NEUROLOGY

## 2022-07-12 NOTE — PROGRESS NOTES
INITIAL NEUROLOGY CONSULTATION    DATE OF VISIT: 7/12/2022  CLINIC LOCATION: [facility]  MRN: [id number]  PATIENT NAME: Gemma Cowart  YOB: 1945    REASON FOR VISIT:   Chief Complaint   Patient presents with     Consult     Pain with pressure on head started on one side now on both. Comes and goes, will hurt for a few days then stop      HISTORY OF PRESENT ILLNESS:                                                    Ms. Gemma Cowart is 76 year old right handed female patient with past medical history of vitamin D/B12 deficiency, hyperlipidemia, diabetes mellitus type 2, obesity, chronic kidney disease, breast cancer and DVT (on Eliquis), who was seen today for headache management.    Per patient's report, approximately 2 years ago she developed intermittent left parietal pain that spread to involve right occipital and parietal areas approximately a year ago.  At the present time, the pain is still intermittent, but worsening.  It could affect either side, but not both at the same time.  She reports episodes of sharp 5/10 pain that lasts 15 to 30 seconds and may occur 2-3 times per day up to 2 to 3 days followed by pain-free interval that could be up to 1 month.  The last episode was approximately a month ago.  She denies any additional associated symptoms.  No obvious aggravating or alleviating factors, though she tends to press on painful spots that seems to somewhat help.  No prior significant head injuries, seizures, CNS infections, and strokes.  Has intermittent left arm/hand numbness after overnight sleep for a month.  No other focal neurological symptoms.  She reports positive family history of Alzheimer's disease and various cancers.    Laboratory evaluation from February-June 2022 includes elevated creatinine of 1.4-1.5, LDL of 101, normal TSH (2.15), normal vitamin B12 (699), elevated hemoglobin A1C (6.5) and unremarkable CBC.    No prior brain imaging is available  for review.    According to scanned notes, the patient was seen at [facility] in 2019 for memory loss.  At the time of evaluation, brain MRI and neuropsychologic evaluation was recommended.  I do not see the results in her chart.  She states that she never pursue recommended testing.    No additional useful information is available in Care Everywhere, which was reviewed.  PAST MEDICAL/SURGICAL HISTORY:                                                    I personally reviewed patient's past medical and surgical history with the patient at today's visit.  MEDICATIONS:                                                    I personally reviewed patient's medications and allergies with the patient at today's visit.  ALLERGIES:                                                      Allergies   Allergen Reactions     Bacitracin Unknown     Dust Mites      Nickel      Phenylene      EXAM:                                                    VITAL SIGNS:   /81 (BP Location: Right arm, Patient Position: Sitting, Cuff Size: Adult Small)   Pulse 60   LMP 09/24/1990   SpO2 99%   Mini-Cog Assessment:  Mini Cog Assessment  Clock Draw Score: 2 Normal  3 Item Recall: 3 objects recalled  Mini Cog Total Score: 5  Administered by: : Alva BAGLEY    General: pt is in NAD, cooperative.  Skin: normal turgor, moist mucous membranes, no lesions/rashes noticed.  HEENT: ATNC, EOMI, PERRL, white sclera, normal conjunctiva, no nystagmus or ptosis. No carotid bruits bilaterally.  Respiratory: lung sounds clear to auscultation bilaterally, no crackles, wheezes, rhonchi. Symmetric lung excursion, no accessory respiratory muscle use.  Cardiovascular: normal S1/S2, no murmurs/rubs/gallops.   Abdomen: Not distended.  : deferred.    Neurological:  Mental: alert, follows commands, Mini Cog Total Score: 5/5 with 3/3 on memory recall, no aphasia or dysarthria. Fund of knowledge is appropriate for age.  Cranial Nerves:  CN II: visual  acuity - able to accurately count fingers with each eye. Visual fields intact, fundi: discs sharp, no papilledema and normal vessels bilaterally.  CN III, IV, VI: EOM intact, pupils equal and reactive  CN V: facial sensation nl  CN VII: face symmetric, no facial droop  CN VIII: hearing normal  CN IX: palate elevation symmetric, uvula at midline  CN XI SCM normal, shoulder shrug nl  CN XII: tongue midline  Motor: Strength: 5/5 in all major groups of all extremities. Normal tone. No abnormal movements. No pronator drift b/l.  Reflexes: Triceps, biceps, brachioradialis, and patellar reflexes normal and symmetric, achilles reflexes are absent bilaterally. No clonus noted. Toes are down-going b/l.   Sensory: light touch, pinprick, and vibration intact. Romberg: negative.  Coordination: FNF and heel-shin tests intact b/l.   Gait:  Normal casual walk, but has moderate difficulty with tandem gait.  There is tenderness to palpation over occipital nerves bilaterally.  DATA:   LABS/EEG/IMAGING/OTHER STUDIES: I reviewed pertinent medical records, as detailed in the history of present illness.  ASSESSMENT AND PLAN:      ASSESSMENT: Gemma Cowart is a 76 year old female patient with listed above past medical history, who presents with intermittent bilateral parietal/occipital pain over the last 2 years in context of prior history of breast cancer.    We had a detailed discussion with the patient regarding her presenting complaints.  The neurological exam today is suggestive of the possibility of peripheral neuropathy (unrelated to main complaint).  We discussed that most likely her clinical presentation would be consistent with bilateral greater occipital neuralgia, though given her history of cancer, secondary headache causes should also be excluded.  We decided to proceed with brain MRI without contrast as the first step.  If negative, will discuss treatment options for occipital neuralgia at the next visit.    Regarding her  intermittent left hand/arm numbness after overnight sleep, she will monitor her symptoms and let me know if she would like to pursue any additional work-up in the future.    DIAGNOSES:    ICD-10-CM    1. Headache disorder  R51.9 Adult Neurology  Referral     MR Brain w/o Contrast     PLAN: At today's visit we thoroughly discussed various diagnostic possibilities for patient's symptoms, necessary evaluation, and the plan, which includes:  Orders Placed This Encounter   Procedures     MR Brain w/o Contrast     No new medications.     Additional recommendations after the work-up.    Next follow-up appointment is in the next 2 weeks or earlier if needed.    Total Time: 61 minutes spent on the date of the encounter doing chart review, history and exam, documentation and further activities per the note.    Alfredo May MD  [facility]  (Chart documentation was completed in part with Dragon voice-recognition software. Even though reviewed, some grammatical, spelling, and word errors may remain.)

## 2022-07-12 NOTE — PROGRESS NOTES
"Gemma Cowart is a 76 year old female who presents for:  Chief Complaint   Patient presents with     Consult     Pain with pressure on head started on one side now on both. Comes and goes, will hurt for a few days then stop         Initial Vitals:  /81 (BP Location: Right arm, Patient Position: Sitting, Cuff Size: Adult Small)   Pulse 60   Ht 1.588 m (5' 2.5\")   Wt 84.8 kg (187 lb)   LMP 09/24/1990   SpO2 99%   BMI 33.66 kg/m   Estimated body mass index is 33.66 kg/m  as calculated from the following:    Height as of this encounter: 1.588 m (5' 2.5\").    Weight as of this encounter: 84.8 kg (187 lb).. Body surface area is 1.93 meters squared. BP completed using cuff size: janet Mayers    "

## 2022-07-12 NOTE — LETTER
7/12/2022         RE: Gemma Cowart  [address] [place]  [place] [address]        Dear Colleague,    Thank you for referring your patient, Gemma Cowart, to the [facility]. Please see a copy of my visit note below.    INITIAL NEUROLOGY CONSULTATION    DATE OF VISIT: 7/12/2022  CLINIC LOCATION: [facility]  MRN: [id number]  PATIENT NAME: Gemma Cowart  YOB: 1945    REASON FOR VISIT:   Chief Complaint   Patient presents with     Consult     Pain with pressure on head started on one side now on both. Comes and goes, will hurt for a few days then stop      HISTORY OF PRESENT ILLNESS:                                                    Ms. Gemma Cowart is 76 year old right handed female patient with past medical history of vitamin D/B12 deficiency, hyperlipidemia, diabetes mellitus type 2, obesity, chronic kidney disease, breast cancer and DVT (on Eliquis), who was seen today for headache management.    Per patient's report, approximately 2 years ago she developed intermittent left parietal pain that spread to involve right occipital and parietal areas approximately a year ago.  At the present time, the pain is still intermittent, but worsening.  It could affect either side, but not both at the same time.  She reports episodes of sharp 5/10 pain that lasts 15 to 30 seconds and may occur 2-3 times per day up to 2 to 3 days followed by pain-free interval that could be up to 1 month.  The last episode was approximately a month ago.  She denies any additional associated symptoms.  No obvious aggravating or alleviating factors, though she tends to press on painful spots that seems to somewhat help.  No prior significant head injuries, seizures, CNS infections, and strokes.  Has intermittent left arm/hand numbness after overnight sleep for a month.  No other focal neurological symptoms.  She reports positive family history of Alzheimer's disease  and various cancers.    Laboratory evaluation from February-June 2022 includes elevated creatinine of 1.4-1.5, LDL of 101, normal TSH (2.15), normal vitamin B12 (699), elevated hemoglobin A1C (6.5) and unremarkable CBC.    No prior brain imaging is available for review.    According to scanned notes, the patient was seen at [facility] in 2019 for memory loss.  At the time of evaluation, brain MRI and neuropsychologic evaluation was recommended.  I do not see the results in her chart.  She states that she never pursue recommended testing.    No additional useful information is available in Care Everywhere, which was reviewed.  PAST MEDICAL/SURGICAL HISTORY:                                                    I personally reviewed patient's past medical and surgical history with the patient at today's visit.  MEDICATIONS:                                                    I personally reviewed patient's medications and allergies with the patient at today's visit.  ALLERGIES:                                                      Allergies   Allergen Reactions     Bacitracin Unknown     Dust Mites      Nickel      Phenylene      EXAM:                                                    VITAL SIGNS:   /81 (BP Location: Right arm, Patient Position: Sitting, Cuff Size: Adult Small)   Pulse 60   LMP 09/24/1990   SpO2 99%   Mini-Cog Assessment:  Mini Cog Assessment  Clock Draw Score: 2 Normal  3 Item Recall: 3 objects recalled  Mini Cog Total Score: 5  Administered by: : Alva BAGLEY    General: pt is in NAD, cooperative.  Skin: normal turgor, moist mucous membranes, no lesions/rashes noticed.  HEENT: ATNC, EOMI, PERRL, white sclera, normal conjunctiva, no nystagmus or ptosis. No carotid bruits bilaterally.  Respiratory: lung sounds clear to auscultation bilaterally, no crackles, wheezes, rhonchi. Symmetric lung excursion, no accessory respiratory muscle use.  Cardiovascular: normal S1/S2, no  murmurs/rubs/gallops.   Abdomen: Not distended.  : deferred.    Neurological:  Mental: alert, follows commands, Mini Cog Total Score: 5/5 with 3/3 on memory recall, no aphasia or dysarthria. Fund of knowledge is appropriate for age.  Cranial Nerves:  CN II: visual acuity - able to accurately count fingers with each eye. Visual fields intact, fundi: discs sharp, no papilledema and normal vessels bilaterally.  CN III, IV, VI: EOM intact, pupils equal and reactive  CN V: facial sensation nl  CN VII: face symmetric, no facial droop  CN VIII: hearing normal  CN IX: palate elevation symmetric, uvula at midline  CN XI SCM normal, shoulder shrug nl  CN XII: tongue midline  Motor: Strength: 5/5 in all major groups of all extremities. Normal tone. No abnormal movements. No pronator drift b/l.  Reflexes: Triceps, biceps, brachioradialis, and patellar reflexes normal and symmetric, achilles reflexes are absent bilaterally. No clonus noted. Toes are down-going b/l.   Sensory: light touch, pinprick, and vibration intact. Romberg: negative.  Coordination: FNF and heel-shin tests intact b/l.   Gait:  Normal casual walk, but has moderate difficulty with tandem gait.  There is tenderness to palpation over occipital nerves bilaterally.  DATA:   LABS/EEG/IMAGING/OTHER STUDIES: I reviewed pertinent medical records, as detailed in the history of present illness.  ASSESSMENT AND PLAN:      ASSESSMENT: Gemma Cowart is a 76 year old female patient with listed above past medical history, who presents with intermittent bilateral parietal/occipital pain over the last 2 years in context of prior history of breast cancer.    We had a detailed discussion with the patient regarding her presenting complaints.  The neurological exam today is suggestive of the possibility of peripheral neuropathy (unrelated to main complaint).  We discussed that most likely her clinical presentation would be consistent with bilateral greater occipital neuralgia,  though given her history of cancer, secondary headache causes should also be excluded.  We decided to proceed with brain MRI without contrast as the first step.  If negative, will discuss treatment options for occipital neuralgia at the next visit.    Regarding her intermittent left hand/arm numbness after overnight sleep, she will monitor her symptoms and let me know if she would like to pursue any additional work-up in the future.    DIAGNOSES:    ICD-10-CM    1. Headache disorder  R51.9 Adult Neurology  Referral     MR Brain w/o Contrast     PLAN: At today's visit we thoroughly discussed various diagnostic possibilities for patient's symptoms, necessary evaluation, and the plan, which includes:  Orders Placed This Encounter   Procedures     MR Brain w/o Contrast     No new medications.     Additional recommendations after the work-up.    Next follow-up appointment is in the next 2 weeks or earlier if needed.    Total Time: 61 minutes spent on the date of the encounter doing chart review, history and exam, documentation and further activities per the note.    Alfredo May MD  [facility]  (Chart documentation was completed in part with Dragon voice-recognition software. Even though reviewed, some grammatical, spelling, and word errors may remain.)            Again, thank you for allowing me to participate in the care of your patient.        Sincerely,        Alfredo May MD

## 2022-07-12 NOTE — PROGRESS NOTES
INITIAL NEUROLOGY CONSULTATION    DATE OF VISIT: 7/12/2022  CLINIC LOCATION: [facility]  MRN: [id number]  PATIENT NAME: Gemma Cowart  YOB: 1945    REASON FOR VISIT: No chief complaint on file.    HISTORY OF PRESENT ILLNESS:                                                    Ms. Gemma Cowart is 76 year old right handed female patient with past medical history of vitamin D/B12 deficiency, hyperlipidemia, diabetes mellitus type 2, obesity, chronic kidney disease, breast cancer and DVT (on Eliquis), who was seen today for headache management.    Per patient's report, ***.    Laboratory evaluation from February-June 2022 includes elevated creatinine of 1.4-1.5, LDL of 101, normal TSH (2.15), normal vitamin B12 (699), elevated hemoglobin A1C (6.5) and unremarkable CBC.    No prior brain imaging is available for review.    According to scanned notes, the patient was seen at [facility] in 2019 for memory loss.  At the time of evaluation, brain MRI and neuropsychologic evaluation was recommended.  I do not see the results in her chart.    No additional useful information is available in Care Everywhere, which was reviewed.  PAST MEDICAL/SURGICAL HISTORY:                                                    I personally reviewed patient's past medical and surgical history with the patient at today's visit.  MEDICATIONS:                                                    I personally reviewed patient's medications and allergies with the patient at today's visit.  ALLERGIES:                                                      Allergies   Allergen Reactions     Bacitracin Unknown     Dust Mites      Nickel      Phenylene      EXAM:                                                    VITAL SIGNS:   [facility] 09/24/1990   Mini-Cog Assessment:       General: pt is in NAD, cooperative.  Skin: normal turgor, moist mucous membranes, no lesions/rashes noticed.  HEENT: ATNC, EOMI,  PERRL, white sclera, normal conjunctiva, no nystagmus or ptosis. No carotid bruits bilaterally.  Respiratory: lung sounds clear to auscultation bilaterally, no crackles, wheezes, rhonchi. Symmetric lung excursion, no accessory respiratory muscle use.  Cardiovascular: normal S1/S2, no murmurs/rubs/gallops.   Abdomen: Not distended.  : deferred.    Neurological:  Mental: alert, follows commands,  /5 with ***/3 on memory recall, no aphasia or dysarthria. Fund of knowledge is {MYAPPROPRIATE:634167}  Cranial Nerves:  CN II: visual acuity - able to accurately count fingers with each eye. Visual fields intact, fundi: discs sharp, no papilledema and normal vessels bilaterally.  CN III, IV, VI: EOM intact, pupils equal and reactive  CN V: facial sensation nl  CN VII: face symmetric, no facial droop  CN VIII: hearing normal  CN IX: palate elevation symmetric, uvula at midline  CN XI SCM normal, shoulder shrug nl  CN XII: tongue midline  Motor: Strength: 5/5 in all major groups of all extremities. Normal tone. No abnormal movements. No pronator drift b/l.  Reflexes: Triceps, biceps, brachioradialis, patellar, and achilles reflexes normal and symmetric. No clonus noted. Toes are down-going b/l.   Sensory: light touch, pinprick, and vibration intact. Romberg: negative.  Coordination: FNF and heel-shin tests intact b/l.   Gait:  Normal, able to tandem walk *** without difficulty.  DATA:   LABS/EEG/IMAGING/OTHER STUDIES: I reviewed pertinent medical records, as detailed in the history of present illness.  ASSESSMENT AND PLAN:      ASSESSMENT: Gemma Cowart is a 76 year old female patient with listed above past medical history, who presents with ***.    We had a detailed discussion with the patient regarding her presenting complaints.  The neurological exam today is ***.    DIAGNOSES:  No diagnosis found.  PLAN: There are no Patient Instructions on file for this visit.    Total Time: *** minutes spent on the date of the  encounter doing chart review, history and exam, documentation and further activities per the note.    Alfredo May MD  [facility]  (Chart documentation was completed in part with Dragon voice-recognition software. Even though reviewed, some grammatical, spelling, and word errors may remain.)

## 2022-07-12 NOTE — NURSING NOTE
"Gemma Cowart is a 76 year old female who presents for:  Chief Complaint   Patient presents with     Consult     Pain with pressure on head started on one side now on both. Comes and goes, will hurt for a few days then stop         Initial Vitals:  /81 (BP Location: Right arm, Patient Position: Sitting, Cuff Size: Adult Small)   Pulse 60   Ht 1.588 m (5' 2.5\")   Wt 84.8 kg (187 lb)   LMP 09/24/1990   SpO2 99%   BMI 33.66 kg/m   Estimated body mass index is 33.66 kg/m  as calculated from the following:    Height as of this encounter: 1.588 m (5' 2.5\").    Weight as of this encounter: 84.8 kg (187 lb).. Body surface area is 1.93 meters squared. BP completed using cuff size: small janet Mayers    "

## 2022-07-12 NOTE — PATIENT INSTRUCTIONS
AFTER VISIT SUMMARY (AVS):    At today's visit we thoroughly discussed various diagnostic possibilities for your symptoms, necessary evaluation, and the plan, which includes:  Orders Placed This Encounter   Procedures    MR Brain w/o Contrast     No new medications.     Additional recommendations after the work-up.    Please let me know if you desire evaluate your left hand/arm numbness.    Next follow-up appointment is in the next 2 weeks or earlier if needed.    Please do not hesitate to call me with any questions or concerns.    Thanks.

## 2022-07-21 ENCOUNTER — ANCILLARY PROCEDURE (OUTPATIENT)
Dept: GENERAL RADIOLOGY | Facility: CLINIC | Age: 77
End: 2022-07-21
Attending: PHYSICIAN ASSISTANT
Payer: COMMERCIAL

## 2022-07-21 ENCOUNTER — OFFICE VISIT (OUTPATIENT)
Dept: FAMILY MEDICINE | Facility: CLINIC | Age: 77
End: 2022-07-21

## 2022-07-21 VITALS
SYSTOLIC BLOOD PRESSURE: 117 MMHG | DIASTOLIC BLOOD PRESSURE: 75 MMHG | HEART RATE: 66 BPM | WEIGHT: 187 LBS | BODY MASS INDEX: 33.66 KG/M2 | OXYGEN SATURATION: 97 % | TEMPERATURE: 97.6 F | RESPIRATION RATE: 16 BRPM

## 2022-07-21 DIAGNOSIS — M79.672 LEFT FOOT PAIN: Primary | ICD-10-CM

## 2022-07-21 DIAGNOSIS — S90.32XA CONTUSION OF LEFT FOOT, INITIAL ENCOUNTER: ICD-10-CM

## 2022-07-21 DIAGNOSIS — M25.551 HIP PAIN, RIGHT: ICD-10-CM

## 2022-07-21 PROCEDURE — 73502 X-RAY EXAM HIP UNI 2-3 VIEWS: CPT | Mod: TC | Performed by: RADIOLOGY

## 2022-07-21 PROCEDURE — 99214 OFFICE O/P EST MOD 30 MIN: CPT | Performed by: PHYSICIAN ASSISTANT

## 2022-07-21 NOTE — PATIENT INSTRUCTIONS
Ice topically to the area 20 minutes on each hour while awake.  Take precautions to avoid damage to the skin.  After 2 days, transition to ice topically 20 minutes 3 times per day.  After 2 days may add heat and alternate ice and heat.  Injuries to the extremities may be elevated above level of the heart continuously for the first 2 days as much as possible.  Use of post op shoe as needed for comfort.  Return to see primary care provider or return to clinic for reexamination and daniela-ray in 2 weeks if anything less than 100% resolution or return to pain-free weightbearing and full activities.

## 2022-07-21 NOTE — PROGRESS NOTES
Patient presents with:  Hip Pain: Right hip pain   Toe Injury: Left two last toes       Clinical Decision Making:  I had a conversation with the patient stating that the x-rays did not show fracture either in the left foot or in the right hip.  Patient will be treated for a foot contusion on the left foot with use of postop shoe for protection and comfort.  Watchful waiting and monitoring until resolution for the right hip pain.  Patient had minor note of degenerative joint disease in the right hip. Expected course of resolution and indication for return was gone over and questions were answered to patient/parent's satisfaction before discharge.          ICD-10-CM    1. Left foot pain  M79.672 XR Foot Left G/E 3 Views     Ankle/Foot Bracing Supplies Order for DME - ONLY FOR DME   2. Hip pain, right  M25.551 XR Hip Right 2-3 Views   3. Contusion of left foot, initial encounter  S90.32XA Ankle/Foot Bracing Supplies Order for DME - ONLY FOR DME       Patient Instructions   Ice topically to the area 20 minutes on each hour while awake.  Take precautions to avoid damage to the skin.  After 2 days, transition to ice topically 20 minutes 3 times per day.  After 2 days may add heat and alternate ice and heat.  Injuries to the extremities may be elevated above level of the heart continuously for the first 2 days as much as possible.  Use of post op shoe as needed for comfort.  Return to see primary care provider or return to clinic for reexamination and daniela-ray in 2 weeks if anything less than 100% resolution or return to pain-free weightbearing and full activities.          HPI:  Gemma Cowart is a 76 year old female who presents today for left foot pain after sustaining a axial loading injury to the left fourth and fifth digit and lateral aspect of the foot.  She had pain with full weightbearing and toe off during gait.  However, patient did not have break in the skin or bleeding.  After 1 week of symptomatic care at  home she decided to come in for evaluation and treatment    Second concern is that she had felt that there was pain in the right lower lumbar and sciatic area and radiated into the right hip.  Patient shares that the symptoms in the right hip have improved she has been full weightbearing on the right lower extremity.  She would like to have her hip evaluated.  Her symptoms on her right hip have been improved as mentioned but she would still like to have them evaluated in the office today.  She has not tried treatment for this at home.    History obtained from chart review and the patient.    Problem List:  2022-04: Hyperparathyroidism (H)  2022-04: Elevated parathyroid hormone  2021-10: Iron deficiency anemia, unspecified iron deficiency anemia   type  2021-06: Chronic anticoagulation  2021-04: COPD (chronic obstructive pulmonary disease) (H)  2018-05: Postmenopausal bleeding  2018-03: Acute pain of right knee  2016-11: Personal history of DVT (deep vein thrombosis)  2016-10: CKD (chronic kidney disease) stage 3, GFR 30-59 ml/min (H)  2016-10: Malignant neoplasm of left female breast (H)  2016-10: Morbid obesity (H)  2016-10: Cervical cancer screening  2016-08: Advanced directives, counseling/discussion  2016-05: Long term current use of anticoagulant therapy  2015-10: Type 2 diabetes mellitus with other specified complication   (H)  2015-10: Psoriasis  2015-09: Acute thromboembolism of deep veins of lower extremity (H)  2015-05: Itchy skin  2014-03: BMI >39  2013-07: Health Care Home  2013-06: Fracture of patella  2013-06: Insomnia  2013-05: Leg edema, left r/o DVT w hx same in 3-13 and off coumadin   on 4-29-13 s/po bilat breast reconstruction on 5-22-13  neg US  5-30- 13 2013-04: Anxiety  2013-02: Eczema  2013-02: Dyshydrosis  2013-02: Microscopic hematuria  2013-02: Hyperlipidemia LDL goal <100  2013-02: Yeast infection of the skin  2013-02: BERTT (iron deficiency anemia)  2013-02: Complications of gastric  bypass surgery  2013: Vitamin D deficiency disease  2013: Vitamin B12 deficiency  2012-10: Depressive disorder, not elsewhere classified  2012-10: Pruritic disorder  2012-10: Nontoxic uninodular goiter  2012-10: Rosacea  2012-10: SVT (supraventricular tachycardia) (H)  2012-10: GI bleed  2012-10: DVT (deep venous thrombosis)  2011: Breast cancer - left with bilaterl mastectomy 2012      Past Medical History:   Diagnosis Date     Anemia     iron deficiency anemia     Anxiety      Breast cancer (H) 2012    Bilateral Masectomies 2012     Complications of gastric bypass surgery      COPD (chronic obstructive pulmonary disease) (H) 2021     Diabetes mellitus (H)     diet controlled     DVT (deep venous thrombosis) (H)      Eczema      Fracture of left knee region     patella - vertical     History of kidney stones      Obesity      Palpitation     with no treatment     Polyneuropathy      Pruritus     generalized     Rosacea      SVT (supraventricular tachycardia) (H)     s/p ablation 2015 at [facility] for left lateral AP       Social History     Tobacco Use     Smoking status: Former Smoker     Packs/day: 1.50     Years: 25.00     Pack years: 37.50     Types: Cigarettes     Quit date: 10/21/1986     Years since quittin.7     Smokeless tobacco: Never Used     Tobacco comment: quit    Substance Use Topics     Alcohol use: No     Comment: 1 time a year       Review of Systems  As above in HPI otherwise negative.    Vitals:    22 1429   BP: 117/75   Pulse: 66   Resp: 16   Temp: 97.6  F (36.4  C)   TempSrc: Oral   SpO2: 97%   Weight: 84.8 kg (187 lb)       General: Patient is resting comfortably no acute distress is afebrile  HEENT: Head is normocephalic atraumatic   Skin: Without rash non-diaphoretic  Musculoskeletal: Inspection of the left foot shows that she had tenderness to palpation over the distal portion of the fifth metatarsal and pain over the proximal portion of the  fifth proximal phalanx that did reproduce her symptoms.  Slight tenderness to palpation over the fourth digit.  No noted skin breakdown edema or ecchymoses.   No tenderness palpation over Lisfranc's ligament and no plantar surface of the foot ecchymoses.  Stabilization of the calcaneus while ABducting the forefoot did not produce pain either.    Attention was turned to the right hip: Internal rotation and flexion of the hip was well-tolerated and had mild tenderness.  Logrolling was negative.   Negative C sign  Dynamic hip testing was well-tolerated.  No pain over the cervical thoracic lumbar sacral spinous processes.  Palpation of left and right SI joint was also well-tolerated without producing sciatic type symptoms    Physical Exam      Labs:  Results for orders placed or performed in visit on 07/21/22   XR Foot Left G/E 3 Views     Status: None    Narrative    EXAM: XR FOOT LEFT G/E 3 VIEWS  LOCATION: [facility]  DATE/TIME: 07/21/2022, 3:19 PM    INDICATION: Distal fifth metatarsal pain and fourth and fifth digit pain of the left foot after axial loading injury.  COMPARISON: None.      Impression    IMPRESSION: No fracture. Normal alignment. Moderate degenerative changes at the TMT joints. Small accessory navicular. Moderate-sized plantar calcaneal spur.     XR Hip Right 2-3 Views     Status: None    Narrative    EXAM: XR HIP RIGHT 2-3 VIEWS  LOCATION: [facility]  DATE/TIME: 7/21/2022 3:19 PM    INDICATION: right hip pain  COMPARISON: None.      Impression    IMPRESSION: Moderate degenerative changes in the right hip. No fracture.      At the end of the encounter, I discussed results, diagnosis, medications. Discussed red flags for immediate return to clinic/ER, as well as indications for follow up if no improvement. Patient understood and agreed to plan. Patient was stable for discharge.

## 2022-07-25 ENCOUNTER — HOSPITAL ENCOUNTER (OUTPATIENT)
Dept: MRI IMAGING | Facility: CLINIC | Age: 77
Discharge: HOME | End: 2022-07-25
Attending: PSYCHIATRY & NEUROLOGY | Admitting: PSYCHIATRY & NEUROLOGY
Payer: COMMERCIAL

## 2022-07-25 DIAGNOSIS — R51.9 HEADACHE DISORDER: ICD-10-CM

## 2022-07-25 PROCEDURE — A9585 GADOBUTROL INJECTION: HCPCS | Performed by: PSYCHIATRY & NEUROLOGY

## 2022-07-25 PROCEDURE — 70553 MRI BRAIN STEM W/O & W/DYE: CPT

## 2022-07-25 PROCEDURE — 255N000002 HC RX 255 OP 636: Performed by: PSYCHIATRY & NEUROLOGY

## 2022-07-25 RX ORDER — GADOBUTROL 604.72 MG/ML
8 INJECTION INTRAVENOUS ONCE
Status: COMPLETED | OUTPATIENT
Start: 2022-07-25 | End: 2022-07-25

## 2022-07-25 RX ADMIN — GADOBUTROL 8 ML: 604.72 INJECTION INTRAVENOUS at 07:44

## 2022-07-26 ENCOUNTER — TELEPHONE (OUTPATIENT)
Dept: NEUROLOGY | Facility: CLINIC | Age: 77
End: 2022-07-26

## 2022-07-26 ENCOUNTER — OFFICE VISIT (OUTPATIENT)
Dept: NEUROLOGY | Facility: CLINIC | Age: 77
End: 2022-07-26
Payer: COMMERCIAL

## 2022-07-26 VITALS — SYSTOLIC BLOOD PRESSURE: 128 MMHG | HEART RATE: 66 BPM | OXYGEN SATURATION: 99 % | DIASTOLIC BLOOD PRESSURE: 72 MMHG

## 2022-07-26 DIAGNOSIS — R51.9 HEADACHE DISORDER: Primary | ICD-10-CM

## 2022-07-26 DIAGNOSIS — M89.9 SKULL LESION: ICD-10-CM

## 2022-07-26 DIAGNOSIS — R90.89 ABNORMAL BRAIN MRI: ICD-10-CM

## 2022-07-26 PROCEDURE — 99214 OFFICE O/P EST MOD 30 MIN: CPT | Performed by: PSYCHIATRY & NEUROLOGY

## 2022-07-26 NOTE — PROGRESS NOTES
"Gemma Cowart is a 76 year old female who presents for:  Chief Complaint   Patient presents with     Follow Up     Follow Up to review Imaging        Initial Vitals:  /72 (BP Location: Right arm, Patient Position: Sitting, Cuff Size: Adult Small)   Pulse 66   LMP 09/24/1990   SpO2 99%  Estimated body mass index is 33.66 kg/m  as calculated from the following:    Height as of 7/12/22: 1.588 m (5' 2.5\").    Weight as of 7/21/22: 84.8 kg (187 lb).. There is no height or weight on file to calculate BSA. BP completed using cuff size: small janet Mayers  "

## 2022-07-26 NOTE — PROGRESS NOTES
ESTABLISHED PATIENT NEUROLOGY NOTE    DATE OF VISIT: 7/26/2022  CLINIC LOCATION: [facility]  MRN: [id number]  PATIENT NAME: Gemma Cowart  YOB: 1945    REASON FOR VISIT:   Chief Complaint   Patient presents with     Follow Up     Follow Up to review Imaging     SUBJECTIVE:                                                      HISTORY OF PRESENT ILLNESS: Patient is here to follow up regarding headache disorder. Please refer to my initial note from 7/12/2022 for further information.    Since the last visit, the patient reports that her symptoms are stable.  She denies interval development of new focal neurological symptoms.    Brain MRI with and without contrast from 7/25/2022 demonstrated patchy marrow signal abnormalities most consistent with osseous metastatic disease with questionable intracranial extension.  No brain metastases were identified.  In addition, nonspecific white matter T2 hyperintensities felt to represent chronic small vessel ischemic disease along with mild parenchymal volume loss were seen.  Images were personally reviewed and independently interpreted.  EXAM:                                                    Physical Exam:   Vitals: /72 (BP Location: Right arm, Patient Position: Sitting, Cuff Size: Adult Small)   Pulse 66   LMP 09/24/1990   SpO2 99%     General: pt is in NAD, cooperative.  Skin: normal turgor, moist mucous membranes, no lesions/rashes noticed.  HEENT: ATNC, white sclera, normal conjunctiva.  Respiratory: Symmetric lung excursion, no accessory respiratory muscle use.  Abdomen: Non distended.  Neurological: awake, cooperative, follows commands, no exam changes compared to the initial visit.  ASSESSMENT AND PLAN:                                                    Assessment: 76 year old female patient presents for follow-up of intermittent bilateral parietal/occipital pain over the last 2 years after the completion of recommended brain  MRI, which demonstrated bone marrow lesions due to possible osseous metastatic disease.  We reviewed images together.  Potentially, this could explain her skull pain, but occipital neuralgia still remains in differential.  I discussed with the patient that she needs to connect with her oncologist for further evaluation of these lesions.  Once they are addressed, and her pain continues, we might consider occipital nerve blocks and other treatments.    Diagnoses:    ICD-10-CM    1. Headache disorder  R51.9    2. Abnormal brain MRI  R90.89    3. Skull lesion  M89.9      Plan: At today's visit we thoroughly discussed current symptoms, evaluation results (lesions in skull bones), and the plan.    She will connect with her oncologist to evaluate found bone abnormalities in her skull.  Once this issue is addressed, and she continues to experience skull pain, we could talk about available treatment options.    Next follow-up appointment is on as needed basis.    Total Time: 32 minutes spent on the date of the encounter doing chart review, history and exam, documentation and further activities per the note.    Alfredo May MD  [facility]  (Chart documentation was completed in part with Dragon voice-recognition software. Even though reviewed, some grammatical, spelling, and word errors may remain.)

## 2022-07-26 NOTE — TELEPHONE ENCOUNTER
----- Message from Alfredo May MD sent at 7/25/2022  9:35 AM CDT -----  Please help the patient to shift her follow-up visit with me to a sooner date.  Thanks,  Alfredo May MD.

## 2022-07-26 NOTE — PATIENT INSTRUCTIONS
AFTER VISIT SUMMARY (AVS):    At today's visit we thoroughly discussed current symptoms, evaluation results (lesions in skull bones), and the plan.    You need to connect with your oncologist to evaluate found bone abnormalities on your skull.  Once this issue is addressed, and you continue to experience skull pain, we could talk about available treatment options.    Next follow-up appointment is on as needed basis.    Please do not hesitate to call me with any questions or concerns.    Thanks.

## 2022-07-26 NOTE — TELEPHONE ENCOUNTER
Per Dr May will reach out to see if patient could come to 11am appointment that just opened up.    Outbound call to Gemma was able to jett her appointment up to today for Dr May to review imaging

## 2022-07-26 NOTE — LETTER
7/26/2022         RE: Gemma Cowart  [address] [place] [address] [place] [id number]        Dear Colleague,    Thank you for referring your patient, Gemma Cowart, to the [facility]. Please see a copy of my visit note below.    ESTABLISHED PATIENT NEUROLOGY NOTE    DATE OF VISIT: 7/26/2022  CLINIC LOCATION: [facility]  MRN: [id number]  PATIENT NAME: Gemma Cowart  YOB: 1945    REASON FOR VISIT:   Chief Complaint   Patient presents with     Follow Up     Follow Up to review Imaging     SUBJECTIVE:                                                      HISTORY OF PRESENT ILLNESS: Patient is here to follow up regarding headache disorder. Please refer to my initial note from 7/12/2022 for further information.    Since the last visit, the patient reports that her symptoms are stable.  She denies interval development of new focal neurological symptoms.    Brain MRI with and without contrast from 7/25/2022 demonstrated patchy marrow signal abnormalities most consistent with osseous metastatic disease with questionable intracranial extension.  No brain metastases were identified.  In addition, nonspecific white matter T2 hyperintensities felt to represent chronic small vessel ischemic disease along with mild parenchymal volume loss were seen.  Images were personally reviewed and independently interpreted.  EXAM:                                                    Physical Exam:   Vitals: /72 (BP Location: Right arm, Patient Position: Sitting, Cuff Size: Adult Small)   Pulse 66   LMP 09/24/1990   SpO2 99%     General: pt is in NAD, cooperative.  Skin: normal turgor, moist mucous membranes, no lesions/rashes noticed.  HEENT: ATNC, white sclera, normal conjunctiva.  Respiratory: Symmetric lung excursion, no accessory respiratory muscle use.  Abdomen: Non distended.  Neurological: awake, cooperative, follows commands, no exam changes compared to the  initial visit.  ASSESSMENT AND PLAN:                                                    Assessment: 76 year old female patient presents for follow-up of intermittent bilateral parietal/occipital pain over the last 2 years after the completion of recommended brain MRI, which demonstrated bone marrow lesions due to possible osseous metastatic disease.  We reviewed images together.  Potentially, this could explain her skull pain, but occipital neuralgia still remains in differential.  I discussed with the patient that she needs to connect with her oncologist for further evaluation of these lesions.  Once they are addressed, and her pain continues, we might consider occipital nerve blocks and other treatments.    Diagnoses:    ICD-10-CM    1. Headache disorder  R51.9    2. Abnormal brain MRI  R90.89    3. Skull lesion  M89.9      Plan: At today's visit we thoroughly discussed current symptoms, evaluation results (lesions in skull bones), and the plan.    She will connect with her oncologist to evaluate found bone abnormalities in her skull.  Once this issue is addressed, and she continues to experience skull pain, we could talk about available treatment options.    Next follow-up appointment is on as needed basis.    Total Time: 32 minutes spent on the date of the encounter doing chart review, history and exam, documentation and further activities per the note.    Alfredo May MD  [facility]  (Chart documentation was completed in part with Dragon voice-recognition software. Even though reviewed, some grammatical, spelling, and word errors may remain.)      Gemma Cowart is a 76 year old female who presents for:  Chief Complaint   Patient presents with     Follow Up     Follow Up to review Imaging        Initial Vitals:  /72 (BP Location: Right arm, Patient Position: Sitting, Cuff Size: Adult Small)   Pulse 66   LMP 09/24/1990   SpO2 99%  Estimated body mass index is 33.66 kg/m  as  "calculated from the following:    Height as of 7/12/22: 1.588 m (5' 2.5\").    Weight as of 7/21/22: 84.8 kg (187 lb).. There is no height or weight on file to calculate BSA. BP completed using cuff size: small regular        Alva Mayers      Again, thank you for allowing me to participate in the care of your patient.        Sincerely,        Alfredo May MD    "

## 2022-07-27 ENCOUNTER — TELEPHONE (OUTPATIENT)
Dept: ONCOLOGY | Facility: CLINIC | Age: 77
End: 2022-07-27

## 2022-07-27 NOTE — TELEPHONE ENCOUNTER
Patient called requesting an appointment with an oncologist as soon as possible. Patient states she had a Brain MRI on 7/25/22 and was told she needs to follow up with her oncologist right away. Please advise.

## 2022-07-29 ENCOUNTER — ONCOLOGY VISIT (OUTPATIENT)
Dept: ONCOLOGY | Facility: CLINIC | Age: 77
End: 2022-07-29
Attending: INTERNAL MEDICINE
Payer: COMMERCIAL

## 2022-07-29 ENCOUNTER — PATIENT OUTREACH (OUTPATIENT)
Dept: ONCOLOGY | Facility: CLINIC | Age: 77
End: 2022-07-29

## 2022-07-29 VITALS
SYSTOLIC BLOOD PRESSURE: 139 MMHG | RESPIRATION RATE: 16 BRPM | DIASTOLIC BLOOD PRESSURE: 70 MMHG | BODY MASS INDEX: 34.2 KG/M2 | WEIGHT: 190 LBS | OXYGEN SATURATION: 98 % | HEART RATE: 60 BPM

## 2022-07-29 DIAGNOSIS — C50.112 MALIGNANT NEOPLASM OF CENTRAL PORTION OF LEFT FEMALE BREAST, UNSPECIFIED ESTROGEN RECEPTOR STATUS (H): ICD-10-CM

## 2022-07-29 DIAGNOSIS — C50.912 MALIGNANT NEOPLASM OF LEFT BREAST (H): Primary | ICD-10-CM

## 2022-07-29 DIAGNOSIS — C79.51 BONE METASTASES: Primary | ICD-10-CM

## 2022-07-29 LAB
ALBUMIN SERPL-MCNC: 3.6 G/DL (ref 3.4–5)
ALP SERPL-CCNC: 72 U/L (ref 40–150)
ALT SERPL W P-5'-P-CCNC: 21 U/L (ref 0–50)
ANION GAP SERPL CALCULATED.3IONS-SCNC: 3 MMOL/L (ref 3–14)
AST SERPL W P-5'-P-CCNC: 22 U/L (ref 0–45)
BASOPHILS # BLD AUTO: 0 10E3/UL (ref 0–0.2)
BASOPHILS NFR BLD AUTO: 1 %
BILIRUB SERPL-MCNC: 0.5 MG/DL (ref 0.2–1.3)
BUN SERPL-MCNC: 22 MG/DL (ref 7–30)
CALCIUM SERPL-MCNC: 8.7 MG/DL (ref 8.5–10.1)
CANCER AG15-3 SERPL-ACNC: 17 U/ML
CEA SERPL-MCNC: 2.2 NG/ML
CHLORIDE BLD-SCNC: 111 MMOL/L (ref 94–109)
CO2 SERPL-SCNC: 26 MMOL/L (ref 20–32)
CREAT SERPL-MCNC: 1.42 MG/DL (ref 0.52–1.04)
EOSINOPHIL # BLD AUTO: 0.1 10E3/UL (ref 0–0.7)
EOSINOPHIL NFR BLD AUTO: 2 %
ERYTHROCYTE [DISTWIDTH] IN BLOOD BY AUTOMATED COUNT: 13 % (ref 10–15)
GFR SERPL CREATININE-BSD FRML MDRD: 38 ML/MIN/1.73M2
GLUCOSE BLD-MCNC: 107 MG/DL (ref 70–99)
HCT VFR BLD AUTO: 42.5 % (ref 35–47)
HGB BLD-MCNC: 13.2 G/DL (ref 11.7–15.7)
IMM GRANULOCYTES # BLD: 0 10E3/UL
IMM GRANULOCYTES NFR BLD: 0 %
LYMPHOCYTES # BLD AUTO: 0.9 10E3/UL (ref 0.8–5.3)
LYMPHOCYTES NFR BLD AUTO: 20 %
MCH RBC QN AUTO: 26.7 PG (ref 26.5–33)
MCHC RBC AUTO-ENTMCNC: 31.1 G/DL (ref 31.5–36.5)
MCV RBC AUTO: 86 FL (ref 78–100)
MONOCYTES # BLD AUTO: 0.3 10E3/UL (ref 0–1.3)
MONOCYTES NFR BLD AUTO: 7 %
NEUTROPHILS # BLD AUTO: 3.2 10E3/UL (ref 1.6–8.3)
NEUTROPHILS NFR BLD AUTO: 70 %
NRBC # BLD AUTO: 0 10E3/UL
NRBC BLD AUTO-RTO: 0 /100
PLATELET # BLD AUTO: 190 10E3/UL (ref 150–450)
POTASSIUM BLD-SCNC: 4.7 MMOL/L (ref 3.4–5.3)
PROT SERPL-MCNC: 6.6 G/DL (ref 6.8–8.8)
RBC # BLD AUTO: 4.94 10E6/UL (ref 3.8–5.2)
SODIUM SERPL-SCNC: 140 MMOL/L (ref 133–144)
WBC # BLD AUTO: 4.6 10E3/UL (ref 4–11)

## 2022-07-29 PROCEDURE — 99215 OFFICE O/P EST HI 40 MIN: CPT | Performed by: INTERNAL MEDICINE

## 2022-07-29 PROCEDURE — G0463 HOSPITAL OUTPT CLINIC VISIT: HCPCS

## 2022-07-29 PROCEDURE — 36415 COLL VENOUS BLD VENIPUNCTURE: CPT | Performed by: INTERNAL MEDICINE

## 2022-07-29 PROCEDURE — 80053 COMPREHEN METABOLIC PANEL: CPT | Performed by: INTERNAL MEDICINE

## 2022-07-29 PROCEDURE — 86300 IMMUNOASSAY TUMOR CA 15-3: CPT | Performed by: INTERNAL MEDICINE

## 2022-07-29 PROCEDURE — 82378 CARCINOEMBRYONIC ANTIGEN: CPT | Performed by: INTERNAL MEDICINE

## 2022-07-29 PROCEDURE — 85025 COMPLETE CBC W/AUTO DIFF WBC: CPT | Performed by: INTERNAL MEDICINE

## 2022-07-29 PROCEDURE — 82040 ASSAY OF SERUM ALBUMIN: CPT | Performed by: INTERNAL MEDICINE

## 2022-07-29 ASSESSMENT — PAIN SCALES - GENERAL: PAINLEVEL: NO PAIN (0)

## 2022-07-29 NOTE — LETTER
7/29/2022         RE: Gemma Cowart  [address] [place] [address] [place] [id number]        Dear Colleague,    Thank you for referring your patient, Gemma Cowart, to the [facility]. Please see a copy of my visit note below.    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  She was a patient of Dr. Narvaez.    Her breast cancer was diagnosed in 08/2012 via screening MA. 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 LN negative disease. Estrogen and progesterone receptors were positive at greater than 90%. HER-2/gibran was negative. She had [id number] disease.   She was started on Femara on 10/10/2012.    It is put on hold in 5/2016 due to memory loss and dysarthria. She then was evaluated by neurologist, got assurance. Her cognitive function did not change by holding femara, it is resumed in July 2016. She finished 5 yrs of it in fall 2017, not willing to continue due to hair loss, bone health and hot flushes.      She had reconstructive surgery done 03/2013 by Dr. Wall.          INTERIM HISTORY:  Gemma returns for follow up. She had an MRI of the brain completed due to issues with headaches which showed 1. Patchy marrow signal abnormalities most consistent with osseous  metastatic disease.  2. No brain metastases are identified.  3. White matter T2 hyperintensities likely represent chronic small  vessel ischemic change.      Gemma also states that she has sharp  "shooting pain at times on the head that goes away.  She states has been going on for a year.  She also states that she has some right-sided hip pain which happened after she tried to reach for her cell phone  about a month ago but that is improving.  She denies any weight loss or night sweats.      REVIEW OF SYSTEMS:   14 point ROS was reviewed and is negative other than as noted above in HPI.       HOME MEDICATIONS:  Current Outpatient Medications   Medication Sig Dispense Refill     ACE/ARB NOT PRESCRIBED, INTENTIONAL, ACE & ARB not prescribed due to Other: normal tensive, low microalbumuria       acetaminophen (TYLENOL) 500 MG tablet Take 2 tablets (1,000 mg) by mouth every 8 hours as needed for mild pain 100 tablet 3     albuterol (PROAIR HFA/PROVENTIL HFA/VENTOLIN HFA) 108 (90 Base) MCG/ACT inhaler Inhale 2 puffs into the lungs every 6 hours 18 g 0     apixaban ANTICOAGULANT (ELIQUIS ANTICOAGULANT) 5 MG tablet Take 1 tablet (5 mg) by mouth 2 times daily 180 tablet 3     blood glucose (NO BRAND SPECIFIED) lancets standard Use to test blood sugar 2 times daily or as directed. 1 each 4     blood glucose (ONETOUCH VERIO IQ) test strip Use to test blood sugar 2 times daily or as directed. 100 strip 3     blood glucose calibration (NO BRAND SPECIFIED) solution Use to calibrate blood glucose monitor as needed as directed. 1 each 0     blood glucose monitoring (NO BRAND SPECIFIED) meter device kit Use to test blood sugar 2 times daily or as directed. 1 kit 1     Cholecalciferol (VITAMIN D) 50 MCG (2000 UT) CAPS Take 1 capsule by mouth daily 90 capsule 2     Cholecalciferol (VITAMIN D3) 75 MCG (3000 UT) TABS Take 1 tablet by mouth daily 3000 UT one day and 5000 UT the next, alternating daily.       clobetasol (TEMOVATE) 0.05 % external cream Apply twice daily to rash (thigh, knees, hands, feet) twice daily for 7 days AS NEEDED \"use sparingly and not to be used on the face\" Follow up recommended. 60 g 3     " Lancets (ONETOUCH DELICA PLUS [id number]) MISC 1 lancet daily 100 each 3     sertraline (ZOLOFT) 100 MG tablet Take 1 tablet (100 mg) by mouth daily 90 tablet 1     tiotropium (SPIRIVA RESPIMAT) 2.5 MCG/ACT inhaler Inhale 2 puffs into the lungs daily 4 g 11     vitamin B-12 (CYANOCOBALAMIN) 2500 MCG sublingual tablet Take 1 tablet (2,500 mcg) by mouth daily 90 tablet 0         ALLERGIES:  Allergies   Allergen Reactions     Bacitracin Unknown     Dust Mites      Nickel      Phenylene          PAST MEDICAL HISTORY:  Past Medical History:   Diagnosis Date     Anemia     iron deficiency anemia     Anxiety      Breast cancer (H) 2012    Bilateral Masectomies 9/2012     Complications of gastric bypass surgery      COPD (chronic obstructive pulmonary disease) (H) 4/21/2021     Diabetes mellitus (H)     diet controlled     DVT (deep venous thrombosis) (H)      Eczema      Fracture of left knee region     patella - vertical     History of kidney stones      Obesity      Palpitation     with no treatment     Polyneuropathy      Pruritus     generalized     Rosacea      SVT (supraventricular tachycardia) (H)     s/p ablation 5/28/2015 at [facility] for left lateral AP         PAST SURGICAL HISTORY:  Past Surgical History:   Procedure Laterality Date     ABDOMEN SURGERY  2000    gastric bypass 2000     COLONOSCOPY  10/11/2012    Procedure: COLONOSCOPY;  colonoscopy;  Surgeon: Gela Cleary MD;  Location:  GI     COLONOSCOPY N/A 10/6/2017    Procedure: COLONOSCOPY;;  Surgeon: Shashank Ortiz MD;  Location:  GI     COLONOSCOPY N/A 3/7/2022    Procedure: COLONOSCOPY;  Surgeon: Andrea Daniel MD;  Location:  GI     DENTAL SURGERY  5/2007    all teeth removed - dentures     ENT SURGERY      all teeth pulled     ESOPHAGOSCOPY, GASTROSCOPY, DUODENOSCOPY (EGD), COMBINED N/A 10/6/2017    Procedure: COMBINED ESOPHAGOSCOPY, GASTROSCOPY, DUODENOSCOPY (EGD);  ESOPHAGOSCOPY, GASTROSCOPY, DUODENOSCOPY (EGD) &  Colonoscopy *Needs INR on arrival;  Surgeon: Shashank Ortiz MD;  Location:  GI     GYN SURGERY  1/10/75    tubal ligation      H ABLATION SVT  2015    stopped metoprolol     HERNIA REPAIR       IMPLANT FILTER INFERIOR VENA CAVA  10/8/2012    taken out  12     INSERT TISSUE EXPANDER BREAST BILATERAL  2012    Procedure: INSERT TISSUE EXPANDER BREAST BILATERAL;;  Surgeon: Orlando Wall MD;  Location:  OR     MASTECTOMY SIMPLE, SENTINEL NODE, COMBINED  2012    Procedure: COMBINED MASTECTOMY SIMPLE, SENTINEL NODE;  BILATERAL MASTECTOMY WITH LEFT AXILLARY SENTINEL LYMPH NODE BIOPSY, BILATERAL TISSUE EXPANDER        MASTECTOMY, BILATERAL       PANNICULECTOMY N/A 2018    Procedure: PANNICULECTOMY;  PANNICULECTOMY ;  Surgeon: Orlando Wall MD;  Location:  OR     RECONSTRUCT BREAST BILATERAL, IMPLANT PROSTHESIS BILATERAL, COMBINED  2013    Procedure: COMBINED RECONSTRUCT BREAST BILATERAL, IMPLANT PROSTHESIS BILATERAL;  BILATERAL SECOND STAGE BREAST RECONSTRUCTION WITH SILICONE GEL IMPLANTS AND LIPOSUCTION;  Surgeon: Orlando Wall MD;  Location: [facility]     RECONSTRUCT NIPPLE BILATERAL  2013    Procedure: RECONSTRUCT NIPPLE BILATERAL;  BILATERAL NIPPLE AREOLAR RECONSTRUCTION;  Surgeon: Orlando Wall MD;  Location: [facility]     ZZHC BREAST RECONSTRUC W OTHR TECHNIQ  2013         SOCIAL HISTORY:  Social History     Socioeconomic History     Marital status:      Spouse name: Not on file     Number of children: Not on file     Years of education: Not on file     Highest education level: Not on file   Occupational History     Employer: RETIRED   Tobacco Use     Smoking status: Former Smoker     Packs/day: 1.50     Years: 25.00     Pack years: 37.50     Types: Cigarettes     Quit date: 10/21/1986     Years since quittin.7     Smokeless tobacco: Never Used     Tobacco comment: quit    Vaping Use     Vaping Use: Never used   Substance and Sexual  Activity     Alcohol use: No     Comment: 1 time a year     Drug use: No     Sexual activity: Not Currently     Partners: Male   Other Topics Concern     Parent/sibling w/ CABG, MI or angioplasty before 65F 55M? No      Service Not Asked     Blood Transfusions Not Asked     Caffeine Concern Not Asked     Occupational Exposure Not Asked     Hobby Hazards Not Asked     Sleep Concern Not Asked     Stress Concern Not Asked     Weight Concern Not Asked     Special Diet Not Asked     Back Care Not Asked     Exercise Not Asked     Bike Helmet Yes     Seat Belt Yes     Self-Exams Not Asked   Social History Narrative     Not on file     Social Determinants of Health     Financial Resource Strain: Unknown     Difficulty of Paying Living Expenses: Patient refused   Food Insecurity: Unknown     Worried About Running Out of Food in the Last Year: Patient refused     Ran Out of Food in the Last Year: Patient refused   Transportation Needs: No Transportation Needs     Lack of Transportation (Medical): No     Lack of Transportation (Non-Medical): No   Physical Activity: Inactive     Days of Exercise per Week: 0 days     Minutes of Exercise per Session: 0 min   Stress: Stress Concern Present     Feeling of Stress : To some extent   Social Connections: Moderately Integrated     Frequency of Communication with Friends and Family: Twice a week     Frequency of Social Gatherings with Friends and Family: More than three times a week     Attends Yazdanism Services: 1 to 4 times per year     Active Member of Clubs or Organizations: Yes     Attends Club or Organization Meetings: Not on file     Marital Status:    Intimate Partner Violence: Not on file   Housing Stability: Unknown     Unable to Pay for Housing in the Last Year: Patient refused     Number of Places Lived in the Last Year: 2     Unstable Housing in the Last Year: No         FAMILY HISTORY:  Family History   Problem Relation Age of Onset     Cancer - colorectal  Mother      Colon Cancer Mother      Prostate Cancer Father      Alzheimer Disease Father      Cancer Paternal Grandmother      Alzheimer Disease Paternal Grandfather      Cancer - colorectal Brother      Diabetes Brother      Crohn's Disease Daughter      Diabetes Brother      Eye Disorder Brother      Diabetes Son      Cancer Maternal Uncle      Cancer Maternal Uncle      Family history of 2 brothers having prostate cancer    PHYSICAL EXAM:  Vital signs:  /70   Pulse 60   Resp 16   Wt 86.2 kg (190 lb)   LMP 1990   SpO2 98%   BMI 34.20 kg/m     ECO  GENERAL/CONSTITUTIONAL: No acute distress.  EYES: No scleral icterus.  MUSCULOSKELETAL: Warm and well-perfused.  Left lower extremity chronically more swollen than right lower extremity.  NEUROLOGIC: Alert, oriented, answers questions appropriately.  INTEGUMENTARY: No jaundice.  Status post bilateral mastectomies with implants in place no evidence of recurrence    LABS:  CBC RESULTS: Recent Labs   Lab Test 22  1044   WBC 4.4   RBC 5.24*   HGB 12.2   HCT 41.6   MCV 79   MCH 23.3*   MCHC 29.3*   RDW 24.3*          Component      Latest Ref Rng & Units 2019 10/27/2021 2022 3/29/2022   Iron      35 - 180 ug/dL  21 (L) 20 (L) 65   Iron Binding Cap      240 - 430 ug/dL  398 431 (H) 294   Iron Saturation Index      15 - 46 %  5 (L) 5 (L) 22   Vitamin B12      193 - 986 pg/mL 3,314 (H) 580 699    Ferritin      8 - 252 ng/mL 13 10 7 (L) 282 (H)   Folate      >=5.4 ng/mL  18.5 14.3        IMAGING:  Left lower extremity ultrasound 21:  FINDINGS: Exam includes the common femoral, femoral, popliteal, and contralateral common femoral veins as well as segmentally visualized deep calf veins and greater saphenous vein.      LEFT: No DVT from groin through popliteal fossa. However, one of the posterior tibial veins within the calf demonstrates partially occlusive thrombus.  No superficial thrombophlebitis. No popliteal  cyst.    IMPRESSION:  Exam is positive for DVT. Small focus of DVT involving one of the posterior tibial veins within the calf.    Left lower extremity ultrasound 10/22/21:  FINDINGS:  Examination of the deep veins with graded compression and  color flow Doppler with spectral wave form analysis was performed.       There is no evidence for acute DVT. There is a small amount of  residual nonocclusive DVT in one of the 2 distal left posterior tibial  veins.                                                                      IMPRESSION: Small amount of residual nonocclusive chronic DVT in one  of the 2 distal left posterior tibial veins. No new DVT.        ASSESSMENT/PLAN:  Gemma Cowart is a 76 year old female with:    1) Left breast cancer s/p double mastectomies, ER/DC+, her 2 - s/p  5 yrs of Femara till 2017.     -she is on annual follow-up for this Now she has concerning lesions on the bone on MRI with symptoms of bone pain on the head (see HPI).  Check pet/ct to rule out metastatic disease. Lab work today. Will need biopsy once pet/ct completed if suspicious lesions     2) History of gastric bypass surgery with iron-deficiency anemia and low B12.  She received Injectafer on 3/1/22 and 3/8/22.  Ferritin and iron are improved.  Hemoglobin is normalized.  She doesn't take oral iron, as it gives her GI upset.    -she takes sublingual vitamin B12  -will check  This      3) Osteopenia:  -follow-up with PCP     4) CKD:  -follow-up with PCP     5) Hx of recurrent DVT:  She saw Dr. Nicki Lopez on 7/28/21.    -On 10/06/2012 she was found to have bilateral DVT.  -She had IVC filter placed on 10/10/2012.  It was removed on 01/04/2013.  -Patient took warfarin for 6 months.  -Lower extremity ultrasound on 06/14/2013 was negative for DVT.  -On 09/12/2015, left leg ultrasound revealed DVT in posterior tibial vein below the level of the left knee.  -The patient was started on warfarin and has been continued on that.  -in  "July 2021, she started to have swelling in the left leg with some pain.  She went to the ED on 7/24/21 and had multiple investigations done.  -Left lower extremity ultrasound revealed partially occlusive thrombus in one of the posterior tibial veins within the calf. She has chronic swelling in that leg  -The patient was started on Eliquis.  -Multiple hypercoagulable workup done.  Lupus anticoagulant, cardiolipin antibody and beta 2 glycoprotein are all negative.  There was no provoking factor.    -repeat ultrasound on 10/22/21 shows small amount of residual non-occlusive chronic DVT. No new DVT.  -she continues on Eliquis indefinitely, recent ultrasound negative    Alan Bowen MD. See me in one week with pet/ct results     Total time spent on day of visit, including review of tests, obtaining/reviewing separately obtained history, ordering medications/tests/procedures, communicating with PCP/consultants, and documenting in electronic medical record: 45 min    Oncology Rooming Note    July 29, 2022 8:28 AM   Gemma Cowart is a 76 year old female who presents for:    Chief Complaint   Patient presents with     Oncology Clinic Visit     Initial Vitals: /70   Pulse 60   Resp 16   Wt 86.2 kg (190 lb)   LMP 09/24/1990   SpO2 98%   BMI 34.20 kg/m   Estimated body mass index is 34.2 kg/m  as calculated from the following:    Height as of 7/12/22: 1.588 m (5' 2.5\").    Weight as of this encounter: 86.2 kg (190 lb). Body surface area is 1.95 meters squared.  No Pain (0) Comment: Data Unavailable   Patient's last menstrual period was 09/24/1990.  Allergies reviewed: Yes  Medications reviewed: Yes    Medications: Medication refills not needed today.  Pharmacy name entered into [facility]: [facility] - [place], [place] - [id number] [facility]    Clinical concerns: no      Lyndsey Cobb, [facility]                Again, thank you for allowing me to participate in the care of your patient.        Sincerely,        Alan" Tonio Bowen MD

## 2022-07-29 NOTE — PROGRESS NOTES
Gemma called clinic back, she is aware that only Pet will be done, no CT scan.     She stated that she can get her Breast Prosthesis/Mastectomy Bra order next week when she comes in. Camila Rhodes RN,BSN,OCN,CBCN

## 2022-07-29 NOTE — PROGRESS NOTES
Gemma is scheduled for Pet scan at [facility] . Her GFR is decreased so verified with Dr. Boewn and only Pet will be done no CT scan. Order will be updated.     Called Gemma left message on her voicemail that only Pet will be done. Also inquired if she needed orders for Breast Prothesis/Mastectomy Bra sent [facility]. Or if it can wait until her scheduled visit with her on Thursday 8/4/22. Camila Rhodes RN,BSN,OCN,CBCN

## 2022-07-29 NOTE — PROGRESS NOTES
Dr. Alan Bowen is referring patient for genetic counseling.  Patient has a personal medical history of breast cancer and two brothers having prostate cancer.   Writer received referral, reviewed for appropriate plan, and sent to New Patient Scheduling for completion.

## 2022-07-29 NOTE — PROGRESS NOTES
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  She was a patient of Dr. Narvaez.    Her breast cancer was diagnosed in 08/2012 via screening MA. 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 LN negative disease. Estrogen and progesterone receptors were positive at greater than 90%. HER-2/gibran was negative. She had [id number] disease.   She was started on Femara on 10/10/2012.    It is put on hold in 5/2016 due to memory loss and dysarthria. She then was evaluated by neurologist, got assurance. Her cognitive function did not change by holding femara, it is resumed in July 2016. She finished 5 yrs of it in fall 2017, not willing to continue due to hair loss, bone health and hot flushes.      She had reconstructive surgery done 03/2013 by Dr. Wall.          INTERIM HISTORY:  Gemma returns for follow up. She had an MRI of the brain completed due to issues with headaches which showed 1. Patchy marrow signal abnormalities most consistent with osseous  metastatic disease.  2. No brain metastases are identified.  3. White matter T2 hyperintensities likely represent chronic small  vessel ischemic change.      Gemma also states that she has sharp shooting pain at times on the head that goes away.  She states has been going on for a year.  She also states that she has some right-sided hip pain which happened after she tried to reach for her cell phone  about a month ago but that is improving.  She  "denies any weight loss or night sweats.      REVIEW OF SYSTEMS:   14 point ROS was reviewed and is negative other than as noted above in HPI.       HOME MEDICATIONS:  Current Outpatient Medications   Medication Sig Dispense Refill     ACE/ARB NOT PRESCRIBED, INTENTIONAL, ACE & ARB not prescribed due to Other: normal tensive, low microalbumuria       acetaminophen (TYLENOL) 500 MG tablet Take 2 tablets (1,000 mg) by mouth every 8 hours as needed for mild pain 100 tablet 3     albuterol (PROAIR HFA/PROVENTIL HFA/VENTOLIN HFA) 108 (90 Base) MCG/ACT inhaler Inhale 2 puffs into the lungs every 6 hours 18 g 0     apixaban ANTICOAGULANT (ELIQUIS ANTICOAGULANT) 5 MG tablet Take 1 tablet (5 mg) by mouth 2 times daily 180 tablet 3     blood glucose (NO BRAND SPECIFIED) lancets standard Use to test blood sugar 2 times daily or as directed. 1 each 4     blood glucose (ONETOUCH VERIO IQ) test strip Use to test blood sugar 2 times daily or as directed. 100 strip 3     blood glucose calibration (NO BRAND SPECIFIED) solution Use to calibrate blood glucose monitor as needed as directed. 1 each 0     blood glucose monitoring (NO BRAND SPECIFIED) meter device kit Use to test blood sugar 2 times daily or as directed. 1 kit 1     Cholecalciferol (VITAMIN D) 50 MCG (2000 UT) CAPS Take 1 capsule by mouth daily 90 capsule 2     Cholecalciferol (VITAMIN D3) 75 MCG (3000 UT) TABS Take 1 tablet by mouth daily 3000 UT one day and 5000 UT the next, alternating daily.       clobetasol (TEMOVATE) 0.05 % external cream Apply twice daily to rash (thigh, knees, hands, feet) twice daily for 7 days AS NEEDED \"use sparingly and not to be used on the face\" Follow up recommended. 60 g 3     Lancets (ONETOUCH DELICA PLUS [id number]) MISC 1 lancet daily 100 each 3     sertraline (ZOLOFT) 100 MG tablet Take 1 tablet (100 mg) by mouth daily 90 tablet 1     tiotropium (SPIRIVA RESPIMAT) 2.5 MCG/ACT inhaler Inhale 2 puffs into the lungs daily 4 g 11     vitamin " B-12 (CYANOCOBALAMIN) 2500 MCG sublingual tablet Take 1 tablet (2,500 mcg) by mouth daily 90 tablet 0         ALLERGIES:  Allergies   Allergen Reactions     Bacitracin Unknown     Dust Mites      Nickel      Phenylene          PAST MEDICAL HISTORY:  Past Medical History:   Diagnosis Date     Anemia     iron deficiency anemia     Anxiety      Breast cancer (H) 2012    Bilateral Masectomies 9/2012     Complications of gastric bypass surgery      COPD (chronic obstructive pulmonary disease) (H) 4/21/2021     Diabetes mellitus (H)     diet controlled     DVT (deep venous thrombosis) (H)      Eczema      Fracture of left knee region     patella - vertical     History of kidney stones      Obesity      Palpitation     with no treatment     Polyneuropathy      Pruritus     generalized     Rosacea      SVT (supraventricular tachycardia) (H)     s/p ablation 5/28/2015 at [facility] for left lateral AP         PAST SURGICAL HISTORY:  Past Surgical History:   Procedure Laterality Date     ABDOMEN SURGERY  2000    gastric bypass 2000     COLONOSCOPY  10/11/2012    Procedure: COLONOSCOPY;  colonoscopy;  Surgeon: Gela Cleary MD;  Location:  GI     COLONOSCOPY N/A 10/6/2017    Procedure: COLONOSCOPY;;  Surgeon: Shashank Ortiz MD;  Location: [facility]     COLONOSCOPY N/A 3/7/2022    Procedure: COLONOSCOPY;  Surgeon: Andrea Daniel MD;  Location:  GI     DENTAL SURGERY  5/2007    all teeth removed - dentures     ENT SURGERY      all teeth pulled     ESOPHAGOSCOPY, GASTROSCOPY, DUODENOSCOPY (EGD), COMBINED N/A 10/6/2017    Procedure: COMBINED ESOPHAGOSCOPY, GASTROSCOPY, DUODENOSCOPY (EGD);  ESOPHAGOSCOPY, GASTROSCOPY, DUODENOSCOPY (EGD) & Colonoscopy *Needs INR on arrival;  Surgeon: Shashank Ortiz MD;  Location:  GI     GYN SURGERY  1/10/75    tubal ligation      H ABLATION SVT  5/28/2015    stopped metoprolol     HERNIA REPAIR  2005     IMPLANT FILTER INFERIOR VENA CAVA  10/8/2012    taken out  1/4/12      INSERT TISSUE EXPANDER BREAST BILATERAL  2012    Procedure: INSERT TISSUE EXPANDER BREAST BILATERAL;;  Surgeon: Orlando Wall MD;  Location:  OR     MASTECTOMY SIMPLE, SENTINEL NODE, COMBINED  2012    Procedure: COMBINED MASTECTOMY SIMPLE, SENTINEL NODE;  BILATERAL MASTECTOMY WITH LEFT AXILLARY SENTINEL LYMPH NODE BIOPSY, BILATERAL TISSUE EXPANDER        MASTECTOMY, BILATERAL       PANNICULECTOMY N/A 2018    Procedure: PANNICULECTOMY;  PANNICULECTOMY ;  Surgeon: Orlando Wall MD;  Location:  OR     RECONSTRUCT BREAST BILATERAL, IMPLANT PROSTHESIS BILATERAL, COMBINED  2013    Procedure: COMBINED RECONSTRUCT BREAST BILATERAL, IMPLANT PROSTHESIS BILATERAL;  BILATERAL SECOND STAGE BREAST RECONSTRUCTION WITH SILICONE GEL IMPLANTS AND LIPOSUCTION;  Surgeon: Orlando Wall MD;  Location:  [facility]     RECONSTRUCT NIPPLE BILATERAL  2013    Procedure: RECONSTRUCT NIPPLE BILATERAL;  BILATERAL NIPPLE AREOLAR RECONSTRUCTION;  Surgeon: Orlando Wall MD;  Location: [facility]     ZZHC BREAST RECONSTRUC W OTHR TECHNIQ  2013         SOCIAL HISTORY:  Social History     Socioeconomic History     Marital status:      Spouse name: Not on file     Number of children: Not on file     Years of education: Not on file     Highest education level: Not on file   Occupational History     Employer: RETIRED   Tobacco Use     Smoking status: Former Smoker     Packs/day: 1.50     Years: 25.00     Pack years: 37.50     Types: Cigarettes     Quit date: 10/21/1986     Years since quittin.7     Smokeless tobacco: Never Used     Tobacco comment: quit    Vaping Use     Vaping Use: Never used   Substance and Sexual Activity     Alcohol use: No     Comment: 1 time a year     Drug use: No     Sexual activity: Not Currently     Partners: Male   Other Topics Concern     Parent/sibling w/ CABG, MI or angioplasty before 65F 55M? No      Service Not Asked     Blood Transfusions Not  Asked     Caffeine Concern Not Asked     Occupational Exposure Not Asked     Hobby Hazards Not Asked     Sleep Concern Not Asked     Stress Concern Not Asked     Weight Concern Not Asked     Special Diet Not Asked     Back Care Not Asked     Exercise Not Asked     Bike Helmet Yes     Seat Belt Yes     Self-Exams Not Asked   Social History Narrative     Not on file     Social Determinants of Health     Financial Resource Strain: Unknown     Difficulty of Paying Living Expenses: Patient refused   Food Insecurity: Unknown     Worried About Running Out of Food in the Last Year: Patient refused     Ran Out of Food in the Last Year: Patient refused   Transportation Needs: No Transportation Needs     Lack of Transportation (Medical): No     Lack of Transportation (Non-Medical): No   Physical Activity: Inactive     Days of Exercise per Week: 0 days     Minutes of Exercise per Session: 0 min   Stress: Stress Concern Present     Feeling of Stress : To some extent   Social Connections: Moderately Integrated     Frequency of Communication with Friends and Family: Twice a week     Frequency of Social Gatherings with Friends and Family: More than three times a week     Attends Synagogue Services: 1 to 4 times per year     Active Member of Clubs or Organizations: Yes     Attends Club or Organization Meetings: Not on file     Marital Status:    Intimate Partner Violence: Not on file   Housing Stability: Unknown     Unable to Pay for Housing in the Last Year: Patient refused     Number of Places Lived in the Last Year: 2     Unstable Housing in the Last Year: No         FAMILY HISTORY:  Family History   Problem Relation Age of Onset     Cancer - colorectal Mother      Colon Cancer Mother      Prostate Cancer Father      Alzheimer Disease Father      Cancer Paternal Grandmother      Alzheimer Disease Paternal Grandfather      Cancer - colorectal Brother      Diabetes Brother      Crohn's Disease Daughter      Diabetes Brother       Eye Disorder Brother      Diabetes Son      Cancer Maternal Uncle      Cancer Maternal Uncle      Family history of 2 brothers having prostate cancer    PHYSICAL EXAM:  Vital signs:  /70   Pulse 60   Resp 16   Wt 86.2 kg (190 lb)   LMP 1990   SpO2 98%   BMI 34.20 kg/m     ECO  GENERAL/CONSTITUTIONAL: No acute distress.  EYES: No scleral icterus.  MUSCULOSKELETAL: Warm and well-perfused.  Left lower extremity chronically more swollen than right lower extremity.  NEUROLOGIC: Alert, oriented, answers questions appropriately.  INTEGUMENTARY: No jaundice.  Status post bilateral mastectomies with implants in place no evidence of recurrence    LABS:  CBC RESULTS: Recent Labs   Lab Test 22  1044   WBC 4.4   RBC 5.24*   HGB 12.2   HCT 41.6   MCV 79   MCH 23.3*   MCHC 29.3*   RDW 24.3*          Component      Latest Ref Rng & Units 2019 10/27/2021 2022 3/29/2022   Iron      35 - 180 ug/dL  21 (L) 20 (L) 65   Iron Binding Cap      240 - 430 ug/dL  398 431 (H) 294   Iron Saturation Index      15 - 46 %  5 (L) 5 (L) 22   Vitamin B12      193 - 986 pg/mL 3,314 (H) 580 699    Ferritin      8 - 252 ng/mL 13 10 7 (L) 282 (H)   Folate      >=5.4 ng/mL  18.5 14.3        IMAGING:  Left lower extremity ultrasound 21:  FINDINGS: Exam includes the common femoral, femoral, popliteal, and contralateral common femoral veins as well as segmentally visualized deep calf veins and greater saphenous vein.      LEFT: No DVT from groin through popliteal fossa. However, one of the posterior tibial veins within the calf demonstrates partially occlusive thrombus.  No superficial thrombophlebitis. No popliteal cyst.    IMPRESSION:  Exam is positive for DVT. Small focus of DVT involving one of the posterior tibial veins within the calf.    Left lower extremity ultrasound 10/22/21:  FINDINGS:  Examination of the deep veins with graded compression and  color flow Doppler with spectral wave form analysis  was performed.       There is no evidence for acute DVT. There is a small amount of  residual nonocclusive DVT in one of the 2 distal left posterior tibial  veins.                                                                      IMPRESSION: Small amount of residual nonocclusive chronic DVT in one  of the 2 distal left posterior tibial veins. No new DVT.        ASSESSMENT/PLAN:  Gemma Cowart is a 76 year old female with:    1) Left breast cancer s/p double mastectomies, [place]+, her 2 - s/p  5 yrs of Femara till 2017.     -she is on annual follow-up for this Now she has concerning lesions on the bone on MRI with symptoms of bone pain on the head (see HPI).  Check pet/ct to rule out metastatic disease. Lab work today. Will need biopsy once pet/ct completed if suspicious lesions     2) History of gastric bypass surgery with iron-deficiency anemia and low B12.  She received Injectafer on 3/1/22 and 3/8/22.  Ferritin and iron are improved.  Hemoglobin is normalized.  She doesn't take oral iron, as it gives her GI upset.    -she takes sublingual vitamin B12  -will check  This      3) Osteopenia:  -follow-up with PCP     4) CKD:  -follow-up with PCP     5) Hx of recurrent DVT:  She saw Dr. Nicki Lopez on 7/28/21.    -On 10/06/2012 she was found to have bilateral DVT.  -She had IVC filter placed on 10/10/2012.  It was removed on 01/04/2013.  -Patient took warfarin for 6 months.  -Lower extremity ultrasound on 06/14/2013 was negative for DVT.  -On 09/12/2015, left leg ultrasound revealed DVT in posterior tibial vein below the level of the left knee.  -The patient was started on warfarin and has been continued on that.  -in July 2021, she started to have swelling in the left leg with some pain.  She went to the ED on 7/24/21 and had multiple investigations done.  -Left lower extremity ultrasound revealed partially occlusive thrombus in one of the posterior tibial veins within the calf. She has chronic swelling in  that leg  -The patient was started on Eliquis.  -Multiple hypercoagulable workup done.  Lupus anticoagulant, cardiolipin antibody and beta 2 glycoprotein are all negative.  There was no provoking factor.    -repeat ultrasound on 10/22/21 shows small amount of residual non-occlusive chronic DVT. No new DVT.  -she continues on Eliquis indefinitely, recent ultrasound negative    Alan Bowen MD. See me in one week with pet/ct results     Total time spent on day of visit, including review of tests, obtaining/reviewing separately obtained history, ordering medications/tests/procedures, communicating with PCP/consultants, and documenting in electronic medical record: 45 min

## 2022-07-29 NOTE — PROGRESS NOTES
"Oncology Rooming Note    July 29, 2022 8:28 AM   Gemma Cowart is a 76 year old female who presents for:    Chief Complaint   Patient presents with     Oncology Clinic Visit     Initial Vitals: /70   Pulse 60   Resp 16   Wt 86.2 kg (190 lb)   LMP 09/24/1990   SpO2 98%   BMI 34.20 kg/m   Estimated body mass index is 34.2 kg/m  as calculated from the following:    Height as of 7/12/22: 1.588 m (5' 2.5\").    Weight as of this encounter: 86.2 kg (190 lb). Body surface area is 1.95 meters squared.  No Pain (0) Comment: Data Unavailable   Patient's last menstrual period was 09/24/1990.  Allergies reviewed: Yes  Medications reviewed: Yes    Medications: Medication refills not needed today.  Pharmacy name entered into [employer]: [facility] - [place], [place] - [phone]     Clinical concerns: no      Lyndsey Cobb CMA            "

## 2022-08-01 ENCOUNTER — TRANSFERRED RECORDS (OUTPATIENT)
Dept: HEALTH INFORMATION MANAGEMENT | Facility: CLINIC | Age: 77
End: 2022-08-01

## 2022-08-04 ENCOUNTER — ONCOLOGY VISIT (OUTPATIENT)
Dept: ONCOLOGY | Facility: CLINIC | Age: 77
End: 2022-08-04
Attending: INTERNAL MEDICINE
Payer: COMMERCIAL

## 2022-08-04 VITALS
OXYGEN SATURATION: 96 % | DIASTOLIC BLOOD PRESSURE: 67 MMHG | TEMPERATURE: 97.8 F | HEART RATE: 67 BPM | RESPIRATION RATE: 16 BRPM | BODY MASS INDEX: 34.2 KG/M2 | SYSTOLIC BLOOD PRESSURE: 112 MMHG | WEIGHT: 190 LBS

## 2022-08-04 DIAGNOSIS — C50.012 MALIGNANT NEOPLASM OF NIPPLE OF LEFT BREAST IN FEMALE, UNSPECIFIED ESTROGEN RECEPTOR STATUS (H): Primary | ICD-10-CM

## 2022-08-04 PROCEDURE — G0463 HOSPITAL OUTPT CLINIC VISIT: HCPCS

## 2022-08-04 PROCEDURE — 99213 OFFICE O/P EST LOW 20 MIN: CPT | Performed by: INTERNAL MEDICINE

## 2022-08-04 ASSESSMENT — PAIN SCALES - GENERAL: PAINLEVEL: NO PAIN (0)

## 2022-08-04 NOTE — PROGRESS NOTES
"Oncology Rooming Note    August 4, 2022 1:12 PM   Gemma Cowart is a 76 year old female who presents for:    Chief Complaint   Patient presents with     Oncology Clinic Visit     Elevated parathyroid hormone         Initial Vitals: /67 (BP Location: Right arm, Patient Position: Sitting, Cuff Size: Adult Regular)   Pulse 67   Temp 97.8  F (36.6  C) (Oral)   Resp 16   Wt 86.2 kg (190 lb)   LMP 09/24/1990   SpO2 96%   BMI 34.20 kg/m   Estimated body mass index is 34.2 kg/m  as calculated from the following:    Height as of 7/12/22: 1.588 m (5' 2.5\").    Weight as of this encounter: 86.2 kg (190 lb). Body surface area is 1.95 meters squared.  No Pain (0) Comment: Data Unavailable   Patient's last menstrual period was 09/24/1990.  Allergies reviewed: Yes  Medications reviewed: Yes    Medications: Medication refills not needed today.  Pharmacy name entered into [email or web address]: [facility] - [place], [place] - [phone] [facility]    Clinical concerns: no       Harini Villarreal CMA              "

## 2022-08-04 NOTE — LETTER
"    8/4/2022         RE: Gemma Cowart  [address] [place] [address] [place] [id number]        Dear Colleague,    Thank you for referring your patient, Gemma Cowart, to the [facility]. Please see a copy of my visit note below.    Oncology Rooming Note    August 4, 2022 1:12 PM   Gemma Cowart is a 76 year old female who presents for:    Chief Complaint   Patient presents with     Oncology Clinic Visit     Elevated parathyroid hormone         Initial Vitals: /67 (BP Location: Right arm, Patient Position: Sitting, Cuff Size: Adult Regular)   Pulse 67   Temp 97.8  F (36.6  C) (Oral)   Resp 16   Wt 86.2 kg (190 lb)   LMP 09/24/1990   SpO2 96%   BMI 34.20 kg/m   Estimated body mass index is 34.2 kg/m  as calculated from the following:    Height as of 7/12/22: 1.588 m (5' 2.5\").    Weight as of this encounter: 86.2 kg (190 lb). Body surface area is 1.95 meters squared.  No Pain (0) Comment: Data Unavailable   Patient's last menstrual period was 09/24/1990.  Allergies reviewed: Yes  Medications reviewed: Yes    Medications: Medication refills not needed today.  Pharmacy name entered into Ellie: [id number] - [place], [place] - [phone]    Clinical concerns: no       Harini Villarreal, TRISH                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  She was a patient of Dr. Narvaez.    Her breast cancer was diagnosed in 08/2012 via screening MA. She underwent bilateral mastectomies on 09/05/2012 where she was found to have a 2 cm, grade 1/3 invasive " ductal adenocarcinoma involving the left breast. LN negative disease. Estrogen and progesterone receptors were positive at greater than 90%. HER-2/gibran was negative. She had [id number] disease.   She was started on Femara on 10/10/2012.    It is put on hold in 5/2016 due to memory loss and dysarthria. She then was evaluated by neurologist, got assurance. Her cognitive function did not change by holding femara, it is resumed in July 2016. She finished 5 yrs of it in fall 2017, not willing to continue due to hair loss, bone health and hot flushes.      She had reconstructive surgery done 03/2013 by Dr. Wall.          INTERIM HISTORY:  Gemma returns for follow up. She had an MRI of the brain completed due to issues with headaches which showed 1. Patchy marrow signal abnormalities most consistent with osseous  metastatic disease.  2. No brain metastases are identified.  3. White matter T2 hyperintensities likely represent chronic small  vessel ischemic change.    Gemma returns today for follow-up.  Her PET/CT does not show any evidence of metastatic disease.  Negative for metabolically active skeletal disease in the calvarium or skull base.      REVIEW OF SYSTEMS:   14 point ROS was reviewed and is negative other than as noted above in HPI.       HOME MEDICATIONS:  Current Outpatient Medications   Medication Sig Dispense Refill     ACE/ARB NOT PRESCRIBED, INTENTIONAL, ACE & ARB not prescribed due to Other: normal tensive, low microalbumuria       acetaminophen (TYLENOL) 500 MG tablet Take 2 tablets (1,000 mg) by mouth every 8 hours as needed for mild pain 100 tablet 3     albuterol (PROAIR HFA/PROVENTIL HFA/VENTOLIN HFA) 108 (90 Base) MCG/ACT inhaler Inhale 2 puffs into the lungs every 6 hours 18 g 0     apixaban ANTICOAGULANT (ELIQUIS ANTICOAGULANT) 5 MG tablet Take 1 tablet (5 mg) by mouth 2 times daily 180 tablet 3     blood glucose (NO BRAND SPECIFIED) lancets standard Use to test blood sugar 2 times daily or as  "directed. 1 each 4     blood glucose (ONETOUCH VERIO IQ) test strip Use to test blood sugar 2 times daily or as directed. 100 strip 3     blood glucose monitoring (NO BRAND SPECIFIED) meter device kit Use to test blood sugar 2 times daily or as directed. 1 kit 1     Cholecalciferol (VITAMIN D) 50 MCG (2000 UT) CAPS Take 1 capsule by mouth daily 90 capsule 2     Cholecalciferol (VITAMIN D3) 75 MCG (3000 UT) TABS Take 1 tablet by mouth daily 3000 UT one day and 5000 UT the next, alternating daily.       clobetasol (TEMOVATE) 0.05 % external cream Apply twice daily to rash (thigh, knees, hands, feet) twice daily for 7 days AS NEEDED \"use sparingly and not to be used on the face\" Follow up recommended. 60 g 3     Lancets (ONETOUCH DELICA PLUS LMURRX46A) MISC 1 lancet daily 100 each 3     sertraline (ZOLOFT) 100 MG tablet Take 1 tablet (100 mg) by mouth daily 90 tablet 1     tiotropium (SPIRIVA RESPIMAT) 2.5 MCG/ACT inhaler Inhale 2 puffs into the lungs daily 4 g 11     vitamin B-12 (CYANOCOBALAMIN) 2500 MCG sublingual tablet Take 1 tablet (2,500 mcg) by mouth daily 90 tablet 0     blood glucose calibration (NO BRAND SPECIFIED) solution Use to calibrate blood glucose monitor as needed as directed. (Patient not taking: Reported on 8/4/2022) 1 each 0         ALLERGIES:  Allergies   Allergen Reactions     Bacitracin Unknown     Dust Mites      Nickel      Phenylene          PAST MEDICAL HISTORY:  Past Medical History:   Diagnosis Date     Anemia     iron deficiency anemia     Anxiety      Breast cancer (H) 2012    Bilateral Masectomies 9/2012     Complications of gastric bypass surgery      COPD (chronic obstructive pulmonary disease) (H) 4/21/2021     Diabetes mellitus (H)     diet controlled     DVT (deep venous thrombosis) (H)      Eczema      Fracture of left knee region     patella - vertical     History of kidney stones      Obesity      Palpitation     with no treatment     Polyneuropathy      Pruritus     " generalized     Rosacea      SVT (supraventricular tachycardia) (H)     s/p ablation 5/28/2015 at [facility] for left lateral AP         PAST SURGICAL HISTORY:  Past Surgical History:   Procedure Laterality Date     ABDOMEN SURGERY  2000    gastric bypass 2000     COLONOSCOPY  10/11/2012    Procedure: COLONOSCOPY;  colonoscopy;  Surgeon: Gela Cleary MD;  Location:  GI     COLONOSCOPY N/A 10/6/2017    Procedure: COLONOSCOPY;;  Surgeon: Shashank Ortiz MD;  Location:  GI     COLONOSCOPY N/A 3/7/2022    Procedure: COLONOSCOPY;  Surgeon: Andrea Daniel MD;  Location: [facility]     DENTAL SURGERY  5/2007    all teeth removed - dentures     ENT SURGERY      all teeth pulled     ESOPHAGOSCOPY, GASTROSCOPY, DUODENOSCOPY (EGD), COMBINED N/A 10/6/2017    Procedure: COMBINED ESOPHAGOSCOPY, GASTROSCOPY, DUODENOSCOPY (EGD);  ESOPHAGOSCOPY, GASTROSCOPY, DUODENOSCOPY (EGD) & Colonoscopy *Needs INR on arrival;  Surgeon: Shashank Ortiz MD;  Location:  GI     GYN SURGERY  1/10/75    tubal ligation      H ABLATION SVT  5/28/2015    stopped metoprolol     HERNIA REPAIR  2005     IMPLANT FILTER INFERIOR VENA CAVA  10/8/2012    taken out  1/4/12     INSERT TISSUE EXPANDER BREAST BILATERAL  9/5/2012    Procedure: INSERT TISSUE EXPANDER BREAST BILATERAL;;  Surgeon: Orlando Wall MD;  Location:  OR     MASTECTOMY SIMPLE, SENTINEL NODE, COMBINED  9/5/2012    Procedure: COMBINED MASTECTOMY SIMPLE, SENTINEL NODE;  BILATERAL MASTECTOMY WITH LEFT AXILLARY SENTINEL LYMPH NODE BIOPSY, BILATERAL TISSUE EXPANDER        MASTECTOMY, BILATERAL       PANNICULECTOMY N/A 4/5/2018    Procedure: PANNICULECTOMY;  PANNICULECTOMY ;  Surgeon: Orlando Wall MD;  Location:  OR     RECONSTRUCT BREAST BILATERAL, IMPLANT PROSTHESIS BILATERAL, COMBINED  5/22/2013    Procedure: COMBINED RECONSTRUCT BREAST BILATERAL, IMPLANT PROSTHESIS BILATERAL;  BILATERAL SECOND STAGE BREAST RECONSTRUCTION WITH SILICONE GEL IMPLANTS AND  LIPOSUCTION;  Surgeon: Orlando Wall MD;  Location: [facility]     RECONSTRUCT NIPPLE BILATERAL  2013    Procedure: RECONSTRUCT NIPPLE BILATERAL;  BILATERAL NIPPLE AREOLAR RECONSTRUCTION;  Surgeon: Orlando Wall MD;  Location: [facility]     ZZHC BREAST RECONSTRUC W OTHR TECHNIQ  2013         SOCIAL HISTORY:  Social History     Socioeconomic History     Marital status:      Spouse name: Not on file     Number of children: Not on file     Years of education: Not on file     Highest education level: Not on file   Occupational History     Employer: RETIRED   Tobacco Use     Smoking status: Former Smoker     Packs/day: 1.50     Years: 25.00     Pack years: 37.50     Types: Cigarettes     Quit date: 10/21/1986     Years since quittin.8     Smokeless tobacco: Never Used     Tobacco comment: quit    Vaping Use     Vaping Use: Never used   Substance and Sexual Activity     Alcohol use: No     Comment: 1 time a year     Drug use: No     Sexual activity: Not Currently     Partners: Male   Other Topics Concern     Parent/sibling w/ CABG, MI or angioplasty before 65F 55M? No      Service Not Asked     Blood Transfusions Not Asked     Caffeine Concern Not Asked     Occupational Exposure Not Asked     Hobby Hazards Not Asked     Sleep Concern Not Asked     Stress Concern Not Asked     Weight Concern Not Asked     Special Diet Not Asked     Back Care Not Asked     Exercise Not Asked     Bike Helmet Yes     Seat Belt Yes     Self-Exams Not Asked   Social History Narrative     Not on file     Social Determinants of Health     Financial Resource Strain: Unknown     Difficulty of Paying Living Expenses: Patient refused   Food Insecurity: Unknown     Worried About Running Out of Food in the Last Year: Patient refused     Ran Out of Food in the Last Year: Patient refused   Transportation Needs: No Transportation Needs     Lack of Transportation (Medical): No     Lack of Transportation (Non-Medical):  No   Physical Activity: Inactive     Days of Exercise per Week: 0 days     Minutes of Exercise per Session: 0 min   Stress: Stress Concern Present     Feeling of Stress : To some extent   Social Connections: Moderately Integrated     Frequency of Communication with Friends and Family: Twice a week     Frequency of Social Gatherings with Friends and Family: More than three times a week     Attends [employer] Services: 1 to 4 times per year     Active Member of Clubs or Organizations: Yes     Attends Club or Organization Meetings: Not on file     Marital Status:    Intimate Partner Violence: Not on file   Housing Stability: Unknown     Unable to Pay for Housing in the Last Year: Patient refused     Number of Places Lived in the Last Year: 2     Unstable Housing in the Last Year: No         FAMILY HISTORY:  Family History   Problem Relation Age of Onset     Cancer - colorectal Mother      Colon Cancer Mother      Prostate Cancer Father      Alzheimer Disease Father      Cancer Paternal Grandmother      Alzheimer Disease Paternal Grandfather      Cancer - colorectal Brother      Diabetes Brother      Crohn's Disease Daughter      Diabetes Brother      Eye Disorder Brother      Diabetes Son      Cancer Maternal Uncle      Cancer Maternal Uncle      Family history of 2 brothers having prostate cancer    PHYSICAL EXAM:  Vital signs:  /67 (BP Location: Right arm, Patient Position: Sitting, Cuff Size: Adult Regular)   Pulse 67   Temp 97.8  F (36.6  C) (Oral)   Resp 16   Wt 86.2 kg (190 lb)   LMP 1990   SpO2 96%   BMI 34.20 kg/m     ECO  Not examined today    LABS:  CBC RESULTS: Recent Labs   Lab Test 22  1044   WBC 4.4   RBC 5.24*   HGB 12.2   HCT 41.6   MCV 79   MCH 23.3*   MCHC 29.3*   RDW 24.3*          Component      Latest Ref Rng & Units 2019 10/27/2021 2022 3/29/2022   Iron      35 - 180 ug/dL  21 (L) 20 (L) 65   Iron Binding Cap      240 - 430 ug/dL  398 431 (H) 294    Iron Saturation Index      15 - 46 %  5 (L) 5 (L) 22   Vitamin B12      193 - 986 pg/mL 3,314 (H) 580 699    Ferritin      8 - 252 ng/mL 13 10 7 (L) 282 (H)   Folate      >=5.4 ng/mL  18.5 14.3        IMAGING:  Left lower extremity ultrasound 7/24/21:  FINDINGS: Exam includes the common femoral, femoral, popliteal, and contralateral common femoral veins as well as segmentally visualized deep calf veins and greater saphenous vein.      LEFT: No DVT from groin through popliteal fossa. However, one of the posterior tibial veins within the calf demonstrates partially occlusive thrombus.  No superficial thrombophlebitis. No popliteal cyst.    IMPRESSION:  Exam is positive for DVT. Small focus of DVT involving one of the posterior tibial veins within the calf.    Left lower extremity ultrasound 10/22/21:  FINDINGS:  Examination of the deep veins with graded compression and  color flow Doppler with spectral wave form analysis was performed.       There is no evidence for acute DVT. There is a small amount of  residual nonocclusive DVT in one of the 2 distal left posterior tibial  veins.                                                                      IMPRESSION: Small amount of residual nonocclusive chronic DVT in one  of the 2 distal left posterior tibial veins. No new DVT.    PET/CT noted and reviewed with the patient.  I personally reviewed the images        ASSESSMENT/PLAN:  Gemma Cowart is a 76 year old female with:    1) Left breast cancer s/p double mastectomies, ER/ME+, her 2 - s/p  5 yrs of Femara till 2017.     -she is on annual follow-up for this Now she has concerning lesions on the bone on MRI PET/CT negative for metastatic disease.  I will repeat the MRI of the brain in 4 months just to make sure that those areas have stayed stable, after discussion with the radiologist he may be bony islands     2) History of gastric bypass surgery with iron-deficiency anemia and low B12.  She received Injectafer on  3/1/22 and 3/8/22.  Ferritin and iron are improved.  Hemoglobin is normalized.  She doesn't take oral iron, as it gives her GI upset.    -she takes sublingual vitamin B12  We will check iron studies in 4 months     3) Osteopenia:  -follow-up with PCP     4) CKD:  -follow-up with PCP     5) Hx of recurrent DVT:  She saw Dr. Nicki Lopez on 7/28/21.    -On 10/06/2012 she was found to have bilateral DVT.  -She had IVC filter placed on 10/10/2012.  It was removed on 01/04/2013.  -Patient took warfarin for 6 months.  -Lower extremity ultrasound on 06/14/2013 was negative for DVT.  -On 09/12/2015, left leg ultrasound revealed DVT in posterior tibial vein below the level of the left knee.  -The patient was started on warfarin and has been continued on that.  -in July 2021, she started to have swelling in the left leg with some pain.  She went to the ED on 7/24/21 and had multiple investigations done.  -Left lower extremity ultrasound revealed partially occlusive thrombus in one of the posterior tibial veins within the calf. She has chronic swelling in that leg  -The patient was started on Eliquis.  -Multiple hypercoagulable workup done.  Lupus anticoagulant, cardiolipin antibody and beta 2 glycoprotein are all negative.  There was no provoking factor.    -repeat ultrasound on 10/22/21 shows small amount of residual non-occlusive chronic DVT. No new DVT.  -she continues on Eliquis indefinitely, recent ultrasound negative    Alan Bowen MD.    Total time spent on day of visit, including review of tests, obtaining/reviewing separately obtained history, ordering medications/tests/procedures, communicating with PCP/consultants, and documenting in electronic medical record: 20  Min     See me in 4 months with MRI of the brain and iron studies       Again, thank you for allowing me to participate in the care of your patient.        Sincerely,        Alan Bowen MD

## 2022-08-04 NOTE — PROGRESS NOTES
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  She was a patient of Dr. Narvaez.    Her breast cancer was diagnosed in 08/2012 via screening MA. 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 LN negative disease. Estrogen and progesterone receptors were positive at greater than 90%. HER-2/gibran was negative. She had [id number] disease.   She was started on Femara on 10/10/2012.    It is put on hold in 5/2016 due to memory loss and dysarthria. She then was evaluated by neurologist, got assurance. Her cognitive function did not change by holding femara, it is resumed in July 2016. She finished 5 yrs of it in fall 2017, not willing to continue due to hair loss, bone health and hot flushes.      She had reconstructive surgery done 03/2013 by Dr. Wall.          INTERIM HISTORY:  Gemma returns for follow up. She had an MRI of the brain completed due to issues with headaches which showed 1. Patchy marrow signal abnormalities most consistent with osseous  metastatic disease.  2. No brain metastases are identified.  3. White matter T2 hyperintensities likely represent chronic small  vessel ischemic change.    Gemma returns today for follow-up.  Her PET/CT does not show any evidence of metastatic disease.  Negative for metabolically active skeletal disease in the calvarium or skull base.      REVIEW OF SYSTEMS:   14 point ROS was reviewed and is negative other than as noted above in HPI.       HOME  "MEDICATIONS:  Current Outpatient Medications   Medication Sig Dispense Refill     ACE/ARB NOT PRESCRIBED, INTENTIONAL, ACE & ARB not prescribed due to Other: normal tensive, low microalbumuria       acetaminophen (TYLENOL) 500 MG tablet Take 2 tablets (1,000 mg) by mouth every 8 hours as needed for mild pain 100 tablet 3     albuterol (PROAIR HFA/PROVENTIL HFA/VENTOLIN HFA) 108 (90 Base) MCG/ACT inhaler Inhale 2 puffs into the lungs every 6 hours 18 g 0     apixaban ANTICOAGULANT (ELIQUIS ANTICOAGULANT) 5 MG tablet Take 1 tablet (5 mg) by mouth 2 times daily 180 tablet 3     blood glucose (NO BRAND SPECIFIED) lancets standard Use to test blood sugar 2 times daily or as directed. 1 each 4     blood glucose (ONETOUCH VERIO IQ) test strip Use to test blood sugar 2 times daily or as directed. 100 strip 3     blood glucose monitoring (NO BRAND SPECIFIED) meter device kit Use to test blood sugar 2 times daily or as directed. 1 kit 1     Cholecalciferol (VITAMIN D) 50 MCG (2000 UT) CAPS Take 1 capsule by mouth daily 90 capsule 2     Cholecalciferol (VITAMIN D3) 75 MCG (3000 UT) TABS Take 1 tablet by mouth daily 3000 UT one day and 5000 UT the next, alternating daily.       clobetasol (TEMOVATE) 0.05 % external cream Apply twice daily to rash (thigh, knees, hands, feet) twice daily for 7 days AS NEEDED \"use sparingly and not to be used on the face\" Follow up recommended. 60 g 3     Lancets (ONETOUCH DELICA PLUS [id number]) MISC 1 lancet daily 100 each 3     sertraline (ZOLOFT) 100 MG tablet Take 1 tablet (100 mg) by mouth daily 90 tablet 1     tiotropium (SPIRIVA RESPIMAT) 2.5 MCG/ACT inhaler Inhale 2 puffs into the lungs daily 4 g 11     vitamin B-12 (CYANOCOBALAMIN) 2500 MCG sublingual tablet Take 1 tablet (2,500 mcg) by mouth daily 90 tablet 0     blood glucose calibration (NO BRAND SPECIFIED) solution Use to calibrate blood glucose monitor as needed as directed. (Patient not taking: Reported on 8/4/2022) 1 each 0 "         ALLERGIES:  Allergies   Allergen Reactions     Bacitracin Unknown     Dust Mites      Nickel      Phenylene          PAST MEDICAL HISTORY:  Past Medical History:   Diagnosis Date     Anemia     iron deficiency anemia     Anxiety      Breast cancer (H) 2012    Bilateral Masectomies 9/2012     Complications of gastric bypass surgery      COPD (chronic obstructive pulmonary disease) (H) 4/21/2021     Diabetes mellitus (H)     diet controlled     DVT (deep venous thrombosis) (H)      Eczema      Fracture of left knee region     patella - vertical     History of kidney stones      Obesity      Palpitation     with no treatment     Polyneuropathy      Pruritus     generalized     Rosacea      SVT (supraventricular tachycardia) (H)     s/p ablation 5/28/2015 at [facility] for left lateral AP         PAST SURGICAL HISTORY:  Past Surgical History:   Procedure Laterality Date     ABDOMEN SURGERY  2000    gastric bypass 2000     COLONOSCOPY  10/11/2012    Procedure: COLONOSCOPY;  colonoscopy;  Surgeon: Gela Cleary MD;  Location:  GI     COLONOSCOPY N/A 10/6/2017    Procedure: COLONOSCOPY;;  Surgeon: Shashank Ortiz MD;  Location:  GI     COLONOSCOPY N/A 3/7/2022    Procedure: COLONOSCOPY;  Surgeon: Andrea Daniel MD;  Location:  GI     DENTAL SURGERY  5/2007    all teeth removed - dentures     ENT SURGERY      all teeth pulled     ESOPHAGOSCOPY, GASTROSCOPY, DUODENOSCOPY (EGD), COMBINED N/A 10/6/2017    Procedure: COMBINED ESOPHAGOSCOPY, GASTROSCOPY, DUODENOSCOPY (EGD);  ESOPHAGOSCOPY, GASTROSCOPY, DUODENOSCOPY (EGD) & Colonoscopy *Needs INR on arrival;  Surgeon: Shashank Ortiz MD;  Location:  GI     GYN SURGERY  1/10/75    tubal ligation      H ABLATION SVT  5/28/2015    stopped metoprolol     HERNIA REPAIR  2005     IMPLANT FILTER INFERIOR VENA CAVA  10/8/2012    taken out  1/4/12     INSERT TISSUE EXPANDER BREAST BILATERAL  9/5/2012    Procedure: INSERT TISSUE EXPANDER BREAST  BILATERAL;;  Surgeon: Orlando Wall MD;  Location:  OR     MASTECTOMY SIMPLE, SENTINEL NODE, COMBINED  2012    Procedure: COMBINED MASTECTOMY SIMPLE, SENTINEL NODE;  BILATERAL MASTECTOMY WITH LEFT AXILLARY SENTINEL LYMPH NODE BIOPSY, BILATERAL TISSUE EXPANDER        MASTECTOMY, BILATERAL       PANNICULECTOMY N/A 2018    Procedure: PANNICULECTOMY;  PANNICULECTOMY ;  Surgeon: Orlando Wall MD;  Location:  OR     RECONSTRUCT BREAST BILATERAL, IMPLANT PROSTHESIS BILATERAL, COMBINED  2013    Procedure: COMBINED RECONSTRUCT BREAST BILATERAL, IMPLANT PROSTHESIS BILATERAL;  BILATERAL SECOND STAGE BREAST RECONSTRUCTION WITH SILICONE GEL IMPLANTS AND LIPOSUCTION;  Surgeon: Orlando Wall MD;  Location: [facility]     RECONSTRUCT NIPPLE BILATERAL  2013    Procedure: RECONSTRUCT NIPPLE BILATERAL;  BILATERAL NIPPLE AREOLAR RECONSTRUCTION;  Surgeon: Orlando Wall MD;  Location: [facility]     ZZHC BREAST RECONSTRUC W OTHR TECHNIQ  2013         SOCIAL HISTORY:  Social History     Socioeconomic History     Marital status:      Spouse name: Not on file     Number of children: Not on file     Years of education: Not on file     Highest education level: Not on file   Occupational History     Employer: RETIRED   Tobacco Use     Smoking status: Former Smoker     Packs/day: 1.50     Years: 25.00     Pack years: 37.50     Types: Cigarettes     Quit date: 10/21/1986     Years since quittin.8     Smokeless tobacco: Never Used     Tobacco comment: quit    Vaping Use     Vaping Use: Never used   Substance and Sexual Activity     Alcohol use: No     Comment: 1 time a year     Drug use: No     Sexual activity: Not Currently     Partners: Male   Other Topics Concern     Parent/sibling w/ CABG, MI or angioplasty before 65F 55M? No      Service Not Asked     Blood Transfusions Not Asked     Caffeine Concern Not Asked     Occupational Exposure Not Asked     Hobby Hazards Not Asked      Sleep Concern Not Asked     Stress Concern Not Asked     Weight Concern Not Asked     Special Diet Not Asked     Back Care Not Asked     Exercise Not Asked     Bike Helmet Yes     Seat Belt Yes     Self-Exams Not Asked   Social History Narrative     Not on file     Social Determinants of Health     Financial Resource Strain: Unknown     Difficulty of Paying Living Expenses: Patient refused   Food Insecurity: Unknown     Worried About Running Out of Food in the Last Year: Patient refused     Ran Out of Food in the Last Year: Patient refused   Transportation Needs: No Transportation Needs     Lack of Transportation (Medical): No     Lack of Transportation (Non-Medical): No   Physical Activity: Inactive     Days of Exercise per Week: 0 days     Minutes of Exercise per Session: 0 min   Stress: Stress Concern Present     Feeling of Stress : To some extent   Social Connections: Moderately Integrated     Frequency of Communication with Friends and Family: Twice a week     Frequency of Social Gatherings with Friends and Family: More than three times a week     Attends Mandaen Services: 1 to 4 times per year     Active Member of Clubs or Organizations: Yes     Attends Club or Organization Meetings: Not on file     Marital Status:    Intimate Partner Violence: Not on file   Housing Stability: Unknown     Unable to Pay for Housing in the Last Year: Patient refused     Number of Places Lived in the Last Year: 2     Unstable Housing in the Last Year: No         FAMILY HISTORY:  Family History   Problem Relation Age of Onset     Cancer - colorectal Mother      Colon Cancer Mother      Prostate Cancer Father      Alzheimer Disease Father      Cancer Paternal Grandmother      Alzheimer Disease Paternal Grandfather      Cancer - colorectal Brother      Diabetes Brother      Crohn's Disease Daughter      Diabetes Brother      Eye Disorder Brother      Diabetes Son      Cancer Maternal Uncle      Cancer Maternal Uncle       Family history of 2 brothers having prostate cancer    PHYSICAL EXAM:  Vital signs:  /67 (BP Location: Right arm, Patient Position: Sitting, Cuff Size: Adult Regular)   Pulse 67   Temp 97.8  F (36.6  C) (Oral)   Resp 16   Wt 86.2 kg (190 lb)   LMP 1990   SpO2 96%   BMI 34.20 kg/m     ECO  Not examined today    LABS:  CBC RESULTS: Recent Labs   Lab Test 22  1044   WBC 4.4   RBC 5.24*   HGB 12.2   HCT 41.6   MCV 79   MCH 23.3*   MCHC 29.3*   RDW 24.3*          Component      Latest Ref Rng & Units 2019 10/27/2021 2022 3/29/2022   Iron      35 - 180 ug/dL  21 (L) 20 (L) 65   Iron Binding Cap      240 - 430 ug/dL  398 431 (H) 294   Iron Saturation Index      15 - 46 %  5 (L) 5 (L) 22   Vitamin B12      193 - 986 pg/mL 3,314 (H) 580 699    Ferritin      8 - 252 ng/mL 13 10 7 (L) 282 (H)   Folate      >=5.4 ng/mL  18.5 14.3        IMAGING:  Left lower extremity ultrasound 21:  FINDINGS: Exam includes the common femoral, femoral, popliteal, and contralateral common femoral veins as well as segmentally visualized deep calf veins and greater saphenous vein.      LEFT: No DVT from groin through popliteal fossa. However, one of the posterior tibial veins within the calf demonstrates partially occlusive thrombus.  No superficial thrombophlebitis. No popliteal cyst.    IMPRESSION:  Exam is positive for DVT. Small focus of DVT involving one of the posterior tibial veins within the calf.    Left lower extremity ultrasound 10/22/21:  FINDINGS:  Examination of the deep veins with graded compression and  color flow Doppler with spectral wave form analysis was performed.       There is no evidence for acute DVT. There is a small amount of  residual nonocclusive DVT in one of the 2 distal left posterior tibial  veins.                                                                      IMPRESSION: Small amount of residual nonocclusive chronic DVT in one  of the 2 distal left  posterior tibial veins. No new DVT.    PET/CT noted and reviewed with the patient.  I personally reviewed the images        ASSESSMENT/PLAN:  Gemma Cowart is a 76 year old female with:    1) Left breast cancer s/p double mastectomies, ER/MA+, her 2 - s/p  5 yrs of Femara till 2017.     -she is on annual follow-up for this Now she has concerning lesions on the bone on MRI PET/CT negative for metastatic disease.  I will repeat the MRI of the brain in 4 months just to make sure that those areas have stayed stable, after discussion with the radiologist he may be bony islands     2) History of gastric bypass surgery with iron-deficiency anemia and low B12.  She received Injectafer on 3/1/22 and 3/8/22.  Ferritin and iron are improved.  Hemoglobin is normalized.  She doesn't take oral iron, as it gives her GI upset.    -she takes sublingual vitamin B12  We will check iron studies in 4 months     3) Osteopenia:  -follow-up with PCP     4) CKD:  -follow-up with PCP     5) Hx of recurrent DVT:  She saw Dr. Nicki Lopez on 7/28/21.    -On 10/06/2012 she was found to have bilateral DVT.  -She had IVC filter placed on 10/10/2012.  It was removed on 01/04/2013.  -Patient took warfarin for 6 months.  -Lower extremity ultrasound on 06/14/2013 was negative for DVT.  -On 09/12/2015, left leg ultrasound revealed DVT in posterior tibial vein below the level of the left knee.  -The patient was started on warfarin and has been continued on that.  -in July 2021, she started to have swelling in the left leg with some pain.  She went to the ED on 7/24/21 and had multiple investigations done.  -Left lower extremity ultrasound revealed partially occlusive thrombus in one of the posterior tibial veins within the calf. She has chronic swelling in that leg  -The patient was started on Eliquis.  -Multiple hypercoagulable workup done.  Lupus anticoagulant, cardiolipin antibody and beta 2 glycoprotein are all negative.  There was no provoking  factor.    -repeat ultrasound on 10/22/21 shows small amount of residual non-occlusive chronic DVT. No new DVT.  -she continues on Eliquis indefinitely, recent ultrasound negative    Alan Bowen MD.    Total time spent on day of visit, including review of tests, obtaining/reviewing separately obtained history, ordering medications/tests/procedures, communicating with PCP/consultants, and documenting in electronic medical record: 20  Min     See me in 4 months with MRI of the brain and iron studies

## 2022-08-17 ENCOUNTER — PATIENT OUTREACH (OUTPATIENT)
Dept: GERIATRIC MEDICINE | Facility: CLINIC | Age: 77
End: 2022-08-17

## 2022-08-17 ENCOUNTER — TELEPHONE (OUTPATIENT)
Dept: ONCOLOGY | Facility: CLINIC | Age: 77
End: 2022-08-17

## 2022-08-17 NOTE — PROGRESS NOTES
CC updated program tasks and targets for [employer] launch.    Ifrah Emerson RN  [facility]  [phone]

## 2022-08-17 NOTE — TELEPHONE ENCOUNTER
Spoke to patient regarding scheduling MRI and return visit due in 4 months. Patient states she understood Dr Bowen wants MRI and visit in 1 year, patient feels four months is too soon.  Please advise.

## 2022-08-19 ENCOUNTER — OFFICE VISIT (OUTPATIENT)
Dept: ENDOCRINOLOGY | Facility: CLINIC | Age: 77
End: 2022-08-19
Payer: COMMERCIAL

## 2022-08-19 VITALS
OXYGEN SATURATION: 96 % | DIASTOLIC BLOOD PRESSURE: 67 MMHG | HEART RATE: 57 BPM | SYSTOLIC BLOOD PRESSURE: 115 MMHG | WEIGHT: 187 LBS | BODY MASS INDEX: 33.66 KG/M2

## 2022-08-19 DIAGNOSIS — E21.3 HYPERPARATHYROIDISM (H): Primary | ICD-10-CM

## 2022-08-19 DIAGNOSIS — M85.80 OSTEOPENIA, UNSPECIFIED LOCATION: ICD-10-CM

## 2022-08-19 PROCEDURE — 99215 OFFICE O/P EST HI 40 MIN: CPT | Performed by: INTERNAL MEDICINE

## 2022-08-19 NOTE — LETTER
"    8/19/2022         RE: Gemma Cowart  [address] [address] [place] [address] [place] [id number]        Dear Colleague,    Thank you for referring your patient, Gemma Cowart, to the [facility] [place]. Please see a copy of my visit note below.    Gemma Cowart is a 76 year old yo female who presents today for evaluation of hyperparathyroid hormone. She was previously seen by Dr Fong in April.    Subjective:  Gemma Cowart is a 76 year old female w/ PMHx of COPD, DM diet controlled, HLD, psoriasis, CKD stage 3 (told it was from metformin?), DVT previously on warfarin now on eliquis, breast cancer s/p masectomy (no chemo/rads) history of gastric bypass surgery found to have elevated PTH level in February here for further evaluation  Chief Complaint   Patient presents with     Consult     parathyroid       1) Elevated PTH, normal calcium level  HPI:  Found in October to have calcium level was low in assay but corrected for albumin into normal range. At subsequent labs, she had PTH level checked that was elevated to 143, prompting referral to endocrine. She was seen in April and recommended calcium supplementation to correct calcium level and then recheck. Patient did not take calcium supplementation because she saw \"TV doctor\" who said they are all chemicals and can't be absorbed by the body and recommended green leafy vegetables. She has been eating kale or spinach 4x weekly. Of note, she also had MRI for headaches and seen to have lytic lesions in bones concerning for metastatic dieseae. PET scan was negative with negative tumor markers.     Noting hair loss- eye brows, R side of head thinning, eye lashes, ridging of nails.  Notes intermittent constipation especially when eating dairy (cheese).    Previous Fractures: toe remote, kneecap (~5 years ago)  Bone-specific therapy:  Family History of Hip Fx:  Hormone History (Menopause, Testosterone):   Steroids: none  PPIs: none  Antiepileptics: " none  Anticoagulation: previous warfarin, current eliquis (clotted through warfarin, Leg)  Autoimmune disease: Psoriasis  CKD: CKD 3  Nephrolithiasis: once, approx 20 yrs ago  Smoking:  none  Alcohol: rare, last 2 years ago katy  Other risk factors: hx of breast cancer s/p mastectomy (found on mammogram), no chemo/rads  Ca/D: Vit D- 5000u daily (since February), Calcium- minimal  (may only have cheese on pizza once a week  Exercise: minimal     SPEP neg 10/2021                  2) Thyroid Nodule  - Seen on ultrasound 2008, patient does not remember this  - Ultrasound revealed densely calcified nodule 1.3x1.0x0.8  Maternal grandmother- large goiter that required multiple surgeries to remove  Denies any difficulty breathing, difficulty swallowing, shortness of breath, enlargement of neck/thyroid, family history of thyroid cancer, exposure to radiation, hoarseness or weight loss.       Active diagnoses this visit:     Hyperparathyroidism (H)  Osteopenia, unspecified location     ROS: 10 point ROS neg other than the symptoms noted above in the HPI.      Medical, surgical, social, and family histories, medications and allergies reviewed and updated.  Past Medical History:   Diagnosis Date     Anemia     iron deficiency anemia     Anxiety      Breast cancer (H) 2012    Bilateral Masectomies 9/2012     Complications of gastric bypass surgery      COPD (chronic obstructive pulmonary disease) (H) 4/21/2021     Diabetes mellitus (H)     diet controlled     DVT (deep venous thrombosis) (H)      Eczema      Fracture of left knee region     patella - vertical     History of kidney stones      Obesity      Palpitation     with no treatment     Polyneuropathy      Pruritus     generalized     Rosacea      SVT (supraventricular tachycardia) (H)     s/p ablation 5/28/2015 at [facility] for left lateral AP       Past Surgical History:   Procedure Laterality Date     ABDOMEN SURGERY  2000    gastric bypass 2000      COLONOSCOPY  10/11/2012    Procedure: COLONOSCOPY;  colonoscopy;  Surgeon: Gela Cleary MD;  Location:  GI     COLONOSCOPY N/A 10/6/2017    Procedure: COLONOSCOPY;;  Surgeon: Shashank Ortiz MD;  Location:  GI     COLONOSCOPY N/A 3/7/2022    Procedure: COLONOSCOPY;  Surgeon: Andrea Daniel MD;  Location:  GI     DENTAL SURGERY  5/2007    all teeth removed - dentures     ENT SURGERY      all teeth pulled     ESOPHAGOSCOPY, GASTROSCOPY, DUODENOSCOPY (EGD), COMBINED N/A 10/6/2017    Procedure: COMBINED ESOPHAGOSCOPY, GASTROSCOPY, DUODENOSCOPY (EGD);  ESOPHAGOSCOPY, GASTROSCOPY, DUODENOSCOPY (EGD) & Colonoscopy *Needs INR on arrival;  Surgeon: Shashank Ortiz MD;  Location:  GI     GYN SURGERY  1/10/75    tubal ligation      H ABLATION SVT  5/28/2015    stopped metoprolol     HERNIA REPAIR  2005     IMPLANT FILTER INFERIOR VENA CAVA  10/8/2012    taken out  1/4/12     INSERT TISSUE EXPANDER BREAST BILATERAL  9/5/2012    Procedure: INSERT TISSUE EXPANDER BREAST BILATERAL;;  Surgeon: Orlando Wall MD;  Location: [facility]     MASTECTOMY SIMPLE, SENTINEL NODE, COMBINED  9/5/2012    Procedure: COMBINED MASTECTOMY SIMPLE, SENTINEL NODE;  BILATERAL MASTECTOMY WITH LEFT AXILLARY SENTINEL LYMPH NODE BIOPSY, BILATERAL TISSUE EXPANDER        MASTECTOMY, BILATERAL       PANNICULECTOMY N/A 4/5/2018    Procedure: PANNICULECTOMY;  PANNICULECTOMY ;  Surgeon: Orlando Wall MD;  Location:  OR     RECONSTRUCT BREAST BILATERAL, IMPLANT PROSTHESIS BILATERAL, COMBINED  5/22/2013    Procedure: COMBINED RECONSTRUCT BREAST BILATERAL, IMPLANT PROSTHESIS BILATERAL;  BILATERAL SECOND STAGE BREAST RECONSTRUCTION WITH SILICONE GEL IMPLANTS AND LIPOSUCTION;  Surgeon: Orlando Wall MD;  Location: [facility]     RECONSTRUCT NIPPLE BILATERAL  8/22/2013    Procedure: RECONSTRUCT NIPPLE BILATERAL;  BILATERAL NIPPLE AREOLAR RECONSTRUCTION;  Surgeon: Orlando Wall MD;  Location: [facility]     [facility]  2013     Allergies:  Bacitracin, Dust mites, Nickel, and Phenylene    Social History     Tobacco Use     Smoking status: Former Smoker     Packs/day: 1.50     Years: 25.00     Pack years: 37.50     Types: Cigarettes     Quit date: 10/21/1986     Years since quittin.8     Smokeless tobacco: Never Used     Tobacco comment: quit    Substance Use Topics     Alcohol use: No     Comment: 1 time a year       Family History   Problem Relation Age of Onset     Cancer - colorectal Mother      Colon Cancer Mother      Prostate Cancer Father      Alzheimer Disease Father      Cancer Paternal Grandmother      Alzheimer Disease Paternal Grandfather      Cancer - colorectal Brother      Diabetes Brother      Crohn's Disease Daughter      Diabetes Brother      Eye Disorder Brother      Diabetes Son      Cancer Maternal Uncle      Cancer Maternal Uncle              Objective:  /67 (BP Location: Right arm, Patient Position: Sitting, Cuff Size: Adult Regular)   Pulse 57   Wt 84.8 kg (187 lb)   LMP 1990   SpO2 96%   BMI 33.66 kg/m      Exam:  Constitutional: healthy, alert, no acute distress  Head: Normocephalic. No masses, lesions, no exophthalmos/proptosis  ENT: normal thyroid, small nodule palpated L thyroid, no cervical lymph nodes  Respiratory: nonlabored  Gastrointestinal: Abdomen soft, non-tender.  Musculoskeletal: extremities normal- no gross deformities noted, gait normal and normal muscle tone  Skin: no suspicious lesions or rashes  Neurologic: Gait normal. sensation grossly intact  Psychiatric: mentation appears normal, calm      Lab Results   Component Value Date/Time    TSH 2.15 2022 08:50 AM    TSH 2.09 2019 11:43 AM    VITD25 5.0 (L) 2012 01:11 PM    PTHI 143 (H) 2022 08:50 AM     Last Comprehensive Metabolic Panel:  Sodium   Date Value Ref Range Status   2022 140 133 - 144 mmol/L Final   2021 143 133 - 144 mmol/L Final      Potassium   Date Value Ref Range Status   07/29/2022 4.7 3.4 - 5.3 mmol/L Final   03/23/2021 4.9 3.4 - 5.3 mmol/L Final     Chloride   Date Value Ref Range Status   07/29/2022 111 (H) 94 - 109 mmol/L Final   03/23/2021 114 (H) 94 - 109 mmol/L Final     Carbon Dioxide   Date Value Ref Range Status   03/23/2021 24 20 - 32 mmol/L Final     Carbon Dioxide (CO2)   Date Value Ref Range Status   07/29/2022 26 20 - 32 mmol/L Final     Anion Gap   Date Value Ref Range Status   07/29/2022 3 3 - 14 mmol/L Final   03/23/2021 5 3 - 14 mmol/L Final     Glucose   Date Value Ref Range Status   07/29/2022 107 (H) 70 - 99 mg/dL Final   03/23/2021 96 70 - 99 mg/dL Final     Urea Nitrogen   Date Value Ref Range Status   07/29/2022 22 7 - 30 mg/dL Final   03/23/2021 15 7 - 30 mg/dL Final     Creatinine   Date Value Ref Range Status   07/29/2022 1.42 (H) 0.52 - 1.04 mg/dL Final   03/23/2021 1.32 (H) 0.52 - 1.04 mg/dL Final     GFR Estimate   Date Value Ref Range Status   07/29/2022 38 (L) >60 mL/min/1.73m2 Final     Comment:     Effective December 21, 2021 eGFRcr in adults is calculated using the 2021 CKD-EPI creatinine equation which includes age and gender (Stephanie et al., [place], DOI: 10.1056/FFUGio5113117)   03/23/2021 40 (L) >60 mL/min/[1.73_m2] Final     Comment:     Non  GFR Calc  Starting 12/18/2018, serum creatinine based estimated GFR (eGFR) will be   calculated using the Chronic Kidney Disease Epidemiology Collaboration   (CKD-EPI) equation.       GFR, ESTIMATED POCT   Date Value Ref Range Status   06/16/2022 36 (L) >60 mL/min/1.73m2 Final     Calcium   Date Value Ref Range Status   07/29/2022 8.7 8.5 - 10.1 mg/dL Final   03/23/2021 8.7 8.5 - 10.1 mg/dL Final     PET Scan 8/1/2022:     MRI Brain 7/25/2022:  FINDINGS: Mild parenchymal volume loss is present. Scattered  frontoparietal predominance and sara white matter T2 hyperintensities  likely represent chronic small vessel ischemic change. No evidence  of  recent ischemia or hemorrhage.  .  Patchy areas of marrow enhancement is present involving the bilateral  frontal bones at the midline measuring approximately 1.1 cm.  Questionable intracranial extension.     Also demonstrates patchy areas of T2 hyperintense signal by T1  hypointense signal, and enhancement within the left frontal bone and  left parietal bone.     Patchy enhancement T1 hyperintense signal within the posterior right  parietal bone which could represent a benign intraosseous cavernous  venous malformation.     Mild paranasal sinus mucosal thickening. The visualized tympanic and  mastoid cavities are unremarkable. Bilateral lens replacements.                                                                      IMPRESSION:  1. Patchy marrow signal abnormalities most consistent with osseous  metastatic disease.  2. No brain metastases are identified.  3. White matter T2 hyperintensities likely represent chronic small  vessel ischemic change.        ASSESSMENT / PLAN:  No diagnosis found.    1) Elevated PTH  - Possibly appropriately elevated in the setting of low calcium intake.  Strongly encouraged adequate calcium daily with 1200 to 1500 mg calcium supplement.  I also encouraged her to get any calcium in her diet that she can  -We will check vitamin D level, continue vitamin D supplement  -After 8 weeks of adequate calcium intake, we will repeat all levels including parathyroid hormone, vitamin D, calcium, albumin  -If parathyroid hormone has corrected, no need for further work-up.  If PTH remains persistently elevated would recommend 24-hour urine screening to evaluate for secondary hyper parathyroidism versus normocalcemic primary hyperparathyroid  1) Serum calcium (>upper limit of normal): 1.0 mg/dL (0.25 mmol/L);  2) BMD by DXA: T-score ?2.5 at lumbar spine, total hip, femoral neck, or distal 1/3 radius;  3) Vertebral fracture by x-ray, CT, MRI, or VFA;  4) Creatinine clearance <60 cc/min; 24-h  urine for calcium >400 mg/d (>10 mmol/d) and increased stone risk by biochemical stone risk analysis;  5) Presence of nephrolithiasis or nephrocalcinosis by x-ray, ultrasound, or CT;  6) Age <50 years      2) Ostepoenia  - Check dexa scan including wrist    Call with results, doesn't check mychart    Return to clinic: 3  months    A total of 58 minutes were spent today 08/19/22 on this visit including chart review, history and counseling, documentation and other activities as detailed above.         Again, thank you for allowing me to participate in the care of your patient.        Sincerely,        Haley Singh MD

## 2022-08-19 NOTE — PROGRESS NOTES
"Gemma Cowart is a 76 year old yo female who presents today for evaluation of hyperparathyroid hormone. She was previously seen by Dr Fong in April.    Subjective:  Gemma Cowart is a 76 year old female w/ PMHx of COPD, DM diet controlled, HLD, psoriasis, CKD stage 3 (told it was from metformin?), DVT previously on warfarin now on eliquis, breast cancer s/p masectomy (no chemo/rads) history of gastric bypass surgery found to have elevated PTH level in February here for further evaluation  Chief Complaint   Patient presents with     Consult     parathyroid       1) Elevated PTH, normal calcium level  HPI:  Found in October to have calcium level was low in assay but corrected for albumin into normal range. At subsequent labs, she had PTH level checked that was elevated to 143, prompting referral to endocrine. She was seen in April and recommended calcium supplementation to correct calcium level and then recheck. Patient did not take calcium supplementation because she saw \"TV doctor\" who said they are all chemicals and can't be absorbed by the body and recommended green leafy vegetables. She has been eating kale or spinach 4x weekly. Of note, she also had MRI for headaches and seen to have lytic lesions in bones concerning for metastatic dieseae. PET scan was negative with negative tumor markers.     Noting hair loss- eye brows, R side of head thinning, eye lashes, ridging of nails.  Notes intermittent constipation especially when eating dairy (cheese).    Previous Fractures: toe remote, kneecap (~5 years ago)  Bone-specific therapy:  Family History of Hip Fx:  Hormone History (Menopause, Testosterone):   Steroids: none  PPIs: none  Antiepileptics: none  Anticoagulation: previous warfarin, current eliquis (clotted through warfarin, Leg)  Autoimmune disease: Psoriasis  CKD: CKD 3  Nephrolithiasis: once, approx 20 yrs ago  Smoking:  none  Alcohol: rare, last 2 years ago katy  Other risk factors: hx of " breast cancer s/p mastectomy (found on mammogram), no chemo/rads  Ca/D: Vit D- 5000u daily (since February), Calcium- minimal  (may only have cheese on pizza once a week  Exercise: minimal     SPEP neg 10/2021                  2) Thyroid Nodule  - Seen on ultrasound 2008, patient does not remember this  - Ultrasound revealed densely calcified nodule 1.3x1.0x0.8  Maternal grandmother- large goiter that required multiple surgeries to remove  Denies any difficulty breathing, difficulty swallowing, shortness of breath, enlargement of neck/thyroid, family history of thyroid cancer, exposure to radiation, hoarseness or weight loss.       Active diagnoses this visit:     Hyperparathyroidism (H)  Osteopenia, unspecified location     ROS: 10 point ROS neg other than the symptoms noted above in the HPI.      Medical, surgical, social, and family histories, medications and allergies reviewed and updated.  Past Medical History:   Diagnosis Date     Anemia     iron deficiency anemia     Anxiety      Breast cancer (H) 2012    Bilateral Masectomies 9/2012     Complications of gastric bypass surgery      COPD (chronic obstructive pulmonary disease) (H) 4/21/2021     Diabetes mellitus (H)     diet controlled     DVT (deep venous thrombosis) (H)      Eczema      Fracture of left knee region     patella - vertical     History of kidney stones      Obesity      Palpitation     with no treatment     Polyneuropathy      Pruritus     generalized     Rosacea      SVT (supraventricular tachycardia) (H)     s/p ablation 5/28/2015 at [facility] for left lateral AP       Past Surgical History:   Procedure Laterality Date     ABDOMEN SURGERY  2000    gastric bypass 2000     COLONOSCOPY  10/11/2012    Procedure: COLONOSCOPY;  colonoscopy;  Surgeon: Gela Cleary MD;  Location:  GI     COLONOSCOPY N/A 10/6/2017    Procedure: COLONOSCOPY;;  Surgeon: Shashank Ortiz MD;  Location:  GI     COLONOSCOPY N/A 3/7/2022    Procedure:  COLONOSCOPY;  Surgeon: Andrea Daniel MD;  Location:  GI     DENTAL SURGERY  5/2007    all teeth removed - dentures     ENT SURGERY      all teeth pulled     ESOPHAGOSCOPY, GASTROSCOPY, DUODENOSCOPY (EGD), COMBINED N/A 10/6/2017    Procedure: COMBINED ESOPHAGOSCOPY, GASTROSCOPY, DUODENOSCOPY (EGD);  ESOPHAGOSCOPY, GASTROSCOPY, DUODENOSCOPY (EGD) & Colonoscopy *Needs INR on arrival;  Surgeon: Shashank Ortiz MD;  Location:  GI     GYN SURGERY  1/10/75    tubal ligation      H ABLATION SVT  5/28/2015    stopped metoprolol     HERNIA REPAIR  2005     IMPLANT FILTER INFERIOR VENA CAVA  10/8/2012    taken out  1/4/12     INSERT TISSUE EXPANDER BREAST BILATERAL  9/5/2012    Procedure: INSERT TISSUE EXPANDER BREAST BILATERAL;;  Surgeon: Orlando Wall MD;  Location:  OR     MASTECTOMY SIMPLE, SENTINEL NODE, COMBINED  9/5/2012    Procedure: COMBINED MASTECTOMY SIMPLE, SENTINEL NODE;  BILATERAL MASTECTOMY WITH LEFT AXILLARY SENTINEL LYMPH NODE BIOPSY, BILATERAL TISSUE EXPANDER        MASTECTOMY, BILATERAL       PANNICULECTOMY N/A 4/5/2018    Procedure: PANNICULECTOMY;  PANNICULECTOMY ;  Surgeon: Orlando Wall MD;  Location:  OR     RECONSTRUCT BREAST BILATERAL, IMPLANT PROSTHESIS BILATERAL, COMBINED  5/22/2013    Procedure: COMBINED RECONSTRUCT BREAST BILATERAL, IMPLANT PROSTHESIS BILATERAL;  BILATERAL SECOND STAGE BREAST RECONSTRUCTION WITH SILICONE GEL IMPLANTS AND LIPOSUCTION;  Surgeon: Orlando Wall MD;  Location: [facility]     RECONSTRUCT NIPPLE BILATERAL  8/22/2013    Procedure: RECONSTRUCT NIPPLE BILATERAL;  BILATERAL NIPPLE AREOLAR RECONSTRUCTION;  Surgeon: Orlando Wall MD;  Location: [facility]     ZHC BREAST RECONSTRUC W OTHR TECHNIQ  5/8/2013     Allergies:  Bacitracin, Dust mites, Nickel, and Phenylene    Social History     Tobacco Use     Smoking status: Former Smoker     Packs/day: 1.50     Years: 25.00     Pack years: 37.50     Types: Cigarettes     Quit date: 10/21/1986      Years since quittin.8     Smokeless tobacco: Never Used     Tobacco comment: quit 1985   Substance Use Topics     Alcohol use: No     Comment: 1 time a year       Family History   Problem Relation Age of Onset     Cancer - colorectal Mother      Colon Cancer Mother      Prostate Cancer Father      Alzheimer Disease Father      Cancer Paternal Grandmother      Alzheimer Disease Paternal Grandfather      Cancer - colorectal Brother      Diabetes Brother      Crohn's Disease Daughter      Diabetes Brother      Eye Disorder Brother      Diabetes Son      Cancer Maternal Uncle      Cancer Maternal Uncle              Objective:  /67 (BP Location: Right arm, Patient Position: Sitting, Cuff Size: Adult Regular)   Pulse 57   Wt 84.8 kg (187 lb)   LMP 1990   SpO2 96%   BMI 33.66 kg/m      Exam:  Constitutional: healthy, alert, no acute distress  Head: Normocephalic. No masses, lesions, no exophthalmos/proptosis  ENT: normal thyroid, small nodule palpated L thyroid, no cervical lymph nodes  Respiratory: nonlabored  Gastrointestinal: Abdomen soft, non-tender.  Musculoskeletal: extremities normal- no gross deformities noted, gait normal and normal muscle tone  Skin: no suspicious lesions or rashes  Neurologic: Gait normal. sensation grossly intact  Psychiatric: mentation appears normal, calm      Lab Results   Component Value Date/Time    TSH 2.15 2022 08:50 AM    TSH 2.09 2019 11:43 AM    VITD25 5.0 (L) 2012 01:11 PM    PTHI 143 (H) 2022 08:50 AM     Last Comprehensive Metabolic Panel:  Sodium   Date Value Ref Range Status   2022 140 133 - 144 mmol/L Final   2021 143 133 - 144 mmol/L Final     Potassium   Date Value Ref Range Status   2022 4.7 3.4 - 5.3 mmol/L Final   2021 4.9 3.4 - 5.3 mmol/L Final     Chloride   Date Value Ref Range Status   2022 111 (H) 94 - 109 mmol/L Final   2021 114 (H) 94 - 109 mmol/L Final     Carbon Dioxide   Date Value  Ref Range Status   03/23/2021 24 20 - 32 mmol/L Final     Carbon Dioxide (CO2)   Date Value Ref Range Status   07/29/2022 26 20 - 32 mmol/L Final     Anion Gap   Date Value Ref Range Status   07/29/2022 3 3 - 14 mmol/L Final   03/23/2021 5 3 - 14 mmol/L Final     Glucose   Date Value Ref Range Status   07/29/2022 107 (H) 70 - 99 mg/dL Final   03/23/2021 96 70 - 99 mg/dL Final     Urea Nitrogen   Date Value Ref Range Status   07/29/2022 22 7 - 30 mg/dL Final   03/23/2021 15 7 - 30 mg/dL Final     Creatinine   Date Value Ref Range Status   07/29/2022 1.42 (H) 0.52 - 1.04 mg/dL Final   03/23/2021 1.32 (H) 0.52 - 1.04 mg/dL Final     GFR Estimate   Date Value Ref Range Status   07/29/2022 38 (L) >60 mL/min/1.73m2 Final     Comment:     Effective December 21, 2021 eGFRcr in adults is calculated using the 2021 CKD-EPI creatinine equation which includes age and gender (Stephanie et al., NEJM, DOI: 10.1056/KKTQqk2438906)   03/23/2021 40 (L) >60 mL/min/[1.73_m2] Final     Comment:     Non  GFR Calc  Starting 12/18/2018, serum creatinine based estimated GFR (eGFR) will be   calculated using the Chronic Kidney Disease Epidemiology Collaboration   (CKD-EPI) equation.       GFR, ESTIMATED POCT   Date Value Ref Range Status   06/16/2022 36 (L) >60 mL/min/1.73m2 Final     Calcium   Date Value Ref Range Status   07/29/2022 8.7 8.5 - 10.1 mg/dL Final   03/23/2021 8.7 8.5 - 10.1 mg/dL Final     PET Scan 8/1/2022:     MRI Brain 7/25/2022:  FINDINGS: Mild parenchymal volume loss is present. Scattered  frontoparietal predominance and sara white matter T2 hyperintensities  likely represent chronic small vessel ischemic change. No evidence of  recent ischemia or hemorrhage.  .  Patchy areas of marrow enhancement is present involving the bilateral  frontal bones at the midline measuring approximately 1.1 cm.  Questionable intracranial extension.     Also demonstrates patchy areas of T2 hyperintense signal by T1  hypointense  signal, and enhancement within the left frontal bone and  left parietal bone.     Patchy enhancement T1 hyperintense signal within the posterior right  parietal bone which could represent a benign intraosseous cavernous  venous malformation.     Mild paranasal sinus mucosal thickening. The visualized tympanic and  mastoid cavities are unremarkable. Bilateral lens replacements.                                                                      IMPRESSION:  1. Patchy marrow signal abnormalities most consistent with osseous  metastatic disease.  2. No brain metastases are identified.  3. White matter T2 hyperintensities likely represent chronic small  vessel ischemic change.        ASSESSMENT / PLAN:  No diagnosis found.    1) Elevated PTH  - Possibly appropriately elevated in the setting of low calcium intake.  Strongly encouraged adequate calcium daily with 1200 to 1500 mg calcium supplement.  I also encouraged her to get any calcium in her diet that she can  -We will check vitamin D level, continue vitamin D supplement  -After 8 weeks of adequate calcium intake, we will repeat all levels including parathyroid hormone, vitamin D, calcium, albumin  -If parathyroid hormone has corrected, no need for further work-up.  If PTH remains persistently elevated would recommend 24-hour urine screening to evaluate for secondary hyper parathyroidism versus normocalcemic primary hyperparathyroid  1) Serum calcium (>upper limit of normal): 1.0 mg/dL (0.25 mmol/L);  2) BMD by DXA: T-score ?2.5 at lumbar spine, total hip, femoral neck, or distal 1/3 radius;  3) Vertebral fracture by x-ray, CT, MRI, or VFA;  4) Creatinine clearance <60 cc/min; 24-h urine for calcium >400 mg/d (>10 mmol/d) and increased stone risk by biochemical stone risk analysis;  5) Presence of nephrolithiasis or nephrocalcinosis by x-ray, ultrasound, or CT;  6) Age <50 years      2) Ostepoenia  - Check dexa scan including wrist    Call with results, doesn't  check mychart    Return to clinic: 3  months    A total of 58 minutes were spent today 08/19/22 on this visit including chart review, history and counseling, documentation and other activities as detailed above.

## 2022-08-19 NOTE — PATIENT INSTRUCTIONS
Take 0697-1308 mg of elemental calcium each day.  This can be in the form of food or over the counter supplements      Parathyroid Hormone-- controls calcium level    Our goal:  1) Take calcium supplement (and any extra dietary calcium you can get)   2) Continue vitamin d 5000u daily  3) Do this for 8 weeks  4) At 8 weeks, check your labs. We'll recheck your calcium, parathyroid hormone (PTH) and vitamin d.   - If normal, nothing else to do. Continue with calcium supplementation   - If PTH is still high despite taking extra calcium and having a normal vitamin d level, the you'll complete a 24hr urine collection to confirm that you have primary hyperparathyroidism (overactive parathyroid gland)  5) DEXA scan  6) IF parathyroid hormone returns normal, we'll do ultrasound to look only at your thyroid.

## 2022-08-22 ENCOUNTER — TELEPHONE (OUTPATIENT)
Dept: ONCOLOGY | Facility: CLINIC | Age: 77
End: 2022-08-22

## 2022-08-22 NOTE — TELEPHONE ENCOUNTER
Left voicemail message for patient requesting a return call. Dr Bowen does want patient to return with imaging in 4 months, if she is stable Dr will change visits to yearly.

## 2022-08-29 ENCOUNTER — TELEPHONE (OUTPATIENT)
Dept: PEDIATRICS | Facility: CLINIC | Age: 77
End: 2022-08-29

## 2022-08-29 NOTE — TELEPHONE ENCOUNTER
"Received call from the pt.    Pt was seen in the ED yesterday ([facility]) for a laceration on left arm. Pt was encouraged to follow-up with PCP within 2-3 days.    Pt's arm is currently bandaged and using a Vaseline-product as barrier, as pt is allergic to bacitracin cream. Pt denies significant pain, redness or swelling at the site, or fever.    Pt agreed to monitor arm and symptoms, and if worsens or does not continue to heal, to call [facility] ([phone]) and can scheduled appt with a provider to assess.      - Oliverio \"Yomi\" Huyen (he/him/his), RN - Patient Advocate Liason (PAL)  [facility]    "

## 2022-09-02 ENCOUNTER — TELEPHONE (OUTPATIENT)
Dept: PEDIATRICS | Facility: CLINIC | Age: 77
End: 2022-09-02

## 2022-09-02 ENCOUNTER — TELEPHONE (OUTPATIENT)
Dept: ONCOLOGY | Facility: CLINIC | Age: 77
End: 2022-09-02

## 2022-09-02 DIAGNOSIS — C50.912 MALIGNANT NEOPLASM OF LEFT BREAST (H): Primary | ICD-10-CM

## 2022-09-02 DIAGNOSIS — Z90.13 ABSENCE OF BREAST, BILATERAL: ICD-10-CM

## 2022-09-02 NOTE — TELEPHONE ENCOUNTER
Please contact patient regarding bra after breast surgery. Patient states Dr Bowen was going to send a referral for bras, but patient has not heard anything. Please call [phone]

## 2022-09-02 NOTE — TELEPHONE ENCOUNTER
Order placed for breast prosthesis and mastectomy bras. LM for Gemma to call back to update on where this should be faxed.    Arminda Calle RN

## 2022-09-02 NOTE — TELEPHONE ENCOUNTER
"The pt called.    Seeking to schedule nurse only appointment to remove sutures. Pt scheduled.    Pt admits to seeing a small amount of yellowish discharge a day or two ago, but has not returned or persisted. Pt denies fevers, pain, swelling.      - Oliverio \"Yomi\" Huyen (he/him/his), RN - Patient Advocate Liason (PAL)  [facility]    "

## 2022-09-06 ENCOUNTER — VIRTUAL VISIT (OUTPATIENT)
Dept: PHARMACY | Facility: CLINIC | Age: 77
End: 2022-09-06
Payer: COMMERCIAL

## 2022-09-06 DIAGNOSIS — F41.9 ANXIETY: ICD-10-CM

## 2022-09-06 DIAGNOSIS — E78.5 HYPERLIPIDEMIA LDL GOAL <100: ICD-10-CM

## 2022-09-06 DIAGNOSIS — E21.3 HYPERPARATHYROIDISM (H): Primary | ICD-10-CM

## 2022-09-06 DIAGNOSIS — K59.00 CONSTIPATION, UNSPECIFIED CONSTIPATION TYPE: ICD-10-CM

## 2022-09-06 DIAGNOSIS — F32.A DEPRESSION, UNSPECIFIED DEPRESSION TYPE: ICD-10-CM

## 2022-09-06 DIAGNOSIS — Z78.9 TAKES DIETARY SUPPLEMENTS: ICD-10-CM

## 2022-09-06 DIAGNOSIS — Z86.718 PERSONAL HISTORY OF DVT (DEEP VEIN THROMBOSIS): ICD-10-CM

## 2022-09-06 DIAGNOSIS — Z85.3 PERSONAL HISTORY OF MALIGNANT NEOPLASM OF BREAST: ICD-10-CM

## 2022-09-06 DIAGNOSIS — G44.209 TENSION HEADACHE: ICD-10-CM

## 2022-09-06 DIAGNOSIS — J44.9 CHRONIC OBSTRUCTIVE PULMONARY DISEASE, UNSPECIFIED COPD TYPE (H): ICD-10-CM

## 2022-09-06 PROCEDURE — 99606 MTMS BY PHARM EST 15 MIN: CPT | Performed by: PHARMACIST

## 2022-09-06 PROCEDURE — 99607 MTMS BY PHARM ADDL 15 MIN: CPT | Performed by: PHARMACIST

## 2022-09-06 RX ORDER — IBUPROFEN 200 MG
1 CAPSULE ORAL 2 TIMES DAILY
Qty: 180 TABLET | Refills: 1 | Status: SHIPPED | OUTPATIENT
Start: 2022-09-06 | End: 2023-11-07

## 2022-09-06 RX ORDER — ALBUTEROL SULFATE 90 UG/1
2 AEROSOL, METERED RESPIRATORY (INHALATION) EVERY 6 HOURS PRN
Qty: 18 G | Refills: 1 | Status: SHIPPED | OUTPATIENT
Start: 2022-09-06 | End: 2023-05-23

## 2022-09-06 NOTE — PROGRESS NOTES
Medication Therapy Management (MTM) Encounter    ASSESSMENT:                            Medication Adherence/Access: No issues identified    Hyperlipidemia: given her hesitancy, would advise we repeat fasting lipid panel when she sees primary care provider to determine restarting statin as she did not have any adverse drug reaction from atorvastatin. She likely benefits given her venous insufficiency and clot history.    Recurrent DVT history: maintain on DOAC. Suggest she follow-up with audiology and eye clinic for her glasses concern.    Anxiety/Depression:  Stable.    COPD: stable.    Hyperparathyroidism/supplements/constipation: Patient may benefit from changing calcium carbonate to calcium citrate to see if this may be better tolerating as calcium carbonate most likely to cause constipation. She needs supplementation based on her diet, likely not meeting calcium goal without supplementation. She may start over-the-counter magnesium which may help with her constipation.  Continue with endocrinologist as directed.     Headache: no changes as directed by neurologist, follow-up on brain MRI with oncology first.    History of breast CA: continue with oncologist recommendations.    PLAN:                            You can start the over-the-counter magnesium oxide 400 mg daily. May help loosen stools for constipation.   Change calcium citrate 1 tab twice daily. Sent rx per patient request.  Follow-up with PCP if constipation if not improved. Would consider Miralax.     Get fasting cholesterol panel when you see primary care provider to consider restarting the atorvastatin again.    Regarding the falls - follow-up on the hearing aids and eye clinic regarding your glasses. Does not seem to be related to the medications.    Refilled inhalers.    Follow-up: 6 months.     SUBJECTIVE/OBJECTIVE:                          Gemma Cowart is a 76 year old female called for a follow-up visit.  Today's visit is a follow-up MTM  visit from 6/1/2022 with Adenike Mason PharmD. Also follow-up from [facility] ED visit on 8/28/22 for laceration.     Reason for visit: medication follow-up.    Allergies/ADRs: Reviewed in chart  Past Medical History: Reviewed in chart  Tobacco: She reports that she quit smoking about 35 years ago. Her smoking use included cigarettes. She has a 37.50 pack-year smoking history. She has never used smokeless tobacco.  Alcohol: rare only holidays    Medication Adherence/Access: Patient takes medications directly from bottles and uses pill box(es).  Patient takes medications 2 time(s) per day. She denies any missed doses.   Medication barriers: calcium - see below  The patient fills medications at [facility]: NO, fills medications at [facility] in [place].     Hyperlipidemia: Current therapy includes none. She did not restart the atorvastatin 10 mg daily. She cannot confidently recall if she was statin when she had labs check in feb and seems to question if needed. She reports being told it was stable so did not restart.   The 10-year ASCVD risk score ([place] DC Jr., et al., 2013) is: 23.9%    Values used to calculate the score:      Age: 76 years      Sex: Female      Is Non- : No      Diabetic: Yes      Tobacco smoker: No      Systolic Blood Pressure: 115 mmHg      Is BP treated: No      HDL Cholesterol: 62 mg/dL      Total Cholesterol: 184 mg/dL  Recent Labs   Lab Test 02/01/22  0850 12/17/20  0806 10/12/16  0915 09/25/15  0939 08/08/15  1047   CHOL 184 186   < > 147 188   HDL 62 59   < > 60 54   * 107*   < > 63 106   TRIG 104 98   < > 121 138   CHOLHDLRATIO  --   --   --  2.5 3.5    < > = values in this interval not displayed.     Recurrent DVT history: Currently on Eliquis 5mg twice daily (indefinitely). No bleeding or bruising reported. Patient denies a history of GIB. Referred to vascular for ongoing swelling, seen in June 2022 Dr. Farias. Patient was positive for embolism with plan to  continue DOAC indefinitely and encouraged compression which patient has not favored in the past.  She reports a recent new episode of falls, fallen twice in her garden. She reports thinks maybe due to knee weakness and when she bends down or possibly from her hearing aids and glassess. Denies any dizziness or lightheadedness from falling. Did not hit her head.     Anxiety/Depression: Currently taking sertaline 100 mg daily. No longer buspirone 10 mg twice daily. She was switched from paroxetine to sertraline. She reports anxiety is controlled while on the medication. No low mood concerns either.  PHQ 3/9/2022 4/5/2022 6/1/2022   PHQ-9 Total Score 11 1 4   Q9: Thoughts of better off dead/self-harm past 2 weeks Not at all Not at all Not at all       ELI-7 SCORE 6/15/2021 3/9/2022 6/1/2022   Total Score - - -   Total Score 3 11 3     COPD: Current medications: Short-Acting Bronchodilator: Albuterol MDI as needed - not used in 4 weeks, Spiriva 2 puffs every morning. She reports significant difference in her breathing and clear her brain function/clarity.  Patient does have an COPD Action Plan on file.    Hyperparathyroidism/supplements/constipation:  She is having issues with constipation. She reports rarely has dairy due to constipation as well from that. She does often have salads with una, kale and spinach though. She also has a history of gastric bypass. She also has a bottle of magnesium oxide 400mg that she did not start but a friend suggest for leg pains.    2 tablets of calcium carbonate 600 mg split twice daily with food  Vitamin D - she reports had a bottle of 2000IU so was alternating 4000 international unit(s) and 5000 international unit(s) daily. She thinks she has about 1 month of the 5000 units left.   twice daily and vitamin D 5000 units daily.   Vitamin B12 2500 mcg daily   Side effects: constipation - no longer having bowel movement daily, now bowel movement once every 4 days.   Dr. Marrero -  endocrinologist.    Lab Results   Component Value Date    PTHI 143 (H) 02/01/2022    VITDT 21 02/01/2022    LADI 8.7 07/29/2022     Magnesium   Date Value Ref Range Status   12/24/2018 2.2 1.6 - 2.3 mg/dL Final       Headaches: works with Neurology, Dr. May visit on 7/12/2022, recommend brain MRI. Completed on 7/25 brain MRI, demonstrated bone marrow lesions due to possible osseous metastatic disease per Dr. Vincent note on 7/26 who recommend follow-up with oncologist and if pains continue then can consider occipital nerve blocks/other treatments.  No concerns reported today.    History of breast CA: follow-up with oncologist, Dr. Bowen on 8/4/2022 to follow-up on MRI of the brain with plan to repeat MRI in 4 months.    Today's Vitals: LMP 09/24/1990   ----------------  Post Discharge Medication Reconciliation Status: discharge medications reconciled and changed, per note/orders.    I spent 30 minutes with this patient today. All changes were made via collaborative practice agreement with ANTON Armas CNP. A copy of the visit note was provided to the patient's provider(s).    The patient was mailed a summary of these recommendations.     Jo Vásquez, PharmD  Medication Therapy Management Pharmacist      Telemedicine Visit Details  Type of service:  Telephone visit  Start Time: 8:02am  End Time: 8:32am  Originating Location (patient location): [place]  Distant Location (provider location):  [facility]     Medication Therapy Recommendations  Hyperparathyroidism (H)    Current Medication: calcium citrate (CITRACAL) 950 (200 Ca) MG tablet   Rationale: Undesirable effect - Adverse medication event - Safety   Recommendation: Change Medication   Status: Accepted per CPA         Takes dietary supplements    Rationale: Synergistic therapy - Needs additional medication therapy - Indication   Recommendation: Start Medication - magnesium oxide 400 MG tablet   Status: Accepted - no CPA  Needed

## 2022-09-07 ENCOUNTER — ALLIED HEALTH/NURSE VISIT (OUTPATIENT)
Dept: PEDIATRICS | Facility: CLINIC | Age: 77
End: 2022-09-07
Payer: COMMERCIAL

## 2022-09-07 DIAGNOSIS — Z48.02 ENCOUNTER FOR REMOVAL OF SUTURES: Primary | ICD-10-CM

## 2022-09-07 PROCEDURE — 99207 PR NO CHARGE NURSE ONLY: CPT

## 2022-09-07 NOTE — NURSING NOTE
Gemma WYATT Sofya presents to the clinic today for removal of sutures.  The patient has had the sutures in place for 10 days.  There has been no history of infection or drainage. Patient reports one suture fell out on its own 3 days ago.   4 sutures are seen located on the right forearm.  The wound is healing well with no signs of infection.  Tetanus status is up to date.   All sutures were easily removed today.  Routine wound care discussed. Reviewed signs/symptoms of infection with the patient, she verbally understood. The patient will follow up as needed.    Krala ACUNA RN on 9/7/2022 at 8:39 AM

## 2022-09-19 ENCOUNTER — TELEPHONE (OUTPATIENT)
Dept: PEDIATRICS | Facility: CLINIC | Age: 77
End: 2022-09-19

## 2022-09-19 NOTE — TELEPHONE ENCOUNTER
Could you PVP to find out exactly what she'd like to discuss tomorrow so I can prepare? Be sure to list each issue.    Also, if possible she should come fasting

## 2022-09-19 NOTE — TELEPHONE ENCOUNTER
Called and spoke with the patient regarding her upcoming appointment tomorrow (9/20/2022) at 10:30 AM with Dr. Loyola   - Informed the patient to come to her appointment fasting   - Patient verbalized understanding and agrees with the plan     Pre-visit planning complete.     Caroline SPAIN RN   Patient Advocate Liaison (PAL)  [facility]

## 2022-09-26 ENCOUNTER — PATIENT OUTREACH (OUTPATIENT)
Dept: GERIATRIC MEDICINE | Facility: CLINIC | Age: 77
End: 2022-09-26

## 2022-09-26 ENCOUNTER — VIRTUAL VISIT (OUTPATIENT)
Dept: PEDIATRICS | Facility: CLINIC | Age: 77
End: 2022-09-26
Payer: COMMERCIAL

## 2022-09-26 DIAGNOSIS — R06.02 SOB (SHORTNESS OF BREATH): ICD-10-CM

## 2022-09-26 DIAGNOSIS — M85.89 OTHER SPECIFIED DISORDERS OF BONE DENSITY AND STRUCTURE, MULTIPLE SITES: ICD-10-CM

## 2022-09-26 DIAGNOSIS — L40.9 PSORIASIS: Chronic | ICD-10-CM

## 2022-09-26 DIAGNOSIS — E11.69 TYPE 2 DIABETES MELLITUS WITH OTHER SPECIFIED COMPLICATION, WITHOUT LONG-TERM CURRENT USE OF INSULIN (H): ICD-10-CM

## 2022-09-26 DIAGNOSIS — E66.01 MORBID OBESITY (H): ICD-10-CM

## 2022-09-26 DIAGNOSIS — S51.811D SKIN TEAR OF RIGHT FOREARM WITHOUT COMPLICATION, SUBSEQUENT ENCOUNTER: ICD-10-CM

## 2022-09-26 DIAGNOSIS — M85.80 OSTEOPENIA, UNSPECIFIED LOCATION: ICD-10-CM

## 2022-09-26 DIAGNOSIS — R20.2 PARESTHESIA: Primary | ICD-10-CM

## 2022-09-26 PROCEDURE — 99443 PR PHYSICIAN TELEPHONE EVALUATION 21-30 MIN: CPT | Mod: 95 | Performed by: NURSE PRACTITIONER

## 2022-09-26 RX ORDER — CLOBETASOL PROPIONATE 0.5 MG/G
CREAM TOPICAL
Qty: 60 G | Refills: 3 | Status: SHIPPED | OUTPATIENT
Start: 2022-09-26 | End: 2024-09-24

## 2022-09-26 NOTE — PROGRESS NOTES
[facility] Care Coordination Contact      [facility] Six-Month Telephone Assessment    6 month telephone assessment completed on 09/26/22.    ER visits: Yes -  [facility] x2 due to skin tear from fall  Hospitalizations: No  TCU stays: No  Significant health status changes: No  Falls/Injuries: Yes: Report 3-4 falls and injury to arm. Report that she fell due to wrong prescription glasses that is causing her dizziness. She is in the process of getting new glasses and hopefully that will fix the issue.  ADL/IADL changes: No  Changes in services: No    Caregiver Assessment follow up:  N/A    Goals: See POC in chart for goal progress documentation.      Will see member in 6 months for an annual health risk assessment.   Encouraged member to call CC with any questions or concerns in the meantime.     Ifrah Emerson RN  [facility]  [phone]

## 2022-09-26 NOTE — PROGRESS NOTES
Gemma is a 76 year old who is being evaluated via a billable telephone visit.        Assessment & Plan     (R20.2) Paresthesia  (primary encounter diagnosis)  Comment: Notes an occasional split second tingling feeling on various places in her body. Doesn't happen every day. Overall I'm not terribly concerned as this is non focal and recently had a brain MRI  Plan: Vitamin B12  -Check B12. If it progresses, follow-up with me or neurology.     (E11.69) Type 2 diabetes mellitus with other specified complication, without long-term current use of insulin (H)  Comment: Well controlled. Due for recheck  Plan: MISCELLANEOUS DME SUPPLY OR ACCESSORY, NOT         OTHERWISE SPECIFIED  -Declines statin. Recheck lipids at next visit  -Due for diabetes visit. Schedule labs and follow-up with me    (M85.80) Osteopenia, unspecified location  Comment: Recommended by endo  Plan: DX Hip/Pelvis/Spine    (S51.811D) Skin tear of right forearm without complication, subsequent encounter  Comment: Healing very well. Sutures removed by RN    (M85.89) Other specified disorders of bone density and structure, multiple sites   Plan: DX Hip/Pelvis/Spine    (R06.02) SOB (shortness of breath)  Comment: Notes shortness of breath on exertion, ongoing. Upon chart review, it sounds like she was having dyspnea on exertion. Was thought to be related to COPD given hyperinflation seen on imaging. To my knowledge no stress test was done. Was started on Advair but it caused mouth sores, so she stopped. Did get some improvement with the albuterol. Also started on spiriva but unclear if this helped.  Plan: NM Lexiscan stress test, REVIEW OF HEALTH         MAINTENANCE PROTOCOL ORDERS  -Lung function testing    (L40.9) Psoriasis  Comment: Needs refill  Plan: clobetasol (TEMOVATE) 0.05 % external cream            (E66.01) Morbid obesity (H)  Comment: Discussed lifestyle changes     BMI:   Estimated body mass index is 33.66 kg/m  as calculated from the  "following:    Height as of 7/12/22: 1.588 m (5' 2.5\").    Weight as of 8/19/22: 84.8 kg (187 lb).   Weight management plan: Discussed healthy diet and exercise guidelines        No follow-ups on file.    Arminda Loyola, ANTON CNP  [facility] JAY Menendez is a 76 year old, presenting for the following health issues:  No chief complaint on file.      HPI           Review of Systems   Constitutional, HEENT, cardiovascular, pulmonary, gi and gu systems are negative, except as otherwise noted.      Objective           Vitals:  No vitals were obtained today due to virtual visit.    Physical Exam   N/A    Phone call duration: 36 minutes. An additional 12 minutes spent in chart review      "

## 2022-09-27 ENCOUNTER — TELEPHONE (OUTPATIENT)
Dept: PEDIATRICS | Facility: CLINIC | Age: 77
End: 2022-09-27

## 2022-09-27 DIAGNOSIS — M79.673 PAIN OF FOOT, UNSPECIFIED LATERALITY: Primary | ICD-10-CM

## 2022-09-27 NOTE — TELEPHONE ENCOUNTER
Called patient to schedule dexa with labs (already on the books), and inform her the she does not need to follow-up with oncology, and will need a podiatry referral for possible diabetic shoes or orthotics. No answer, [facility] requesting a call back directly to PAL extension.     Lisset Rosado, [facility]  [phone]

## 2022-10-03 NOTE — TELEPHONE ENCOUNTER
Called patient,     Patient would like a referral for podiatry. Pended.     Patient is scheduled for labs and Dexa. Patient is aware she does not need to meet with oncology regarding recent imaging.     Lisset Rosado, [facility]  [phone]

## 2022-10-04 ENCOUNTER — TRANSFERRED RECORDS (OUTPATIENT)
Dept: HEALTH INFORMATION MANAGEMENT | Facility: CLINIC | Age: 77
End: 2022-10-04

## 2022-10-05 ENCOUNTER — HOSPITAL ENCOUNTER (OUTPATIENT)
Dept: NUCLEAR MEDICINE | Facility: CLINIC | Age: 77
Setting detail: NUCLEAR MEDICINE
Discharge: HOME | End: 2022-10-05
Attending: NURSE PRACTITIONER
Payer: COMMERCIAL

## 2022-10-05 ENCOUNTER — HOSPITAL ENCOUNTER (OUTPATIENT)
Dept: CARDIOLOGY | Facility: CLINIC | Age: 77
Discharge: HOME | End: 2022-10-05
Attending: NURSE PRACTITIONER
Payer: COMMERCIAL

## 2022-10-05 ENCOUNTER — TELEPHONE (OUTPATIENT)
Dept: PEDIATRICS | Facility: CLINIC | Age: 77
End: 2022-10-05

## 2022-10-05 DIAGNOSIS — R06.02 SOB (SHORTNESS OF BREATH): ICD-10-CM

## 2022-10-05 LAB
CV STRESS MAX HR HE: 82
RATE PRESSURE PRODUCT: NORMAL
STRESS ECHO BASELINE DIASTOLIC HE: 76
STRESS ECHO BASELINE HR: 59 BPM
STRESS ECHO BASELINE SYSTOLIC BP: 153
STRESS ECHO CALCULATED PERCENT HR: 57 %
STRESS ECHO LAST STRESS DIASTOLIC BP: 67
STRESS ECHO LAST STRESS SYSTOLIC BP: 143
STRESS ECHO TARGET HR: 145
STRESS/REST PERFUSION RATIO: 1.18

## 2022-10-05 PROCEDURE — 250N000011 HC RX IP 250 OP 636

## 2022-10-05 PROCEDURE — 343N000001 HC RX 343: Performed by: NURSE PRACTITIONER

## 2022-10-05 PROCEDURE — 93017 CV STRESS TEST TRACING ONLY: CPT

## 2022-10-05 PROCEDURE — 78452 HT MUSCLE IMAGE SPECT MULT: CPT

## 2022-10-05 PROCEDURE — 93016 CV STRESS TEST SUPVJ ONLY: CPT | Performed by: INTERNAL MEDICINE

## 2022-10-05 PROCEDURE — 93018 CV STRESS TEST I&R ONLY: CPT | Performed by: INTERNAL MEDICINE

## 2022-10-05 PROCEDURE — A9502 TC99M TETROFOSMIN: HCPCS | Performed by: NURSE PRACTITIONER

## 2022-10-05 PROCEDURE — 78452 HT MUSCLE IMAGE SPECT MULT: CPT | Mod: 26 | Performed by: INTERNAL MEDICINE

## 2022-10-05 RX ORDER — REGADENOSON 0.08 MG/ML
INJECTION, SOLUTION INTRAVENOUS
Status: COMPLETED
Start: 2022-10-05 | End: 2022-10-05

## 2022-10-05 RX ADMIN — TETROFOSMIN 30 MCI.: 1.38 INJECTION, POWDER, LYOPHILIZED, FOR SOLUTION INTRAVENOUS at 10:39

## 2022-10-05 RX ADMIN — TETROFOSMIN 11 MCI.: 1.38 INJECTION, POWDER, LYOPHILIZED, FOR SOLUTION INTRAVENOUS at 08:14

## 2022-10-05 RX ADMIN — REGADENOSON 0.4 MG: 0.08 INJECTION, SOLUTION INTRAVENOUS at 10:10

## 2022-10-05 NOTE — TELEPHONE ENCOUNTER
Pt called and informed of stress test. Left direct number for call back.     Aparna Dupree, EMT at 4:06 PM on October 5, 2022  [facility] Health Guide  [phone]

## 2022-10-07 NOTE — TELEPHONE ENCOUNTER
Talked with Gemma and gave her the information. She has no issues going on and stated she is doing well. Ending encounter.     Aparna Dupree, EMT at 1:13 PM on October 7, 2022  [facility] Health Guide  [phone]

## 2022-10-10 NOTE — PROGRESS NOTES
Gemma is a 76 year old who is being evaluated via a billable telephone visit.      What phone number would you like to be contacted at? [phone]  How would you like to obtain your AVS? Mail a copy  Phone call duration: 66 minutes    10/12/2022    Referring Provider: Alan Bowen MD    Present for Today's Visit: Gemma    Presenting Information:   I met with Gemma Cowart today for genetic counseling (telephone visit due to covid19) to discuss her personal and family history of  cancer.  She is here today to review this history, cancer screening recommendations, and available genetic testing options.    Personal History:  Gemma is a 76 year old female. She was diagnosed with a left breast cancer (inviasive ductal carcinoma, ER positive, WI positive, and HER2 negative) at age 66; treatment included bilateral mastectomies with reconstruction. She was on Femara until 2016. She also has a history of hyperparathyroidism (currently being worked-up) and bilateral DVTs.       She had her first menstrual period at age 12, her first child at age 19, and reports that she went through menopause at age 41. Gemma has her ovaries, fallopian tubes and uterus in place.  She reports past history of oral contraceptive use for about 20 years and that she was on hormone replacement therapy (estrogen) for less than 5 years.      Most recent colonoscopy in 2022 was normal and follow-up was recommended as needed. Colonoscopy in 2017 was normal, colonoscopy in 2014 resulted in the removal of four (3-8mm) tubular adenomas, colonoscopy in 2012 resulted in the removal of one tubular adenoma (3mm) and one sessile serrated adenoma (10mm). She reports that she's had additional colonoscopies prior to 2012 (we do not have record of), but does not recall any polyps being removed. She does not regularly do any other cancer screening at this time.  Gemma reported past (quit in 1985) tobacco use, and about 1 drink per year for alcohol  use.    Family History: Cancer family history and pertinent information reviewed below (Please see scanned pedigree for detailed family history information)    Brother, age 74, has a history of colon cancer (2 foci) diagnosed in his 50's and had about 36 inches of his colon removed per Gemma.    Brother, age 73, has a history of prostate cancer diagnosed in his mid 60's, Gemma reports that it was aggressive, but as far as she knows not metastatic. This brother has a daughter with a history of uterine cancer diagnosed in her mid 40's.     Mother passed at age 74 from colon cancer diagnosed at age 72.    Maternal uncle passed in his early 60's from lung cancer. He was a smoker.     Maternal first-cousin has a history of uterine cancer diagnosed in her 20's.     Father passed at age 78 with a history of prostate cancer diagnosed at age 50.     Paternal grandmother passed at age 62 from bone cancer (? Primary vs metastases).     There is no known Ashkenazi Alevism ancestry on either side of her family. There is no reported consanguinity.    Discussion:    Gemma's personal and family history of cancer is suggestive of a hereditary cancer syndrome.    We reviewed the features of sporadic, familial, and hereditary cancers. In looking at Gemma's family history, it is possible that a cancer susceptibility gene is present due to her breast cancer history, her brother's colon cancer history, her brother's prostate cancer history, her mother's colon cancer history, her father's prostate cancer history, her niece's early-onset uterine cancer history, and her maternal first-cousin's early-onset uterine cancer history.    We discussed the natural history and genetics of hereditary cancers. A detailed handout regarding the information we discussed will be sent to Gemma in [place] mail. Topics included: inheritance pattern, cancer risks, cancer screening recommendations, and also risks, benefits and limitations of testing.  We reviewed  that the most common cause of hereditary breast cancer is Hereditary Breast and Ovarian Cancer (HBOC) syndrome, which is caused by mutations in the genes BRCA1 and BRCA2. Women with a mutation in either of these genes are at increased risk for breast and ovarian cancer. There is also an increased risk for a second primary breast cancer for women. Men with a mutation in either BRCA1 or BRCA2 are at increased risk for male breast and prostate cancer. Both women and men may also be at increased risk for pancreatic cancer and melanoma.   Gaxiola syndrome can be caused by a mutation in one of five genes:  MLH1, MSH2, MSH6, PMS2, and EPCAM.  Gaxiola syndrome may present with polyps, but typically a small number.  The highest cancer risks associated with Gaxiola syndrome include colon cancer, endometrial/uterine cancer, gastric cancer, and ovarian cancer.    Based on her personal and family history, Gemma meets current National Comprehensive Cancer Network (NCCN) criteria for genetic testing of high-penetrance breast cancer susceptibility genes (BRCA1, BRCA2, CDH1, PALB2, PTEN, and TP53) as well as Gaxiola syndrome.      We discussed that there are additional genes that could cause increased risk for breast, gynecologic, gastrointestinal, prostate, and related cancers. As many of these genes present with overlapping features in a family and accurate cancer risk cannot always be established based upon the pedigree analysis alone, it would be reasonable for Gemma to consider panel genetic testing to analyze multiple genes at once.    We reviewed genetic testing options given Gemma's personal and family history including targeted and expanded options. After our discussion, Gemma opted to proceed with genetic testing via BRCA1/2 analysis with automatic reflex to the Common Hereditary Cancers panel through [facility].   Genetic testing is available for 47 genes associated with cancers of the breast, ovary, uterus, prostate and  gastrointestinal system: [facility] Common Hereditary Cancers panel (APC, NENO, AXIN2, BARD1, BMPR1A, BRCA1, BRCA2, BRIP1, CDH1, CDK4, CDKN2A, CHEK2, CTNNA1, DICER1, EPCAM, GREM1, HOXB13, KIT, MEN1, MLH1, MSH2, MSH3, MSH6, MUTYH, NBN, NF1, NTHL1, PALB2, PDGFRA, PMS2, POLD1, POLE, PTEN,RAD50, RAD51C, RAD51D, SDHA, SDHB, SDHC, SDHD, SMAD4, SMARCA4, STK11, TP53,TSC1, TSC2, VHL).    We discussed that many of these genes are associated with specific hereditary cancer syndromes and published management guidelines: Hereditary Breast and Ovarian Cancer syndrome (BRCA1, BRCA2), Gaxiola syndrome (MLH1, MSH2, MSH6, PMS2, EPCAM), Familial Adenomatous Polyposis (APC), Hereditary Diffuse Gastric Cancer (CDH1), Familial Atypical Multiple Mole Melanoma syndrome (CDK4, CDKN2A), Multiple Endocrine Neoplasia type 1 (MEN1), Juvenile Polyposis syndrome (BMPR1A, SMAD4), Cowden syndrome (PTEN), Li Fraumeni syndrome (TP53), Hereditary Paraganglioma and Pheochromocytoma syndrome (SDHA, SDHB, SDHC, SDHD), Peutz-Jeghers syndrome (STK11), MUTYH Associated Polyposis (MUTYH), Tuberous sclerosis complex (TSC1, TSC2), Von Hippel-Lindau disease (VHL), and Neurofibromatosis type 1 (NF1).   The NENO, AXIN2, BRIP1, CHEK2, GREM1, MSH3, NBN, NTHL1, PALB2, POLD1, POLE, RAD51C, and RAD51D genes are associated with increased cancer risk and have published management guidelines for certain cancers.   The remaining genes (BARD1, CTNNA1, DICER1, HOXB13, KIT, PDGFRA, RAD50, and SMARCA4) are associated with increased cancer risk and may allow us to make medical recommendations when mutations are identified.     Consent was obtained over the phone with no witness required due to the current covid19 global pandemic.    Medical Management: For Gemma, we reviewed that the information from genetic testing may determine:    additional cancer screening for which Gemma may qualify (i.e. more frequent colonoscopies, more frequent dermatologic exams, etc.),    options for risk  reducing surgeries Gemma could consider (i.e. surgery to remove her ovaries and/or uterus, etc.),      and targeted chemotherapies for Gemma if she were to develop certain cancers in the future (i.e. immunotherapy for individuals with Gaxiola syndrome, PARP inhibitors, etc.).     These recommendations will be discussed in detail once genetic testing is completed.     Gemma will be drawn at her upcoming lab appointment on 10/18 at the [facility].     Plan:  1) Today Gemma elected to proceed with BRCA1/2 analysis with automatic reflex to Common Hereditary Cancers panel genetic testing (47 genes) through [facility].  2) This information should be available in approximately 3 weeks.  3) Gemma will be contacted by our scheduling department to set up a result disclosure appointment.     Rosita Cárdenas MS, [facility]  Licensed, Certified Genetic Counselor  [facility]  [email or web address]

## 2022-10-12 ENCOUNTER — VIRTUAL VISIT (OUTPATIENT)
Dept: ONCOLOGY | Facility: CLINIC | Age: 77
End: 2022-10-12
Attending: INTERNAL MEDICINE
Payer: COMMERCIAL

## 2022-10-12 DIAGNOSIS — Z80.0 FAMILY HISTORY OF COLON CANCER: ICD-10-CM

## 2022-10-12 DIAGNOSIS — Z85.3 PERSONAL HISTORY OF MALIGNANT NEOPLASM OF BREAST: Primary | ICD-10-CM

## 2022-10-12 DIAGNOSIS — Z80.42 FAMILY HISTORY OF PROSTATE CANCER: ICD-10-CM

## 2022-10-12 DIAGNOSIS — Z80.49 FAMILY HISTORY OF UTERINE CANCER: ICD-10-CM

## 2022-10-12 DIAGNOSIS — C79.51 BONE METASTASES: ICD-10-CM

## 2022-10-12 PROCEDURE — 96040 HC GENETIC COUNSELING, EACH 30 MINUTES: CPT | Mod: TEL | Performed by: GENETIC COUNSELOR, MS

## 2022-10-12 NOTE — PATIENT INSTRUCTIONS
Assessing Cancer Risk  Only about 5-10% of cancers are thought to be due to an inherited cancer susceptibility gene.    These families often have:  Several people with the same or related types of cancer  Cancers diagnosed at a young age (before age 50)  Individuals with more than one primary cancer  Multiple generations of the family affected with cancer    Some people may be candidates for genetic testing of more than one gene.  For these families, genetic testing using a multi-gene cancer panel may be offered.  These panels may test genes known to increase the risk for breast (and other) cancers: NENO, BRCA1, BRCA2, CDH1, CHEK2, PALB2, PTEN, and TP53.  The purpose of this handout is to serve as a brief summary of the high/moderate-risk breast cancer genes which have published clinical management guidelines for individuals who are found to carry a mutation.    Hereditary Breast and Ovarian Cancer Syndrome (HBOC)  A single mutation in one of the copies of BRCA1 or BRCA2 increases the risk for breast and ovarian cancer, among others.  The risk for pancreatic cancer and melanoma may also be slightly increased in some families.  The tables below list the chance that someone with a BRCA mutation would develop cancer in his or her lifetime1,2,3,4.          Women   Men     General Population BRCA+    General Population BRCA+   Breast  12% 60-90%  Breast <1% ~7%   Ovarian  1-2% Up to 58%  Prostate 16% 20%     A person s ethnic background is also important to consider, as individuals of Ashkenazi Spiritism ancestry have a higher chance of having a BRCA gene mutation.  There are three BRCA mutations that occur more frequently in this population.    Li-Fraumeni Syndrome (LFS)  LFS is a cancer predisposition syndrome. Individuals with LFS are at an increased risk for developing cancer at a young age. The general lifetime risk for development of cancer is 50% by age 30 and 90% by age 60.  LFS is caused by a mutation in the TP53  gene.  A single mutation in one of the copies of TP53 increases the risk for multiple cancers.     Core Cancers: Sarcomas, Breast, Brain, Lung, Leukemias/Lymphomas, Adrenocortical carcinomas  Other Cancers: Gastrointestinal, Thyroid, Skin, Genitourinary    Cowden Syndrome  Cowden syndrome is a hereditary condition that increases the risk for breast, thyroid, endometrial, and kidney cancer.  Cowden syndrome is caused by a mutation in the PTEN gene.  A single mutation in one of the copies of PTEN causes Cowden syndrome and increases cancer risk.  The table below shows the chance that someone with a PTEN mutation would develop cancer in their lifetime5,6.  Other benign features seen in some individuals with Cowden syndrome include benign skin lesions (facial papules, keratoses, lipomas), learning disability, autism, thyroid nodules, colon polyps, and larger head size.      Lifetime Cancer Risk   Cancer Type General Population Cowden Syndrome   Breast  12% 25-50%*   Thyroid  1% 35%   Renal 1-2% 35%   Endometrial  2-3% 28%   Colon 5% 9%   Melanoma 2-3% >5%     *One recent study found breast cancer risk to be increased to 85%    Hereditary Diffuse Gastric Cancer (HDGC)  Currently, one gene is known to cause hereditary diffuse gastric cancer: CDH1.  Individuals with HDGC are at increased risk for diffuse gastric cancer and lobular breast cancer. Of people diagnosed with HDGC, 30-50% have a mutation in the CDH1 gene.  This suggests there are likely other genes that may cause HDGC that have not been identified yet.      Lifetime Cancer Risks    General Pop HDGC    Diffuse Gastric  <1% ~80%   Breast 12% 39-52%     Additional Genes Associated with Increased Breast Cancer Risk  PALB2  Mutations in PALB2 have been shown to increase the risk of breast cancer up to 33-58% in some families; where individuals fall within this risk range is dependent upon family history.9 PALB2 mutations have also been associated with increased risk  for pancreatic cancer, although this risk has not been quantified yet.  Individuals who inherit two PALB2 mutations--one from their mother and one from their father--have a condition called Fanconi Anemia.  This rare autosomal recessive condition is associated with short stature, developmental delay, bone marrow failure, and increased risk for childhood cancers.    NENO  NENO is a moderate-risk breast cancer gene. Women who have a mutation in NENO can have between a 2-4 fold increased risk for breast cancer compared to the general population.10  NENO mutations have also been associated with increased risk for pancreatic cancer, however an estimate of this cancer risk is not well understood.11  Individuals who inherit two NENO mutations have a condition called ataxia-telangiectasia (AT).  This rare autosomal recessive condition affects the nervous system and immune system, and is associated with progressive cerebellar ataxia beginning in childhood.  Individuals with ataxia-telangiectasia often have a weakened immune system and have an increased risk for childhood cancers.           CHEK2   CHEK2 is a moderate-risk breast cancer gene.  Women who have a mutation in CHEK2 have around a 2-fold increased risk for breast cancer compared to the general population, and this risk may be higher depending upon family history.12,13,14  Mutations in CHEK2 have also been shown to increase the risk of a number of other cancers, including colon and prostate, however these cancer risks are currently not well understood.         Inheritance   All of the genes reviewed above are inherited in an autosomal dominant pattern.  This means that if a parent has a mutation, each of his or her children will have a 50% chance of inheriting that same mutation.  Therefore, each child--male or female--would have a 50% chance of being at increased risk for developing cancer.    Image obtained from [employer], 2013     Mutations in some genes  can occur de kiki, which means that a person s mutation occurred for the first time in them and was not inherited from a parent.  Now that they have the mutation, however, it can be passed on to future generations.    Genetic Testing  Genetic testing involves a blood test and will look for any harmful mutations within genes that are associated with increased cancer risk.  If possible, it is recommended that the person(s) who has had cancer be tested before other family members.  That person will give us the most useful information about whether or not a specific gene is associated with the cancer in the family.    Results  There are three possible results of genetic testing:  Positive--a harmful mutation was identified in one or more of the genes  Negative--no mutation was identified in any of the genes on this panel  Variant of unknown significance--a variation in one of the genes was identified, but it is unclear how this impacts cancer risk in the family    Advantages and Disadvantages  There are advantages and disadvantages to genetic testing.  Advantages  May clarify your cancer risk  Can help you make medical decisions  May explain the cancers in your family  May give useful information to your family members (if you share your results)    Disadvantages  Possible negative emotional impact of learning about inherited cancer risk  Uncertainty in interpreting a negative test result in some situations  Possible genetic discrimination concerns (see below)    Genetic Information Nondiscrimination Act (WAGNER)  WAGNER is a federal law that protects individuals from health insurance or employment discrimination based on a genetic test result alone.  Although rare, there are currently no legal discrimination protections in terms of life insurance, long term care, or disability insurances.  Visit the National Human Genome Research [place] genome.gov/58880162 to learn more.    Reducing Cancer Risk  Each of the genes listed  within this handout have nationally recognized cancer screening guidelines that would be recommended for individuals who test positive.  In addition to increased cancer screening, surgeries may be offered or recommended to reduce cancer risk.  Recommendations are based upon an individual s genetic test result as well as their personal and family history of cancer.    Questions to Think About Regarding Genetic Testing  What effect will the test result have on me and my relationship with my family members if I have an inherited gene mutation?  If I don t have a gene mutation?  Should I share my test results, and how will my family react to this news, which may also affect them?  Are my children ready to learn new information that may one day affect their own health?    Resources  FORCE: Facing Our Risk of Cancer Empowered facingourrisk.org   Bright Pink bebrightpink.org   Li-Fraumeni Syndrome Association lfsassociation.org   PTEN World PTENworld.[facility]   No stomach for cancer, Inc. nostomachforcancer.org   Stomach cancer relief network scrnet.org   Collaborative Group of the Americas on Inherited Colorectal Cancer (CGA) cgaicc.com    Cancer Care cancercare.org   American Cancer Society (ACS) cancer.org   National Cancer [place] (NCI) cancer.gov     Please call us if you have any questions or concerns.   Cancer Risk Management Program [phone] ([phone])      -References  A-akhil WADSWORTH, Phoebe PDP, Nova S, Griselda MURPHY, Jessie JE, Seamus JL, Marley N, Bruna H, Saurabh O, uLcas A, Waldo B, Nadine P, Regan S, Juan DM, Hermelindo N, Zoran E, Jordon H, Edwar E, Janel J, Ibeth J, Kala B, Damián H, Yamileth S, Enrique H, Mell H, Ray K, Gaurav OP. Average risks of breast and ovarian cancer associated with BRCA1 or BRCA2 mutations detected in case series unselected for family history: a combined analysis of 222 studies. Am J Hum Rohini. 2003;72:1117-30.  Maria Del Carmen GLEZ, Radha WYATT,  Maylin HANDLEY.  BRCA1 and BRCA2 Hereditary Breast and Ovarian Cancer. Gene Reviews online. 2013.  Wilfrido YC, Kathy S, Matilde G, Reed S. Breast cancer risk among male BRCA1 and BRCA2 mutation carriers. J Natl Cancer Inst. 2007;99:1811-4.  Devante FOOTE, Ashley I, Dhiraj J, Clemente E, Tank ER, Ivone F. Risk of breast cancer in male BRCA2 carriers. J Med Rohini. 2010;47:710-1.  Henry MH, Montserrat J, Jannette J, Shahida LA, Ginger MS, Corey C. Lifetime cancer risks in individuals with germline PTEN mutations. Clin Cancer Res. 2012;18:400-7.  Pilalex R. Cowden Syndrome: A Critical Review of the Clinical Literature. J Rohini . 2009:18:13-27.  National Comprehensive Cancer Network. Clinical practice guidelines in oncology, colorectal cancer screening. Available online (registration required). 2013.  National Cancer [place]. SEER Cancer Stat Fact Sheets.  December 2013.  Lottie SETH, et al. Breast-Cancer Risk in Families with Mutations in PALB2. NEJM. 2014; 371(6):497-506.  Geremias A, Marques D, Salma S, Kathy P, Gerard T, Misha M, Adrian B, Tony H, Summer R, Debra K, Cesar L, Devante FOOTE, Juan D, Andrew DF, Rachel MR, The Breast Cancer Susceptibility Collaboration (UK) & Deepa GLEZ. NENO mutations that cause ataxia-telangiectasia are breast cancer susceptibility alleles. Nature Genetics. 2006;38:873-875  Francois N , Flavio Y, Beti J, Camden L, Luis GM , Abiel ML, Eun S, Mercer AG, Syngal S, Amada ML, Eliceo J , Farhat R, Heraclio SZ, Lavell JR, Bonita VE, Ronald M, Vogelstein B, Krystal N, Jazzmine RH, Sheyla KW, and January AP. NENO mutations in patients with hereditary pancreatic cancer. Cancer Discover. 2012;2:41-46  CHEK2 Breast Cancer Case-Control Consortium. CHEK2*1100delC and susceptibility to breast cancer: A collaborative analysis involving 10,860 breast cancer cases and 9,065 controls from 10 studies. Am J Hum Rohini, 74 (2004), pp. 5000-1299  Eve T, Gaviota S, Nancy K, et al.  Spectrum of Mutations in BRCA1, BRCA2, CHEK2, and TP53 in Families at High Risk of Breast Cancer. JO. 2006;295(12):3610-9991.   Margareth HARVEY, Yuki WOODS, Yesi WADSWORTH, et al. Risk of breast cancer in women with a CHEK2 mutation with and without a family history of breast cancer. J Clin Oncol. 2011;29:3076-6889

## 2022-10-12 NOTE — Clinical Note
Please send letter to patient's home address.    Rosita Cárdenas MS, [facility]  Licensed, Certified Genetic Counselor  [facility]  [phone]  [email or web address]

## 2022-10-12 NOTE — LETTER
Cancer Risk Management  Program Locations    [facility]  [facility]  [facility]  [facility]  Mailing Address  Cancer Risk Management Program  [facility]  [address]  [place] [address]    New patient appointments  [phone]  October 12, 2022    Gemma WYATT Sofya  [address] [place] [address] [place] [id number]      Dear Gemma,    It was a pleasure speaking with you on the phone on 10/12/2022.  Here is a copy of the progress note from our discussion. If you have any additional questions, please feel free to call.    Referring Provider: Alan Bowen MD    Present for Today's Visit: Gemma    Presenting Information:   I met with Gemma Sofya today for genetic counseling (telephone visit due to covid19) to discuss her personal and family history of  cancer.  She is here today to review this history, cancer screening recommendations, and available genetic testing options.    Personal History:  Gemma is a 76 year old female. She was diagnosed with a left breast cancer (inviasive ductal carcinoma, ER positive, ID positive, and HER2 negative) at age 66; treatment included bilateral mastectomies with reconstruction. She was on Femara until 2016. She also has a history of hyperparathyroidism (currently being worked-up) and bilateral DVTs.       She had her first menstrual period at age 12, her first child at age 19, and reports that she went through menopause at age 41. Gemma has her ovaries, fallopian tubes and uterus in place.  She reports past history of oral contraceptive use for about 20 years and that she was on hormone replacement therapy (estrogen) for less than 5 years.      Most recent colonoscopy in 2022 was normal and follow-up was recommended as needed. Colonoscopy in 2017 was normal, colonoscopy in 2014 resulted in the removal of four  (3-8mm) tubular adenomas, colonoscopy in 2012 resulted in the removal of one tubular adenoma (3mm) and one sessile serrated adenoma (10mm). She reports that she's had additional colonoscopies prior to 2012 (we do not have record of), but does not recall any polyps being removed. She does not regularly do any other cancer screening at this time.  Gemma reported past (quit in 1985) tobacco use, and about 1 drink per year for alcohol use.    Family History: Cancer family history and pertinent information reviewed below (Please see scanned pedigree for detailed family history information)    Brother, age 74, has a history of colon cancer (2 foci) diagnosed in his 50's and had about 36 inches of his colon removed per Gemma.    Brother, age 73, has a history of prostate cancer diagnosed in his mid 60's, Gemma reports that it was aggressive, but as far as she knows not metastatic. This brother has a daughter with a history of uterine cancer diagnosed in her mid 40's.     Mother passed at age 74 from colon cancer diagnosed at age 72.    Maternal uncle passed in his early 60's from lung cancer. He was a smoker.     Maternal first-cousin has a history of uterine cancer diagnosed in her 20's.     Father passed at age 78 with a history of prostate cancer diagnosed at age 50.     Paternal grandmother passed at age 62 from bone cancer (? Primary vs metastases).     There is no known Ashkenazi Taoism ancestry on either side of her family. There is no reported consanguinity.    Discussion:    Gemma's personal and family history of cancer is suggestive of a hereditary cancer syndrome.    We reviewed the features of sporadic, familial, and hereditary cancers. In looking at Gemma's family history, it is possible that a cancer susceptibility gene is present due to her breast cancer history, her brother's colon cancer history, her brother's prostate cancer history, her mother's colon cancer history, her father's prostate cancer history,  her niece's early-onset uterine cancer history, and her maternal first-cousin's early-onset uterine cancer history.    We discussed the natural history and genetics of hereditary cancers. A detailed handout regarding the information we discussed will be sent to Gemma in [place] mail. Topics included: inheritance pattern, cancer risks, cancer screening recommendations, and also risks, benefits and limitations of testing.  We reviewed that the most common cause of hereditary breast cancer is Hereditary Breast and Ovarian Cancer (HBOC) syndrome, which is caused by mutations in the genes BRCA1 and BRCA2. Women with a mutation in either of these genes are at increased risk for breast and ovarian cancer. There is also an increased risk for a second primary breast cancer for women. Men with a mutation in either BRCA1 or BRCA2 are at increased risk for male breast and prostate cancer. Both women and men may also be at increased risk for pancreatic cancer and melanoma.   Gaxiola syndrome can be caused by a mutation in one of five genes:  MLH1, MSH2, MSH6, PMS2, and EPCAM.  Gaxiola syndrome may present with polyps, but typically a small number.  The highest cancer risks associated with Gaxiola syndrome include colon cancer, endometrial/uterine cancer, gastric cancer, and ovarian cancer.    Based on her personal and family history, Gemma meets current National Comprehensive Cancer Network (NCCN) criteria for genetic testing of high-penetrance breast cancer susceptibility genes (BRCA1, BRCA2, CDH1, PALB2, PTEN, and TP53) as well as Gaxiola syndrome.      We discussed that there are additional genes that could cause increased risk for breast, gynecologic, gastrointestinal, prostate, and related cancers. As many of these genes present with overlapping features in a family and accurate cancer risk cannot always be established based upon the pedigree analysis alone, it would be reasonable for Gemma to consider panel genetic testing to analyze  multiple genes at once.    We reviewed genetic testing options given Gemma's personal and family history including targeted and expanded options. After our discussion, Gemma opted to proceed with genetic testing via BRCA1/2 analysis with automatic reflex to the Common Hereditary Cancers panel through [facility].   Genetic testing is available for 47 genes associated with cancers of the breast, ovary, uterus, prostate and gastrointestinal system: [facility] Common Hereditary Cancers panel (APC, NENO, AXIN2, BARD1, BMPR1A, BRCA1, BRCA2, BRIP1, CDH1, CDK4, CDKN2A, CHEK2, CTNNA1, DICER1, EPCAM, GREM1, HOXB13, KIT, MEN1, MLH1, MSH2, MSH3, MSH6, MUTYH, NBN, NF1, NTHL1, PALB2, PDGFRA, PMS2, POLD1, POLE, PTEN,RAD50, RAD51C, RAD51D, SDHA, SDHB, SDHC, SDHD, SMAD4, SMARCA4, STK11, TP53,TSC1, TSC2, VHL).    We discussed that many of these genes are associated with specific hereditary cancer syndromes and published management guidelines: Hereditary Breast and Ovarian Cancer syndrome (BRCA1, BRCA2), Gaxiola syndrome (MLH1, MSH2, MSH6, PMS2, EPCAM), Familial Adenomatous Polyposis (APC), Hereditary Diffuse Gastric Cancer (CDH1), Familial Atypical Multiple Mole Melanoma syndrome (CDK4, CDKN2A), Multiple Endocrine Neoplasia type 1 (MEN1), Juvenile Polyposis syndrome (BMPR1A, SMAD4), Cowden syndrome (PTEN), Li Fraumeni syndrome (TP53), Hereditary Paraganglioma and Pheochromocytoma syndrome (SDHA, SDHB, SDHC, SDHD), Peutz-Jeghers syndrome (STK11), MUTYH Associated Polyposis (MUTYH), Tuberous sclerosis complex (TSC1, TSC2), Von Hippel-Lindau disease (VHL), and Neurofibromatosis type 1 (NF1).   The NENO, AXIN2, BRIP1, CHEK2, GREM1, MSH3, NBN, NTHL1, PALB2, POLD1, POLE, RAD51C, and RAD51D genes are associated with increased cancer risk and have published management guidelines for certain cancers.   The remaining genes (BARD1, CTNNA1, DICER1, HOXB13, KIT, PDGFRA, RAD50, and SMARCA4) are associated with increased cancer risk and may allow us to make  medical recommendations when mutations are identified.     Consent was obtained over the phone with no witness required due to the current covid19 global pandemic.    Medical Management: For Gemma, we reviewed that the information from genetic testing may determine:    additional cancer screening for which Gemma may qualify (i.e. more frequent colonoscopies, more frequent dermatologic exams, etc.),    options for risk reducing surgeries Gemma could consider (i.e. surgery to remove her ovaries and/or uterus, etc.),      and targeted chemotherapies for Gemma if she were to develop certain cancers in the future (i.e. immunotherapy for individuals with Gaxiola syndrome, PARP inhibitors, etc.).     These recommendations will be discussed in detail once genetic testing is completed.     Gemma will be drawn at her upcoming lab appointment on 10/18 at the [facility].     Plan:  1) Today Gemma elected to proceed with BRCA1/2 analysis with automatic reflex to Common Hereditary Cancers panel genetic testing (47 genes) through [facility].  2) This information should be available in approximately 3 weeks.  3) Gemma will be contacted by our scheduling department to set up a result disclosure appointment.     Rosita Cárdenas MS, [facility]  Licensed, Certified Genetic Counselor  [facility]  [email or web address]

## 2022-10-12 NOTE — LETTER
10/12/2022         RE: Gemma Cowart  [address]  [facility] [address]        Dear Colleague,    Thank you for referring your patient, Gemma Cowart, to the [facility]. Please see a copy of my visit note below.    Gemma is a 76 year old who is being evaluated via a billable telephone visit.      What phone number would you like to be contacted at? [phone]  How would you like to obtain your AVS? Mail a copy  Phone call duration: 66 minutes    10/12/2022    Referring Provider: Alan Bowen MD    Present for Today's Visit: Gemma    Presenting Information:   I met with Gemma Cowart today for genetic counseling (telephone visit due to covid19) to discuss her personal and family history of  cancer.  She is here today to review this history, cancer screening recommendations, and available genetic testing options.    Personal History:  Gemma is a 76 year old female. She was diagnosed with a left breast cancer (inviasive ductal carcinoma, ER positive, NJ positive, and HER2 negative) at age 66; treatment included bilateral mastectomies with reconstruction. She was on Femara until 2016. She also has a history of hyperparathyroidism (currently being worked-up) and bilateral DVTs.       She had her first menstrual period at age 12, her first child at age 19, and reports that she went through menopause at age 41. Gemma has her ovaries, fallopian tubes and uterus in place.  She reports past history of oral contraceptive use for about 20 years and that she was on hormone replacement therapy (estrogen) for less than 5 years.      Most recent colonoscopy in 2022 was normal and follow-up was recommended as needed. Colonoscopy in 2017 was normal, colonoscopy in 2014 resulted in the removal of four (3-8mm) tubular adenomas, colonoscopy in 2012 resulted in the removal of one tubular adenoma (3mm) and one sessile serrated adenoma (10mm). She reports that she's had additional  colonoscopies prior to 2012 (we do not have record of), but does not recall any polyps being removed. She does not regularly do any other cancer screening at this time.  Gemma reported past (quit in 1985) tobacco use, and about 1 drink per year for alcohol use.    Family History: Cancer family history and pertinent information reviewed below (Please see scanned pedigree for detailed family history information)    Brother, age 74, has a history of colon cancer (2 foci) diagnosed in his 50's and had about 36 inches of his colon removed per Gemma.    Brother, age 73, has a history of prostate cancer diagnosed in his mid 60's, Gemma reports that it was aggressive, but as far as she knows not metastatic. This brother has a daughter with a history of uterine cancer diagnosed in her mid 40's.     Mother passed at age 74 from colon cancer diagnosed at age 72.    Maternal uncle passed in his early 60's from lung cancer. He was a smoker.     Maternal first-cousin has a history of uterine cancer diagnosed in her 20's.     Father passed at age 78 with a history of prostate cancer diagnosed at age 50.     Paternal grandmother passed at age 62 from bone cancer (? Primary vs metastases).     There is no known Ashkenazi Roman Catholic ancestry on either side of her family. There is no reported consanguinity.    Discussion:    Gemma's personal and family history of cancer is suggestive of a hereditary cancer syndrome.    We reviewed the features of sporadic, familial, and hereditary cancers. In looking at Gemma's family history, it is possible that a cancer susceptibility gene is present due to her breast cancer history, her brother's colon cancer history, her brother's prostate cancer history, her mother's colon cancer history, her father's prostate cancer history, her niece's early-onset uterine cancer history, and her maternal first-cousin's early-onset uterine cancer history.    We discussed the natural history and genetics of  hereditary cancers. A detailed handout regarding the information we discussed will be sent to Gemma in US mail. Topics included: inheritance pattern, cancer risks, cancer screening recommendations, and also risks, benefits and limitations of testing.  We reviewed that the most common cause of hereditary breast cancer is Hereditary Breast and Ovarian Cancer (HBOC) syndrome, which is caused by mutations in the genes BRCA1 and BRCA2. Women with a mutation in either of these genes are at increased risk for breast and ovarian cancer. There is also an increased risk for a second primary breast cancer for women. Men with a mutation in either BRCA1 or BRCA2 are at increased risk for male breast and prostate cancer. Both women and men may also be at increased risk for pancreatic cancer and melanoma.   Gaxiola syndrome can be caused by a mutation in one of five genes:  MLH1, MSH2, MSH6, PMS2, and EPCAM.  Gaxiola syndrome may present with polyps, but typically a small number.  The highest cancer risks associated with Gaxiola syndrome include colon cancer, endometrial/uterine cancer, gastric cancer, and ovarian cancer.    Based on her personal and family history, Gemma meets current National Comprehensive Cancer Network (NCCN) criteria for genetic testing of high-penetrance breast cancer susceptibility genes (BRCA1, BRCA2, CDH1, PALB2, PTEN, and TP53) as well as Gaxiola syndrome.      We discussed that there are additional genes that could cause increased risk for breast, gynecologic, gastrointestinal, prostate, and related cancers. As many of these genes present with overlapping features in a family and accurate cancer risk cannot always be established based upon the pedigree analysis alone, it would be reasonable for Gemma to consider panel genetic testing to analyze multiple genes at once.    We reviewed genetic testing options given Gemma's personal and family history including targeted and expanded options. After our discussion,  Gemma opted to proceed with genetic testing via BRCA1/2 analysis with automatic reflex to the Common Hereditary Cancers panel through [facility].   Genetic testing is available for 47 genes associated with cancers of the breast, ovary, uterus, prostate and gastrointestinal system: [facility] Common Hereditary Cancers panel (APC, NENO, AXIN2, BARD1, BMPR1A, BRCA1, BRCA2, BRIP1, CDH1, CDK4, CDKN2A, CHEK2, CTNNA1, DICER1, EPCAM, GREM1, HOXB13, KIT, MEN1, MLH1, MSH2, MSH3, MSH6, MUTYH, NBN, NF1, NTHL1, PALB2, PDGFRA, PMS2, POLD1, POLE, PTEN,RAD50, RAD51C, RAD51D, SDHA, SDHB, SDHC, SDHD, SMAD4, SMARCA4, STK11, TP53,TSC1, TSC2, VHL).    We discussed that many of these genes are associated with specific hereditary cancer syndromes and published management guidelines: Hereditary Breast and Ovarian Cancer syndrome (BRCA1, BRCA2), Gaxiola syndrome (MLH1, MSH2, MSH6, PMS2, EPCAM), Familial Adenomatous Polyposis (APC), Hereditary Diffuse Gastric Cancer (CDH1), Familial Atypical Multiple Mole Melanoma syndrome (CDK4, CDKN2A), Multiple Endocrine Neoplasia type 1 (MEN1), Juvenile Polyposis syndrome (BMPR1A, SMAD4), Cowden syndrome (PTEN), Li Fraumeni syndrome (TP53), Hereditary Paraganglioma and Pheochromocytoma syndrome (SDHA, SDHB, SDHC, SDHD), Peutz-Jeghers syndrome (STK11), MUTYH Associated Polyposis (MUTYH), Tuberous sclerosis complex (TSC1, TSC2), Von Hippel-Lindau disease (VHL), and Neurofibromatosis type 1 (NF1).   The NENO, AXIN2, BRIP1, CHEK2, GREM1, MSH3, NBN, NTHL1, PALB2, POLD1, POLE, RAD51C, and RAD51D genes are associated with increased cancer risk and have published management guidelines for certain cancers.   The remaining genes (BARD1, CTNNA1, DICER1, HOXB13, KIT, PDGFRA, RAD50, and SMARCA4) are associated with increased cancer risk and may allow us to make medical recommendations when mutations are identified.     Consent was obtained over the phone with no witness required due to the current covid19 global  pandemic.    Medical Management: For Gemma, we reviewed that the information from genetic testing may determine:    additional cancer screening for which Gemma may qualify (i.e. more frequent colonoscopies, more frequent dermatologic exams, etc.),    options for risk reducing surgeries Gemma could consider (i.e. surgery to remove her ovaries and/or uterus, etc.),      and targeted chemotherapies for Gemma if she were to develop certain cancers in the future (i.e. immunotherapy for individuals with Gaxiola syndrome, PARP inhibitors, etc.).     These recommendations will be discussed in detail once genetic testing is completed.     Gemma will be drawn at her upcoming lab appointment on 10/18 at the [facility].     Plan:  1) Today Gemma elected to proceed with BRCA1/2 analysis with automatic reflex to Common Hereditary Cancers panel genetic testing (47 genes) through [facility].  2) This information should be available in approximately 3 weeks.  3) Gemma will be contacted by our scheduling department to set up a result disclosure appointment.         Again, thank you for allowing me to participate in the care of your patient.      Sincerely,    Rosita Feng, GC

## 2022-10-13 ENCOUNTER — TRANSFERRED RECORDS (OUTPATIENT)
Dept: HEALTH INFORMATION MANAGEMENT | Facility: CLINIC | Age: 77
End: 2022-10-13

## 2022-10-20 ENCOUNTER — HOSPITAL ENCOUNTER (OUTPATIENT)
Dept: RESPIRATORY THERAPY | Facility: CLINIC | Age: 77
Discharge: HOME | End: 2022-10-20
Attending: NURSE PRACTITIONER | Admitting: NURSE PRACTITIONER
Payer: COMMERCIAL

## 2022-10-20 DIAGNOSIS — R06.02 SOB (SHORTNESS OF BREATH): ICD-10-CM

## 2022-10-20 PROCEDURE — 94726 PLETHYSMOGRAPHY LUNG VOLUMES: CPT

## 2022-10-20 PROCEDURE — 94010 BREATHING CAPACITY TEST: CPT

## 2022-10-20 PROCEDURE — 94729 DIFFUSING CAPACITY: CPT

## 2022-10-20 NOTE — PROGRESS NOTES
Patient completed pulmonary function testing with spirometry, lung volumes and diffusion. Good patient effort and cooperation. The results of this test met the ATS standards for acceptability and repeatability, except for DLCO. DLCO did not meet ATS due to IV<90%VC. The average of two consistent results were recorded. Hgb result of 13.7 from 7/29/22 was used for DLCO.

## 2022-10-21 LAB
DLCOCOR-%PRED-PRE: 104 %
DLCOCOR-PRE: 18.77 ML/MIN/MMHG
DLCOUNC-%PRED-PRE: 103 %
DLCOUNC-PRE: 18.65 ML/MIN/MMHG
DLCOUNC-PRED: 17.94 ML/MIN/MMHG
ERV-%PRED-PRE: 63 %
ERV-PRE: 0.16 L
ERV-PRED: 0.26 L
EXPTIME-PRE: 7.11 SEC
FEF2575-%PRED-PRE: 72 %
FEF2575-PRE: 1.16 L/SEC
FEF2575-PRED: 1.61 L/SEC
FEFMAX-%PRED-PRE: 123 %
FEFMAX-PRE: 6.04 L/SEC
FEFMAX-PRED: 4.89 L/SEC
FEV1-%PRED-PRE: 96 %
FEV1-PRE: 1.85 L
FEV1FEV6-PRE: 73 %
FEV1FEV6-PRED: 78 %
FEV1FVC-PRE: 72 %
FEV1FVC-PRED: 78 %
FEV1SVC-PRE: 75 %
FEV1SVC-PRED: 71 %
FIFMAX-PRE: 4.3 L/SEC
FRCPLETH-%PRED-PRE: 119 %
FRCPLETH-PRE: 3.13 L
FRCPLETH-PRED: 2.62 L
FVC-%PRED-PRE: 103 %
FVC-PRE: 2.58 L
FVC-PRED: 2.5 L
GAW-%PRED-PRE: 63 %
GAW-PRE: 0.65 L/S/CMH2O
GAW-PRED: 1.03 L/S/CMH2O
IC-%PRED-PRE: 93 %
IC-PRE: 2.3 L
IC-PRED: 2.45 L
RVPLETH-%PRED-PRE: 142 %
RVPLETH-PRE: 2.97 L
RVPLETH-PRED: 2.08 L
SGAW-%PRED-PRE: 174 %
SGAW-PRE: 0.18 1/CMH2O*S
SGAW-PRED: 0.1 1/CMH2O*S
SRAW-%PRED-PRE: 118 %
SRAW-PRE: 5.64 CMH2O*S
SRAW-PRED: 4.76 CMH2O*S
TLCPLETH-%PRED-PRE: 116 %
TLCPLETH-PRE: 5.43 L
TLCPLETH-PRED: 4.65 L
VA-%PRED-PRE: 100 %
VA-PRE: 4.36 L
VC-%PRED-PRE: 90 %
VC-PRE: 2.47 L
VC-PRED: 2.71 L

## 2022-10-24 ENCOUNTER — LAB (OUTPATIENT)
Dept: LAB | Facility: CLINIC | Age: 77
End: 2022-10-24
Payer: COMMERCIAL

## 2022-10-24 DIAGNOSIS — Z80.0 FAMILY HISTORY OF COLON CANCER: ICD-10-CM

## 2022-10-24 DIAGNOSIS — Z80.49 FAMILY HISTORY OF UTERINE CANCER: ICD-10-CM

## 2022-10-24 DIAGNOSIS — M85.80 OSTEOPENIA, UNSPECIFIED LOCATION: ICD-10-CM

## 2022-10-24 DIAGNOSIS — E21.3 HYPERPARATHYROIDISM (H): ICD-10-CM

## 2022-10-24 DIAGNOSIS — R20.2 PARESTHESIA: ICD-10-CM

## 2022-10-24 DIAGNOSIS — Z85.3 PERSONAL HISTORY OF MALIGNANT NEOPLASM OF BREAST: ICD-10-CM

## 2022-10-24 DIAGNOSIS — E11.69 TYPE 2 DIABETES MELLITUS WITH OTHER SPECIFIED COMPLICATION, WITHOUT LONG-TERM CURRENT USE OF INSULIN (H): ICD-10-CM

## 2022-10-24 DIAGNOSIS — Z80.42 FAMILY HISTORY OF PROSTATE CANCER: ICD-10-CM

## 2022-10-24 LAB
ALBUMIN SERPL-MCNC: 3.3 G/DL (ref 3.4–5)
ANION GAP SERPL CALCULATED.3IONS-SCNC: 6 MMOL/L (ref 3–14)
BUN SERPL-MCNC: 19 MG/DL (ref 7–30)
CALCIUM SERPL-MCNC: 8.6 MG/DL (ref 8.5–10.1)
CHLORIDE BLD-SCNC: 113 MMOL/L (ref 94–109)
CHOLEST SERPL-MCNC: 188 MG/DL
CO2 SERPL-SCNC: 24 MMOL/L (ref 20–32)
CREAT SERPL-MCNC: 1.41 MG/DL (ref 0.52–1.04)
FASTING STATUS PATIENT QL REPORTED: YES
GFR SERPL CREATININE-BSD FRML MDRD: 38 ML/MIN/1.73M2
GLUCOSE BLD-MCNC: 106 MG/DL (ref 70–99)
HDLC SERPL-MCNC: 60 MG/DL
LDLC SERPL CALC-MCNC: 106 MG/DL
NONHDLC SERPL-MCNC: 128 MG/DL
POTASSIUM BLD-SCNC: 5 MMOL/L (ref 3.4–5.3)
PTH-INTACT SERPL-MCNC: 78 PG/ML (ref 15–65)
SODIUM SERPL-SCNC: 143 MMOL/L (ref 133–144)
TRIGL SERPL-MCNC: 111 MG/DL
VIT B12 SERPL-MCNC: 1722 PG/ML (ref 232–1245)

## 2022-10-24 PROCEDURE — 80061 LIPID PANEL: CPT

## 2022-10-24 PROCEDURE — 82607 VITAMIN B-12: CPT

## 2022-10-24 PROCEDURE — 82040 ASSAY OF SERUM ALBUMIN: CPT

## 2022-10-24 PROCEDURE — 80048 BASIC METABOLIC PNL TOTAL CA: CPT

## 2022-10-24 PROCEDURE — 83970 ASSAY OF PARATHORMONE: CPT

## 2022-10-24 PROCEDURE — 82306 VITAMIN D 25 HYDROXY: CPT

## 2022-10-24 PROCEDURE — 99000 SPECIMEN HANDLING OFFICE-LAB: CPT

## 2022-10-24 PROCEDURE — 36415 COLL VENOUS BLD VENIPUNCTURE: CPT

## 2022-10-25 LAB — DEPRECATED CALCIDIOL+CALCIFEROL SERPL-MC: 30 UG/L (ref 20–75)

## 2022-10-26 ENCOUNTER — HOSPITAL ENCOUNTER (OUTPATIENT)
Dept: BONE DENSITY | Facility: CLINIC | Age: 77
Discharge: HOME | End: 2022-10-26
Attending: INTERNAL MEDICINE | Admitting: INTERNAL MEDICINE
Payer: COMMERCIAL

## 2022-10-26 DIAGNOSIS — M85.80 OSTEOPENIA, UNSPECIFIED LOCATION: ICD-10-CM

## 2022-10-26 DIAGNOSIS — M85.89 OTHER SPECIFIED DISORDERS OF BONE DENSITY AND STRUCTURE, MULTIPLE SITES: ICD-10-CM

## 2022-10-26 PROCEDURE — 77080 DXA BONE DENSITY AXIAL: CPT

## 2022-10-31 LAB — SCANNED LAB RESULT: NORMAL

## 2022-11-02 DIAGNOSIS — E21.3 HYPERPARATHYROIDISM (H): Primary | ICD-10-CM

## 2022-11-02 DIAGNOSIS — M85.80 OSTEOPENIA, UNSPECIFIED LOCATION: ICD-10-CM

## 2022-11-03 ENCOUNTER — TELEPHONE (OUTPATIENT)
Dept: ENDOCRINOLOGY | Facility: CLINIC | Age: 77
End: 2022-11-03

## 2022-11-03 NOTE — RESULT ENCOUNTER NOTE
Team - please call patient with results.    Will discuss more at next visit-- parathyroid hormone improved with calcium supplemenation, but still mildly elevated  Please complete 24hr urine test[facility]  Will discuss results at visit in November.

## 2022-11-03 NOTE — TELEPHONE ENCOUNTER
----- Message from Haley Singh MD sent at 11/2/2022 10:18 PM CDT -----  Team - please call patient with results.    Will discuss more at next visit-- parathyroid hormone improved with calcium supplemenation, but still mildly elevated  Please complete 24hr urine test-- can  jug at any [facility] location  Will discuss results at visit in November.

## 2022-11-03 NOTE — TELEPHONE ENCOUNTER
Called pt to review lab results/provider msg.  Pt would like to know why she needs to do a 24 hr urine.  RN explained why they are done, but she would like to know specificallly.  Will route to provider for more information, and then call pt back.  Kelle Dunlap RN

## 2022-11-15 ENCOUNTER — TELEPHONE (OUTPATIENT)
Dept: ONCOLOGY | Facility: CLINIC | Age: 77
End: 2022-11-15

## 2022-11-15 NOTE — TELEPHONE ENCOUNTER
Called Gemma back regarding based on Dr. Bwoen's last visit MRI brain is recommended. Camila Rhodes RN,BSN,OCN,CBCN

## 2022-11-15 NOTE — TELEPHONE ENCOUNTER
Patient called today asking why she needs another brain MRI. Patient states she sees so many doctors and she is positive she has had recent MRI's. This  informed patient that last Brain MRI was July 2022.  Patient is requesting a return call to discuss need for MRI scheduled for 11/17/22.    PT STATES SHE HAD A "BLOOD CLOT" RIGHT GROIN AFTER WATCHMAN'S PROCEDURE

## 2022-11-17 ENCOUNTER — ANCILLARY PROCEDURE (OUTPATIENT)
Dept: MRI IMAGING | Facility: CLINIC | Age: 77
End: 2022-11-17
Attending: INTERNAL MEDICINE
Payer: COMMERCIAL

## 2022-11-17 ENCOUNTER — LAB (OUTPATIENT)
Dept: LAB | Facility: CLINIC | Age: 77
End: 2022-11-17
Attending: INTERNAL MEDICINE
Payer: COMMERCIAL

## 2022-11-17 DIAGNOSIS — D50.9 IRON DEFICIENCY ANEMIA, UNSPECIFIED IRON DEFICIENCY ANEMIA TYPE: ICD-10-CM

## 2022-11-17 DIAGNOSIS — C79.51 BONE METASTASES: ICD-10-CM

## 2022-11-17 DIAGNOSIS — R79.89 ELEVATED PARATHYROID HORMONE: ICD-10-CM

## 2022-11-17 DIAGNOSIS — E21.3 HYPERPARATHYROIDISM (H): ICD-10-CM

## 2022-11-17 DIAGNOSIS — C50.012 MALIGNANT NEOPLASM OF NIPPLE OF LEFT BREAST IN FEMALE, UNSPECIFIED ESTROGEN RECEPTOR STATUS (H): ICD-10-CM

## 2022-11-17 LAB
BASOPHILS # BLD AUTO: 0.1 10E3/UL (ref 0–0.2)
BASOPHILS NFR BLD AUTO: 2 %
CALCIUM SERPL-MCNC: 8.9 MG/DL (ref 8.5–10.1)
CREAT SERPL-MCNC: 1.35 MG/DL (ref 0.52–1.04)
DEPRECATED CALCIDIOL+CALCIFEROL SERPL-MC: 30 UG/L (ref 20–75)
EOSINOPHIL # BLD AUTO: 0.1 10E3/UL (ref 0–0.7)
EOSINOPHIL NFR BLD AUTO: 2 %
ERYTHROCYTE [DISTWIDTH] IN BLOOD BY AUTOMATED COUNT: 13.3 % (ref 10–15)
FERRITIN SERPL-MCNC: 67 NG/ML (ref 8–252)
FOLATE SERPL-MCNC: 8.1 NG/ML (ref 4.6–34.8)
GFR SERPL CREATININE-BSD FRML MDRD: 41 ML/MIN/1.73M2
HCT VFR BLD AUTO: 43.7 % (ref 35–47)
HGB BLD-MCNC: 13.3 G/DL (ref 11.7–15.7)
IMM GRANULOCYTES # BLD: 0 10E3/UL
IMM GRANULOCYTES NFR BLD: 0 %
IRON SATN MFR SERPL: 14 % (ref 15–46)
IRON SERPL-MCNC: 49 UG/DL (ref 35–180)
LYMPHOCYTES # BLD AUTO: 0.6 10E3/UL (ref 0.8–5.3)
LYMPHOCYTES NFR BLD AUTO: 14 %
MAGNESIUM SERPL-MCNC: 2.3 MG/DL (ref 1.6–2.3)
MCH RBC QN AUTO: 26.1 PG (ref 26.5–33)
MCHC RBC AUTO-ENTMCNC: 30.4 G/DL (ref 31.5–36.5)
MCV RBC AUTO: 86 FL (ref 78–100)
MONOCYTES # BLD AUTO: 0.3 10E3/UL (ref 0–1.3)
MONOCYTES NFR BLD AUTO: 6 %
NEUTROPHILS # BLD AUTO: 3.1 10E3/UL (ref 1.6–8.3)
NEUTROPHILS NFR BLD AUTO: 76 %
NRBC # BLD AUTO: 0 10E3/UL
NRBC BLD AUTO-RTO: 0 /100
PHOSPHATE SERPL-MCNC: 3.6 MG/DL (ref 2.5–4.5)
PLATELET # BLD AUTO: 190 10E3/UL (ref 150–450)
PTH-INTACT SERPL-MCNC: 88 PG/ML
RBC # BLD AUTO: 5.1 10E6/UL (ref 3.8–5.2)
TIBC SERPL-MCNC: 343 UG/DL (ref 240–430)
VIT B12 SERPL-MCNC: 1786 PG/ML (ref 232–1245)
WBC # BLD AUTO: 4.1 10E3/UL (ref 4–11)

## 2022-11-17 PROCEDURE — 83735 ASSAY OF MAGNESIUM: CPT

## 2022-11-17 PROCEDURE — 82565 ASSAY OF CREATININE: CPT

## 2022-11-17 PROCEDURE — 36415 COLL VENOUS BLD VENIPUNCTURE: CPT

## 2022-11-17 PROCEDURE — 85025 COMPLETE CBC W/AUTO DIFF WBC: CPT

## 2022-11-17 PROCEDURE — 82607 VITAMIN B-12: CPT

## 2022-11-17 PROCEDURE — 83550 IRON BINDING TEST: CPT

## 2022-11-17 PROCEDURE — 82310 ASSAY OF CALCIUM: CPT

## 2022-11-17 PROCEDURE — 82728 ASSAY OF FERRITIN: CPT

## 2022-11-17 PROCEDURE — 70553 MRI BRAIN STEM W/O & W/DYE: CPT

## 2022-11-17 PROCEDURE — 82746 ASSAY OF FOLIC ACID SERUM: CPT

## 2022-11-17 PROCEDURE — 255N000002 HC RX 255 OP 636: Performed by: INTERNAL MEDICINE

## 2022-11-17 PROCEDURE — 84100 ASSAY OF PHOSPHORUS: CPT

## 2022-11-17 PROCEDURE — 82306 VITAMIN D 25 HYDROXY: CPT

## 2022-11-17 PROCEDURE — 83970 ASSAY OF PARATHORMONE: CPT

## 2022-11-17 PROCEDURE — A9585 GADOBUTROL INJECTION: HCPCS | Performed by: INTERNAL MEDICINE

## 2022-11-17 RX ORDER — GADOBUTROL 604.72 MG/ML
9 INJECTION INTRAVENOUS ONCE
Status: COMPLETED | OUTPATIENT
Start: 2022-11-17 | End: 2022-11-17

## 2022-11-17 RX ADMIN — GADOBUTROL 9 ML: 604.72 INJECTION INTRAVENOUS at 08:13

## 2022-11-17 NOTE — LETTER
[place] [address], [address]  [place] [address]  [phone]        November 28, 2022    Gemma Cowart  [address] [place] [address] [place] [id number]            Dear Ms. Gemma Cowart:    Labs/ Imaging studies done with endocrinology are attached.   Please discuss labs with endocrinologist .     Thank you.     Lashawn Marrero MD     Results for orders placed or performed in visit on 11/17/22   Ferritin     Status: Normal   Result Value Ref Range    Ferritin 67 8 - 252 ng/mL   Iron & Iron Binding Capacity     Status: Abnormal   Result Value Ref Range    Iron 49 35 - 180 ug/dL    Iron Binding Capacity 343 240 - 430 ug/dL    Iron Sat Index 14 (L) 15 - 46 %   Vitamin B12     Status: Abnormal   Result Value Ref Range    Vitamin B12 1,786 (H) 232 - 1,245 pg/mL   Folate     Status: Normal   Result Value Ref Range    Folic Acid 8.1 4.6 - 34.8 ng/mL   Phosphorus     Status: Normal   Result Value Ref Range    Phosphorus 3.6 2.5 - 4.5 mg/dL   Magnesium     Status: Normal   Result Value Ref Range    Magnesium 2.3 1.6 - 2.3 mg/dL   Parathyroid Hormone Intact     Status: None   Result Value Ref Range    Parathyroid Hormone Intact 88 pg/mL   Vitamin D Deficiency     Status: Normal   Result Value Ref Range    Vitamin D, Total (25-Hydroxy) 30 20 - 75 ug/L    Narrative    Season, race, dietary intake, and treatment affect the concentration of 25-hydroxy-Vitamin D. Values may decrease during winter months and increase during summer months. Values 20-29 ug/L may indicate Vitamin D insufficiency and values <20 ug/L may indicate Vitamin D deficiency.    Vitamin D determination is routinely performed by an immunoassay specific for 25 hydroxyvitamin D3.  If an individual is on vitamin D2(ergocalciferol) supplementation, please specify 25 OH vitamin D2 and D3 level determination by LCMSMS test VITD23.     Calcium     Status: Normal   Result Value Ref Range    Calcium 8.9 8.5 -  10.1 mg/dL   Creatinine     Status: Abnormal   Result Value Ref Range    Creatinine 1.35 (H) 0.52 - 1.04 mg/dL    GFR Estimate 41 (L) >60 mL/min/1.73m2   CBC with platelets and differential     Status: Abnormal   Result Value Ref Range    WBC Count 4.1 4.0 - 11.0 10e3/uL    RBC Count 5.10 3.80 - 5.20 10e6/uL    Hemoglobin 13.3 11.7 - 15.7 g/dL    Hematocrit 43.7 35.0 - 47.0 %    MCV 86 78 - 100 fL    MCH 26.1 (L) 26.5 - 33.0 pg    MCHC 30.4 (L) 31.5 - 36.5 g/dL    RDW 13.3 10.0 - 15.0 %    Platelet Count 190 150 - 450 10e3/uL    % Neutrophils 76 %    % Lymphocytes 14 %    % Monocytes 6 %    % Eosinophils 2 %    % Basophils 2 %    % Immature Granulocytes 0 %    NRBCs per 100 WBC 0 <1 /100    Absolute Neutrophils 3.1 1.6 - 8.3 10e3/uL    Absolute Lymphocytes 0.6 (L) 0.8 - 5.3 10e3/uL    Absolute Monocytes 0.3 0.0 - 1.3 10e3/uL    Absolute Eosinophils 0.1 0.0 - 0.7 10e3/uL    Absolute Basophils 0.1 0.0 - 0.2 10e3/uL    Absolute Immature Granulocytes 0.0 <=0.4 10e3/uL    Absolute NRBCs 0.0 10e3/uL   CBC with Platelets & Differential     Status: Abnormal    Narrative    The following orders were created for panel order CBC with Platelets & Differential.  Procedure                               Abnormality         Status                     ---------                               -----------         ------                     CBC with platelets and d...[[id number]]  Abnormal            Final result                 Please view results for these tests on the individual orders.   Results for orders placed or performed in visit on 11/17/22   MRI Brain w & w/o contrast     Status: None    Narrative    EXAM: MR BRAIN W/O and W CONTRAST  LOCATION: [facility]  DATE/TIME: 11/17/2022 9:03 AM    INDICATION: Malignant neoplasm of nipple of left breast in female, unspecified estrogen receptor status (H).  COMPARISON: Brain MRI 07/25/2022.  CONTRAST: 9 mL Gadavist.  TECHNIQUE: Routine multiplanar multisequence  head MRI without and with intravenous contrast.    FINDINGS:  INTRACRANIAL CONTENTS: No acute or subacute infarct. No mass, acute hemorrhage, or extra-axial fluid collections. Scattered nonspecific T2/FLAIR hyperintensities within the cerebral white matter and sara most consistent with mild to moderate chronic   microvascular ischemic change. Mild to moderate generalized cerebral atrophy. No hydrocephalus. Normal position of the cerebellar tonsils. No pathologic contrast enhancement. The major intracranial vascular flow voids are maintained.     SELLA: No abnormality accounting for technique.    OSSEOUS STRUCTURES/SOFT TISSUES: Unchanged osseous metastases manifest by T1 hypointense enhancing lesions most notably within the left parietal calvarium.     ORBITS: No abnormality accounting for technique.     SINUSES/MASTOIDS: No paranasal sinus mucosal disease. No middle ear or mastoid effusion.       Impression    IMPRESSION:  1.  Unchanged osseous metastases within the calvarium.  2.  No evidence for intracranial metastatic disease.  3.  Unchanged generalized volume loss and chronic microvascular ischemic change.

## 2022-11-18 DIAGNOSIS — Z98.84 S/P GASTRIC BYPASS: ICD-10-CM

## 2022-11-18 DIAGNOSIS — E53.8 VITAMIN B12 DEFICIENCY: ICD-10-CM

## 2022-11-18 NOTE — TELEPHONE ENCOUNTER
The pt is aware of the providers message and has no further questions at this time.  Rosa Maria Ngo on 11/18/2022 at 3:07 PM

## 2022-11-18 NOTE — TELEPHONE ENCOUNTER
Routing refill request to provider for review/approval because:  A break in medication    Susan Fry RN on 11/18/2022 at 2:45 PM

## 2022-11-28 NOTE — RESULT ENCOUNTER NOTE
Gemma    Labs/ Imaging studies done with endocrinology are attached.  Please discuss labs with endocrinologist .    Thank you.    Lashawn Marrero MD      Please send a letter with above lab/test results and above comments.

## 2022-11-29 ENCOUNTER — OFFICE VISIT (OUTPATIENT)
Dept: PODIATRY | Facility: CLINIC | Age: 77
End: 2022-11-29
Payer: COMMERCIAL

## 2022-11-29 VITALS — WEIGHT: 187 LBS | BODY MASS INDEX: 33.66 KG/M2 | HEART RATE: 72 BPM

## 2022-11-29 DIAGNOSIS — M20.42 HAMMER TOES OF BOTH FEET: ICD-10-CM

## 2022-11-29 DIAGNOSIS — M20.41 HAMMER TOES OF BOTH FEET: ICD-10-CM

## 2022-11-29 DIAGNOSIS — E11.69 TYPE 2 DIABETES MELLITUS WITH OTHER SPECIFIED COMPLICATION, WITHOUT LONG-TERM CURRENT USE OF INSULIN (H): Primary | Chronic | ICD-10-CM

## 2022-11-29 PROCEDURE — 99203 OFFICE O/P NEW LOW 30 MIN: CPT | Performed by: PODIATRIST

## 2022-11-29 ASSESSMENT — PAIN SCALES - GENERAL: PAINLEVEL: NO PAIN (0)

## 2022-11-29 NOTE — PATIENT INSTRUCTIONS
[facility]  [facility]  [address]  [place] [address]  Phone: [phone]  Fax: [phone] [facility]  [address] [place] [address]  Home Medical Equipment, [address] Phone: [phone]  Orthotics and Prosthetics, [address]  Phone [phone] [facility]  [address]  [place] [address]  Phone: [phone]   Fax: [phone]   [facility]  [address]  [place] [address]  Phone: [phone]  Fax: [phone]  [facility]  [address]  [place] [address]  Phone: [phone]   Fax: [phone]   [facility]   [address]  [place] [address]  Phone: [phone]  Fax: [phone] [facility]   [address]  [address]  [place] [address]  Phone: [phone] [place]  [address] [place] [place] [address]  Phone :[phone]             WEARING YOUR CUSTOM FOOT ORTHOTICS   Most insurance plans cover one pair of orthotics per year. You must check with your   insurance plan to see what your payment responsibility will be. Please call your   insurance company by calling the number on the back of your insurance card.   Orthotic's are non-refundable and non-returnable.   Orthotics are made of various designs. Some orthotics are covered with material that extends beyond your toes. If your orthotic is of this design, you will likely need to trim the toe end to get a proper fit. The insole from your shoe can be used as a template. Simply overlay the shoe insert on top of the custom orthotic. Align the heel end while tracing the length of the insert onto the custom orthotic. Use a large scissor to trim the toe end until you get a proper fit in the shoe.   The orthotic needs to be pushed as far back in the shoe as  possible. The heel portion should not ride forward so as not to irritate your heel.   Orthotics are designed to work with socks. Excessive perspiration will shorten the life span of the orthotics. Remove the orthotic from the shoe frequently for proper drying.   The break-in period lasts for weeks. People new to orthotics will likely experience new aches and pains. The orthotic is forcing your foot into a new position. Arch, foot and leg muscle aches and fatigue are common during these weeks. Minor discomfort can be considered normal break in phenomenon. Start wearing your orthotic around your home your first day. Limited activity for one to two hours is recommended. You can increase one or two additional hours each day provided the aches and pains are subsiding. The degree of discomfort, fatigue and problems will dictate the speed of break in. You may require multiple weeks to work up to full time use.   Do not continue wearing your orthotics if they are creating problems such as blisters or sores. Do not hesitate to call the clinic to speak with a nurse regarding orthotic   break in, fit, trimming, etc. You may also need to see the doctor if the orthotics are   simply not working out. Adjustments are sometimes made to improve orthotic   function.     Orthotics will only work in certain styles and types of shoes. Orthotics rarely work in dress shoes. Slip-ons, clogs, sandals and heels are particularly troublesome. Specially designed orthotics may be necessary for these types of shoes. Your custom orthotic was designed for activities that require appropriate walking or running shoes. Lace up athletic shoes, walking shoes or work boots should work appropriately. You may need a wider or longer shoe. Shoes with a removable  or insert work best. In general, you want to remove an insert from the shoe before placing the orthotic into the shoe. Shoes without a removable liner may not work as well.     When  purchasing new shoes, bring your orthotics along to get a proper fit. Shop at stores that are familiar with orthotics.   Frequent washing of the orthotic may shorten the life span of the top cover. The top cover can be replaced but will generally last one to five years depending on use and foot perspiration.

## 2022-11-29 NOTE — PROGRESS NOTES
FOOT AND ANKLE SURGERY/PODIATRY CONSULT NOTE         ASSESSMENT:   DJD right foot   Hammertoe  Ingrown nail   DM2      TREATMENT:  -I discussed with the patient that overall her feet are in good condition. There is a mild dorsal right midfoot exostosis at lisfranc's joint. No concern for skin irritation or skin breakdown.     -We discussed temporary and permanent nail avulsion procedures today for ingrown nail on the right hallux. No signs of infection on exam today. She will consider these options.     -I have referred her to [facility] for diabetic shoes and inserts.     -Patient's questions invited and answered. She was encouraged to call my office with any further questions or concerns.     David Terrazas DPM  [facility] Podiatry/Foot & Ankle Surgery      HPI: I was asked to see Gemma Cowart today for a diabetic foot exam. The patient states she has noticed minor discomfort associated with an ingrown nail on the right hallux, denies drainage or erythema. She would also like diabetic shoes and insert referral.       Past Medical History:   Diagnosis Date     Anemia     iron deficiency anemia     Anxiety      Breast cancer (H) 2012    Bilateral Masectomies 9/2012     Complications of gastric bypass surgery      COPD (chronic obstructive pulmonary disease) (H) 4/21/2021     Diabetes mellitus (H)     diet controlled     DVT (deep venous thrombosis) (H)      Eczema      Fracture of left knee region     patella - vertical     History of kidney stones      Obesity      Palpitation     with no treatment     Polyneuropathy      Pruritus     generalized     Rosacea      SVT (supraventricular tachycardia) (H)     s/p ablation 5/28/2015 at [facility] for left lateral AP         Social History     Socioeconomic History     Marital status:      Spouse name: Not on file     Number of children: Not on file     Years of education: Not on file     Highest education level: Not on file    Occupational History     Employer: RETIRED   Tobacco Use     Smoking status: Former     Packs/day: 1.50     Years: 25.00     Pack years: 37.50     Types: Cigarettes     Quit date: 10/21/1986     Years since quittin.1     Smokeless tobacco: Never     Tobacco comments:     quit    Vaping Use     Vaping Use: Never used   Substance and Sexual Activity     Alcohol use: No     Comment: 1 time a year     Drug use: No     Sexual activity: Not Currently     Partners: Male   Other Topics Concern     Parent/sibling w/ CABG, MI or angioplasty before 65F 55M? No      Service Not Asked     Blood Transfusions Not Asked     Caffeine Concern Not Asked     Occupational Exposure Not Asked     Hobby Hazards Not Asked     Sleep Concern Not Asked     Stress Concern Not Asked     Weight Concern Not Asked     Special Diet Not Asked     Back Care Not Asked     Exercise Not Asked     Bike Helmet Yes     Seat Belt Yes     Self-Exams Not Asked   Social History Narrative     Not on file     Social Determinants of Health     Financial Resource Strain: Unknown     Difficulty of Paying Living Expenses: Patient refused   Food Insecurity: Unknown     Worried About Running Out of Food in the Last Year: Patient refused     Ran Out of Food in the Last Year: Patient refused   Transportation Needs: No Transportation Needs     Lack of Transportation (Medical): No     Lack of Transportation (Non-Medical): No   Physical Activity: Inactive     Days of Exercise per Week: 0 days     Minutes of Exercise per Session: 0 min   Stress: Stress Concern Present     Feeling of Stress : To some extent   Social Connections: Moderately Integrated     Frequency of Communication with Friends and Family: Twice a week     Frequency of Social Gatherings with Friends and Family: More than three times a week     Attends Jainism Services: 1 to 4 times per year     Active Member of Clubs or Organizations: Yes     Attends Club or Organization Meetings: Not on  file     Marital Status:    Intimate Partner Violence: Not on file   Housing Stability: Unknown     Unable to Pay for Housing in the Last Year: Patient refused     Number of Places Lived in the Last Year: 2     Unstable Housing in the Last Year: No            Allergies   Allergen Reactions     Bacitracin Unknown     Dust Mites      Nickel          MEDICATIONS:   Current Outpatient Medications   Medication     acetaminophen (TYLENOL) 500 MG tablet     albuterol (PROAIR HFA/PROVENTIL HFA/VENTOLIN HFA) 108 (90 Base) MCG/ACT inhaler     apixaban ANTICOAGULANT (ELIQUIS ANTICOAGULANT) 5 MG tablet     blood glucose (NO BRAND SPECIFIED) lancets standard     blood glucose (ONETOUCH VERIO IQ) test strip     blood glucose calibration (NO BRAND SPECIFIED) solution     blood glucose monitoring (NO BRAND SPECIFIED) meter device kit     calcium citrate (CITRACAL) 950 (200 Ca) MG tablet     Cholecalciferol (VITAMIN D3 PO)     clobetasol (TEMOVATE) 0.05 % external cream     cyanocobalamin (VITAMIN B-12) 1000 MCG sublingual tablet     Lancets (ONETOUCH DELICA PLUS [id number]) MISC     sertraline (ZOLOFT) 100 MG tablet     tiotropium (SPIRIVA RESPIMAT) 2.5 MCG/ACT inhaler     No current facility-administered medications for this visit.        Family History   Problem Relation Age of Onset     Cancer - colorectal Mother      Colon Cancer Mother      Prostate Cancer Father      Alzheimer Disease Father      Cancer Paternal Grandmother      Alzheimer Disease Paternal Grandfather      Cancer - colorectal Brother      Diabetes Brother      Crohn's Disease Daughter      Diabetes Brother      Eye Disorder Brother      Diabetes Son      Cancer Maternal Uncle      Cancer Maternal Uncle           Review of Systems - 10 point Review of Systems is negative except for ingrown nail which is noted in HPI.    OBJECTIVE:  Appearance: alert, well appearing, and in no distress.    VITAL SIGNS: Pulse 72   Wt 84.8 kg (187 lb)   LMP 09/24/1990    BMI 33.66 kg/m        General appearance: Patient is alert and fully cooperative with history & exam.  No sign of distress is noted during the visit.     Psychiatric: Affect is pleasant & appropriate.  Patient appears motivated to improve health.     Respiratory: Breathing is regular & unlabored while sitting.     HEENT: Hearing is intact to spoken word.  Speech is clear.  No gross evidence of visual impairment that would impact ambulation.      Vascular: Dorsalis pedis palpable. There is pedal hair growth bilateral.  CFT < 3 sec from anterior tibial surface to distal digits bilateral. There is no appreciable edema noted.  Dermatologic: Turgor and texture are within normal limits. No coloration or temperature changes. No primary or secondary lesions noted. Mild incurvation medial border right hallux, no erythema.   Neurologic: All epicritic and proprioceptive sensations are grossly intact bilateral.  Musculoskeletal: Contracted digits bilateral. Bony prominence dorsal right midfoot.

## 2022-11-29 NOTE — LETTER
11/29/2022         RE: Gemma Cowart  [address] [place] [address] [place] [id number]        Dear Colleague,    Thank you for referring your patient, Gemma Cowart, to the [facility]. Please see a copy of my visit note below.          FOOT AND ANKLE SURGERY/PODIATRY CONSULT NOTE         ASSESSMENT:   DJD right foot   Hammertoe  Ingrown nail   DM2      TREATMENT:  -I discussed with the patient that overall her feet are in good condition. There is a mild dorsal right midfoot exostosis at lisfranc's joint. No concern for skin irritation or skin breakdown.     -We discussed temporary and permanent nail avulsion procedures today for ingrown nail on the right hallux. No signs of infection on exam today. She will consider these options.     -I have referred her to [facility] for diabetic shoes and inserts.     -Patient's questions invited and answered. She was encouraged to call my office with any further questions or concerns.     David Terrazas DPM  [facility] Podiatry/Foot & Ankle Surgery      HPI: I was asked to see Gemma Cowart today for a diabetic foot exam. The patient states she has noticed minor discomfort associated with an ingrown nail on the right hallux, denies drainage or erythema. She would also like diabetic shoes and insert referral.       Past Medical History:   Diagnosis Date     Anemia     iron deficiency anemia     Anxiety      Breast cancer (H) 2012    Bilateral Masectomies 9/2012     Complications of gastric bypass surgery      COPD (chronic obstructive pulmonary disease) (H) 4/21/2021     Diabetes mellitus (H)     diet controlled     DVT (deep venous thrombosis) (H)      Eczema      Fracture of left knee region     patella - vertical     History of kidney stones      Obesity      Palpitation     with no treatment     Polyneuropathy      Pruritus     generalized     Rosacea      SVT (supraventricular tachycardia) (H)     s/p ablation [facility]/28/[facility] for left lateral AP         Social History     Socioeconomic History     Marital status:      Spouse name: Not on file     Number of children: Not on file     Years of education: Not on file     Highest education level: Not on file   Occupational History     Employer: RETIRED   Tobacco Use     Smoking status: Former     Packs/day: 1.50     Years: 25.00     Pack years: 37.50     Types: Cigarettes     Quit date: 10/21/1986     Years since quittin.1     Smokeless tobacco: Never     Tobacco comments:     quit    Vaping Use     Vaping Use: Never used   Substance and Sexual Activity     Alcohol use: No     Comment: 1 time a year     Drug use: No     Sexual activity: Not Currently     Partners: Male   Other Topics Concern     Parent/sibling w/ CABG, MI or angioplasty before 65F 55M? No      Service Not Asked     Blood Transfusions Not Asked     Caffeine Concern Not Asked     Occupational Exposure Not Asked     Hobby Hazards Not Asked     Sleep Concern Not Asked     Stress Concern Not Asked     Weight Concern Not Asked     Special Diet Not Asked     Back Care Not Asked     Exercise Not Asked     Bike Helmet Yes     Seat Belt Yes     Self-Exams Not Asked   Social History Narrative     Not on file     Social Determinants of Health     Financial Resource Strain: Unknown     Difficulty of Paying Living Expenses: Patient refused   Food Insecurity: Unknown     Worried About Running Out of Food in the Last Year: Patient refused     Ran Out of Food in the Last Year: Patient refused   Transportation Needs: No Transportation Needs     Lack of Transportation (Medical): No     Lack of Transportation (Non-Medical): No   Physical Activity: Inactive     Days of Exercise per Week: 0 days     Minutes of Exercise per Session: 0 min   Stress: Stress Concern Present     Feeling of Stress : To some extent   Social Connections: Moderately Integrated     Frequency of Communication with Friends and Family: Twice a  week     Frequency of Social Gatherings with Friends and Family: More than three times a week     Attends Judaism Services: 1 to 4 times per year     Active Member of Clubs or Organizations: Yes     Attends Club or Organization Meetings: Not on file     Marital Status:    Intimate Partner Violence: Not on file   Housing Stability: Unknown     Unable to Pay for Housing in the Last Year: Patient refused     Number of Places Lived in the Last Year: 2     Unstable Housing in the Last Year: No            Allergies   Allergen Reactions     Bacitracin Unknown     Dust Mites      Nickel          MEDICATIONS:   Current Outpatient Medications   Medication     acetaminophen (TYLENOL) 500 MG tablet     albuterol (PROAIR HFA/PROVENTIL HFA/VENTOLIN HFA) 108 (90 Base) MCG/ACT inhaler     apixaban ANTICOAGULANT (ELIQUIS ANTICOAGULANT) 5 MG tablet     blood glucose (NO BRAND SPECIFIED) lancets standard     blood glucose (ONETOUCH VERIO IQ) test strip     blood glucose calibration (NO BRAND SPECIFIED) solution     blood glucose monitoring (NO BRAND SPECIFIED) meter device kit     calcium citrate (CITRACAL) 950 (200 Ca) MG tablet     Cholecalciferol (VITAMIN D3 PO)     clobetasol (TEMOVATE) 0.05 % external cream     cyanocobalamin (VITAMIN B-12) 1000 MCG sublingual tablet     Lancets (ONETOUCH DELICA PLUS [id number]) MISC     sertraline (ZOLOFT) 100 MG tablet     tiotropium (SPIRIVA RESPIMAT) 2.5 MCG/ACT inhaler     No current facility-administered medications for this visit.        Family History   Problem Relation Age of Onset     Cancer - colorectal Mother      Colon Cancer Mother      Prostate Cancer Father      Alzheimer Disease Father      Cancer Paternal Grandmother      Alzheimer Disease Paternal Grandfather      Cancer - colorectal Brother      Diabetes Brother      Crohn's Disease Daughter      Diabetes Brother      Eye Disorder Brother      Diabetes Son      Cancer Maternal Uncle      Cancer Maternal Uncle            Review of Systems - 10 point Review of Systems is negative except for ingrown nail which is noted in HPI.    OBJECTIVE:  Appearance: alert, well appearing, and in no distress.    VITAL SIGNS: Pulse 72   Wt 84.8 kg (187 lb)   LMP 09/24/1990   BMI 33.66 kg/m        General appearance: Patient is alert and fully cooperative with history & exam.  No sign of distress is noted during the visit.     Psychiatric: Affect is pleasant & appropriate.  Patient appears motivated to improve health.     Respiratory: Breathing is regular & unlabored while sitting.     HEENT: Hearing is intact to spoken word.  Speech is clear.  No gross evidence of visual impairment that would impact ambulation.      Vascular: Dorsalis pedis palpable. There is pedal hair growth bilateral.  CFT < 3 sec from anterior tibial surface to distal digits bilateral. There is no appreciable edema noted.  Dermatologic: Turgor and texture are within normal limits. No coloration or temperature changes. No primary or secondary lesions noted. Mild incurvation medial border right hallux, no erythema.   Neurologic: All epicritic and proprioceptive sensations are grossly intact bilateral.  Musculoskeletal: Contracted digits bilateral. Bony prominence dorsal right midfoot.             Again, thank you for allowing me to participate in the care of your patient.        Sincerely,        David Terrazas DPM

## 2022-12-07 ENCOUNTER — VIRTUAL VISIT (OUTPATIENT)
Dept: ONCOLOGY | Facility: CLINIC | Age: 77
End: 2022-12-07
Attending: GENETIC COUNSELOR, MS
Payer: COMMERCIAL

## 2022-12-07 DIAGNOSIS — Z80.49 FAMILY HISTORY OF UTERINE CANCER: ICD-10-CM

## 2022-12-07 DIAGNOSIS — Z80.42 FAMILY HISTORY OF PROSTATE CANCER: ICD-10-CM

## 2022-12-07 DIAGNOSIS — Z80.0 FAMILY HISTORY OF COLON CANCER: ICD-10-CM

## 2022-12-07 DIAGNOSIS — Z85.3 PERSONAL HISTORY OF MALIGNANT NEOPLASM OF BREAST: Primary | ICD-10-CM

## 2022-12-07 DIAGNOSIS — C79.51 BONE METASTASES: ICD-10-CM

## 2022-12-07 PROCEDURE — 999N000069 HC STATISTIC GENETIC COUNSELING, < 16 MIN: Mod: TEL | Performed by: GENETIC COUNSELOR, MS

## 2022-12-07 NOTE — Clinical Note
Ulices send copy of letter to patient's home address and include a copy of scanned report from Laboratory Miscellaneous Order [id number]    Rosita Cárdenas MS, [facility]  Licensed, Certified Genetic Counselor  [facility]  [phone]  [email or web address]

## 2022-12-07 NOTE — PATIENT INSTRUCTIONS
Negative Genetic Test Result    Genetic Testing  Genetic testing involved a blood or saliva test which looked at the genetic information in select genes for variants associated with cancer risk.  This testing may have included analysis of a single gene due to a known variant in the family, multiple genes most associated with the cancers in a family, or an expanded panel of genes related to many types of cancers.     Results  There are several possible genetic test results, including:   Positive--a harmful mutation (also known as a  pathogenic  or  likely pathogenic  variant) was identified in a gene associated with increased cancer risk.  These risks, as well as medical management options, depend on the specific genetic variant identified.    Negative--no variants were identified in the genes analyzed   Variant of unknown significance--a variant was identified in one or more genes, though it is currently unclear how this impacts cancer risk in the family.  Genetic testing labs are working to collect evidence about these uncertain variants and may provide updates in the future.    What Does a Negative Genetic Test Result Mean?  A negative genetic test results means that no genetic changes (variants) were detected in the genes tested. While no inherited risk factors for cancer were identified, you and your family may be at risk for certain cancers due to family history, environmental factors, or other genetic causes not identified by this test.  It is also possible that your family may still be at risk of carrying a genetic risk factor which you did not inherit. Your genetic counselor can help interpret the result for you and your relatives.      It is important to note which genes were included in your test. A list of these genes can be found on your test result.    Screening Recommendations  A combination of personal and family history factors may inform cancer risk and medical management recommendations.   Population cancer screening options, such as those recommended by the American Cancer Society and the National Comprehensive Cancer Network (NCCN) are appropriate for many families at average risk for cancer.  However, earlier and/or more frequent screening may be recommended based on personal factors (lifestyle, exposures, medications, screening results), family history of cancer, and sometimes genetic factors.  These cancer risk management options should be discussed in more detail with an individual's medical providers.      Please call us if you have any questions or concerns.   Cancer Risk Management Program (Appointments: [phone])

## 2022-12-07 NOTE — LETTER
"    Cancer Risk Management  Program [facility]  [facility]  [facility]  Mailing Address  Cancer Risk Management Program  [address]  [place] [address]    New patient appointments  [phone]  December 7, 2022    Gemma Cowart  [address] [place] [address] [place] [id number]      Dear Gemma,    It was a pleasure speaking with you on 12/7/2022.  Here is a copy of the progress note from our discussion. If you have any additional questions, please feel free to call.    Referring Provider: Dr. Alan Bowen MD    Presenting Information:  I spoke to Gemma by phone today to discuss her genetic testing results. Her blood was drawn on 10/24/2022. Common Hereditary Cancers panel testing was ordered from [email or web address]. This testing was done because of Gemma's personal and family history of cancer.    Genetic Testing Result: NEGATIVE  Gemma is negative for mutations in APC, NENO, AXIN2, BARD1, BMPR1A, BRCA1, BRCA2, BRIP1, CDH1, CDK4, CDKN2A, CHEK2, CTNNA1, DICER1, EPCAM, GREM1, HOXB13, KIT, MEN1, MLH1, MSH2, MSH3, MSH6, MUTYH, NBN, NF1, NTHL1, PALB2, PDGFRA, PMS2, POLD1, POLE, PTEN, RAD50, RAD51C, RAD51D, SDHA, SDHB, SDHC, SDHD, SMAD4, SMARCA4, STK11, TP53, TSC1, TSC2, and VHL. No mutations were found in any of the 47 genes analyzed. Please see copy of scanned test report for complete details regarding testing methodology/limitations.    A copy of the test report can be found in the Laboratory tab, dated 10/24/2022, and named \"LABORATORY MISCELLANEOUS ORDER\". The report is scanned in as a linked document.    Interpretation:  We discussed several different interpretations of this negative test result.    1. One explanation may be that there is a different gene or combination of genes and environment that are associated with the " cancers in this family.  2. It is possible that her other relatives with cancer history did have a mutation in one of the above genes, and she did not inherit it.  3. There is also a small possibility that there is a mutation in one of these genes, and the testing laboratory could not find it with their current testing methods.       Screening:  Based on this negative test result, it is important for Gemma and her relatives to refer back to the family history for appropriate cancer screening.      Gemma should continue to follow her oncology team's recommendations for the follow-up for her own cancer history.     Gemma s close female relatives remain at increased risk for breast cancer given their family history. Breast cancer screening is generally recommended to begin approximately 10 years younger than the earliest age of breast cancer diagnosis in the family, or at age 40, whichever comes first. Breast screening options should be discussed with an individual's primary care provider and a genetic counselor, to determine at what age to begin screening, what screening is appropriate, and if additional screening (such as breast MRI) is necessary based on personal/family history factors.   Per National Comprehensive Cancer Network (NCCN) guidelines, individuals with a first degree relative with colorectal cancer diagnosed at any age should begin colonoscopy at age 40, or 10 years before the earliest diagnosis of colorectal cancer, whichever is first.  Colonoscopy should be repeated every 5 years, or based on colonoscopy findings.  This would apply to Gemma and her other brother as well as her nieces and nephew whose father had colon cancer.  These recommendations may change based on personal and family history as well as personal preference, and should be discussed with an individuals physician.   Due to Gemma's family history of uterine cancer, Gemma and other close female relatives of the family member who had  uterine cancer remain at slightly increased risk for uterine cancer. We discussed available uterine cancer screening (pelvic exams, endometrial sampling, transvaginal ultrasounds) as well as the significant limitations of this screening. As such, this screening is not typically recommended. That being said, women in this family should discuss their family history, this screening, and the signs and symptoms of uterine cancer with their primary OB/GYN provider, as they may have individualized recommendations.  Other population cancer screening options, such as those recommended by the American Cancer Society and the National Comprehensive Cancer Network (NCCN), are also appropriate for Gemma and her family. These screening recommendations may change if there are changes to Gemma's personal and/or family history. Final screening recommendations should be made by each individual's managing physician.         Inheritance:  We reviewed the autosomal dominant inheritance of mutations in these 47 genes. We discussed that Gemma cannot/did not pass on an identifiable mutation in these genes to her children based on this test result.  Mutations in these genes do not skip generations.      Additional Testing Considerations:  Although Gemma's genetic testing result was negative, other relatives may still carry a gene mutation associated with personal/family history of cancer. Genetic counseling is recommended for her brothers and her niece with uterine cancer history to discuss genetic testing options. If any of these relatives do pursue genetic testing, Gemma is encouraged to contact me so that we may review the impact of their test results on her.    Summary:  We do not have an explanation for Gemma's personal and family history of cancer. While no genetic changes were identified, Gemma may still be at risk for certain cancers due to family history, environmental factors, or other genetic causes not identified by this test.   Because of that, it is important that she continue with cancer screening based on her personal and family history as discussed above.    Genetic testing is rapidly advancing, and new cancer susceptibility genes will most likely be identified in the future.  Therefore, I encouraged Gemma to contact me annually or if there are changes in her personal or family history.  This may change how we assess her cancer risk, screening, and the testing we would offer.    Plan:  1. A copy of the test results will be mailed to Gemma.  2. She plans to follow-up with her oncology team and primary care providers.  3. She should contact me regularly, or sooner if her family history changes.    Rosita Cárdenas MS, [facility]  Licensed, Certified Genetic Counselor  [facility]  [email or web address]

## 2022-12-07 NOTE — PROGRESS NOTES
"Gemma is a 77 year old who is being evaluated via a billable telephone visit.      What phone number would you like to be contacted at? [phone]  How would you like to obtain your AVS? Mail a copy  Phone call duration: 10 minutes    12/7/2022    Referring Provider: Dr. Alan Bowen MD    Presenting Information:  I spoke to Gmema by phone today to discuss her genetic testing results. Her blood was drawn on 10/24/2022. Common Hereditary Cancers panel testing was ordered from [facility]. This testing was done because of Gemma's personal and family history of cancer.    Genetic Testing Result: NEGATIVE  Gemma is negative for mutations in APC, NENO, AXIN2, BARD1, BMPR1A, BRCA1, BRCA2, BRIP1, CDH1, CDK4, CDKN2A, CHEK2, CTNNA1, DICER1, EPCAM, GREM1, HOXB13, KIT, MEN1, MLH1, MSH2, MSH3, MSH6, MUTYH, NBN, NF1, NTHL1, PALB2, PDGFRA, PMS2, POLD1, POLE, PTEN, RAD50, RAD51C, RAD51D, SDHA, SDHB, SDHC, SDHD, SMAD4, SMARCA4, STK11, TP53, TSC1, TSC2, and VHL. No mutations were found in any of the 47 genes analyzed. Please see copy of scanned test report for complete details regarding testing methodology/limitations.    A copy of the test report can be found in the Laboratory tab, dated 10/24/2022, and named \"LABORATORY MISCELLANEOUS ORDER\". The report is scanned in as a linked document.    Interpretation:  We discussed several different interpretations of this negative test result.    1. One explanation may be that there is a different gene or combination of genes and environment that are associated with the cancers in this family.  2. It is possible that her other relatives with cancer history did have a mutation in one of the above genes, and she did not inherit it.  3. There is also a small possibility that there is a mutation in one of these genes, and the testing laboratory could not find it with their current testing methods.       Screening:  Based on this negative test result, it is important for Gemma and her relatives to " refer back to the family history for appropriate cancer screening.      Gemma should continue to follow her oncology team's recommendations for the follow-up for her own cancer history.     Gemma s close female relatives remain at increased risk for breast cancer given their family history. Breast cancer screening is generally recommended to begin approximately 10 years younger than the earliest age of breast cancer diagnosis in the family, or at age 40, whichever comes first. Breast screening options should be discussed with an individual's primary care provider and a genetic counselor, to determine at what age to begin screening, what screening is appropriate, and if additional screening (such as breast MRI) is necessary based on personal/family history factors.   Per National Comprehensive Cancer Network (NCCN) guidelines, individuals with a first degree relative with colorectal cancer diagnosed at any age should begin colonoscopy at age 40, or 10 years before the earliest diagnosis of colorectal cancer, whichever is first.  Colonoscopy should be repeated every 5 years, or based on colonoscopy findings.  This would apply to Gemma and her other brother as well as her nieces and nephew whose father had colon cancer.  These recommendations may change based on personal and family history as well as personal preference, and should be discussed with an individuals physician.   Due to Gemma's family history of uterine cancer, Gemma and other close female relatives of the family member who had uterine cancer remain at slightly increased risk for uterine cancer. We discussed available uterine cancer screening (pelvic exams, endometrial sampling, transvaginal ultrasounds) as well as the significant limitations of this screening. As such, this screening is not typically recommended. That being said, women in this family should discuss their family history, this screening, and the signs and symptoms of uterine cancer with  their primary OB/GYN provider, as they may have individualized recommendations.  Other population cancer screening options, such as those recommended by the American Cancer Society and the National Comprehensive Cancer Network (NCCN), are also appropriate for Gemma and her family. These screening recommendations may change if there are changes to Gemma's personal and/or family history. Final screening recommendations should be made by each individual's managing physician.         Inheritance:  We reviewed the autosomal dominant inheritance of mutations in these 47 genes. We discussed that Gemma cannot/did not pass on an identifiable mutation in these genes to her children based on this test result.  Mutations in these genes do not skip generations.      Additional Testing Considerations:  Although Gemma's genetic testing result was negative, other relatives may still carry a gene mutation associated with personal/family history of cancer. Genetic counseling is recommended for her brothers and her niece with uterine cancer history to discuss genetic testing options. If any of these relatives do pursue genetic testing, Gemma is encouraged to contact me so that we may review the impact of their test results on her.    Summary:  We do not have an explanation for Gemma's personal and family history of cancer. While no genetic changes were identified, Gemma may still be at risk for certain cancers due to family history, environmental factors, or other genetic causes not identified by this test.  Because of that, it is important that she continue with cancer screening based on her personal and family history as discussed above.    Genetic testing is rapidly advancing, and new cancer susceptibility genes will most likely be identified in the future.  Therefore, I encouraged Gemma to contact me annually or if there are changes in her personal or family history.  This may change how we assess her cancer risk, screening, and  the testing we would offer.    Plan:  1. A copy of the test results will be mailed to Gemma.  2. She plans to follow-up with her oncology team and primary care providers.  3. She should contact me regularly, or sooner if her family history changes.    Rosita Cárdenas MS, [facility] [place]  Licensed, Certified Genetic Counselor  [facility]  [email or web address]

## 2022-12-07 NOTE — LETTER
"    12/7/2022         RE: Gemma Cowart  [address] [place]  [place] [address]        Dear Colleague,    Thank you for referring your patient, Gemma Cowart, to the [facility]. Please see a copy of my visit note below.    Gemma is a 77 year old who is being evaluated via a billable telephone visit.      What phone number would you like to be contacted at? [phone]  How would you like to obtain your AVS? Mail a copy  Phone call duration: 10 minutes    12/7/2022    Referring Provider: Dr. Alan Bowen MD    Presenting Information:  I spoke to Gemma by phone today to discuss her genetic testing results. Her blood was drawn on 10/24/2022. Common Hereditary Cancers panel testing was ordered from [email or web address]. This testing was done because of Gemma's personal and family history of cancer.    Genetic Testing Result: NEGATIVE  Gemma is negative for mutations in APC, NENO, AXIN2, BARD1, BMPR1A, BRCA1, BRCA2, BRIP1, CDH1, CDK4, CDKN2A, CHEK2, CTNNA1, DICER1, EPCAM, GREM1, HOXB13, KIT, MEN1, MLH1, MSH2, MSH3, MSH6, MUTYH, NBN, NF1, NTHL1, PALB2, PDGFRA, PMS2, POLD1, POLE, PTEN, RAD50, RAD51C, RAD51D, SDHA, SDHB, SDHC, SDHD, SMAD4, SMARCA4, STK11, TP53, TSC1, TSC2, and VHL. No mutations were found in any of the 47 genes analyzed. Please see copy of scanned test report for complete details regarding testing methodology/limitations.    A copy of the test report can be found in the Laboratory tab, dated 10/24/2022, and named \"LABORATORY MISCELLANEOUS ORDER\". The report is scanned in as a linked document.    Interpretation:  We discussed several different interpretations of this negative test result.    1. One explanation may be that there is a different gene or combination of genes and environment that are associated with the cancers in this family.  2. It is possible that her other relatives with cancer history did have a mutation in one of the above genes, and she did not inherit it.  3. There " is also a small possibility that there is a mutation in one of these genes, and the testing laboratory could not find it with their current testing methods.       Screening:  Based on this negative test result, it is important for Gemma and her relatives to refer back to the family history for appropriate cancer screening.      Gemma should continue to follow her oncology team's recommendations for the follow-up for her own cancer history.     Gemma s close female relatives remain at increased risk for breast cancer given their family history. Breast cancer screening is generally recommended to begin approximately 10 years younger than the earliest age of breast cancer diagnosis in the family, or at age 40, whichever comes first. Breast screening options should be discussed with an individual's primary care provider and a genetic counselor, to determine at what age to begin screening, what screening is appropriate, and if additional screening (such as breast MRI) is necessary based on personal/family history factors.   Per National Comprehensive Cancer Network (NCCN) guidelines, individuals with a first degree relative with colorectal cancer diagnosed at any age should begin colonoscopy at age 40, or 10 years before the earliest diagnosis of colorectal cancer, whichever is first.  Colonoscopy should be repeated every 5 years, or based on colonoscopy findings.  This would apply to Gemma and her other brother as well as her nieces and nephew whose father had colon cancer.  These recommendations may change based on personal and family history as well as personal preference, and should be discussed with an individuals physician.   Due to Gemma's family history of uterine cancer, Gemma and other close female relatives of the family member who had uterine cancer remain at slightly increased risk for uterine cancer. We discussed available uterine cancer screening (pelvic exams, endometrial sampling, transvaginal  ultrasounds) as well as the significant limitations of this screening. As such, this screening is not typically recommended. That being said, women in this family should discuss their family history, this screening, and the signs and symptoms of uterine cancer with their primary OB/GYN provider, as they may have individualized recommendations.  Other population cancer screening options, such as those recommended by the American Cancer Society and the National Comprehensive Cancer Network (NCCN), are also appropriate for Gemma and her family. These screening recommendations may change if there are changes to Gemma's personal and/or family history. Final screening recommendations should be made by each individual's managing physician.         Inheritance:  We reviewed the autosomal dominant inheritance of mutations in these 47 genes. We discussed that Gemma cannot/did not pass on an identifiable mutation in these genes to her children based on this test result.  Mutations in these genes do not skip generations.      Additional Testing Considerations:  Although Gemma's genetic testing result was negative, other relatives may still carry a gene mutation associated with personal/family history of cancer. Genetic counseling is recommended for her brothers and her niece with uterine cancer history to discuss genetic testing options. If any of these relatives do pursue genetic testing, Gemma is encouraged to contact me so that we may review the impact of their test results on her.    Summary:  We do not have an explanation for Gemma's personal and family history of cancer. While no genetic changes were identified, Gemma may still be at risk for certain cancers due to family history, environmental factors, or other genetic causes not identified by this test.  Because of that, it is important that she continue with cancer screening based on her personal and family history as discussed above.    Genetic testing is rapidly  advancing, and new cancer susceptibility genes will most likely be identified in the future.  Therefore, I encouraged Gemma to contact me annually or if there are changes in her personal or family history.  This may change how we assess her cancer risk, screening, and the testing we would offer.    Plan:  1. A copy of the test results will be mailed to Gemma.  2. She plans to follow-up with her oncology team and primary care providers.  3. She should contact me regularly, or sooner if her family history changes.    Rosita Cárdenas MS, [place]  Licensed, Certified Genetic Counselor  [facility]  [email or web address]              Again, thank you for allowing me to participate in the care of your patient.        Sincerely,        Rosita Feng, GC

## 2023-01-01 ENCOUNTER — TRANSFERRED RECORDS (OUTPATIENT)
Dept: MULTI SPECIALTY CLINIC | Facility: CLINIC | Age: 78
End: 2023-01-01

## 2023-01-01 LAB — RETINOPATHY: NORMAL

## 2023-01-04 ENCOUNTER — VIRTUAL VISIT (OUTPATIENT)
Dept: ENDOCRINOLOGY | Facility: CLINIC | Age: 78
End: 2023-01-04
Payer: COMMERCIAL

## 2023-01-04 DIAGNOSIS — R79.89 ELEVATED PARATHYROID HORMONE: ICD-10-CM

## 2023-01-04 DIAGNOSIS — E21.3 HYPERPARATHYROIDISM (H): Primary | ICD-10-CM

## 2023-01-04 DIAGNOSIS — M81.8 OTHER OSTEOPOROSIS WITHOUT CURRENT PATHOLOGICAL FRACTURE: ICD-10-CM

## 2023-01-04 PROCEDURE — 99215 OFFICE O/P EST HI 40 MIN: CPT | Mod: 95 | Performed by: INTERNAL MEDICINE

## 2023-01-04 ASSESSMENT — PAIN SCALES - GENERAL: PAINLEVEL: NO PAIN (0)

## 2023-01-04 NOTE — PROGRESS NOTES
"  Video-Visit Details    Type of service:  Video Visit  Video Start Time: 9:50  Video End Time:10:15  Originating Location (pt. Location): [place], [place]  Distant Location (provider location):  Home  Platform used for Video Visit: Leeanne Singh MD    Gemma Cowart is a 77 year old year old female here for evaluation of hyperparathyroidism via a billable video visit. She was previously seen by Dr Fong in April and me in August    Subjective:  Gemma Cowart is a 77 year old female w/ PMHx of COPD, DM diet controlled, HLD, psoriasis, CKD stage 3 (told it was from metformin?), DVT previously on warfarin now on eliquis, breast cancer s/p masectomy (no chemo/rads) history of gastric bypass surgery found to have elevated PTH level in February here for follow-up.  Chief Complaint   Patient presents with     Follow Up     Hyperparathyroidism (H) +1 more       INTERVAL HISTORY:  - Started calcium supplementation after last visit, did not complete 24hr urine collection as recommended.  - Has been trying to lose weight, lost 10lbs over past year  - Misses evening calcium dose, only taking about 50% of the calcium was recommended  - otherwise feeling well    1) Elevated PTH, normal calcium level  HPI:  Found in October to have calcium level was low in assay but corrected for albumin into normal range. At subsequent labs, she had PTH level checked that was elevated to 143, prompting referral to endocrine. She was seen in April and recommended calcium supplementation to correct calcium level and then recheck. Patient did not take calcium supplementation because she saw \"TV doctor\" who said they are all chemicals and can't be absorbed by the body and recommended green leafy vegetables. She has been eating kale or spinach 4x weekly. Of note, she also had MRI for headaches and seen to have lytic lesions in bones concerning for metastatic dieseae. PET scan was negative with negative tumor markers.     Previous Fractures: " toe remote, kneecap (~5 years ago)  Bone-specific therapy: none  Family History of Hip Fx: none, but mother and brother required hip replacement for OA  Hormone History (Menopause, Testosterone):   Steroids: none  PPIs: none  Antiepileptics: none  Anticoagulation: previous warfarin, current eliquis (clotted through warfarin, Leg)  Autoimmune disease: Psoriasis  CKD: CKD 3  Nephrolithiasis: once, approx 20 yrs ago  Smoking:  none  Alcohol: rare, last 2 years ago katy  Other risk factors: hx of breast cancer s/p mastectomy (found on mammogram), no chemo/rads  Ca/D: Vit D- 5000u daily (since February), Calcium-600mg daily  Exercise: minimal     SPEP neg 10/2021    2/1/2022-  Ca 8.7      11/17/2022- (after partial Ca supplement and Vit D)  Ca 8.9  Vit D 30  PTH 88 (ULN 65)    2) Thyroid Nodule  - Seen on ultrasound 2008, patient does not remember this  - Ultrasound revealed densely calcified nodule 1.3x1.0x0.8  Maternal grandmother- large goiter that required multiple surgeries to remove  Denies any difficulty breathing, difficulty swallowing, shortness of breath, enlargement of neck/thyroid, family history of thyroid cancer, exposure to radiation, hoarseness or weight loss.       Active diagnoses this visit:     Other osteoporosis without current pathological fracture  Hyperparathyroidism (H)  Elevated parathyroid hormone     ROS: 10 point ROS neg other than the symptoms noted above in the HPI.      Medical, surgical, social, and family histories, medications and allergies reviewed and updated.  Past Medical History:   Diagnosis Date     Anemia     iron deficiency anemia     Anxiety      Breast cancer (H) 2012    Bilateral Masectomies 9/2012     Complications of gastric bypass surgery      COPD (chronic obstructive pulmonary disease) (H) 4/21/2021     Diabetes mellitus (H)     diet controlled     DVT (deep venous thrombosis) (H)      Eczema      Fracture of left knee region     patella - vertical      History of kidney stones      Obesity      Palpitation     with no treatment     Polyneuropathy      Pruritus     generalized     Rosacea      SVT (supraventricular tachycardia) (H)     s/p ablation 5/28/2015 at [facility] for left lateral AP       Past Surgical History:   Procedure Laterality Date     ABDOMEN SURGERY  2000    gastric bypass 2000     COLONOSCOPY  10/11/2012    Procedure: COLONOSCOPY;  colonoscopy;  Surgeon: Gela Cleary MD;  Location:  GI     COLONOSCOPY N/A 10/6/2017    Procedure: COLONOSCOPY;;  Surgeon: Shashank Ortiz MD;  Location:  GI     COLONOSCOPY N/A 3/7/2022    Procedure: COLONOSCOPY;  Surgeon: Andrea Daniel MD;  Location:  GI     DENTAL SURGERY  5/2007    all teeth removed - dentures     ENT SURGERY      all teeth pulled     ESOPHAGOSCOPY, GASTROSCOPY, DUODENOSCOPY (EGD), COMBINED N/A 10/6/2017    Procedure: COMBINED ESOPHAGOSCOPY, GASTROSCOPY, DUODENOSCOPY (EGD);  ESOPHAGOSCOPY, GASTROSCOPY, DUODENOSCOPY (EGD) & Colonoscopy *Needs INR on arrival;  Surgeon: Shashank Ortiz MD;  Location:  GI     GYN SURGERY  1/10/75    tubal ligation      H ABLATION SVT  5/28/2015    stopped metoprolol     HERNIA REPAIR  2005     IMPLANT FILTER INFERIOR VENA CAVA  10/8/2012    taken out  1/4/12     INSERT TISSUE EXPANDER BREAST BILATERAL  9/5/2012    Procedure: INSERT TISSUE EXPANDER BREAST BILATERAL;;  Surgeon: Orlando Wall MD;  Location:  OR     MASTECTOMY SIMPLE, SENTINEL NODE, COMBINED  9/5/2012    Procedure: COMBINED MASTECTOMY SIMPLE, SENTINEL NODE;  BILATERAL MASTECTOMY WITH LEFT AXILLARY SENTINEL LYMPH NODE BIOPSY, BILATERAL TISSUE EXPANDER        MASTECTOMY, BILATERAL       PANNICULECTOMY N/A 4/5/2018    Procedure: PANNICULECTOMY;  PANNICULECTOMY ;  Surgeon: Orlando Wall MD;  Location:  OR     RECONSTRUCT BREAST BILATERAL, IMPLANT PROSTHESIS BILATERAL, COMBINED  5/22/2013    Procedure: COMBINED RECONSTRUCT BREAST BILATERAL, IMPLANT PROSTHESIS  BILATERAL;  BILATERAL SECOND STAGE BREAST RECONSTRUCTION WITH SILICONE GEL IMPLANTS AND LIPOSUCTION;  Surgeon: Orlando Wall MD;  Location: [facility]     RECONSTRUCT NIPPLE BILATERAL  2013    Procedure: RECONSTRUCT NIPPLE BILATERAL;  BILATERAL NIPPLE AREOLAR RECONSTRUCTION;  Surgeon: Orlando Wall MD;  Location: [facility]     ZZHC BREAST RECONSTRUC W OTHR TECHNIQ  2013     Allergies:  Bacitracin, Dust mites, and Nickel    Social History     Tobacco Use     Smoking status: Former     Packs/day: 1.50     Years: 25.00     Pack years: 37.50     Types: Cigarettes     Quit date: 10/21/1986     Years since quittin.2     Smokeless tobacco: Never     Tobacco comments:     quit    Substance Use Topics     Alcohol use: No     Comment: 1 time a year       Family History   Problem Relation Age of Onset     Cancer - colorectal Mother      Colon Cancer Mother      Prostate Cancer Father      Alzheimer Disease Father      Cancer Paternal Grandmother      Alzheimer Disease Paternal Grandfather      Cancer - colorectal Brother      Diabetes Brother      Crohn's Disease Daughter      Diabetes Brother      Eye Disorder Brother      Diabetes Son      Cancer Maternal Uncle      Cancer Maternal Uncle              Objective:  LMP 1990     Physical Exam (visual exam)  VS:  no vital signs taken for video visit  CONSTITUTIONAL: healthy, alert and NAD, responding appropriately  ENT: normocephalic, no visual evidence of trauma, normal nose and oral mucosa  EYES: conjunctivae and sclerae normal, no exophthalmos or proptosis  THYROID:  no visualized nodules or goiter  LUNGS: no audible wheeze, cough or visible cyanosis, no visible retractions or increased work of breathing  EXTREMITIES: no hand tremors  NEUROLOGY: cranial nerves grossly intact with no obvious deficit.  SKIN:  no visualized skin lesions or rash, no edema visualized  PSYCH: mentation appears normal, normal judgement        Lab Results   Component Value  Date/Time    TSH 2.15 02/01/2022 08:50 AM    TSH 2.09 09/06/2019 11:43 AM    VITD25 5.0 (L) 05/19/2012 01:11 PM    PTHI 88 11/17/2022 09:42 AM     Last Comprehensive Metabolic Panel:  Sodium   Date Value Ref Range Status   10/24/2022 143 133 - 144 mmol/L Final   03/23/2021 143 133 - 144 mmol/L Final     Potassium   Date Value Ref Range Status   10/24/2022 5.0 3.4 - 5.3 mmol/L Final   03/23/2021 4.9 3.4 - 5.3 mmol/L Final     Chloride   Date Value Ref Range Status   10/24/2022 113 (H) 94 - 109 mmol/L Final   03/23/2021 114 (H) 94 - 109 mmol/L Final     Carbon Dioxide   Date Value Ref Range Status   03/23/2021 24 20 - 32 mmol/L Final     Carbon Dioxide (CO2)   Date Value Ref Range Status   10/24/2022 24 20 - 32 mmol/L Final     Anion Gap   Date Value Ref Range Status   10/24/2022 6 3 - 14 mmol/L Final   03/23/2021 5 3 - 14 mmol/L Final     Glucose   Date Value Ref Range Status   10/24/2022 106 (H) 70 - 99 mg/dL Final   03/23/2021 96 70 - 99 mg/dL Final     Urea Nitrogen   Date Value Ref Range Status   10/24/2022 19 7 - 30 mg/dL Final   03/23/2021 15 7 - 30 mg/dL Final     Creatinine   Date Value Ref Range Status   11/17/2022 1.35 (H) 0.52 - 1.04 mg/dL Final   03/23/2021 1.32 (H) 0.52 - 1.04 mg/dL Final     GFR Estimate   Date Value Ref Range Status   11/17/2022 41 (L) >60 mL/min/1.73m2 Final     Comment:     Effective December 21, 2021 eGFRcr in adults is calculated using the 2021 CKD-EPI creatinine equation which includes age and gender (Stephanie alexis al., NEJM, DOI: 10.1056/XWWVch0013082)   03/23/2021 40 (L) >60 mL/min/[1.73_m2] Final     Comment:     Non  GFR Calc  Starting 12/18/2018, serum creatinine based estimated GFR (eGFR) will be   calculated using the Chronic Kidney Disease Epidemiology Collaboration   (CKD-EPI) equation.       GFR, ESTIMATED POCT   Date Value Ref Range Status   06/16/2022 36 (L) >60 mL/min/1.73m2 Final     Calcium   Date Value Ref Range Status   11/17/2022 8.9 8.5 - 10.1 mg/dL  Final   03/23/2021 8.7 8.5 - 10.1 mg/dL Final     PET Scan 8/1/2022:     MRI Brain 7/25/2022:  FINDINGS: Mild parenchymal volume loss is present. Scattered  frontoparietal predominance and sara white matter T2 hyperintensities  likely represent chronic small vessel ischemic change. No evidence of  recent ischemia or hemorrhage.  .  Patchy areas of marrow enhancement is present involving the bilateral  frontal bones at the midline measuring approximately 1.1 cm.  Questionable intracranial extension.     Also demonstrates patchy areas of T2 hyperintense signal by T1  hypointense signal, and enhancement within the left frontal bone and  left parietal bone.     Patchy enhancement T1 hyperintense signal within the posterior right  parietal bone which could represent a benign intraosseous cavernous  venous malformation.     Mild paranasal sinus mucosal thickening. The visualized tympanic and  mastoid cavities are unremarkable. Bilateral lens replacements.                                                                      IMPRESSION:  1. Patchy marrow signal abnormalities most consistent with osseous  metastatic disease.  2. No brain metastases are identified.  3. White matter T2 hyperintensities likely represent chronic small  vessel ischemic change.    DEXA 10/26/2022-   Lumbar Spine: L1-L4: BMD: 1.211 g/cm2. T-score: 0.1. Z-score: 1.3  RIGHT Hip Total: BMD: 0.705 g/cm2. T-score: -2.4. Z-score: -1.0  RIGHT Hip Femoral neck: BMD: 0.743 g/cm2. T-score: -2.1. Z-score: -0.6  LEFT Hip Total: BMD: 0.701 g/cm2. T-score: -2.4. Z-score: -1.1  LEFT Hip Femoral neck: BMD: 0.681 g/cm2. T-score: -2.6. Z-score: -1.0    ASSESSMENT / PLAN:  No diagnosis found.    1) Elevated PTH, normal calcium  - Likely appropriately elevated in the setting of low calcium intake. Improved with partial calcium supplementation  - Strongly encouraged adequate calcium daily with 1200 to 1500 mg calcium supplement.  I also encouraged her to get any  calcium in her diet that she can, she will make a stronger effort for this  - Will plan to recheck levels in 3 months  - She is unable to complete 24hr urine-- has BM with voiding and would not be able to separate out urine alone. Discussed possibility of spot urine calcium but may have limited utility. May be helpful if low, as would be reassuring that is truly dietary related  - Discussed possible treatment otpions for hyperparathyroidism including surgery vs medical management, neither great options for her and she prefers to delay/avoid surgery if at all possible. She meets various surgical criteria based on low CrCl (prolonged, since 2015), remote history of nephrolithiasis (>20yr ago) and osteoporosis (see below). All are mild, and she prefers to work on minimizing complications rather than pursuing surgery, which is reasonable.   - For now,will focus on adequate calcium and osteoporosis treatment, continued adeuqate hydration.    2) Osteoporosis  - Defined based on T score of 2.6 at femoral neck.  - Will proceed with  reclast infusion   Discussed 1/3 risk of flu symptoms (acute phase reaction) after treatment with IV zoledronic acid. Discussed risks of osteonecrosis of the jaw (1/200 after tooth extraction, 1/2500 spontaneous) and atypical femur fractures (1/1000) with chronic bisphosphonates. For every atypical femur fracture, 100 typical femur fractures are prevented.  - GFR 41 now    Surgical indications for hyperparathyroidsim  1) Serum calcium (>upper limit of normal): 1.0 mg/dL (0.25 mmol/L);  2) BMD by DXA: T-score ?2.5 at lumbar spine, total hip, femoral neck, or distal 1/3 radius;  3) Vertebral fracture by x-ray, CT, MRI, or VFA;  4) Creatinine clearance <60 cc/min; 24-h urine for calcium >400 mg/d (>10 mmol/d) and increased stone risk by biochemical stone risk analysis;  5) Presence of nephrolithiasis or nephrocalcinosis by x-ray, ultrasound, or CT;  6) Age <50 years      Return to clinic: 1  year    A total of 56 minutes were spent today 01/04/23 on this visit including chart review, history and counseling, documentation and other activities as detailed above.

## 2023-01-04 NOTE — LETTER
"    1/4/2023         RE: Gemma Cowart  [address] [place] [address] [place] [id number]        Dear Colleague,    Thank you for referring your patient, Gemma Cowart, to the [facility] [place]. Please see a copy of my visit note below.      Video-Visit Details    Type of service:  Video Visit  Video Start Time: 9:50  Video End Time:10:15  Originating Location (pt. Location): [place], [place]  Distant Location (provider location):  Home  Platform used for Video Visit: Leeanne Singh MD    Gemma Cowart is a 77 year old year old female here for evaluation of hyperparathyroidism via a billable video visit. She was previously seen by Dr Fong in April and me in August    Subjective:  Gemma Cowart is a 77 year old female w/ PMHx of COPD, DM diet controlled, HLD, psoriasis, CKD stage 3 (told it was from metformin?), DVT previously on warfarin now on eliquis, breast cancer s/p masectomy (no chemo/rads) history of gastric bypass surgery found to have elevated PTH level in February here for follow-up.  Chief Complaint   Patient presents with     Follow Up     Hyperparathyroidism (H) +1 more       INTERVAL HISTORY:  - Started calcium supplementation after last visit, did not complete 24hr urine collection as recommended.  - Has been trying to lose weight, lost 10lbs over past year  - Misses evening calcium dose, only taking about 50% of the calcium was recommended  - otherwise feeling well    1) Elevated PTH, normal calcium level  HPI:  Found in October to have calcium level was low in assay but corrected for albumin into normal range. At subsequent labs, she had PTH level checked that was elevated to 143, prompting referral to endocrine. She was seen in April and recommended calcium supplementation to correct calcium level and then recheck. Patient did not take calcium supplementation because she saw \"TV doctor\" who said they are all chemicals and can't be absorbed by the body and recommended " green leafy vegetables. She has been eating kale or spinach 4x weekly. Of note, she also had MRI for headaches and seen to have lytic lesions in bones concerning for metastatic dieseae. PET scan was negative with negative tumor markers.     Previous Fractures: toe remote, kneecap (~5 years ago)  Bone-specific therapy: none  Family History of Hip Fx: none, but mother and brother required hip replacement for OA  Hormone History (Menopause, Testosterone):   Steroids: none  PPIs: none  Antiepileptics: none  Anticoagulation: previous warfarin, current eliquis (clotted through warfarin, Leg)  Autoimmune disease: Psoriasis  CKD: CKD 3  Nephrolithiasis: once, approx 20 yrs ago  Smoking:  none  Alcohol: rare, last 2 years ago katy  Other risk factors: hx of breast cancer s/p mastectomy (found on mammogram), no chemo/rads  Ca/D: Vit D- 5000u daily (since February), Calcium-600mg daily  Exercise: minimal     SPEP neg 10/2021    2/1/2022-  Ca 8.7      11/17/2022- (after partial Ca supplement and Vit D)  Ca 8.9  Vit D 30  PTH 88 (ULN 65)    2) Thyroid Nodule  - Seen on ultrasound 2008, patient does not remember this  - Ultrasound revealed densely calcified nodule 1.3x1.0x0.8  Maternal grandmother- large goiter that required multiple surgeries to remove  Denies any difficulty breathing, difficulty swallowing, shortness of breath, enlargement of neck/thyroid, family history of thyroid cancer, exposure to radiation, hoarseness or weight loss.       Active diagnoses this visit:     Other osteoporosis without current pathological fracture  Hyperparathyroidism (H)  Elevated parathyroid hormone     ROS: 10 point ROS neg other than the symptoms noted above in the HPI.      Medical, surgical, social, and family histories, medications and allergies reviewed and updated.  Past Medical History:   Diagnosis Date     Anemia     iron deficiency anemia     Anxiety      Breast cancer (H) 2012    Bilateral Masectomies 9/2012      Complications of gastric bypass surgery      COPD (chronic obstructive pulmonary disease) (H) 4/21/2021     Diabetes mellitus (H)     diet controlled     DVT (deep venous thrombosis) (H)      Eczema      Fracture of left knee region     patella - vertical     History of kidney stones      Obesity      Palpitation     with no treatment     Polyneuropathy      Pruritus     generalized     Rosacea      SVT (supraventricular tachycardia) (H)     s/p ablation 5/28/2015 at [facility] for left lateral AP       Past Surgical History:   Procedure Laterality Date     ABDOMEN SURGERY  2000    gastric bypass 2000     COLONOSCOPY  10/11/2012    Procedure: COLONOSCOPY;  colonoscopy;  Surgeon: Gela Cleary MD;  Location:  GI     COLONOSCOPY N/A 10/6/2017    Procedure: COLONOSCOPY;;  Surgeon: Shashank Ortiz MD;  Location:  GI     COLONOSCOPY N/A 3/7/2022    Procedure: COLONOSCOPY;  Surgeon: Andrea Daniel MD;  Location:  GI     DENTAL SURGERY  5/2007    all teeth removed - dentures     ENT SURGERY      all teeth pulled     ESOPHAGOSCOPY, GASTROSCOPY, DUODENOSCOPY (EGD), COMBINED N/A 10/6/2017    Procedure: COMBINED ESOPHAGOSCOPY, GASTROSCOPY, DUODENOSCOPY (EGD);  ESOPHAGOSCOPY, GASTROSCOPY, DUODENOSCOPY (EGD) & Colonoscopy *Needs INR on arrival;  Surgeon: Shashank Ortiz MD;  Location:  GI     GYN SURGERY  1/10/75    tubal ligation      H ABLATION SVT  5/28/2015    stopped metoprolol     HERNIA REPAIR  2005     IMPLANT FILTER INFERIOR VENA CAVA  10/8/2012    taken out  1/4/12     INSERT TISSUE EXPANDER BREAST BILATERAL  9/5/2012    Procedure: INSERT TISSUE EXPANDER BREAST BILATERAL;;  Surgeon: Orlando Wall MD;  Location:  OR     MASTECTOMY SIMPLE, SENTINEL NODE, COMBINED  9/5/2012    Procedure: COMBINED MASTECTOMY SIMPLE, SENTINEL NODE;  BILATERAL MASTECTOMY WITH LEFT AXILLARY SENTINEL LYMPH NODE BIOPSY, BILATERAL TISSUE EXPANDER        MASTECTOMY, BILATERAL       PANNICULECTOMY N/A  2018    Procedure: PANNICULECTOMY;  PANNICULECTOMY ;  Surgeon: Orlando Wall MD;  Location:  OR     RECONSTRUCT BREAST BILATERAL, IMPLANT PROSTHESIS BILATERAL, COMBINED  2013    Procedure: COMBINED RECONSTRUCT BREAST BILATERAL, IMPLANT PROSTHESIS BILATERAL;  BILATERAL SECOND STAGE BREAST RECONSTRUCTION WITH SILICONE GEL IMPLANTS AND LIPOSUCTION;  Surgeon: Orlando Wall MD;  Location: [facility]     RECONSTRUCT NIPPLE BILATERAL  2013    Procedure: RECONSTRUCT NIPPLE BILATERAL;  BILATERAL NIPPLE AREOLAR RECONSTRUCTION;  Surgeon: Orlando Wall MD;  Location: [facility]     ZZHC BREAST RECONSTRUC W OTHR TECHNIQ  2013     Allergies:  Bacitracin, Dust mites, and Nickel    Social History     Tobacco Use     Smoking status: Former     Packs/day: 1.50     Years: 25.00     Pack years: 37.50     Types: Cigarettes     Quit date: 10/21/1986     Years since quittin.2     Smokeless tobacco: Never     Tobacco comments:     quit    Substance Use Topics     Alcohol use: No     Comment: 1 time a year       Family History   Problem Relation Age of Onset     Cancer - colorectal Mother      Colon Cancer Mother      Prostate Cancer Father      Alzheimer Disease Father      Cancer Paternal Grandmother      Alzheimer Disease Paternal Grandfather      Cancer - colorectal Brother      Diabetes Brother      Crohn's Disease Daughter      Diabetes Brother      Eye Disorder Brother      Diabetes Son      Cancer Maternal Uncle      Cancer Maternal Uncle              Objective:  LMP 1990     Physical Exam (visual exam)  VS:  no vital signs taken for video visit  CONSTITUTIONAL: healthy, alert and NAD, responding appropriately  ENT: normocephalic, no visual evidence of trauma, normal nose and oral mucosa  EYES: conjunctivae and sclerae normal, no exophthalmos or proptosis  THYROID:  no visualized nodules or goiter  LUNGS: no audible wheeze, cough or visible cyanosis, no visible retractions or increased  work of breathing  EXTREMITIES: no hand tremors  NEUROLOGY: cranial nerves grossly intact with no obvious deficit.  SKIN:  no visualized skin lesions or rash, no edema visualized  PSYCH: mentation appears normal, normal judgement        Lab Results   Component Value Date/Time    TSH 2.15 02/01/2022 08:50 AM    TSH 2.09 09/06/2019 11:43 AM    VITD25 5.0 (L) 05/19/2012 01:11 PM    PTHI 88 11/17/2022 09:42 AM     Last Comprehensive Metabolic Panel:  Sodium   Date Value Ref Range Status   10/24/2022 143 133 - 144 mmol/L Final   03/23/2021 143 133 - 144 mmol/L Final     Potassium   Date Value Ref Range Status   10/24/2022 5.0 3.4 - 5.3 mmol/L Final   03/23/2021 4.9 3.4 - 5.3 mmol/L Final     Chloride   Date Value Ref Range Status   10/24/2022 113 (H) 94 - 109 mmol/L Final   03/23/2021 114 (H) 94 - 109 mmol/L Final     Carbon Dioxide   Date Value Ref Range Status   03/23/2021 24 20 - 32 mmol/L Final     Carbon Dioxide (CO2)   Date Value Ref Range Status   10/24/2022 24 20 - 32 mmol/L Final     Anion Gap   Date Value Ref Range Status   10/24/2022 6 3 - 14 mmol/L Final   03/23/2021 5 3 - 14 mmol/L Final     Glucose   Date Value Ref Range Status   10/24/2022 106 (H) 70 - 99 mg/dL Final   03/23/2021 96 70 - 99 mg/dL Final     Urea Nitrogen   Date Value Ref Range Status   10/24/2022 19 7 - 30 mg/dL Final   03/23/2021 15 7 - 30 mg/dL Final     Creatinine   Date Value Ref Range Status   11/17/2022 1.35 (H) 0.52 - 1.04 mg/dL Final   03/23/2021 1.32 (H) 0.52 - 1.04 mg/dL Final     GFR Estimate   Date Value Ref Range Status   11/17/2022 41 (L) >60 mL/min/1.73m2 Final     Comment:     Effective December 21, 2021 eGFRcr in adults is calculated using the 2021 CKD-EPI creatinine equation which includes age and gender (Stephanie et al., NEJM, DOI: 10.1056/ONHVqt4321834)   03/23/2021 40 (L) >60 mL/min/[1.73_m2] Final     Comment:     Non  GFR Calc  Starting 12/18/2018, serum creatinine based estimated GFR (eGFR) will be    calculated using the Chronic Kidney Disease Epidemiology Collaboration   (CKD-EPI) equation.       GFR, ESTIMATED POCT   Date Value Ref Range Status   06/16/2022 36 (L) >60 mL/min/1.73m2 Final     Calcium   Date Value Ref Range Status   11/17/2022 8.9 8.5 - 10.1 mg/dL Final   03/23/2021 8.7 8.5 - 10.1 mg/dL Final     PET Scan 8/1/2022:     MRI Brain 7/25/2022:  FINDINGS: Mild parenchymal volume loss is present. Scattered  frontoparietal predominance and sara white matter T2 hyperintensities  likely represent chronic small vessel ischemic change. No evidence of  recent ischemia or hemorrhage.  .  Patchy areas of marrow enhancement is present involving the bilateral  frontal bones at the midline measuring approximately 1.1 cm.  Questionable intracranial extension.     Also demonstrates patchy areas of T2 hyperintense signal by T1  hypointense signal, and enhancement within the left frontal bone and  left parietal bone.     Patchy enhancement T1 hyperintense signal within the posterior right  parietal bone which could represent a benign intraosseous cavernous  venous malformation.     Mild paranasal sinus mucosal thickening. The visualized tympanic and  mastoid cavities are unremarkable. Bilateral lens replacements.                                                                      IMPRESSION:  1. Patchy marrow signal abnormalities most consistent with osseous  metastatic disease.  2. No brain metastases are identified.  3. White matter T2 hyperintensities likely represent chronic small  vessel ischemic change.    DEXA 10/26/2022-   Lumbar Spine: L1-L4: BMD: 1.211 g/cm2. T-score: 0.1. Z-score: 1.3  RIGHT Hip Total: BMD: 0.705 g/cm2. T-score: -2.4. Z-score: -1.0  RIGHT Hip Femoral neck: BMD: 0.743 g/cm2. T-score: -2.1. Z-score: -0.6  LEFT Hip Total: BMD: 0.701 g/cm2. T-score: -2.4. Z-score: -1.1  LEFT Hip Femoral neck: BMD: 0.681 g/cm2. T-score: -2.6. Z-score: -1.0    ASSESSMENT / PLAN:  No diagnosis found.    1)  Elevated PTH, normal calcium  - Likely appropriately elevated in the setting of low calcium intake. Improved with partial calcium supplementation  - Strongly encouraged adequate calcium daily with 1200 to 1500 mg calcium supplement.  I also encouraged her to get any calcium in her diet that she can, she will make a stronger effort for this  - Will plan to recheck levels in 3 months  - She is unable to complete 24hr urine-- has BM with voiding and would not be able to separate out urine alone. Discussed possibility of spot urine calcium but may have limited utility. May be helpful if low, as would be reassuring that is truly dietary related  - Discussed possible treatment otpions for hyperparathyroidism including surgery vs medical management, neither great options for her and she prefers to delay/avoid surgery if at all possible. She meets various surgical criteria based on low CrCl (prolonged, since 2015), remote history of nephrolithiasis (>20yr ago) and osteoporosis (see below). All are mild, and she prefers to work on minimizing complications rather than pursuing surgery, which is reasonable.   - For now,will focus on adequate calcium and osteoporosis treatment, continued adeuqate hydration.    2) Osteoporosis  - Defined based on T score of 2.6 at femoral neck.  - Will proceed with  reclast infusion   Discussed 1/3 risk of flu symptoms (acute phase reaction) after treatment with IV zoledronic acid. Discussed risks of osteonecrosis of the jaw (1/200 after tooth extraction, 1/2500 spontaneous) and atypical femur fractures (1/1000) with chronic bisphosphonates. For every atypical femur fracture, 100 typical femur fractures are prevented.  - GFR 41 now    Surgical indications for hyperparathyroidsim  1) Serum calcium (>upper limit of normal): 1.0 mg/dL (0.25 mmol/L);  2) BMD by DXA: T-score ?2.5 at lumbar spine, total hip, femoral neck, or distal 1/3 radius;  3) Vertebral fracture by x-ray, CT, MRI, or VFA;  4)  Creatinine clearance <60 cc/min; 24-h urine for calcium >400 mg/d (>10 mmol/d) and increased stone risk by biochemical stone risk analysis;  5) Presence of nephrolithiasis or nephrocalcinosis by x-ray, ultrasound, or CT;  6) Age <50 years      Return to clinic: 1 year    A total of 56 minutes were spent today 01/04/23 on this visit including chart review, history and counseling, documentation and other activities as detailed above.         Again, thank you for allowing me to participate in the care of your patient.        Sincerely,        Haley Singh MD

## 2023-01-04 NOTE — NURSING NOTE
Chief Complaint   Patient presents with     Follow Up     Hyperparathyroidism (H) +1 more       There were no vitals filed for this visit.    There is no height or weight on file to calculate BMI.    Sandy Hernández, [facility]

## 2023-02-21 ENCOUNTER — PATIENT OUTREACH (OUTPATIENT)
Dept: GERIATRIC MEDICINE | Facility: CLINIC | Age: 78
End: 2023-02-21
Payer: COMMERCIAL

## 2023-02-21 NOTE — PROGRESS NOTES
Encounter opened due to Regulatory [employer] Update to open FVP Program.    Jossie Lockwood  [facility]  Case Management Specialist  [phone]

## 2023-02-21 NOTE — PROGRESS NOTES
Encounter opened due to Regulatory [employer] Mar[employer] Update to close FVP Program.    Jossie Lockwood  [facility]  Case Management Specialist  [phone]

## 2023-03-02 ENCOUNTER — PATIENT OUTREACH (OUTPATIENT)
Dept: GERIATRIC MEDICINE | Facility: CLINIC | Age: 78
End: 2023-03-02
Payer: COMMERCIAL

## 2023-03-02 NOTE — PROGRESS NOTES
[facility] Care Coordination Contact      [facility] Refusal  Assessment    Member refused home visit HRA on 3/1/2023 (reason: member declines because she says that she is taking care of a family member and is only home on Sundays and she cannot meet with care coordinator for the next several months or longer).    ER visits: Yes -  [facility]  Hospitalizations: No  Health concerns: None  Falls/Injuries: No  ADL/IADL Dependencies: unknown, did not have time to talk        Member currently receiving the following home care services:   None  Member currently receiving the following community resources:    Informal support(s): None      Advanced Care Planning discussion, complete code section.    [facility] sponsored benefits: Shared information re: Silver Sneakers/gym memberships, ASA, Calcium +D.    Follow-Up Plan: Member informed of future contact, plan to f/u with member with a 6 month telephone assessment and offer a home visit.  Contact information shared with member and family, encouraged member to call with any questions or concerns at any time.    This CC note routed to PCP.    Ifrah Emerson RN  [facility]  [phone]

## 2023-03-02 NOTE — LETTER
March 6, 2023    SALUD MARTINEZ  [address] [place]  [place] [address]        Dear Salud:    As a member of [facility]'s [facility], we offer a health risk assessment at no cost to you. I know you don't want to have the assessment right now. If you change your mind, please call me at the number below.    Who performs the health risk assessment?  A [facility] Care Coordinator performs the assessment. Our Care Coordinators can also help you understand your benefits. They can tell you about services to aid you at home, such as managing your care with your doctors if your health worsens.    Our Care Coordinators are here for you if you need:    Support for activities you used to do by yourself (including making meals, bathing, and paying bills)    Equipment for bathroom or home safety    Help finding a new place to live    Information on staying healthy, preventing falls and immunizations    Questions?  If you have questions, or you would like to do the assessment, call me at [phone]. TTY users call [phone]. I'm here from 8am to 5pm. I may reach out to you again soon.      Sincerely,          Ifrah Emerson RN    E-Mail:  [email or web address]  Phone: [phone]      [employer]    <[id number] accepted    [id number] (12/2021)  [id number]>

## 2023-03-06 ENCOUNTER — TELEPHONE (OUTPATIENT)
Dept: PEDIATRICS | Facility: CLINIC | Age: 78
End: 2023-03-06
Payer: COMMERCIAL

## 2023-03-06 NOTE — PROGRESS NOTES
"[facility] Care Coordination Contact    Per CC, mailed client a \"Refusal of Home Visit\" letter.    Jossie Lockwood  [facility]  Case Management Specialist  [phone]    "

## 2023-03-10 ENCOUNTER — TELEPHONE (OUTPATIENT)
Dept: ONCOLOGY | Facility: CLINIC | Age: 78
End: 2023-03-10

## 2023-03-10 NOTE — TELEPHONE ENCOUNTER
Patient called to cancel appointment with Dr Bowen scheduled for 3/13/23. This  attempted to discuss rescheduling. Patient states her brother was in an accident, is in critical condition and she has to travel to take care of him. Patient declined rescheduling, states she will call back.

## 2023-03-23 NOTE — TELEPHONE ENCOUNTER
Spoke with patient about CH[facility] outreach, patient did not need anything at this time.     Fany Chavarria, KENTONG

## 2023-04-15 ENCOUNTER — TELEPHONE (OUTPATIENT)
Dept: NURSING | Facility: CLINIC | Age: 78
End: 2023-04-15
Payer: COMMERCIAL

## 2023-04-15 DIAGNOSIS — Z86.718 PERSONAL HISTORY OF DVT (DEEP VEIN THROMBOSIS): ICD-10-CM

## 2023-04-15 NOTE — TELEPHONE ENCOUNTER
Routing high priority as patient only has 3 days left.    TERE HOLLEY RN on 4/15/2023 at 2:46 PM

## 2023-04-17 NOTE — TELEPHONE ENCOUNTER
Pt saw PCP 6 months ago  Prescription approved per [facility] Refill Protocol.  Nicole Cook RN, BSN  [facility]

## 2023-05-19 ENCOUNTER — TELEPHONE (OUTPATIENT)
Dept: PEDIATRICS | Facility: CLINIC | Age: 78
End: 2023-05-19
Payer: COMMERCIAL

## 2023-05-19 DIAGNOSIS — E11.69 TYPE 2 DIABETES MELLITUS WITH OTHER SPECIFIED COMPLICATION, WITHOUT LONG-TERM CURRENT USE OF INSULIN (H): Primary | ICD-10-CM

## 2023-05-19 NOTE — TELEPHONE ENCOUNTER
Patient overdue for d5 follow-up. Labs ordered. Please call patient to schedule diabetes follow-up with primary care provider or MTM.    Jo Vásquez, PharmD  Medication Therapy Management Pharmacist

## 2023-05-22 NOTE — TELEPHONE ENCOUNTER
The pt is aware and scheduled for her upcoming appointment.   Rosa Maria Ngo on 5/22/2023 at 9:17 AM

## 2023-05-23 ENCOUNTER — OFFICE VISIT (OUTPATIENT)
Dept: PEDIATRICS | Facility: CLINIC | Age: 78
End: 2023-05-23
Payer: COMMERCIAL

## 2023-05-23 ENCOUNTER — TELEPHONE (OUTPATIENT)
Dept: PEDIATRICS | Facility: CLINIC | Age: 78
End: 2023-05-23

## 2023-05-23 VITALS
WEIGHT: 175.8 LBS | HEART RATE: 57 BPM | HEIGHT: 62 IN | SYSTOLIC BLOOD PRESSURE: 124 MMHG | RESPIRATION RATE: 14 BRPM | DIASTOLIC BLOOD PRESSURE: 68 MMHG | OXYGEN SATURATION: 99 % | BODY MASS INDEX: 32.35 KG/M2 | TEMPERATURE: 98 F

## 2023-05-23 DIAGNOSIS — F41.9 ANXIETY: ICD-10-CM

## 2023-05-23 DIAGNOSIS — M81.8 OTHER OSTEOPOROSIS WITHOUT CURRENT PATHOLOGICAL FRACTURE: ICD-10-CM

## 2023-05-23 DIAGNOSIS — R79.89 ELEVATED PARATHYROID HORMONE: ICD-10-CM

## 2023-05-23 DIAGNOSIS — E11.69 TYPE 2 DIABETES MELLITUS WITH OTHER SPECIFIED COMPLICATION, WITHOUT LONG-TERM CURRENT USE OF INSULIN (H): ICD-10-CM

## 2023-05-23 DIAGNOSIS — Z98.84 S/P GASTRIC BYPASS: ICD-10-CM

## 2023-05-23 DIAGNOSIS — E21.3 HYPERPARATHYROIDISM (H): ICD-10-CM

## 2023-05-23 DIAGNOSIS — E11.69 TYPE 2 DIABETES MELLITUS WITH OTHER SPECIFIED COMPLICATION, UNSPECIFIED WHETHER LONG TERM INSULIN USE (H): Chronic | ICD-10-CM

## 2023-05-23 DIAGNOSIS — Z86.718 PERSONAL HISTORY OF DVT (DEEP VEIN THROMBOSIS): ICD-10-CM

## 2023-05-23 DIAGNOSIS — E11.69 TYPE 2 DIABETES MELLITUS WITH OTHER SPECIFIED COMPLICATION, UNSPECIFIED WHETHER LONG TERM INSULIN USE (H): Primary | ICD-10-CM

## 2023-05-23 DIAGNOSIS — R25.2 LEG CRAMP: ICD-10-CM

## 2023-05-23 DIAGNOSIS — J44.9 CHRONIC OBSTRUCTIVE PULMONARY DISEASE, UNSPECIFIED COPD TYPE (H): ICD-10-CM

## 2023-05-23 DIAGNOSIS — E53.8 VITAMIN B12 DEFICIENCY: ICD-10-CM

## 2023-05-23 DIAGNOSIS — C50.012 MALIGNANT NEOPLASM OF NIPPLE OF LEFT BREAST IN FEMALE, UNSPECIFIED ESTROGEN RECEPTOR STATUS (H): ICD-10-CM

## 2023-05-23 DIAGNOSIS — N81.4 UTERINE PROLAPSE: ICD-10-CM

## 2023-05-23 DIAGNOSIS — E78.5 HYPERLIPIDEMIA LDL GOAL <100: Chronic | ICD-10-CM

## 2023-05-23 DIAGNOSIS — N18.32 STAGE 3B CHRONIC KIDNEY DISEASE (H): ICD-10-CM

## 2023-05-23 PROBLEM — M20.42 HAMMER TOES OF BOTH FEET: Status: RESOLVED | Noted: 2022-11-29 | Resolved: 2023-05-23

## 2023-05-23 PROBLEM — M20.41 HAMMER TOES OF BOTH FEET: Status: RESOLVED | Noted: 2022-11-29 | Resolved: 2023-05-23

## 2023-05-23 PROBLEM — Z79.01 CHRONIC ANTICOAGULATION: Status: RESOLVED | Noted: 2021-06-15 | Resolved: 2023-05-23

## 2023-05-23 LAB
ALBUMIN SERPL BCG-MCNC: 4.3 G/DL (ref 3.5–5.2)
ALP SERPL-CCNC: 62 U/L (ref 35–104)
ALT SERPL W P-5'-P-CCNC: 11 U/L (ref 10–35)
ANION GAP SERPL CALCULATED.3IONS-SCNC: 12 MMOL/L (ref 7–15)
AST SERPL W P-5'-P-CCNC: 22 U/L (ref 10–35)
BILIRUB SERPL-MCNC: 0.4 MG/DL
BUN SERPL-MCNC: 17.3 MG/DL (ref 8–23)
CALCIUM SERPL-MCNC: 9.6 MG/DL (ref 8.8–10.2)
CHLORIDE SERPL-SCNC: 109 MMOL/L (ref 98–107)
CREAT SERPL-MCNC: 1.55 MG/DL (ref 0.51–0.95)
CREAT UR-MCNC: 103 MG/DL
DEPRECATED CALCIDIOL+CALCIFEROL SERPL-MC: 36 UG/L (ref 20–75)
DEPRECATED HCO3 PLAS-SCNC: 23 MMOL/L (ref 22–29)
GFR SERPL CREATININE-BSD FRML MDRD: 34 ML/MIN/1.73M2
GLUCOSE SERPL-MCNC: 99 MG/DL (ref 70–99)
HBA1C MFR BLD: 6.1 % (ref 0–5.6)
MAGNESIUM SERPL-MCNC: 2.2 MG/DL (ref 1.7–2.3)
MICROALBUMIN UR-MCNC: <12 MG/L
MICROALBUMIN/CREAT UR: NORMAL MG/G{CREAT}
POTASSIUM SERPL-SCNC: 5.1 MMOL/L (ref 3.4–5.3)
PROT SERPL-MCNC: 6.6 G/DL (ref 6.4–8.3)
PTH-INTACT SERPL-MCNC: 68 PG/ML (ref 15–65)
SODIUM SERPL-SCNC: 144 MMOL/L (ref 136–145)
TSH SERPL DL<=0.005 MIU/L-ACNC: 2.22 UIU/ML (ref 0.3–4.2)

## 2023-05-23 PROCEDURE — 82306 VITAMIN D 25 HYDROXY: CPT | Performed by: NURSE PRACTITIONER

## 2023-05-23 PROCEDURE — 82043 UR ALBUMIN QUANTITATIVE: CPT | Performed by: NURSE PRACTITIONER

## 2023-05-23 PROCEDURE — 83735 ASSAY OF MAGNESIUM: CPT | Performed by: NURSE PRACTITIONER

## 2023-05-23 PROCEDURE — 83036 HEMOGLOBIN GLYCOSYLATED A1C: CPT | Performed by: NURSE PRACTITIONER

## 2023-05-23 PROCEDURE — 82570 ASSAY OF URINE CREATININE: CPT | Performed by: NURSE PRACTITIONER

## 2023-05-23 PROCEDURE — 86364 TISS TRNSGLTMNASE EA IG CLAS: CPT | Performed by: NURSE PRACTITIONER

## 2023-05-23 PROCEDURE — 99215 OFFICE O/P EST HI 40 MIN: CPT | Performed by: NURSE PRACTITIONER

## 2023-05-23 PROCEDURE — 83970 ASSAY OF PARATHORMONE: CPT | Performed by: NURSE PRACTITIONER

## 2023-05-23 PROCEDURE — 84443 ASSAY THYROID STIM HORMONE: CPT | Performed by: NURSE PRACTITIONER

## 2023-05-23 PROCEDURE — 80053 COMPREHEN METABOLIC PANEL: CPT | Performed by: NURSE PRACTITIONER

## 2023-05-23 PROCEDURE — 36415 COLL VENOUS BLD VENIPUNCTURE: CPT | Performed by: NURSE PRACTITIONER

## 2023-05-23 RX ORDER — ALBUTEROL SULFATE 90 UG/1
2 AEROSOL, METERED RESPIRATORY (INHALATION) EVERY 6 HOURS PRN
Qty: 18 G | Refills: 1 | Status: SHIPPED | OUTPATIENT
Start: 2023-05-23 | End: 2023-12-13

## 2023-05-23 RX ORDER — SERTRALINE HYDROCHLORIDE 100 MG/1
100 TABLET, FILM COATED ORAL DAILY
Qty: 90 TABLET | Refills: 1 | Status: SHIPPED | OUTPATIENT
Start: 2023-05-23 | End: 2023-12-13

## 2023-05-23 ASSESSMENT — PAIN SCALES - GENERAL: PAINLEVEL: NO PAIN (0)

## 2023-05-23 NOTE — TELEPHONE ENCOUNTER
Please call pt.    I noticed after she left that she's not on a statin.    Risk of heart attack and stroke in the next 10 years is 30%, which is extremely high (under 7.5% is low). I strongly recommend she re-start simvastatin. Lowers heart attack/stroke/dementia risk by about 20-30%. Please ask if she's willing to start a statin.    If not, recommend phone visit to discuss.    Arminda Loyola, FNP-DNP.

## 2023-05-23 NOTE — PROGRESS NOTES
Assessment & Plan     (E11.69) Type 2 diabetes mellitus with other specified complication, unspecified whether long term insulin use (H)  (primary encounter diagnosis)  Comment: Stable based on today's A1c. Diet controlled.  Plan: Hemoglobin A1c  -Not smoking  -BP controlled  -Not on asa due to apixaban use  -Upcoming eye visit  -Realized after she left she hasn't been on a statin since 2015. Very high ASCVD risk. Had RN call her to see if she'd be willing to re-start  -Follow-up A1c 6 months    (E53.8) Vitamin B12 deficiency  Comment: Stable  Plan: cyanocobalamin (VITAMIN B-12) 1000 MCG         sublingual tablet            (Z98.84) S/P gastric bypass  Comment: Between endo and heme, all post bariatric surgery labs are being checked  Plan: cyanocobalamin (VITAMIN B-12) 1000 MCG         sublingual tablet            (Z86.718) Personal history of DVT (deep vein thrombosis)  Comment: Stable on AC  Plan: apixaban ANTICOAGULANT (ELIQUIS ANTICOAGULANT)         5 MG tablet          (F41.9) Anxiety  Comment: Stable  Plan: sertraline (ZOLOFT) 100 MG tablet            (J44.9) Chronic obstructive pulmonary disease, unspecified COPD type (H)  Comment: Poor control. Interestingly, had normal PFTs but is symptomatic with activity. Had negative stress test recently.   Plan: albuterol (PROAIR HFA/PROVENTIL HFA/VENTOLIN         HFA) 108 (90 Base) MCG/ACT inhaler, Adult         Pulmonary Medicine Referral        See pulm.     (N18.32) Stage 3b chronic kidney disease (H)  Comment: Stable    (C50.012) Malignant neoplasm of nipple of left breast in female, unspecified estrogen receptor status (H)  Comment: Follows with heme    (M81.8) Other osteoporosis without current pathological fracture  Comment: Follows with heme  Plan: Adult Endocrinology  Referral            (E34.9) Elevated parathyroid hormone  Comment: Not taking calcium. Asked her to follow-up with heme.  Plan: Adult Endocrinology  Referral         "    (N81.4) Uterine prolapse  Comment: Wanting referral  Plan: Ob/Gyn Referral            (R25.2) Leg cramp  Comment: Ongoing. Has improved with magnesium. No side effects. Will check levels  Plan: Magnesium Citrate 200 MG TABS, Magnesium            (E21.3) Hyperparathyroidism (H)  Comment: Following with endo but not taking calcium because someone on youtube said it was not helpful.   Plan: Asked her to follow-up with endo     BMI:   Estimated body mass index is 31.9 kg/m  as calculated from the following:    Height as of this encounter: 1.581 m (5' 2.25\").    Weight as of this encounter: 79.7 kg (175 lb 12.8 oz).   Weight management plan: Discussed healthy diet and exercise guidelines    See Patient Instructions    ANTNO COELLO [facility] JAY Menendez is a 77 year old, presenting for the following health issues:  Diabetes    Can't catch her breath with walking.  Hyperinflation on imaging  Stress test negative 6 months ago.    Diabetes: No tingling in feet      5/23/2023     9:53 AM   Additional Questions   Roomed by Lisset Rosado   Accompanied by N/A         5/23/2023     9:53 AM   Patient Reported Additional Medications   Patient reports taking the following new medications N/A     History of Present Illness     Asthma:  She presents for follow up of asthma.  She has no cough, some wheezing, and some shortness of breath. She is using a relief medication daily. She typically misses taking her controller medication 2 time(s) per week.Patient is aware of the following triggers: dust mites, exercise or sports, mold, pollens and smoke. The patient has had a visit to the Emergency Room, Urgent Care or Hospital due to asthma since the last clinic visit. She has been to the Emergency Room or Urgent Care 0 times.She has had a Hospitalization 0 times.    Back Pain:  She presents for follow up of back pain. Patient's back pain is a chronic problem.  Location of back " pain:  Left lower back, left middle of back, right upper back, right shoulder, right hip, left hip and other  Description of back pain: dull ache  Back pain spreads: nowhere    Since patient first noticed back pain, pain is: always present, but gets better and worse  Does back pain interfere with her job:  Not applicable      CKD: She is not using over the counter pain medicine.     COPD:  She presents for follow up of COPD.  Overall, COPD symptoms are slightly worse since last visit. She has same as usual fatigue or shortness of breath with exertion and same as usual shortness of breath at rest.  She often coughs and does not have change in sputum. No recent fever. She can walk 2-5 blocks without stopping to rest. She can not walk a flight of stairs without resting. The patient has had no ED, urgent care, or hospital admissions because of COPD since the last visit.     Diabetes:   She presents for follow up of diabetes.  She is checking home blood glucose one time daily. She checks blood glucose before meals.  Blood glucose is never over 200 and sometimes under 70. She is aware of hypoglycemia symptoms including shakiness, dizziness, weakness, lethargy and other. She is concerned about low blood sugar, several less than 70 in the past few weeks. She is having burning in feet, excessive thirst and weight loss. The patient has had a diabetic eye exam in the last 12 months. Eye exam performed on 2023. Location of last eye exam [facility].        Reason for visit:  Health team called me to check on my diabetes    She eats 2-3 servings of fruits and vegetables daily.She consumes 0 sweetened beverage(s) daily.She exercises with enough effort to increase her heart rate 10 to 19 minutes per day.  She exercises with enough effort to increase her heart rate 7 days per week. She is missing 1 dose(s) of medications per week.  She is not taking prescribed medications regularly due to remembering to  take.       Review of Systems   Constitutional, HEENT, cardiovascular, pulmonary, gi and gu systems are negative, except as otherwise noted.      Objective    LMP 09/24/1990   There is no height or weight on file to calculate BMI.  Physical Exam   CONSTITUTIONAL: Alert, well-nourished, well-groomed, NAD  HRRR S1 S2 No MRG. No peripheral edema  Lungs CTA. No wheeze, rhonchi, rales.      50 minutes spent with patient, over 1/2 in counseling and coordination of care regarding the above diagnoses

## 2023-05-23 NOTE — PATIENT INSTRUCTIONS
Please see OB about your uterus  I have re-referred you to endo. Please follow up with her. They will call you to schedule. Please talk to her about calcium AND osteoporosis.  Please see pulmonology (lung doctor). Phone: [phone]

## 2023-05-23 NOTE — LETTER
June 7, 2023      Gemam Cowart  [address] [place]  [place] [address]          Dear ,    We are writing to inform you of your test results.    {results letter list:[id number]}    Resulted Orders   Comprehensive metabolic panel   Result Value Ref Range    Sodium 144 136 - 145 mmol/L    Potassium 5.1 3.4 - 5.3 mmol/L    Chloride 109 (H) 98 - 107 mmol/L    Carbon Dioxide (CO2) 23 22 - 29 mmol/L    Anion Gap 12 7 - 15 mmol/L    Urea Nitrogen 17.3 8.0 - 23.0 mg/dL    Creatinine 1.55 (H) 0.51 - 0.95 mg/dL    Calcium 9.6 8.8 - 10.2 mg/dL    Glucose 99 70 - 99 mg/dL    Alkaline Phosphatase 62 35 - 104 U/L    AST 22 10 - 35 U/L    ALT 11 10 - 35 U/L    Protein Total 6.6 6.4 - 8.3 g/dL    Albumin 4.3 3.5 - 5.2 g/dL    Bilirubin Total 0.4 <=1.2 mg/dL    GFR Estimate 34 (L) >60 mL/min/1.73m2      Comment:      eGFR calculated using 2021 CKD-EPI equation.   TSH with free T4 reflex   Result Value Ref Range    TSH 2.22 0.30 - 4.20 uIU/mL   Parathyroid Hormone Intact   Result Value Ref Range    Parathyroid Hormone Intact 68 (H) 15 - 65 pg/mL    Narrative    This result was obtained with the Roche Elecsys PTH STAT assay.   This reference range differs from PTH assays used in other [facility] laboratories.   Vitamin D Deficiency   Result Value Ref Range    Vitamin D, Total (25-Hydroxy) 36 20 - 75 ug/L    Narrative    Season, race, dietary intake, and treatment affect the concentration of 25-hydroxy-Vitamin D. Values may decrease during winter months and increase during summer months. Values 20-29 ug/L may indicate Vitamin D insufficiency and values <20 ug/L may indicate Vitamin D deficiency.    Vitamin D determination is routinely performed by an immunoassay specific for 25 hydroxyvitamin D3.  If an individual is on vitamin D2(ergocalciferol) supplementation, please specify 25 OH vitamin D2 and D3 level determination by LCMSMS test VITD23.     Tissue transglutaminase casandra IgA and IgG   Result Value Ref Range     Tissue Transglutaminase Antibody IgA 0.5 <7.0 U/mL      Comment:      Negative- The tTG-IgA assay has limited utility for patients with decreased levels of IgA. Screening for celiac disease should include IgA testing to rule out selective IgA deficiency and to guide selection and interpretation of serological testing. tTG-IgG testing may be positive in celiac disease patients with IgA deficiency.    Tissue Transglutaminase Antibody IgG <0.6 <7.0 U/mL      Comment:      Negative       If you have any questions or concerns, please call the clinic at the number listed above.       Sincerely,      Haley Singh MD

## 2023-05-23 NOTE — TELEPHONE ENCOUNTER
Please postpone x 3 months. Needs general phone follow-up in 6 months.     When you call in August to schedule, please be sure she has completed her diabetic eye exam and also that she has scheduled a follow-up with endo since her last visit with me in May.

## 2023-05-23 NOTE — LETTER
June 7, 2023      Gemma Cowart  [address] [place] [id number]        Dear ,    We are writing to inform you of your test results.    Please make an appointment with your provider to review or follow up on your test results.  Appointments can be made by calling [phone].    Your kidney function is slightly worse-- we should discuss before any further reclast infusions.  PTH is persistently elevated, but better than previous so continue your calcium supplementation.     Resulted Orders   Comprehensive metabolic panel   Result Value Ref Range    Sodium 144 136 - 145 mmol/L    Potassium 5.1 3.4 - 5.3 mmol/L    Chloride 109 (H) 98 - 107 mmol/L    Carbon Dioxide (CO2) 23 22 - 29 mmol/L    Anion Gap 12 7 - 15 mmol/L    Urea Nitrogen 17.3 8.0 - 23.0 mg/dL    Creatinine 1.55 (H) 0.51 - 0.95 mg/dL    Calcium 9.6 8.8 - 10.2 mg/dL    Glucose 99 70 - 99 mg/dL    Alkaline Phosphatase 62 35 - 104 U/L    AST 22 10 - 35 U/L    ALT 11 10 - 35 U/L    Protein Total 6.6 6.4 - 8.3 g/dL    Albumin 4.3 3.5 - 5.2 g/dL    Bilirubin Total 0.4 <=1.2 mg/dL    GFR Estimate 34 (L) >60 mL/min/1.73m2      Comment:      eGFR calculated using 2021 CKD-EPI equation.   TSH with free T4 reflex   Result Value Ref Range    TSH 2.22 0.30 - 4.20 uIU/mL   Parathyroid Hormone Intact   Result Value Ref Range    Parathyroid Hormone Intact 68 (H) 15 - 65 pg/mL    Narrative    This result was obtained with the Roche Elecsys PTH STAT assay.   This reference range differs from PTH assays used in other [facility] laboratories.   Vitamin D Deficiency   Result Value Ref Range    Vitamin D, Total (25-Hydroxy) 36 20 - 75 ug/L    Narrative    Season, race, dietary intake, and treatment affect the concentration of 25-hydroxy-Vitamin D. Values may decrease during winter months and increase during summer months. Values 20-29 ug/L may indicate Vitamin D insufficiency and values <20 ug/L may indicate Vitamin D deficiency.    Vitamin D  determination is routinely performed by an immunoassay specific for 25 hydroxyvitamin D3.  If an individual is on vitamin D2(ergocalciferol) supplementation, please specify 25 OH vitamin D2 and D3 level determination by LCMSMS test VITD23.     Tissue transglutaminase casandra IgA and IgG   Result Value Ref Range    Tissue Transglutaminase Antibody IgA 0.5 <7.0 U/mL      Comment:      Negative- The tTG-IgA assay has limited utility for patients with decreased levels of IgA. Screening for celiac disease should include IgA testing to rule out selective IgA deficiency and to guide selection and interpretation of serological testing. tTG-IgG testing may be positive in celiac disease patients with IgA deficiency.    Tissue Transglutaminase Antibody IgG <0.6 <7.0 U/mL      Comment:      Negative       If you have any questions or concerns, please call the clinic at the number listed above.       Sincerely,      Haley Singh MD

## 2023-05-23 NOTE — TELEPHONE ENCOUNTER
"Arminda Loyola: I don't seen an active order for simvastatin. Per chart review, last ordered in 2015.      - Oliverio \"Yomi\" Huyen (he/him/his), RN - Patient Advocate Liason (PAL)  [facility]    "

## 2023-05-25 RX ORDER — SIMVASTATIN 20 MG
20 TABLET ORAL AT BEDTIME
Qty: 90 TABLET | Refills: 1 | Status: CANCELLED | OUTPATIENT
Start: 2023-05-25

## 2023-05-25 RX ORDER — SIMVASTATIN 40 MG
40 TABLET ORAL AT BEDTIME
Qty: 90 TABLET | Refills: 3 | Status: SHIPPED | OUTPATIENT
Start: 2023-05-25 | End: 2023-11-07

## 2023-05-25 NOTE — TELEPHONE ENCOUNTER
"Arminda Loyola: Called the pt. Does not have any simvastatin. Agreed to restart. Needs new order. Pended for review.      - Oliverio \"Yomi\" Huyen (he/him/his), RN - Patient Advocate Liason (PAL)  [facility]    "

## 2023-05-26 LAB
TTG IGA SER-ACNC: 0.5 U/ML
TTG IGG SER-ACNC: <0.6 U/ML

## 2023-06-20 ENCOUNTER — NURSE TRIAGE (OUTPATIENT)
Dept: PEDIATRICS | Facility: CLINIC | Age: 78
End: 2023-06-20
Payer: COMMERCIAL

## 2023-06-20 NOTE — TELEPHONE ENCOUNTER
Reason for Call:  Appointment Request    Patient requesting this type of appt:  other    Requested provider: Arminda Larson    Reason patient unable to be scheduled: Not within requested timeframe    When does patient want to be seen/preferred time: Same day    Comments: feet and legs swollen    Okay to leave a detailed message?: Yes at Home number on file [phone] (home)    Call taken on 6/20/2023 at 9:57 AM by Soraya Zhu

## 2023-06-20 NOTE — TELEPHONE ENCOUNTER
"Called the pt.    Reviewed that she has been experiencing some mild swelling of both her legs and feet.    Denies other symptoms, such as chest pain, redness, warm to the touch, achiness or pain. Baseline SOB due to COPD, but denies worsening or changes.    Pt was seen in the ER today for a cat bite and the provider viewed her legs and was not concerned, but encouraged she reach out to her PCP. Denying any emergent concerns.    Noted BP in the ER was 126/70, pulse 59. Denies irregular heart rate/rhythm.    Pt wondering if she should be seen or can start a short supply of hydrochlorothiazide. States she had similar leg swelling sometimes in 2005 (20+ years ago), but not since.    Hx of breast cancer (2012).    Arminda [employer]ins[employer]-Yair/Covering Providers: See notes above and advise. Pt scheduled with Leon tomorrow if needed.      Answer Assessment - Initial Assessment Questions  1. ONSET: \"When did the swelling start?\" (e.g., minutes, hours, days)      Pt first noticed the swelling yesterday. Has not improved and has slightly worsened since.  2. LOCATION: \"What part of the leg is swollen?\"  \"Are both legs swollen or just one leg?\"      Pt states its both feet and legs that are swollen. Denies arms or other areas.  3. SEVERITY: \"How bad is the swelling?\" (e.g., localized; mild, moderate, severe)   - Localized - small area of swelling localized to one leg   - MILD pedal edema - swelling limited to foot and ankle, pitting edema < 1/4 inch (6 mm) deep, rest and elevation eliminate most or all swelling   - MODERATE edema - swelling of lower leg to knee, pitting edema > 1/4 inch (6 mm) deep, rest and elevation only partially reduce swelling   - SEVERE edema - swelling extends above knee, facial or hand swelling present       Mild, some pitting with her diabetic socks.  4. REDNESS: \"Does the swelling look red or infected?\"      No redness noted. Does not look infected. No open wounds.  5. PAIN: \"Is the swelling painful to touch?\" If " "Yes, ask: \"How painful is it?\"   (Scale 1-10; mild, moderate or severe)      Denies pain, achiness, or soreness.  6. FEVER: \"Do you have a fever?\" If Yes, ask: \"What is it, how was it measured, and when did it start?\"       Denies fever.  7. CAUSE: \"What do you think is causing the leg swelling?\"      Pt is unsure. States she's had this years ago and was treated with a \"water pill\".  8. MEDICAL HISTORY: \"Do you have a history of heart failure, kidney disease, liver failure, or cancer?\"      Hx of breast cancer in 2012.  9. RECURRENT SYMPTOM: \"Have you had leg swelling before?\" If Yes, ask: \"When was the last time?\" \"What happened that time?\"      Did have before, but was sometime in 2005.  10. OTHER SYMPTOMS: \"Do you have any other symptoms?\" (e.g., chest pain, difficulty breathing)        Only has some mild itching that she states is normal for the summer. Suspects possible psoriasis.  11. PREGNANCY: \"Is there any chance you are pregnant?\" \"When was your last menstrual period?\"        Not pregant, post menopausal.    Protocols used: LEG SWELLING AND EDEMA-A-OH    "

## 2023-06-20 NOTE — TELEPHONE ENCOUNTER
Keep visit with me tomorrow and we can discuss  I wouldn't start hydrochlorothiazide without a visit.  Anne Marie Quintero, APRN, CNP

## 2023-06-20 NOTE — TELEPHONE ENCOUNTER
"Called the pt.    Pt agreed to still be seen and will reach out if symptoms worsen before then.      - Oliverio \"Yomi\" Huyen (he/him/his), RN - Patient Advocate Liason (PAL)  [facility]    "

## 2023-07-11 ENCOUNTER — TRANSFERRED RECORDS (OUTPATIENT)
Dept: HEALTH INFORMATION MANAGEMENT | Facility: CLINIC | Age: 78
End: 2023-07-11
Payer: COMMERCIAL

## 2023-07-11 LAB — RETINOPATHY: NEGATIVE

## 2023-07-26 ENCOUNTER — VIRTUAL VISIT (OUTPATIENT)
Dept: ENDOCRINOLOGY | Facility: CLINIC | Age: 78
End: 2023-07-26
Payer: COMMERCIAL

## 2023-07-26 DIAGNOSIS — M81.8 OTHER OSTEOPOROSIS WITHOUT CURRENT PATHOLOGICAL FRACTURE: Primary | ICD-10-CM

## 2023-07-26 DIAGNOSIS — E21.3 HYPERPARATHYROIDISM (H): ICD-10-CM

## 2023-07-26 PROCEDURE — 99215 OFFICE O/P EST HI 40 MIN: CPT | Mod: VID | Performed by: INTERNAL MEDICINE

## 2023-07-26 NOTE — LETTER
7/26/2023         RE: Gemma Cowart  [address] [place]  [facility] [address]        Dear Colleague,    Thank you for referring your patient, Gemma Cowart, to the [facility] [place]. Please see a copy of my visit note below.      Video-Visit Details    Type of service:  Video Visit  Video Start Time: 9:06  Video End Time: 9:40  Originating Location (pt. Location): [place], [place]  Distant Location (provider location):  Home  Platform used for Video Visit: Leeanne Singh MD    Gemma Cowart is a 77 year old year old female here for follow-up of hyperparathyroidism, thyroid nodule via a billable video visit. She was previously seen by me Jan 2023.    Subjective:  Gemma Cowart is a 77 year old female w/ PMHx of COPD, DM diet controlled, HLD, psoriasis, CKD stage 3 (told it was from metformin?), DVT previously on warfarin now on eliquis, breast cancer s/p masectomy (no chemo/rads) history of gastric bypass surgery found to have elevated PTH level in February here for follow-up.  No chief complaint on file.      INTERVAL HISTORY:  - Had a fall 2 months ago, went to ED- no fractures, only bruises  - Has been caring for 6 family members (disabled, health issues, etc)   - Drinks 3 8oz glasses per day of milk, sometimes more   - otherwise feeling well,   - Trying to lose weight (cut out/minimized sugar/carbs), down about 20 lbs in one year    Wt Readings from Last 10 Encounters:   05/23/23 79.7 kg (175 lb 12.8 oz)   11/29/22 84.8 kg (187 lb)   08/19/22 84.8 kg (187 lb)   08/04/22 86.2 kg (190 lb)   07/29/22 86.2 kg (190 lb)   07/21/22 84.8 kg (187 lb)   07/12/22 84.8 kg (187 lb)   07/11/22 85 kg (187 lb 6.4 oz)   06/03/22 86.8 kg (191 lb 6.4 oz)   04/01/22 88.9 kg (196 lb)         1) Elevated PTH, normal calcium level  HPI:  Found in October to have calcium level was low in assay but corrected for albumin into normal range. At subsequent labs, she had PTH level checked that was elevated  "to 143, prompting referral to endocrine. She was seen in April and recommended calcium supplementation to correct calcium level and then recheck. Patient did not take calcium supplementation because she saw \"TV doctor\" who said they are all chemicals and can't be absorbed by the body and recommended green leafy vegetables. She has been eating kale or spinach 4x weekly. Of note, she also had MRI for headaches and seen to have lytic lesions in bones concerning for metastatic dieseae. PET scan was negative with negative tumor markers.     Previous Fractures: toe remote, kneecap (~5 years ago)  Bone-specific therapy: none  Family History of Hip Fx: none, but mother and brother required hip replacement for OA  Hormone History (Menopause, Testosterone):   Steroids: none  PPIs: none  Antiepileptics: none  Anticoagulation: previous warfarin, current eliquis (clotted through warfarin, Leg)  Autoimmune disease: Psoriasis  CKD: CKD 3  Nephrolithiasis: once, approx 20 yrs ago  Smoking:  none  Alcohol: rare, last 2 years ago katy  Other risk factors: hx of breast cancer s/p mastectomy (found on mammogram), no chemo/rads  Ca/D: Vit D- 5000u daily (since February), no supplements, 3 glasses of whole milk daily  Exercise: minimal   Dental- no remaining, has full dentures    SPEP neg 10/2021    2/1/2022-  Ca 8.7      11/17/2022- (after partial Ca supplement and Vit D)  Ca 8.9  Vit D 30  PTH 88 (ULN 65)    5/23/2023  Ca 9.6  PTH 68 (ULN 65)    2) Thyroid Nodule  - Seen on ultrasound 2008, patient does not remember this  - Ultrasound revealed densely calcified nodule 1.3x1.0x0.8  Maternal grandmother- large goiter that required multiple surgeries to remove  Denies any difficulty breathing, difficulty swallowing, shortness of breath, enlargement of neck/thyroid, family history of thyroid cancer, exposure to radiation, hoarseness or weight loss.     INTERVAL HISTORY:  - Has trouble laying flat, sleeps on side because of " this  - Cough because she gags when she swallows, may be related to dentures because they fit poorly   - No dysphagia with food    Active diagnoses this visit:  Data Unavailable     ROS: 10 point ROS neg other than the symptoms noted above in the HPI.      Medical, surgical, social, and family histories, medications and allergies reviewed and updated.  Past Medical History:   Diagnosis Date     Anemia     iron deficiency anemia     Anxiety      Breast cancer (H) 2012    Bilateral Masectomies 9/2012     Complications of gastric bypass surgery      COPD (chronic obstructive pulmonary disease) (H) 4/21/2021     Diabetes mellitus (H)     diet controlled     DVT (deep venous thrombosis) (H)      Eczema      Fracture of left knee region     patella - vertical     History of kidney stones      Obesity      Palpitation     with no treatment     Polyneuropathy      Pruritus     generalized     Rosacea      SVT (supraventricular tachycardia) (H)     s/p ablation 5/28/2015 at [facility] for left lateral AP       Past Surgical History:   Procedure Laterality Date     ABDOMEN SURGERY  2000    gastric bypass 2000     COLONOSCOPY  10/11/2012    Procedure: COLONOSCOPY;  colonoscopy;  Surgeon: Gela Cleary MD;  Location:  GI     COLONOSCOPY N/A 10/6/2017    Procedure: COLONOSCOPY;;  Surgeon: Shashank Ortiz MD;  Location: [facility] [address]     COLONOSCOPY N/A 3/7/2022    Procedure: COLONOSCOPY;  Surgeon: Andrea Daniel MD;  Location: [facility] [address]     DENTAL SURGERY  5/2007    all teeth removed - dentures     ENT SURGERY      all teeth pulled     ESOPHAGOSCOPY, GASTROSCOPY, DUODENOSCOPY (EGD), COMBINED N/A 10/6/2017    Procedure: COMBINED ESOPHAGOSCOPY, GASTROSCOPY, DUODENOSCOPY (EGD);  ESOPHAGOSCOPY, GASTROSCOPY, DUODENOSCOPY (EGD) & Colonoscopy *Needs INR on arrival;  Surgeon: Shashank Ortiz MD;  Location:  GI     GYN SURGERY  1/10/75    tubal ligation      H ABLATION SVT  5/28/2015    stopped metoprolol     HERNIA  REPAIR  2005     IMPLANT FILTER INFERIOR VENA CAVA  10/8/2012    taken out  13     INSERT TISSUE EXPANDER BREAST BILATERAL  2012    Procedure: INSERT TISSUE EXPANDER BREAST BILATERAL;;  Surgeon: Orlando Wall MD;  Location:  OR     MASTECTOMY SIMPLE, SENTINEL NODE, COMBINED  2012    Procedure: COMBINED MASTECTOMY SIMPLE, SENTINEL NODE;  BILATERAL MASTECTOMY WITH LEFT AXILLARY SENTINEL LYMPH NODE BIOPSY, BILATERAL TISSUE EXPANDER        MASTECTOMY, BILATERAL       PANNICULECTOMY N/A 2018    Procedure: PANNICULECTOMY;  PANNICULECTOMY ;  Surgeon: Orlando Wall MD;  Location:  OR     RECONSTRUCT BREAST BILATERAL, IMPLANT PROSTHESIS BILATERAL, COMBINED  2013    Procedure: COMBINED RECONSTRUCT BREAST BILATERAL, IMPLANT PROSTHESIS BILATERAL;  BILATERAL SECOND STAGE BREAST RECONSTRUCTION WITH SILICONE GEL IMPLANTS AND LIPOSUCTION;  Surgeon: Orlando Wall MD;  Location: [facility]     RECONSTRUCT NIPPLE BILATERAL  2013    Procedure: RECONSTRUCT NIPPLE BILATERAL;  BILATERAL NIPPLE AREOLAR RECONSTRUCTION;  Surgeon: Orlando Wall MD;  Location: [facility]     ZZHC BREAST RECONSTRUC W OTHR TECHNIQ  2013     Allergies:  Bacitracin, Dust mites, and Nickel    Social History     Tobacco Use     Smoking status: Former     Packs/day: 1.50     Years: 25.00     Pack years: 37.50     Types: Cigarettes     Quit date: 10/21/1986     Years since quittin.7     Smokeless tobacco: Never     Tobacco comments:     quit    Substance Use Topics     Alcohol use: No     Comment: 1 time a year       Family History   Problem Relation Age of Onset     Cancer - colorectal Mother      Colon Cancer Mother      Prostate Cancer Father      Alzheimer Disease Father      Cancer Paternal Grandmother      Alzheimer Disease Paternal Grandfather      Cancer - colorectal Brother      Diabetes Brother      Crohn's Disease Daughter      Diabetes Brother      Eye Disorder Brother      Diabetes Son       Cancer Maternal Uncle      Cancer Maternal Uncle              Objective:  LMP 09/24/1990     Physical Exam (visual exam)  VS:  no vital signs taken for video visit  CONSTITUTIONAL: healthy, alert and NAD, responding appropriately  ENT: normocephalic, no visual evidence of trauma, normal nose and oral mucosa  EYES: conjunctivae and sclerae normal, no exophthalmos or proptosis  THYROID:  no visualized nodules or goiter  LUNGS: no audible wheeze, cough or visible cyanosis, no visible retractions or increased work of breathing  EXTREMITIES: no hand tremors  NEUROLOGY: cranial nerves grossly intact with no obvious deficit.  SKIN:  no visualized skin lesions or rash, no edema visualized  PSYCH: mentation appears normal, normal judgement        Lab Results   Component Value Date/Time    TSH 2.22 05/23/2023 10:54 AM    TSH 2.15 02/01/2022 08:50 AM    TSH 2.09 09/06/2019 11:43 AM    VITD25 5.0 (L) 05/19/2012 01:11 PM    PTHI 68 (H) 05/23/2023 10:54 AM     Last Comprehensive Metabolic Panel:  Sodium   Date Value Ref Range Status   05/23/2023 144 136 - 145 mmol/L Final   03/23/2021 143 133 - 144 mmol/L Final     Potassium   Date Value Ref Range Status   05/23/2023 5.1 3.4 - 5.3 mmol/L Final   10/24/2022 5.0 3.4 - 5.3 mmol/L Final   03/23/2021 4.9 3.4 - 5.3 mmol/L Final     Chloride   Date Value Ref Range Status   05/23/2023 109 (H) 98 - 107 mmol/L Final   10/24/2022 113 (H) 94 - 109 mmol/L Final   03/23/2021 114 (H) 94 - 109 mmol/L Final     Carbon Dioxide   Date Value Ref Range Status   03/23/2021 24 20 - 32 mmol/L Final     Carbon Dioxide (CO2)   Date Value Ref Range Status   05/23/2023 23 22 - 29 mmol/L Final   10/24/2022 24 20 - 32 mmol/L Final     Anion Gap   Date Value Ref Range Status   05/23/2023 12 7 - 15 mmol/L Final   10/24/2022 6 3 - 14 mmol/L Final   03/23/2021 5 3 - 14 mmol/L Final     Glucose   Date Value Ref Range Status   05/23/2023 99 70 - 99 mg/dL Final   10/24/2022 106 (H) 70 - 99 mg/dL Final   03/23/2021  96 70 - 99 mg/dL Final     Urea Nitrogen   Date Value Ref Range Status   05/23/2023 17.3 8.0 - 23.0 mg/dL Final   10/24/2022 19 7 - 30 mg/dL Final   03/23/2021 15 7 - 30 mg/dL Final     Creatinine   Date Value Ref Range Status   05/23/2023 1.55 (H) 0.51 - 0.95 mg/dL Final   03/23/2021 1.32 (H) 0.52 - 1.04 mg/dL Final     GFR Estimate   Date Value Ref Range Status   05/23/2023 34 (L) >60 mL/min/1.73m2 Final     Comment:     eGFR calculated using 2021 CKD-EPI equation.   03/23/2021 40 (L) >60 mL/min/[1.73_m2] Final     Comment:     Non  GFR Calc  Starting 12/18/2018, serum creatinine based estimated GFR (eGFR) will be   calculated using the Chronic Kidney Disease Epidemiology Collaboration   (CKD-EPI) equation.       GFR, ESTIMATED POCT   Date Value Ref Range Status   06/16/2022 36 (L) >60 mL/min/1.73m2 Final     Calcium   Date Value Ref Range Status   05/23/2023 9.6 8.8 - 10.2 mg/dL Final   03/23/2021 8.7 8.5 - 10.1 mg/dL Final     PET Scan 8/1/2022:     MRI Brain 7/25/2022:  FINDINGS: Mild parenchymal volume loss is present. Scattered  frontoparietal predominance and sara white matter T2 hyperintensities  likely represent chronic small vessel ischemic change. No evidence of  recent ischemia or hemorrhage.  .  Patchy areas of marrow enhancement is present involving the bilateral  frontal bones at the midline measuring approximately 1.1 cm.  Questionable intracranial extension.     Also demonstrates patchy areas of T2 hyperintense signal by T1  hypointense signal, and enhancement within the left frontal bone and  left parietal bone.     Patchy enhancement T1 hyperintense signal within the posterior right  parietal bone which could represent a benign intraosseous cavernous  venous malformation.     Mild paranasal sinus mucosal thickening. The visualized tympanic and  mastoid cavities are unremarkable. Bilateral lens replacements.                                                                       IMPRESSION:  1. Patchy marrow signal abnormalities most consistent with osseous  metastatic disease.  2. No brain metastases are identified.  3. White matter T2 hyperintensities likely represent chronic small  vessel ischemic change.    DEXA 10/26/2022-   Lumbar Spine: L1-L4: BMD: 1.211 g/cm2. T-score: 0.1. Z-score: 1.3  RIGHT Hip Total: BMD: 0.705 g/cm2. T-score: -2.4. Z-score: -1.0  RIGHT Hip Femoral neck: BMD: 0.743 g/cm2. T-score: -2.1. Z-score: -0.6  LEFT Hip Total: BMD: 0.701 g/cm2. T-score: -2.4. Z-score: -1.1  LEFT Hip Femoral neck: BMD: 0.681 g/cm2. T-score: -2.6. Z-score: -1.0    ASSESSMENT / PLAN:  No diagnosis found.    1) Elevated PTH, normal calcium  2) secondary hyperparathyroidism vs normocalcemic primary hyperparathyroidism  - Likely appropriately elevated in the setting of low calcium intake. - Nearly normalized with sufficent calcium intake. Remaining elevation may be secondary in setting of kidney dysfunction. Has not completed 24hr urine calcium,  has BM with voiding and would not be able to separate out urine alone  - Strongly encouraged adequate calcium daily with 1200 to 1500 mg calcium supplement.  I also encouraged her to get any calcium in her diet that she can, she will make a stronger effort for this  - Discussed possible treatment otpions for hyperparathyroidism including surgery vs medical management, neither great options for her and she prefers to delay/avoid surgery if at all possible. She meets various surgical criteria based on low CrCl (prolonged, since 2015), remote history of nephrolithiasis (>20yr ago) and osteoporosis (see below). All are mild, and she prefers to work on minimizing complications rather than pursuing surgery, which is reasonable.   - For now,will focus on adequate calcium and osteoporosis treatment, continued adeuqate hydration.    2) Osteoporosis  - Defined based on T score of 2.6 at femoral neck.  - CrCl now borderline for reclast-- will recheck. If  acceptable, proceed with reclast. She is hesitant to proceed with prolia given likely lifelong medication. Reviewed risk/benefit of prolia as well  Discussed 1/3 risk of flu symptoms (acute phase reaction) after treatment with IV zoledronic acid. Discussed risks of osteonecrosis of the jaw (1/200 after tooth extraction, 1/2500 spontaneous) and atypical femur fractures (1/1000) with chronic bisphosphonates. For every atypical femur fracture, 100 typical femur fractures are prevented.  - GFR 34 now    Surgical indications for hyperparathyroidsim  1) Serum calcium (>upper limit of normal): 1.0 mg/dL (0.25 mmol/L);  2) BMD by DXA: T-score ?2.5 at lumbar spine, total hip, femoral neck, or distal 1/3 radius;  3) Vertebral fracture by x-ray, CT, MRI, or VFA;  4) Creatinine clearance <60 cc/min; 24-h urine for calcium >400 mg/d (>10 mmol/d) and increased stone risk by biochemical stone risk analysis;  5) Presence of nephrolithiasis or nephrocalcinosis by x-ray, ultrasound, or CT;  6) Age <50 years    3) Weight loss, osseous mets  - I am very concerned about her 20lb weight loss in the last year, despite dietary modifications this is a substantial amount of weight  - I have strongly recommended she reconnect with her oncologist, she will call now for a follow-up appointment      Return to clinic: 2 months    A total of 55 minutes were spent today 07/26/23 on this visit including chart review, history and counseling, documentation and other activities as detailed above.           Again, thank you for allowing me to participate in the care of your patient.        Sincerely,        Haley Singh MD

## 2023-07-26 NOTE — PROGRESS NOTES
"  Video-Visit Details    Type of service:  Video Visit  Video Start Time: 9:06  Video End Time: 9:40  Originating Location (pt. Location): [place], [place]  Distant Location (provider location):  Home  Platform used for Video Visit: Leeanne Singh MD    Gemma Cowart is a 77 year old year old female here for follow-up of hyperparathyroidism, thyroid nodule via a billable video visit. She was previously seen by me Jan 2023.    Subjective:  Gemma Cowart is a 77 year old female w/ PMHx of COPD, DM diet controlled, HLD, psoriasis, CKD stage 3 (told it was from metformin?), DVT previously on warfarin now on eliquis, breast cancer s/p masectomy (no chemo/rads) history of gastric bypass surgery found to have elevated PTH level in February here for follow-up.  No chief complaint on file.      INTERVAL HISTORY:  - Had a fall 2 months ago, went to ED- no fractures, only bruises  - Has been caring for 6 family members (disabled, health issues, etc)   - Drinks 3 8oz glasses per day of milk, sometimes more   - otherwise feeling well,   - Trying to lose weight (cut out/minimized sugar/carbs), down about 20 lbs in one year    Wt Readings from Last 10 Encounters:   05/23/23 79.7 kg (175 lb 12.8 oz)   11/29/22 84.8 kg (187 lb)   08/19/22 84.8 kg (187 lb)   08/04/22 86.2 kg (190 lb)   07/29/22 86.2 kg (190 lb)   07/21/22 84.8 kg (187 lb)   07/12/22 84.8 kg (187 lb)   07/11/22 85 kg (187 lb 6.4 oz)   06/03/22 86.8 kg (191 lb 6.4 oz)   04/01/22 88.9 kg (196 lb)         1) Elevated PTH, normal calcium level  HPI:  Found in October to have calcium level was low in assay but corrected for albumin into normal range. At subsequent labs, she had PTH level checked that was elevated to 143, prompting referral to endocrine. She was seen in April and recommended calcium supplementation to correct calcium level and then recheck. Patient did not take calcium supplementation because she saw \"TV doctor\" who said they are all chemicals and " can't be absorbed by the body and recommended green leafy vegetables. She has been eating kale or spinach 4x weekly. Of note, she also had MRI for headaches and seen to have lytic lesions in bones concerning for metastatic dieseae. PET scan was negative with negative tumor markers.     Previous Fractures: toe remote, kneecap (~5 years ago)  Bone-specific therapy: none  Family History of Hip Fx: none, but mother and brother required hip replacement for OA  Hormone History (Menopause, Testosterone):   Steroids: none  PPIs: none  Antiepileptics: none  Anticoagulation: previous warfarin, current eliquis (clotted through warfarin, Leg)  Autoimmune disease: Psoriasis  CKD: CKD 3  Nephrolithiasis: once, approx 20 yrs ago  Smoking:  none  Alcohol: rare, last 2 years ago katy  Other risk factors: hx of breast cancer s/p mastectomy (found on mammogram), no chemo/rads  Ca/D: Vit D- 5000u daily (since February), no supplements, 3 glasses of whole milk daily  Exercise: minimal   Dental- no remaining, has full dentures    SPEP neg 10/2021    2/1/2022-  Ca 8.7      11/17/2022- (after partial Ca supplement and Vit D)  Ca 8.9  Vit D 30  PTH 88 (ULN 65)    5/23/2023  Ca 9.6  PTH 68 (ULN 65)    2) Thyroid Nodule  - Seen on ultrasound 2008, patient does not remember this  - Ultrasound revealed densely calcified nodule 1.3x1.0x0.8  Maternal grandmother- large goiter that required multiple surgeries to remove  Denies any difficulty breathing, difficulty swallowing, shortness of breath, enlargement of neck/thyroid, family history of thyroid cancer, exposure to radiation, hoarseness or weight loss.     INTERVAL HISTORY:  - Has trouble laying flat, sleeps on side because of this  - Cough because she gags when she swallows, may be related to dentures because they fit poorly   - No dysphagia with food    Active diagnoses this visit:  Data Unavailable     ROS: 10 point ROS neg other than the symptoms noted above in the  HPI.      Medical, surgical, social, and family histories, medications and allergies reviewed and updated.  Past Medical History:   Diagnosis Date     Anemia     iron deficiency anemia     Anxiety      Breast cancer (H) 2012    Bilateral Masectomies 9/2012     Complications of gastric bypass surgery      COPD (chronic obstructive pulmonary disease) (H) 4/21/2021     Diabetes mellitus (H)     diet controlled     DVT (deep venous thrombosis) (H)      Eczema      Fracture of left knee region     patella - vertical     History of kidney stones      Obesity      Palpitation     with no treatment     Polyneuropathy      Pruritus     generalized     Rosacea      SVT (supraventricular tachycardia) (H)     s/p ablation 5/28/2015 at [facility] for left lateral AP       Past Surgical History:   Procedure Laterality Date     ABDOMEN SURGERY  2000    gastric bypass 2000     COLONOSCOPY  10/11/2012    Procedure: COLONOSCOPY;  colonoscopy;  Surgeon: Gela Cleary MD;  Location:  GI     COLONOSCOPY N/A 10/6/2017    Procedure: COLONOSCOPY;;  Surgeon: Shashank Ortiz MD;  Location:  GI     COLONOSCOPY N/A 3/7/2022    Procedure: COLONOSCOPY;  Surgeon: Andrea Daniel MD;  Location:  GI     DENTAL SURGERY  5/2007    all teeth removed - dentures     ENT SURGERY      all teeth pulled     ESOPHAGOSCOPY, GASTROSCOPY, DUODENOSCOPY (EGD), COMBINED N/A 10/6/2017    Procedure: COMBINED ESOPHAGOSCOPY, GASTROSCOPY, DUODENOSCOPY (EGD);  ESOPHAGOSCOPY, GASTROSCOPY, DUODENOSCOPY (EGD) & Colonoscopy *Needs INR on arrival;  Surgeon: Shashank Ortiz MD;  Location:  GI     GYN SURGERY  1/10/75    tubal ligation      H ABLATION SVT  5/28/2015    stopped metoprolol     HERNIA REPAIR  2005     IMPLANT FILTER INFERIOR VENA CAVA  10/8/2012    taken out  1/4/12     INSERT TISSUE EXPANDER BREAST BILATERAL  9/5/2012    Procedure: INSERT TISSUE EXPANDER BREAST BILATERAL;;  Surgeon: Orlando Wall MD;  Location:  OR      MASTECTOMY SIMPLE, SENTINEL NODE, COMBINED  2012    Procedure: COMBINED MASTECTOMY SIMPLE, SENTINEL NODE;  BILATERAL MASTECTOMY WITH LEFT AXILLARY SENTINEL LYMPH NODE BIOPSY, BILATERAL TISSUE EXPANDER        MASTECTOMY, BILATERAL       PANNICULECTOMY N/A 2018    Procedure: PANNICULECTOMY;  PANNICULECTOMY ;  Surgeon: Orlando Wall MD;  Location:  OR     RECONSTRUCT BREAST BILATERAL, IMPLANT PROSTHESIS BILATERAL, COMBINED  2013    Procedure: COMBINED RECONSTRUCT BREAST BILATERAL, IMPLANT PROSTHESIS BILATERAL;  BILATERAL SECOND STAGE BREAST RECONSTRUCTION WITH SILICONE GEL IMPLANTS AND LIPOSUCTION;  Surgeon: Orlando Wall MD;  Location: [facility]     RECONSTRUCT NIPPLE BILATERAL  2013    Procedure: RECONSTRUCT NIPPLE BILATERAL;  BILATERAL NIPPLE AREOLAR RECONSTRUCTION;  Surgeon: Orlando Wall MD;  Location: [facility]     ZZHC BREAST RECONSTRUC W OTHR TECHNIQ  2013     Allergies:  Bacitracin, Dust mites, and Nickel    Social History     Tobacco Use     Smoking status: Former     Packs/day: 1.50     Years: 25.00     Pack years: 37.50     Types: Cigarettes     Quit date: 10/21/1986     Years since quittin.7     Smokeless tobacco: Never     Tobacco comments:     quit    Substance Use Topics     Alcohol use: No     Comment: 1 time a year       Family History   Problem Relation Age of Onset     Cancer - colorectal Mother      Colon Cancer Mother      Prostate Cancer Father      Alzheimer Disease Father      Cancer Paternal Grandmother      Alzheimer Disease Paternal Grandfather      Cancer - colorectal Brother      Diabetes Brother      Crohn's Disease Daughter      Diabetes Brother      Eye Disorder Brother      Diabetes Son      Cancer Maternal Uncle      Cancer Maternal Uncle              Objective:  LMP 1990     Physical Exam (visual exam)  VS:  no vital signs taken for video visit  CONSTITUTIONAL: healthy, alert and NAD, responding appropriately  ENT: normocephalic, no  visual evidence of trauma, normal nose and oral mucosa  EYES: conjunctivae and sclerae normal, no exophthalmos or proptosis  THYROID:  no visualized nodules or goiter  LUNGS: no audible wheeze, cough or visible cyanosis, no visible retractions or increased work of breathing  EXTREMITIES: no hand tremors  NEUROLOGY: cranial nerves grossly intact with no obvious deficit.  SKIN:  no visualized skin lesions or rash, no edema visualized  PSYCH: mentation appears normal, normal judgement        Lab Results   Component Value Date/Time    TSH 2.22 05/23/2023 10:54 AM    TSH 2.15 02/01/2022 08:50 AM    TSH 2.09 09/06/2019 11:43 AM    VITD25 5.0 (L) 05/19/2012 01:11 PM    PTHI 68 (H) 05/23/2023 10:54 AM     Last Comprehensive Metabolic Panel:  Sodium   Date Value Ref Range Status   05/23/2023 144 136 - 145 mmol/L Final   03/23/2021 143 133 - 144 mmol/L Final     Potassium   Date Value Ref Range Status   05/23/2023 5.1 3.4 - 5.3 mmol/L Final   10/24/2022 5.0 3.4 - 5.3 mmol/L Final   03/23/2021 4.9 3.4 - 5.3 mmol/L Final     Chloride   Date Value Ref Range Status   05/23/2023 109 (H) 98 - 107 mmol/L Final   10/24/2022 113 (H) 94 - 109 mmol/L Final   03/23/2021 114 (H) 94 - 109 mmol/L Final     Carbon Dioxide   Date Value Ref Range Status   03/23/2021 24 20 - 32 mmol/L Final     Carbon Dioxide (CO2)   Date Value Ref Range Status   05/23/2023 23 22 - 29 mmol/L Final   10/24/2022 24 20 - 32 mmol/L Final     Anion Gap   Date Value Ref Range Status   05/23/2023 12 7 - 15 mmol/L Final   10/24/2022 6 3 - 14 mmol/L Final   03/23/2021 5 3 - 14 mmol/L Final     Glucose   Date Value Ref Range Status   05/23/2023 99 70 - 99 mg/dL Final   10/24/2022 106 (H) 70 - 99 mg/dL Final   03/23/2021 96 70 - 99 mg/dL Final     Urea Nitrogen   Date Value Ref Range Status   05/23/2023 17.3 8.0 - 23.0 mg/dL Final   10/24/2022 19 7 - 30 mg/dL Final   03/23/2021 15 7 - 30 mg/dL Final     Creatinine   Date Value Ref Range Status   05/23/2023 1.55 (H) 0.51 -  0.95 mg/dL Final   03/23/2021 1.32 (H) 0.52 - 1.04 mg/dL Final     GFR Estimate   Date Value Ref Range Status   05/23/2023 34 (L) >60 mL/min/1.73m2 Final     Comment:     eGFR calculated using 2021 CKD-EPI equation.   03/23/2021 40 (L) >60 mL/min/[1.73_m2] Final     Comment:     Non  GFR Calc  Starting 12/18/2018, serum creatinine based estimated GFR (eGFR) will be   calculated using the Chronic Kidney Disease Epidemiology Collaboration   (CKD-EPI) equation.       GFR, ESTIMATED POCT   Date Value Ref Range Status   06/16/2022 36 (L) >60 mL/min/1.73m2 Final     Calcium   Date Value Ref Range Status   05/23/2023 9.6 8.8 - 10.2 mg/dL Final   03/23/2021 8.7 8.5 - 10.1 mg/dL Final     PET Scan 8/1/2022:     MRI Brain 7/25/2022:  FINDINGS: Mild parenchymal volume loss is present. Scattered  frontoparietal predominance and sara white matter T2 hyperintensities  likely represent chronic small vessel ischemic change. No evidence of  recent ischemia or hemorrhage.  .  Patchy areas of marrow enhancement is present involving the bilateral  frontal bones at the midline measuring approximately 1.1 cm.  Questionable intracranial extension.     Also demonstrates patchy areas of T2 hyperintense signal by T1  hypointense signal, and enhancement within the left frontal bone and  left parietal bone.     Patchy enhancement T1 hyperintense signal within the posterior right  parietal bone which could represent a benign intraosseous cavernous  venous malformation.     Mild paranasal sinus mucosal thickening. The visualized tympanic and  mastoid cavities are unremarkable. Bilateral lens replacements.                                                                      IMPRESSION:  1. Patchy marrow signal abnormalities most consistent with osseous  metastatic disease.  2. No brain metastases are identified.  3. White matter T2 hyperintensities likely represent chronic small  vessel ischemic change.    DEXA 10/26/2022-    Lumbar Spine: L1-L4: BMD: 1.211 g/cm2. T-score: 0.1. Z-score: 1.3  RIGHT Hip Total: BMD: 0.705 g/cm2. T-score: -2.4. Z-score: -1.0  RIGHT Hip Femoral neck: BMD: 0.743 g/cm2. T-score: -2.1. Z-score: -0.6  LEFT Hip Total: BMD: 0.701 g/cm2. T-score: -2.4. Z-score: -1.1  LEFT Hip Femoral neck: BMD: 0.681 g/cm2. T-score: -2.6. Z-score: -1.0    ASSESSMENT / PLAN:  No diagnosis found.    1) Elevated PTH, normal calcium  2) secondary hyperparathyroidism vs normocalcemic primary hyperparathyroidism  - Likely appropriately elevated in the setting of low calcium intake. - Nearly normalized with sufficent calcium intake. Remaining elevation may be secondary in setting of kidney dysfunction. Has not completed 24hr urine calcium,  has BM with voiding and would not be able to separate out urine alone  - Strongly encouraged adequate calcium daily with 1200 to 1500 mg calcium supplement.  I also encouraged her to get any calcium in her diet that she can, she will make a stronger effort for this  - Discussed possible treatment otpions for hyperparathyroidism including surgery vs medical management, neither great options for her and she prefers to delay/avoid surgery if at all possible. She meets various surgical criteria based on low CrCl (prolonged, since 2015), remote history of nephrolithiasis (>20yr ago) and osteoporosis (see below). All are mild, and she prefers to work on minimizing complications rather than pursuing surgery, which is reasonable.   - For now,will focus on adequate calcium and osteoporosis treatment, continued adeuqate hydration.    2) Osteoporosis  - Defined based on T score of 2.6 at femoral neck.  - CrCl now borderline for reclast-- will recheck. If acceptable, proceed with reclast. She is hesitant to proceed with prolia given likely lifelong medication. Reviewed risk/benefit of prolia as well  Discussed 1/3 risk of flu symptoms (acute phase reaction) after treatment with IV zoledronic acid. Discussed risks  of osteonecrosis of the jaw (1/200 after tooth extraction, 1/2500 spontaneous) and atypical femur fractures (1/1000) with chronic bisphosphonates. For every atypical femur fracture, 100 typical femur fractures are prevented.  - GFR 34 now    Surgical indications for hyperparathyroidsim  1) Serum calcium (>upper limit of normal): 1.0 mg/dL (0.25 mmol/L);  2) BMD by DXA: T-score ?2.5 at lumbar spine, total hip, femoral neck, or distal 1/3 radius;  3) Vertebral fracture by x-ray, CT, MRI, or VFA;  4) Creatinine clearance <60 cc/min; 24-h urine for calcium >400 mg/d (>10 mmol/d) and increased stone risk by biochemical stone risk analysis;  5) Presence of nephrolithiasis or nephrocalcinosis by x-ray, ultrasound, or CT;  6) Age <50 years    3) Weight loss, osseous mets  - I am very concerned about her 20lb weight loss in the last year, despite dietary modifications this is a substantial amount of weight  - I have strongly recommended she reconnect with her oncologist, she will call now for a follow-up appointment      Return to clinic: 2 months    A total of 55 minutes were spent today 07/26/23 on this visit including chart review, history and counseling, documentation and other activities as detailed above.

## 2023-07-27 ENCOUNTER — TELEPHONE (OUTPATIENT)
Dept: ENDOCRINOLOGY | Facility: CLINIC | Age: 78
End: 2023-07-27
Payer: COMMERCIAL

## 2023-07-27 NOTE — TELEPHONE ENCOUNTER
LVM for PT to call [phone] to schedule virtual f/u appt with Dr. Singh. I did offer 9/19 or 10/3 at 12:30. Ok to schedule per Alva.

## 2023-08-02 NOTE — TELEPHONE ENCOUNTER
UZAIRM (2nd) for PT to call [phone] to schedule virtual f/u appt with Dr. Singh. I did offer 10/3 at 12:30. Ok to schedule per Alva.

## 2023-08-03 ENCOUNTER — TELEPHONE (OUTPATIENT)
Dept: PEDIATRICS | Facility: CLINIC | Age: 78
End: 2023-08-03
Payer: COMMERCIAL

## 2023-08-03 DIAGNOSIS — E11.69 TYPE 2 DIABETES MELLITUS WITH OTHER SPECIFIED COMPLICATION, WITHOUT LONG-TERM CURRENT USE OF INSULIN (H): ICD-10-CM

## 2023-08-03 RX ORDER — BLOOD SUGAR DIAGNOSTIC
STRIP MISCELLANEOUS
Qty: 200 STRIP | Refills: 3 | Status: SHIPPED | OUTPATIENT
Start: 2023-08-03 | End: 2024-09-12

## 2023-08-03 NOTE — TELEPHONE ENCOUNTER
"Received voicemail from the pt, left yesterday (RN PAL is off on Wednesdays).    Pt states that she is seeking a refill on One Touch test strips as she is out. States she tests BID.          - Oliverio \"Yomi\" Huyen (he/him/his), RN - Patient Advocate Liason (PAL)  [facility]  "

## 2023-08-07 ENCOUNTER — HOSPITAL ENCOUNTER (OUTPATIENT)
Dept: ULTRASOUND IMAGING | Facility: CLINIC | Age: 78
Discharge: HOME | End: 2023-08-07
Attending: INTERNAL MEDICINE | Admitting: INTERNAL MEDICINE
Payer: COMMERCIAL

## 2023-08-07 DIAGNOSIS — E21.3 HYPERPARATHYROIDISM (H): ICD-10-CM

## 2023-08-07 DIAGNOSIS — M81.8 OTHER OSTEOPOROSIS WITHOUT CURRENT PATHOLOGICAL FRACTURE: ICD-10-CM

## 2023-08-07 PROCEDURE — 76536 US EXAM OF HEAD AND NECK: CPT

## 2023-08-08 ENCOUNTER — LAB (OUTPATIENT)
Dept: INFUSION THERAPY | Facility: CLINIC | Age: 78
End: 2023-08-08
Attending: PHYSICIAN ASSISTANT
Payer: COMMERCIAL

## 2023-08-08 ENCOUNTER — ONCOLOGY VISIT (OUTPATIENT)
Dept: ONCOLOGY | Facility: CLINIC | Age: 78
End: 2023-08-08
Attending: PHYSICIAN ASSISTANT
Payer: COMMERCIAL

## 2023-08-08 VITALS
OXYGEN SATURATION: 99 % | DIASTOLIC BLOOD PRESSURE: 68 MMHG | RESPIRATION RATE: 16 BRPM | HEIGHT: 62 IN | SYSTOLIC BLOOD PRESSURE: 136 MMHG | HEART RATE: 57 BPM | WEIGHT: 173.4 LBS | BODY MASS INDEX: 31.91 KG/M2

## 2023-08-08 DIAGNOSIS — Z85.3 PERSONAL HISTORY OF MALIGNANT NEOPLASM OF BREAST: Primary | ICD-10-CM

## 2023-08-08 DIAGNOSIS — M89.8X9 ABNORMAL BONE FORMATION: ICD-10-CM

## 2023-08-08 DIAGNOSIS — C50.011 MALIGNANT NEOPLASM OF NIPPLE OF RIGHT BREAST IN FEMALE, ESTROGEN RECEPTOR POSITIVE (H): ICD-10-CM

## 2023-08-08 DIAGNOSIS — R97.8 OTHER ABNORMAL TUMOR MARKERS: ICD-10-CM

## 2023-08-08 DIAGNOSIS — Z17.0 MALIGNANT NEOPLASM OF NIPPLE OF RIGHT BREAST IN FEMALE, ESTROGEN RECEPTOR POSITIVE (H): ICD-10-CM

## 2023-08-08 DIAGNOSIS — Z85.3 PERSONAL HISTORY OF MALIGNANT NEOPLASM OF BREAST: ICD-10-CM

## 2023-08-08 DIAGNOSIS — R63.4 LOSS OF WEIGHT: ICD-10-CM

## 2023-08-08 LAB
ALBUMIN SERPL BCG-MCNC: 4.1 G/DL (ref 3.5–5.2)
ALP SERPL-CCNC: 69 U/L (ref 35–104)
ALT SERPL W P-5'-P-CCNC: 14 U/L (ref 0–50)
ANION GAP SERPL CALCULATED.3IONS-SCNC: 9 MMOL/L (ref 7–15)
AST SERPL W P-5'-P-CCNC: 21 U/L (ref 0–45)
BASOPHILS # BLD AUTO: 0.1 10E3/UL (ref 0–0.2)
BASOPHILS NFR BLD AUTO: 1 %
BILIRUB SERPL-MCNC: 0.2 MG/DL
BUN SERPL-MCNC: 30.5 MG/DL (ref 8–23)
CALCIUM SERPL-MCNC: 9.1 MG/DL (ref 8.8–10.2)
CANCER AG15-3 SERPL-ACNC: 17 U/ML
CEA SERPL-MCNC: 2.4 NG/ML
CHLORIDE SERPL-SCNC: 110 MMOL/L (ref 98–107)
CREAT SERPL-MCNC: 1.63 MG/DL (ref 0.51–0.95)
DEPRECATED HCO3 PLAS-SCNC: 24 MMOL/L (ref 22–29)
EOSINOPHIL # BLD AUTO: 0.1 10E3/UL (ref 0–0.7)
EOSINOPHIL NFR BLD AUTO: 1 %
ERYTHROCYTE [DISTWIDTH] IN BLOOD BY AUTOMATED COUNT: 13.7 % (ref 10–15)
GFR SERPL CREATININE-BSD FRML MDRD: 32 ML/MIN/1.73M2
GLUCOSE SERPL-MCNC: 142 MG/DL (ref 70–99)
HCT VFR BLD AUTO: 40.8 % (ref 35–47)
HGB BLD-MCNC: 12.5 G/DL (ref 11.7–15.7)
IMM GRANULOCYTES # BLD: 0 10E3/UL
IMM GRANULOCYTES NFR BLD: 0 %
LYMPHOCYTES # BLD AUTO: 0.9 10E3/UL (ref 0.8–5.3)
LYMPHOCYTES NFR BLD AUTO: 19 %
MCH RBC QN AUTO: 26.2 PG (ref 26.5–33)
MCHC RBC AUTO-ENTMCNC: 30.6 G/DL (ref 31.5–36.5)
MCV RBC AUTO: 85 FL (ref 78–100)
MONOCYTES # BLD AUTO: 0.3 10E3/UL (ref 0–1.3)
MONOCYTES NFR BLD AUTO: 6 %
NEUTROPHILS # BLD AUTO: 3.5 10E3/UL (ref 1.6–8.3)
NEUTROPHILS NFR BLD AUTO: 73 %
NRBC # BLD AUTO: 0 10E3/UL
NRBC BLD AUTO-RTO: 0 /100
PLATELET # BLD AUTO: 170 10E3/UL (ref 150–450)
POTASSIUM SERPL-SCNC: 5.3 MMOL/L (ref 3.4–5.3)
PROT SERPL-MCNC: 6.2 G/DL (ref 6.4–8.3)
RBC # BLD AUTO: 4.78 10E6/UL (ref 3.8–5.2)
SODIUM SERPL-SCNC: 143 MMOL/L (ref 136–145)
WBC # BLD AUTO: 4.8 10E3/UL (ref 4–11)

## 2023-08-08 PROCEDURE — 82378 CARCINOEMBRYONIC ANTIGEN: CPT | Performed by: PHYSICIAN ASSISTANT

## 2023-08-08 PROCEDURE — 36415 COLL VENOUS BLD VENIPUNCTURE: CPT

## 2023-08-08 PROCEDURE — G0463 HOSPITAL OUTPT CLINIC VISIT: HCPCS | Performed by: PHYSICIAN ASSISTANT

## 2023-08-08 PROCEDURE — 99215 OFFICE O/P EST HI 40 MIN: CPT | Performed by: PHYSICIAN ASSISTANT

## 2023-08-08 PROCEDURE — 86301 IMMUNOASSAY TUMOR CA 19-9: CPT | Performed by: PHYSICIAN ASSISTANT

## 2023-08-08 PROCEDURE — 85004 AUTOMATED DIFF WBC COUNT: CPT | Performed by: PHYSICIAN ASSISTANT

## 2023-08-08 PROCEDURE — 86300 IMMUNOASSAY TUMOR CA 15-3: CPT | Performed by: PHYSICIAN ASSISTANT

## 2023-08-08 PROCEDURE — 80053 COMPREHEN METABOLIC PANEL: CPT | Performed by: PHYSICIAN ASSISTANT

## 2023-08-08 ASSESSMENT — PAIN SCALES - GENERAL: PAINLEVEL: NO PAIN (0)

## 2023-08-08 NOTE — PROGRESS NOTES
Medical Assistant Note:  Gemma Cowart presents today for blood draw.    Patient seen by provider today: Yes: marcus.   present during visit today: Not Applicable.    Concerns: No Concerns.    Procedure:  Lab draw site: rac, Needle type: bf, Gauge: 23.    Post Assessment:  Labs drawn without difficulty: Yes.    Discharge Plan:  Departure Mode: Ambulatory.    Face to Face Time: 5 min.    Paula Miller, CMA

## 2023-08-08 NOTE — PROGRESS NOTES
"Oncology Rooming Note    August 8, 2023 1:55 PM   Gemma Cowart is a 77 year old female who presents for:    Chief Complaint   Patient presents with    Oncology Clinic Visit     Initial Vitals: Resp 16   Ht 1.575 m (5' 2\")   Wt 78.7 kg (173 lb 6.4 oz)   LMP 09/24/1990   BMI 31.72 kg/m   Estimated body mass index is 31.72 kg/m  as calculated from the following:    Height as of this encounter: 1.575 m (5' 2\").    Weight as of this encounter: 78.7 kg (173 lb 6.4 oz). Body surface area is 1.86 meters squared.  Data Unavailable Comment: Data Unavailable   Patient's last menstrual period was 09/24/1990.  Allergies reviewed: Yes  Medications reviewed: Yes    Medications: Medication refills not needed today.  Pharmacy name entered into [employer]:    [id number] [place] - [phone] [id number] - [place] - [address] [facility] [address]        Alexandra Poon MA            "

## 2023-08-08 NOTE — LETTER
"    8/8/2023         RE: Gemma Cowart  [address] [place]  [place] [facility] [address]        Dear Colleague,    Thank you for referring your patient, Gemma Cowart, to the [facility]. Please see a copy of my visit note below.    Oncology Rooming Note    August 8, 2023 1:55 PM   Gemma Cowart is a 77 year old female who presents for:    Chief Complaint   Patient presents with     Oncology Clinic Visit     Initial Vitals: Resp 16   Ht 1.575 m (5' 2\")   Wt 78.7 kg (173 lb 6.4 oz)   LMP 09/24/1990   BMI 31.72 kg/m   Estimated body mass index is 31.72 kg/m  as calculated from the following:    Height as of this encounter: 1.575 m (5' 2\").    Weight as of this encounter: 78.7 kg (173 lb 6.4 oz). Body surface area is 1.86 meters squared.  Data Unavailable Comment: Data Unavailable   Patient's last menstrual period was 09/24/1990.  Allergies reviewed: Yes  Medications reviewed: Yes    Medications: Medication refills not needed today.  Pharmacy name entered into [employer]:    [facility] - [place], [place] - [id number] - [place], [place] - [address] [facility] [address]        Alexandra Poon MA              Oncology/Hematology Visit Note  Aug 8, 2023    Reason for Visit: weight loss, review prior imaging    Primary Oncologist: Dr. Bowen    Oncologic History:  Diagnosed with left breast cancer screening mammogram 8/2012. Status post bilateral mastectomies, pathology with left breast 2 cm, grade 1, IDC, ER/NH+ HER-2/gibran-. [id number]. She under bilateral mastectomies reconstructive surgery done 03/2013 by Dr. Wall. She received Femara 10/2012 through Fall 2017. It was temporarily held 2016 due to memory issues. She was not interested in continuing beyond 5 years due to hair loss, hot flashes, and bone health.     She has a history of memory issues, headaches, and dysarthria for which she follows with neurology. An MRI brain for headaches 7/2022 " "showed patchy marrow signal abnormality. She had a follow-up PET-CT 8/2022 with no evidence of metabolically active disease including of skull. The images were reviewed with radiology who though the skull lesions may represent bone islands considering negative PET. Repeat MRI brain 11/2022 was stable    Interval History:  Ms. Cowart was recently following up with her endocrinologist who noted weight loss. This, combined with the skull lesions on prior brain MRIs, led her to recommend follow-up with oncology, astutely.     Meeting Gemma today, she is doing well. She feels well and in her general state of health. In chart review, she has lost about 27 lbs in 2 year time period, 17 lbs in last year, and 15 lbs in the last 8 months. She describes that she has very intentionally attempted to lose weight. She is adhering strictly to a low sugar diet. 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 She is not concerned with weight. She has a history of DVT/PE but no current symptoms and tolerates the anticoagulation well.     She has no headaches or neurologic changes and was otherwise unaware of the MRI findings. She has no additional B-symptoms; she rarely gets hot flashes or feels cold. She denies lymphadenopathy. Denies abnormal bleeding. No recurrent infections.She reports feeling generally well, good diet, active with daily activities. Normal bowels (IBS) for her and no urinary concerns. She is in need of a surgical repair.     She describes some discomfort at the right superior lateral reconstructed breast and size difference. Denies masses.       Review of Systems:  A complete review of systems was negative except as noted in the HPI.     Past medical, Surgical, and social history:  Reviewed    Physical Examination:  /68   Pulse 57   Resp 16   Ht 1.575 m (5' 2\")   Wt 78.7 kg (173 lb 6.4 oz)   LMP 09/24/1990   SpO2 99%   BMI 31.72 kg/m    Wt Readings from " Last 10 Encounters:   08/08/23 78.7 kg (173 lb 6.4 oz)   05/23/23 79.7 kg (175 lb 12.8 oz)   11/29/22 84.8 kg (187 lb)   08/19/22 84.8 kg (187 lb)   08/04/22 86.2 kg (190 lb)   07/29/22 86.2 kg (190 lb)   07/21/22 84.8 kg (187 lb)   07/12/22 84.8 kg (187 lb)   07/11/22 85 kg (187 lb 6.4 oz)   06/03/22 86.8 kg (191 lb 6.4 oz)     General: Pleasant patient in no acute distress.  Skin: Warm and dry. No abnormal ecchymosis or rashes.   Eyes: Anicteric. EOMs intact. No strabismus or ptosis.  ENT: Oral mucosa pink and moist without erythema, lesions, thrush, or petechia.  Lymph: No cervical, clavicular, or axillary LAD.  Breast: Bilateral mastectomies with implant reconstruction; there is right upper/lateral full/tenderness.  Heart: Normal rate and rhythm without murmur.  Lungs: Clear to auscultation in all fields with no adventitious lung sounds. Normal work of breathing.  Abdomen: Soft, non-tender, non-distended without hepatosplenomegaly. Normoactive bowel sounds.  Extremities: Trace left > right peripheral edema. Gross movement intact without difficulty.  Mental: Calm, cooperative, appropriate. Mood euthymic.  Neuro: Cranial nerves and coordination grossly intact. Alert and oriented x 3.    Laboratory Data:  CBC, CMP, tumor markers reviewed.       Assessment and Plan:  Gemma Cowart is a 77 year old female with:    1. Left breast cancer s/p double mastectomies, ER/LA+, her-2- s/p  5 yrs of Femara till 2017.  - Continue annual oncology follow-ups   - Refer to lymphedema therapy for breast fullness/discomfort/unevenness    2. Abnormal MRI findings  3. Weight Loss  - There was concern in 2022 for possible metastatic lesions noted on MRI brain obtained due to headaches; follow-up PET without metabolic activity and repeat MRI with stable findings; Dr. Bowen discussed these with radiology and they may represent [place]  - Patient has had intended weight loss; she has no B symptoms or concerns exam finding indicative  of metastatic or hematologic disorder  - Discussed with patient; will forgo further evaluation at this time with no convincing findings; she is aware to notify us with any changes    4. History of gastric bypass surgery   5. History of iron and B12 deficiencies  - Continue SL B12; no current iron due to GI intolerance and has previously required IV iron     6. 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 Multiple Comorbidites  Follow-up with PCP and specialists for CKD, osteopenia, hyperparathryoid, COPD, DMII, HTN, HLD, psoriasis, CKD stage 3,     45 minutes spent on the date of the encounter doing chart review, review of test results, interpretation of tests, patient visit, documentation, and discussion with other provider(s)     Sadia Hummel PA-C  [facility]   [phone]      Again, thank you for allowing me to participate in the care of your patient.        Sincerely,        SAIDA HUMMEL PA-C

## 2023-08-09 NOTE — PROGRESS NOTES
Oncology/Hematology Visit Note  Aug 8, 2023    Reason for Visit: weight loss, review prior imaging    Primary Oncologist: Dr. Bowen    Oncologic History:  Diagnosed with left breast cancer screening mammogram 8/2012. Status post bilateral mastectomies, pathology with left breast 2 cm, grade 1, IDC, ER/DE+ HER-2/gibran-. [id number]. She under bilateral mastectomies reconstructive surgery done 03/2013 by Dr. Wall. She received Femara 10/2012 through Fall 2017. It was temporarily held 2016 due to memory issues. She was not interested in continuing beyond 5 years due to hair loss, hot flashes, and bone health.     She has a history of memory issues, headaches, and dysarthria for which she follows with neurology. An MRI brain for headaches 7/2022 showed patchy marrow signal abnormality. She had a follow-up PET-CT 8/2022 with no evidence of metabolically active disease including of skull. The images were reviewed with radiology who though the skull lesions may represent bone islands considering negative PET. Repeat MRI brain 11/2022 was stable    Interval History:  Ms. Cowart was recently following up with her endocrinologist who noted weight loss. This, combined with the skull lesions on prior brain MRIs, led her to recommend follow-up with oncology, astutely.     Meeting Gemma today, she is doing well. She feels well and in her general state of health. In chart review, she has lost about 27 lbs in 2 year time period, 17 lbs in last year, and 15 lbs in the last 8 months. She describes that she has very intentionally attempted to lose weight. She is adhering strictly to a low sugar diet. 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 She is not concerned with weight. She has a history of DVT/PE but no current symptoms and tolerates the anticoagulation well.     She has no headaches or neurologic changes and was otherwise unaware of the MRI findings. She has no additional  "B-symptoms; she rarely gets hot flashes or feels cold. She denies lymphadenopathy. Denies abnormal bleeding. No recurrent infections.She reports feeling generally well, good diet, active with daily activities. Normal bowels (IBS) for her and no urinary concerns. She is in need of a surgical repair.     She describes some discomfort at the right superior lateral reconstructed breast and size difference. Denies masses.       Review of Systems:  A complete review of systems was negative except as noted in the HPI.     Past medical, Surgical, and social history:  Reviewed    Physical Examination:  /68   Pulse 57   Resp 16   Ht 1.575 m (5' 2\")   Wt 78.7 kg (173 lb 6.4 oz)   LMP 09/24/1990   SpO2 99%   BMI 31.72 kg/m    Wt Readings from Last 10 Encounters:   08/08/23 78.7 kg (173 lb 6.4 oz)   05/23/23 79.7 kg (175 lb 12.8 oz)   11/29/22 84.8 kg (187 lb)   08/19/22 84.8 kg (187 lb)   08/04/22 86.2 kg (190 lb)   07/29/22 86.2 kg (190 lb)   07/21/22 84.8 kg (187 lb)   07/12/22 84.8 kg (187 lb)   07/11/22 85 kg (187 lb 6.4 oz)   06/03/22 86.8 kg (191 lb 6.4 oz)     General: Pleasant patient in no acute distress.  Skin: Warm and dry. No abnormal ecchymosis or rashes.   Eyes: Anicteric. EOMs intact. No strabismus or ptosis.  ENT: Oral mucosa pink and moist without erythema, lesions, thrush, or petechia.  Lymph: No cervical, clavicular, or axillary LAD.  Breast: Bilateral mastectomies with implant reconstruction; there is right upper/lateral full/tenderness.  Heart: Normal rate and rhythm without murmur.  Lungs: Clear to auscultation in all fields with no adventitious lung sounds. Normal work of breathing.  Abdomen: Soft, non-tender, non-distended without hepatosplenomegaly. Normoactive bowel sounds.  Extremities: Trace left > right peripheral edema. Gross movement intact without difficulty.  Mental: Calm, cooperative, appropriate. Mood euthymic.  Neuro: Cranial nerves and coordination grossly intact. Alert and " oriented x 3.    Laboratory Data:  CBC, CMP, tumor markers reviewed.       Assessment and Plan:  Gemma Cowart is a 77 year old female with:    1. Left breast cancer s/p double mastectomies, ER/NH+, her-2- s/p  5 yrs of Femara till 2017.  - Continue annual oncology follow-ups   - Refer to lymphedema therapy for breast fullness/discomfort/unevenness    2. Abnormal MRI findings  3. Weight Loss  - There was concern in 2022 for possible metastatic lesions noted on MRI brain obtained due to headaches; follow-up PET without metabolic activity and repeat MRI with stable findings; Dr. Bowen discussed these with radiology and they may represent [place]  - Patient has had intended weight loss; she has no B symptoms or concerns exam finding indicative of metastatic or hematologic disorder  - Discussed with patient; will forgo further evaluation at this time with no convincing findings; she is aware to notify us with any changes    4. History of gastric bypass surgery   5. History of iron and B12 deficiencies  - Continue SL B12; no current iron due to GI intolerance and has previously required IV iron     6. 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 Multiple Comorbidites  Follow-up with PCP and specialists for CKD, osteopenia, hyperparathryoid, COPD, DMII, HTN, HLD, psoriasis, CKD stage 3,     45 minutes spent on the date of the encounter doing chart review, review of test results, interpretation of tests, patient visit, documentation, and discussion with other provider(s)     Sadia WYATT [facility]   [phone]

## 2023-08-10 LAB — CANCER AG19-9 SERPL IA-ACNC: 28 U/ML

## 2023-08-21 NOTE — TELEPHONE ENCOUNTER
Called and spoke with patient  Had eye appt about a month ago 9July) at [facility].  Has appt w/ jeremy in Oct.  Scheduled for 11/14/23 w/ Arminda for f/up  Dee Dee Keating CMA

## 2023-08-24 ENCOUNTER — PATIENT OUTREACH (OUTPATIENT)
Dept: GERIATRIC MEDICINE | Facility: CLINIC | Age: 78
End: 2023-08-24
Payer: COMMERCIAL

## 2023-08-24 NOTE — PROGRESS NOTES
[facility] Care Coordination Contact      [facility] Six-Month Telephone Assessment    6 month telephone assessment completed on 8/24/23.    ER visits: Yes -   [facility]  Hospitalizations: No  TCU stays: No  Significant health status changes: no  Falls/Injuries: Yes: 1 fall  ADL/IADL changes: No  Changes in services: No    Caregiver Assessment follow up:  n/a    Goals: See POC in chart for goal progress documentation.      Will see member in 6 months for an annual health risk assessment.   Encouraged member to call CC with any questions or concerns in the meantime.     Ifrah Emerson RN  [facility]  [phone]

## 2023-09-07 DIAGNOSIS — M81.8 OTHER OSTEOPOROSIS WITHOUT CURRENT PATHOLOGICAL FRACTURE: ICD-10-CM

## 2023-09-07 DIAGNOSIS — E04.2 NON-TOXIC MULTINODULAR GOITER: Primary | ICD-10-CM

## 2023-09-07 NOTE — LETTER
[birth year] 10, 2018      Gemma Cowart  [address] [address]        Dear Gemma,       We care about your health and have reviewed your health plan including your medical conditions, medications, and lab results.  Based on this review, it is recommended that you follow up regarding the following health topic(s):  -Diabetes  -Breast Cancer Screening  -Wellness (Physical) Visit     We recommend you take the following action(s):  -schedule a WELLNESS (Physical) APPOINTMENT.  We will perform the following labs: A1c and Lipids (fasting cholesterol - nothing to eat except water and/or meds for 8-10 hours).  -schedule a MAMMOGRAM which is due. Please disregard this reminder if you have had this exam elsewhere within the last 1-2 years please let us know so we can update your records.     Please call us at the [facility] - [phone] (or use [employer]) to address the above recommendations.     Thank you for trusting [facility] and we appreciate the opportunity to serve you.  We look forward to supporting your healthcare needs in the future.    Healthy Regards,    Your Health Care Team  [facility]           Use Enhanced Medication Counseling?: No

## 2023-09-07 NOTE — RESULT ENCOUNTER NOTE
Please call patient-  Overall ultrasound shows multiple nodules-- 6 total. All are less than a centimeter with the exception of nodule #5  Nodule #5 on ultrasound (Left mid thyroid) should be biopsied.   I'll place the order and interventional radiology will contact to schedule

## 2023-09-08 ENCOUNTER — TELEPHONE (OUTPATIENT)
Dept: ENDOCRINOLOGY | Facility: CLINIC | Age: 78
End: 2023-09-08
Payer: COMMERCIAL

## 2023-09-08 NOTE — TELEPHONE ENCOUNTER
Called pt and left detailed msg on self identified vm with ultrasound results/plan of care per provider. Kelle Dunlap RN

## 2023-09-08 NOTE — TELEPHONE ENCOUNTER
----- Message from Haley Singh MD sent at 9/7/2023  2:30 PM CDT -----  Please call patient-  Overall ultrasound shows multiple nodules-- 6 total. All are less than a centimeter with the exception of nodule #5  Nodule #5 on ultrasound (Left mid thyroid) should be biopsied.   I'll place the order and interventional radiology will contact to schedule

## 2023-09-15 ENCOUNTER — HOSPITAL ENCOUNTER (OUTPATIENT)
Dept: ULTRASOUND IMAGING | Facility: CLINIC | Age: 78
Discharge: HOME | End: 2023-09-15
Attending: INTERNAL MEDICINE | Admitting: INTERNAL MEDICINE
Payer: COMMERCIAL

## 2023-09-15 DIAGNOSIS — E04.2 NON-TOXIC MULTINODULAR GOITER: ICD-10-CM

## 2023-09-15 PROCEDURE — 250N000009 HC RX 250: Performed by: RADIOLOGY

## 2023-09-15 PROCEDURE — 88173 CYTOPATH EVAL FNA REPORT: CPT | Mod: TC | Performed by: INTERNAL MEDICINE

## 2023-09-15 PROCEDURE — 272N000431 US BIOPSY THYROID FINE NEEDLE ASPIRATION

## 2023-09-15 RX ADMIN — LIDOCAINE HYDROCHLORIDE 10 ML: 10 INJECTION, SOLUTION EPIDURAL; INFILTRATION; INTRACAUDAL; PERINEURAL at 10:42

## 2023-09-15 NOTE — PROGRESS NOTES
Left Thyroid Biopsy done. Patient tolerated procedure well. She left ambulatory in stable condition. Has no questions and discharge instructions reviewed.

## 2023-09-18 LAB
PATH REPORT.COMMENTS IMP SPEC: ABNORMAL
PATH REPORT.COMMENTS IMP SPEC: ABNORMAL
PATH REPORT.COMMENTS IMP SPEC: YES
PATH REPORT.FINAL DX SPEC: ABNORMAL
PATH REPORT.GROSS SPEC: ABNORMAL
PATH REPORT.MICROSCOPIC SPEC OTHER STN: ABNORMAL
PATH REPORT.RELEVANT HX SPEC: ABNORMAL

## 2023-09-18 PROCEDURE — 88173 CYTOPATH EVAL FNA REPORT: CPT | Mod: 26 | Performed by: PATHOLOGY

## 2023-09-26 ENCOUNTER — TELEPHONE (OUTPATIENT)
Dept: PEDIATRICS | Facility: CLINIC | Age: 78
End: 2023-09-26

## 2023-09-26 DIAGNOSIS — C73 MALIGNANT NEOPLASM OF THYROID GLAND (H): Primary | ICD-10-CM

## 2023-09-26 NOTE — TELEPHONE ENCOUNTER
"Dr De Leon/Endo Team: Looks like this patient's Bx was resulted last week and unsure if the pt has been reach about the results. I believe this was ordered by you, correct?      - Oliverio \"Yomi\" Hyuen (he/him/his), RN - Patient Advocate Liason (PAL)  [facility]    "

## 2023-09-26 NOTE — TELEPHONE ENCOUNTER
Test Results        Who ordered the test:  Haley mullen    Type of test: Lab and Biopsy    Date of test:  09/14/2023    Where was the test performed:  [facility]    What are your questions/concerns?:  Results of biopsy    Okay to leave a detailed message?: Yes at Cell number on file:    Telephone Information:   Mobile [phone]

## 2023-09-26 NOTE — TELEPHONE ENCOUNTER
Called patient to review pathology--     Suspicious for malignancy  Plan for neck ultrasound to eval lymphadenopathy (none visualized on thyroid ultrasound)  Referral to surgery for at minimum hemithyroidectomy. Given multiple nodules on R side, would recommend total thyroidectomy. LN as determined based on ultrasound     RTC after this removal for review of pathology and determination of need of additional therapies.      Discussed after thyroid surgery likely would need thyroid hormone supplementation      Please help schedule follow-up with me in end Nov/Dec. Referral placed and ultrasound ordered

## 2023-09-26 NOTE — PROGRESS NOTES
Called patient to review pathology--    Suspicious for malignancy  Plan for neck ultrasound to eval lymphadenopathy (none visualized on thyroid ultrasound)  Referral to surgery for at minimum hemithyroidectomy. Given multiple nodules on R side, would recommend total thyroidectomy. LN as determined based on ultrasound    RTC after this removal for review of pathology and determination of need of additional therapies.     Discussed after thyroid surgery likely would need thyroid hormone supplementation

## 2023-09-28 NOTE — TELEPHONE ENCOUNTER
LVM for PT to call [phone] to reschedule the Dr. Singh appt. See provider notes, push this appt to Dec. There are holds on providers schedule on 12/13 8:00, 3:00 or 3:30

## 2023-09-29 NOTE — TELEPHONE ENCOUNTER
LVM for PT to call [phone] to reschedule the Dr. Singh appt. See provider notes, push this appt to Dec. There are holds on providers schedule on 12/13 8:00, 3:00 or 3:30

## 2023-10-02 NOTE — MR AVS SNAPSHOT
After Visit Summary   11/8/2018    Gemma Cowart    MRN: [id number]           Patient Information     Date Of Birth          1945        Visit Information        Provider Department      11/8/2018 2:40 PM Neris Pritchard PA-C [facility]        Today's Diagnoses     Bleeding hemorrhoid    -  1      Care Instructions    Follow up with CRS          Follow-ups after your visit        Additional Services     COLORECTAL SURGERY REFERRAL       Your provider has referred you to: N: [facility] [phone]   [email or web address]    Referral Reason(s): Hemorrhoids  Special Concerns: Coumadin  This referral is: Urgent (24 - 72 hours)  It is OK to leave a message on patient's voicemail.    Please be aware that coverage of these services is subject to the terms and limitations of your health insurance plan.  Call member services at your health plan with any benefit or coverage questions.      Please bring the following with you to your appointment:    (1) Any X-Rays, CTs or MRIs which have been performed.  Contact the facility where they were done to arrange for  prior to your scheduled appointment.    (2) List of current medications  (3) This referral request   (4) Any documents/labs given to you for this referral                  Your next 10 appointments already scheduled     Dec 03, 2018  8:45 AM CST   Anticoagulation Visit with  ANTICOAGULATION CLINIC   [facility] ([facility])    [address] [place] [address]  [address]  [facility] [address]   [phone]            Oct 16, 2019  8:00 AM CDT   Return Visit with  Oncology Nurse   [facility] ([facility] [place] [facility])    [facility] [place]  [address]  [place] [address]   [phone]            Oct 16, 2019  9:00 AM CDT   Return Visit with Jyoti Narvaez MD   [facility] ([facility])    [facility] [place]  [address]  [place] [id number] [phone]              Who to contact     If you have questions or need follow up information about today's clinic visit or your schedule please contact [facility] [place] directly at [phone].  Normal or non-critical lab and imaging results will be communicated to you by MyChart, letter or phone within 4 business days after the clinic has received the results. If you do not hear from us within 7 days, please contact the clinic through MyChart or phone. If you have a critical or abnormal lab result, we will notify you by phone as soon as possible.  Submit refill requests through [employer] or call your pharmacy and they will forward the refill request to us. Please allow 3 business days for your refill to be completed.          Additional Information About Your Visit        Care EveryWhere ID     This is your Care EveryWhere ID. This could be used by other organizations to access your [facility] medical records  [id number]        Your Vitals Were     Pulse Temperature Last Period BMI (Body Mass Index)          72 98.5  F (36.9  C) (Tympanic) 09/24/1990 34.27 kg/m2         Blood Pressure from Last 3 Encounters:   11/08/18 120/77   10/17/18 120/84   09/07/18 110/60    Weight from Last 3 Encounters:   11/08/18 195 lb (88.5 kg)   10/17/18 194 lb (88 kg)   09/07/18 189 lb (85.7 kg)              We Performed the Following     COLORECTAL SURGERY REFERRAL        Primary Care Provider Office Phone # Fax #    Alva Viera [phone] [address] [place] [address]        Equal Access to Services     [facility] : Hadii kavitha cat Somisa, waaxda luqadaha, qaybta kaalmabg case. So [facility] [phone].    ATENCIÓN: Si habla español, tiene a marcum disposición servicios gratuitos de asistencia lingüística. Llame al [phone].    We comply  with applicable federal civil rights laws and [place] laws. We do not discriminate on the basis of race, color, national origin, age, disability, sex, sexual orientation, or gender identity.            Thank you!     Thank you for choosing [facility]  for your care. Our goal is always to provide you with excellent care. Hearing back from our patients is one way we can continue to improve our services. Please take a few minutes to complete the written survey that you may receive in the mail after your visit with us. Thank you!             Your Updated Medication List - Protect others around you: Learn how to safely use, store and throw away your medicines at www.disposemymeds.org.          This list is accurate as of 11/8/18  2:59 PM.  Always use your most recent med list.                   Brand Name Dispense Instructions for use Diagnosis    ACE/ARB/ARNI NOT PRESCRIBED (INTENTIONAL)      ACE & ARB not prescribed due to Other: normal tensive, low microalbumuria    Type 2 diabetes mellitus with other specified complication (H)       blood glucose lancets standard    no brand specified    2 Box    Use to test blood sugars 2 times daily or as directed. Please dispense lancets for Relion Confirm.    Type 2 diabetes mellitus with other specified complication (H)       blood glucose monitoring test strip    no brand specified    200 each    Use to test blood sugars 2 times daily or as directed. Please dispense Relion Confirm test strips    Type 2 diabetes mellitus with other specified complication (H)       * clobetasol 0.05 % cream    TEMOVATE    270 g    Apply sparingly to affected area twice daily as needed.  Do not apply to face.    Psoriasis       * clobetasol 0.05 % external solution    TEMOVATE    50 mL    Apply sparingly to affected area twice daily for 14 days.  Do not apply to face.    Psoriasis       cyanocobalamin 2500 MCG sublingual tablet    VITAMIN B-12    90 tablet    Place 2,500 mcg under  the tongue daily    Vitamin B12 deficiency, S/P gastric bypass       PARoxetine 30 MG tablet    PAXIL    180 tablet    Take 2 tablets (60 mg) by mouth At Bedtime    ELI (generalized anxiety disorder)       TYLENOL 500 MG tablet   Generic drug:  acetaminophen     100 tablet    Take 2 tablets (1,000 mg) by mouth every 8 hours as needed for mild pain        vitamin D 2000 units Caps     90 capsule    Take 1 capsule by mouth daily    S/P gastric bypass       warfarin 5 MG tablet    COUMADIN    150 tablet    Take 10 mg (2 tabs) on Mon, Thu; 7.5 mg (1.5 tabs) all other days or as directed by INR Clinic    Personal history of DVT (deep vein thrombosis)       * Notice:  This list has 2 medication(s) that are the same as other medications prescribed for you. Read the directions carefully, and ask your doctor or other care provider to review them with you.       Treatment Number (Optional): 15

## 2023-10-03 ENCOUNTER — TELEPHONE (OUTPATIENT)
Dept: ENDOCRINOLOGY | Facility: CLINIC | Age: 78
End: 2023-10-03

## 2023-10-03 NOTE — TELEPHONE ENCOUNTER
Unable to leave voice message. I tried reaching out to her daughter who is listed as an alternate .  Left voice message regarding rescheduling today's appointment. Left message stating that today's video appointment will be cancelled and we can reschedule her for November or December. I left a call back number at [phone].     Sebastián Ibrahim MA.

## 2023-10-10 ENCOUNTER — HOSPITAL ENCOUNTER (OUTPATIENT)
Dept: ULTRASOUND IMAGING | Facility: CLINIC | Age: 78
Discharge: HOME | End: 2023-10-10
Attending: INTERNAL MEDICINE | Admitting: INTERNAL MEDICINE
Payer: COMMERCIAL

## 2023-10-10 DIAGNOSIS — C73 MALIGNANT NEOPLASM OF THYROID GLAND (H): ICD-10-CM

## 2023-10-10 PROCEDURE — 76536 US EXAM OF HEAD AND NECK: CPT

## 2023-10-17 ENCOUNTER — OFFICE VISIT (OUTPATIENT)
Dept: SURGERY | Facility: CLINIC | Age: 78
End: 2023-10-17
Payer: COMMERCIAL

## 2023-10-17 ENCOUNTER — TELEPHONE (OUTPATIENT)
Dept: SURGERY | Facility: CLINIC | Age: 78
End: 2023-10-17

## 2023-10-17 VITALS
BODY MASS INDEX: 31.83 KG/M2 | HEIGHT: 62 IN | OXYGEN SATURATION: 96 % | WEIGHT: 173 LBS | HEART RATE: 61 BPM | SYSTOLIC BLOOD PRESSURE: 136 MMHG | DIASTOLIC BLOOD PRESSURE: 78 MMHG

## 2023-10-17 DIAGNOSIS — E04.1 THYROID NODULE: Primary | ICD-10-CM

## 2023-10-17 PROCEDURE — 99205 OFFICE O/P NEW HI 60 MIN: CPT | Performed by: SURGERY

## 2023-10-17 NOTE — PROGRESS NOTES
History of Present Illness  Gemma Cowart is a 78 year old female who is referred from Dr. Haley Singh for surgery consultation regarding a suspicious left thyroid nodule.  Gemma has actually had this nodule for a long time, visible on her chest CTs that were performed for breast cancer surveillance and PEs. The first mention of the nodule that I could find was in 2008 and it was 1.3 and calcified at that time. She was recently seen by Dr. Haley Singh of endocrinology for hyperparathyroidism, which is likely secondary (historically low serum calcium levels, CKD, and history of gastric bypass). As part of her workup, she underwent thyroid ultrasound which showed multiple subcentimeter nodules and this  1.4 cm left nodule with peripheral calcium. This nodule was biopsied and results were [place] Category 5, suspicious for papillary carcinoma, conferring a 50-75% malignancy risk.     She endorses a ~ 20 lb weight loss over the last ~ 2 years. Since her FNA, she has had some hot flash type symptoms as well. Otherwise, she denies fatigue, heat/cold intolerance, bowel/skin changes or CVS symptoms.    She denies swallowing difficulty, shortness of breath, frequent throat clearing, cough, and hoarseness.    She does not have a family history of thyroid cancer.  She denies a personal history of radiation exposure.    Gemma is not on thyroid medications.   Gemma has no prior neck surgery..    Work up to date:  Imaging:  Imaging was personally reviewed with Gemma.     Recent Results (from the past 744 hour(s))   US Head Neck Soft Tissue    Narrative    US HEAD NECK SOFT TISSUE   10/10/2023 11:20 AM     HISTORY: Malignant neoplasm of thyroid gland (H). Preoperative lymph  node mapping.    COMPARISON: 8/7/2023 and 9/15/2023    FINDINGS:  No lymphadenopathy in the left or right neck. A few small  benign-appearing bilateral level 2 lymph nodes.      Impression    IMPRESSION: No cervical lymphadenopathy.    MARY MAYS MD          SYSTEM ID:  [id number]     US THYROID 8/7/2023 2:09 PM     CLINICAL HISTORY: Other osteoporosis without current pathological  fracture. Hyperparathyroidism (H).     TECHNIQUE: Thyroid ultrasound.      COMPARISON: None      FINDINGS:  RIGHT lobe: 3.6 x 1.3 x 1.5 cm. Homogeneous echotexture.  Isthmus: 3 mm.  LEFT lobe: 3.4 x 1.3 x 1.6 cm. Homogeneous echotexture.     NECK: No cervical lymphadenopathy.     NODULES:     Nodule 1: Right mid, 0.6 x 0.5 x 0.6 cm.   Composition: Mixed cystic and solid, 1 point   Echogenicity: Hyperechoic or isoechoic, 1 point   Shape: Not taller than wide, 0 points   Margin: Ill-defined, 0 points   Echogenic Foci: None, or large comet-tail artifacts, 0 points   Point Total: 1-2 points. TI-RADS 2. No FNA.      Nodule 2: Right lower, and 0.5 x 0.6 x 0.3 cm.  Composition: Solid or almost completely solid, 2 points   Echogenicity: Hyperechoic or isoechoic, 1 point   Shape: Wider-than-tall, 0 points   Margin: Ill-defined, 0 points   Echogenic Foci: Punctate echoic foci, 3 points   Point Total: 4-6 points. TI-RADS 4. If 1.5 cm or larger, recommend  FNA; if 1 cm or larger, follow up US (annually for 5 years).     Nodule 3: Right lower, 0.6 x 0.8 x 0.6 cm.  Composition: Mixed cystic and solid, 1 point   Echogenicity: Hyperechoic or isoechoic, 1 point   Shape: Wider-than-tall, 0 points   Margin: Smooth, 0 points   Echogenic Foci: None, or large comet-tail artifacts, 0 points   Point Total: 1-2 points. TI-RADS 2. No FNA.     Nodule 4: Left upper, 0.9 x 0.6 x 0.4 cm.  Composition: Solid or almost completely solid, 2 points   Echogenicity: Hyperechoic or isoechoic, 1 point   Shape: Wider-than-tall, 0 points   Margin: Smooth, 0 points   Echogenic Foci: None, or large comet-tail artifacts, 0 points   Point Total: 3 points. TI-RADS 3. If 2.5 cm or larger, recommend FNA;  if 1.5 cm or larger, recommend follow up US at 1, 3, and 5 years.     Nodule 5: Left mid, 1.4 x 1.0 x 1.0 cm.  Composition:  "Unable to determine, 2 points   Echogenicity: Hypoechoic, 2 points   Shape: Wider-than-tall, 0 points   Margin: Ill-defined, 0 points   Echogenic Foci: Peripheral (rim) calcifications, 2 points   Point Total: 4-6 points. TI-RADS 4. If 1.5 cm or larger, recommend  FNA; if 1 cm or larger, follow up US (annually for 5 years).     Nodule 6: Left lower, 0.7 x 0.8 x 0.5 cm.  Composition: Solid or almost completely solid, 2 points   Echogenicity: Hypoechoic, 2 points   Shape: Wider-than-tall, 0 points   Margin: Smooth, 0 points   Echogenic Foci: None, or large comet-tail artifacts, 0 points   Point Total: 4-6 points. TI-RADS 4. If 1.5 cm or larger, recommend  FNA; if 1 cm or larger, follow up US (annually for 5 years).                                                                      IMPRESSION:  1.  Thyroid nodules as above.     Nodules are characterized per  ACR Thyroid Imaging, Reporting and Data System (TI-RADS): White Paper  of the ACR TI-RADS Committee  Oseas Lambert, et al. Journal of the American College of  Radiology 2017. Volume 14 (2017), Issue 5, 971-225.      ELIAS BAEZA MD     CT Chest/abdomen/pelvis from 2014 reviewed stating:  \"Stable calcified thyroid nodule on the left.\"     Thyroid ultrasound from 2008 reviewed stating:  COMPARISON:  04/09/2008.  FINDINGS:  Right lobe measures 4.1 x 1.4 x 1.6 cm.  Left lobe  measures 4.2 x 1.6 x 1.6 cm.  There is a densely calcified nodule  within the left lobe of the thyroid gland measuring approximately 1.3  x 1.0 x 0.8 cm.  No other nodules are identified.  The isthmus  appears unremarkable.    IMPRESSION  :  Dominant densely calcified nodule is seen within the  left lobe of the thyroid gland.    FNA biopsy results:   inal Diagnosis   Specimen A                 Interpretation:                   Suspicious for papillary carcinoma (susp malig) (Please see comment and microscopic description)                    Adequacy:                 Satisfactory for " evaluation         Electronically signed by Gemma Gold MD on 9/18/2023 at  2:35 PM   Comment  [facility]   The [place] implied risk of malignancy and recommended clinical management:     Suspicious for malignancy has a 50-75% risk of malignancy, recommended management is near-total thyroidectomy or surgical lobectomy     Labs:  TSH: 2.22 (5/23/23)    Past Medical History:  Past Medical History:   Diagnosis Date    Anemia     iron deficiency anemia    Anxiety     Breast cancer (H) 2012    Bilateral Masectomies 9/2012    Complications of gastric bypass surgery     COPD (chronic obstructive pulmonary disease) (H) 4/21/2021    Diabetes mellitus (H)     diet controlled    DVT (deep venous thrombosis) (H)     Eczema     Fracture of left knee region     patella - vertical    History of kidney stones     Obesity     Palpitation     with no treatment    Polyneuropathy     Pruritus     generalized    Rosacea     SVT (supraventricular tachycardia)     s/p ablation 5/28/2015 at [facility] for left lateral AP       Past Surgical History:  Past Surgical History:   Procedure Laterality Date    ABDOMEN SURGERY  2000    gastric bypass 2000    COLONOSCOPY  10/11/2012    Procedure: COLONOSCOPY;  colonoscopy;  Surgeon: Gela Cleary MD;  Location:  GI    COLONOSCOPY N/A 10/6/2017    Procedure: COLONOSCOPY;;  Surgeon: Shashank Ortiz MD;  Location: [facility]    COLONOSCOPY N/A 3/7/2022    Procedure: COLONOSCOPY;  Surgeon: Andrea Daniel MD;  Location: [facility]    DENTAL SURGERY  5/2007    all teeth removed - dentures    ENT SURGERY      all teeth pulled    ESOPHAGOSCOPY, GASTROSCOPY, DUODENOSCOPY (EGD), COMBINED N/A 10/6/2017    Procedure: COMBINED ESOPHAGOSCOPY, GASTROSCOPY, DUODENOSCOPY (EGD);  ESOPHAGOSCOPY, GASTROSCOPY, DUODENOSCOPY (EGD) & Colonoscopy *Needs INR on arrival;  Surgeon: Shashank Ortiz MD;  Location:  GI    GYN SURGERY  1/10/75    tubal ligation     H ABLATION SVT  5/28/2015    stopped  "metoprolol    HERNIA REPAIR  2005    IMPLANT FILTER INFERIOR VENA CAVA  10/8/2012    taken out  12    INSERT TISSUE EXPANDER BREAST BILATERAL  2012    Procedure: INSERT TISSUE EXPANDER BREAST BILATERAL;;  Surgeon: Orlando Wall MD;  Location:  OR    MASTECTOMY SIMPLE, SENTINEL NODE, COMBINED  2012    Procedure: COMBINED MASTECTOMY SIMPLE, SENTINEL NODE;  BILATERAL MASTECTOMY WITH LEFT AXILLARY SENTINEL LYMPH NODE BIOPSY, BILATERAL TISSUE EXPANDER       MASTECTOMY, BILATERAL      PANNICULECTOMY N/A 2018    Procedure: PANNICULECTOMY;  PANNICULECTOMY ;  Surgeon: Orlando Wall MD;  Location:  OR    RECONSTRUCT BREAST BILATERAL, IMPLANT PROSTHESIS BILATERAL, COMBINED  2013    Procedure: COMBINED RECONSTRUCT BREAST BILATERAL, IMPLANT PROSTHESIS BILATERAL;  BILATERAL SECOND STAGE BREAST RECONSTRUCTION WITH SILICONE GEL IMPLANTS AND LIPOSUCTION;  Surgeon: Orlando Wall MD;  Location: [facility]    RECONSTRUCT NIPPLE BILATERAL  2013    Procedure: RECONSTRUCT NIPPLE BILATERAL;  BILATERAL NIPPLE AREOLAR RECONSTRUCTION;  Surgeon: Orlando Wall MD;  Location: [facility]    ZZHC BREAST RECONSTRUC W OTHR TECHNIQ  2013        Social History:  Social History     Tobacco Use    Smoking status: Former     Packs/day: 1.50     Years: 25.00     Additional pack years: 0.00     Total pack years: 37.50     Types: Cigarettes     Quit date: 10/21/1986     Years since quittin.0     Passive exposure: Never    Smokeless tobacco: Never    Tobacco comments:     quit    Vaping Use    Vaping Use: Never used   Substance Use Topics    Alcohol use: No     Comment: 1 time a year    Drug use: No       ROS:  The 10 point review of systems is negative other than noted in the HPI.    Physical Exam:  Ht 1.575 m (5' 2\")   Wt 78.5 kg (173 lb)   LMP 1990   BMI 31.64 kg/m    Well developed, well nourished female in no apparent distress.  HEENT:  Normocephalic, atraumatic.                   " Eyes without exophthalmos.  Neck:   Normal appearance.              Range of motion is normal.              No neck masses on visual inspection.  Thyroid:  normal to inspection and palpation aside from some firmness in the left thyroid lobe without discrete palpable nodule.  Lymph:  No cervical adenopathy.  Respirations:  Unlabored.  Neurologic:  Alert.  Speech is clear.  Moves all extremities with good strength  Skin:  Warm and dry.  Psychologic:  Alert and appropriate range of emotions.      Assessment and Plan:      Gemma has a suspicious 1.4 cm nodule in the left mid thyroid. It has actually been present and calcified for many years, at least since 2008, when it measured 1.3 cm. With it stable size over 15+ years, I certainly think it is possible that this represents a benign nodule, so I recommend proceeding with a left thyroid lobectomy with intraoperative pathology consultation. If the nodule appears malignant intraoperatively, then we will proceed with a total thyroidectomy at that time. Gemma is aware of the risks to the recurrent laryngeal nerve and to the parathyroid glands during surgery.  She is interested in proceeding with surgery sometime in the next several weeks.     Haley Garcia MD  Please route to  Haley Singh MD    60 minutes total time spent on the date of this encounter doing: chart review, review of test results, patient visit, physical exam, education, counseling, developing plan of care, and documenting.

## 2023-10-17 NOTE — LETTER
2023       Gemma Cowart    RE: [id number]  : 1945    Gemma Cowart has been scheduled for surgery on 2023 at 7:40 AM  at [facility] with Dr Haley Garcia.  The hospital is located at [address] in [place].    Please check in at the Surgery reception desk at 5:40 AM . This is located in the back of the hospital on the East side, just past the Emergency Room entrance.     DO NOT EAT OR DRINK ANYTHING 8 HOURS BEFORE YOUR ARRIVAL TIME.  You may have sips of clear liquids up until 2 hours before your arrival time. If you have been advised to take your medication, please do this early in the morning with just sips of clear liquid.       If you are on a blood thinner such as ELIQUIS, please stop taking this 2 days before your surgery.          Hospital regulations require an updated pre-operative examination to be completed within 30 days of the procedure. This can be done by your primary care provider. Please ask them to fax documentation to [phone]. We also recommend you bring a copy with you.     You should shower before your surgery with Hibiclens or Exidine soap.  This can be found at your local pharmacy or you can pick it up from our office for free.  Please call our office if you have any questions.     You will receive several calls from our staff 3-7 days prior to your scheduled procedure with further details and to answer any questions you may have.    It is sometimes necessary to adjust the surgery schedule due to emergencies and additions to the schedule.  If your surgery is affected by this, we greatly appreciate your flexibility and understanding in this matter    It is best if you call regarding post-operative questions between the hours of 8:00 am & 3:00 pm Monday-Friday, so you have access to the daytime care team that know you best.  Prescription refills are accepted during regular office hours only.    Please do not bring any Disability or  [place] papers to the hospital.  They need to be either faxed ([phone]), mailed or hand delivered to our office by you or a family member for completion.  Please allow 14 business days to complete paperwork.        If you have questions or concerns, please contact our office at [phone].

## 2023-10-17 NOTE — TELEPHONE ENCOUNTER
Type of surgery: LEFT THYROID LOBECTOMY, POSSIBLE TOTAL THYROIDECTOMY, POSSIBLE CENTRAL NECK DISSECTION  Location of surgery: [facility]  Date and time of surgery: 11/14/2023 @ 7:30 AM   Surgeon: Haley Garcia MD   Pre-Op Appt Date: PATIENT TO SCHEDULE    Post-Op Appt Date: PATIENT TO SCHEDULE     Packet sent out: Yes  Pre-cert/Authorization completed:  Not Applicable  Date: 10/17/2023           LEFT THYROID LOBECTOMY, POSSIBLE TOTAL THYROIDECTOMY, POSSIBLE CENTRAL NECK DISSECTION GENERAL PT INST TO HAVE H&P WITH  MIN REQ PA ASSIST MGB NMS

## 2023-11-10 ENCOUNTER — OFFICE VISIT (OUTPATIENT)
Dept: PEDIATRICS | Facility: CLINIC | Age: 78
End: 2023-11-10
Payer: COMMERCIAL

## 2023-11-10 VITALS
RESPIRATION RATE: 16 BRPM | DIASTOLIC BLOOD PRESSURE: 78 MMHG | SYSTOLIC BLOOD PRESSURE: 129 MMHG | WEIGHT: 172.25 LBS | HEART RATE: 61 BPM | OXYGEN SATURATION: 96 % | TEMPERATURE: 98 F | BODY MASS INDEX: 31.5 KG/M2

## 2023-11-10 DIAGNOSIS — R01.1 HEART MURMUR: ICD-10-CM

## 2023-11-10 DIAGNOSIS — Z01.818 PREOP GENERAL PHYSICAL EXAM: Primary | ICD-10-CM

## 2023-11-10 DIAGNOSIS — J44.9 CHRONIC OBSTRUCTIVE PULMONARY DISEASE, UNSPECIFIED COPD TYPE (H): ICD-10-CM

## 2023-11-10 DIAGNOSIS — I47.19 OTHER SUPRAVENTRICULAR TACHYCARDIA (H): ICD-10-CM

## 2023-11-10 DIAGNOSIS — E11.69 TYPE 2 DIABETES MELLITUS WITH OTHER SPECIFIED COMPLICATION, WITHOUT LONG-TERM CURRENT USE OF INSULIN (H): ICD-10-CM

## 2023-11-10 DIAGNOSIS — E04.1 THYROID NODULE: ICD-10-CM

## 2023-11-10 LAB
FASTING STATUS PATIENT QL REPORTED: NO
GLUCOSE SERPL-MCNC: 94 MG/DL (ref 70–99)
HBA1C MFR BLD: 5.7 % (ref 0–5.6)

## 2023-11-10 PROCEDURE — 83036 HEMOGLOBIN GLYCOSYLATED A1C: CPT | Performed by: PEDIATRICS

## 2023-11-10 PROCEDURE — 93000 ELECTROCARDIOGRAM COMPLETE: CPT | Performed by: PEDIATRICS

## 2023-11-10 PROCEDURE — 36415 COLL VENOUS BLD VENIPUNCTURE: CPT | Performed by: PEDIATRICS

## 2023-11-10 PROCEDURE — 99214 OFFICE O/P EST MOD 30 MIN: CPT | Mod: 25 | Performed by: PEDIATRICS

## 2023-11-10 PROCEDURE — 82947 ASSAY GLUCOSE BLOOD QUANT: CPT | Performed by: PEDIATRICS

## 2023-11-10 RX ORDER — RESPIRATORY SYNCYTIAL VIRUS VACCINE 120MCG/0.5
0.5 KIT INTRAMUSCULAR ONCE
Qty: 1 EACH | Refills: 0 | Status: CANCELLED | OUTPATIENT
Start: 2023-11-10 | End: 2023-11-10

## 2023-11-10 ASSESSMENT — PAIN SCALES - GENERAL: PAINLEVEL: NO PAIN (0)

## 2023-11-10 NOTE — PROGRESS NOTES
[facility]  [address]  [address]  [place] [address]  Phone: [phone]  Fax: [phone]  Primary Provider: Aureliano Larson  Pre-op Performing Provider: AURELIANO BENNETT    PREOPERATIVE EVALUATION:  Today's date: 11/10/2023    Gemma is a 78 year old female who presents for a preoperative evaluation.      11/10/2023     1:35 PM   Additional Questions   Roomed by Michelle [place] CMA   Accompanied by N/a         11/10/2023     1:35 PM   Patient Reported Additional Medications   Patient reports taking the following new medications n/a       Surgical Information:  Surgery/Procedure: Thyroidectomy  Surgery Location: [facility]  Surgeon: Dr. Shankar  Surgery Date: 11/14/23  Time of Surgery: 7:40AM  Where patient plans to recover: At home with family  Fax number for surgical facility: Note does not need to be faxed, will be available electronically in [phone]    Assessment & Plan     The proposed surgical procedure is considered INTERMEDIATE risk.    (Z01.818) Preop general physical exam  (primary encounter diagnosis)  Comment: cleared  Plan:     (E04.1) Thyroid nodule  Comment:   Plan:     (E11.69) Type 2 diabetes mellitus with other specified complication, without long-term current use of insulin (H)  Comment: diet controlled.  Patient on metformin a few years ago, but then lost weight and has been able to stay off medications.   Plan: Random Glucose, Hemoglobin A1c            (J44.9) Chronic obstructive pulmonary disease, unspecified COPD type (H)  Comment: stable  Plan:     (R01.1) Heart murmur  Comment: new on exam. This should not delay surgery, but needs to be evaluated non urgently.    Plan: Echocardiogram Complete            (I47.19) Other supraventricular tachycardia  Comment: patient with h/o ablation  Plan: EKG 12-lead complete w/read - Clinics        Ok      I will be new PCP for this patient.  She request MD to manage care and I agree.  Follow-up  in 1 month with me.     Risks and Recommendations:  The patient has the following additional risks and recommendations for perioperative complications:   - Consult Hospitalist / IM to assist with post-op medical management  Pulmonary:    - Incentive spirometry post-op    Antiplatelet or Anticoagulation Medication Instructions:  Eliquis - GFR 36 - hold for 48 hours    Additional Medication Instructions:  Patient is to take all scheduled medications on the day of surgery EXCEPT for modifications listed below:    RECOMMENDATION:  APPROVAL GIVEN to proceed with proposed procedure, without further diagnostic evaluation.    Subjective       HPI related to upcoming procedure: Patient with calcified thyroid nodule.  Surgery to determine cancer.     COPD - Daily Spirva and prn albuterol    DM - diet controlled    H/o DVT - Eliquis        11/10/2023     1:16 PM   Preop Questions   1. Have you ever had a heart attack or stroke? No   2. Have you ever had surgery on your heart or blood vessels, such as a stent placement, a coronary artery bypass, or surgery on an artery in your head, neck, heart, or legs? YES - tachycardia s/p ablation   3. Do you have chest pain with activity? No   4. Do you have a history of  heart failure? No   5. Do you currently have a cold, bronchitis or symptoms of other infection? No   6. Do you have a cough, shortness of breath, or wheezing? YES - COPD, stable   7. Do you or anyone in your family have previous history of blood clots? YES - personal h/o DVT, on Eliquis   8. Do you or does anyone in your family have a serious bleeding problem such as prolonged bleeding following surgeries or cuts? No   9. Have you ever had problems with anemia or been told to take iron pills? YES - a few years ago, required iron transfusion, but has been stable   10. Have you had any abnormal blood loss such as black, tarry or bloody stools, or abnormal vaginal bleeding? YES - in the past, non currently   11. Have you  ever had a blood transfusion? YES    11a. Have you ever had a transfusion reaction? No   12. Are you willing to have a blood transfusion if it is medically needed before, during, or after your surgery? Yes   13. Have you or any of your relatives ever had problems with anesthesia? No   14. Do you have sleep apnea, excessive snoring or daytime drowsiness? YES - daytime drowsiness (naps 3-4 times per week if wakes early)   14a. Do you have a CPAP machine? No   15. Do you have any artifical heart valves or other implanted medical devices like a pacemaker, defibrillator, or continuous glucose monitor? No   16. Do you have artificial joints? No   17. Are you allergic to latex? No       Health Care Directive:  Patient does not have a Health Care Directive or Living Will: Discussed advance care planning with patient; information given to patient to review.    Preoperative Review of :   reviewed - controlled substances prescribed by other outside provider(s).    Review of Systems  Constitutional, neuro, ENT, endocrine, pulmonary, cardiac, gastrointestinal, genitourinary, musculoskeletal, integument and psychiatric systems are negative, except as otherwise noted.    Patient Active Problem List    Diagnosis Date Noted    Other osteoporosis without current pathological fracture 01/04/2023     Priority: Medium     Didn't follow-up on treatment prescribed by endo as of May 23, 2023. States she wasn't told she had osteoporosis      Elevated parathyroid hormone 04/12/2022     Priority: Medium     Asked her to follow-up with endo May 23, 2023. Not taking her calcium because someone on youtube told her not to      Iron deficiency anemia, unspecified iron deficiency anemia type 10/27/2021     Priority: Medium    COPD (chronic obstructive pulmonary disease) (H) 04/21/2021     Priority: Medium     PFTs were normal other than higher residual volume (seen in COPD vs asthma) but still symptomatic so referred to pulm May 23,  2023    Stress test negative fall 2022      Postmenopausal bleeding 05/09/2018     Priority: Medium    CKD (chronic kidney disease) stage 3, GFR 30-59 ml/min (H) 10/16/2016     Priority: Medium    Malignant neoplasm of nipple of left breast in female, unspecified estrogen receptor status (H) 10/12/2016     Priority: Medium     S/p bilateral mastectomy, follows with Dr. Narvaez.       Long term current use of anticoagulant-hx multiple DVTs 05/04/2016     Priority: Medium    Type 2 diabetes mellitus with other specified complication (H) 10/09/2015     Priority: Medium     Asked her to start a statin May 23, 2023. Waiting to hear back      Psoriasis 10/09/2015     Priority: Medium    Insomnia 06/27/2013     Priority: Medium    Anxiety 04/23/2013     Priority: Medium    Hyperlipidemia LDL goal <100 02/22/2013     Priority: Medium    Vitamin D deficiency disease 02/01/2013     Priority: Medium    Vitamin B12 deficiency 02/01/2013     Priority: Medium    Nontoxic uninodular goiter 10/18/2012     Priority: Medium    Rosacea 10/18/2012     Priority: Medium      Past Medical History:   Diagnosis Date    Anemia     iron deficiency anemia    Anxiety     Breast cancer (H) 2012    Bilateral Masectomies 9/2012    Complications of gastric bypass surgery     COPD (chronic obstructive pulmonary disease) (H) 04/21/2021    Diabetes mellitus (H)     diet controlled    DVT (deep venous thrombosis) (H)     Eczema     Fracture of left knee region     patella - vertical    History of kidney stones     Obesity     Palpitation     with no treatment    Polyneuropathy     Pruritus     generalized    Rosacea     SVT (supraventricular tachycardia)     s/p ablation 5/28/2015 at [facility] for left lateral AP     Past Surgical History:   Procedure Laterality Date    ABDOMEN SURGERY  2000    gastric bypass 2000    COLONOSCOPY  10/11/2012    Procedure: COLONOSCOPY;  colonoscopy;  Surgeon: Gela Cleary MD;  Location:  GI    COLONOSCOPY N/A  10/6/2017    Procedure: COLONOSCOPY;;  Surgeon: Shashank Ortiz MD;  Location:  GI    COLONOSCOPY N/A 3/7/2022    Procedure: COLONOSCOPY;  Surgeon: Andrea Daniel MD;  Location:  GI    DENTAL SURGERY  5/2007    all teeth removed - dentures    ENT SURGERY      all teeth pulled    ESOPHAGOSCOPY, GASTROSCOPY, DUODENOSCOPY (EGD), COMBINED N/A 10/6/2017    Procedure: COMBINED ESOPHAGOSCOPY, GASTROSCOPY, DUODENOSCOPY (EGD);  ESOPHAGOSCOPY, GASTROSCOPY, DUODENOSCOPY (EGD) & Colonoscopy *Needs INR on arrival;  Surgeon: Shashank Ortiz MD;  Location:  GI    GYN SURGERY  1/10/75    tubal ligation     H ABLATION SVT  5/28/2015    stopped metoprolol    HERNIA REPAIR  2005    IMPLANT FILTER INFERIOR VENA CAVA  10/8/2012    taken out  1/4/12    INSERT TISSUE EXPANDER BREAST BILATERAL  9/5/2012    Procedure: INSERT TISSUE EXPANDER BREAST BILATERAL;;  Surgeon: Orlando Wall MD;  Location:  OR    MASTECTOMY SIMPLE, SENTINEL NODE, COMBINED  9/5/2012    Procedure: COMBINED MASTECTOMY SIMPLE, SENTINEL NODE;  BILATERAL MASTECTOMY WITH LEFT AXILLARY SENTINEL LYMPH NODE BIOPSY, BILATERAL TISSUE EXPANDER       MASTECTOMY, BILATERAL      PANNICULECTOMY N/A 4/5/2018    Procedure: PANNICULECTOMY;  PANNICULECTOMY ;  Surgeon: Orlando Wall MD;  Location:  OR    RECONSTRUCT BREAST BILATERAL, IMPLANT PROSTHESIS BILATERAL, COMBINED  5/22/2013    Procedure: COMBINED RECONSTRUCT BREAST BILATERAL, IMPLANT PROSTHESIS BILATERAL;  BILATERAL SECOND STAGE BREAST RECONSTRUCTION WITH SILICONE GEL IMPLANTS AND LIPOSUCTION;  Surgeon: Orlando Wall MD;  Location: [facility]    RECONSTRUCT NIPPLE BILATERAL  8/22/2013    Procedure: RECONSTRUCT NIPPLE BILATERAL;  BILATERAL NIPPLE AREOLAR RECONSTRUCTION;  Surgeon: Orlando Wall MD;  Location: [facility]    ZZHC BREAST RECONSTRUC W OTHR TECHNIQ  5/8/2013     Current Outpatient Medications   Medication Sig Dispense Refill    acetaminophen (TYLENOL) 500 MG tablet Take 2 tablets  "(1,000 mg) by mouth every 8 hours as needed for mild pain 100 tablet 3    albuterol (PROAIR HFA/PROVENTIL HFA/VENTOLIN HFA) 108 (90 Base) MCG/ACT inhaler Inhale 2 puffs into the lungs every 6 hours as needed for shortness of breath or wheezing 18 g 1    apixaban ANTICOAGULANT (ELIQUIS ANTICOAGULANT) 5 MG tablet Take 1 tablet (5 mg) by mouth 2 times daily 180 tablet 3    ASPIRIN NOT PRESCRIBED (INTENTIONAL) Please choose reason not prescribed from choices below.      blood glucose (ONETOUCH VERIO IQ) test strip Use to test blood sugar 2 times daily or as directed. 200 strip 3    Cholecalciferol (VITAMIN D3 PO) Alternates between 4000 units and 5000 units daily.      clobetasol (TEMOVATE) 0.05 % external cream Apply twice daily to rash (thigh, knees, hands, feet) twice daily for 7 days AS NEEDED \"use sparingly and not to be used on the face\" Follow up recommended. 60 g 3    cyanocobalamin (VITAMIN B-12) 1000 MCG sublingual tablet Place 1 tablet (1,000 mcg) under the tongue daily 90 tablet 3    sertraline (ZOLOFT) 100 MG tablet Take 1 tablet (100 mg) by mouth daily 90 tablet 1    tiotropium (SPIRIVA RESPIMAT) 2.5 MCG/ACT inhaler Inhale 2 puffs into the lungs daily 12 g 3       Allergies   Allergen Reactions    Bacitracin Unknown    Dust Mites     Nickel         Social History     Tobacco Use    Smoking status: Former     Packs/day: 1.50     Years: 25.00     Additional pack years: 0.00     Total pack years: 37.50     Types: Cigarettes     Quit date: 10/21/1986     Years since quittin.0     Passive exposure: Never    Smokeless tobacco: Never    Tobacco comments:     quit    Substance Use Topics    Alcohol use: Not Currently       History   Drug Use No         Objective     /78   Pulse 61   Temp 98  F (36.7  C)   Resp 16   Wt 78.1 kg (172 lb 4 oz)   LMP 1990   SpO2 96%   BMI 31.50 kg/m      Physical Exam    GENERAL APPEARANCE: healthy, alert and no distress     EYES: EOMI, PERRL     HENT: ear " canals and TM's normal and nose and mouth without ulcers or lesions     NECK: no adenopathy, no asymmetry, masses, or scars and thyroid normal to palpation     RESP: lungs clear to auscultation - no rales, rhonchi or wheezes     CV: regular rates and rhythm, normal S1 S2, no S3 or S4 and no murmur, click or rub     ABDOMEN:  soft, nontender, no HSM or masses and bowel sounds normal     MS: extremities normal- no gross deformities noted, no evidence of inflammation in joints, FROM in all extremities.     SKIN: no suspicious lesions or rashes     NEURO: Normal strength and tone, sensory exam grossly normal, mentation intact and speech normal     PSYCH: mentation appears normal. and affect normal/bright     LYMPHATICS: No cervical adenopathy    Recent Labs   Lab Test 08/08/23  1310 05/23/23  1054 11/17/22  0942 02/17/22  1249 02/01/22  0850   HGB 12.5  --  13.3   < > 9.3*     --  190   < > 221    144  --    < > 142   POTASSIUM 5.3 5.1  --    < > 4.6   CR 1.63* 1.55* 1.35*   < > 1.40*   A1C  --  6.1*  --   --  6.5*    < > = values in this interval not displayed.        Diagnostics:  Labs pending at this time.  Results will be reviewed when available.   EKG: junctional rhythm    Revised Cardiac Risk Index (RCRI):  The patient has the following serious cardiovascular risks for perioperative complications:   - No serious cardiac risks = 0 points     RCRI Interpretation: 0 points: Class I (very low risk - 0.4% complication rate)         Signed Electronically by: Arminda Foley MD  Copy of this evaluation report is provided to requesting physician.

## 2023-11-10 NOTE — PATIENT INSTRUCTIONS
Preparing for Your Surgery  Getting started  A nurse will call you to review your health history and instructions. They will give you an arrival time based on your scheduled surgery time. Please be ready to share:  Your doctor's clinic name and phone number  Your medical, surgical, and anesthesia history  A list of allergies and sensitivities  A list of medicines, including herbal treatments and over-the-counter drugs  Whether the patient has a legal guardian (ask how to send us the papers in advance)  Please tell us if you're pregnant--or if there's any chance you might be pregnant. Some surgeries may injure a fetus (unborn baby), so they require a pregnancy test. Surgeries that are safe for a fetus don't always need a test, and you can choose whether to have one.   If you have a child who's having surgery, please ask for a copy of Preparing for Your Child's Surgery.    Preparing for surgery  Within 10 to 30 days of surgery: Have a pre-op exam (sometimes called an H&P, or History and Physical). This can be done at a clinic or pre-operative center.  If you're having a , you may not need this exam. Talk to your care team.  At your pre-op exam, talk to your care team about all medicines you take. If you need to stop any medicines before surgery, ask when to start taking them again.  We do this for your safety. Many medicines can make you bleed too much during surgery. Some change how well surgery (anesthesia) drugs work.  Call your insurance company to let them know you're having surgery. (If you don't have insurance, call [phone].)  Call your clinic if there's any change in your health. This includes signs of a cold or flu (sore throat, runny nose, cough, rash, fever). It also includes a scrape or scratch near the surgery site.  If you have questions on the day of surgery, call your hospital or surgery center.  Eating and drinking guidelines  For your safety: Unless your surgeon tells you otherwise,  follow the guidelines below.  Eat and drink as usual until 8 hours before you arrive for surgery. After that, no food or milk.  Drink clear liquids until 2 hours before you arrive. These are liquids you can see through, like water, Gatorade, and Propel Water. They also include plain black coffee and tea (no cream or milk), candy, and breath mints. You can spit out gum when you arrive.  If you drink alcohol: Stop drinking it the night before surgery.  If your care team tells you to take medicine on the morning of surgery, it's okay to take it with a sip of water.  Preventing infection  Shower or bathe the night before and morning of your surgery. Follow the instructions your clinic gave you. (If no instructions, use regular soap.)  Don't shave or clip hair near your surgery site. We'll remove the hair if needed.  Don't smoke or vape the morning of surgery. You may chew nicotine gum up to 2 hours before surgery. A nicotine patch is okay.  Note: Some surgeries require you to completely quit smoking and nicotine. Check with your surgeon.  Your care team will make every effort to keep you safe from infection. We will:  Clean our hands often with soap and water (or an alcohol-based hand rub).  Clean the skin at your surgery site with a special soap that kills germs.  Give you a special gown to keep you warm. (Cold raises the risk of infection.)  Wear special hair covers, masks, gowns and gloves during surgery.  Give antibiotic medicine, if prescribed. Not all surgeries need antibiotics.  What to bring on the day of surgery  Photo ID and insurance card  Copy of your health care directive, if you have one  Glasses and hearing aids (bring cases)  You can't wear contacts during surgery  Inhaler and eye drops, if you use them (tell us about these when you arrive)  CPAP machine or breathing device, if you use them  A few personal items, if spending the night  If you have . . .  A pacemaker, ICD (cardiac defibrillator) or other  implant: Bring the ID card.  An implanted stimulator: Bring the remote control.  A legal guardian: Bring a copy of the certified (court-stamped) guardianship papers.  Please remove any jewelry, including body piercings. Leave jewelry and other valuables at home.  If you're going home the day of surgery  You must have a responsible adult drive you home. They should stay with you overnight as well.  If you don't have someone to stay with you, and you aren't safe to go home alone, we may keep you overnight. Insurance often won't pay for this.  After surgery  If it's hard to control your pain or you need more pain medicine, please call your surgeon's office.  Questions?   If you have any questions for your care team, list them here: _________________________________________________________________________________________________________________________________________________________________________ ____________________________________ ____________________________________ ____________________________________  For informational purposes only. Not to replace the advice of your health care provider. Copyright   2003, 2019 [employer]. All rights reserved. Clinically reviewed by Kristi Mccyo MD. SMARTworks [id number] - REV 12/22.    How to Take Your Medication Before Surgery  - STOP taking all vitamins and herbal supplements 14 days before surgery.  - STOP ELIQUIS 2 DAYS PRIOR TO SURGERY

## 2023-11-10 NOTE — COMMUNITY RESOURCES LIST (ENGLISH)
11/10/2023   [facility]  N/A  For questions about this resource list or additional care needs, please contact your primary care clinic or care manager.  Phone: [phone]   Email: N/A   Address: [address] [place] [address]   Hours: N/A        Transportation       Free or low-cost transportation  1  [employer] Distance: 7.46 miles      [address] [place] [address]  Language: English, Macedonian, Hmong, Algerian, Cymraes  Hours: Mon - Sun 5:00 AM - 10:00 PM  Fees: Self Pay   Phone: (609) 399-5706 Email: maximalcare_mobility@FIGMD Website: https://www.[place]Sponduu.info/Providers/Maximal_Care_Mobility_LLC/Transportation/1?pos=9     2  [facility] Circulator Bus Distance: 14.9 miles      In-Person   [address] [place] [address]  Language: English  Hours: Tue 9:00 AM - 2:00 PM  Fees: Self Pay   Phone: (370) 515-8834 Email: info@[employer].Xiimo Website: http://www.DataFox.org/     Transportation to medical appointments  3  [facility] Service - Just Friends Distance: 1.36 miles      In-Person   [address] [place] [address]  Language: English  Fees: Free   Phone: (893) 571-4578 Email: info@[facility].org Website: http://www.Smacktive.comAdventHealth LittletonEDAN.org     4  [employer] Distance: 7.46 miles      [address] [place] [address]  Language: English, Macedonian, Hmong, Algerian, Cymraes  Hours: Mon - Sun 5:00 AM - 10:00 PM  Fees: Insurance, Self Pay   Phone: (919) 608-8918 Email: maximalcare_mobility@FIGMD Website: https://www.[place]Sponduu.info/Providers/Maximal_Care_Mobility_LLC/Transportation/1?pos=9          Important Numbers & Websites       Emergency Services   911  City Services   311  Poison Control   (657) 987-3040  Suicide Prevention Lifeline   (410) 836-1782 (TALK)  Child Abuse Hotline   (826) 108-6705 (4-A-Child)  Sexual Assault Hotline   (725) 482-9079 (HOPE)  National  Runaway Safeline   (760) 107-4896 (RUNAWAY)  All-Options Talkline   (751) 463-5867  Substance Abuse Referral   (334) 127-5975 (HELP)

## 2023-11-10 NOTE — COMMUNITY RESOURCES LIST (ENGLISH)
11/10/2023   [facility]  N/A  For questions about this resource list or additional care needs, please contact your primary care clinic or care manager.  Phone: [phone]   Email: N/A   Address: [address] [place] [address]   Hours: N/A        Transportation       Free or low-cost transportation  1  [employer] Distance: 7.46 miles      [address] [place] [address]  Language: English, Swazi, Hmong, Liberian, Polish  Hours: Mon - Sun 5:00 AM - 10:00 PM  Fees: Self Pay   Phone: (540) 469-8222 Email: [email or web address]_mobility@[email or web address] Website: https://www.[place]Waveborn.info/Providers/Maximal_Care_Mobility_LLC/Transportation/1?pos=9     2  [facility] Circulator Bus Distance: 14.9 miles      In-Person   [address] [place] [address]  Language: English  Hours: Tue 9:00 AM - 2:00 PM  Fees: Self Pay   Phone: (948) 974-6755 Email: info@[employer].NeedFeed Website: http://www.Kom[employer]te..org/     Transportation to medical appointments  3  [facility] Service - Just Friends Distance: 1.36 miles      In-Person   [address] [place] [address]  Language: English  Fees: Free   Phone: (766) 130-4055 Email: info@[facility].org Website: http://www.Fashion GPSNorth Suburban [facility] CenterPowerCell [place].org     4  [employer] Mob[employer] Distance: 7.46 miles      [address] [place] [address]  Language: English, Swazi, Hmong, Liberian, Polish  Hours: Mon - Sun 5:00 AM - 10:00 PM  Fees: Insurance, Self Pay   Phone: (949) 989-5998 Email: maximalcare_mobility@AltSchool Website: https://www.[place]Waveborn.info/Providers/Maximal_Care_Mobility_LLC/Transportation/1?pos=9          Important Numbers & Websites       Emergency Services   911  City Services   311  Poison Control   (537) 964-3039  Suicide Prevention Lifeline   (345) 957-1875 (TALK)  Child Abuse Hotline   (936) 957-2344 (4-A-Child)  Sexual Assault Hotline   (882) 918-7418 (HOPE)  National  Runaway Safeline   (465) 147-5401 (RUNAWAY)  All-Options Talkline   (542) 882-3064  Substance Abuse Referral   (278) 944-4864 (HELP)

## 2023-11-13 NOTE — PHARMACY-ADMISSION MEDICATION HISTORY
"Medication reconciliation interview completed by pre-admitting nurse, reviewed by pharmacy.     No further clarifications needed.     Prior to Admission medications    Medication Sig Last Dose Taking? Auth Provider Long Term End Date   acetaminophen (TYLENOL) 500 MG tablet Take 2 tablets (1,000 mg) by mouth every 8 hours as needed for mild pain  Yes Cece Foster MD     albuterol (PROAIR HFA/PROVENTIL HFA/VENTOLIN HFA) 108 (90 Base) MCG/ACT inhaler Inhale 2 puffs into the lungs every 6 hours as needed for shortness of breath or wheezing  Yes Arminda Larson APRN CNP Yes    apixaban ANTICOAGULANT (ELIQUIS ANTICOAGULANT) 5 MG tablet Take 1 tablet (5 mg) by mouth 2 times daily  Yes Arminda Larson APRN CNP     Cholecalciferol (VITAMIN D3 PO) Alternates between 4000 units and 5000 units daily.  Yes Reported, Patient     clobetasol (TEMOVATE) 0.05 % external cream Apply twice daily to rash (thigh, knees, hands, feet) twice daily for 7 days AS NEEDED \"use sparingly and not to be used on the face\" Follow up recommended.  Yes Arminda Larson APRN CNP     cyanocobalamin (VITAMIN B-12) 1000 MCG sublingual tablet Place 1 tablet (1,000 mcg) under the tongue daily  Yes Arminda Larson APRN CNP     sertraline (ZOLOFT) 100 MG tablet Take 1 tablet (100 mg) by mouth daily  Yes Arminda Larson APRN CNP Yes    tiotropium (SPIRIVA RESPIMAT) 2.5 MCG/ACT inhaler Inhale 2 puffs into the lungs daily  Yes Arminda Larson APRN CNP Yes    ASPIRIN NOT PRESCRIBED (INTENTIONAL) Please choose reason not prescribed from choices below.   Arminda Larson APRN CNP Yes    blood glucose (ONETOUCH VERIO IQ) test strip Use to test blood sugar 2 times daily or as directed.   Arminda Larson APRN CNP     tiotropium (SPIRIVA RESPIMAT) 2.5 MCG/ACT inhaler Inhale 2 puffs into the lungs daily   Arminda Larson APRN CNP Yes       "

## 2023-11-13 NOTE — RESULT ENCOUNTER NOTE
Called and was able to inform patient of PCP message.     Patient was content and understood that her levels are normal.     Nurys Feldman MA

## 2023-11-14 ENCOUNTER — ANESTHESIA EVENT (OUTPATIENT)
Dept: SURGERY | Facility: CLINIC | Age: 78
End: 2023-11-14
Payer: COMMERCIAL

## 2023-11-14 ENCOUNTER — APPOINTMENT (OUTPATIENT)
Dept: SURGERY | Facility: PHYSICIAN GROUP | Age: 78
End: 2023-11-14
Payer: COMMERCIAL

## 2023-11-14 ENCOUNTER — HOSPITAL ENCOUNTER (OUTPATIENT)
Facility: CLINIC | Age: 78
Discharge: HOME | End: 2023-11-14
Attending: SURGERY | Admitting: SURGERY
Payer: COMMERCIAL

## 2023-11-14 ENCOUNTER — ANESTHESIA (OUTPATIENT)
Dept: SURGERY | Facility: CLINIC | Age: 78
End: 2023-11-14
Payer: COMMERCIAL

## 2023-11-14 VITALS
HEIGHT: 62 IN | SYSTOLIC BLOOD PRESSURE: 145 MMHG | HEART RATE: 76 BPM | WEIGHT: 172.4 LBS | TEMPERATURE: 98.9 F | BODY MASS INDEX: 31.73 KG/M2 | RESPIRATION RATE: 16 BRPM | DIASTOLIC BLOOD PRESSURE: 80 MMHG | OXYGEN SATURATION: 95 %

## 2023-11-14 DIAGNOSIS — E11.69 TYPE 2 DIABETES MELLITUS WITH OTHER SPECIFIED COMPLICATION, UNSPECIFIED WHETHER LONG TERM INSULIN USE (H): ICD-10-CM

## 2023-11-14 DIAGNOSIS — Z90.09 HISTORY OF LOBECTOMY OF THYROID: ICD-10-CM

## 2023-11-14 DIAGNOSIS — E04.1 NONTOXIC UNINODULAR GOITER: Primary | ICD-10-CM

## 2023-11-14 LAB
GLUCOSE BLDC GLUCOMTR-MCNC: 100 MG/DL (ref 70–99)
GLUCOSE BLDC GLUCOMTR-MCNC: 192 MG/DL (ref 70–99)

## 2023-11-14 PROCEDURE — 370N000017 HC ANESTHESIA TECHNICAL FEE, PER MIN: Performed by: SURGERY

## 2023-11-14 PROCEDURE — 710N000009 HC RECOVERY PHASE 1, LEVEL 1, PER MIN: Performed by: SURGERY

## 2023-11-14 PROCEDURE — 250N000011 HC RX IP 250 OP 636: Performed by: NURSE ANESTHETIST, CERTIFIED REGISTERED

## 2023-11-14 PROCEDURE — 250N000011 HC RX IP 250 OP 636: Performed by: SURGERY

## 2023-11-14 PROCEDURE — 82962 GLUCOSE BLOOD TEST: CPT

## 2023-11-14 PROCEDURE — 272N000001 HC OR GENERAL SUPPLY STERILE: Performed by: SURGERY

## 2023-11-14 PROCEDURE — 88311 DECALCIFY TISSUE: CPT | Mod: TC | Performed by: SURGERY

## 2023-11-14 PROCEDURE — 258N000003 HC RX IP 258 OP 636: Performed by: ANESTHESIOLOGY

## 2023-11-14 PROCEDURE — 999N000141 HC STATISTIC PRE-PROCEDURE NURSING ASSESSMENT: Performed by: SURGERY

## 2023-11-14 PROCEDURE — 250N000025 HC SEVOFLURANE, PER MIN: Performed by: SURGERY

## 2023-11-14 PROCEDURE — 60220 PARTIAL REMOVAL OF THYROID: CPT | Mod: LT | Performed by: SURGERY

## 2023-11-14 PROCEDURE — 710N000012 HC RECOVERY PHASE 2, PER MINUTE: Performed by: SURGERY

## 2023-11-14 PROCEDURE — 60220 PARTIAL REMOVAL OF THYROID: CPT | Mod: AS | Performed by: PHYSICIAN ASSISTANT

## 2023-11-14 PROCEDURE — 60512 AUTOTRANSPLANT PARATHYROID: CPT | Performed by: SURGERY

## 2023-11-14 PROCEDURE — 360N000076 HC SURGERY LEVEL 3, PER MIN: Performed by: SURGERY

## 2023-11-14 PROCEDURE — 250N000009 HC RX 250: Performed by: NURSE ANESTHETIST, CERTIFIED REGISTERED

## 2023-11-14 PROCEDURE — 60512 AUTOTRANSPLANT PARATHYROID: CPT | Mod: AS | Performed by: PHYSICIAN ASSISTANT

## 2023-11-14 RX ORDER — DIAZEPAM 10 MG/2ML
2.5 INJECTION, SOLUTION INTRAMUSCULAR; INTRAVENOUS
Status: DISCONTINUED | OUTPATIENT
Start: 2023-11-14 | End: 2023-11-14 | Stop reason: HOSPADM

## 2023-11-14 RX ORDER — FENTANYL CITRATE 50 UG/ML
INJECTION, SOLUTION INTRAMUSCULAR; INTRAVENOUS PRN
Status: DISCONTINUED | OUTPATIENT
Start: 2023-11-14 | End: 2023-11-14

## 2023-11-14 RX ORDER — SODIUM CHLORIDE, SODIUM LACTATE, POTASSIUM CHLORIDE, CALCIUM CHLORIDE 600; 310; 30; 20 MG/100ML; MG/100ML; MG/100ML; MG/100ML
INJECTION, SOLUTION INTRAVENOUS CONTINUOUS
Status: DISCONTINUED | OUTPATIENT
Start: 2023-11-14 | End: 2023-11-14 | Stop reason: HOSPADM

## 2023-11-14 RX ORDER — HYDROMORPHONE HCL IN WATER/PF 6 MG/30 ML
0.4 PATIENT CONTROLLED ANALGESIA SYRINGE INTRAVENOUS EVERY 5 MIN PRN
Status: DISCONTINUED | OUTPATIENT
Start: 2023-11-14 | End: 2023-11-14 | Stop reason: HOSPADM

## 2023-11-14 RX ORDER — LABETALOL HYDROCHLORIDE 5 MG/ML
10 INJECTION, SOLUTION INTRAVENOUS
Status: DISCONTINUED | OUTPATIENT
Start: 2023-11-14 | End: 2023-11-14 | Stop reason: HOSPADM

## 2023-11-14 RX ORDER — ONDANSETRON 2 MG/ML
4 INJECTION INTRAMUSCULAR; INTRAVENOUS EVERY 30 MIN PRN
Status: DISCONTINUED | OUTPATIENT
Start: 2023-11-14 | End: 2023-11-14 | Stop reason: HOSPADM

## 2023-11-14 RX ORDER — LIDOCAINE 40 MG/G
CREAM TOPICAL
Status: DISCONTINUED | OUTPATIENT
Start: 2023-11-14 | End: 2023-11-14 | Stop reason: HOSPADM

## 2023-11-14 RX ORDER — ACETAMINOPHEN 500 MG
1000 TABLET ORAL EVERY 6 HOURS PRN
COMMUNITY
Start: 2023-11-14 | End: 2024-09-24

## 2023-11-14 RX ORDER — DEXAMETHASONE SODIUM PHOSPHATE 4 MG/ML
INJECTION, SOLUTION INTRA-ARTICULAR; INTRALESIONAL; INTRAMUSCULAR; INTRAVENOUS; SOFT TISSUE PRN
Status: DISCONTINUED | OUTPATIENT
Start: 2023-11-14 | End: 2023-11-14

## 2023-11-14 RX ORDER — PROPOFOL 10 MG/ML
INJECTION, EMULSION INTRAVENOUS PRN
Status: DISCONTINUED | OUTPATIENT
Start: 2023-11-14 | End: 2023-11-14

## 2023-11-14 RX ORDER — ONDANSETRON 4 MG/1
4 TABLET, ORALLY DISINTEGRATING ORAL EVERY 30 MIN PRN
Status: DISCONTINUED | OUTPATIENT
Start: 2023-11-14 | End: 2023-11-14 | Stop reason: HOSPADM

## 2023-11-14 RX ORDER — PROPOFOL 10 MG/ML
INJECTION, EMULSION INTRAVENOUS CONTINUOUS PRN
Status: DISCONTINUED | OUTPATIENT
Start: 2023-11-14 | End: 2023-11-14

## 2023-11-14 RX ORDER — DIMENHYDRINATE 50 MG/ML
25 INJECTION, SOLUTION INTRAMUSCULAR; INTRAVENOUS
Status: DISCONTINUED | OUTPATIENT
Start: 2023-11-14 | End: 2023-11-14 | Stop reason: HOSPADM

## 2023-11-14 RX ORDER — OXYCODONE HYDROCHLORIDE 5 MG/1
5 TABLET ORAL
Status: DISCONTINUED | OUTPATIENT
Start: 2023-11-14 | End: 2023-11-14 | Stop reason: HOSPADM

## 2023-11-14 RX ORDER — LIDOCAINE HYDROCHLORIDE 20 MG/ML
INJECTION, SOLUTION INFILTRATION; PERINEURAL PRN
Status: DISCONTINUED | OUTPATIENT
Start: 2023-11-14 | End: 2023-11-14

## 2023-11-14 RX ORDER — ALBUTEROL SULFATE 0.83 MG/ML
2.5 SOLUTION RESPIRATORY (INHALATION) EVERY 4 HOURS PRN
Status: DISCONTINUED | OUTPATIENT
Start: 2023-11-14 | End: 2023-11-14 | Stop reason: HOSPADM

## 2023-11-14 RX ORDER — GLYCOPYRROLATE 0.2 MG/ML
INJECTION, SOLUTION INTRAMUSCULAR; INTRAVENOUS PRN
Status: DISCONTINUED | OUTPATIENT
Start: 2023-11-14 | End: 2023-11-14

## 2023-11-14 RX ORDER — OXYCODONE HYDROCHLORIDE 5 MG/1
10 TABLET ORAL
Status: DISCONTINUED | OUTPATIENT
Start: 2023-11-14 | End: 2023-11-14 | Stop reason: HOSPADM

## 2023-11-14 RX ORDER — BUPIVACAINE HYDROCHLORIDE 5 MG/ML
INJECTION, SOLUTION PERINEURAL PRN
Status: DISCONTINUED | OUTPATIENT
Start: 2023-11-14 | End: 2023-11-14 | Stop reason: HOSPADM

## 2023-11-14 RX ORDER — FENTANYL CITRATE 50 UG/ML
25 INJECTION, SOLUTION INTRAMUSCULAR; INTRAVENOUS EVERY 5 MIN PRN
Status: DISCONTINUED | OUTPATIENT
Start: 2023-11-14 | End: 2023-11-14 | Stop reason: HOSPADM

## 2023-11-14 RX ORDER — ONDANSETRON 2 MG/ML
INJECTION INTRAMUSCULAR; INTRAVENOUS PRN
Status: DISCONTINUED | OUTPATIENT
Start: 2023-11-14 | End: 2023-11-14

## 2023-11-14 RX ORDER — HYDROMORPHONE HCL IN WATER/PF 6 MG/30 ML
0.2 PATIENT CONTROLLED ANALGESIA SYRINGE INTRAVENOUS EVERY 5 MIN PRN
Status: DISCONTINUED | OUTPATIENT
Start: 2023-11-14 | End: 2023-11-14 | Stop reason: HOSPADM

## 2023-11-14 RX ORDER — OXYCODONE HYDROCHLORIDE 5 MG/1
5-10 TABLET ORAL EVERY 4 HOURS PRN
Qty: 16 TABLET | Refills: 0 | Status: SHIPPED | OUTPATIENT
Start: 2023-11-14 | End: 2023-12-13

## 2023-11-14 RX ORDER — HALOPERIDOL 5 MG/ML
1 INJECTION INTRAMUSCULAR
Status: DISCONTINUED | OUTPATIENT
Start: 2023-11-14 | End: 2023-11-14 | Stop reason: HOSPADM

## 2023-11-14 RX ORDER — CEFAZOLIN SODIUM/WATER 2 G/20 ML
2 SYRINGE (ML) INTRAVENOUS SEE ADMIN INSTRUCTIONS
Status: DISCONTINUED | OUTPATIENT
Start: 2023-11-14 | End: 2023-11-14 | Stop reason: HOSPADM

## 2023-11-14 RX ORDER — ACETAMINOPHEN 325 MG/1
650 TABLET ORAL
Status: DISCONTINUED | OUTPATIENT
Start: 2023-11-14 | End: 2023-11-14 | Stop reason: HOSPADM

## 2023-11-14 RX ORDER — ONDANSETRON 4 MG/1
4 TABLET, ORALLY DISINTEGRATING ORAL
Status: DISCONTINUED | OUTPATIENT
Start: 2023-11-14 | End: 2023-11-14 | Stop reason: HOSPADM

## 2023-11-14 RX ORDER — HYDRALAZINE HYDROCHLORIDE 20 MG/ML
2.5-5 INJECTION INTRAMUSCULAR; INTRAVENOUS EVERY 10 MIN PRN
Status: DISCONTINUED | OUTPATIENT
Start: 2023-11-14 | End: 2023-11-14 | Stop reason: HOSPADM

## 2023-11-14 RX ORDER — CEFAZOLIN SODIUM/WATER 2 G/20 ML
2 SYRINGE (ML) INTRAVENOUS
Status: COMPLETED | OUTPATIENT
Start: 2023-11-14 | End: 2023-11-14

## 2023-11-14 RX ORDER — FENTANYL CITRATE 50 UG/ML
50 INJECTION, SOLUTION INTRAMUSCULAR; INTRAVENOUS EVERY 5 MIN PRN
Status: DISCONTINUED | OUTPATIENT
Start: 2023-11-14 | End: 2023-11-14 | Stop reason: HOSPADM

## 2023-11-14 RX ORDER — ACETAMINOPHEN 325 MG/1
975 TABLET ORAL ONCE
Status: DISCONTINUED | OUTPATIENT
Start: 2023-11-14 | End: 2023-11-14 | Stop reason: HOSPADM

## 2023-11-14 RX ADMIN — PROPOFOL 100 MG: 10 INJECTION, EMULSION INTRAVENOUS at 07:40

## 2023-11-14 RX ADMIN — HYDROMORPHONE HYDROCHLORIDE 1 MG: 1 INJECTION, SOLUTION INTRAMUSCULAR; INTRAVENOUS; SUBCUTANEOUS at 07:40

## 2023-11-14 RX ADMIN — GLYCOPYRROLATE 0.2 MG: 0.2 INJECTION, SOLUTION INTRAMUSCULAR; INTRAVENOUS at 07:40

## 2023-11-14 RX ADMIN — PROPOFOL 75 MCG/KG/MIN: 10 INJECTION, EMULSION INTRAVENOUS at 07:40

## 2023-11-14 RX ADMIN — Medication 2 G: at 07:33

## 2023-11-14 RX ADMIN — FENTANYL CITRATE 100 MCG: 50 INJECTION INTRAMUSCULAR; INTRAVENOUS at 07:40

## 2023-11-14 RX ADMIN — ONDANSETRON 4 MG: 2 INJECTION INTRAMUSCULAR; INTRAVENOUS at 07:40

## 2023-11-14 RX ADMIN — SODIUM CHLORIDE, POTASSIUM CHLORIDE, SODIUM LACTATE AND CALCIUM CHLORIDE: 600; 310; 30; 20 INJECTION, SOLUTION INTRAVENOUS at 06:53

## 2023-11-14 RX ADMIN — LIDOCAINE HYDROCHLORIDE 50 MG: 20 INJECTION, SOLUTION INFILTRATION; PERINEURAL at 07:40

## 2023-11-14 RX ADMIN — Medication 100 MG: at 07:40

## 2023-11-14 RX ADMIN — DEXAMETHASONE SODIUM PHOSPHATE 8 MG: 4 INJECTION, SOLUTION INTRA-ARTICULAR; INTRALESIONAL; INTRAMUSCULAR; INTRAVENOUS; SOFT TISSUE at 07:40

## 2023-11-14 RX ADMIN — ROCURONIUM BROMIDE 10 MG: 50 INJECTION, SOLUTION INTRAVENOUS at 07:40

## 2023-11-14 ASSESSMENT — ACTIVITIES OF DAILY LIVING (ADL)
ADLS_ACUITY_SCORE: 23
ADLS_ACUITY_SCORE: 23
ADLS_ACUITY_SCORE: 25
ADLS_ACUITY_SCORE: 25

## 2023-11-14 ASSESSMENT — COPD QUESTIONNAIRES
COPD: 1
CAT_SEVERITY: MILD

## 2023-11-14 ASSESSMENT — ENCOUNTER SYMPTOMS: DYSRHYTHMIAS: 1

## 2023-11-14 ASSESSMENT — LIFESTYLE VARIABLES: TOBACCO_USE: 1

## 2023-11-14 NOTE — PROGRESS NOTES
New purple bruising noted to the right of incision; area palpate and soft.  Patient denies trouble breathing or pain.  Patient and daughter instructed to keep an eye on it and to return to the ER/call if bruising continues to expand or any signs of respiratory distress arise.  Family and patient verbalized understanding of above.

## 2023-11-14 NOTE — PROGRESS NOTES
Dr. Adams notified about patient's blood sugar of 192--no intervention or recheck needed per provider.

## 2023-11-14 NOTE — ANESTHESIA CARE TRANSFER NOTE
Patient: Gemma Cowart    Procedure: Procedure(s):  Left Thyroid Lobectomy and Autotransplant of Left Superior Parathyroid       Diagnosis: Thyroid nodule [E04.1]  Diagnosis Additional Information: No value filed.    Anesthesia Type:   General     Note:    Oropharynx: oropharynx clear of all foreign objects  Level of Consciousness: awake  Oxygen Supplementation: face mask  Level of Supplemental Oxygen (L/min / FiO2): 6 lpm  Independent Airway: airway patency satisfactory and stable  Dentition: dentition unchanged  Vital Signs Stable: post-procedure vital signs reviewed and stable  Report to RN Given: handoff report given  Patient transferred to: PACU  Comments: Patient oral suctioned. Patient with spontaneous respirations and adequate tidal volumes. Patient awake and responsive. Extubated in OR to 6L facemask. To PACU ventilating well. VSS. Report given.  Handoff Report: Identifed the Patient, Identified the Reponsible Provider, Reviewed the pertinent medical history, Discussed the surgical course, Reviewed Intra-OP anesthesia mangement and issues during anesthesia, Set expectations for post-procedure period and Allowed opportunity for questions and acknowledgement of understanding      Vitals:  Vitals Value Taken Time   /77 11/14/23 0939   Temp 96.9  F (36.1  C) 11/14/23 0939   Pulse 78 11/14/23 0939   Resp 16 11/14/23 0939   SpO2 100 % 11/14/23 0939       Electronically Signed By: ANTON March CRNA  November 14, 2023  9:40 AM

## 2023-11-14 NOTE — OP NOTE
General Surgery Operative Note    PREOPERATIVE DIAGNOSIS:  Thyroid nodule [E04.1]    POSTOPERATIVE DIAGNOSIS:  Same    PROCEDURE:   Left Thyroid Lobectomy, autotransplant of left superior parathyroid    ANESTHESIA:  General    PREOPERATIVE MEDICATIONS:  Ancef 2 gm    SURGEON:  Haley Garcia MD    ASSISTANT:  Blair Preston PA-C,  - the physician assistant was medically necessary in providing adequate exposure in the operating field, maintaining hemostasis, cutting suture, clamping and ligating blood vessels, and visualization of the anatomic structures throughout the surgical procedure.    ESTIMATED BLOOD LOSS:  25 ml    INDICATIONS:  Gemma Cowart is a 78 year old female with a left calcified thyroid nodule for ~20 years (seen on chest CTs since at least 2004).  It is stable in size at 1.4 cm.  A fine needle aspiration biopsy showed that it is suspicious for papillary thyroid carcinoma, so she is here today for left thyroid lobectomy, possible total thyroidectomy, possible central neck dissection.    PROCEDURE:    Gemma Cowart was placed supine on the operating room table and general endotracheal anesthesia was induced. The patient was then placed in cervical extension and the neck was prepped and draped in our usual sterile fashion. After a confirmatory pause was performed, a cervical collar incision was made sharply and then carried down through the subcutaneous tissues and platysma with cautery. Superior and inferior flaps were raised. The strap muscles were divided in the midline and the larynx and trachea were identified. The strap muscles were elevated off the left thyroid lobe bluntly and middle thyroid vein was identified and divided with LigaSure. The superior pole vessels were dissected and divided with Ligasure allowing the thyroid lobe to be rotated medially.  The superior parathyroid was identified on the posterior aspect of the superior pole and was dissected off.      The inferior  parathyroid was identified on the inferior thyroid capsule and was carefully dissected off. Deep to this, we identified the recurrent laryngeal nerve in its typical location.  Its function was confirmed using a nerve monitor. With the nerve in view, we ligated and divided the inferior pole vessels.  We followed the nerve cephalad, carfully  the thyroid away from it.  The thyroid was dissected off the trachea at the ligament of Ballesteros. The thyroid isthmus was divided with a ligasure device. The superior pole was marked with a suture and the specimen was passed off to pathology for review.      The left paratracheal space was inspected. The central neck lymph nodes were found to be grossly normal to palpation and visual inspection. The inferior parathyroid was well-vascularized, but the superior appeared to have a very diminutive pedicle, so we elected to autotransplant the left superior parathyroid. The left superior parathyroid was excised and sharply divided into ~ 1 ml fragments. The fascia of the left sternohyoid muscle was sharply opened and the parathyroid fragments were placed into pockets of muscle tissue. The fascia was closed with a figure-of-eight suture of 4-0 prolene with long tails for marking the pocket.     Frozen section was unable to be performed due to the thickness of the calcium shell around the nodule. The nodule could not be cut. Therefore, the pathologist is planning to perform dissolution of the calcium and then perform histologic exam. Therefore, we elected to stop the operation at this point in favor of waiting for final diagnosis.     At conclusion of the procedure, there was excellent recurrent laryngeal nerve signal. We irrigated with saline and ensured hemostasis with valsalva.  Fibrillar hemostatic agent was placed in the operative bed The midline fascia and platysma were closed with interrupted 3-0 vicryl suture and the skin was closed with running 4-0 subcuticular Monocryl.  A dry sterile dressing was placed and the patient was transferred to recovery in good condition.      INTRAOPERATIVE FINDINGS:    1.  Thyroid nodule with eggshell calcification. Unable to perform frozen section  2.  Left recurrent laryngeal nerve seen and preserved.  Function confirmed by nerve monitor.  3.  Left superior and inferior parathyroid glands seen. Inferior preserved in situ, Superior auto-transplanted into the left sternohyoid muscle.     Specimens:   ID Type Source Tests Collected by Time Destination   1 : Left Lobe Thyroid; Stitch marks superior Tissue Thyroid, left SURGICAL PATHOLOGY EXAM Haley Garcia MD 11/14/2023  8:52 AM        Haley Garcia MD

## 2023-11-14 NOTE — ANESTHESIA POSTPROCEDURE EVALUATION
Patient: Gemma Cowart    Procedure: Procedure(s):  Left Thyroid Lobectomy and Autotransplant of Left Superior Parathyroid       Anesthesia Type:  General    Note:  Disposition: Inpatient   Postop Pain Control: Uneventful            Sign Out: Well controlled pain   PONV: No   Neuro/Psych: Uneventful            Sign Out: Acceptable/Baseline neuro status   Airway/Respiratory: Uneventful            Sign Out: Acceptable/Baseline resp. status   CV/Hemodynamics: Uneventful            Sign Out: Acceptable CV status; No obvious hypovolemia; No obvious fluid overload   Other NRE: NONE   DID A NON-ROUTINE EVENT OCCUR? No           Last vitals:  Vitals Value Taken Time   /76 11/14/23 1021   Temp 96.9  F (36.1  C) 11/14/23 0939   Pulse 80 11/14/23 1027   Resp 0 11/14/23 1027   SpO2 94 % 11/14/23 1027   Vitals shown include unfiled device data.    Electronically Signed By: Eddie Adams MD  November 14, 2023  10:29 AM

## 2023-11-14 NOTE — DISCHARGE INSTRUCTIONS
HOME CARE FOLLOWING THYROIDECTOMY/LOBECTOMY  GUERRERO Elias      RESULTS:  Call the office regarding your final pathology report if you have not received your results after 2 full business days.     INCISIONAL CARE:  After removing the dressing, you may shower.  Do not submerse incision in water for 1 week.  Sutures will absorb and do not need to be removed.  Leave the steri-strips (white paper tapes) in place until they fall off, or remove at 2 weeks after surgery.  A lump/ridge under the incision is normal and will gradually resolve.    ACTIVITY:  Light Activity -- you may immediately be up and about as tolerated.  Driving -- you may drive when comfortable and off narcotic pain medications.  Light Work -- resume when comfortable off pain medications.  (If you can drive, you probably can work.)  Strenuous Work/Activity -- limit lifting to less than 10 pounds for 1 week.  Progressively increase with time.  Active Sports (running, biking, etc.) -- cautiously resume after 1 week.    DIET:  No restrictions.  Increased fluid intake is recommended. While taking pain medications, increase dietary fiber or add a fiber supplementation like Metamucil or Citrucel to help prevent constipation - a possible side effect of pain medications.    DISCOMFORT:  Use pain medications as prescribed by your surgeon.  Take the pain medication with some food, when possible, to minimize side effects.  Intermittent use of ice packs may help during the first 48 hours.  Expect gradual improvement.      RETURN APPOINTMENT:  Schedule a follow-up visit 2 weeks post-op (you may do this any time after surgery is scheduled).  Office Phone:  [phone]       CONTACT US IF THE FOLLOWING DEVELOPS:   1. A fever that is above 101     2. If there is a large amount of drainage, bleeding, or swelling.   3. Severe pain that is not relieved by your prescription.   4. Drainage that is thick, cloudy, yellow, green or white.   5. Any other  questions not answered by  Frequently Asked Questions  sheet.     GENERAL ANESTHESIA OR SEDATION ADULT DISCHARGE INSTRUCTIONS   SPECIAL PRECAUTIONS FOR 24 HOURS AFTER SURGERY    IT IS NOT UNUSUAL TO FEEL LIGHT-HEADED OR FAINT, UP TO 24 HOURS AFTER SURGERY OR WHILE TAKING PAIN MEDICATION.  IF YOU HAVE THESE SYMPTOMS; SIT FOR A FEW MINUTES BEFORE STANDING AND HAVE SOMEONE ASSIST YOU WHEN YOU GET UP TO WALK OR USE THE BATHROOM.    YOU SHOULD REST AND RELAX FOR THE NEXT 24 HOURS AND YOU MUST MAKE ARRANGEMENTS TO HAVE SOMEONE STAY WITH YOU FOR AT LEAST 24 HOURS AFTER YOUR DISCHARGE.  AVOID HAZARDOUS AND STRENUOUS ACTIVITIES.  DO NOT MAKE IMPORTANT DECISIONS FOR 24 HOURS.    DO NOT DRIVE ANY VEHICLE OR OPERATE MECHANICAL EQUIPMENT FOR 24 HOURS FOLLOWING THE END OF YOUR SURGERY.  EVEN THOUGH YOU MAY FEEL NORMAL, YOUR REACTIONS MAY BE AFFECTED BY THE MEDICATION YOU HAVE RECEIVED.    DO NOT DRINK ALCOHOLIC BEVERAGES FOR 24 HOURS FOLLOWING YOUR SURGERY.    DRINK CLEAR LIQUIDS (APPLE JUICE, GINGER ALE, 7-UP, BROTH, ETC.).  PROGRESS TO YOUR REGULAR DIET AS YOU FEEL ABLE.    YOU MAY HAVE A DRY MOUTH, A SORE THROAT, MUSCLES ACHES OR TROUBLE SLEEPING.  THESE SHOULD GO AWAY AFTER 24 HOURS.    CALL YOUR DOCTOR FOR ANY OF THE FOLLOWING:  SIGNS OF INFECTION (FEVER, GROWING TENDERNESS AT THE SURGERY SITE, A LARGE AMOUNT OF DRAINAGE OR BLEEDING, SEVERE PAIN, FOUL-SMELLING DRAINAGE, REDNESS OR SWELLING.    IT HAS BEEN OVER 8 TO 10 HOURS SINCE SURGERY AND YOU ARE STILL NOT ABLE TO URINATE (PASS WATER).      Maximum acetaminophen (Tylenol) dose from all sources should not exceed 4 grams (4000 mg) per day.

## 2023-11-14 NOTE — ANESTHESIA PROCEDURE NOTES
Airway       Patient location during procedure: OR       Procedure Start/Stop Times: 11/14/2023 7:40 AM  Staff -        Anesthesiologist:  Eddie Adams MD       CRNA: Loyd Bhagat APRN CRNA       Performed By: CRNA  Consent for Airway        Urgency: elective  Indications and Patient Condition       Indications for airway management: seamus-procedural       Induction type:intravenous       Mask difficulty assessment: 2 - vent by mask + OA or adjuvant +/- NMBA (edentulous patient; OA used to give structure for mask ventilation)    Final Airway Details       Final airway type: endotracheal airway       Successful airway: ETT - single and NIM  Endotracheal Airway Details        ETT size (mm): 7.0       Cuffed: yes       Cuff volume (mL): 4       Successful intubation technique: video laryngoscopy (DL not attempted; Glidescope used to confirm EMG monitoring tube placement)       VL Blade Size: Glidescope 3       Grade View of Cords: 1       Adjucts: stylet       Position: Right       Measured from: lips       Secured at (cm): 21       Bite block used: None    Post intubation assessment        Placement verified by: capnometry, equal breath sounds and chest rise        Number of attempts at approach: 1       Number of other approaches attempted: 0       Secured with: plastic tape       Ease of procedure: easy       Dentition: Intact and Unchanged    Medication(s) Administered   Medication Administration Time: 11/14/2023 7:40 AM

## 2023-11-14 NOTE — ANESTHESIA PREPROCEDURE EVALUATION
Anesthesia Pre-Procedure Evaluation    Patient: Gemma Cowart   MRN: [id number] : 1945        Procedure : Procedure(s):  Left Thyroid Lobectomy, Possible Total Thyroidectomy, Possible Central Neck Dissection          Past Medical History:   Diagnosis Date    Anemia     iron deficiency anemia    Anxiety     Breast cancer (H) 2012    Bilateral Masectomies 2012    Complications of gastric bypass surgery     COPD (chronic obstructive pulmonary disease) (H) 2021    Diabetes mellitus (H)     diet controlled    DVT (deep venous thrombosis) (H)     Eczema     Fracture of left knee region     patella - vertical    History of kidney stones     Obesity     Palpitation     with no treatment    Polyneuropathy     Pruritus     generalized    Rosacea     SVT (supraventricular tachycardia)     s/p ablation 2015 at [facility] for left lateral AP      Past Surgical History:   Procedure Laterality Date    ABDOMEN SURGERY      gastric bypass     COLONOSCOPY  10/11/2012    Procedure: COLONOSCOPY;  colonoscopy;  Surgeon: Gela Cleary MD;  Location:  GI    COLONOSCOPY N/A 10/6/2017    Procedure: COLONOSCOPY;;  Surgeon: Shashank Ortiz MD;  Location: [facility]    COLONOSCOPY N/A 3/7/2022    Procedure: COLONOSCOPY;  Surgeon: Andrea Daniel MD;  Location: [facility]    DENTAL SURGERY  2007    all teeth removed - dentures    ENT SURGERY      all teeth pulled    ESOPHAGOSCOPY, GASTROSCOPY, DUODENOSCOPY (EGD), COMBINED N/A 10/6/2017    Procedure: COMBINED ESOPHAGOSCOPY, GASTROSCOPY, DUODENOSCOPY (EGD);  ESOPHAGOSCOPY, GASTROSCOPY, DUODENOSCOPY (EGD) & Colonoscopy *Needs INR on arrival;  Surgeon: Shashank Ortiz MD;  Location:  GI    GYN SURGERY  1/10/75    tubal ligation     H ABLATION SVT  2015    stopped metoprolol    HERNIA REPAIR      IMPLANT FILTER INFERIOR VENA CAVA  10/8/2012    taken out  12    INSERT TISSUE EXPANDER BREAST BILATERAL  2012    Procedure: INSERT  TISSUE EXPANDER BREAST BILATERAL;;  Surgeon: Orlando Wall MD;  Location:  OR    MASTECTOMY SIMPLE, SENTINEL NODE, COMBINED  2012    Procedure: COMBINED MASTECTOMY SIMPLE, SENTINEL NODE;  BILATERAL MASTECTOMY WITH LEFT AXILLARY SENTINEL LYMPH NODE BIOPSY, BILATERAL TISSUE EXPANDER       MASTECTOMY, BILATERAL      PANNICULECTOMY N/A 2018    Procedure: PANNICULECTOMY;  PANNICULECTOMY ;  Surgeon: Orlando Wall MD;  Location:  OR    RECONSTRUCT BREAST BILATERAL, IMPLANT PROSTHESIS BILATERAL, COMBINED  2013    Procedure: COMBINED RECONSTRUCT BREAST BILATERAL, IMPLANT PROSTHESIS BILATERAL;  BILATERAL SECOND STAGE BREAST RECONSTRUCTION WITH SILICONE GEL IMPLANTS AND LIPOSUCTION;  Surgeon: Orlando Wall MD;  Location: [facility]    RECONSTRUCT NIPPLE BILATERAL  2013    Procedure: RECONSTRUCT NIPPLE BILATERAL;  BILATERAL NIPPLE AREOLAR RECONSTRUCTION;  Surgeon: Orlando Wall MD;  Location: [facility]    ZZHC BREAST RECONSTRUC W OTHR TECHNIQ  2013      Allergies   Allergen Reactions    Bacitracin Unknown    Dust Mites     Nickel       Social History     Tobacco Use    Smoking status: Former     Packs/day: 1.50     Years: 25.00     Additional pack years: 0.00     Total pack years: 37.50     Types: Cigarettes     Quit date: 10/21/1986     Years since quittin.0     Passive exposure: Never    Smokeless tobacco: Never    Tobacco comments:     quit    Substance Use Topics    Alcohol use: Not Currently      Wt Readings from Last 1 Encounters:   23 78.2 kg (172 lb 6.4 oz)        Anesthesia Evaluation   Pt has had prior anesthetic. Type: General.    No history of anesthetic complications       ROS/MED HX  ENT/Pulmonary:     (+)                tobacco use, Past use,  37  Pack-Year Hx,      mild,  COPD,              Neurologic:  - neg neurologic ROS     Cardiovascular: Comment: Hx of SVT and subsequent ablation    (+)  hypertension- -   -  - -   Taking blood thinners Pt has  received instructions: Instructions Given to patient: held 48 hours.                   dysrhythmias, Other,        Previous cardiac testing   Echo: Date: Results:    Stress Test:  Date: 2022 Results:     The nuclear stress test is negative for inducible myocardial ischemia or infarction. The left ventricular ejection fraction at stress is greater than 70%. Left ventricular function is normal.     There is no prior study for comparison.    ECG Reviewed:  Date: Results:    Cath:  Date: Results:      METS/Exercise Tolerance:     Hematologic:  - neg hematologic  ROS     Musculoskeletal:   (+)  arthritis,             GI/Hepatic:  - neg GI/hepatic ROS     Renal/Genitourinary:     (+) renal disease, type: CRI, Pt does not require dialysis,           Endo: Comment: Class 1 obesity    (+)  type II DM,   Not using insulin, - not using insulin pump.   Diabetic complications: nephropathy. thyroid problem, Thyroid disease - Other nodule for excision today,    Obesity,       Psychiatric/Substance Use:     (+) psychiatric history anxiety       Infectious Disease:  - neg infectious disease ROS     Malignancy:   (+) Malignancy, History of Breast.Breast CA Remission status post Surgery and Chemo.      Other:  - neg other ROS          Physical Exam    Airway        Mallampati: II   TM distance: > 3 FB   Neck ROM: full   Mouth opening: > 3 cm    Respiratory Devices and Support         Dental       (+) Edentulous      Cardiovascular          Rhythm and rate: regular and normal   (+) murmur       Pulmonary   pulmonary exam normal        breath sounds clear to auscultation       Other findings: Lab Test        08/08/23     11/17/22     07/29/22     10/27/21     07/24/21     07/24/21     07/16/21                       1310          0942          0933          1233          2301          2046          1247          WBC          4.8          4.1          4.6            < >         --          4.8           --           HGB          12.5          13.3         13.2           < >         --          10.0*         --           MCV          85           86           86             < >         --          79            --           PLT          170          190          190            < >         --          185           --           INR           --           --           --           --          2.18*        2.08*        3.5*           < > = values in this interval not displayed.                  Lab Test        11/14/23     11/10/23     08/08/23     05/23/23     05/23/23     11/17/22     10/24/22                       0559          1429          1310          1054          1054          0942          1015          NA            --           --          143           --          144           --          143           POTASSIUM     --           --          5.3           --          5.1           --          5.0           CHLORIDE      --           --          110*          --          109*          --          113*          CO2           --           --          24            --          23            --          24            BUN           --           --          30.5*         --          17.3          --          19            CR            --           --          1.63*         --          1.55*        1.35*        1.41*         ANIONGAP      --           --          9             --          12            --          6             LADI           --           --          9.1           --          9.6          8.9          8.6           GLC          100*         94           142*           < >        99            --          106*           < > = values in this interval not displayed.                        EKG Interpretation:   Sinus  Rhythm   WITHIN NORMAL LIMITS      OUTSIDE LABS:  CBC:   Lab Results   Component Value Date    WBC 4.8 08/08/2023    WBC 4.1 11/17/2022    HGB 12.5 08/08/2023    HGB 13.3 11/17/2022    HCT 40.8 08/08/2023    HCT 43.7  11/17/2022     08/08/2023     11/17/2022     BMP:   Lab Results   Component Value Date     08/08/2023     05/23/2023    POTASSIUM 5.3 08/08/2023    POTASSIUM 5.1 05/23/2023    CHLORIDE 110 (H) 08/08/2023    CHLORIDE 109 (H) 05/23/2023    CO2 24 08/08/2023    CO2 23 05/23/2023    BUN 30.5 (H) 08/08/2023    BUN 17.3 05/23/2023    CR 1.63 (H) 08/08/2023    CR 1.55 (H) 05/23/2023     (H) 11/14/2023    GLC 94 11/10/2023     COAGS:   Lab Results   Component Value Date    INR 2.18 (H) 07/24/2021     POC:   Lab Results   Component Value Date     (H) 10/06/2017     HEPATIC:   Lab Results   Component Value Date    ALBUMIN 4.1 08/08/2023    PROTTOTAL 6.2 (L) 08/08/2023    ALT 14 08/08/2023    AST 21 08/08/2023    ALKPHOS 69 08/08/2023    BILITOTAL 0.2 08/08/2023     OTHER:   Lab Results   Component Value Date    A1C 5.7 (H) 11/10/2023    LADI 9.1 08/08/2023    PHOS 3.6 11/17/2022    MAG 2.2 05/23/2023    TSH 2.22 05/23/2023    CRP <2.9 10/02/2017    SED 10 10/02/2017       Anesthesia Plan    ASA Status:  3    NPO Status:  NPO Appropriate    Anesthesia Type: General.     - Airway: ETT   Induction: Intravenous.   Maintenance: Balanced.   Techniques and Equipment:     - Airway: Video-Laryngoscope       Consents    Anesthesia Plan(s) and associated risks, benefits, and realistic alternatives discussed. Questions answered and patient/representative(s) expressed understanding.     - Discussed:     - Discussed with:  Patient      - Extended Intubation/Ventilatory Support Discussed: No.      - Patient is DNR/DNI Status: No     Use of blood products discussed: No .     Postoperative Care    Pain management: IV analgesics, Oral pain medications, Multi-modal analgesia.   PONV prophylaxis: Ondansetron (or other 5HT-3), Dexamethasone or Solumedrol, Background Propofol Infusion     Comments:                Eddie Adams MD

## 2023-11-17 LAB
PATH REPORT.COMMENTS IMP SPEC: ABNORMAL
PATH REPORT.COMMENTS IMP SPEC: YES
PATH REPORT.FINAL DX SPEC: ABNORMAL
PATH REPORT.GROSS SPEC: ABNORMAL
PATH REPORT.MICROSCOPIC SPEC OTHER STN: ABNORMAL
PATH REPORT.RELEVANT HX SPEC: ABNORMAL
PATHOLOGY SYNOPTIC REPORT: ABNORMAL
PHOTO IMAGE: ABNORMAL

## 2023-11-17 PROCEDURE — 88307 TISSUE EXAM BY PATHOLOGIST: CPT | Mod: 26 | Performed by: PATHOLOGY

## 2023-11-17 PROCEDURE — 88311 DECALCIFY TISSUE: CPT | Mod: 26 | Performed by: PATHOLOGY

## 2023-11-30 DIAGNOSIS — E11.69 TYPE 2 DIABETES MELLITUS WITH OTHER SPECIFIED COMPLICATION, WITHOUT LONG-TERM CURRENT USE OF INSULIN (H): ICD-10-CM

## 2023-11-30 RX ORDER — BLOOD-GLUCOSE METER
1 EACH MISCELLANEOUS ONCE
Qty: 1 KIT | Refills: 0 | Status: SHIPPED | OUTPATIENT
Start: 2023-11-30 | End: 2023-11-30

## 2023-11-30 NOTE — TELEPHONE ENCOUNTER
Patient called to ask if someone can call pharmacy in [facility] in [place] to request a pharmacy to pharmacy transfer for the patient.    RN called pharmacy to request this for patient. Pharmacy will start the process to transfer her prescriptions from [facility] to [facility].     RN called patient back to inform her that [facility] pharmacy will request the transfer. Prescription for monitoring system and lancets is still being reviewed at this time. Patient was given an opportunity to ask questions, verbalized understanding of plan, and is agreeable.      Rozina HARVEY RN on 11/30/2023 at 9:53 AM

## 2023-11-30 NOTE — TELEPHONE ENCOUNTER
Patient calling to request pharmacy change, records updated. Informed patient she can call pharmacy for them to initiate the pharmacy to pharmacy prescription change.     Requesting new meter device kit because she dropped hers and it has not been reliable. Also needs more lancets.     Was referred to the Verio device monitoring by [facility].     Medications not active on medication list:   Blood Glucose Monitoring Suppl (ONETOUCH VERIO FLEX SYSTEM) w/Device KIT   blood glucose (NO BRAND SPECIFIED) lancets standard       Meds and pharmacy pended.  Rozina HARVEY RN on 11/30/2023 at 9:16 AM

## 2023-12-01 ENCOUNTER — OFFICE VISIT (OUTPATIENT)
Dept: SURGERY | Facility: CLINIC | Age: 78
End: 2023-12-01
Payer: COMMERCIAL

## 2023-12-01 ENCOUNTER — TELEPHONE (OUTPATIENT)
Dept: OTOLARYNGOLOGY | Facility: CLINIC | Age: 78
End: 2023-12-01

## 2023-12-01 VITALS
OXYGEN SATURATION: 98 % | HEIGHT: 62 IN | HEART RATE: 55 BPM | BODY MASS INDEX: 31.73 KG/M2 | WEIGHT: 172.4 LBS | RESPIRATION RATE: 17 BRPM | SYSTOLIC BLOOD PRESSURE: 144 MMHG | DIASTOLIC BLOOD PRESSURE: 62 MMHG

## 2023-12-01 DIAGNOSIS — Z90.09 HISTORY OF LOBECTOMY OF THYROID: Primary | ICD-10-CM

## 2023-12-01 PROCEDURE — 99024 POSTOP FOLLOW-UP VISIT: CPT | Performed by: SURGERY

## 2023-12-01 NOTE — TELEPHONE ENCOUNTER
This encounter is being sent to inform the clinic that this patient has a referral from Haley Garcia MD  for the diagnoses of Hoarse voise since left thyroid lobectomy, evaluate vocal cord movement. History of lobectomy of thyroid [Z90.09]  and has requested that this patient be seen within 1-2 weeks and/or with any provider.  Based on the availability of our provider(s), we are unable to accommodate this request.    Were all sites offered this patient?  Yes    Does scheduling algorithm request to schedule next available?  Patient has been scheduled for the first available opening with Dr Brownlee on 5/13.  We have informed the patient that the clinic will review their referral and reach out if a sooner appointment is medically necessary.

## 2023-12-01 NOTE — TELEPHONE ENCOUNTER
FUTURE VISIT INFORMATION      FUTURE VISIT INFORMATION:  Date: 1/18/24  Time: 1pm  [place] [facility] [place]ERR[place] INFORMATION:  Referring provider:  Haley Garcia MD  Referring providers clinic:  [facility]  Reason for visit/diagnosis  Hoarse voise since left thyroid lobectomy, evaluate vocal cord movement.  History of lobectomy of thyroid [Z90.09]  ref by Haley Garcia MD  recs in [facility]  conf loc  sched w/pt     RECORDS REQUESTED FROM:       Clinic name Comments Records Status Imaging Status   [facility]   12/1/23- ov wHaley Aragon [facility]  11/14/23- Hospital Admission and left thyroid surgery  [facility]     Imaging  10/10/23- us head neck  Epic  Pacs

## 2023-12-01 NOTE — PROGRESS NOTES
"Progress Note/Post-op Follow up:  Gemma is here for follow up after left thyroid lobectomy and autotransplant of the left superior parathyroid 2.5 weeks ago. She reports good energy. Incisional discomfort is nearly resolved.    Physical Exam:  BP (!) 144/62   Pulse 55   Resp 17   Ht 1.575 m (5' 2\")   Wt 78.2 kg (172 lb 6.4 oz)   LMP 09/24/1990   SpO2 98%   BMI 31.53 kg/m    General:  Appears well, in no acute distress  Neck:  Incision site healing nicely, small, soft seroma beneath incision.             Range of motion is normal.  Neuro:  Voice is weak, though cough sounds strong    Pathology:  Pathology was reviewed with the patient.   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  I recommend a follow up with Dr. Haley Singh in the next few weeks for TSH check.  Dr. Singh and I have discussed the risks/benefits of completion thyroidectomy for Ms. Cowart and agree that lobectomy should be sufficient treatment for this 1.5 cm PTC confined to the thyroid. She should undergo FNA of the 6 mm nodule on the right which is TI-RADS 4.  The patient is still contemplating undergoing completion thyroidectomy. I would like to evaluate her voice further before agreeing to this. Her left RLN function was excellent during surgery, so I am surprised that she is hoarse today. Recommend laryngoscopy to evaluate for vocal cord movement and speech therapy consult.      We discussed use of sunscreen, scar cream, and scar massage to help the scar fade and soften over the next ~6 months.     I would like to see her back in 4-6 weeks to finalize plans after she has hopefully met with Dr. Singh, had her right thyroid FNA, and had her laryngoscopy.     Haley Garcia MD  Please route or send letter to:  Dr. De Leon " Samantha Foley

## 2023-12-04 ENCOUNTER — HOSPITAL ENCOUNTER (OUTPATIENT)
Dept: CARDIOLOGY | Facility: CLINIC | Age: 78
Discharge: HOME | End: 2023-12-04
Attending: PEDIATRICS | Admitting: PEDIATRICS
Payer: COMMERCIAL

## 2023-12-04 DIAGNOSIS — R01.1 HEART MURMUR: ICD-10-CM

## 2023-12-04 LAB — LVEF ECHO: NORMAL

## 2023-12-04 PROCEDURE — 93306 TTE W/DOPPLER COMPLETE: CPT

## 2023-12-04 PROCEDURE — 93306 TTE W/DOPPLER COMPLETE: CPT | Mod: 26 | Performed by: INTERNAL MEDICINE

## 2023-12-06 ENCOUNTER — TELEPHONE (OUTPATIENT)
Dept: OTOLARYNGOLOGY | Facility: CLINIC | Age: 78
End: 2023-12-06
Payer: COMMERCIAL

## 2023-12-06 NOTE — TELEPHONE ENCOUNTER
Left vm message for patient in regards to message about questions about upcoming appointment. Writers call back number provided on vm.

## 2023-12-06 NOTE — TELEPHONE ENCOUNTER
[facility] Call Center    Phone Message    May a detailed message be left on voicemail: yes     Reason for Call: Other: Pt would like to receive a call to discuss the details of her upcoming appt with Dr Rosario. Thanks.     Action Taken: Other: ENT    Travel Screening: Not Applicable

## 2023-12-08 ENCOUNTER — THERAPY VISIT (OUTPATIENT)
Dept: SPEECH THERAPY | Facility: CLINIC | Age: 78
End: 2023-12-08
Attending: SURGERY
Payer: COMMERCIAL

## 2023-12-08 DIAGNOSIS — Z90.09 HISTORY OF LOBECTOMY OF THYROID: ICD-10-CM

## 2023-12-08 DIAGNOSIS — R47.89 PROBLEM WITH VOICE PRODUCTION: ICD-10-CM

## 2023-12-08 DIAGNOSIS — R49.0 DYSPHONIA: Primary | ICD-10-CM

## 2023-12-08 PROCEDURE — 92507 TX SP LANG VOICE COMM INDIV: CPT | Mod: GN | Performed by: SPEECH-LANGUAGE PATHOLOGIST

## 2023-12-08 PROCEDURE — 92524 BEHAVRAL QUALIT ANALYS VOICE: CPT | Mod: GN | Performed by: SPEECH-LANGUAGE PATHOLOGIST

## 2023-12-08 NOTE — PROGRESS NOTES
SPEECH LANGUAGE PATHOLOGY EVALUATION    See electronic medical record for Abuse and Falls Screening details.    Subjective      Presenting condition or subjective complaint: Pt is a 78 y.o. female who was referred for an OP SLP Voice evaluation d/t vocal changes occuring post operatively on 11/14/23. Pt underwent left thyroid lobectomy and autotrnasplant of the left superior parathyroid. Pt denies pain with voicing or swallowing, denies dysphagia. She is able to vocalize for ~10 minutes before loosing her voice and then must rest for several hours.  Date of onset: 11/14/23    Relevant medical history:   Please see EMR.   Dates & types of surgery:  Please see EMR.    Patient goals for therapy: Communicate easily and effectively.    Pain assessment: Pain denied     Objective     PERSONAL RATING/VOICE USE  Avocational Voice Use: Conversation with family and friends.   Patient description of current voice quality: Breathy, soft.   Describe the amount of voice use required for your job:  retired     Describe the intensity of voice use required for your job:  retired.     Describe the amount of typical non-work voice use: very little    Describe the intensity of typical non-work voice use: soft and quiet, moderate   Voice Handicap Index Short Firm (VHI-10): 35/40    COUGH/THROAT CLEAR  Cough/throat clear characteristics: occasional   Cough/throat clear triggers in evaluation: voice use, minimally noted during this appt.       VOICE PROFILE DURING CONVERSATION (1 min monologue & paragraph reading)  Voice quality: breathy, airy, thin   Voice quality severity rating continuum: 2 - severe   Breath control: weak, shallow -Pt reports SOB prior to surgery w/ exertion possibly d/t COPD.   Breath control severity rating continuum: 6 - mild  Voice Use/Effort: pinched/squeezed larynx, contraction of neck muscles   Voice Use/Effort severity rating continuum: 5 - mild-moderate  Pitch/Frequency Description: pitch breaks    Pitch/Frequency severity rating continuum: 5 - mild-moderate  Average dB: 62 dB SPL  Volume: too quiet   Volume severity rating continuum: 3 - moderate-severe    VIBRATORY FUNCTION OF VOCAL FOLDS  Prolonged  ah  at mid pitch (sec):  60dB SPL for an average of 10 seconds  Vibratory Function of Vocal Folds Scale Norms: females 20 - 80 yrs: 10 - 22 secs       UNILATERAL LARYNX FUNCTION  Alternating head turns to right and left:  No change noted/reported.       COMPRESSION STRENGTH OF VOCAL FOLDS/LARYNX MUSCLES  Efficiency of increased volume: larynx - internal strain/pinch   Ease/Effort of loud/quiet volumes: quiet volume easier, both loud/quiet is difficult     FUNCTIONAL LENGTHENERS/SHORTENERS (CT & TA muscles)  Pitch glides: upper pitches more dysphonic, lower pitches more dysphonic   High/Low sentences: upper pitches more dysphonic, lower pitches more dysphonic   Ronnie juan a  (high/low): upper pitches more dysphonic, lower pitches more dysphonic    Assessment & Plan   CLINICAL IMPRESSIONS   Medical Diagnosis: History of lobectomy of thyroid (Z90.09) (Problems With Voice Production   hoarse voice since thyroid surgery on 11/14)    Treatment Diagnosis: Moderate to severe dysphonia   Impression/Assessment: Pt is a 78 year old female with vocal complaints. The following significant findings have been identified: impaired voicing, characterized by reduced volume, airy/breathy/hoarse/strained vocal quality, with impaired pitch range both high and low. Identified deficits interfere with their ability to communicate within the home or community as compared to previous level of function.    PLAN OF CARE  Treatment Interventions: Voice    Prognosis to achieve stated therapy goals is fair   Rehab potential is impacted by: comorbidities, pending future surgical intervention for thyroid carcinoma.     Long Term Goals   SLP Goal 1  Goal Identifier: Throat Relaxation  Goal Description: Pt will learn, demonstrate and impliment use  of 2-3 voice exercises (semi-occluded vocal tract, resonant voice, circumlaryngeal massage), given min cues from SLP to promote reduced laryngeal tension and irritation.  Rationale: To maximize functional communication within the home or community  Target Date: 02/06/24      Frequency of Treatment: 2x/month  Duration of Treatment: 2 months     Recommended Referrals to Other Professionals:  Pt scheduled with ENT  and speech language pathology consultation on 1/18/24, recommend Pt attend this appt.   Education Assessment:   Learner/Method: Patient;Listening;Reading;Demonstration;No Barriers to Learning    Risks and benefits of evaluation/treatment have been explained.   Patient/Family/caregiver agrees with Plan of Care.     Evaluation Time:    Voice Minutes (82222): 25     Present: Not applicable     Signing Clinician: SEEMA León    [facility]                                                                                   OUTPATIENT SPEECH LANGUAGE PATHOLOGY      PLAN OF TREATMENT FOR OUTPATIENT REHABILITATION   Patient's Last Name, First Name, EDMUNDOGemma Ramos YOB: 1945   Provider's Name   [facility]   Medical Record No.  [id number]     Onset Date: 11/14/23 Start of Care Date: 12/08/23     Medical Diagnosis:  History of lobectomy of thyroid (Z90.09) (Problems With Voice Production   hoarse voice since thyroid surgery on 11/14)      SLP Treatment Diagnosis: Moderate to severe dysphonia  Plan of Treatment  Frequency/Duration: 2x/month  / 2 months     Certification date from 12/08/23   To 02/06/24          See note for plan of treatment details and functional goals     SEEMA León                         I CERTIFY THE NEED FOR THESE SERVICES FURNISHED UNDER        THIS PLAN OF TREATMENT AND WHILE UNDER MY CARE     (Physician attestation of this document indicates review and certification of the therapy plan).               Referring Provider:  Haley Garcia    Initial Assessment  See Epic Evaluation- 12/08/23

## 2023-12-13 ENCOUNTER — OFFICE VISIT (OUTPATIENT)
Dept: PEDIATRICS | Facility: CLINIC | Age: 78
End: 2023-12-13
Payer: COMMERCIAL

## 2023-12-13 ENCOUNTER — VIRTUAL VISIT (OUTPATIENT)
Dept: ENDOCRINOLOGY | Facility: CLINIC | Age: 78
End: 2023-12-13
Payer: COMMERCIAL

## 2023-12-13 ENCOUNTER — TELEPHONE (OUTPATIENT)
Dept: ENDOCRINOLOGY | Facility: CLINIC | Age: 78
End: 2023-12-13

## 2023-12-13 VITALS
HEIGHT: 62 IN | OXYGEN SATURATION: 98 % | WEIGHT: 172 LBS | HEART RATE: 61 BPM | RESPIRATION RATE: 16 BRPM | DIASTOLIC BLOOD PRESSURE: 70 MMHG | BODY MASS INDEX: 31.65 KG/M2 | TEMPERATURE: 97.7 F | SYSTOLIC BLOOD PRESSURE: 150 MMHG

## 2023-12-13 DIAGNOSIS — J44.9 CHRONIC OBSTRUCTIVE PULMONARY DISEASE, UNSPECIFIED COPD TYPE (H): ICD-10-CM

## 2023-12-13 DIAGNOSIS — E11.69 TYPE 2 DIABETES MELLITUS WITH OTHER SPECIFIED COMPLICATION, WITHOUT LONG-TERM CURRENT USE OF INSULIN (H): ICD-10-CM

## 2023-12-13 DIAGNOSIS — C73 MALIGNANT NEOPLASM OF THYROID GLAND (H): Primary | ICD-10-CM

## 2023-12-13 DIAGNOSIS — F41.9 ANXIETY: ICD-10-CM

## 2023-12-13 DIAGNOSIS — Z00.01 ENCOUNTER FOR GENERAL ADULT MEDICAL EXAMINATION WITH ABNORMAL FINDINGS: Primary | ICD-10-CM

## 2023-12-13 DIAGNOSIS — Z86.718 PERSONAL HISTORY OF DVT (DEEP VEIN THROMBOSIS): ICD-10-CM

## 2023-12-13 DIAGNOSIS — Z29.11 NEED FOR VACCINATION AGAINST RESPIRATORY SYNCYTIAL VIRUS: ICD-10-CM

## 2023-12-13 PROCEDURE — 80061 LIPID PANEL: CPT | Performed by: PEDIATRICS

## 2023-12-13 PROCEDURE — 99397 PER PM REEVAL EST PAT 65+ YR: CPT | Mod: 24 | Performed by: PEDIATRICS

## 2023-12-13 PROCEDURE — 99214 OFFICE O/P EST MOD 30 MIN: CPT | Mod: VID | Performed by: INTERNAL MEDICINE

## 2023-12-13 PROCEDURE — 99207 PR FOOT EXAM NO CHARGE: CPT | Performed by: PEDIATRICS

## 2023-12-13 PROCEDURE — 36415 COLL VENOUS BLD VENIPUNCTURE: CPT | Performed by: PEDIATRICS

## 2023-12-13 PROCEDURE — 99214 OFFICE O/P EST MOD 30 MIN: CPT | Mod: 24 | Performed by: PEDIATRICS

## 2023-12-13 RX ORDER — A/SINGAPORE/GP1908/2015 IVR-180 (AN A/MICHIGAN/45/2015 (H1N1)PDM09-LIKE VIRUS, A/HONG KONG/4801/2014, NYMC X-263B (H3N2) (AN A/HONG KONG/4801/2014-LIKE VIRUS), AND B/BRISBANE/60/2008, WILD TYPE (A B/BRISBANE/60/2008-LIKE VIRUS) 15; 15; 15 UG/.5ML; UG/.5ML; UG/.5ML
INJECTION, SUSPENSION INTRAMUSCULAR
COMMUNITY
Start: 2023-10-05 | End: 2023-12-13

## 2023-12-13 RX ORDER — SERTRALINE HYDROCHLORIDE 100 MG/1
100 TABLET, FILM COATED ORAL DAILY
Qty: 90 TABLET | Refills: 1 | Status: SHIPPED | OUTPATIENT
Start: 2023-12-13 | End: 2024-04-19

## 2023-12-13 RX ORDER — MAGNESIUM 200 MG
2 TABLET ORAL
COMMUNITY
Start: 2023-12-04 | End: 2024-06-14

## 2023-12-13 RX ORDER — LANCETS 33 GAUGE
EACH MISCELLANEOUS
COMMUNITY
Start: 2023-12-01 | End: 2024-06-14

## 2023-12-13 RX ORDER — BLOOD-GLUCOSE METER
KIT MISCELLANEOUS
COMMUNITY
Start: 2023-12-08 | End: 2024-04-25

## 2023-12-13 RX ORDER — COVID-19 VACCINE, MRNA 0.23 MG/2.25ML
INJECTION, SUSPENSION INTRAMUSCULAR
COMMUNITY
Start: 2023-10-05 | End: 2024-01-23

## 2023-12-13 RX ORDER — ALBUTEROL SULFATE 90 UG/1
2 AEROSOL, METERED RESPIRATORY (INHALATION) EVERY 6 HOURS PRN
Qty: 18 G | Refills: 1 | Status: SHIPPED | OUTPATIENT
Start: 2023-12-13 | End: 2024-06-14

## 2023-12-13 RX ORDER — SIMVASTATIN 40 MG
TABLET ORAL
COMMUNITY
Start: 2023-12-01 | End: 2023-12-13

## 2023-12-13 RX ORDER — RESPIRATORY SYNCYTIAL VIRUS VACCINE 120MCG/0.5
0.5 KIT INTRAMUSCULAR ONCE
Qty: 1 EACH | Refills: 0 | Status: CANCELLED | OUTPATIENT
Start: 2023-12-13 | End: 2023-12-13

## 2023-12-13 ASSESSMENT — ENCOUNTER SYMPTOMS
NAUSEA: 0
HEARTBURN: 0
HEADACHES: 0
EYE PAIN: 0
NERVOUS/ANXIOUS: 0
JOINT SWELLING: 0
ABDOMINAL PAIN: 0
SORE THROAT: 0
FREQUENCY: 0
SHORTNESS OF BREATH: 1
DIARRHEA: 0
PALPITATIONS: 1
PARESTHESIAS: 0
ARTHRALGIAS: 0
HEMATURIA: 0
COUGH: 0
DYSURIA: 0
MYALGIAS: 0
DIZZINESS: 0
CONSTIPATION: 0
FEVER: 0
BREAST MASS: 0
WEAKNESS: 0
HEMATOCHEZIA: 0
CHILLS: 0

## 2023-12-13 ASSESSMENT — PAIN SCALES - GENERAL: PAINLEVEL: NO PAIN (0)

## 2023-12-13 ASSESSMENT — ACTIVITIES OF DAILY LIVING (ADL): CURRENT_FUNCTION: SHOPPING REQUIRES ASSISTANCE

## 2023-12-13 NOTE — TELEPHONE ENCOUNTER
[facility] Call Center    Phone Message    May a detailed message be left on voicemail: yes     Reason for Call: Other: Patient asking if Dr. Singh can call her on her on her phone for her appt at 3pm. Please call and advise.

## 2023-12-13 NOTE — LETTER
12/13/2023         RE: Gemma Cowart  [address] [place] [address] [place] [id number]        Dear Colleague,    Thank you for referring your patient, Gemma Cowart, to the [facility] [place]. Please see a copy of my visit note below.      Video-Visit Details    Type of service:  Video Visit  Video Start Time: 3:15  Video End Time: 335  Originating Location (pt. Location): [place], [place]  Distant Location (provider location):  Home  Platform used for Video Visit: Leeanne Singh MD    Gemma Cowart is a 78 year old year old female here for follow-up of hyperparathyroidism, thyroid nodule via a billable video visit. She was previously seen by me Jan 2023.    Subjective:  Gemma Cowart is a 78 year old female w/ PMHx of COPD, DM diet controlled, HLD, psoriasis, CKD stage 3 (told it was from metformin?), DVT previously on warfarin now on eliquis, breast cancer s/p masectomy (no chemo/rads) history of gastric bypass surgery found to have elevated PTH level in February here for follow-up.  Chief Complaint   Patient presents with     RECHECK     Other osteoporosis without current pathological fracture +1 more       INTERVAL HISTORY:  - L hemithyroidectomy 11/14- 1.5cmg unifocal papillary thyroid cancer  - C/b hoarseness- RLN sensation good throughout surgery. Working with speech therapy and has ENT visit pending in January  - Very insistent that she wants any cancer removed, no interest in watchful waiting    - Did not address osteoporosis or hypercalcemia on this call-- patient arrived 15 min late to visit. Will address at next visit.      Wt Readings from Last 10 Encounters:   12/13/23 78 kg (172 lb)   12/01/23 78.2 kg (172 lb 6.4 oz)   11/14/23 78.2 kg (172 lb 6.4 oz)   11/10/23 78.1 kg (172 lb 4 oz)   10/17/23 78.5 kg (173 lb)   08/08/23 78.7 kg (173 lb 6.4 oz)   05/23/23 79.7 kg (175 lb 12.8 oz)   11/29/22 84.8 kg (187 lb)   08/19/22 84.8 kg (187 lb)   08/04/22 86.2 kg (190 lb)     1) L  "Papillary Thyroid Cancer, 1.5cmg s/p hemithyroidectomy  - Seen on ultrasound 2008, patient does not remember this  - Ultrasound revealed densely calcified nodule 1.3x1.0x0.8  Maternal grandmother- large goiter that required multiple surgeries to remove  Denies any difficulty breathing, difficulty swallowing, shortness of breath, enlargement of neck/thyroid, family history of thyroid cancer, exposure to radiation, hoarseness or weight loss.     2) Elevated PTH, normal calcium level  HPI:  Found in October to have calcium level was low in assay but corrected for albumin into normal range. At subsequent labs, she had PTH level checked that was elevated to 143, prompting referral to endocrine. She was seen in April and recommended calcium supplementation to correct calcium level and then recheck. Patient did not take calcium supplementation because she saw \"TV doctor\" who said they are all chemicals and can't be absorbed by the body and recommended green leafy vegetables. She has been eating kale or spinach 4x weekly. Of note, she also had MRI for headaches and seen to have lytic lesions in bones concerning for metastatic dieseae. PET scan was negative with negative tumor markers.     Previous Fractures: toe remote, kneecap (~5 years ago)  Bone-specific therapy: none  Family History of Hip Fx: none, but mother and brother required hip replacement for OA  Hormone History (Menopause, Testosterone):   Steroids: none  PPIs: none  Antiepileptics: none  Anticoagulation: previous warfarin, current eliquis (clotted through warfarin, Leg)  Autoimmune disease: Psoriasis  CKD: CKD 3  Nephrolithiasis: once, approx 20 yrs ago  Smoking:  none  Alcohol: rare, last 2 years ago katy  Other risk factors: hx of breast cancer s/p mastectomy (found on mammogram), no chemo/rads  Ca/D: Vit D- 5000u daily (since February), no supplements, 3 glasses of whole milk daily  Exercise: minimal   Dental- no remaining, has full dentures    SPEP " neg 10/2021    2/1/2022-  Ca 8.7      11/17/2022- (after partial Ca supplement and Vit D)  Ca 8.9  Vit D 30  PTH 88 (ULN 65)    5/23/2023  Ca 9.6  PTH 68 (ULN 65)              Active diagnoses this visit:  Data Unavailable     ROS: 10 point ROS neg other than the symptoms noted above in the HPI.      Medical, surgical, social, and family histories, medications and allergies reviewed and updated.  Past Medical History:   Diagnosis Date     Anemia     iron deficiency anemia     Anxiety      Breast cancer (H) 2012    Bilateral Masectomies 9/2012     Complications of gastric bypass surgery      COPD (chronic obstructive pulmonary disease) (H) 04/21/2021     Diabetes mellitus (H)     diet controlled     DVT (deep venous thrombosis) (H)      Eczema      Fracture of left knee region     patella - vertical     History of kidney stones      Obesity      Palpitation     with no treatment     Polyneuropathy      Pruritus     generalized     Rosacea      SVT (supraventricular tachycardia)     s/p ablation 5/28/2015 at [facility] for left lateral AP       Past Surgical History:   Procedure Laterality Date     ABDOMEN SURGERY  2000    gastric bypass 2000     COLONOSCOPY  10/11/2012    Procedure: COLONOSCOPY;  colonoscopy;  Surgeon: Gela Cleary MD;  Location:  GI     COLONOSCOPY N/A 10/6/2017    Procedure: COLONOSCOPY;;  Surgeon: Shashank Ortiz MD;  Location:  GI     COLONOSCOPY N/A 3/7/2022    Procedure: COLONOSCOPY;  Surgeon: Andrea Daniel MD;  Location: [facility]     DENTAL SURGERY  5/2007    all teeth removed - dentures     ENT SURGERY      all teeth pulled     ESOPHAGOSCOPY, GASTROSCOPY, DUODENOSCOPY (EGD), COMBINED N/A 10/6/2017    Procedure: COMBINED ESOPHAGOSCOPY, GASTROSCOPY, DUODENOSCOPY (EGD);  ESOPHAGOSCOPY, GASTROSCOPY, DUODENOSCOPY (EGD) & Colonoscopy *Needs INR on arrival;  Surgeon: Shashank Oritz MD;  Location:  GI     GYN SURGERY  1/10/75    tubal ligation      H ABLATION SVT   2015    stopped metoprolol     HERNIA REPAIR       IMPLANT FILTER INFERIOR VENA CAVA  10/8/2012    taken out  12     INSERT TISSUE EXPANDER BREAST BILATERAL  2012    Procedure: INSERT TISSUE EXPANDER BREAST BILATERAL;;  Surgeon: Orlando Wall MD;  Location:  OR     MASTECTOMY SIMPLE, SENTINEL NODE, COMBINED  2012    Procedure: COMBINED MASTECTOMY SIMPLE, SENTINEL NODE;  BILATERAL MASTECTOMY WITH LEFT AXILLARY SENTINEL LYMPH NODE BIOPSY, BILATERAL TISSUE EXPANDER        MASTECTOMY, BILATERAL       PANNICULECTOMY N/A 2018    Procedure: PANNICULECTOMY;  PANNICULECTOMY ;  Surgeon: Orlando Wall MD;  Location:  OR     RECONSTRUCT BREAST BILATERAL, IMPLANT PROSTHESIS BILATERAL, COMBINED  2013    Procedure: COMBINED RECONSTRUCT BREAST BILATERAL, IMPLANT PROSTHESIS BILATERAL;  BILATERAL SECOND STAGE BREAST RECONSTRUCTION WITH SILICONE GEL IMPLANTS AND LIPOSUCTION;  Surgeon: Orlando Wall MD;  Location:  SD     RECONSTRUCT NIPPLE BILATERAL  2013    Procedure: RECONSTRUCT NIPPLE BILATERAL;  BILATERAL NIPPLE AREOLAR RECONSTRUCTION;  Surgeon: Orlando Wall MD;  Location:  SD     THYROIDECTOMY N/A 2023    Procedure: Left Thyroid Lobectomy and Autotransplant of Left Superior Parathyroid;  Surgeon: Haley Garcia MD;  Location:  OR     [facility] BREAST RECONSTRUC W OTHR TECHNIQ  2013     Allergies:  Bacitracin, Dust mites, and Nickel    Social History     Tobacco Use     Smoking status: Former     Packs/day: 1.50     Years: 25.00     Additional pack years: 0.00     Total pack years: 37.50     Types: Cigarettes     Quit date: 10/21/1986     Years since quittin.1     Passive exposure: Never     Smokeless tobacco: Never     Tobacco comments:     quit    Substance Use Topics     Alcohol use: Not Currently       Family History   Problem Relation Age of Onset     Cancer - colorectal Mother      Colon Cancer Mother      Prostate Cancer Father       Alzheimer Disease Father      Cancer Paternal Grandmother      Alzheimer Disease Paternal Grandfather      Cancer - colorectal Brother      Diabetes Brother      Crohn's Disease Daughter      Diabetes Brother      Eye Disorder Brother      Diabetes Son      Cancer Maternal Uncle      Cancer Maternal Uncle              Objective:  LMP 09/24/1990     Physical Exam (visual exam)  VS:  no vital signs taken for video visit  CONSTITUTIONAL: healthy, alert and NAD, responding appropriately  ENT: normocephalic, no visual evidence of trauma, normal nose and oral mucosa  EYES: conjunctivae and sclerae normal, no exophthalmos or proptosis  THYROID:  no visualized nodules or goiter  LUNGS: no audible wheeze, cough or visible cyanosis, no visible retractions or increased work of breathing  EXTREMITIES: no hand tremors  NEUROLOGY: cranial nerves grossly intact with no obvious deficit.  SKIN:  no visualized skin lesions or rash, no edema visualized  PSYCH: mentation appears normal, normal judgement        Lab Results   Component Value Date/Time    TSH 2.22 05/23/2023 10:54 AM    TSH 2.15 02/01/2022 08:50 AM    TSH 2.09 09/06/2019 11:43 AM    VITD25 5.0 (L) 05/19/2012 01:11 PM    PTHI 68 (H) 05/23/2023 10:54 AM     Last Comprehensive Metabolic Panel:  Sodium   Date Value Ref Range Status   08/08/2023 143 136 - 145 mmol/L Final   03/23/2021 143 133 - 144 mmol/L Final     Potassium   Date Value Ref Range Status   08/08/2023 5.3 3.4 - 5.3 mmol/L Final   10/24/2022 5.0 3.4 - 5.3 mmol/L Final   03/23/2021 4.9 3.4 - 5.3 mmol/L Final     Chloride   Date Value Ref Range Status   08/08/2023 110 (H) 98 - 107 mmol/L Final   10/24/2022 113 (H) 94 - 109 mmol/L Final   03/23/2021 114 (H) 94 - 109 mmol/L Final     Carbon Dioxide   Date Value Ref Range Status   03/23/2021 24 20 - 32 mmol/L Final     Carbon Dioxide (CO2)   Date Value Ref Range Status   08/08/2023 24 22 - 29 mmol/L Final   10/24/2022 24 20 - 32 mmol/L Final     Anion Gap   Date  Value Ref Range Status   08/08/2023 9 7 - 15 mmol/L Final   10/24/2022 6 3 - 14 mmol/L Final   03/23/2021 5 3 - 14 mmol/L Final     Glucose   Date Value Ref Range Status   10/24/2022 106 (H) 70 - 99 mg/dL Final   03/23/2021 96 70 - 99 mg/dL Final     GLUCOSE BY METER POCT   Date Value Ref Range Status   11/14/2023 192 (H) 70 - 99 mg/dL Final     Urea Nitrogen   Date Value Ref Range Status   08/08/2023 30.5 (H) 8.0 - 23.0 mg/dL Final   10/24/2022 19 7 - 30 mg/dL Final   03/23/2021 15 7 - 30 mg/dL Final     Creatinine   Date Value Ref Range Status   08/08/2023 1.63 (H) 0.51 - 0.95 mg/dL Final   03/23/2021 1.32 (H) 0.52 - 1.04 mg/dL Final     GFR Estimate   Date Value Ref Range Status   08/08/2023 32 (L) >60 mL/min/1.73m2 Final   03/23/2021 40 (L) >60 mL/min/[1.73_m2] Final     Comment:     Non  GFR Calc  Starting 12/18/2018, serum creatinine based estimated GFR (eGFR) will be   calculated using the Chronic Kidney Disease Epidemiology Collaboration   (CKD-EPI) equation.       GFR, ESTIMATED POCT   Date Value Ref Range Status   06/16/2022 36 (L) >60 mL/min/1.73m2 Final     Calcium   Date Value Ref Range Status   08/08/2023 9.1 8.8 - 10.2 mg/dL Final   03/23/2021 8.7 8.5 - 10.1 mg/dL Final     PET Scan 8/1/2022:     MRI Brain 7/25/2022:  FINDINGS: Mild parenchymal volume loss is present. Scattered  frontoparietal predominance and sara white matter T2 hyperintensities  likely represent chronic small vessel ischemic change. No evidence of  recent ischemia or hemorrhage.  .  Patchy areas of marrow enhancement is present involving the bilateral  frontal bones at the midline measuring approximately 1.1 cm.  Questionable intracranial extension.     Also demonstrates patchy areas of T2 hyperintense signal by T1  hypointense signal, and enhancement within the left frontal bone and  left parietal bone.     Patchy enhancement T1 hyperintense signal within the posterior right  parietal bone which could represent a  benign intraosseous cavernous  venous malformation.     Mild paranasal sinus mucosal thickening. The visualized tympanic and  mastoid cavities are unremarkable. Bilateral lens replacements.                                                                      IMPRESSION:  1. Patchy marrow signal abnormalities most consistent with osseous  metastatic disease.  2. No brain metastases are identified.  3. White matter T2 hyperintensities likely represent chronic small  vessel ischemic change.    DEXA 10/26/2022-   Lumbar Spine: L1-L4: BMD: 1.211 g/cm2. T-score: 0.1. Z-score: 1.3  RIGHT Hip Total: BMD: 0.705 g/cm2. T-score: -2.4. Z-score: -1.0  RIGHT Hip Femoral neck: BMD: 0.743 g/cm2. T-score: -2.1. Z-score: -0.6  LEFT Hip Total: BMD: 0.701 g/cm2. T-score: -2.4. Z-score: -1.1  LEFT Hip Femoral neck: BMD: 0.681 g/cm2. T-score: -2.6. Z-score: -1.0    ASSESSMENT / PLAN:  No diagnosis found.    1) Papillary Thyroid Cancer  2) L hemithyroidectomy  - R side with TIRADS 4 nodule, pending biopsy next week  - Patient insistent that if this is cancer, she wants it out. We discussed cancers are slow growing (her L cancer was present in 2008 at 1.3 x0.8x0.8-- has only grown by approx 1-2mm in every direction in 15 years... as an example) and her nodule would be candidate for watchful waiting as it is <1cm and no suspicious lymph nodes. Concern for further vocal cord injury or need for tracheostomy if completion thyroidectomy damages further. Patient strongly expresses she wants any cancer out, and understands all of this  - Will await results of FNA before having any further discussion.    - Check updated TFTs now    To address at next visit--  3) Elevated PTH, normal calcium  4) secondary hyperparathyroidism vs normocalcemic primary hyperparathyroidism  - Likely appropriately elevated in the setting of low calcium intake. - Nearly normalized with sufficent calcium intake. Remaining elevation may be secondary in setting of kidney  dysfunction. Has not completed 24hr urine calcium,  has BM with voiding and would not be able to separate out urine alone  - Strongly encouraged adequate calcium daily with 1200 to 1500 mg calcium supplement.  I also encouraged her to get any calcium in her diet that she can, she will make a stronger effort for this  - Discussed possible treatment otpions for hyperparathyroidism including surgery vs medical management, neither great options for her and she prefers to delay/avoid surgery if at all possible. She meets various surgical criteria based on low CrCl (prolonged, since 2015), remote history of nephrolithiasis (>20yr ago) and osteoporosis (see below). All are mild, and she prefers to work on minimizing complications rather than pursuing surgery, which is reasonable.   - For now,will focus on adequate calcium and osteoporosis treatment, continued adeuqate hydration.    5) Osteoporosis  - Defined based on T score of 2.6 at femoral neck.  - CrCl now borderline for reclast-- will recheck. If acceptable, proceed with reclast. She is hesitant to proceed with prolia given likely lifelong medication. Reviewed risk/benefit of prolia as well. Did not get reclast previously, will revisit at followup  Discussed 1/3 risk of flu symptoms (acute phase reaction) after treatment with IV zoledronic acid. Discussed risks of osteonecrosis of the jaw (1/200 after tooth extraction, 1/2500 spontaneous) and atypical femur fractures (1/1000) with chronic bisphosphonates. For every atypical femur fracture, 100 typical femur fractures are prevented.  - GFR 34 now    Surgical indications for hyperparathyroidsim  1) Serum calcium (>upper limit of normal): 1.0 mg/dL (0.25 mmol/L);  2) BMD by DXA: T-score =2.5 at lumbar spine, total hip, femoral neck, or distal 1/3 radius;  3) Vertebral fracture by x-ray, CT, MRI, or VFA;  4) Creatinine clearance <60 cc/min; 24-h urine for calcium >400 mg/d (>10 mmol/d) and increased stone risk by  biochemical stone risk analysis;  5) Presence of nephrolithiasis or nephrocalcinosis by x-ray, ultrasound, or CT;  6) Age <50 years    Return to clinic: 6 months    A total of 32 minutes were spent today 12/13/23 on this visit including chart review, history and counseling, documentation and other activities as detailed above.         Again, thank you for allowing me to participate in the care of your patient.        Sincerely,        Haley Singh MD

## 2023-12-13 NOTE — COMMUNITY RESOURCES LIST (ENGLISH)
12/13/2023   [facility]  N/A  For questions about this resource list or additional care needs, please contact your primary care clinic or care manager.  Phone: [phone]   Email: N/A   Address: [address] [place] [address]   Hours: N/A        Transportation       Free or low-cost transportation  1  [employer] Distance: 7.46 miles      [address] [place] [address]  Language: English, Trinidadian, Hmong, Filipino, Tunisian  Hours: Mon - Sun 5:00 AM - 10:00 PM  Fees: Self Pay   Phone: (821) 477-5054 Email: [email or web address] Website: https://[email or web address].info/Providers/[employer]/Transportation/1?pos=9     2  [facility] Circulator Bus Distance: 14.9 miles      In-Person   [address] [place] [address]  Language: English  Hours: Tue 9:00 AM - 2:00 PM  Fees: Self Pay   Phone: (718) 588-9643 Email: info@[employer].Virtual City Website: http://www.[employer].org/     Transportation to medical appointments  3  [facility] Service - [employer] Distance: 1.36 miles      In-Person   [address] [place] [address]  Language: English  Fees: Free   Phone: (731) 476-6268 Email: info@[email or web address] [email or web address] Website: http://[email or web address] [email or web address].org     4  [employer] Distance: 7.46 miles      [address] [place] [address]  Language: English, Trinidadian, Hmong, Filipino, Tunisian  Hours: Mon - Sun 5:00 AM - 10:00 PM  Fees: Insurance, Self Pay   Phone: (691) 945-5990 Email: [email or web address] Website: https://www.[place].info/Providers/[employer]/Transportation/1?pos=9          Important Numbers & Websites       Emergency Services   911  City Services   311  Poison Control   (676) 462-1984  Suicide Prevention Lifeline   (808) 590-8533 (TALK)  Child Abuse Hotline   (507) 968-2705 (4-A-Child)  Sexual Assault Hotline   (649) 466-3572 (HOPE)  National  Runaway Safeline   (982) 434-9957 (RUNAWAY)  All-Options Talkline   (863) 927-6294  Substance Abuse Referral   (273) 522-8621 (HELP)

## 2023-12-13 NOTE — Clinical Note
I saw her today-- she's very insistent on completion thyroidectomy if bx returns positive. We discussed this extensively, and said we could regroup in January after FNA and ENT eval.   I'm going to be out on maternity leave (I'm due 12/22)-- but when all this comes back please reach out to my cell phone so I can take a look/we can talk. I told Gemma I would look at it myself (which I'm happy to do)

## 2023-12-13 NOTE — PROGRESS NOTES
Video-Visit Details    Type of service:  Video Visit  Video Start Time: 3:15  Video End Time: 335  Originating Location (pt. Location): [place], [place]  Distant Location (provider location):  Home  Platform used for Video Visit: Leeanne Singh MD    Gemma Cowart is a 78 year old year old female here for follow-up of hyperparathyroidism, thyroid nodule via a billable video visit. She was previously seen by me Jan 2023.    Subjective:  Gemma Cowart is a 78 year old female w/ PMHx of COPD, DM diet controlled, HLD, psoriasis, CKD stage 3 (told it was from metformin?), DVT previously on warfarin now on eliquis, breast cancer s/p masectomy (no chemo/rads) history of gastric bypass surgery found to have elevated PTH level in February here for follow-up.  Chief Complaint   Patient presents with    RECHECK     Other osteoporosis without current pathological fracture +1 more       INTERVAL HISTORY:  - L hemithyroidectomy 11/14- 1.5cmg unifocal papillary thyroid cancer  - C/b hoarseness- RLN sensation good throughout surgery. Working with speech therapy and has ENT visit pending in January  - Very insistent that she wants any cancer removed, no interest in watchful waiting    - Did not address osteoporosis or hypercalcemia on this call-- patient arrived 15 min late to visit. Will address at next visit.      Wt Readings from Last 10 Encounters:   12/13/23 78 kg (172 lb)   12/01/23 78.2 kg (172 lb 6.4 oz)   11/14/23 78.2 kg (172 lb 6.4 oz)   11/10/23 78.1 kg (172 lb 4 oz)   10/17/23 78.5 kg (173 lb)   08/08/23 78.7 kg (173 lb 6.4 oz)   05/23/23 79.7 kg (175 lb 12.8 oz)   11/29/22 84.8 kg (187 lb)   08/19/22 84.8 kg (187 lb)   08/04/22 86.2 kg (190 lb)     1) L Papillary Thyroid Cancer, 1.5cmg s/p hemithyroidectomy  - Seen on ultrasound 2008, patient does not remember this  - Ultrasound revealed densely calcified nodule 1.3x1.0x0.8  Maternal grandmother- large goiter that required multiple surgeries to remove  Denies  "any difficulty breathing, difficulty swallowing, shortness of breath, enlargement of neck/thyroid, family history of thyroid cancer, exposure to radiation, hoarseness or weight loss.     2) Elevated PTH, normal calcium level  HPI:  Found in October to have calcium level was low in assay but corrected for albumin into normal range. At subsequent labs, she had PTH level checked that was elevated to 143, prompting referral to endocrine. She was seen in April and recommended calcium supplementation to correct calcium level and then recheck. Patient did not take calcium supplementation because she saw \"TV doctor\" who said they are all chemicals and can't be absorbed by the body and recommended green leafy vegetables. She has been eating kale or spinach 4x weekly. Of note, she also had MRI for headaches and seen to have lytic lesions in bones concerning for metastatic dieseae. PET scan was negative with negative tumor markers.     Previous Fractures: toe remote, kneecap (~5 years ago)  Bone-specific therapy: none  Family History of Hip Fx: none, but mother and brother required hip replacement for OA  Hormone History (Menopause, Testosterone):   Steroids: none  PPIs: none  Antiepileptics: none  Anticoagulation: previous warfarin, current eliquis (clotted through warfarin, Leg)  Autoimmune disease: Psoriasis  CKD: CKD 3  Nephrolithiasis: once, approx 20 yrs ago  Smoking:  none  Alcohol: rare, last 2 years ago katy  Other risk factors: hx of breast cancer s/p mastectomy (found on mammogram), no chemo/rads  Ca/D: Vit D- 5000u daily (since February), no supplements, 3 glasses of whole milk daily  Exercise: minimal   Dental- no remaining, has full dentures    SPEP neg 10/2021    2/1/2022-  Ca 8.7      11/17/2022- (after partial Ca supplement and Vit D)  Ca 8.9  Vit D 30  PTH 88 (ULN 65)    5/23/2023  Ca 9.6  PTH 68 (ULN 65)              Active diagnoses this visit:  Data Unavailable     ROS: 10 point ROS neg other " than the symptoms noted above in the HPI.      Medical, surgical, social, and family histories, medications and allergies reviewed and updated.  Past Medical History:   Diagnosis Date    Anemia     iron deficiency anemia    Anxiety     Breast cancer (H) 2012    Bilateral Masectomies 9/2012    Complications of gastric bypass surgery     COPD (chronic obstructive pulmonary disease) (H) 04/21/2021    Diabetes mellitus (H)     diet controlled    DVT (deep venous thrombosis) (H)     Eczema     Fracture of left knee region     patella - vertical    History of kidney stones     Obesity     Palpitation     with no treatment    Polyneuropathy     Pruritus     generalized    Rosacea     SVT (supraventricular tachycardia)     s/p ablation 5/28/2015 at [facility] for left lateral AP       Past Surgical History:   Procedure Laterality Date    ABDOMEN SURGERY  2000    gastric bypass 2000    COLONOSCOPY  10/11/2012    Procedure: COLONOSCOPY;  colonoscopy;  Surgeon: Gela Cleary MD;  Location:  GI    COLONOSCOPY N/A 10/6/2017    Procedure: COLONOSCOPY;;  Surgeon: Shashank Ortiz MD;  Location:  GI    COLONOSCOPY N/A 3/7/2022    Procedure: COLONOSCOPY;  Surgeon: Andrea Daniel MD;  Location:  GI    DENTAL SURGERY  5/2007    all teeth removed - dentures    ENT SURGERY      all teeth pulled    ESOPHAGOSCOPY, GASTROSCOPY, DUODENOSCOPY (EGD), COMBINED N/A 10/6/2017    Procedure: COMBINED ESOPHAGOSCOPY, GASTROSCOPY, DUODENOSCOPY (EGD);  ESOPHAGOSCOPY, GASTROSCOPY, DUODENOSCOPY (EGD) & Colonoscopy *Needs INR on arrival;  Surgeon: Shashank Ortiz MD;  Location:  GI    GYN SURGERY  1/10/75    tubal ligation     H ABLATION SVT  5/28/2015    stopped metoprolol    HERNIA REPAIR  2005    IMPLANT FILTER INFERIOR VENA CAVA  10/8/2012    taken out  1/4/12    INSERT TISSUE EXPANDER BREAST BILATERAL  9/5/2012    Procedure: INSERT TISSUE EXPANDER BREAST BILATERAL;;  Surgeon: Orlando Wall MD;  Location:  OR     MASTECTOMY SIMPLE, SENTINEL NODE, COMBINED  2012    Procedure: COMBINED MASTECTOMY SIMPLE, SENTINEL NODE;  BILATERAL MASTECTOMY WITH LEFT AXILLARY SENTINEL LYMPH NODE BIOPSY, BILATERAL TISSUE EXPANDER       MASTECTOMY, BILATERAL      PANNICULECTOMY N/A 2018    Procedure: PANNICULECTOMY;  PANNICULECTOMY ;  Surgeon: Orlando Wall MD;  Location:  OR    RECONSTRUCT BREAST BILATERAL, IMPLANT PROSTHESIS BILATERAL, COMBINED  2013    Procedure: COMBINED RECONSTRUCT BREAST BILATERAL, IMPLANT PROSTHESIS BILATERAL;  BILATERAL SECOND STAGE BREAST RECONSTRUCTION WITH SILICONE GEL IMPLANTS AND LIPOSUCTION;  Surgeon: Orlando Wall MD;  Location:  SD    RECONSTRUCT NIPPLE BILATERAL  2013    Procedure: RECONSTRUCT NIPPLE BILATERAL;  BILATERAL NIPPLE AREOLAR RECONSTRUCTION;  Surgeon: Orlando Wall MD;  Location:  SD    THYROIDECTOMY N/A 2023    Procedure: Left Thyroid Lobectomy and Autotransplant of Left Superior Parathyroid;  Surgeon: Haley Garcia MD;  Location:  OR    ZZ BREAST RECONSTRUC W OTHR TECHNIQ  2013     Allergies:  Bacitracin, Dust mites, and Nickel    Social History     Tobacco Use    Smoking status: Former     Packs/day: 1.50     Years: 25.00     Additional pack years: 0.00     Total pack years: 37.50     Types: Cigarettes     Quit date: 10/21/1986     Years since quittin.1     Passive exposure: Never    Smokeless tobacco: Never    Tobacco comments:     quit 1985   Substance Use Topics    Alcohol use: Not Currently       Family History   Problem Relation Age of Onset    Cancer - colorectal Mother     Colon Cancer Mother     Prostate Cancer Father     Alzheimer Disease Father     Cancer Paternal Grandmother     Alzheimer Disease Paternal Grandfather     Cancer - colorectal Brother     Diabetes Brother     Crohn's Disease Daughter     Diabetes Brother     Eye Disorder Brother     Diabetes Son     Cancer Maternal Uncle     Cancer Maternal Uncle               Objective:  LMP 09/24/1990     Physical Exam (visual exam)  VS:  no vital signs taken for video visit  CONSTITUTIONAL: healthy, alert and NAD, responding appropriately  ENT: normocephalic, no visual evidence of trauma, normal nose and oral mucosa  EYES: conjunctivae and sclerae normal, no exophthalmos or proptosis  THYROID:  no visualized nodules or goiter  LUNGS: no audible wheeze, cough or visible cyanosis, no visible retractions or increased work of breathing  EXTREMITIES: no hand tremors  NEUROLOGY: cranial nerves grossly intact with no obvious deficit.  SKIN:  no visualized skin lesions or rash, no edema visualized  PSYCH: mentation appears normal, normal judgement        Lab Results   Component Value Date/Time    TSH 2.22 05/23/2023 10:54 AM    TSH 2.15 02/01/2022 08:50 AM    TSH 2.09 09/06/2019 11:43 AM    VITD25 5.0 (L) 05/19/2012 01:11 PM    PTHI 68 (H) 05/23/2023 10:54 AM     Last Comprehensive Metabolic Panel:  Sodium   Date Value Ref Range Status   08/08/2023 143 136 - 145 mmol/L Final   03/23/2021 143 133 - 144 mmol/L Final     Potassium   Date Value Ref Range Status   08/08/2023 5.3 3.4 - 5.3 mmol/L Final   10/24/2022 5.0 3.4 - 5.3 mmol/L Final   03/23/2021 4.9 3.4 - 5.3 mmol/L Final     Chloride   Date Value Ref Range Status   08/08/2023 110 (H) 98 - 107 mmol/L Final   10/24/2022 113 (H) 94 - 109 mmol/L Final   03/23/2021 114 (H) 94 - 109 mmol/L Final     Carbon Dioxide   Date Value Ref Range Status   03/23/2021 24 20 - 32 mmol/L Final     Carbon Dioxide (CO2)   Date Value Ref Range Status   08/08/2023 24 22 - 29 mmol/L Final   10/24/2022 24 20 - 32 mmol/L Final     Anion Gap   Date Value Ref Range Status   08/08/2023 9 7 - 15 mmol/L Final   10/24/2022 6 3 - 14 mmol/L Final   03/23/2021 5 3 - 14 mmol/L Final     Glucose   Date Value Ref Range Status   10/24/2022 106 (H) 70 - 99 mg/dL Final   03/23/2021 96 70 - 99 mg/dL Final     GLUCOSE BY METER POCT   Date Value Ref Range Status    11/14/2023 192 (H) 70 - 99 mg/dL Final     Urea Nitrogen   Date Value Ref Range Status   08/08/2023 30.5 (H) 8.0 - 23.0 mg/dL Final   10/24/2022 19 7 - 30 mg/dL Final   03/23/2021 15 7 - 30 mg/dL Final     Creatinine   Date Value Ref Range Status   08/08/2023 1.63 (H) 0.51 - 0.95 mg/dL Final   03/23/2021 1.32 (H) 0.52 - 1.04 mg/dL Final     GFR Estimate   Date Value Ref Range Status   08/08/2023 32 (L) >60 mL/min/1.73m2 Final   03/23/2021 40 (L) >60 mL/min/[1.73_m2] Final     Comment:     Non  GFR Calc  Starting 12/18/2018, serum creatinine based estimated GFR (eGFR) will be   calculated using the Chronic Kidney Disease Epidemiology Collaboration   (CKD-EPI) equation.       GFR, ESTIMATED POCT   Date Value Ref Range Status   06/16/2022 36 (L) >60 mL/min/1.73m2 Final     Calcium   Date Value Ref Range Status   08/08/2023 9.1 8.8 - 10.2 mg/dL Final   03/23/2021 8.7 8.5 - 10.1 mg/dL Final     PET Scan 8/1/2022:     MRI Brain 7/25/2022:  FINDINGS: Mild parenchymal volume loss is present. Scattered  frontoparietal predominance and sara white matter T2 hyperintensities  likely represent chronic small vessel ischemic change. No evidence of  recent ischemia or hemorrhage.  .  Patchy areas of marrow enhancement is present involving the bilateral  frontal bones at the midline measuring approximately 1.1 cm.  Questionable intracranial extension.     Also demonstrates patchy areas of T2 hyperintense signal by T1  hypointense signal, and enhancement within the left frontal bone and  left parietal bone.     Patchy enhancement T1 hyperintense signal within the posterior right  parietal bone which could represent a benign intraosseous cavernous  venous malformation.     Mild paranasal sinus mucosal thickening. The visualized tympanic and  mastoid cavities are unremarkable. Bilateral lens replacements.                                                                      IMPRESSION:  1. Patchy marrow signal  abnormalities most consistent with osseous  metastatic disease.  2. No brain metastases are identified.  3. White matter T2 hyperintensities likely represent chronic small  vessel ischemic change.    DEXA 10/26/2022-   Lumbar Spine: L1-L4: BMD: 1.211 g/cm2. T-score: 0.1. Z-score: 1.3  RIGHT Hip Total: BMD: 0.705 g/cm2. T-score: -2.4. Z-score: -1.0  RIGHT Hip Femoral neck: BMD: 0.743 g/cm2. T-score: -2.1. Z-score: -0.6  LEFT Hip Total: BMD: 0.701 g/cm2. T-score: -2.4. Z-score: -1.1  LEFT Hip Femoral neck: BMD: 0.681 g/cm2. T-score: -2.6. Z-score: -1.0    ASSESSMENT / PLAN:  No diagnosis found.    1) Papillary Thyroid Cancer  2) L hemithyroidectomy  - R side with TIRADS 4 nodule, pending biopsy next week  - Patient insistent that if this is cancer, she wants it out. We discussed cancers are slow growing (her L cancer was present in 2008 at 1.3 x0.8x0.8-- has only grown by approx 1-2mm in every direction in 15 years... as an example) and her nodule would be candidate for watchful waiting as it is <1cm and no suspicious lymph nodes. Concern for further vocal cord injury or need for tracheostomy if completion thyroidectomy damages further. Patient strongly expresses she wants any cancer out, and understands all of this  - Will await results of FNA before having any further discussion.    - Check updated TFTs now    To address at next visit--  3) Elevated PTH, normal calcium  4) secondary hyperparathyroidism vs normocalcemic primary hyperparathyroidism  - Likely appropriately elevated in the setting of low calcium intake. - Nearly normalized with sufficent calcium intake. Remaining elevation may be secondary in setting of kidney dysfunction. Has not completed 24hr urine calcium,  has BM with voiding and would not be able to separate out urine alone  - Strongly encouraged adequate calcium daily with 1200 to 1500 mg calcium supplement.  I also encouraged her to get any calcium in her diet that she can, she will make a  stronger effort for this  - Discussed possible treatment otpions for hyperparathyroidism including surgery vs medical management, neither great options for her and she prefers to delay/avoid surgery if at all possible. She meets various surgical criteria based on low CrCl (prolonged, since 2015), remote history of nephrolithiasis (>20yr ago) and osteoporosis (see below). All are mild, and she prefers to work on minimizing complications rather than pursuing surgery, which is reasonable.   - For now,will focus on adequate calcium and osteoporosis treatment, continued adeuqate hydration.    5) Osteoporosis  - Defined based on T score of 2.6 at femoral neck.  - CrCl now borderline for reclast-- will recheck. If acceptable, proceed with reclast. She is hesitant to proceed with prolia given likely lifelong medication. Reviewed risk/benefit of prolia as well. Did not get reclast previously, will revisit at followup  Discussed 1/3 risk of flu symptoms (acute phase reaction) after treatment with IV zoledronic acid. Discussed risks of osteonecrosis of the jaw (1/200 after tooth extraction, 1/2500 spontaneous) and atypical femur fractures (1/1000) with chronic bisphosphonates. For every atypical femur fracture, 100 typical femur fractures are prevented.  - GFR 34 now    Surgical indications for hyperparathyroidsim  1) Serum calcium (>upper limit of normal): 1.0 mg/dL (0.25 mmol/L);  2) BMD by DXA: T-score ?2.5 at lumbar spine, total hip, femoral neck, or distal 1/3 radius;  3) Vertebral fracture by x-ray, CT, MRI, or VFA;  4) Creatinine clearance <60 cc/min; 24-h urine for calcium >400 mg/d (>10 mmol/d) and increased stone risk by biochemical stone risk analysis;  5) Presence of nephrolithiasis or nephrocalcinosis by x-ray, ultrasound, or CT;  6) Age <50 years    Return to clinic: 6 months    A total of 32 minutes were spent today 12/13/23 on this visit including chart review, history and counseling, documentation and other  activities as detailed above.

## 2023-12-13 NOTE — PATIENT INSTRUCTIONS
Next time you are at your [facility], please check your blood pressure and call us with the value. We may need to start a blood pressure medication if this remains high.    If your LDL cholesterol is >70, then I will start rosuvastatin.    Follow-up with me in 6 months.    ______________________    Thank you for choosing [facility] for your care.  Dr. Foley is your primary care physician (PCP). She will see you yearly for your in person physical. FRANCESCA Jackson is her partner who will see you for follow up care and/or other acute needs.  We look forward to working together to keep you healthy!    MD Tram Peña PA      _____________________    Currently [facility] are providing and scheduling RSV vaccines at our pharmacies and clinics. The pharmacy is the recommended location for RSV vaccination for patients on Medicare insurance plans.  We are providing RSV vaccines for the following patients:     RSV vaccine  Patients 60+ years*  Pregnant patients at 32 to 36 weeks' gestation    *It is recommended that patients on Medicare insurance plans receive their RSV vaccine in the pharmacy.    Additional Information about RSV, RSV vaccines, and RSV monoclonal antibodies   Respiratory Syncytial Virus (RSV) is a common respiratory illness that typically causes mild, cold-like symptoms. However, RSV can be serious for infants and older adults. RSV causes approximately 60,000-160,000 hospitalizations and 6,000-10,000 deaths among adults 65 years and older. RSV is also the leading cause of hospitalization for infants. Nearly all children are infected with RSV by the age of 2 years.    Prevention of RSV through vaccination (adults) or monoclonal antibody (infants) is essential as there is no treatment (medications) for RSV after someone is infected.    RSV vaccine 60+ years  Patients 60 years old are eligible for RSV that desire vaccination  Medical conditions that are associated with  increased risk of severe RSV disease are:  Chronic lung disease (e.g. COPD, asthma)  Heart disease (e.g. CHD, CAD)  Chronic or progressive neurological conditions  Chronic kidney disease  Chronic liver disease  Diabetes  Moderate or severe immunocompromise  Chronic blood disorders  Frailty  Living in a nursing home or long term care facility     RSV vaccine in pregnancy  Shown to reduce risk of RSV hospitalization by 57% for the baby in the first six months after birth  One dose is recommended between weeks 32 and 36 of pregnancy      Patient Education   Personalized Prevention Plan  You are due for the preventive services outlined below.  Your care team is available to assist you in scheduling these services.  If you have already completed any of these items, please share that information with your care team to update in your medical record.  Health Maintenance Due   Topic Date Due    COPD Action Plan  Never done    RSV VACCINE (Pregnancy & 60+) (1 - 1-dose 60+ series) Never done    Diabetic Foot Exam  03/23/2022    Annual Wellness Visit  02/01/2023    ANNUAL REVIEW OF HM ORDERS  09/26/2023    Cholesterol Lab  10/24/2023    COVID-19 Vaccine (7 - 2023-24 season) 11/30/2023     Preventing Falls: Care Instructions  Injuries and health problems such as trouble walking or poor eyesight can increase your risk of falling. So can some medicines. But there are things you can do to help prevent falls. You can exercise to get stronger. You can also arrange your home to make it safer.    Talk to your doctor about the medicines you take. Ask if any of them increase the risk of falls and whether they can be changed or stopped.   Try to exercise regularly. It can help improve your strength and balance. This can help lower your risk of falling.     Practice fall safety and prevention.    Wear low-heeled shoes that fit well and give your feet good support. Talk to your doctor if you have foot problems that make this hard.  Carry a  "cellphone or wear a medical alert device that you can use to call for help.  Use stepladders instead of chairs to reach high objects. Don't climb if you're at risk for falls. Ask for help, if needed.  Wear the correct eyeglasses, if you need them.    Make your home safer.    Remove rugs, cords, clutter, and furniture from walkways.  Keep your house well lit. Use night-lights in hallways and bathrooms.  Install and use sturdy handrails on stairways.  Wear nonskid footwear, even inside. Don't walk barefoot or in socks without shoes.    Be safe outside.    Use handrails, curb cuts, and ramps whenever possible.  Keep your hands free by using a shoulder bag or backpack.  Try to walk in well-lit areas. Watch out for uneven ground, changes in pavement, and debris.  Be careful in the winter. Walk on the grass or gravel when sidewalks are slippery. Use de-icer on steps and walkways. Add non-slip devices to shoes.    Put grab bars and nonskid mats in your shower or tub and near the toilet. Try to use a shower chair or bath bench when bathing.   Get into a tub or shower by putting in your weaker leg first. Get out with your strong side first. Have a phone or medical alert device in the bathroom with you.   Where can you learn more?  Go to https://www.[employer].net/patiented  Enter G117 in the search box to learn more about \"Preventing Falls: Care Instructions.\"  Current as of: July 18, 2023               Content Version: 13.8    4575-7159 [employer].   Care instructions adapted under license by your healthcare professional. If you have questions about a medical condition or this instruction, always ask your healthcare professional. [employer] disclaims any warranty or liability for your use of this information.      How to Get Up Safely After a Fall: Care Instructions  Overview     If you have injuries, health problems, or other reasons that may make it easy for you to fall at home, it is a good " idea to learn how to get up safely after a fall. Learning how to get up correctly can help you avoid making an injury worse.  Also, knowing what to do if you cannot get up can help you stay safe until help arrives.  Follow-up care is a key part of your treatment and safety. Be sure to make and go to all appointments, and call your doctor if you are having problems. It's also a good idea to know your test results and keep a list of the medicines you take.  How can you care for yourself after a fall?  If you think you can get up  First lie still for a few minutes and think about how you feel. If your body feels okay and you think you can get up safely, follow the rest of the steps below:  Look for a chair or other piece of furniture that is close to you.  Roll onto your side and rest. Roll by turning your head in the direction you want to roll, move your shoulder and arm, then hip and leg in the same direction.  Lie still for a moment to let your blood pressure adjust.  Slowly push your upper body up, lift your head, and take a moment to rest.  Slowly get up on your hands and knees, and crawl to the chair or other stable piece of furniture.  Put your hands on the chair.  Move one foot forward, and place it flat on the floor. Your other leg should be bent with the knee on the floor.  Rise slowly, turn your body, and sit in the chair. Stay seated for a bit and think about how you feel. Call for help. Even if you feel okay, let someone know what happened to you. You might not know that you have a serious injury.  If you cannot get up  If you think you are injured after a fall or you cannot get up, try not to panic.  Call out for help.  If you have a phone within reach or you have an emergency call device, use it to call for help.  If you do not have a phone within reach, try to slide yourself toward it. If you cannot get to the phone, try to slide toward a door or window or a place where you think you can be heard.  [place]  "or use an object to make noise so someone might hear you.  If you can reach something that you can use for a pillow, place it under your head. Try to stay warm by covering yourself with a blanket or clothing while you wait for help.  When should you call for help?   Call 911 anytime you think you may need emergency care. For example, call if:    You passed out (lost consciousness).     You cannot get up after a fall.     You have severe pain.   Call your doctor now or seek immediate medical care if:    You have new or worse pain.     You are dizzy or lightheaded.     You hit your head.   Watch closely for changes in your health, and be sure to contact your doctor if:    You do not get better as expected.   Where can you learn more?  Go to https://www.[employer].net/patiented  Enter G513 in the search box to learn more about \"How to Get Up Safely After a Fall: Care Instructions.\"  Current as of: November 13, 2022               Content Version: 13.8    5310-1880 [employer].   Care instructions adapted under license by your healthcare professional. If you have questions about a medical condition or this instruction, always ask your healthcare professional. [employer] disclaims any warranty or liability for your use of this information.         "

## 2023-12-13 NOTE — COMMUNITY RESOURCES LIST (ENGLISH)
12/13/2023   [facility]  N/A  For questions about this resource list or additional care needs, please contact your primary care clinic or care manager.  Phone: [phone]   Email: N/A   Address: [address] [place] [id number]   Hours: N/A        Transportation       Free or low-cost transportation  1  [employer] Distance: 7.46 miles      [address] [place] [address]  Language: English, Croatian, Hmong, Bulgarian, Omani  Hours: Mon - Sun 5:00 AM - 10:00 PM  Fees: Self Pay   Phone: (663) 447-1578 Email: maximalcare_mobility@[employer] Website: https://www.[place]Let [employer] Wave.info/Providers/[employer]/Transportation/1?pos=9     2  [facility] Circulator Bus Distance: 14.9 miles      In-Person   [address] [place] [address]  Language: English  Hours: Tue 9:00 AM - 2:00 PM  Fees: Self Pay   Phone: (688) 962-4670 Email: info@[employer] Website: http://www."[employer].org/     Transportation to medical appointments  3  [facility] - [employer] Distance: 1.36 miles      In-Person   [address] [place] [address]  Language: English  Fees: Free   Phone: (456) 461-2418 Email: info@[facility].org Website: http://www.[facility].org     4  [employer] Distance: 7.46 miles      [address] [place] [address]  Language: English, Croatian, Hmong, Bulgarian, Omani  Hours: Mon - Sun 5:00 AM - 10:00 PM  Fees: Insurance, Self Pay   Phone: (216) 605-5040 Email: maximalcare_mobility@[employer] Website: https://www.[place]Let it Wave.info/Providers/Maximal_Care_Mobility_LLC/Transportation/1?pos=9          Important Numbers & Websites       Emergency Services   911  City Services   311  Poison Control   (979) 978-1106  Suicide Prevention Lifeline   (580) 151-6352 (TALK)  Child Abuse Hotline   (218) 177-4049 (4-A-Child)  Sexual Assault Hotline   (515) 490-5564 (HOPE)  National  Runaway Safeline   (192) 827-9660 (RUNAWAY)  All-Options Talkline   (858) 708-1958  Substance Abuse Referral   (348) 316-6551 (HELP)
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
## 2023-12-14 ENCOUNTER — TELEPHONE (OUTPATIENT)
Dept: ENDOCRINOLOGY | Facility: CLINIC | Age: 78
End: 2023-12-14
Payer: COMMERCIAL

## 2023-12-14 DIAGNOSIS — E78.5 HYPERLIPIDEMIA LDL GOAL <70: Primary | ICD-10-CM

## 2023-12-14 LAB
CHOLEST SERPL-MCNC: 208 MG/DL
FASTING STATUS PATIENT QL REPORTED: NO
HDLC SERPL-MCNC: 67 MG/DL
LDLC SERPL CALC-MCNC: 122 MG/DL
NONHDLC SERPL-MCNC: 141 MG/DL
TRIGL SERPL-MCNC: 96 MG/DL

## 2023-12-14 RX ORDER — ROSUVASTATIN CALCIUM 20 MG/1
20 TABLET, COATED ORAL DAILY
Qty: 90 TABLET | Refills: 3 | Status: SHIPPED | OUTPATIENT
Start: 2023-12-14 | End: 2024-06-14

## 2023-12-14 NOTE — TELEPHONE ENCOUNTER
IZABELLA and [employer] message for PT to call [phone] to schedule f/u appt with Dr Singh. july/aug  Please schedule lab appt

## 2023-12-18 ENCOUNTER — PATIENT OUTREACH (OUTPATIENT)
Dept: GERIATRIC MEDICINE | Facility: CLINIC | Age: 78
End: 2023-12-18
Payer: COMMERCIAL
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
## 2023-12-18 NOTE — Clinical Note
LUZMAI regarding Primary Care coordination referral. See note.   Thank you Ifrah Emerson RN [facility] [phone]

## 2023-12-19 ENCOUNTER — HOSPITAL ENCOUNTER (OUTPATIENT)
Dept: ULTRASOUND IMAGING | Facility: CLINIC | Age: 78
Discharge: HOME | End: 2023-12-19
Attending: SURGERY | Admitting: SURGERY
Payer: COMMERCIAL

## 2023-12-19 DIAGNOSIS — Z90.09 HISTORY OF LOBECTOMY OF THYROID: ICD-10-CM

## 2023-12-19 PROCEDURE — 272N000431 US BIOPSY THYROID FINE NEEDLE ASPIRATION

## 2023-12-19 PROCEDURE — 88173 CYTOPATH EVAL FNA REPORT: CPT | Mod: TC | Performed by: SURGERY

## 2023-12-19 PROCEDURE — 250N000009 HC RX 250: Performed by: RADIOLOGY

## 2023-12-19 RX ADMIN — LIDOCAINE HYDROCHLORIDE 10 ML: 10 INJECTION, SOLUTION EPIDURAL; INFILTRATION; INTRACAUDAL; PERINEURAL at 08:13

## 2023-12-19 NOTE — PROGRESS NOTES
Right thyroid biopsy completed per Dr. Flores without difficulty, patient tolerated well. All discharge criteria were met and patient discharged to home ambulatory in stable condition by self.

## 2023-12-21 LAB
PATH REPORT.COMMENTS IMP SPEC: NORMAL
PATH REPORT.FINAL DX SPEC: NORMAL
PATH REPORT.GROSS SPEC: NORMAL
PATH REPORT.MICROSCOPIC SPEC OTHER STN: NORMAL
PATH REPORT.RELEVANT HX SPEC: NORMAL

## 2023-12-21 PROCEDURE — 88173 CYTOPATH EVAL FNA REPORT: CPT | Mod: 26 | Performed by: PATHOLOGY

## 2023-12-27 ENCOUNTER — THERAPY VISIT (OUTPATIENT)
Dept: SPEECH THERAPY | Facility: CLINIC | Age: 78
End: 2023-12-27
Payer: COMMERCIAL

## 2023-12-27 DIAGNOSIS — R49.0 DYSPHONIA: ICD-10-CM

## 2023-12-27 DIAGNOSIS — Z90.09 HISTORY OF LOBECTOMY OF THYROID: Primary | ICD-10-CM

## 2023-12-27 DIAGNOSIS — R47.89 PROBLEM WITH VOICE PRODUCTION: ICD-10-CM

## 2023-12-27 PROCEDURE — 92507 TX SP LANG VOICE COMM INDIV: CPT | Mod: GN | Performed by: SPEECH-LANGUAGE PATHOLOGIST

## 2023-12-27 NOTE — PROGRESS NOTES
"OP SLP DISCHARGE NOTE     12/27/23 0500   Appointment Info   Treating Provider Maria Del Carmen Caballero MA CCC-SLP   Total/Authorized Visits 4   Visits Used 2, 2/10   Medical Diagnosis History of lobectomy of thyroid (Z90.09)  (Problems With Voice Production   hoarse voice since thyroid surgery on 11/14)   SLP Tx Diagnosis Moderate to severe dysphonia   Quick Adds Certification   Progress Note/Certification   Start Of Care Date 12/08/23   Onset Of Illness/injury Or Date Of Surgery 11/14/23   Therapy Frequency 2x/month   Predicted Duration 2 months   Certification date from 12/08/23   Certification date to 02/06/24   Progress Note Due Date 02/06/24   Progress Note Completed Date 12/08/23   Subjective Report   Subjective Report Pt on time, reports gradual improvements in her voice. Waking up in the morning and experiences about 1 hour of \"normal\" deep voice with progressive decline as the day progresses.   SLP Goals   SLP Goals 1   SLP Goal 1   Goal Identifier Throat Relaxation   Goal Description Pt will learn, demonstrate and impliment use of 2-3 voice exercises (semi-occluded vocal tract, resonant voice, circumlaryngeal massage), given min cues from SLP to promote reduced laryngeal tension and irritation.   Rationale To maximize functional communication within the home or community   Target Date 02/06/24   Treatment Interventions (SLP)   Treatment Interventions Treatment Speech/Lang/Voice   Treatment Speech/Lang/Voice   Treatment of Speech, Language, Voice Communication&/or Auditory Processing (32862) 32 Minutes   Skilled Intervention Provided written and verbal information on.;Educated patient on vocal health strategies.;Demonstrated voice exercises;Provided feedback on performance of tasks   Patient Response/Progress Throat Relaxation-HEP-Pt reports inconsistent completion in SOVT cup and bubbles exercises but does think these were helpful. Reviewe exercises today with cup and bubbles as well as tongue trill being the most " faciliating, difficulty achieving forward focused humming. Pt denies laryngeal tension, SLP provided education on circumlaryngeal massage options and heat therapy PRN. Pt's voice continues with breathy/airy quality and pitch breaks.   Education   Learner/Method Patient;Listening;Reading;Demonstration;No Barriers to Learning   Plan   Home program SOVT exercises   Plan for next session Discharge per Pt request. Would like to hold on further visits until seeing ENT on 1/18/24.   Total Session Time   Total Treatment Time (sum of timed and untimed services) 32         DISCHARGE  Reason for Discharge: Patient chooses to discontinue therapy.    Equipment Issued: none     Discharge Plan: Patient to continue home program.  Other services: ENT w/ speech path. Consult 1/18/24.    Referring Provider:  Haley Garcia

## 2024-01-04 ENCOUNTER — TELEPHONE (OUTPATIENT)
Dept: ENDOCRINOLOGY | Facility: CLINIC | Age: 79
End: 2024-01-04
Payer: COMMERCIAL

## 2024-01-04 ENCOUNTER — PATIENT OUTREACH (OUTPATIENT)
Dept: GERIATRIC MEDICINE | Facility: CLINIC | Age: 79
End: 2024-01-04
Payer: COMMERCIAL

## 2024-01-04 NOTE — PROGRESS NOTES
[facility] Care Coordination Contact     CHW, per care coordinator Ifraheber Cunha followed up w/ Mbr on housing. Mbr is seeking to move  preferably in the  [facility]( 1st choice) or  [place]. She is seeking subsided complex. Current rent is $ 296 and wants to continue to pay the same.  CHW,reviewed w/ Mbr all considerations to make move that she should consider application fee/ background check, deposit and moving expenses.     Mbr, noted she will start checking moving cost. CHW will follow up w/ Mbr  w//in the next week to provide options Mbr can follow up on.         Camila Fu, LUIS  [facility]  [phone]

## 2024-01-04 NOTE — TELEPHONE ENCOUNTER
----- Message from Haley Singh MD sent at 1/4/2024  7:25 AM CST -----  Regarding: Thyroid biopsy   Biopsy benign, no plan for additional intervention now, will  be plan to monitor with ultrasound once a year.     Please message/call her with this info and text me if she has any questions. I promised her I would look at these results myself.

## 2024-01-10 NOTE — Clinical Note
[facility]  [address]  [address]  [place] [address] [phone]      2017      To:  Lab at [facility] (Dr. Anderson)  Phone: [phone]  Fax: [phone]      Regarding: Orders for Gemma Cowart     : 1945    Draw INR every 6 weeks starting 17.              Diag: Z79.01  Long-term (current) use of anticoagulants.  Fingerstick INR is acceptable.     Fax result to:   VILLA Mcbride or VILLA Casarez  [facility] (Fax: [phone]).  Phone number: [phone]      Please also give result to patient.        Thanks          Alva Viera MD  PINKY# [id number]  [facility]  [address] [place]     [address]  [phone]     Fall with Harm Risk

## 2024-01-18 ENCOUNTER — OFFICE VISIT (OUTPATIENT)
Dept: OTOLARYNGOLOGY | Facility: CLINIC | Age: 79
End: 2024-01-18
Attending: SURGERY
Payer: COMMERCIAL

## 2024-01-18 ENCOUNTER — PRE VISIT (OUTPATIENT)
Dept: OTOLARYNGOLOGY | Facility: CLINIC | Age: 79
End: 2024-01-18

## 2024-01-18 ENCOUNTER — THERAPY VISIT (OUTPATIENT)
Dept: SPEECH THERAPY | Facility: CLINIC | Age: 79
End: 2024-01-18
Payer: COMMERCIAL

## 2024-01-18 VITALS
BODY MASS INDEX: 31.28 KG/M2 | WEIGHT: 170 LBS | HEIGHT: 62 IN | DIASTOLIC BLOOD PRESSURE: 62 MMHG | HEART RATE: 69 BPM | SYSTOLIC BLOOD PRESSURE: 128 MMHG

## 2024-01-18 DIAGNOSIS — R49.0 DYSPHONIA: Primary | ICD-10-CM

## 2024-01-18 DIAGNOSIS — R13.12 OROPHARYNGEAL DYSPHAGIA: ICD-10-CM

## 2024-01-18 DIAGNOSIS — Z90.09 HISTORY OF LOBECTOMY OF THYROID: ICD-10-CM

## 2024-01-18 PROCEDURE — 92612 ENDOSCOPY SWALLOW (FEES) VID: CPT | Mod: GN | Performed by: OTOLARYNGOLOGY

## 2024-01-18 PROCEDURE — 92613 ENDOSCOPY SWALLOW (FEES) I&R: CPT | Performed by: OTOLARYNGOLOGY

## 2024-01-18 PROCEDURE — 99204 OFFICE O/P NEW MOD 45 MIN: CPT | Mod: 25 | Performed by: OTOLARYNGOLOGY

## 2024-01-18 PROCEDURE — 92524 BEHAVRAL QUALIT ANALYS VOICE: CPT | Mod: GN | Performed by: SPEECH-LANGUAGE PATHOLOGIST

## 2024-01-18 PROCEDURE — 92610 EVALUATE SWALLOWING FUNCTION: CPT | Mod: GN | Performed by: SPEECH-LANGUAGE PATHOLOGIST

## 2024-01-18 NOTE — PROGRESS NOTES
"[facility] VOICE CLINIC  Evaluation report    Clinician: Art Hernandez M.M., M.A., CCC/SLP  Seen in conjunction with: Dr. Rosario  Referring physician:  Dr. Garcia  Patient: Gemma Cowart  Date of Visit: 1/18/2024    HISTORY  Chief complaint: Gemma Cowart is a 78 year old woman presenting today for evaluation of voice.    Chart Review: Patient underwent left thyroid \"lobectomy on 11/14/2023 experiencing subsequent dysphonia.  On 12/1/2023 general surgeon recommended that she follow-up with ENT for further evaluation of vocal cord motion as well as putting in therapy consult.  Patient underwent therapeutic evaluation on 12/8/2023 but did not have visualization at that time.  She has had 1 additional session of speech therapy since that time.  She presents today for further evaluation and treatment as warranted.    CURRENT SYMPTOMS PER PATIENT REPORT:  VOICE  Today she reports the voice quality degraded immediately following thyroid surgery.  At its worst it dropped down to a level of 2 out of 10 (10 being her old voice) since then it has been gradually begun to improve.  She has been diligent in the use of therapeutic exercises which she learned (describing semi-occluded vocal tract exercises) and credits this with the degree of her improvement.  Despite this voice quality is still notably limited now achieving a best quality of 5 out of 10 (10 being her old voice).  Voice quality will worsen over the course of the day or with use.  Frequently does not feel that she can converse by the end of the day.  She has modest vocal demands on a typical day    Patient does endorse more longstanding breathing and cough issues related to COPD, but states that these have not changed since the thyroid surgery.  Regarding her swallowing she reports sensation of food getting stuck at times.  Maintains normal diet.    OTHER PERTINENT HISTORY  Please see my colleague Donna Boss CCC-SLP's note for additional details    Past Medical " History:   Diagnosis Date    Anemia     iron deficiency anemia    Anxiety     Breast cancer (H) 2012    Bilateral Masectomies 9/2012    Complications of gastric bypass surgery     COPD (chronic obstructive pulmonary disease) (H) 04/21/2021    Diabetes mellitus (H)     diet controlled    DVT (deep venous thrombosis) (H)     Eczema     Fracture of left knee region     patella - vertical    History of kidney stones     Obesity     Palpitation     with no treatment    Polyneuropathy     Pruritus     generalized    Rosacea     SVT (supraventricular tachycardia)     s/p ablation 5/28/2015 at [facility] for left lateral AP     Past Surgical History:   Procedure Laterality Date    ABDOMEN SURGERY  2000    gastric bypass 2000    COLONOSCOPY  10/11/2012    Procedure: COLONOSCOPY;  colonoscopy;  Surgeon: Gela Cleary MD;  Location:  GI    COLONOSCOPY N/A 10/6/2017    Procedure: COLONOSCOPY;;  Surgeon: Shashank Ortiz MD;  Location:  GI    COLONOSCOPY N/A 3/7/2022    Procedure: COLONOSCOPY;  Surgeon: Andrea Daniel MD;  Location:  GI    DENTAL SURGERY  5/2007    all teeth removed - dentures    ENT SURGERY      all teeth pulled    ESOPHAGOSCOPY, GASTROSCOPY, DUODENOSCOPY (EGD), COMBINED N/A 10/6/2017    Procedure: COMBINED ESOPHAGOSCOPY, GASTROSCOPY, DUODENOSCOPY (EGD);  ESOPHAGOSCOPY, GASTROSCOPY, DUODENOSCOPY (EGD) & Colonoscopy *Needs INR on arrival;  Surgeon: Shashank Ortiz MD;  Location:  GI    GYN SURGERY  1/10/75    tubal ligation     H ABLATION SVT  5/28/2015    stopped metoprolol    HERNIA REPAIR  2005    IMPLANT FILTER INFERIOR VENA CAVA  10/8/2012    taken out  1/4/12    INSERT TISSUE EXPANDER BREAST BILATERAL  9/5/2012    Procedure: INSERT TISSUE EXPANDER BREAST BILATERAL;;  Surgeon: Orlando Wall MD;  Location:  OR    MASTECTOMY SIMPLE, SENTINEL NODE, COMBINED  9/5/2012    Procedure: COMBINED MASTECTOMY SIMPLE, SENTINEL NODE;  BILATERAL MASTECTOMY WITH LEFT AXILLARY  "SENTINEL LYMPH NODE BIOPSY, BILATERAL TISSUE EXPANDER       MASTECTOMY, BILATERAL      PANNICULECTOMY N/A 4/5/2018    Procedure: PANNICULECTOMY;  PANNICULECTOMY ;  Surgeon: Orlando Wall MD;  Location:  OR    RECONSTRUCT BREAST BILATERAL, IMPLANT PROSTHESIS BILATERAL, COMBINED  5/22/2013    Procedure: COMBINED RECONSTRUCT BREAST BILATERAL, IMPLANT PROSTHESIS BILATERAL;  BILATERAL SECOND STAGE BREAST RECONSTRUCTION WITH SILICONE GEL IMPLANTS AND LIPOSUCTION;  Surgeon: Orlando Wall MD;  Location:  SD    RECONSTRUCT NIPPLE BILATERAL  8/22/2013    Procedure: RECONSTRUCT NIPPLE BILATERAL;  BILATERAL NIPPLE AREOLAR RECONSTRUCTION;  Surgeon: Orlando Wall MD;  Location:  SD    THYROIDECTOMY N/A 11/14/2023    Procedure: Left Thyroid Lobectomy and Autotransplant of Left Superior Parathyroid;  Surgeon: Haley Garcia MD;  Location:  OR    ZZ BREAST RECONSTRUC W OTHR TECHNIQ  5/8/2013       OBJECTIVE    PERCEPTUAL EVALUATION (CPT 89499)  POSTURE / TENSION:   No overt tension visible    BREATHING:   phonation is not coordinated with respiration    VOICE:  Roughness: Moderate to severe Consistent  Breathiness: Minimal  Strain: Mild to moderate Consistent  Loudness  Conversational speech:  Mild to moderately reduced  Projected speech:  Moderate to severely reduced  Pitch:  Conversational speech:  Mildly lowered  Pitch glide:   Patient demonstrates significant difficulty achieving a pitch glide even with maximal clinician support  She states that she has never been a \"grover\" so it is unclear the degree to which this is physiologically based versus functional.  Resonance:  Conversational speech:  laryngeal pharyngeal resonance  Singing vs. Speech: Consistent across contexts    COUGH/THROAT CLEARING:  Occasional throat clearing    THERAPY PROBES: Improvement was elicited with use of forward resonant stimuli, coordination of respiration and phonation, and use of glottic coup to promote vocal " fold closure  ____________________________________________________________________  Laryngeal Function Studies   Laryngeal function studies are warranted but were unable to be completed today secondary to timing.  ____________________________________________________________________    LARYNGEAL EXAMINATION  Procedure: Flexible endoscopy with chip-tip technology with stroboscopy, right nostril; topical anesthesia with 3% Lidocaine and 0.25% phenylephrine was not applied.   Performed by: Dr. Lovely Rosario  The laryngeal and pharyngeal structures were evaluated for gross appearance, mobility, function, and focal lesions / abnormalities of the associated mucosa.  Stroboscopy was warranted to evaluate closure, symmetry, and vibratory characteristics of the vocal folds.  All findings were within normal limits with the exception of the following salient features:   Essentially healthy mucosa  Brisk motion bilaterally on repetitive motion tasks in AB and ADduction  Moderate 4 way constrictive supraglottic hyperfunction  Reduction of hyperfunction is noted with higher flow contacts  Improvement in clarity of voicing is noted occasionally with glottic coup  Stroboscopy demonstrates limited views due to the degree of roughness, these demonstrate:  Increased amplitude and mucosal wave of the left true vocal fold  Near normal vibratory characteristics of the right true vocal fold  Associated phase asymmetry  Frequent aperiodicity    The laryngeal exam was reviewed with Ms. Cowart, and I provided pertinent explanations, as well as written and oral information.    ASSESSMENT / PLAN  IMPRESSIONS: Gemma Cowart is presenting today with acute voice changes that began following left thyroid lobectomy on 11/14/2023.  Today's evaluation demonstrates Dysphonia (R49.0) in the context of an imbalance in function of the intrinsic and extrinsic muscles of the larynx and possible subtle residual left true vocal fold weakness . Laryngeal  evaluation shows brisk bilateral motion and full range of motion of the vocal folds and AB and adduction as well as elongation and contraction.  There is frequent a periodicity, though limited views on stroboscopy show increased amplitude and mucosal wave of the left true vocal fold which may point towards decreased left true vocal fold bulk/tone.  Complete glottic closure is able to be achieved.  There are variable degrees of supraglottic hyperfunction, though improved clarity and reduced hyperfunction are noted with higher flow contexts.  Perceptually patient's voice is notable for significant roughness, decreased intensity, and to a lesser degree strain.     DETAILED RECOMMENDATIONS:   Patient was encouraged to continue working with her already established speech therapist on tasks to improve phonatory respiratory coordination and ease with voicing.  Precise goals will be left to the treating clinician  If there is residual weakness it will likely continue to improve across time given excellent appearance of mobility, and she was also encouraged to prioritize her global health and conditioning as this will play a role in improving her respiratory drive and overall endurance.  Should she plateau with her current clinician she was encouraged to reach out to the clinic as she is welcome to be seen by one of our providers if desired.  Today's appointment is evaluation only.    This treatment plan was developed with the patient who agreed with the recommendations.    TOTAL SERVICE TIME: 30 minutes  EVALUATION OF VOICE AND RESONANCE (74551)  NO CHARGE FACILITY FEE (03859)    Art Hernandez M.M., M.A., CCC-SLP  Speech-Language Pathologist  Certificate of Vocology  [phone]    *this report was created in part through the use of computerized dictation software, and though reviewed following completion, some typographic errors may persist.  If there is confusion regarding any of this notes contents, please contact  me for clarification.*

## 2024-01-18 NOTE — LETTER
2024       RE: Gemma Cowart  [address]  [facility] [address]     Dear Colleague,    Thank you for referring your patient, Gemma Cowart, to the [facility] [place] [facility]. Please see a copy of my visit note below.        [facility]   at the [facility]   Otolaryngology Clinic     Patient: Gemma Cowart    MRN: [id number]    : 1945    Age/Gender: 78 year old female  Date of Service: 2024  Rendering Provider:   Lovely Rosario MD     Referring Provider   PCP: Arminda Foley  Referring Physician: Haley Garcia MD  [facility]  [address] [place] [address]  Reason for Consultation   Dysphonia  History   HISTORY OF PRESENT ILLNESS: Ms. Cowart is a 78 year old female who presents to us today with dysphonia.      Of note she had a left thyroid lobectomy and autotransplant of the left superior parathyroid 23 and EGD 10/6/17    she presents today for evaluation. she reports:    Dysphonia  - had thyroidectomy in November  - having trouble with her voice  - voice has gotten better, but not as good as she would like it to be          PAST MEDICAL HISTORY:   Past Medical History:   Diagnosis Date    Anemia     iron deficiency anemia    Anxiety     Breast cancer (H) 2012    Bilateral Masectomies 2012    Complications of gastric bypass surgery     COPD (chronic obstructive pulmonary disease) (H) 2021    Diabetes mellitus (H)     diet controlled    DVT (deep venous thrombosis) (H)     Eczema     Fracture of left knee region     patella - vertical    History of kidney stones     Obesity     Palpitation     with no treatment    Polyneuropathy     Pruritus     generalized    Rosacea     SVT (supraventricular tachycardia)     s/p ablation 2015 at [facility] for left lateral AP       PAST SURGICAL HISTORY:   Past Surgical  History:   Procedure Laterality Date    ABDOMEN SURGERY  2000    gastric bypass 2000    COLONOSCOPY  10/11/2012    Procedure: COLONOSCOPY;  colonoscopy;  Surgeon: Gela Cleary MD;  Location:  GI    COLONOSCOPY N/A 10/6/2017    Procedure: COLONOSCOPY;;  Surgeon: Shashank Ortiz MD;  Location:  GI    COLONOSCOPY N/A 3/7/2022    Procedure: COLONOSCOPY;  Surgeon: Andrea Daniel MD;  Location:  GI    DENTAL SURGERY  5/2007    all teeth removed - dentures    ENT SURGERY      all teeth pulled    ESOPHAGOSCOPY, GASTROSCOPY, DUODENOSCOPY (EGD), COMBINED N/A 10/6/2017    Procedure: COMBINED ESOPHAGOSCOPY, GASTROSCOPY, DUODENOSCOPY (EGD);  ESOPHAGOSCOPY, GASTROSCOPY, DUODENOSCOPY (EGD) & Colonoscopy *Needs INR on arrival;  Surgeon: Shashank Ortiz MD;  Location:  GI    GYN SURGERY  1/10/75    tubal ligation     H ABLATION SVT  5/28/2015    stopped metoprolol    HERNIA REPAIR  2005    IMPLANT FILTER INFERIOR VENA CAVA  10/8/2012    taken out  1/4/12    INSERT TISSUE EXPANDER BREAST BILATERAL  9/5/2012    Procedure: INSERT TISSUE EXPANDER BREAST BILATERAL;;  Surgeon: Orlando Wall MD;  Location:  OR    MASTECTOMY SIMPLE, SENTINEL NODE, COMBINED  9/5/2012    Procedure: COMBINED MASTECTOMY SIMPLE, SENTINEL NODE;  BILATERAL MASTECTOMY WITH LEFT AXILLARY SENTINEL LYMPH NODE BIOPSY, BILATERAL TISSUE EXPANDER       MASTECTOMY, BILATERAL      PANNICULECTOMY N/A 4/5/2018    Procedure: PANNICULECTOMY;  PANNICULECTOMY ;  Surgeon: Orlando Wall MD;  Location:  OR    RECONSTRUCT BREAST BILATERAL, IMPLANT PROSTHESIS BILATERAL, COMBINED  5/22/2013    Procedure: COMBINED RECONSTRUCT BREAST BILATERAL, IMPLANT PROSTHESIS BILATERAL;  BILATERAL SECOND STAGE BREAST RECONSTRUCTION WITH SILICONE GEL IMPLANTS AND LIPOSUCTION;  Surgeon: Orlando Wall MD;  Location:  SD    RECONSTRUCT NIPPLE BILATERAL  8/22/2013    Procedure: RECONSTRUCT NIPPLE BILATERAL;  BILATERAL NIPPLE AREOLAR RECONSTRUCTION;   "Surgeon: Orlando Wall MD;  Location:  SD    THYROIDECTOMY N/A 11/14/2023    Procedure: Left Thyroid Lobectomy and Autotransplant of Left Superior Parathyroid;  Surgeon: Haley Garcia MD;  Location[facility]  OR    [facility]  5/8/2013       CURRENT MEDICATIONS:   Current Outpatient Medications:     acetaminophen (TYLENOL) 500 MG tablet, Take 2 tablets (1,000 mg) by mouth every 6 hours as needed for mild pain, Disp: , Rfl:     albuterol (PROAIR HFA/PROVENTIL HFA/VENTOLIN HFA) 108 (90 Base) MCG/ACT inhaler, Inhale 2 puffs into the lungs every 6 hours as needed for shortness of breath or wheezing, Disp: 18 g, Rfl: 1    apixaban ANTICOAGULANT (ELIQUIS ANTICOAGULANT) 5 MG tablet, Take 1 tablet (5 mg) by mouth 2 times daily, Disp: 180 tablet, Rfl: 3    blood glucose (NO BRAND SPECIFIED) lancets standard, Use to test blood sugar 2 times daily or as directed., Disp: 1 each, Rfl: 4    blood glucose (ONETOUCH VERIO IQ) test strip, Use to test blood sugar 2 times daily or as directed., Disp: 200 strip, Rfl: 3    Blood Glucose Monitoring Suppl (ONETOUCH VERIO REFLECT) w/Device KIT, , Disp: , Rfl:     Cholecalciferol (VITAMIN D3 PO), Alternates between 4000 units and 5000 units daily., Disp: , Rfl:     clobetasol (TEMOVATE) 0.05 % external cream, Apply twice daily to rash (thigh, knees, hands, feet) twice daily for 7 days AS NEEDED \"use sparingly and not to be used on the face\" Follow up recommended., Disp: 60 g, Rfl: 3    COMIRNATY 30 MCG/0.3ML injection, , Disp: , Rfl:     cyanocobalamin (VITAMIN B-12) 1000 MCG sublingual tablet, Place 1 tablet (1,000 mcg) under the tongue daily, Disp: 90 tablet, Rfl: 3    Lancets (ONETOUCH DELICA PLUS [id number]) MISC, USE TO TEST TWICE DAILY, Disp: , Rfl:     magnesium 200 MG TABS, Take 2 tablets by mouth daily at 2 pm, Disp: , Rfl:     rosuvastatin (CRESTOR) 20 MG tablet, Take 1 tablet (20 mg) by mouth daily, Disp: 90 tablet, Rfl: 3    sertraline (ZOLOFT) " 100 MG tablet, Take 1 tablet (100 mg) by mouth daily, Disp: 90 tablet, Rfl: 1    tiotropium (SPIRIVA RESPIMAT) 2.5 MCG/ACT inhaler, Inhale 2 puffs into the lungs daily, Disp: 12 g, Rfl: 3    ALLERGIES: Bacitracin, Dust mites, and Nickel    SOCIAL HISTORY:    Social History     Socioeconomic History    Marital status:      Spouse name: Not on file    Number of children: Not on file    Years of education: Not on file    Highest education level: Not on file   Occupational History     Employer: RETIRED   Tobacco Use    Smoking status: Former     Packs/day: 1.50     Years: 25.00     Additional pack years: 0.00     Total pack years: 37.50     Types: Cigarettes     Quit date: 10/21/1986     Years since quittin.2     Passive exposure: Never    Smokeless tobacco: Never    Tobacco comments:     quit    Vaping Use    Vaping Use: Never used   Substance and Sexual Activity    Alcohol use: Not Currently    Drug use: No    Sexual activity: Not Currently     Partners: Male   Other Topics Concern    Parent/sibling w/ CABG, MI or angioplasty before 65F 55M? No     Service Not Asked    Blood Transfusions Not Asked    Caffeine Concern Not Asked    Occupational Exposure Not Asked    Hobby Hazards Not Asked    Sleep Concern Not Asked    Stress Concern Not Asked    Weight Concern Not Asked    Special Diet Not Asked    Back Care Not Asked    Exercise Not Asked    Bike Helmet Yes    Seat Belt Yes    Self-Exams Not Asked   Social History Narrative    Not on file     Social Determinants of Health     Financial Resource Strain: Low Risk  (2023)    Financial Resource Strain     Within the past 12 months, have you or your family members you live with been unable to get utilities (heat, electricity) when it was really needed?: No   Food Insecurity: Low Risk  (2023)    Food Insecurity     Within the past 12 months, did you worry that your food would run out before you got money to buy more?: No     Within the  past 12 months, did the food you bought just not last and you didn t have money to get more?: No   Transportation Needs: High Risk (12/13/2023)    Transportation Needs     Within the past 12 months, has lack of transportation kept you from medical appointments, getting your medicines, non-medical meetings or appointments, work, or from getting things that you need?: Yes   Physical Activity: Inactive (2/1/2022)    Exercise Vital Sign     Days of Exercise per Week: 0 days     Minutes of Exercise per Session: 0 min   Stress: Stress Concern Present (2/1/2022)    [facility] [employer]ational [employer] - Occupational Stress Questionnaire     Feeling of Stress : To some extent   Social Connections: Moderately Integrated (2/1/2022)    Social Connection and Isolation Panel [NHANES]     Frequency of Communication with Friends and Family: Twice a week     Frequency of Social Gatherings with Friends and Family: More than three times a week     Attends Congregation Services: 1 to 4 times per year     Active Member of Clubs or Organizations: Yes     Attends Club or Organization Meetings: Not on file     Marital Status:    Interpersonal Safety: High Risk (12/13/2023)    Interpersonal Safety     Do you feel physically and emotionally safe where you currently live?: No     Within the past 12 months, have you been hit, slapped, kicked or otherwise physically hurt by someone?: No     Within the past 12 months, have you been humiliated or emotionally abused in other ways by your partner or ex-partner?: No   Housing Stability: Low Risk  (12/13/2023)    Housing Stability     Do you have housing? : Yes     Are you worried about losing your housing?: No         FAMILY HISTORY:   Family History   Problem Relation Age of Onset    Cancer - colorectal Mother     Colon Cancer Mother     Prostate Cancer Father     Alzheimer Disease Father     Cancer Paternal Grandmother     Alzheimer Disease Paternal Grandfather     Cancer - colorectal  Brother     Diabetes Brother     Crohn's Disease Daughter     Diabetes Brother     Eye Disorder Brother     Diabetes Son     Cancer Maternal Uncle     Cancer Maternal Uncle      Non-contributory for problems with anesthesia    REVIEW OF SYSTEMS:   The patient was asked a 14 point review of systems regarding constitutional symptoms, eye symptoms, ears, nose, mouth, throat symptoms, cardiovascular symptoms, respiratory symptoms, gastrointestinal symptoms, genitourinary symptoms, musculoskeletal symptoms, integumentary symptoms, neurological symptoms, psychiatric symptoms, endocrine symptoms, hematologic/lymphatic symptoms, and allergic/ immunologic symptoms.   The pertinent factors have been included in the HPI and below.  Patient Supplied Answers to Review of Systems       No data to display                Physical Examination   The patient underwent a physical examination as described below. The pertinent positive and negative findings are summarized after the description of the examination.  Constitutional: The patient's developmental and nutritional status was assessed. The patient's voice quality was assessed.  Head and Face: The head and face were inspected for deformities. The sinuses were palpated. The salivary glands were palpated. Facial muscle strength was assessed bilaterally.  Eyes: Extraocular movements and primary gaze alignment were assessed.  Ears, Nose, Mouth and Throat: The ears and nose were examined for deformities. The nasal septum, mucosa, and turbinates were inspected by anterior rhinoscopy. The lips, teeth, and gums were examined for abnormalities. The oral mucosa, tongue, palate, tonsils, lateral and posterior pharynx were inspected for the presence of asymmetry or mucosal lesions.    Neck: The tracheal position was noted, and the neck mass palpated to determine if there were any asymmetries, abnormal neck masses, thyromegally, or thyroid nodules.  Respiratory: The nature of the breathing and  chest expansion/symmetry was observed.  Cardiovascular: The patient was examined to determine the presence of any edema or jugular venous distension.  Abdomen: The contour of the abdomen was noted.  Lymphatic: The patient was examined for infraclavicular lymphadenopathy.  Musculoskeletal: The patient was inspected for the presence of skeletal deformities.  Extremities: The extremities were examined for any clubbing or cyanosis.  Skin: The skin was examined for inflammatory or neoplastic conditions.  Neurologic: The patient's orientation, mood, and affect were noted. The cranial nerve  functions were examined.  Other pertinent positive and negative findings on physical examination:      OC/OP: no lesions, uvula midline, soft palate elevates symmetrically   Neck: no lesions, no TH tenderness to palpation  All other physical examination findings were within normal limits and noncontributory.  Procedures     Video Laryngoscopy with Stroboscopy (CPT 01683)    Preoperative Diagnosis:  Dysphonia and throat symptoms  Postoperative Diagnosis: Dysphonia and throat symptoms  Indication:  Patient has new or persistent dysphonia and throat symptoms.  This requires evaluation by stroboscopy to fully delineate the laryngeal functioning; especially mucosal wave assessment, ultrasharp visualization of lesions on the vocal folds, and overall functioning of the larynx.  Details of Procedure: A 70 degree rigid telescopic laryngoscope or a distal chip flexible scope was used. The lighting was obtained via a light cable connected to a stroboscopic unit. The telescope was inserted either transorally or transnasally until the vocal folds could be visualized. The patient was instructed to sustain the vowel  ee  at a comfortable pitch and loudness as the voice was monitored by a microphone connected to a fundamental frequency tracker. This circuit tracked vocal periodicity, allowing the light to flash in synchrony with the glottal cycles. A  setting on the stroboscope was set to change the phase of light strobing with relation to the vocal fundamental frequency, producing an image of 1 to 2 glottal cycles every second. The video images were recorded for analysis. Use of the variable speed, slow and stop scan allowed careful study of pertinent segments of laryngeal function to increase accuracy of clinical assessments of function and dysfunction.  In particular, features of glottal closure, mucosal wave on the vocal fold cover and laryngeal symmetry were analyzed. Lastly, the patient was asked to phonate speech samples and auditory/perceptual evaluation of voice and resonance were performed.  The vocal quality was carefully evaluated for hoarseness, breathiness, loudness, phrase length and intelligibility to determine the source of dysphonia.    Findings:      B. LARYNGOVIDEOSTROBOSCOPY   Anatomic/Physiological Deviations:  RNC, right vocal fold mild restriction and vibration, has good vocal fold mobility  Mucosal wave: Right:  Little restriction     Left: No restriction  Bilateral Vocal Fold Vibration: Asymmetric  Vocal Process: Right: No restriction    Left:  No restriction  Vocal Fold closure: Complete glottal closure    Complication(s): None  Disposition: Patient tolerated the procedure well         Fiberoptic Endoscopic Evaluation of Swallowing (CPT 36112)  and Interpretation of Swallowing (CPT 93129)    Indications: See above notes for complete history and physical. Patient complains of dysphagia to both solids and liquids and/or there is suggestion on history and endoscopic exam of the presence of dysphagia causing medical complaints.  Swallowing evaluation is being performed to assess the presence and degree of dysphagia, and to recommend a safe diet.     Pulmonary Status:  No PNA   Current Diet:              regular                                        thin liquids      Consistency Amounts:  Thin Liquid: sip   Puree: sip  Solid: cookies             Positioning: upright in a chair  Oral Peripheral Exam: See physical exam section.  Anatomic Notes: See Videostroboscopy report for assessment of anatomy and laryngeal functioning  Pharyngeal secretions prior to administration of liquid or food: No   Oral Phase Abnormal Findings: No abnormal behavior observed   Pharyngeal Phase Abnormal Findings: episode of penetration with serial sips    Recommended Diet:  regular                                        thin liquids            Review of Relevant Clinical Data   I personally reviewed:  Notes: Dr. Maria Del Carmen Caballero,SLP, 12/27/23,  Throat Relaxation-HEP-Pt reports inconsistent completion in SOVT cup and bubbles exercises but does think these were helpful. Reviewe exercises today with cup and bubbles as well as tongue trill being the most faciliating, difficulty achieving forward focused humming. Pt denies laryngeal tension, SLP provided education on circumlaryngeal massage options and heat therapy PRN. Pt's voice continues with breathy/airy quality and pitch breaks.     Dr. Haley Garcia, 12/1/23, General surgery  Assessment and Plan:  Gemma is doing well after left thyroid lobectomy.  I recommend a follow up with Dr. Haley Singh in the next few weeks for TSH check.  Dr. Singh and I have discussed the risks/benefits of completion thyroidectomy for Ms. Cowart and agree that lobectomy should be sufficient treatment for this 1.5 cm PTC confined to the thyroid. She should undergo FNA of the 6 mm nodule on the right which is TI-RADS 4.  The patient is still contemplating undergoing completion thyroidectomy. I would like to evaluate her voice further before agreeing to this. Her left RLN function was excellent during surgery, so I am surprised that she is hoarse today. Recommend laryngoscopy to evaluate for vocal cord movement and speech therapy consult.      Dr. Inna Montes, 3/3/22, Cardiology  ASSESSMENT AND RECOMMENDATIONS: Ms. Cowart has some occasional minor  palpitation. She has no evidence of recurrent SVT from the previous ablation. The occasional short-lasting atrial tachycardia is not uncommon in people of her age. I do not recommend intervention. The patient is asked to check her radial pulses for 1 minute if she has palpitation in the future. If she detects occasional skipping of the pulses, she does not need to be concerned about it. At this point, I do not see restriction for activities. She does not need routine Cardiology followup.     Dr. 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  We reviewed images together.  Potentially, this could explain her skull pain, but occipital neuralgia still remains in differential.  I discussed with the patient that she needs to connect with her oncologist for further evaluation of these lesions.  Once they are addressed, and her pain continues, we might consider occipital nerve blocks and other treatments.     Radiology: chest xray 9/11/23  Impression:  Cardiac silhouette size is within normal limits. Calcified atherosclerotic changes at the aortic arch. Mild hyperinflation suggesting underlying COPD. No new infiltrates. Hypertrophic changes, thoracic spine. Postoperative changes within the upper abdominal midline.    Procedures: PFTs 10/20/22  IMPRESSION:   Normal pulmonary function.     Upper GI endoscopy 10/6/17  Impression:       - Normal esophagus.                             - Myla-en-Y gastrojejunostomy with gastrojejunal                             anastomosis characterized by healthy appearing                             mucosa.                             - Normal examined jejunum.                             - No specimens collected.   Labs:  Lab Results  "  Component Value Date    TSH 2.22 05/23/2023     Lab Results   Component Value Date     08/08/2023    CO2 24 08/08/2023    BUN 30.5 (H) 08/08/2023    CREAT 1.3 (H) 04/16/2019    PHOS 3.6 11/17/2022     Lab Results   Component Value Date    WBC 4.8 08/08/2023    HGB 12.5 08/08/2023    HCT 40.8 08/08/2023    MCV 85 08/08/2023     08/08/2023     Lab Results   Component Value Date    INR 2.18 (H) 07/24/2021     No results found for: \"RAFAEL\"  No components found for: \"RHEUMATOIDFACTOR\", \"RF\"  Lab Results   Component Value Date    CRP <2.9 10/02/2017     No components found for: \"CKTOT\", \"URICACID\"  No components found for: \"C3\", \"C4\", \"DSDNAAB\", \"NDNAABIFA\"  No results found for: \"MPOAB\"    Patient reported Quality of Life (QOL) Measures   Patient Supplied Answers To VHI Questionnaire       No data to display                  Patient Supplied Answers To EAT Questionnaire       No data to display                  Patient Supplied Answers To CSI Questionnaire       No data to display                  Patient Supplied Answers to Dyspnea Index Questionnaire:       No data to display                Impression & Plan     IMPRESSION: Ms. 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 Cowart. If you " have any questions, please do not hesitate to contact me.    Scribe Disclosure:   I, Juli Rowell, am serving as a scribe; to document services personally performed by Lovely Rosario MD -based on data collection and the provider's statements to me.     Provider Disclosure:  I agree with above History, Review of Systems, Physical exam and Plan.  I have reviewed the content of the documentation and have edited it as needed. I have personally performed the services documented here and the documentation accurately represents those services and the decisions I have made.           Lovely Rosario MD    Laryngology    [facility]  Department of  Otolaryngology - Head and Neck Surgery  Clinics & Surgery Center  [address] [place] [address]  Appointment line: [phone]  Fax: [phone]  [email or web address]      Again, thank you for allowing me to participate in the care of your patient.      Sincerely,    Lovely Rosario MD

## 2024-01-18 NOTE — PROGRESS NOTES
[facility]   at the [facility]   Otolaryngology Clinic     Patient: Gemma Cowart    MRN: [id number]    : 1945    Age/Gender: 78 year old female  Date of Service: 2024  Rendering Provider:   Lovely Rosario MD     Referring Provider   PCP: Arminda Foley  Referring Physician: Haley Garcia MD  [facility]  [address]  [address]  [place] [address]  Reason for Consultation   Dysphonia  History   HISTORY OF PRESENT ILLNESS: Ms. Cowart is a 78 year old female who presents to us today with dysphonia.      Of note she had a left thyroid lobectomy and autotransplant of the left superior parathyroid 23 and EGD 10/6/17    she presents today for evaluation. she reports:    Dysphonia  - had thyroidectomy in November  - having trouble with her voice  - voice has gotten better, but not as good as she would like it to be          PAST MEDICAL HISTORY:   Past Medical History:   Diagnosis Date    Anemia     iron deficiency anemia    Anxiety     Breast cancer (H) 2012    Bilateral Masectomies 2012    Complications of gastric bypass surgery     COPD (chronic obstructive pulmonary disease) (H) 2021    Diabetes mellitus (H)     diet controlled    DVT (deep venous thrombosis) (H)     Eczema     Fracture of left knee region     patella - vertical    History of kidney stones     Obesity     Palpitation     with no treatment    Polyneuropathy     Pruritus     generalized    Rosacea     SVT (supraventricular tachycardia)     s/p ablation 2015 at [facility] for left lateral AP       PAST SURGICAL HISTORY:   Past Surgical History:   Procedure Laterality Date    ABDOMEN SURGERY      gastric bypass     COLONOSCOPY  10/11/2012    Procedure: COLONOSCOPY;  colonoscopy;  Surgeon: Gela Cleary MD;  Location:  GI    COLONOSCOPY N/A 10/6/2017    Procedure: COLONOSCOPY;;  Surgeon: Shashank Ortiz MD;  Location:  GI    COLONOSCOPY  N/A 3/7/2022    Procedure: COLONOSCOPY;  Surgeon: Andrea Daniel MD;  Location:  GI    DENTAL SURGERY  5/2007    all teeth removed - dentures    ENT SURGERY      all teeth pulled    ESOPHAGOSCOPY, GASTROSCOPY, DUODENOSCOPY (EGD), COMBINED N/A 10/6/2017    Procedure: COMBINED ESOPHAGOSCOPY, GASTROSCOPY, DUODENOSCOPY (EGD);  ESOPHAGOSCOPY, GASTROSCOPY, DUODENOSCOPY (EGD) & Colonoscopy *Needs INR on arrival;  Surgeon: Shashank Ortiz MD;  Location:  GI    GYN SURGERY  1/10/75    tubal ligation     H ABLATION SVT  5/28/2015    stopped metoprolol    HERNIA REPAIR  2005    IMPLANT FILTER INFERIOR VENA CAVA  10/8/2012    taken out  1/4/12    INSERT TISSUE EXPANDER BREAST BILATERAL  9/5/2012    Procedure: INSERT TISSUE EXPANDER BREAST BILATERAL;;  Surgeon: Orlando Wall MD;  Location:  OR    MASTECTOMY SIMPLE, SENTINEL NODE, COMBINED  9/5/2012    Procedure: COMBINED MASTECTOMY SIMPLE, SENTINEL NODE;  BILATERAL MASTECTOMY WITH LEFT AXILLARY SENTINEL LYMPH NODE BIOPSY, BILATERAL TISSUE EXPANDER       MASTECTOMY, BILATERAL      PANNICULECTOMY N/A 4/5/2018    Procedure: PANNICULECTOMY;  PANNICULECTOMY ;  Surgeon: Orlando Wall MD;  Location:  OR    RECONSTRUCT BREAST BILATERAL, IMPLANT PROSTHESIS BILATERAL, COMBINED  5/22/2013    Procedure: COMBINED RECONSTRUCT BREAST BILATERAL, IMPLANT PROSTHESIS BILATERAL;  BILATERAL SECOND STAGE BREAST RECONSTRUCTION WITH SILICONE GEL IMPLANTS AND LIPOSUCTION;  Surgeon: Orlando Wall MD;  Location:  SD    RECONSTRUCT NIPPLE BILATERAL  8/22/2013    Procedure: RECONSTRUCT NIPPLE BILATERAL;  BILATERAL NIPPLE AREOLAR RECONSTRUCTION;  Surgeon: Orlando Wlal MD;  Location:  SD    THYROIDECTOMY N/A 11/14/2023    Procedure: Left Thyroid Lobectomy and Autotransplant of Left Superior Parathyroid;  Surgeon: Haley Garcia MD;  Location: [facility] BREAST RECONSTRUC W OTHR TECHNIQ  5/8/2013       CURRENT MEDICATIONS:   Current Outpatient  "Medications:     acetaminophen (TYLENOL) 500 MG tablet, Take 2 tablets (1,000 mg) by mouth every 6 hours as needed for mild pain, Disp: , Rfl:     albuterol (PROAIR HFA/PROVENTIL HFA/VENTOLIN HFA) 108 (90 Base) MCG/ACT inhaler, Inhale 2 puffs into the lungs every 6 hours as needed for shortness of breath or wheezing, Disp: 18 g, Rfl: 1    apixaban ANTICOAGULANT (ELIQUIS ANTICOAGULANT) 5 MG tablet, Take 1 tablet (5 mg) by mouth 2 times daily, Disp: 180 tablet, Rfl: 3    blood glucose (NO BRAND SPECIFIED) lancets standard, Use to test blood sugar 2 times daily or as directed., Disp: 1 each, Rfl: 4    blood glucose (ONETOUCH VERIO IQ) test strip, Use to test blood sugar 2 times daily or as directed., Disp: 200 strip, Rfl: 3    Blood Glucose Monitoring Suppl (ONETOUCH VERIO REFLECT) w/Device KIT, , Disp: , Rfl:     Cholecalciferol (VITAMIN D3 PO), Alternates between 4000 units and 5000 units daily., Disp: , Rfl:     clobetasol (TEMOVATE) 0.05 % external cream, Apply twice daily to rash (thigh, knees, hands, feet) twice daily for 7 days AS NEEDED \"use sparingly and not to be used on the face\" Follow up recommended., Disp: 60 g, Rfl: 3    COMIRNATY 30 MCG/0.3ML injection, , Disp: , Rfl:     cyanocobalamin (VITAMIN B-12) 1000 MCG sublingual tablet, Place 1 tablet (1,000 mcg) under the tongue daily, Disp: 90 tablet, Rfl: 3    Lancets (ONETOUCH DELICA PLUS [id number]) MISC, USE TO TEST TWICE DAILY, Disp: , Rfl:     magnesium 200 MG TABS, Take 2 tablets by mouth daily at 2 pm, Disp: , Rfl:     rosuvastatin (CRESTOR) 20 MG tablet, Take 1 tablet (20 mg) by mouth daily, Disp: 90 tablet, Rfl: 3    sertraline (ZOLOFT) 100 MG tablet, Take 1 tablet (100 mg) by mouth daily, Disp: 90 tablet, Rfl: 1    tiotropium (SPIRIVA RESPIMAT) 2.5 MCG/ACT inhaler, Inhale 2 puffs into the lungs daily, Disp: 12 g, Rfl: 3    ALLERGIES: Bacitracin, Dust mites, and Nickel    SOCIAL HISTORY:    Social History     Socioeconomic History    Marital status: "      Spouse name: Not on file    Number of children: Not on file    Years of education: Not on file    Highest education level: Not on file   Occupational History     Employer: RETIRED   Tobacco Use    Smoking status: Former     Packs/day: 1.50     Years: 25.00     Additional pack years: 0.00     Total pack years: 37.50     Types: Cigarettes     Quit date: 10/21/1986     Years since quittin.2     Passive exposure: Never    Smokeless tobacco: Never    Tobacco comments:     quit    Vaping Use    Vaping Use: Never used   Substance and Sexual Activity    Alcohol use: Not Currently    Drug use: No    Sexual activity: Not Currently     Partners: Male   Other Topics Concern    Parent/sibling w/ CABG, MI or angioplasty before 65F 55M? No     Service Not Asked    Blood Transfusions Not Asked    Caffeine Concern Not Asked    Occupational Exposure Not Asked    Hobby Hazards Not Asked    Sleep Concern Not Asked    Stress Concern Not Asked    Weight Concern Not Asked    Special Diet Not Asked    Back Care Not Asked    Exercise Not Asked    Bike Helmet Yes    Seat Belt Yes    Self-Exams Not Asked   Social History Narrative    Not on file     Social Determinants of Health     Financial Resource Strain: Low Risk  (2023)    Financial Resource Strain     Within the past 12 months, have you or your family members you live with been unable to get utilities (heat, electricity) when it was really needed?: No   Food Insecurity: Low Risk  (2023)    Food Insecurity     Within the past 12 months, did you worry that your food would run out before you got money to buy more?: No     Within the past 12 months, did the food you bought just not last and you didn t have money to get more?: No   Transportation Needs: High Risk (2023)    Transportation Needs     Within the past 12 months, has lack of transportation kept you from medical appointments, getting your medicines, non-medical meetings or  appointments, work, or from getting things that you need?: Yes   Physical Activity: Inactive (2/1/2022)    Exercise Vital Sign     Days of Exercise per Week: 0 days     Minutes of Exercise per Session: 0 min   Stress: Stress Concern Present (2/1/2022)    [employer] - Occupational Stress Questionnaire     Feeling of Stress : To some extent   Social Connections: Moderately Integrated (2/1/2022)    Social Connection and Isolation Panel [NHANES]     Frequency of Communication with Friends and Family: Twice a week     Frequency of Social Gatherings with Friends and Family: More than three times a week     Attends [employer] Services: 1 to 4 times per year     Active Member of Clubs or Organizations: Yes     Attends Club or Organization Meetings: Not on file     Marital Status:    Interpersonal Safety: High Risk (12/13/2023)    Interpersonal Safety     Do you feel physically and emotionally safe where you currently live?: No     Within the past 12 months, have you been hit, slapped, kicked or otherwise physically hurt by someone?: No     Within the past 12 months, have you been humiliated or emotionally abused in other ways by your partner or ex-partner?: No   Housing Stability: Low Risk  (12/13/2023)    Housing Stability     Do you have housing? : Yes     Are you worried about losing your housing?: No         FAMILY HISTORY:   Family History   Problem Relation Age of Onset    Cancer - colorectal Mother     Colon Cancer Mother     Prostate Cancer Father     Alzheimer Disease Father     Cancer Paternal Grandmother     Alzheimer Disease Paternal Grandfather     Cancer - colorectal Brother     Diabetes Brother     Crohn's Disease Daughter     Diabetes Brother     Eye Disorder Brother     Diabetes Son     Cancer Maternal Uncle     Cancer Maternal Uncle      Non-contributory for problems with anesthesia    REVIEW OF SYSTEMS:   The patient was asked a 14 point review of systems regarding  constitutional symptoms, eye symptoms, ears, nose, mouth, throat symptoms, cardiovascular symptoms, respiratory symptoms, gastrointestinal symptoms, genitourinary symptoms, musculoskeletal symptoms, integumentary symptoms, neurological symptoms, psychiatric symptoms, endocrine symptoms, hematologic/lymphatic symptoms, and allergic/ immunologic symptoms.   The pertinent factors have been included in the HPI and below.  Patient Supplied Answers to Review of Systems       No data to display                Physical Examination   The patient underwent a physical examination as described below. The pertinent positive and negative findings are summarized after the description of the examination.  Constitutional: The patient's developmental and nutritional status was assessed. The patient's voice quality was assessed.  Head and Face: The head and face were inspected for deformities. The sinuses were palpated. The salivary glands were palpated. Facial muscle strength was assessed bilaterally.  Eyes: Extraocular movements and primary gaze alignment were assessed.  Ears, Nose, Mouth and Throat: The ears and nose were examined for deformities. The nasal septum, mucosa, and turbinates were inspected by anterior rhinoscopy. The lips, teeth, and gums were examined for abnormalities. The oral mucosa, tongue, palate, tonsils, lateral and posterior pharynx were inspected for the presence of asymmetry or mucosal lesions.    Neck: The tracheal position was noted, and the neck mass palpated to determine if there were any asymmetries, abnormal neck masses, thyromegally, or thyroid nodules.  Respiratory: The nature of the breathing and chest expansion/symmetry was observed.  Cardiovascular: The patient was examined to determine the presence of any edema or jugular venous distension.  Abdomen: The contour of the abdomen was noted.  Lymphatic: The patient was examined for infraclavicular lymphadenopathy.  Musculoskeletal: The patient was  inspected for the presence of skeletal deformities.  Extremities: The extremities were examined for any clubbing or cyanosis.  Skin: The skin was examined for inflammatory or neoplastic conditions.  Neurologic: The patient's orientation, mood, and affect were noted. The cranial nerve  functions were examined.  Other pertinent positive and negative findings on physical examination:      OC/OP: no lesions, uvula midline, soft palate elevates symmetrically   Neck: no lesions, no TH tenderness to palpation  All other physical examination findings were within normal limits and noncontributory.  Procedures     Video Laryngoscopy with Stroboscopy (CPT 14288)    Preoperative Diagnosis:  Dysphonia and throat symptoms  Postoperative Diagnosis: Dysphonia and throat symptoms  Indication:  Patient has new or persistent dysphonia and throat symptoms.  This requires evaluation by stroboscopy to fully delineate the laryngeal functioning; especially mucosal wave assessment, ultrasharp visualization of lesions on the vocal folds, and overall functioning of the larynx.  Details of Procedure: A 70 degree rigid telescopic laryngoscope or a distal chip flexible scope was used. The lighting was obtained via a light cable connected to a stroboscopic unit. The telescope was inserted either transorally or transnasally until the vocal folds could be visualized. The patient was instructed to sustain the vowel  ee  at a comfortable pitch and loudness as the voice was monitored by a microphone connected to a fundamental frequency tracker. This circuit tracked vocal periodicity, allowing the light to flash in synchrony with the glottal cycles. A setting on the stroboscope was set to change the phase of light strobing with relation to the vocal fundamental frequency, producing an image of 1 to 2 glottal cycles every second. The video images were recorded for analysis. Use of the variable speed, slow and stop scan allowed careful study of pertinent  segments of laryngeal function to increase accuracy of clinical assessments of function and dysfunction.  In particular, features of glottal closure, mucosal wave on the vocal fold cover and laryngeal symmetry were analyzed. Lastly, the patient was asked to phonate speech samples and auditory/perceptual evaluation of voice and resonance were performed.  The vocal quality was carefully evaluated for hoarseness, breathiness, loudness, phrase length and intelligibility to determine the source of dysphonia.    Findings:      B. LARYNGOVIDEOSTROBOSCOPY   Anatomic/Physiological Deviations:  RNC, right vocal fold mild restriction and vibration, has good vocal fold mobility  Mucosal wave: Right:  Little restriction     Left: No restriction  Bilateral Vocal Fold Vibration: Asymmetric  Vocal Process: Right: No restriction    Left:  No restriction  Vocal Fold closure: Complete glottal closure    Complication(s): None  Disposition: Patient tolerated the procedure well         Fiberoptic Endoscopic Evaluation of Swallowing (CPT 50820)  and Interpretation of Swallowing (CPT 65852)    Indications: See above notes for complete history and physical. Patient complains of dysphagia to both solids and liquids and/or there is suggestion on history and endoscopic exam of the presence of dysphagia causing medical complaints.  Swallowing evaluation is being performed to assess the presence and degree of dysphagia, and to recommend a safe diet.     Pulmonary Status:  No PNA   Current Diet:              regular                                        thin liquids      Consistency Amounts:  Thin Liquid: sip   Puree: sip  Solid: cookies            Positioning: upright in a chair  Oral Peripheral Exam: See physical exam section.  Anatomic Notes: See Videostroboscopy report for assessment of anatomy and laryngeal functioning  Pharyngeal secretions prior to administration of liquid or food: No   Oral Phase Abnormal Findings: No abnormal behavior  observed   Pharyngeal Phase Abnormal Findings: episode of penetration with serial sips    Recommended Diet:  regular                                        thin liquids            Review of Relevant Clinical Data   I personally reviewed:  Notes: Dr. Maria Del Carmen Caballero,SLP, 12/27/23,  Throat Relaxation-HEP-Pt reports inconsistent completion in SOVT cup and bubbles exercises but does think these were helpful. Reviewe exercises today with cup and bubbles as well as tongue trill being the most faciliating, difficulty achieving forward focused humming. Pt denies laryngeal tension, SLP provided education on circumlaryngeal massage options and heat therapy PRN. Pt's voice continues with breathy/airy quality and pitch breaks.     Dr. Haley Garcia, 12/1/23, General surgery  Assessment and Plan:  Gemma is doing well after left thyroid lobectomy.  I recommend a follow up with Dr. Haley Singh in the next few weeks for TSH check.  Dr. Singh and I have discussed the risks/benefits of completion thyroidectomy for Ms. Cowart and agree that lobectomy should be sufficient treatment for this 1.5 cm PTC confined to the thyroid. She should undergo FNA of the 6 mm nodule on the right which is TI-RADS 4.  The patient is still contemplating undergoing completion thyroidectomy. I would like to evaluate her voice further before agreeing to this. Her left RLN function was excellent during surgery, so I am surprised that she is hoarse today. Recommend laryngoscopy to evaluate for vocal cord movement and speech therapy consult.      Dr. Inna Montes, 3/3/22, Cardiology  ASSESSMENT AND RECOMMENDATIONS: Ms. Cowart has some occasional minor palpitation. She has no evidence of recurrent SVT from the previous ablation. The occasional short-lasting atrial tachycardia is not uncommon in people of her age. I do not recommend intervention. The patient is asked to check her radial pulses for 1 minute if she has palpitation in the future. If she detects  occasional skipping of the pulses, she does not need to be concerned about it. At this point, I do not see restriction for activities. She does not need routine Cardiology followup.     Dr. 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  We reviewed images together.  Potentially, this could explain her skull pain, but occipital neuralgia still remains in differential.  I discussed with the patient that she needs to connect with her oncologist for further evaluation of these lesions.  Once they are addressed, and her pain continues, we might consider occipital nerve blocks and other treatments.     Radiology: chest xray 9/11/23  Impression:  Cardiac silhouette size is within normal limits. Calcified atherosclerotic changes at the aortic arch. Mild hyperinflation suggesting underlying COPD. No new infiltrates. Hypertrophic changes, thoracic spine. Postoperative changes within the upper abdominal midline.    Procedures: PFTs 10/20/22  IMPRESSION:   Normal pulmonary function.     Upper GI endoscopy 10/6/17  Impression:       - Normal esophagus.                             - Myla-en-Y gastrojejunostomy with gastrojejunal                             anastomosis characterized by healthy appearing                             mucosa.                             - Normal examined jejunum.                             - No specimens collected.   Labs:  Lab Results   Component Value Date    TSH 2.22 05/23/2023     Lab Results   Component Value Date     08/08/2023    CO2 24 08/08/2023    BUN 30.5 (H) 08/08/2023    CREAT 1.3 (H) 04/16/2019    PHOS 3.6 11/17/2022     Lab Results   Component Value Date    WBC 4.8 08/08/2023    HGB 12.5 08/08/2023    HCT 40.8 08/08/2023     "MCV 85 08/08/2023     08/08/2023     Lab Results   Component Value Date    INR 2.18 (H) 07/24/2021     No results found for: \"RAFAEL\"  No components found for: \"RHEUMATOIDFACTOR\", \"RF\"  Lab Results   Component Value Date    CRP <2.9 10/02/2017     No components found for: \"CKTOT\", \"URICACID\"  No components found for: \"C3\", \"C4\", \"DSDNAAB\", \"NDNAABIFA\"  No results found for: \"MPOAB\"    Patient reported Quality of Life (QOL) Measures   Patient Supplied Answers To VHI Questionnaire       No data to display                  Patient Supplied Answers To EAT Questionnaire       No data to display                  Patient Supplied Answers To CSI Questionnaire       No data to display                  Patient Supplied Answers to Dyspnea Index Questionnaire:       No data to display                Impression & Plan     IMPRESSION: Ms. 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 Cowart. If you have any questions, please do not hesitate to contact me.    Scribe Disclosure:   I, Juli Rowell, am serving as a scribe; to document services personally performed by Lovely Rosario MD -based on data collection and the provider's statements to me.     Provider Disclosure:  I agree with above History, Review of " Systems, Physical exam and Plan.  I have reviewed the content of the documentation and have edited it as needed. I have personally performed the services documented here and the documentation accurately represents those services and the decisions I have made.           Lovely Rosario MD    Laryngology    [facility]  Department of  Otolaryngology - Head and Neck Surgery  Clinics & Surgery Center  [address] [place] [address]  Appointment line: [phone]  Fax: [phone]  [email or web address]

## 2024-01-18 NOTE — PATIENT INSTRUCTIONS
1.  You were seen in the ENT Clinic today by Dr. Rosario. If you have any questions or concerns after your appointment, please call [phone]. Press option #1 for scheduling related needs. Press option #3 for Nurse advice.    2.  Plan is to return to clinic as needed    Anastasia Cuadra  Please call [phone] with any questions or concerns  [facility] - Otolaryngology

## 2024-01-18 NOTE — LETTER
"1/18/2024       RE: Gemma Cowart  [address] [place] [id number]     Dear Colleague,    Thank you for referring your patient, Gemma Cowart, to the [facility]ICE [facility] [place] at [facility]. Please see a copy of my visit note below.    [facility] VOICE [facility]INIC  Evaluation report    Clinician: Art Hernandez M.M., M.A., CCC/SLP  Seen in conjunction with: Dr. Rosario  Referring physician:  Dr. Garcia  Patient: Gemma Cowart  Date of Visit: 1/18/2024    HISTORY  Chief complaint: Gemma Cowart is a 78 year old woman presenting today for evaluation of voice.    Chart Review: Patient underwent left thyroid \"lobectomy on 11/14/2023 experiencing subsequent dysphonia.  On 12/1/2023 general surgeon recommended that she follow-up with ENT for further evaluation of vocal cord motion as well as putting in therapy consult.  Patient underwent therapeutic evaluation on 12/8/2023 but did not have visualization at that time.  She has had 1 additional session of speech therapy since that time.  She presents today for further evaluation and treatment as warranted.    CURRENT SYMPTOMS PER PATIENT REPORT:  VOICE  Today she reports the voice quality degraded immediately following thyroid surgery.  At its worst it dropped down to a level of 2 out of 10 (10 being her old voice) since then it has been gradually begun to improve.  She has been diligent in the use of therapeutic exercises which she learned (describing semi-occluded vocal tract exercises) and credits this with the degree of her improvement.  Despite this voice quality is still notably limited now achieving a best quality of 5 out of 10 (10 being her old voice).  Voice quality will worsen over the course of the day or with use.  Frequently does not feel that she can converse by the end of the day.  She has modest vocal demands on a typical day    Patient does endorse more longstanding breathing and cough " issues related to COPD, but states that these have not changed since the thyroid surgery.  Regarding her swallowing she reports sensation of food getting stuck at times.  Maintains normal diet.    OTHER PERTINENT HISTORY  Please see my colleague Donna Boss CCC-SLP's note for additional details    Past Medical History:   Diagnosis Date    Anemia     iron deficiency anemia    Anxiety     Breast cancer (H) 2012    Bilateral Masectomies 9/2012    Complications of gastric bypass surgery     COPD (chronic obstructive pulmonary disease) (H) 04/21/2021    Diabetes mellitus (H)     diet controlled    DVT (deep venous thrombosis) (H)     Eczema     Fracture of left knee region     patella - vertical    History of kidney stones     Obesity     Palpitation     with no treatment    Polyneuropathy     Pruritus     generalized    Rosacea     SVT (supraventricular tachycardia)     s/p ablation 5/28/2015 at [facility] for left lateral AP     Past Surgical History:   Procedure Laterality Date    ABDOMEN SURGERY  2000    gastric bypass 2000    COLONOSCOPY  10/11/2012    Procedure: COLONOSCOPY;  colonoscopy;  Surgeon: Gela Cleary MD;  Location:  GI    COLONOSCOPY N/A 10/6/2017    Procedure: COLONOSCOPY;;  Surgeon: Shashank Ortiz MD;  Location: [facility]    COLONOSCOPY N/A 3/7/2022    Procedure: COLONOSCOPY;  Surgeon: Andrea Daniel MD;  Location: [facility]    DENTAL SURGERY  5/2007    all teeth removed - dentures    ENT SURGERY      all teeth pulled    ESOPHAGOSCOPY, GASTROSCOPY, DUODENOSCOPY (EGD), COMBINED N/A 10/6/2017    Procedure: COMBINED ESOPHAGOSCOPY, GASTROSCOPY, DUODENOSCOPY (EGD);  ESOPHAGOSCOPY, GASTROSCOPY, DUODENOSCOPY (EGD) & Colonoscopy *Needs INR on arrival;  Surgeon: Shashank Ortiz MD;  Location:  GI    GYN SURGERY  1/10/75    tubal ligation     H ABLATION SVT  5/28/2015    stopped metoprolol    HERNIA REPAIR  2005    IMPLANT FILTER INFERIOR VENA CAVA  10/8/2012    taken out  1/4/12     "INSERT TISSUE EXPANDER BREAST BILATERAL  9/5/2012    Procedure: INSERT TISSUE EXPANDER BREAST BILATERAL;;  Surgeon: Orlando Wall MD;  Location:  OR    MASTECTOMY SIMPLE, SENTINEL NODE, COMBINED  9/5/2012    Procedure: COMBINED MASTECTOMY SIMPLE, SENTINEL NODE;  BILATERAL MASTECTOMY WITH LEFT AXILLARY SENTINEL LYMPH NODE BIOPSY, BILATERAL TISSUE EXPANDER       MASTECTOMY, BILATERAL      PANNICULECTOMY N/A 4/5/2018    Procedure: PANNICULECTOMY;  PANNICULECTOMY ;  Surgeon: Orlando Wall MD;  Location:  OR    RECONSTRUCT BREAST BILATERAL, IMPLANT PROSTHESIS BILATERAL, COMBINED  5/22/2013    Procedure: COMBINED RECONSTRUCT BREAST BILATERAL, IMPLANT PROSTHESIS BILATERAL;  BILATERAL SECOND STAGE BREAST RECONSTRUCTION WITH SILICONE GEL IMPLANTS AND LIPOSUCTION;  Surgeon: Orlando Wall MD;  Location:  SD    RECONSTRUCT NIPPLE BILATERAL  8/22/2013    Procedure: RECONSTRUCT NIPPLE BILATERAL;  BILATERAL NIPPLE AREOLAR RECONSTRUCTION;  Surgeon: Orlando Wall MD;  Location:  SD    THYROIDECTOMY N/A 11/14/2023    Procedure: Left Thyroid Lobectomy and Autotransplant of Left Superior Parathyroid;  Surgeon: Haley Garcia MD;  Location:  OR    [facility] RECONSTR[facility] W [facility]TH[facility] TECHNIQ  5/8/2013       OBJECTIVE    PERCEPTUAL EVALUATION (CPT 93166)  POSTURE / TENSION:   No overt tension visible    BREATHING:   phonation is not coordinated with respiration    VOICE:  Roughness: Moderate to severe Consistent  Breathiness: Minimal  Strain: Mild to moderate Consistent  Loudness  Conversational speech:  Mild to moderately reduced  Projected speech:  Moderate to severely reduced  Pitch:  Conversational speech:  Mildly lowered  Pitch glide:   Patient demonstrates significant difficulty achieving a pitch glide even with maximal clinician support  She states that she has never been a \"grover\" so it is unclear the degree to which this is physiologically based versus functional.  Resonance:  Conversational " speech:  laryngeal pharyngeal resonance  Singing vs. Speech: Consistent across contexts    COUGH/THROAT CLEARING:  Occasional throat clearing    THERAPY PROBES: Improvement was elicited with use of forward resonant stimuli, coordination of respiration and phonation, and use of glottic coup to promote vocal fold closure  ____________________________________________________________________  Laryngeal Function Studies   Laryngeal function studies are warranted but were unable to be completed today secondary to timing.  ____________________________________________________________________    LARYNGEAL EXAMINATION  Procedure: Flexible endoscopy with chip-tip technology with stroboscopy, right nostril; topical anesthesia with 3% Lidocaine and 0.25% phenylephrine was not applied.   Performed by: Dr. Lovely Rosario  The laryngeal and pharyngeal structures were evaluated for gross appearance, mobility, function, and focal lesions / abnormalities of the associated mucosa.  Stroboscopy was warranted to evaluate closure, symmetry, and vibratory characteristics of the vocal folds.  All findings were within normal limits with the exception of the following salient features:   Essentially healthy mucosa  Brisk motion bilaterally on repetitive motion tasks in AB and ADduction  Moderate 4 way constrictive supraglottic hyperfunction  Reduction of hyperfunction is noted with higher flow contacts  Improvement in clarity of voicing is noted occasionally with glottic coup  Stroboscopy demonstrates limited views due to the degree of roughness, these demonstrate:  Increased amplitude and mucosal wave of the left true vocal fold  Near normal vibratory characteristics of the right true vocal fold  Associated phase asymmetry  Frequent aperiodicity    The laryngeal exam was reviewed with Ms. Cowart, and I provided pertinent explanations, as well as written and oral information.    ASSESSMENT / PLAN  IMPRESSIONS: Gemma Cowart is presenting today  with acute voice changes that began following left thyroid lobectomy on 11/14/2023.  Today's evaluation demonstrates Dysphonia (R49.0) in the context of an imbalance in function of the intrinsic and extrinsic muscles of the larynx and possible subtle residual left true vocal fold weakness . Laryngeal evaluation shows brisk bilateral motion and full range of motion of the vocal folds and AB and adduction as well as elongation and contraction.  There is frequent a periodicity, though limited views on stroboscopy show increased amplitude and mucosal wave of the left true vocal fold which may point towards decreased left true vocal fold bulk/tone.  Complete glottic closure is able to be achieved.  There are variable degrees of supraglottic hyperfunction, though improved clarity and reduced hyperfunction are noted with higher flow contexts.  Perceptually patient's voice is notable for significant roughness, decreased intensity, and to a lesser degree strain.     DETAILED RECOMMENDATIONS:   Patient was encouraged to continue working with her already established speech therapist on tasks to improve phonatory respiratory coordination and ease with voicing.  Precise goals will be left to the treating clinician  If there is residual weakness it will likely continue to improve across time given excellent appearance of mobility, and she was also encouraged to prioritize her global health and conditioning as this will play a role in improving her respiratory drive and overall endurance.  Should she plateau with her current clinician she was encouraged to reach out to the clinic as she is welcome to be seen by one of our providers if desired.  Today's appointment is evaluation only.    This treatment plan was developed with the patient who agreed with the recommendations.    TOTAL SERVICE TIME: 30 minutes  EVALUATION OF VOICE AND RESONANCE (58955)  NO CHARGE FACILITY FEE (12031)    Art Hernandez M.M., M.A.,  CCC-SLP  Speech-Language Pathologist  Certificate of Vocology  [phone]    *this report was created in part through the use of computerized dictation software, and though reviewed following completion, some typographic errors may persist.  If there is confusion regarding any of this notes contents, please contact me for clarification.*      Again, thank you for allowing me to participate in the care of your patient.      Sincerely,    Speech Language Pathologist

## 2024-01-18 NOTE — PROGRESS NOTES
SPEECH LANGUAGE PATHOLOGY EVALUATION    See electronic medical record for Abuse and Falls Screening details.    Subjective      Presenting condition or subjective complaint:    Date of onset: 01/18/24    Relevant medical history:   COPD, anemia, DM, DVT, obesity, polyneuropathy, gastric bypass with complications  Dates & types of surgery:  hemithyroidectomy 11/14/23    Prior diagnostic imaging/testing results:     no previous swallow studies reported  Prior therapy history for the same diagnosis, illness or injury:        Living Environment  Social support:   lives alone  Help at home:   independent  Equipment owned:       Employment:    retired: [employer], cook at [employer],  at a grocery store  Hobbies/Interests:   knit, tyrone, gardening, sleep, watch TV, read a little bit    Patient goals for therapy:   to see if her voice with ever improve. Usually by the end of the day she is left with just a whisper    Since half the thyroid was removed.    Pain assessment: Pain denied     Objective     SWALLOW EVALUTION  Dysphagia history: Pt reports some feeling of food getting stuck.    Current Diet/Method of Nutritional Intake: thin liquids (level 0), regular diet        CLINICAL SWALLOW EVALUATION WITH ENDOSCOPIC VIEW PROVIDED BY ENT    Oral Motor Function: Dentition: adequate dentition  Secretion management: WFL  Mucosal quality: adequate  Mandibular function: intact  Oral labial function: WFL  Lingual function: WFL  Velar function: intact   Buccal function: intact  Laryngeal function: cough, throat clear, volitional swallow, voicing WFL     Level of assist required for feeding: no assistance needed   Textures Trialed:   Clinical Swallow Eval: Thin Liquids  Mode of presentation: straw   Volume presented: 4oz  Preparatory Phase: WFL  Oral Phase: WFL  Pharyngeal phase of swallow: intact   Diagnostic statement: PO trials evaluated under endoscopy completed by MD. No penetration/aspiration or significant  residuals.     Clinical Swallow Eval: Purees  Mode of presentation: spoon   Volume presented: 3 tablespoons  Preparatory Phase: WFL  Oral Phase: WFL  Pharyngeal phase of swallow: intact   Diagnostic statement: PO trials evaluated under endoscopy completed by MD. No penetration/aspiration or significant residuals.     Clinical Swallow Eval: Solids  Mode of presentation: self-fed   Volume presented: 1 Jennie doone  Preparatory Phase: WFL  Oral Phase: WFL  Pharyngeal phase of swallow: intact   Diagnostic statement: PO trials evaluated under endoscopy completed by MD. No penetration/aspiration or significant residuals.         ESOPHAGEAL PHASE OF SWALLOW  no observed or reported concerns related to esophageal function     SWALLOW ASSESSMENT CLINICAL IMPRESSIONS AND RATIONALE  Diet Consistency Recommendations: thin liquids (level 0), regular diet    Recommended Feeding/Eating Techniques: slow rate of intake, maintain upright sitting position for eating   Medication Administration Recommendations: Whole with liquid ok  Instrumental Assessment Recommendations: none     Assessment & Plan   CLINICAL IMPRESSIONS   Medical Diagnosis: dysphagia, dysphonia    Treatment Diagnosis: safe functional oropharyngeal swallow   Impression/Assessment: Pt is a 78 year old female with voice changes and swallowing complaints. The following significant findings have been identified: safe functional swallow, characterized by timely swallow on all consistencies presented. No pharyngeal residue after the swallow. No aspiration noted on any consistency presented. There was trace penetration on large consecutive sips of thin liquid however this is within normal for pt's age. Adequate oral manipulation of bolus and no oral residue noted. Pt demonstrates adequate ROM and strength of oral mechanism. No further SLP services indicated for dysphagia at this time.     PLAN OF CARE  Evaluation only    Recommended Referrals to Other Professionals:  none  Education Assessment:   Learner/Method: Patient;No Barriers to Learning    Risks and benefits of evaluation/treatment have been explained.   Patient/Family/caregiver agrees with Plan of Care.     Evaluation Time:    SLP Eval: oral/pharyngeal swallow function, clinical minutes (82165): 15     Present: Not applicable     Signing Clinician: Donna Boss, SLP

## 2024-01-23 ENCOUNTER — OFFICE VISIT (OUTPATIENT)
Dept: SURGERY | Facility: CLINIC | Age: 79
End: 2024-01-23
Payer: COMMERCIAL

## 2024-01-23 ENCOUNTER — LAB (OUTPATIENT)
Dept: LAB | Facility: CLINIC | Age: 79
End: 2024-01-23
Payer: COMMERCIAL

## 2024-01-23 ENCOUNTER — PATIENT OUTREACH (OUTPATIENT)
Dept: GERIATRIC MEDICINE | Facility: CLINIC | Age: 79
End: 2024-01-23

## 2024-01-23 VITALS
WEIGHT: 170 LBS | DIASTOLIC BLOOD PRESSURE: 60 MMHG | BODY MASS INDEX: 31.28 KG/M2 | HEART RATE: 73 BPM | HEIGHT: 62 IN | SYSTOLIC BLOOD PRESSURE: 118 MMHG | OXYGEN SATURATION: 98 %

## 2024-01-23 DIAGNOSIS — E21.3 HYPERPARATHYROIDISM (H): ICD-10-CM

## 2024-01-23 DIAGNOSIS — C73 MALIGNANT NEOPLASM OF THYROID GLAND (H): ICD-10-CM

## 2024-01-23 DIAGNOSIS — Z09 SURGICAL FOLLOWUP VISIT: Primary | ICD-10-CM

## 2024-01-23 DIAGNOSIS — M81.8 OTHER OSTEOPOROSIS WITHOUT CURRENT PATHOLOGICAL FRACTURE: ICD-10-CM

## 2024-01-23 LAB
PTH-INTACT SERPL-MCNC: 70 PG/ML (ref 15–65)
T4 FREE SERPL-MCNC: 0.86 NG/DL (ref 0.9–1.7)
TSH SERPL DL<=0.005 MIU/L-ACNC: 3.42 UIU/ML (ref 0.3–4.2)
VIT D+METAB SERPL-MCNC: 44 NG/ML (ref 20–50)

## 2024-01-23 PROCEDURE — 84443 ASSAY THYROID STIM HORMONE: CPT

## 2024-01-23 PROCEDURE — 99024 POSTOP FOLLOW-UP VISIT: CPT | Performed by: SURGERY

## 2024-01-23 PROCEDURE — 84439 ASSAY OF FREE THYROXINE: CPT

## 2024-01-23 PROCEDURE — 82306 VITAMIN D 25 HYDROXY: CPT

## 2024-01-23 PROCEDURE — 83970 ASSAY OF PARATHORMONE: CPT

## 2024-01-23 PROCEDURE — 36415 COLL VENOUS BLD VENIPUNCTURE: CPT

## 2024-01-23 PROCEDURE — 86800 THYROGLOBULIN ANTIBODY: CPT

## 2024-01-23 NOTE — PROGRESS NOTES
[facility] Care Coordination Contact     CHW,followed up w/ Mbr on housing.Mbr requested follow up call on 1/25/24.       LUIS Rodriguez  [facility]  [phone]

## 2024-01-25 ENCOUNTER — PATIENT OUTREACH (OUTPATIENT)
Dept: GERIATRIC MEDICINE | Facility: CLINIC | Age: 79
End: 2024-01-25
Payer: COMMERCIAL

## 2024-01-25 NOTE — PROGRESS NOTES
[facility] Care Coordination Contact     CHW, followed up w/ Mbr on scheduled  housing follow up call. Lvm to rtn call.     LUIS Rodriguez  [facility]  [phone]

## 2024-01-30 LAB — SCANNED LAB RESULT: NORMAL

## 2024-01-31 ENCOUNTER — TELEPHONE (OUTPATIENT)
Dept: ENDOCRINOLOGY | Facility: CLINIC | Age: 79
End: 2024-01-31
Payer: COMMERCIAL

## 2024-02-01 ENCOUNTER — PATIENT OUTREACH (OUTPATIENT)
Dept: GERIATRIC MEDICINE | Facility: CLINIC | Age: 79
End: 2024-02-01
Payer: COMMERCIAL

## 2024-02-01 NOTE — LETTER
February 1, 2024    SALUD MARTINEZ  [address] [place]  [place] [address]        Dear Salud:    As a member of [facility], we offer a health risk assessment at no cost to you. I know you don't want to have the assessment right now. If you change your mind, please call me at the number below.    Who performs the health risk assessment?  A [facility] Care Coordinator performs the assessment. Our Care Coordinators can also help you understand your benefits. They can tell you about services to aid you at home, such as managing your care with your doctors if your health worsens.    Our Care Coordinators are here for you if you need:  Support for activities you used to do by yourself (including making meals, bathing, and paying bills)  Equipment for bathroom or home safety  Help finding a new place to live  Information on staying healthy, preventing falls and immunizations    Questions?  If you have questions, or you would like to do the assessment, call me at [phone]. TTY users call [phone]. I'm here from 8am to 5pm. I may reach out to you again soon.      Sincerely,          Ifrah Emerson RN    E-Mail:  [email or web address]  Phone: [phone]      [employer]    <[id number] accepted    [id number] (12/2021)  [id number]>

## 2024-02-01 NOTE — PROGRESS NOTES
"[facility] Care Coordination Contact    Per CC, mailed client a \"Refusal of Home Visit\" letter.        Lilia Frias  Care Management Specialist  [facility]  [phone]           "
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
## 2024-02-01 NOTE — Clinical Note
Member declined Health risk assessment this year. She reports she is still independent and can still take care of herself. See note for details on housing situation.   Thank you Ifrah Emerson RN [facility] [phone]

## 2024-02-07 NOTE — PROGRESS NOTES
"Progress Note/Post-op Follow up:  Gemma is here for follow up after left thyroid lobectomy and autotransplant of the left superior parathyroid 2 months ago. 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 A 6 mm nodule in her remnant thyroid has been biopsied as benign. She has had difficulty with hoarseness since her operation and just had an evaluation with ENT and laryngoscopy. 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     Physical Exam:  /60   Pulse 73   Ht 1.575 m (5' 2\")   Wt 77.1 kg (170 lb)   LMP 09/24/1990   SpO2 98%   BMI 31.09 kg/m    General:  Appears well, in no acute distress  Neck:  Incision site healing nicely, neck is flat.              Range of motion is normal.  Neuro:  Voice is weak, though cough sounds strong    Pathology:  Pathology was reviewed with the patient.   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  She will undergo surveillance with Dr. Singh of endocrinology. She is scheduled to have her TSH checked this week to assess for post surgical hypothyroidism. She should continue to work with speech therapy for her dysphonia.      Haley Garcia MD  Please route or send letter to:  Dr. Haley Foley        "

## 2024-03-25 ENCOUNTER — TRANSFERRED RECORDS (OUTPATIENT)
Dept: HEALTH INFORMATION MANAGEMENT | Facility: CLINIC | Age: 79
End: 2024-03-25
Payer: COMMERCIAL

## 2024-04-18 DIAGNOSIS — J44.9 CHRONIC OBSTRUCTIVE PULMONARY DISEASE, UNSPECIFIED COPD TYPE (H): ICD-10-CM

## 2024-04-18 DIAGNOSIS — F41.9 ANXIETY: ICD-10-CM

## 2024-04-18 NOTE — TELEPHONE ENCOUNTER
Medication Question or Refill        What medication are you calling about (include dose and sig)?: tiotropium (SPIRIVA RESPIMAT) 2.5 MCG/ACT inhaler      Cholecalciferol (VITAMIN D3 PO)     sertraline (ZOLOFT) 100 MG tablet       Preferred Pharmacy:   [id number] [place] - [address] [place] [address] AT [address]  [address] [facility] [address]  Phone: [phone] Fax: [phone]      Controlled Substance Agreement on file:   CSA -- Patient Level:    CSA: None found at the patient level.       Who prescribed the medication?: Dr. Foley/ Jim    Do you need a refill? Yes    When did you use the medication last? N/a    Patient offered an appointment? No    Do you have any questions or concerns?  Yes: Sertraline, would like higher dosage. Is not lasting 24 hours,. Takes in the morning can not sleep at night, takes at night she is anxious all day    tiotropium (SPIRIVA RESPIMAT) 2.5 MCG/ACT inhaler  Did not receive this one last time, she got abuterol    D3, was told it would be 3000, but they filled it with 5000    Is requesting magnesium glycinate specifically for muscle cramps        Could we send this information to you in [facility] or would you prefer to receive a phone call?:   Patient would prefer a phone call   Okay to leave a detailed message?: Yes at Home number on file [phone] (home)

## 2024-04-19 RX ORDER — SERTRALINE HYDROCHLORIDE 100 MG/1
100 TABLET, FILM COATED ORAL DAILY
Qty: 90 TABLET | Refills: 1 | Status: SHIPPED | OUTPATIENT
Start: 2024-04-19 | End: 2024-06-14

## 2024-04-19 RX ORDER — GLUCOSAMINE HCL 500 MG
1 TABLET ORAL DAILY
Qty: 90 TABLET | Refills: 0 | Status: SHIPPED | OUTPATIENT
Start: 2024-04-19 | End: 2024-09-24

## 2024-04-19 NOTE — TELEPHONE ENCOUNTER
Pended the patient's tiotropium (SPIRIVA RESPIMAT) 2.5 MCG/ACT inhaler, Cholecalciferol (VITAMIN D3 PO), and sertraline (ZOLOFT) 100 MG tablet with the [facility] #[id number] - [place], [place] - [facility] [facility] AT [facility] [address]     Dr. Foley, please review, advise, and order as appropriate. Patient wanting to increase her sertraline dose as she reports that she takes it in the morning and can't sleep at night and if she takes it at night then she is anxious all day.     Caroline SPAIN RN   [facility]

## 2024-04-25 DIAGNOSIS — E11.69 TYPE 2 DIABETES MELLITUS WITH OTHER SPECIFIED COMPLICATION, WITHOUT LONG-TERM CURRENT USE OF INSULIN (H): Primary | ICD-10-CM

## 2024-04-25 RX ORDER — BLOOD-GLUCOSE METER
KIT MISCELLANEOUS
Qty: 1 KIT | Refills: 0 | Status: SHIPPED | OUTPATIENT
Start: 2024-04-25 | End: 2024-09-24

## 2024-04-26 ENCOUNTER — TELEPHONE (OUTPATIENT)
Dept: PEDIATRICS | Facility: CLINIC | Age: 79
End: 2024-04-26
Payer: COMMERCIAL

## 2024-04-26 ENCOUNTER — TRANSFERRED RECORDS (OUTPATIENT)
Dept: HEALTH INFORMATION MANAGEMENT | Facility: CLINIC | Age: 79
End: 2024-04-26
Payer: COMMERCIAL

## 2024-04-26 DIAGNOSIS — F41.9 ANXIETY: Primary | ICD-10-CM

## 2024-04-26 RX ORDER — MAGNESIUM GLYCINATE 100 MG
200 CAPSULE ORAL 2 TIMES DAILY
Qty: 360 CAPSULE | Refills: 3 | Status: SHIPPED | OUTPATIENT
Start: 2024-04-26 | End: 2024-06-14

## 2024-04-26 NOTE — TELEPHONE ENCOUNTER
"Received fax via pharmacy RE:  Outpatient Medication Detail     Disp Refills Start End CATRINA   magnesium 200 MG TABS -- -- 12/4/2023 -- --   Sig - Route: Take 2 tablets by mouth daily at 2 pm - Oral   Class: Historical   Order: [id number]     Pharmacy states:   \"Patient wants magnesium glycinate so could you send a new rx for magnesium glycinate? Thanks!\"    Pharmacy    [facility] #[id number] - [place], [place] - [facility] AT [facility] [address]       Chinyere CABALLERO, - Flex  Primary Care- ADS, Steven Garcia [place] [facility]    "

## 2024-05-08 ENCOUNTER — TRANSFERRED RECORDS (OUTPATIENT)
Dept: HEALTH INFORMATION MANAGEMENT | Facility: CLINIC | Age: 79
End: 2024-05-08
Payer: COMMERCIAL

## 2024-05-23 ENCOUNTER — TRANSFERRED RECORDS (OUTPATIENT)
Dept: HEALTH INFORMATION MANAGEMENT | Facility: CLINIC | Age: 79
End: 2024-05-23
Payer: COMMERCIAL

## 2024-05-23 DIAGNOSIS — Z86.718 PERSONAL HISTORY OF DVT (DEEP VEIN THROMBOSIS): ICD-10-CM

## 2024-05-23 RX ORDER — APIXABAN 5 MG/1
5 TABLET, FILM COATED ORAL 2 TIMES DAILY
Qty: 180 TABLET | Refills: 3 | OUTPATIENT
Start: 2024-05-23

## 2024-05-29 ENCOUNTER — TRANSFERRED RECORDS (OUTPATIENT)
Dept: HEALTH INFORMATION MANAGEMENT | Facility: CLINIC | Age: 79
End: 2024-05-29
Payer: COMMERCIAL

## 2024-06-05 ENCOUNTER — OFFICE VISIT (OUTPATIENT)
Dept: PEDIATRICS | Facility: CLINIC | Age: 79
End: 2024-06-05
Payer: COMMERCIAL

## 2024-06-05 ENCOUNTER — TRANSFERRED RECORDS (OUTPATIENT)
Dept: HEALTH INFORMATION MANAGEMENT | Facility: CLINIC | Age: 79
End: 2024-06-05

## 2024-06-05 VITALS
WEIGHT: 173.8 LBS | HEART RATE: 54 BPM | TEMPERATURE: 98.1 F | BODY MASS INDEX: 31.98 KG/M2 | HEIGHT: 62 IN | OXYGEN SATURATION: 97 % | DIASTOLIC BLOOD PRESSURE: 74 MMHG | SYSTOLIC BLOOD PRESSURE: 122 MMHG | RESPIRATION RATE: 16 BRPM

## 2024-06-05 DIAGNOSIS — R10.9 FLANK PAIN: ICD-10-CM

## 2024-06-05 DIAGNOSIS — N18.30 STAGE 3 CHRONIC KIDNEY DISEASE, UNSPECIFIED WHETHER STAGE 3A OR 3B CKD (H): Primary | ICD-10-CM

## 2024-06-05 LAB
ALBUMIN UR-MCNC: NEGATIVE MG/DL
APPEARANCE UR: CLEAR
BILIRUB UR QL STRIP: NEGATIVE
COLOR UR AUTO: YELLOW
GLUCOSE UR STRIP-MCNC: NEGATIVE MG/DL
HGB UR QL STRIP: NEGATIVE
KETONES UR STRIP-MCNC: NEGATIVE MG/DL
LEUKOCYTE ESTERASE UR QL STRIP: ABNORMAL
NITRATE UR QL: NEGATIVE
PH UR STRIP: 5.5 [PH] (ref 5–7)
RBC #/AREA URNS AUTO: ABNORMAL /HPF
SP GR UR STRIP: 1.02 (ref 1–1.03)
SQUAMOUS #/AREA URNS AUTO: ABNORMAL /LPF
UROBILINOGEN UR STRIP-ACNC: 0.2 E.U./DL
WBC #/AREA URNS AUTO: ABNORMAL /HPF

## 2024-06-05 PROCEDURE — 81001 URINALYSIS AUTO W/SCOPE: CPT | Performed by: PHYSICIAN ASSISTANT

## 2024-06-05 PROCEDURE — 99213 OFFICE O/P EST LOW 20 MIN: CPT | Performed by: PHYSICIAN ASSISTANT

## 2024-06-05 NOTE — PROGRESS NOTES
"  Assessment & Plan     Type 2 diabetes mellitus with other specified complication (H)      CKD (chronic kidney disease) stage 3, GFR 30-59 ml/min (H)      Flank pain    - UA Macroscopic with reflex to Microscopic and Culture - Clinic Collect  - UA Microscopic with Reflex to Culture        BMI  Estimated body mass index is 31.79 kg/m  as calculated from the following:    Height as of this encounter: 1.575 m (5' 2\").    Weight as of this encounter: 78.8 kg (173 lb 12.8 oz).       Angel Menendez is a 78 year old, presenting for the following health issues:  Abdominal Pain        6/5/2024     7:59 AM   Additional Questions   Roomed by Paty Bob   Accompanied by N/A     HPI       Pain History:  When did you first notice your pain? 2-3 months    Have you seen anyone else for your pain? No  How has your pain affected your ability to work? Not currently working - unrelated to pain  Where in your body do you have pain? Abdominal/Flank Pain  Onset/Duration: 2-3 months  Description:   Character: Sharp  Location: left upper quadrant  Radiation: Back  Intensity: 9/10  Progression of Symptoms:  worsening  Accompanying Signs & Symptoms:  Fever/Chills: No  Gas/Bloating: YES- gas and pt states gas has becoming extremely foul smelling  Nausea: No  Vomitting: No  Diarrhea: YES- on and off due to IBS dx  Constipation: YES- on and off due to IBS dx  Dysuria or Hematuria: YES- pt is concerned about dysuria   History:   Trauma: No  Previous similar pain: No - pt states she is concerned that it could be lung mass growth or kidney disease dx worsening   Previous tests done: x-ray for fatty lump on that side years ago, Colonoscopy about a year ago, and Upper Endoscopy to thyroid   Precipitating factors:   Does the pain change with:     Food: No    Bowel Movement: No    Urination: No   Other factors:  YES- turning, sitting up, bending  Therapies tried and outcome: None  Patient's last menstrual period was 09/24/1990.            Review " "of Systems  Constitutional, neuro, ENT, endocrine, pulmonary, cardiac, gastrointestinal, genitourinary, musculoskeletal, integument and psychiatric systems are negative, except as otherwise noted.      Objective    /74 (BP Location: Right arm, Patient Position: Sitting, Cuff Size: Adult Regular)   Pulse 54   Temp 98.1  F (36.7  C) (Tympanic)   Resp 16   Ht 1.575 m (5' 2\")   Wt 78.8 kg (173 lb 12.8 oz)   LMP 09/24/1990   SpO2 97%   BMI 31.79 kg/m    Body mass index is 31.79 kg/m .  Physical Exam   GENERAL: alert and no distress  EYES: Eyes grossly normal to inspection, PERRL and conjunctivae and sclerae normal  NECK: no adenopathy, no asymmetry, masses, or scars  RESP: lungs clear to auscultation - no rales, rhonchi or wheezes  CV: regular rate and rhythm, normal S1 S2, no S3 or S4, no murmur, click or rub, no peripheral edema  ABDOMEN: soft, nontender, no hepatosplenomegaly, no masses and bowel sounds normal  MS: no gross musculoskeletal defects noted, no edema  BACK: no CVA tenderness, no paralumbar tenderness          Signed Electronically by: Tram Horowitz PA-C    "

## 2024-06-14 ENCOUNTER — OFFICE VISIT (OUTPATIENT)
Dept: PEDIATRICS | Facility: CLINIC | Age: 79
End: 2024-06-14
Payer: COMMERCIAL

## 2024-06-14 ENCOUNTER — TELEPHONE (OUTPATIENT)
Dept: PEDIATRICS | Facility: CLINIC | Age: 79
End: 2024-06-14

## 2024-06-14 VITALS
HEIGHT: 63 IN | SYSTOLIC BLOOD PRESSURE: 125 MMHG | DIASTOLIC BLOOD PRESSURE: 71 MMHG | RESPIRATION RATE: 16 BRPM | HEART RATE: 65 BPM | WEIGHT: 171.13 LBS | TEMPERATURE: 98.2 F | BODY MASS INDEX: 30.32 KG/M2 | OXYGEN SATURATION: 96 %

## 2024-06-14 DIAGNOSIS — E55.9 VITAMIN D DEFICIENCY: ICD-10-CM

## 2024-06-14 DIAGNOSIS — N81.4 UTERINE PROLAPSE: ICD-10-CM

## 2024-06-14 DIAGNOSIS — E53.8 VITAMIN B12 DEFICIENCY: ICD-10-CM

## 2024-06-14 DIAGNOSIS — E11.69 TYPE 2 DIABETES MELLITUS WITH OTHER SPECIFIED COMPLICATION, WITHOUT LONG-TERM CURRENT USE OF INSULIN (H): Primary | ICD-10-CM

## 2024-06-14 DIAGNOSIS — F41.1 GAD (GENERALIZED ANXIETY DISORDER): ICD-10-CM

## 2024-06-14 LAB — HBA1C MFR BLD: 5.9 % (ref 0–5.6)

## 2024-06-14 PROCEDURE — 83036 HEMOGLOBIN GLYCOSYLATED A1C: CPT | Performed by: PEDIATRICS

## 2024-06-14 PROCEDURE — G2211 COMPLEX E/M VISIT ADD ON: HCPCS | Performed by: PEDIATRICS

## 2024-06-14 PROCEDURE — 36415 COLL VENOUS BLD VENIPUNCTURE: CPT | Performed by: PEDIATRICS

## 2024-06-14 PROCEDURE — 99214 OFFICE O/P EST MOD 30 MIN: CPT | Performed by: PEDIATRICS

## 2024-06-14 RX ORDER — ESCITALOPRAM OXALATE 20 MG/1
20 TABLET ORAL DAILY
Qty: 90 TABLET | Refills: 1 | Status: SHIPPED | OUTPATIENT
Start: 2024-06-14

## 2024-06-14 RX ORDER — OXYCODONE HYDROCHLORIDE 5 MG/1
0.5 TABLET ORAL EVERY 6 HOURS PRN
COMMUNITY
Start: 2024-05-18 | End: 2024-06-14

## 2024-06-14 RX ORDER — RESPIRATORY SYNCYTIAL VIRUS VACCINE 120MCG/0.5
0.5 KIT INTRAMUSCULAR ONCE
Qty: 1 EACH | Refills: 0 | Status: CANCELLED | OUTPATIENT
Start: 2024-06-14 | End: 2024-06-14

## 2024-06-14 RX ORDER — LANOLIN ALCOHOL/MO/W.PET/CERES
1000 CREAM (GRAM) TOPICAL DAILY
Qty: 90 TABLET | Refills: 3 | Status: SHIPPED | OUTPATIENT
Start: 2024-06-14

## 2024-06-14 ASSESSMENT — PAIN SCALES - GENERAL: PAINLEVEL: NO PAIN (0)

## 2024-06-14 NOTE — PATIENT INSTRUCTIONS
Stop sertraline and start Lexapro (escitalopram) - follow up in 2 months with Tram WASHBURN to make sure this is working  Labs today  Make sure you have a peanut butter sandwich before sweets  We will check on the vit D soft gel at your pharmacy      Follow-up in 6 months with me for Annual Wellness and diabetes check.

## 2024-06-14 NOTE — PROGRESS NOTES
"  Assessment & Plan     (E11.69) Type 2 diabetes mellitus with other specified complication, without long-term current use of insulin (H)  (primary encounter diagnosis)  Comment: diet controlled.  Pattern of lows is well correlated with eating lots of sugar on empty stomach. See PI  Plan: HEMOGLOBIN A1C, Albumin Random Urine         Quantitative with Creat Ratio, blood glucose         (NO BRAND SPECIFIED) lancets standard            (F41.1) ELI (generalized anxiety disorder)  Comment: patient getting stomach pain when she takes sertraline.   Plan: escitalopram (LEXAPRO) 20 MG tablet, Magnesium         Citrate 200 MG TABS        Switch to lexapro and follow up in 2 months to see is stomach pain better and anxiety still controlled    (E53.8) Vitamin B12 deficiency  Comment: patient doesn't like SL tablets  Plan: cyanocobalamin (VITAMIN B-12) 1000 MCG tablet        Switch to oral     (N81.4) Uterine prolapse  Comment: previous referral   Plan: Ob/Gyn  Referral            (E55.9) Vitamin D deficiency  Comment: patient states she should be getting \"soft gels\" 3000 international unit(s) from pharmacy  Plan: I don't see this as option in [employer], will have PAL call pharmacy.     The longitudinal plan of care for the diagnosis(es)/condition(s) as documented were addressed during this visit. Due to the added complexity in care, I will continue to support Gemma in the subsequent management and with ongoing continuity of care.        BMI  Estimated body mass index is 30.8 kg/m  as calculated from the following:    Height as of this encounter: 1.588 m (5' 2.5\").    Weight as of this encounter: 77.6 kg (171 lb 2 oz).         See Patient Instructions    Subjective   Gmema is a 78 year old, presenting for the following health issues:  Diabetes (Recheck)        2024    10:18 AM   Additional Questions   Roomed by Michelle Palmer CMA   Accompanied by N/A         2024    10:18 AM   Patient Reported Additional " Medications   Patient reports taking the following new medications N/A     HPI         Diabetes Follow-up    How often are you checking your blood sugar? Two times daily  Blood sugar testing frequency justification:  Adjustment of medication(s)  What time of day are you checking your blood sugars (select all that apply)?  Before meals  Have you had any blood sugars above 200?  No  Have you had any blood sugars below 70?  Yes 40  What symptoms do you notice when your blood sugar is low?  Shaky, Dizzy, Weak, Lethargy, Blurred vision, and Other: 42 is the lowest. Rare. Usually when she hasn't eaten anything (or only eating sugar and coffee).   What concerns do you have today about your diabetes? None and Low blood sugar   Do you have any of these symptoms? (Select all that apply)  Redness, sores, or blisters on feet  Have you had a diabetic eye exam in the last 12 months? No    Lab Results   Component Value Date    A1C 5.7 11/10/2023    A1C 6.1 05/23/2023    A1C 6.5 02/01/2022    A1C 6.2 03/23/2021    A1C 5.9 12/17/2020    A1C 5.7 09/06/2019    A1C 5.7 09/07/2018    A1C 5.5 02/26/2018     Current DM medications: diet controlled    Anxiety - sertraline    OB/GYN - uterine prolapse    BP Readings from Last 2 Encounters:   06/14/24 125/71   06/05/24 122/74     Hemoglobin A1C (%)   Date Value   11/10/2023 5.7 (H)   05/23/2023 6.1 (H)   03/23/2021 6.2 (H)   12/17/2020 5.9 (H)     LDL Cholesterol Calculated (mg/dL)   Date Value   12/13/2023 122 (H)   10/24/2022 106 (H)   12/17/2020 107 (H)   09/06/2019 125 (H)         How many servings of fruits and vegetables do you eat daily?  0-1  On average, how many sweetened beverages do you drink each day (Examples: soda, juice, sweet tea, etc.  Do NOT count diet or artificially sweetened beverages)?   0  How many days per week do you exercise enough to make your heart beat faster? 3 or less  How many minutes a day do you exercise enough to make your heart beat faster? 9 or less  How  "many days per week do you miss taking your medication? 0          Objective    /71 (BP Location: Right arm, Patient Position: Sitting, Cuff Size: Adult Regular)   Pulse 65   Temp 98.2  F (36.8  C) (Oral)   Resp 16   Ht 1.588 m (5' 2.5\")   Wt 77.6 kg (171 lb 2 oz)   LMP 09/24/1990   SpO2 96%   BMI 30.80 kg/m    Body mass index is 30.8 kg/m .  Physical Exam               Signed Electronically by: Arminda Foley MD    "

## 2024-06-14 NOTE — TELEPHONE ENCOUNTER
"Arminda Foley MD P Ea Sb3/5 Kendell HARVEY (Rn)  Patient states her vit D is a prescription that should be a \"soft gel 3000 international unit(s)\" She states the last one sent was incorrect. I don't see soft gel as option to order in [facility]. Please call pharmacy and see if this can be ordered verbally. Ok to order 90# with 3 refills.    Called the [facility] in [place] at [phone] and spoke with Alf,    - Alf, Pharmacist accepted the verbal order on behalf of Dr. Foley for Vit D soft gel 3000 international unit(s) for a one year supply     Called the patient at [phone]   - Informed the patient that Dr. Foley updated her prescription   - Patient verbalized understanding and agrees with the plan     Caroline SPAIN RN   [facility]   "

## 2024-06-14 NOTE — Clinical Note
"Patient states her vit D is a prescription that should be a \"soft gel 3000 international unit(s)\" She states the last one sent was incorrect. I don't see soft gel as option to order in [facility]. Please call pharmacy and see if this can be ordered verbally. Ok to order 90# with 3 refills. "

## 2024-06-14 NOTE — LETTER
June 14, 2024      Gemma Cowart  [address] [place] [address] [place] [id number]        Dear ,    We are writing to inform you of your test results.    Your test results fall within the expected range. A1C still looks good. Please continue with current treatment plan.     Resulted Orders   HEMOGLOBIN A1C   Result Value Ref Range    Hemoglobin A1C 5.9 (H) 0.0 - 5.6 %      Comment:      Normal <5.7%   Prediabetes 5.7-6.4%    Diabetes 6.5% or higher     Note: Adopted from ADA consensus guidelines.       If you have any questions or concerns, please call the clinic at the number listed above.       Sincerely,      Arminda Foley MD

## 2024-07-01 ENCOUNTER — TRANSFERRED RECORDS (OUTPATIENT)
Dept: HEALTH INFORMATION MANAGEMENT | Facility: CLINIC | Age: 79
End: 2024-07-01
Payer: COMMERCIAL

## 2024-07-29 ENCOUNTER — TRANSFERRED RECORDS (OUTPATIENT)
Dept: HEALTH INFORMATION MANAGEMENT | Facility: CLINIC | Age: 79
End: 2024-07-29
Payer: COMMERCIAL

## 2024-07-29 LAB — RETINOPATHY: NEGATIVE

## 2024-07-30 ENCOUNTER — VIRTUAL VISIT (OUTPATIENT)
Dept: ENDOCRINOLOGY | Facility: CLINIC | Age: 79
End: 2024-07-30
Payer: COMMERCIAL

## 2024-07-30 DIAGNOSIS — R07.0 BURNING SENSATION OF THROAT: ICD-10-CM

## 2024-07-30 DIAGNOSIS — M81.8 OTHER OSTEOPOROSIS WITHOUT CURRENT PATHOLOGICAL FRACTURE: Primary | ICD-10-CM

## 2024-07-30 DIAGNOSIS — E04.2 NON-TOXIC MULTINODULAR GOITER: ICD-10-CM

## 2024-07-30 DIAGNOSIS — R25.2 CRAMP OF LIMB: ICD-10-CM

## 2024-07-30 DIAGNOSIS — C73 MALIGNANT NEOPLASM OF THYROID GLAND (H): ICD-10-CM

## 2024-07-30 PROCEDURE — 99214 OFFICE O/P EST MOD 30 MIN: CPT | Mod: 95 | Performed by: INTERNAL MEDICINE

## 2024-07-30 RX ORDER — UREA 10 %
500 LOTION (ML) TOPICAL 2 TIMES DAILY
Qty: 90 TABLET | Refills: 1 | Status: SHIPPED | OUTPATIENT
Start: 2024-07-30 | End: 2024-09-24
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
## 2024-07-31 ENCOUNTER — TELEPHONE (OUTPATIENT)
Dept: ENDOCRINOLOGY | Facility: CLINIC | Age: 79
End: 2024-07-31

## 2024-07-31 ENCOUNTER — PATIENT OUTREACH (OUTPATIENT)
Dept: GERIATRIC MEDICINE | Facility: CLINIC | Age: 79
End: 2024-07-31
Payer: COMMERCIAL

## 2024-07-31 NOTE — TELEPHONE ENCOUNTER
UZAIRM to schedule a f/u with Dr. Magana around late Januarary. As well as labs in early August and imaging late december.

## 2024-07-31 NOTE — PROGRESS NOTES
[facility] Care Coordination Contact      [facility] Six-Month Telephone Assessment    6 month telephone assessment completed on 7/31/24.    ER visits: Yes -  [facility]   Hospitalizations: No  TCU stays: No  Significant health status changes: none  Falls/Injuries: Yes: several falls with no injuries; educated her on use of walker at all times when walking.  ADL/IADL changes: No  Changes in services: No    Caregiver Assessment follow up:  n/a    Goals: See POC in chart for goal progress documentation.      Will see member in 6 months for an annual health risk assessment.   Encouraged member to call CC with any questions or concerns in the meantime.     Member would like to sign up for the [facility] section 8 waiting list. Request sent to [facility] to assist with process.    Ifrah Emerson RN  [facility]  [phone]

## 2024-08-01 ENCOUNTER — PATIENT OUTREACH (OUTPATIENT)
Dept: GERIATRIC MEDICINE | Facility: CLINIC | Age: 79
End: 2024-08-01
Payer: COMMERCIAL

## 2024-08-01 NOTE — PROGRESS NOTES
[facility] [place] Care Coordination Contact    [facility] per care coordinator Ifrah Cunha followed up w/ [facility] on housing.  Left a message to rtn call.    LUSI Rodriguez  [facility] [place]  [phone]

## 2024-08-09 ENCOUNTER — OFFICE VISIT (OUTPATIENT)
Dept: PEDIATRICS | Facility: CLINIC | Age: 79
End: 2024-08-09
Attending: PEDIATRICS
Payer: COMMERCIAL

## 2024-08-09 VITALS
HEIGHT: 63 IN | WEIGHT: 169.56 LBS | SYSTOLIC BLOOD PRESSURE: 124 MMHG | HEART RATE: 60 BPM | DIASTOLIC BLOOD PRESSURE: 74 MMHG | BODY MASS INDEX: 30.04 KG/M2 | TEMPERATURE: 97.9 F | OXYGEN SATURATION: 100 % | RESPIRATION RATE: 16 BRPM

## 2024-08-09 DIAGNOSIS — L74.9 SWEATING ABNORMALITY: ICD-10-CM

## 2024-08-09 DIAGNOSIS — F41.9 ANXIETY: Primary | Chronic | ICD-10-CM

## 2024-08-09 DIAGNOSIS — Z90.09 HISTORY OF LOBECTOMY OF THYROID: ICD-10-CM

## 2024-08-09 DIAGNOSIS — E11.69 TYPE 2 DIABETES MELLITUS WITH OTHER SPECIFIED COMPLICATION, WITHOUT LONG-TERM CURRENT USE OF INSULIN (H): Chronic | ICD-10-CM

## 2024-08-09 PROCEDURE — 99214 OFFICE O/P EST MOD 30 MIN: CPT | Performed by: PHYSICIAN ASSISTANT

## 2024-08-09 RX ORDER — UBIDECARENONE 100 MG
3 CAPSULE ORAL DAILY
COMMUNITY
Start: 2024-06-14 | End: 2024-09-24

## 2024-08-09 RX ORDER — MAGNESIUM 200 MG
TABLET ORAL
COMMUNITY
Start: 2024-06-14 | End: 2024-09-24

## 2024-08-09 RX ORDER — CHOLECALCIFEROL (VITAMIN D3) 25 MCG
3 TABLET ORAL
COMMUNITY
Start: 2024-04-22 | End: 2024-09-24

## 2024-08-09 RX ORDER — LANCETS 30 GAUGE
EACH MISCELLANEOUS
COMMUNITY
Start: 2024-06-14 | End: 2024-09-24

## 2024-08-09 ASSESSMENT — PAIN SCALES - GENERAL: PAINLEVEL: NO PAIN (0)

## 2024-08-09 NOTE — PATIENT INSTRUCTIONS
For your concerns today:    Please be sure to followup with the Endocrinologist as to when she wants the labs rechecked.  They are all pending in your chart, and therefore may need to be collected.  The thyroid labs are in this group of labs.    For the breast pain, because you have a history of reconstruction, we will need to have you followup with the surgeon for the breast pain.  It appears you saw Dr. Orlando Wall, his office number:  [phone]    Please also followup with MTM to discuss the anxiety medication and consideration of changing back to Paxil    Finally, please see diabetes education for the low blood sugars you are getting after your dinner meal.  This is also something you can discuss with your endocrinologist as well.      Please followup with Dr. Foley, your PCP in the next 4-8 weeks for a recheck on all of this.  Please be seen sooner if needed.

## 2024-08-09 NOTE — PROGRESS NOTES
Assessment & Plan     Anxiety    - Med Therapy Management Referral    Type 2 diabetes mellitus with other specified complication, without long-term current use of insulin (H)    - Adult Diabetes Education  Referral; Future  - Med Therapy Management Referral    History of lobectomy of thyroid    Sweating abnormality        Patient Plan:  For your concerns today:    Please be sure to followup with the Endocrinologist as to when she wants the labs rechecked.  They are all pending in your chart, and therefore may need to be collected.  The thyroid labs are in this group of labs.    For the breast pain, because you have a history of reconstruction, we will need to have you followup with the surgeon for the breast pain.  It appears you saw Dr. Orlando Wall, his office number:  [phone]    Please also followup with MTM to discuss the anxiety medication and consideration of changing back to Paxil    Finally, please see diabetes education for the low blood sugars you are getting after your dinner meal.  This is also something you can discuss with your endocrinologist as well.      Please followup with Dr. Foley, your PCP in the next 4-8 weeks for a recheck on all of this.  Please be seen sooner if needed.     Notes from Clinical pharmacy visit on 02/24/2022:  Anxiety/Depression: Currently taking paroxetine 30 mg twice daily. Was also prescribed buspirone 5 mg twice daily but it caused upset stomach (even after taking with food) and a burning sensation so she stopped. Reports side effects of dry mouth, some constipation, memory impairment, and fatigue. States she is under extreme stress due to son being started on dialysis, her own health conditions, and a recent move to be closer to her family. Has never tried counseling. Does not feel that paroxetine is managing her symptoms well. Reports she feels like crying the instant someone says anything about her, even if it isn't negative.    2/14 PCP Note:  "Ongoing issue, worse lately. I suspect Paxil could be contributing to memory loss and fatigue. Goal to reduce Paxil while improving anxiety. Taking one in the am, 1 in pm    PHQ-9 SCORE 3/2/2018 9/6/2019 6/15/2021     BMI  Estimated body mass index is 30.52 kg/m  as calculated from the following:    Height as of this encounter: 1.588 m (5' 2.5\").    Weight as of this encounter: 76.9 kg (169 lb 9 oz).       Angel Menendez is a 78 year old, presenting for the following health issues:  RECHECK      8/9/2024     7:58 AM   Additional Questions   Roomed by Michelle Palmer CMA   Accompanied by N/A         8/9/2024     7:58 AM   Patient Reported Additional Medications   Patient reports taking the following new medications N/A     History of Present Illness       Reason for visit:  Health problems    She eats 2-3 servings of fruits and vegetables daily.She consumes 0 sweetened beverage(s) daily.She exercises with enough effort to increase her heart rate 10 to 19 minutes per day.  She exercises with enough effort to increase her heart rate 4 days per week.   She is taking medications regularly.       On and off pains in different places of her right breast.  She says that now in the last week she can feel the pains to her back on that side as well.      She says that she gets little sweat bubbles on her body when she gets hot and she is wondering if that has anything to due with her thyroid.  Patient has a history of thyroid cancer and she says that they removed half of her thyroid to treat the cancer.  She does not take any medication for her thyroid.      She says that about one month ago her anxiety meds were changed, but she got stomach burning from that.  She says that           Review of Systems  Constitutional, neuro, ENT, endocrine, pulmonary, cardiac, gastrointestinal, genitourinary, musculoskeletal, integument and psychiatric systems are negative, except as otherwise noted.      Objective    LMP 09/24/1990   There " is no height or weight on file to calculate BMI.  Physical Exam   GENERAL: alert and no distress  EYES: Eyes grossly normal to inspection, PERRL and conjunctivae and sclerae normal  NECK: no adenopathy, no asymmetry, masses, or scars  RESP: lungs clear to auscultation - no rales, rhonchi or wheezes  CV: regular rate and rhythm, normal S1 S2, no S3 or S4, no murmur, click or rub, no peripheral edema  MS: no gross musculoskeletal defects noted, no edema          Signed Electronically by: Tram Horowitz PA-C

## 2024-08-12 ENCOUNTER — TRANSFERRED RECORDS (OUTPATIENT)
Dept: HEALTH INFORMATION MANAGEMENT | Facility: CLINIC | Age: 79
End: 2024-08-12
Payer: COMMERCIAL

## 2024-08-21 ENCOUNTER — TELEPHONE (OUTPATIENT)
Dept: GASTROENTEROLOGY | Facility: CLINIC | Age: 79
End: 2024-08-21
Payer: COMMERCIAL

## 2024-08-21 NOTE — TELEPHONE ENCOUNTER
"Endoscopy Scheduling Screen    Have you had a positive Covid test in the last 14 days?  No    What is your communication preference for Instructions and/or Bowel Prep?   Mail/USPS    What insurance is in the chart?  Other:  [facility]    Ordering/Referring Provider:     SIMEON VAZQUEZ      (If ordering provider performs procedure, schedule with ordering provider unless otherwise instructed. )    BMI: Estimated body mass index is 30.52 kg/m  as calculated from the following:    Height as of 8/9/24: 1.588 m (5' 2.5\").    Weight as of 8/9/24: 76.9 kg (169 lb 9 oz).     Sedation Ordered  moderate sedation.   If patient BMI > 50 do not schedule in [facility].    If patient BMI > 45 do not schedule at [facility].    Are you taking methadone or Suboxone?  No    Have you had difficulties, pain, or discomfort during past endoscopy procedures?  No    Are you taking any prescription medications for pain 3 or more times per week?   NO, No RN review required.    Do you have a history of malignant hyperthermia?  No    (Females) Are you currently pregnant?   No     Have you been diagnosed or told you have pulmonary hypertension?   No    Do you have an LVAD?  No    Have you been told you have moderate to severe sleep apnea?  No    Have you been told you have COPD, asthma, or any other lung disease?  Yes     What breathing problems do you have?  COPD and Asthma     Do you use home oxygen?  No    Have your breathing problems required an ED visit or hospitalization in the last year?  No    Do you have any heart conditions?  Yes     In the past year, have you had any hospitalizations for heart related issues including cardiomyopathy, heart attack, or stent placement?  No    Do you have any implantable devices in your body (pacemaker, ICD)?  No    Do you take nitroglycerine?  No    Have you ever had or are you waiting for an organ transplant?  No. Continue scheduling, no site restrictions.    Have you had a stroke or transient ischemic attack (TIA aka " "\"mini stroke\" in the last 6 months?   No    Have you been diagnosed with or been told you have cirrhosis of the liver?   No    Are you currently on dialysis?   No    Do you need assistance transferring?   Yes (Hospital Only)    BMI: Estimated body mass index is 30.52 kg/m  as calculated from the following:    Height as of 8/9/24: 1.588 m (5' 2.5\").    Weight as of 8/9/24: 76.9 kg (169 lb 9 oz).     Is patients BMI > 40 and scheduling location [facility]?  No    Do you take an injectable medication for weight loss or diabetes (excluding insulin)?  No    Do you take the medication Naltrexone?  No    Do you take blood thinners?  Yes     Are you taking Effient/Prasugrel?  No, you must contact your prescribing provider for direction on holding or bridging with a different medication.       Prep   Are you currently on dialysis or do you have chronic kidney disease?  Yes (Golytely Prep)    Do you have a diagnosis of diabetes?  Yes (Golytely Prep)    Do you have a diagnosis of cystic fibrosis (CF)?  No    On a regular basis do you go 3 -5 days between bowel movements?  No    BMI > 40?  No    Preferred Pharmacy:    [id number] [address] AT [facility] [address]  [facility] [place] [place] [address]  Phone: [phone] Fax: [phone]      Final Scheduling Details     Procedure scheduled  Upper endoscopy (EGD)    Surgeon:  KEON     Date of procedure:  09/06/2024     Pre-OP / PAC:   No - Not required for this site.    Location  [facility] - Per order.    Sedation   Moderate Sedation - Per order.      Patient Reminders:   You will receive a call from a Nurse to review instructions and health history.  This assessment must be completed prior to your procedure.  Failure to complete the Nurse assessment may result in the procedure being cancelled.      On the day of your procedure, please designate an adult(s) who can drive you home stay with you for the next 24 hours. The medicines used in " the exam will make you sleepy. You will not be able to drive.      You cannot take public transportation, ride share services, or non-medical taxi service without a responsible caregiver.  Medical transport services are allowed with the requirement that a responsible caregiver will receive you at your destination.  We require that drivers and caregivers are confirmed prior to your procedure.

## 2024-08-23 ENCOUNTER — VIRTUAL VISIT (OUTPATIENT)
Dept: EDUCATION SERVICES | Facility: CLINIC | Age: 79
End: 2024-08-23
Attending: PHYSICIAN ASSISTANT
Payer: COMMERCIAL

## 2024-08-23 DIAGNOSIS — E11.69 TYPE 2 DIABETES MELLITUS WITH OTHER SPECIFIED COMPLICATION, WITHOUT LONG-TERM CURRENT USE OF INSULIN (H): Chronic | ICD-10-CM

## 2024-08-23 PROCEDURE — G0108 DIAB MANAGE TRN  PER INDIV: HCPCS | Mod: 95 | Performed by: NUTRITIONIST

## 2024-08-23 RX ORDER — BLOOD-GLUCOSE CONTROL, NORMAL
EACH MISCELLANEOUS
Qty: 1 EACH | Refills: 0 | Status: SHIPPED | OUTPATIENT
Start: 2024-08-23

## 2024-08-23 ASSESSMENT — PAIN SCALES - GENERAL: PAINLEVEL: EXTREME PAIN (9)

## 2024-08-23 NOTE — NURSING NOTE
Current patient location: [address] [place] [address] [address]  [facility] [address]    Is the patient currently in the state of [place]? YES    Visit mode:VIDEO    If the visit is dropped, the patient can be reconnected by: VIDEO VISIT: Text to cell phone:   Telephone Information:   Mobile [phone]       Will anyone else be joining the visit? NO  (If patient encounters technical issues they should call [phone]    How would you like to obtain your AVS? Mail a copy    Are changes needed to the allergy or medication list? No    Are refills needed on medications prescribed by this physician? NO    Rooming Documentation:  Questionnaire(s) not pre-assigned      Reason for visit: Consult    Shelby Kocher VVF

## 2024-08-23 NOTE — PROGRESS NOTES
Virtual Visit Details    Type of service:  Video Visit   Joined the call at 8/23/2024, 8:32:14 am.  Left the call at 8/23/2024, 9:27:49 am.  You were on the call for 55 minutes 34 seconds    Originating Location (pt. Location): Home    Distant Location (provider location):  Off-site  Platform used for Video Visit: [employer]    Diabetes Self-Management Education & Support    Gemma Cowart presents today for education related to Type 2 diabetes    Patient is being treated with:  Diet  She is accompanied by self    Year of diagnosis: about 20 years ago  Diet controlled the past 12 years  Referring provider:  Carlos Manuel    PATIENT CONCERNS/REASON FOR REFERRAL low glucose       ASSESSMENT:    Taking Medication:     Current Diabetes Management per Patient:  Taking diabetes medications? no    Monitoring    Checks glucose every day 1-2 times per day  Max of 3-4 times daily if having a low    Fasting this morning was 103  Yesterday afternoon before supper 64 (felt fine), fasting yesterday 106  110 fasting on 8/21 8/20 115 fasting  8/17 87 fasting  8/16 100 fasting  8/15 97 fasting  8/14 102 fasting  8/13 142 middle of the morning but fasting  8/11 102 fasting  8/10 100 fasting  8/9 104 fasting  8/8 94 fasting, 40 at 6pm, 91 6:12 pm - Felt sweaty, hot, light headed. Drank juice and checked 12 minutes late. Happens every couple months or so.  Is not new. And has been happening for several years. Thinks caffeine coffee may have been contributing and switching to decaf helped.   She has double checked the lows by doing a second test and has also found those to be low as well.     7/28 96 fasting, 47 at 5:46pm, 75 after treating  7/22 48/56 at 10:28pm      Patient's most recent   Lab Results   Component Value Date    A1C 5.9 06/14/2024    A1C 6.2 03/23/2021      Patient's A1C goal: <7.0    Activity: gardening, weeding  Doesn't find lows associated with exercise    Healthy Eating:     Foods she has associated with lows: Caffeine,  potato, cookies, sweets, mini eclaire, truffle    M/W/F 8-9 coffee and breakfast with a friend at her house    8am - coffee - black. Bagel with cream cheese. Everything bagel. Or Raisin and cinnamon.   Or egg with peppers onion cheese silva  2pm is the next meal if she is out in the garden otherwise 1pm. Occasionally 12pm.   Lettuce tomato and silva sandwich   Or sandwich sandwich or tuna sandwich. She uses white bread.  Sometimes a beef stick for a snack or green beans or cherry tomatoes  Dinner - protein and vegetable around 7-8pm     If she has a sweet it is usually in the morning.     She does not drive.    Juice with lows only.  Otherwise decaf and ice water    Problem Solving:      Patient is at risk of hypoglycemia?: NO  Hospitalizations for hyper or hypoglycemia: No    EDUCATION and INSTRUCTION PROVIDED AT THIS VISIT:       Patient with 20 year history of diabetes. Diet controlled the past 12 years.   Reports several year history of low blood sugars that happen anywhere from zero to a few times per month.   Patient does sometimes skip meals.   We discussed diet strategies to help reduce risk of reactive hypoglycemia including not skipping meals, having small frequent meals through the day spaced 2-3 hours apart, balancing meals with carb pro fat veg, choosing higher fiber options when possible (whole fruit over juice), limiting added sugars and sweets and avoiding sweetened beverages.     Reviewed SMBG technique and patient is testing correctly.     Patient-stated goal written and given to Gemma Cowart.  Verbalized and demonstrated understanding of instructions.     PLAN:  Avoid skipping meals  Have small frequent meals through the day spaced 2-3 hours apart  Balance meals with carb pro healthy fat vegetables  Choose higher fiber options when possible (whole fruit over juice)  Limit added sugars and sweets and avoiding sweetened beverages.     We will order a control solution for the meter just to check  on the accuracy. Though I don't suspect this is meter technique or accuracy as patient is symptomatic.     FOLLOW-UP:      One month    Time spent with patient at today's visit was 55 minutes.      Any diabetes medication dose changes were made via the CDE Protocol and Collaborative Practice Agreement with [facility] and  [facility].  A copy of this encounter was provided to patient's referring provider.

## 2024-08-27 ENCOUNTER — TELEPHONE (OUTPATIENT)
Dept: ENDOCRINOLOGY | Facility: CLINIC | Age: 79
End: 2024-08-27
Payer: COMMERCIAL

## 2024-08-27 NOTE — TELEPHONE ENCOUNTER
Pt asked to call back 08/29. Schedule a f/u with Dr. Magana around late Januarary. As well as labs in early August and imaging late december.

## 2024-08-28 ENCOUNTER — TRANSFERRED RECORDS (OUTPATIENT)
Dept: HEALTH INFORMATION MANAGEMENT | Facility: CLINIC | Age: 79
End: 2024-08-28
Payer: COMMERCIAL

## 2024-08-29 ENCOUNTER — TELEPHONE (OUTPATIENT)
Dept: GASTROENTEROLOGY | Facility: CLINIC | Age: 79
End: 2024-08-29
Payer: COMMERCIAL

## 2024-08-29 ENCOUNTER — TELEPHONE (OUTPATIENT)
Dept: PHARMACY | Facility: OTHER | Age: 79
End: 2024-08-29
Payer: COMMERCIAL

## 2024-08-29 NOTE — TELEPHONE ENCOUNTER
Called and left voice message for patient regarding medication therapy management referral and virtual appointment today at 11am. Please reschedule patient for MTM New appointment if she calls back.    Jo Vásquez, PharmD  Medication Therapy Management Pharmacist

## 2024-08-29 NOTE — TELEPHONE ENCOUNTER
Pre visit planning completed.      Procedure details:    Patient scheduled for Upper endoscopy (EGD) on 9/6/24.     Arrival time: 1000. Procedure time 1045    Facility location: [facility]; [address] [place] [address]. Check in location: Main entrance, door #1 on the North side of the building under roundabout awning. DO NOT GO TO SURGERY/ED ENTRANCE.     Sedation type: Conscious sedation     Pre op exam needed? No.    Indication for procedure: Malignant neoplasm of thyroid gland, burning sensation of throat    It was noted that patient needs assistance with transferring - please verify what patient needs and add to special needs.       Chart review:     Electronic implanted devices? No    Recent diagnosis of diverticulitis within the last 6 weeks? No      Medication review:    Diabetic? Yes. Not on diabetic medication    Anticoagulants? Yes Apixaban (Eliquis): Recommended HOLD 2 days before procedure.  Consult with your managing provider.    Weight loss medication/injectable? No.    NSAIDS? No    Other medication HOLDING recommendations:  N/A      Prep for procedure:     Bowel prep recommendation: N/A      Prep instructions sent via letter by scheduling.         Ambar Sin RN  Endoscopy Procedure Pre Assessment RN  [phone] option 4

## 2024-08-30 NOTE — TELEPHONE ENCOUNTER
Pre assessment completed for upcoming procedure.   (Please see previous telephone encounter notes for complete details)        Procedure details:    Arrival time and facility location reviewed.    Pre op exam needed? No.    Designated  policy reviewed. Instructed to have someone stay 6  hours post procedure.       Medication review:    Medications reviewed. Please see supporting documentation below. Holding recommendations discussed (if applicable).   Blood thinner/Anti-platelet medication(s):  Apixaban (Eliquis): Recommended HOLD 2 days before procedure.  Consult with your managing provider.      Prep for procedure:     Procedure prep instructions reviewed.        Any additional information needed:  N/A      Patient  verbalized understanding and had no questions or concerns at this time.      Aparna Fierro RN  Endoscopy Procedure Pre Assessment   [phone] option 4

## 2024-09-03 ENCOUNTER — OFFICE VISIT (OUTPATIENT)
Dept: OBGYN | Facility: CLINIC | Age: 79
End: 2024-09-03
Attending: PEDIATRICS
Payer: COMMERCIAL

## 2024-09-03 VITALS — SYSTOLIC BLOOD PRESSURE: 122 MMHG | BODY MASS INDEX: 29.7 KG/M2 | WEIGHT: 165 LBS | DIASTOLIC BLOOD PRESSURE: 70 MMHG

## 2024-09-03 DIAGNOSIS — N81.4 UTERINE PROLAPSE: ICD-10-CM

## 2024-09-03 PROCEDURE — 99204 OFFICE O/P NEW MOD 45 MIN: CPT | Performed by: OBSTETRICS & GYNECOLOGY

## 2024-09-03 NOTE — PROGRESS NOTES
"  SUBJECTIVE:                                                     Gemma Cowart is a 78 year old female  who presents today for suspected pelvic organ prolapse.    She first noticed symptoms in 2023.  She feels that \"something is coming out.\"  This is not painful.  It does not significantly impact her activities.  She has had no bleeding.  She reports no significant urinary issues, though she sometimes gets the urge to go and goes only a small amount.  She has a history of IBS and so is not sure if this is affected bowel movements at all.  She does occasionally have to push this back again when toileting.    She has a history of 4 prior vaginal deliveries, all babies between 6 pounds 1 ounce and 7 pounds 5 ounces.  History of breast cancer status post mastectomy and reconstruction.  History of tubal ligation.      Problem list and histories reviewed & adjusted, as indicated.  Additional history: as documented.    Patient Active Problem List   Diagnosis    Nontoxic uninodular goiter    Rosacea    Vitamin D deficiency disease    Vitamin B12 deficiency    Hyperlipidemia LDL goal <100    Anxiety    Insomnia    Type 2 diabetes mellitus with other specified complication (H)    Psoriasis    Long term current use of anticoagulant-hx multiple DVTs    Malignant neoplasm of nipple of left breast in female, unspecified estrogen receptor status (H)    CKD (chronic kidney disease) stage 3, GFR 30-59 ml/min (H)    Postmenopausal bleeding    COPD (chronic obstructive pulmonary disease) (H)    Iron deficiency anemia, unspecified iron deficiency anemia type    Elevated parathyroid hormone    Other osteoporosis without current pathological fracture    Dysphonia    History of lobectomy of thyroid    Problem with voice production     Past Surgical History:   Procedure Laterality Date    ABDOMEN SURGERY      gastric bypass     COLONOSCOPY  10/11/2012    Procedure: COLONOSCOPY;  colonoscopy;  Surgeon: Gela Cleary " MD Stacie;  Location:  GI    COLONOSCOPY N/A 10/6/2017    Procedure: COLONOSCOPY;;  Surgeon: Shashank Ortiz MD;  Location:  GI    COLONOSCOPY N/A 3/7/2022    Procedure: COLONOSCOPY;  Surgeon: Andrea Daniel MD;  Location:  GI    DENTAL SURGERY  5/2007    all teeth removed - dentures    ENT SURGERY      all teeth pulled    ESOPHAGOSCOPY, GASTROSCOPY, DUODENOSCOPY (EGD), COMBINED N/A 10/6/2017    Procedure: COMBINED ESOPHAGOSCOPY, GASTROSCOPY, DUODENOSCOPY (EGD);  ESOPHAGOSCOPY, GASTROSCOPY, DUODENOSCOPY (EGD) & Colonoscopy *Needs INR on arrival;  Surgeon: Shashank Ortiz MD;  Location:  GI    GYN SURGERY  1/10/75    tubal ligation     H ABLATION SVT  5/28/2015    stopped metoprolol    HERNIA REPAIR  2005    IMPLANT FILTER INFERIOR VENA CAVA  10/8/2012    taken out  1/4/12    INSERT TISSUE EXPANDER BREAST BILATERAL  9/5/2012    Procedure: INSERT TISSUE EXPANDER BREAST BILATERAL;;  Surgeon: Orlando Wall MD;  Location:  OR    MASTECTOMY SIMPLE, SENTINEL NODE, COMBINED  9/5/2012    Procedure: COMBINED MASTECTOMY SIMPLE, SENTINEL NODE;  BILATERAL MASTECTOMY WITH LEFT AXILLARY SENTINEL LYMPH NODE BIOPSY, BILATERAL TISSUE EXPANDER       MASTECTOMY, BILATERAL      PANNICULECTOMY N/A 4/5/2018    Procedure: PANNICULECTOMY;  PANNICULECTOMY ;  Surgeon: Orlando Wall MD;  Location:  OR    RECONSTRUCT BREAST BILATERAL, IMPLANT PROSTHESIS BILATERAL, COMBINED  5/22/2013    Procedure: COMBINED RECONSTRUCT BREAST BILATERAL, IMPLANT PROSTHESIS BILATERAL;  BILATERAL SECOND STAGE BREAST RECONSTRUCTION WITH SILICONE GEL IMPLANTS AND LIPOSUCTION;  Surgeon: Orlando Wall MD;  Location: [facility]    RECONSTRUCT NIPPLE BILATERAL  8/22/2013    Procedure: RECONSTRUCT NIPPLE BILATERAL;  BILATERAL NIPPLE AREOLAR RECONSTRUCTION;  Surgeon: Orlando Wall MD;  Location: [facility]    THYROIDECTOMY N/A 11/14/2023    Procedure: Left Thyroid Lobectomy and Autotransplant of Left Superior Parathyroid;  Surgeon:  "Haley Garcia MD;  Location:  OR    [facility] BREAST RECONSTRUC W OTHR TECHNIQ  2013      Social History     Tobacco Use    Smoking status: Former     Current packs/day: 0.00     Average packs/day: 1.5 packs/day for 25.0 years (37.5 ttl pk-yrs)     Types: Cigarettes     Start date: 10/21/1961     Quit date: 10/21/1986     Years since quittin.8     Passive exposure: Never    Smokeless tobacco: Never    Tobacco comments:     quit    Substance Use Topics    Alcohol use: Not Currently      Problem (# of Occurrences) Relation (Name,Age of Onset)    Alzheimer Disease (2) Father, Paternal Grandfather    Cancer (3) Paternal Grandmother, Maternal Uncle, Maternal Uncle    Diabetes (3) Brother, Brother, Son    Eye Disorder (1) Brother    Cancer - colorectal (2) Mother, Brother    Prostate Cancer (1) Father    Crohn's Disease (1) Daughter    Colon Cancer (1) Mother              Current Outpatient Medications   Medication Sig Dispense Refill    acetaminophen (TYLENOL) 500 MG tablet Take 2 tablets (1,000 mg) by mouth every 6 hours as needed for mild pain      apixaban ANTICOAGULANT (ELIQUIS ANTICOAGULANT) 5 MG tablet Take 1 tablet (5 mg) by mouth 2 times daily 180 tablet 3    blood glucose (NO BRAND SPECIFIED) lancets standard Use to test blood sugar 2 times daily or as directed. 1 each 4    blood glucose (ONETOUCH VERIO IQ) test strip Use to test blood sugar 2 times daily or as directed. 200 strip 3    blood glucose calibration (NO BRAND SPECIFIED) solution Use to calibrate blood glucose monitor as directed. One touch verio Flex 1 each 0    clobetasol (TEMOVATE) 0.05 % external cream Apply twice daily to rash (thigh, knees, hands, feet) twice daily for 7 days AS NEEDED \"use sparingly and not to be used on the face\" Follow up recommended. 60 g 3    cyanocobalamin (VITAMIN B-12) 1000 MCG tablet Take 1 tablet (1,000 mcg) by mouth daily 90 tablet 3    escitalopram (LEXAPRO) 20 MG tablet Take 1 tablet (20 mg) by " mouth daily 90 tablet 1    magnesium gluconate (MAGONATE) 500 (27 Mg) MG tablet Take 1 tablet (500 mg) by mouth 2 times daily 90 tablet 1    tiotropium (SPIRIVA RESPIMAT) 2.5 MCG/ACT inhaler Inhale 2 puffs into the lungs daily 12 g 3    amoxicillin-clavulanate (AUGMENTIN) 875-125 MG tablet       Blood Glucose Monitoring Suppl (ONETOUCH VERIO REFLECT) w/Device KIT USE TO TEST TWICE DAILY 1 kit 0    Cholecalciferol (D3-1000) 25 MCG (1000 UT) CAPS Take 3 capsules by mouth daily      Cholecalciferol (VITAMIN D3) 75 MCG (3000 UT) TABS Take 1 tablet by mouth daily 90 tablet 0    Lancets (ONETOUCH DELICA PLUS [id number]) MISC USE TO TEST BLOOD SUGAR 2 TIMES DAILY OR AS DIRECTED      magnesium 200 MG TABS TAKE 2 TABLETS BY MOUTH DAILY AT 2PM      VITAMIN D3 25 MCG (1000 UT) tablet Take 3 tablets by mouth daily at 2 pm       No current facility-administered medications for this visit.     Allergies   Allergen Reactions    Povidone Iodine Hives    Bacitracin Unknown    Dust Mites     Nickel        ROS:  Negative other than as noted in HPI.    OBJECTIVE:     Exam:  Constitutional:  Appearance: Well nourished, well developed alert, in no acute distress  Neurologic/Psychiatric:  Mental Status:  Oriented X3   Pelvis: EGBUS notable for vulvovaginal atrophy.  On speculum exam there is a grade 2 to nearly grade 3 cervico uterine prolapse, grade 1 to grade 2 cystocele and grade 1 rectocele is present.  On bimanual exam the uterus is small, mobile, nontender.  No adnexal masses.      In-Clinic Test Results:  No results found. However, due to the size of the patient record, not all encounters were searched. Please check Results Review for a complete set of results.    ASSESSMENT/PLAN:                                                        ICD-10-CM    1. Uterine prolapse  N81.4 Ob/Gyn  Referral            Options discussed, including surgical correction (vaginal hysterectomy with cystocele and/or rectocele repair) versus  pessary fitting versus watchful waiting +/- pelvic floor physical therapy. She is advised that this is not dangerous, and that she can keep an eye on things and wait for any treatment until or if she needs it. She will know she needs to come back in if her symptoms change enough to be bothersome, if she develops any urinary retention or persistent leakage, if bowel movements are difficult and splinting is required to evacuate the rectum, or if she has any vaginal bleeding.    She would like to return for pessary fitting.  She feels that this is starting to impact things like gardening, which is an activity she enjoys.  We discussed the procedure for fitting a pessary, and she would prefer to leave it in place for longer periods of time and have regular follow-up.  She will return for a pessary fitting.    Shanique Paniagua MD  [facility]

## 2024-09-03 NOTE — NURSING NOTE
"Chief Complaint   Patient presents with    Uterine Prolapse       Initial /70   Wt 74.8 kg (165 lb)   LMP 1990   BMI 29.70 kg/m   Estimated body mass index is 29.7 kg/m  as calculated from the following:    Height as of 24: 1.588 m (5' 2.5\").    Weight as of this encounter: 74.8 kg (165 lb).  BP completed using cuff size: regular    Questioned patient about current smoking habits.  Pt. quit smoking some time ago.          "

## 2024-09-04 NOTE — TELEPHONE ENCOUNTER
ADDENDUM: Per Dr. Daniel ok to proceed with EGD     Patient called to go over instructions again because she has misplaced her letter.  Writer went over instructions and patient stated that she did take her Xarelto this morning.      Writer sent Dr. Daniel a message to ensure that will be along enough hold time for Xarelto to proceed with EGD.    Patient had no further questions and understands writer will be reaching back out to her if her Xarelto is an issue.    Kerline FierroRN

## 2024-09-06 ENCOUNTER — HOSPITAL ENCOUNTER (OUTPATIENT)
Facility: CLINIC | Age: 79
Discharge: HOME | End: 2024-09-06
Attending: INTERNAL MEDICINE | Admitting: INTERNAL MEDICINE
Payer: COMMERCIAL

## 2024-09-06 VITALS
DIASTOLIC BLOOD PRESSURE: 71 MMHG | RESPIRATION RATE: 18 BRPM | HEART RATE: 56 BPM | OXYGEN SATURATION: 98 % | SYSTOLIC BLOOD PRESSURE: 110 MMHG | BODY MASS INDEX: 28.98 KG/M2 | WEIGHT: 161 LBS

## 2024-09-06 LAB
GLUCOSE BLDC GLUCOMTR-MCNC: 97 MG/DL (ref 70–99)
UPPER GI ENDOSCOPY: NORMAL

## 2024-09-06 PROCEDURE — 82962 GLUCOSE BLOOD TEST: CPT

## 2024-09-06 PROCEDURE — 999N000099 HC STATISTIC MODERATE SEDATION < 10 MIN: Performed by: INTERNAL MEDICINE

## 2024-09-06 PROCEDURE — 250N000009 HC RX 250: Performed by: INTERNAL MEDICINE

## 2024-09-06 PROCEDURE — 43235 EGD DIAGNOSTIC BRUSH WASH: CPT | Performed by: INTERNAL MEDICINE

## 2024-09-06 PROCEDURE — 250N000011 HC RX IP 250 OP 636: Performed by: INTERNAL MEDICINE

## 2024-09-06 RX ORDER — EPINEPHRINE 1 MG/ML
0.1 INJECTION, SOLUTION INTRAMUSCULAR; SUBCUTANEOUS
Status: DISCONTINUED | OUTPATIENT
Start: 2024-09-06 | End: 2024-09-06 | Stop reason: HOSPADM

## 2024-09-06 RX ORDER — DIPHENHYDRAMINE HYDROCHLORIDE 50 MG/ML
25-50 INJECTION INTRAMUSCULAR; INTRAVENOUS
Status: DISCONTINUED | OUTPATIENT
Start: 2024-09-06 | End: 2024-09-06 | Stop reason: HOSPADM

## 2024-09-06 RX ORDER — ATROPINE SULFATE 0.1 MG/ML
1 INJECTION INTRAVENOUS
Status: DISCONTINUED | OUTPATIENT
Start: 2024-09-06 | End: 2024-09-06 | Stop reason: HOSPADM

## 2024-09-06 RX ORDER — ONDANSETRON 2 MG/ML
4 INJECTION INTRAMUSCULAR; INTRAVENOUS
Status: DISCONTINUED | OUTPATIENT
Start: 2024-09-06 | End: 2024-09-06 | Stop reason: HOSPADM

## 2024-09-06 RX ORDER — SIMETHICONE 40MG/0.6ML
133 SUSPENSION, DROPS(FINAL DOSAGE FORM)(ML) ORAL
Status: DISCONTINUED | OUTPATIENT
Start: 2024-09-06 | End: 2024-09-06 | Stop reason: HOSPADM

## 2024-09-06 RX ORDER — NALOXONE HYDROCHLORIDE 0.4 MG/ML
0.4 INJECTION, SOLUTION INTRAMUSCULAR; INTRAVENOUS; SUBCUTANEOUS
Status: DISCONTINUED | OUTPATIENT
Start: 2024-09-06 | End: 2024-09-06 | Stop reason: HOSPADM

## 2024-09-06 RX ORDER — LIDOCAINE 40 MG/G
CREAM TOPICAL
Status: DISCONTINUED | OUTPATIENT
Start: 2024-09-06 | End: 2024-09-06 | Stop reason: HOSPADM

## 2024-09-06 RX ORDER — PROCHLORPERAZINE MALEATE 5 MG
5 TABLET ORAL EVERY 6 HOURS PRN
Status: DISCONTINUED | OUTPATIENT
Start: 2024-09-06 | End: 2024-09-06 | Stop reason: HOSPADM

## 2024-09-06 RX ORDER — ONDANSETRON 4 MG/1
4 TABLET, ORALLY DISINTEGRATING ORAL EVERY 6 HOURS PRN
Status: DISCONTINUED | OUTPATIENT
Start: 2024-09-06 | End: 2024-09-06 | Stop reason: HOSPADM

## 2024-09-06 RX ORDER — NALOXONE HYDROCHLORIDE 0.4 MG/ML
0.2 INJECTION, SOLUTION INTRAMUSCULAR; INTRAVENOUS; SUBCUTANEOUS
Status: DISCONTINUED | OUTPATIENT
Start: 2024-09-06 | End: 2024-09-06 | Stop reason: HOSPADM

## 2024-09-06 RX ORDER — FLUMAZENIL 0.1 MG/ML
0.2 INJECTION, SOLUTION INTRAVENOUS
Status: DISCONTINUED | OUTPATIENT
Start: 2024-09-06 | End: 2024-09-06 | Stop reason: HOSPADM

## 2024-09-06 RX ORDER — FENTANYL CITRATE 50 UG/ML
50-100 INJECTION, SOLUTION INTRAMUSCULAR; INTRAVENOUS EVERY 5 MIN PRN
Status: DISCONTINUED | OUTPATIENT
Start: 2024-09-06 | End: 2024-09-06 | Stop reason: HOSPADM

## 2024-09-06 RX ORDER — ONDANSETRON 2 MG/ML
4 INJECTION INTRAMUSCULAR; INTRAVENOUS EVERY 6 HOURS PRN
Status: DISCONTINUED | OUTPATIENT
Start: 2024-09-06 | End: 2024-09-06 | Stop reason: HOSPADM

## 2024-09-06 RX ADMIN — MIDAZOLAM 1 MG: 1 INJECTION INTRAMUSCULAR; INTRAVENOUS at 10:56

## 2024-09-06 RX ADMIN — TOPICAL ANESTHETIC 0.5 ML: 200 SPRAY DENTAL; PERIODONTAL at 10:57

## 2024-09-06 RX ADMIN — FENTANYL CITRATE 50 MCG: 50 INJECTION, SOLUTION INTRAMUSCULAR; INTRAVENOUS at 10:56

## 2024-09-06 ASSESSMENT — ACTIVITIES OF DAILY LIVING (ADL)
ADLS_ACUITY_SCORE: 36
ADLS_ACUITY_SCORE: 36

## 2024-09-09 ENCOUNTER — TRANSFERRED RECORDS (OUTPATIENT)
Dept: HEALTH INFORMATION MANAGEMENT | Facility: CLINIC | Age: 79
End: 2024-09-09
Payer: COMMERCIAL

## 2024-09-10 ENCOUNTER — PATIENT OUTREACH (OUTPATIENT)
Dept: GERIATRIC MEDICINE | Facility: CLINIC | Age: 79
End: 2024-09-10
Payer: COMMERCIAL

## 2024-09-10 NOTE — PROGRESS NOTES
[facility] Care Coordination Contact    Received called from member that she needs to know who to contact for her EBT card as no amount of money was loaded on her card when she called the EBT center. Informed her to call her financial worker and [facility] number for her to contact [facility].    Member reports she needs help finding a dentist possibly close to her home. Care coordinator informed her care coordinator will have a CHW assist her with finding a dental clinic and with scheduling appointment. She states understanding and will wait for CHW to contact her.    Ifrah Emerson RN  [facility]  [phone]

## 2024-09-12 DIAGNOSIS — E11.69 TYPE 2 DIABETES MELLITUS WITH OTHER SPECIFIED COMPLICATION, WITHOUT LONG-TERM CURRENT USE OF INSULIN (H): ICD-10-CM

## 2024-09-12 RX ORDER — BLOOD SUGAR DIAGNOSTIC
STRIP MISCELLANEOUS
Qty: 200 STRIP | Refills: 2 | Status: SHIPPED | OUTPATIENT
Start: 2024-09-12

## 2024-09-13 ENCOUNTER — OFFICE VISIT (OUTPATIENT)
Dept: OBGYN | Facility: CLINIC | Age: 79
End: 2024-09-13
Payer: COMMERCIAL

## 2024-09-13 VITALS — SYSTOLIC BLOOD PRESSURE: 112 MMHG | DIASTOLIC BLOOD PRESSURE: 64 MMHG

## 2024-09-13 DIAGNOSIS — N81.4 UTERINE PROLAPSE: Primary | ICD-10-CM

## 2024-09-13 PROCEDURE — G2211 COMPLEX E/M VISIT ADD ON: HCPCS | Mod: 53 | Performed by: OBSTETRICS & GYNECOLOGY

## 2024-09-13 PROCEDURE — 99213 OFFICE O/P EST LOW 20 MIN: CPT | Performed by: OBSTETRICS & GYNECOLOGY

## 2024-09-13 NOTE — PROGRESS NOTES
SUBJECTIVE:                                                     Gemma Cowart is a 78 year old female  who presents today for discussion of uterine prolapse and surgical treatment please see my prior.    Consultation note.  At that time she was leaning towards use of a pessary.  She has now decided that she is not interested in trying a pessary, and is interested in definitive surgery.  At the time of her last appointment, she had a grade 2 to nearly grade 3 uterine prolapse with a grade 2 cystocele and grade 1 rectocele.  She does not have any stress incontinence or urge incontinence, and had no stress or urge incontinence prior to noticing symptoms of prolapse.  She is quite active with gardening and has never had leakage with this.      Problem list and histories reviewed & adjusted, as indicated.  Additional history: as documented.    Patient Active Problem List   Diagnosis     Nontoxic uninodular goiter     Rosacea     Vitamin D deficiency disease     Vitamin B12 deficiency     Hyperlipidemia LDL goal <100     Anxiety     Insomnia     Type 2 diabetes mellitus with other specified complication (H)     Psoriasis     Long term current use of anticoagulant-hx multiple DVTs     Malignant neoplasm of nipple of left breast in female, unspecified estrogen receptor status (H)     CKD (chronic kidney disease) stage 3, GFR 30-59 ml/min (H)     Postmenopausal bleeding     COPD (chronic obstructive pulmonary disease) (H)     Iron deficiency anemia, unspecified iron deficiency anemia type     Elevated parathyroid hormone     Other osteoporosis without current pathological fracture     Dysphonia     History of lobectomy of thyroid     Problem with voice production     Past Surgical History:   Procedure Laterality Date     ABDOMEN SURGERY      gastric bypass      COLONOSCOPY  10/11/2012    Procedure: COLONOSCOPY;  colonoscopy;  Surgeon: Gela Cleary MD;  Location:  GI     COLONOSCOPY N/A 10/6/2017     Procedure: COLONOSCOPY;;  Surgeon: Shashank Ortiz MD;  Location:  GI     COLONOSCOPY N/A 3/7/2022    Procedure: COLONOSCOPY;  Surgeon: Andrea Daniel MD;  Location:  GI     DENTAL SURGERY  5/2007    all teeth removed - dentures     ENT SURGERY      all teeth pulled     ESOPHAGOSCOPY, GASTROSCOPY, DUODENOSCOPY (EGD), COMBINED N/A 10/6/2017    Procedure: COMBINED ESOPHAGOSCOPY, GASTROSCOPY, DUODENOSCOPY (EGD);  ESOPHAGOSCOPY, GASTROSCOPY, DUODENOSCOPY (EGD) & Colonoscopy *Needs INR on arrival;  Surgeon: Shashank Ortiz MD;  Location:  GI     ESOPHAGOSCOPY, GASTROSCOPY, DUODENOSCOPY (EGD), COMBINED N/A 9/6/2024    Procedure: Esophagoscopy, gastroscopy, duodenoscopy (EGD), combined;  Surgeon: Andrea Daniel MD;  Location:  GI     GYN SURGERY  1/10/75    tubal ligation      H ABLATION SVT  5/28/2015    stopped metoprolol     HERNIA REPAIR  2005     IMPLANT FILTER INFERIOR VENA CAVA  10/8/2012    taken out  1/4/12     INSERT TISSUE EXPANDER BREAST BILATERAL  9/5/2012    Procedure: INSERT TISSUE EXPANDER BREAST BILATERAL;;  Surgeon: Orlando Wall MD;  Location:  OR     MASTECTOMY SIMPLE, SENTINEL NODE, COMBINED  9/5/2012    Procedure: COMBINED MASTECTOMY SIMPLE, SENTINEL NODE;  BILATERAL MASTECTOMY WITH LEFT AXILLARY SENTINEL LYMPH NODE BIOPSY, BILATERAL TISSUE EXPANDER        MASTECTOMY, BILATERAL       PANNICULECTOMY N/A 4/5/2018    Procedure: PANNICULECTOMY;  PANNICULECTOMY ;  Surgeon: Orlando Wall MD;  Location:  OR     RECONSTRUCT BREAST BILATERAL, IMPLANT PROSTHESIS BILATERAL, COMBINED  5/22/2013    Procedure: COMBINED RECONSTRUCT BREAST BILATERAL, IMPLANT PROSTHESIS BILATERAL;  BILATERAL SECOND STAGE BREAST RECONSTRUCTION WITH SILICONE GEL IMPLANTS AND LIPOSUCTION;  Surgeon: Orlando Wall MD;  Location:  SD     RECONSTRUCT NIPPLE BILATERAL  8/22/2013    Procedure: RECONSTRUCT NIPPLE BILATERAL;  BILATERAL NIPPLE AREOLAR RECONSTRUCTION;  Surgeon: Orlando Wall  "MD EMMANUELLE;  Location:  SD     THYROIDECTOMY N/A 2023    Procedure: Left Thyroid Lobectomy and Autotransplant of Left Superior Parathyroid;  Surgeon: Haley Garcia MD;  Location:  OR     [facility] BREAST RECONSTRUC W OTHR TECHNIQ  2013      Social History     Tobacco Use     Smoking status: Former     Current packs/day: 0.00     Average packs/day: 1.5 packs/day for 25.0 years (37.5 ttl pk-yrs)     Types: Cigarettes     Start date: 10/21/1961     Quit date: 10/21/1986     Years since quittin.9     Passive exposure: Never     Smokeless tobacco: Never     Tobacco comments:     quit    Substance Use Topics     Alcohol use: Not Currently      Problem (# of Occurrences) Relation (Name,Age of Onset)    Alzheimer Disease (2) Father, Paternal Grandfather    Cancer (3) Paternal Grandmother, Maternal Uncle, Maternal Uncle    Diabetes (3) Brother, Brother, Son    Eye Disorder (1) Brother    Cancer - colorectal (2) Mother, Brother    Prostate Cancer (1) Father    Crohn's Disease (1) Daughter    Colon Cancer (1) Mother              Current Outpatient Medications   Medication Sig Dispense Refill     acetaminophen (TYLENOL) 500 MG tablet Take 2 tablets (1,000 mg) by mouth every 6 hours as needed for mild pain       apixaban ANTICOAGULANT (ELIQUIS ANTICOAGULANT) 5 MG tablet Take 1 tablet (5 mg) by mouth 2 times daily 180 tablet 3     blood glucose (NO BRAND SPECIFIED) lancets standard Use to test blood sugar 2 times daily or as directed. 1 each 4     blood glucose (ONETOUCH VERIO IQ) test strip USE TO TEST TWICE DAILY OR AS DIRECTED 200 strip 2     blood glucose calibration (NO BRAND SPECIFIED) solution Use to calibrate blood glucose monitor as directed. One touch verio Flex 1 each 0     clobetasol (TEMOVATE) 0.05 % external cream Apply twice daily to rash (thigh, knees, hands, feet) twice daily for 7 days AS NEEDED \"use sparingly and not to be used on the face\" Follow up recommended. 60 g 3     cyanocobalamin " (VITAMIN B-12) 1000 MCG tablet Take 1 tablet (1,000 mcg) by mouth daily 90 tablet 3     escitalopram (LEXAPRO) 20 MG tablet Take 1 tablet (20 mg) by mouth daily 90 tablet 1     magnesium gluconate (MAGONATE) 500 (27 Mg) MG tablet Take 1 tablet (500 mg) by mouth 2 times daily 90 tablet 1     tiotropium (SPIRIVA RESPIMAT) 2.5 MCG/ACT inhaler Inhale 2 puffs into the lungs daily 12 g 3     amoxicillin-clavulanate (AUGMENTIN) 875-125 MG tablet        Blood Glucose Monitoring Suppl (ONETOUCH VERIO REFLECT) w/Device KIT USE TO TEST TWICE DAILY 1 kit 0     Cholecalciferol (D3-1000) 25 MCG (1000 UT) CAPS Take 3 capsules by mouth daily       Cholecalciferol (VITAMIN D3) 75 MCG (3000 UT) TABS Take 1 tablet by mouth daily 90 tablet 0     Lancets (ONETOUCH DELICA PLUS [id number]) MISC USE TO TEST BLOOD SUGAR 2 TIMES DAILY OR AS DIRECTED       magnesium 200 MG TABS TAKE 2 TABLETS BY MOUTH DAILY AT 2PM       VITAMIN D3 25 MCG (1000 UT) tablet Take 3 tablets by mouth daily at 2 pm       No current facility-administered medications for this visit.     Allergies   Allergen Reactions     Povidone Iodine Hives     Bacitracin Unknown     Dust Mites      Nickel        ROS:  Negative other than as noted in HPI.    OBJECTIVE:     Exam:  Constitutional:  Appearance: Well nourished, well developed alert, in no acute distress  Neurologic/Psychiatric:  Mental Status:  Oriented X3       In-Clinic Test Results:  No results found. However, due to the size of the patient record, not all encounters were searched. Please check Results Review for a complete set of results.    ASSESSMENT/PLAN:                                                        ICD-10-CM    1. Uterine prolapse  N81.4             We reviewed again options for medical and surgical treatment of prolapse.  She is quite certain that she would like to proceed with surgery.  We discussed the risk, benefits, alternatives to V notes laparoscopic vaginal hysterectomy.  The plan will be to  remove tubes and ovaries as well.  We discussed the possibility of cystocele repair and rectocele repair.  It is possible that correction of the central prolapse will mean that no further cystocele rectocele repair is necessary, but we will plan for nonetheless.  If she does well with an uncomplicated hysterectomy and no further repairs are needed, she can likely discharge the same day.  If she has anterior and posterior repairs I would rather her stay for 23-hour observation overnight.  This was discussed in detail.  She plans to have her daughter come with her and also stay overnight with her at her home after she is discharged.  All questions were answered.    We also discussed the possibility of De kiki stress incontinence after correction of prolapse.  If she had more significant prolapse, I would recommend consideration of mid urethral sling at the time of prolapse surgery, but as she is having no symptoms and had no symptoms prior to the onset of her prolapse symptoms, she declines this and I am in agreement with that.    Shanique Paniagua MD  [facility]

## 2024-09-16 ENCOUNTER — PREP FOR PROCEDURE (OUTPATIENT)
Dept: OBGYN | Facility: CLINIC | Age: 79
End: 2024-09-16
Payer: COMMERCIAL

## 2024-09-16 ENCOUNTER — TELEPHONE (OUTPATIENT)
Dept: OBGYN | Facility: CLINIC | Age: 79
End: 2024-09-16
Payer: COMMERCIAL

## 2024-09-16 DIAGNOSIS — N81.9 PROLAPSE OF FEMALE PELVIC ORGANS: Primary | ICD-10-CM

## 2024-09-16 NOTE — TELEPHONE ENCOUNTER
Surgeon: Dr Shanique Paniagua   Assist:  Yes Dr. Lidia Knowles   Location: [facility]   Date/time preference:  per patient     Surgery:  vNOTES vaginal hysterectomy, bilateral salpingo-oophorectomy, possible anterior repair, possible posterior repair, possible cystoscopy   Length of Surgery:  2h   Diagnosis:  pelvic organ prolapse   Anesthesia type:  GENERAL     Special instructions / equipment:  vnotes platform   Am admit or same day: SAME DAY   Bowel prep: No   Pre op: PCP   Office visit with surgeon prior to surgery: No   Schedule Post Op: 2 and 6 weeks     Shanique Paniagua MD   [facility]etric[facility] Gyne[facility]

## 2024-09-17 ENCOUNTER — PREP FOR PROCEDURE (OUTPATIENT)
Dept: OBGYN | Facility: CLINIC | Age: 79
End: 2024-09-17
Payer: COMMERCIAL

## 2024-09-17 ENCOUNTER — PATIENT OUTREACH (OUTPATIENT)
Dept: GERIATRIC MEDICINE | Facility: CLINIC | Age: 79
End: 2024-09-17
Payer: COMMERCIAL

## 2024-09-17 NOTE — PROGRESS NOTES
[facility] Care Coordination Contact    CHW spoke w/ mbr to assist scheduling Dental Exam and Denture Consultation appt with:    [facility], [address] [place] [address]  Ph# [phone]. On October,1st, at 10:am.     Mbr is going to schedule ride by her self. Mbr have been informed to brig ID, Insurance Card and Med list. CHW contact info was provided if Questions.     LUIS Barr  [facility]  [phone]    09/19 Mbr called to reschedule appt for Denture Consultation , new appt is: on 10/03 at 10am w/ [facility].

## 2024-09-18 NOTE — TELEPHONE ENCOUNTER
Type of surgery: vnotes vaginal hysterectomy, bilateral salpingo-oophorectomy, possible anterior repair, possible posterior repair, possible cystoscoy  Location of surgery: [facility]  Date and time of surgery: 10/10/24 @ 7:40 am  Surgeon: Dr. Paniagua  Pre-Op Appt Date: Patient advised to schedule.  Post-Op Appt Date: 10/24/24 and 11/21/24   Packet sent out: Yes  Pre-cert/Authorization completed:  No  Date: 9/18/24

## 2024-09-24 ENCOUNTER — VIRTUAL VISIT (OUTPATIENT)
Dept: PHARMACY | Facility: CLINIC | Age: 79
End: 2024-09-24
Attending: PHYSICIAN ASSISTANT
Payer: COMMERCIAL

## 2024-09-24 DIAGNOSIS — R25.2 LEG CRAMPS: ICD-10-CM

## 2024-09-24 DIAGNOSIS — R79.89 ELEVATED PARATHYROID HORMONE: ICD-10-CM

## 2024-09-24 DIAGNOSIS — Z79.01 LONG TERM CURRENT USE OF ANTICOAGULANT THERAPY: ICD-10-CM

## 2024-09-24 DIAGNOSIS — F41.9 ANXIETY: Chronic | ICD-10-CM

## 2024-09-24 DIAGNOSIS — Z71.85 VACCINE COUNSELING: ICD-10-CM

## 2024-09-24 DIAGNOSIS — E55.9 VITAMIN D DEFICIENCY DISEASE: Primary | ICD-10-CM

## 2024-09-24 DIAGNOSIS — E53.8 VITAMIN B12 DEFICIENCY: ICD-10-CM

## 2024-09-24 DIAGNOSIS — L40.9 PSORIASIS: Chronic | ICD-10-CM

## 2024-09-24 DIAGNOSIS — J44.9 CHRONIC OBSTRUCTIVE PULMONARY DISEASE, UNSPECIFIED COPD TYPE (H): ICD-10-CM

## 2024-09-24 DIAGNOSIS — E11.69 TYPE 2 DIABETES MELLITUS WITH OTHER SPECIFIED COMPLICATION, UNSPECIFIED WHETHER LONG TERM INSULIN USE (H): Chronic | ICD-10-CM

## 2024-09-24 DIAGNOSIS — M25.559 HIP PAIN, UNSPECIFIED LATERALITY: ICD-10-CM

## 2024-09-24 DIAGNOSIS — M81.8 OTHER OSTEOPOROSIS WITHOUT CURRENT PATHOLOGICAL FRACTURE: ICD-10-CM

## 2024-09-24 PROCEDURE — 99605 MTMS BY PHARM NP 15 MIN: CPT | Mod: 93 | Performed by: PHARMACIST

## 2024-09-24 PROCEDURE — 99607 MTMS BY PHARM ADDL 15 MIN: CPT | Mod: 93 | Performed by: PHARMACIST

## 2024-09-24 RX ORDER — CLOBETASOL PROPIONATE 0.5 MG/G
CREAM TOPICAL
Qty: 60 G | Refills: 3 | Status: SHIPPED | OUTPATIENT
Start: 2024-09-24

## 2024-09-24 RX ORDER — ACETAMINOPHEN 500 MG
500-1000 TABLET ORAL 2 TIMES DAILY PRN
COMMUNITY

## 2024-09-24 RX ORDER — MAGNESIUM GLYCINATE 100 MG
100 CAPSULE ORAL 2 TIMES DAILY
Qty: 180 CAPSULE | Refills: 3 | Status: SHIPPED | OUTPATIENT
Start: 2024-09-24

## 2024-09-24 RX ORDER — VITAMIN B COMPLEX
75 TABLET ORAL DAILY
Qty: 270 TABLET | Refills: 3 | Status: SHIPPED | OUTPATIENT
Start: 2024-09-24

## 2024-09-24 NOTE — LETTER
September 24, 2024  Gemma Cowart  [address] [place]  [place] [address]    Dear Ms. Cowart, [facility]MUNDO [facility] JAY     Thank you for talking with me on Sep 24, 2024 about your health and medications. As a follow-up to our conversation, I have included two documents:      Your Recommended To-Do List has steps you should take to get the best results from your medications.  Your Medication List will help you keep track of your medications and how to take them.    If you want to talk about these documents, please call Jo Vásquez RPH at phone: [phone], Monday-Friday 8-4:30pm.    I look forward to working with you and your doctors to make sure your medications work well for you.    Sincerely,  Jo Vásquez RPH  [facility] Pharmacist, [facility]

## 2024-09-24 NOTE — TELEPHONE ENCOUNTER
Patient requesting refill. Patient typically applies to toes or legs for itching, (diagnosis psoriasis previous rx).

## 2024-09-24 NOTE — PATIENT INSTRUCTIONS
"Recommendation(s) from today's visit:                                                      Reminder to call and schedule DEXA for ~ December per Dr. Singh.    Scheduled lab appointment to get updated labs from Dr. Singh that were ordered.    You may start vitamin B complex     Increase your milk to 2 glasses in a day to help you maintain enough calcium in your diet.     Refill request for clobetasol sent to primary care provider    Stay on magnesium glycinate, new rx sent.    Refilled vitamin D3 as well stay on total 3000 units per day    Follow-up: Return in about 11 months (around 9/1/2025) for Medication Therapy Management Visit.    My Clinical Pharmacist's contact information:                                                      Jo Vásquez, PharmD  Medication therapy management clinical pharmacist    It was great speaking with you today.  I value your experience and would be very thankful for your time in providing feedback in our clinic survey. In the next few days, you may receive an email or text message from [employer] with a link to a survey related to your  clinical pharmacist.\"     To schedule another MTM appointment, please call the clinic directly [phone] or you may call the MTM scheduling line at [phone].    "

## 2024-09-24 NOTE — PROGRESS NOTES
Medication Therapy Management (MTM) Encounter    ASSESSMENT:                            Medication Adherence/Access: No issues identified    Leg cramps  No mag level to review from the past year. Patient is tolerating would stay on magnesium glycinate.    Type 2 Diabetes  Patient is meeting A1c goal of < 7%, next recheck due December.    Recurrent DVT history  DOAC does is appropriate.     Anxiety  Crcl is > 20, no dose adjustment required. Side effects resolved and from patient report is effective.     COPD  Patient is on LAMA. Stable.     Hip Pain  Stable    Psoriasis   Refill request to primary care provider, topical is effective.    B12 deficiency  No B12 level within the year to review. Last hgb was low and MCV was normal. Patient has strong preference to start B complex, low safety risk may consider.    Osteoporosis and Hyperparathyroidism  PTHi from 1/2024 elevated but was thought to be from low calcium. Patient would benefit from calcium goal of at least 1200 mg daily. Patient prefers to increase dietary calcium vs supplementation.  Patient has preferred to defer treatment (due to ckd would consider Prolia) until DEXA repeat.     Vaccines  Patient has appointment made for vaccines.     PLAN:                            Patient reminded to call and schedule DEXA for ~ December per Dr. Singh.    Schedule patient for lab appointment to get updated labs from Dr. Singh that were ordered.    Patient may start vitamin B complex per patient preference.    Increase your milk to 2 glasses in a day to help you maintain enough calcium in your diet.     Refill request for clobetasol sent to primary care provider in separate enc.    Stay on magnesium glycinate, new rx sent.    Refilled vitamin D3.     Follow-up: Return in about 11 months (around 9/1/2025) for Medication Therapy Management Visit.    SUBJECTIVE/OBJECTIVE:                          Gemma Cowart is a 78 year old female seen for an initial visit. She was  referred to me from Tram Horowitz PA-C. PCP is Dr. Foley.      Reason for visit: follow-up on anxiety.    Allergies/ADRs: Reviewed in chart  Past Medical History: Reviewed in chart  Tobacco: She reports that she quit smoking about 37 years ago. Her smoking use included cigarettes. She started smoking about 62 years ago. She has a 37.5 pack-year smoking history. She has never been exposed to tobacco smoke. She has never used smokeless tobacco.  Alcohol: not currently using  Social: resides in assisted living.    Medication Adherence/Access: no issues reported    Leg cramps  Magnesium gluconate 500 mg twice daily - not taking taking this, taking over-the-counter magnesium glycinate she purchased over-the-counter (magnesium glycinate 120 mg twice daily)  She was wondering about magnesium glycinate prescription coverage because she was not able to get Rx from pharmacy so she had to purchase over-the-counter. She finds this effective for leg cramps.  Denies any issues with diarrhea but does have IBS usually constipation and then diarrhea.     Type 2 Diabetes  Taking none.  Working with Nicole JAMIL RD.  Blood sugar monitorin time(s) daily; Ranges: (patient reported) Fasting- 100 mg/dL today, . She reports blood sugar was up to 134 for the highest but was right after meal.   Current diabetes symptoms: none  Diet/Exercise: avoid very sugary sweets or candy, may have a sweet treat only once a month at most.  Eye exam: up to date  Foot exam: up to date    Recurrent DVT history  Eliquis 5 mg twice daily   Patient recommended to stay on indefinitely. She reports has had swelling on her left side but ongoing for years, she reports unknown why that happens.      Anxiety  Escitalopram 20 mg daily   Patient recently changed from sertraline to Lexpro due to stomach upset. She reports her stomach upset has improved since change. She thinks her mood and anxiety are well controlled. She may have times of feeling anxious  but not very often. Usually happens when she talks to her son who is medically ill which triggers her anxiety.  Previously taken: Buspirone, Paroxetine, sertraline     COPD  Spiriva Respimat 2 puff daily   Symptoms: controlled when using regularly, notices if missed dose may have more chest tightness so tries to be diligent with it.  Triggers: none.      Hip Pain  Acetaminophen 500 mg - 1000 mg daily as needed   She reports normally doesn't take but with increased knee and hip pain has been taking more frequently.     Psoriasis   Topical clobetasol 0.05% cream once daily as needed - 1-3 times a week  She is requesting a refill. She reports her tubes are all . Applies to knees and toes.     B12 deficiency  Vitamin B12 1000 mcg daily   Does not tolerate sublingual tablet. She also purchased a bottle of super B complex and wondering if she can take this. She reports has history of gastric bypass so wants to take additional to help with her replacement.     Osteoporosis and Hyperparathyroidism  Vitamin D3 3000 units daily   DEXA History: 10/2022. Repeat order from Dr. Singh, [facility] ordered 2024 for check in August, not completed yet.  Strongly encouraged adequate calcium daily with 1200 to 1500 mg calcium supplement.   Navid Singh.  Patient is getting  1-8 oz milk, 1 yogurt/cottage cheese, 1 spring mix salad 4 times a week  serving(s)/day of calcium in their diet.   Lab Results   Component Value Date    VITDT 44 2024    VITDT 36 2023    VITDT 30 2022    VITDT 30 10/24/2022    VITDT 21 2022     Vaccines  Patient reports [facility] will be coming out to assisted living 10/26 so is signed up to get covid and flu booster then.    Today's Vitals: LMP 1990   ----------------      I spent 51 minutes with this patient today. All changes were made via collaborative practice agreement with Arminda Foley MD. A copy of the visit note was provided to the  patient's provider(s).    A summary of these recommendations was mailed to the patient.    Jo Vásquez PharmD  Medication Therapy Management Pharmacist    Telemedicine Visit Details  The patient's medications can be safely assessed via a telemedicine encounter.  Type of service:  Telephone visit  Originating Location (pt. Location): Home    Distant Location (provider location):  On-site  Start Time:  9:04AM  End Time: 9:55 AM     Medication Therapy Recommendations  Leg cramps    Current Medication: magnesium glycinate 100 MG CAPS capsule   Rationale: Cannot afford medication product - Cost - Adherence   Recommendation: Provide Adherence Intervention   Status: Accepted per CPA

## 2024-09-24 NOTE — LETTER
SUBJECTIVE:   CC: Carri is an 49 year old who presents for preventive health visit.       2023     1:48 PM   Additional Questions   Roomed by Fide Prescott     Healthy Habits:     Getting at least 3 servings of Calcium per day:  Yes    Bi-annual eye exam:  Yes    Dental care twice a year:  Yes    Sleep apnea or symptoms of sleep apnea:  None    Diet:  Carbohydrate counting    Frequency of exercise:  1 day/week    Duration of exercise:  15-30 minutes    Taking medications regularly:  Yes    Additional concerns today:  Yes    Hot flashes since age 31. Night sweats. Dry skin. Dry vagina. Hair fell out. Improved with better nutrition/collagen.      Today's PHQ-9 Score:       2023     1:43 PM   PHQ-9 SCORE   PHQ-9 Total Score MyChart 5 (Mild depression)   PHQ-9 Total Score 5                 -------------------------------------      Social History     Tobacco Use     Smoking status: Former     Packs/day: 0.50     Years: 35.00     Pack years: 17.50     Types: Cigarettes     Quit date: 2018     Years since quittin.6     Smokeless tobacco: Never   Substance Use Topics     Alcohol use: Yes     Comment: 5 drinks/week             2023     2:20 PM   Alcohol Use   Prescreen: >3 drinks/day or >7 drinks/week? No     Reviewed orders with patient.  Reviewed health maintenance and updated orders accordingly - Yes  Lab work is in process    Breast Cancer Screening:    FHS-7:       2021    10:05 AM 2022     9:05 AM 2023     2:25 PM   Breast CA Risk Assessment (FHS-7)   Did any of your first-degree relatives have breast or ovarian cancer? No Yes Yes   Did any of your relatives have bilateral breast cancer? Yes No No   Did any man in your family have breast cancer? No No No   Did any woman in your family have breast and ovarian cancer? Yes No Yes   Did any woman in your family have breast cancer before age 50 y? No No No   Do you have 2 or more relatives with breast and/or ovarian cancer? No No  _  Medication List        Prepared on: Sep 24, 2024     Bring your Medication List when you go to the doctor, hospital, or   emergency room. And, share it with your family or caregivers.     Note any changes to how you take your medications.  Cross out medications when you no longer use them.    Medication How I take it Why I use it Prescriber   acetaminophen (TYLENOL) 500 MG tablet Take 500-1,000 mg by mouth 2 times daily as needed for mild pain.  Hip pain Patient Reported   apixaban ANTICOAGULANT (ELIQUIS ANTICOAGULANT) 5 MG tablet Take 1 tablet (5 mg) by mouth 2 times daily Personal History of DVT (Deep Vein Thrombosis) Arminda Foley MD   blood glucose (NO BRAND SPECIFIED) lancets standard Use to test blood sugar 2 times daily or as directed. Type 2 diabetes mellitus Arminda Foley MD   blood glucose (ONETOUCH VERIO IQ) test strip USE TO TEST TWICE DAILY OR AS DIRECTED Type 2 diabetes mellitus  ANTON Delgadillo CNP   blood glucose calibration (NO BRAND SPECIFIED) solution Use to calibrate blood glucose monitor as directed. One touch verio Flex Type 2 diabetes mellitus  Tram Horowitz PA-C   clobetasol propionate (TEMOVATE) 0.05 % external cream Apply twice daily as needed Psoriasis Arminda Foley MD   cyanocobalamin (VITAMIN B-12) 1000 MCG tablet Take 1 tablet (1,000 mcg) by mouth daily Vitamin B12 Deficiency Arminda Foley MD   escitalopram (LEXAPRO) 20 MG tablet Take 1 tablet (20 mg) by mouth daily ELI (Generalized Anxiety Disorder) Arminad Foley MD   magnesium glycinate 100 MG CAPS capsule Take 1 capsule (100 mg) by mouth 2 times daily. Leg Cramps Arminda Foley MD   tiotropium (SPIRIVA RESPIMAT) 2.5 MCG/ACT inhaler Inhale 2 puffs into the lungs daily Chronic obstructive pulmonary disease Arminda Foley MD   Vitamin D3 (CHOLECALCIFEROL) 25 mcg (1000 units) tablet Take 3 tablets (75 mcg) by mouth daily. Vitamin D Deficiency  Arminda Foley MD          Add new medications, over-the-counter drugs, herbals, vitamins, or  minerals in the blank rows below.    Medication How I take it Why I use it Prescriber                                      Allergies:      - Povidone Iodine - Hives  - Bacitracin - Unknown  - Dust Mites  - Nickel        Side effects I have had:      Not on File        Other Information:              My notes and questions:   "No   Do you have 2 or more relatives with breast and/or bowel cancer? No No No     click delete button to remove this line now  Mammogram Screening: Recommended annual mammography  Pertinent mammograms are reviewed under the imaging tab.    History of abnormal Pap smear: NO - age 30-65 PAP every 5 years with negative HPV co-testing recommended      Latest Ref Rng & Units 3/23/2018    12:56 PM 3/23/2018    12:05 PM   PAP / HPV   PAP (Historical)   NIL    HPV 16 DNA NEG^Negative Negative     HPV 18 DNA NEG^Negative Negative     Other HR HPV NEG^Negative Negative       Reviewed and updated as needed this visit by clinical staff   Tobacco  Allergies  Meds              Reviewed and updated as needed this visit by Provider                     Review of Systems   Constitutional: Negative for chills and fever.   HENT: Negative for congestion, ear pain, hearing loss and sore throat.    Eyes: Negative for pain and visual disturbance.   Respiratory: Negative for cough and shortness of breath.    Cardiovascular: Positive for palpitations. Negative for chest pain and peripheral edema.   Gastrointestinal: Positive for heartburn and hematochezia. Negative for abdominal pain, constipation, diarrhea and nausea.   Breasts:  Negative for tenderness, breast mass and discharge.   Genitourinary: Negative for dysuria, frequency, genital sores, hematuria, pelvic pain, urgency, vaginal bleeding and vaginal discharge.   Musculoskeletal: Positive for arthralgias and myalgias. Negative for joint swelling.   Skin: Negative for rash.   Neurological: Positive for weakness and headaches. Negative for dizziness and paresthesias.   Psychiatric/Behavioral: Positive for mood changes. The patient is nervous/anxious.           OBJECTIVE:   /80 (BP Location: Right arm, Patient Position: Sitting, Cuff Size: Adult Large)   Pulse 91   Temp 98.3  F (36.8  C) (Oral)   Resp 20   Ht 1.645 m (5' 4.76\")   Wt 106.3 kg (234 lb 6.4 oz)   LMP  (LMP " Unknown)   SpO2 97%   BMI 39.29 kg/m    Physical Exam  GENERAL: healthy, alert and no distress  EYES: Eyes grossly normal to inspection, PERRL and conjunctivae and sclerae normal  HENT: ear canals and TM's normal, nose and mouth without ulcers or lesions  NECK: no adenopathy, no asymmetry, masses, or scars and thyroid normal to palpation  RESP: lungs clear to auscultation - no rales, rhonchi or wheezes  CV: regular rate and rhythm, normal S1 S2, no S3 or S4, no murmur, click or rub, no peripheral edema and peripheral pulses strong  ABDOMEN: soft, nontender, no hepatosplenomegaly, no masses and bowel sounds normal   (female): normal female external genitalia, normal urethral meatus, vaginal mucosa pale, dryand normal cervix/adnexa/uterus without masses or discharge  MS: no gross musculoskeletal defects noted, no edema  SKIN: no suspicious lesions or rashes  NEURO: Normal strength and tone, mentation intact and speech normal  PSYCH: mentation appears normal, affect normal/bright    Diagnostic Test Results:  Labs reviewed in Epic    ASSESSMENT/PLAN:   (Z00.00) Healthcare maintenance  (primary encounter diagnosis)  -Declines vaccinations  -Recommended mammo/colon cancer screen    (Z12.11) Screen for colon cancer  Plan: Colonoscopy Screening  Referral    (Z12.4) Cervical cancer screening  Plan: Pap Screen with HPV - recommended age 30 - 65         years    (J45.30) Mild persistent asthma without complication  Comment: Stable.   Plan: albuterol (PROAIR HFA/PROVENTIL HFA/VENTOLIN         HFA) 108 (90 Base) MCG/ACT inhaler    (F33.8) Seasonal affective disorder (H)  Comment: Was stable this year. Self stopped wellbutrin. See below for anxiety    (E66.01) Morbid obesity (H)  BMI over 35 with hyperlipidemia  -Has lost weight. Discussed diet and exercise    (N95.2) Atrophic vaginitis  Comment: Unclear if she is in perimenopause/menopause as she has IUD and does not menstruate. Has had hot flashes for several years  "and now experiencing vaginal dryness. Vagina shows signs of atrophy.   Plan: estradiol (ESTRACE) 0.1 MG/GM vaginal cream  Reviewed r/b/se and how to use.     (F41.9) Anxiety  Comment: SAD better but anxiety worsened, mostly due to situational stressors. Has also had irritability. Drinking 2 ETOH beverages per night. Declining therapy. Discussed med options, specifically SSRIs  Plan:   -She will set up VRT visit if wanting to start selective serotonin reuptake inhibitor. For now, not wanting to start because she wants to go the \"natural\" route  -Recommended increasing exercising and stopping ETOH for now.     (R12) Heartburn  Comment: Used to take 60mg pepcid per day. Started taking?colostrum supplements and now only needing 20mg per day and doesn't have sx if she skips it.   Plan:   -Reduce etoh. Contact me if sx return.           COUNSELING:  Reviewed preventive health counseling, as reflected in patient instructions        She reports that she quit smoking about 4 years ago. Her smoking use included cigarettes. She has a 17.50 pack-year smoking history. She has never used smokeless tobacco.          WARD MEDELLIN CNP  [facility]  Answers for HPI/ROS submitted by the patient on 7/11/2023  If you checked off any problems, how difficult have these problems made it for you to do your work, take care of things at home, or get along with other people?: Not difficult at all  PHQ9 TOTAL SCORE: 5      "

## 2024-09-24 NOTE — LETTER
"Recommended To-Do List      Prepared on: Sep 24, 2024       You can get the best results from your medications by completing the items on this \"To-Do List.\"      Bring your To-Do List when you go to your doctor. And, share it with your family or caregivers.    My To-Do List:  What we talked about: What I should do:   The cost of your medication(s)    Reminder to take your medication as prescribed for magnesium glycinate. I sent a new Rx for 100 mg twice daily.           What we talked about: What I should do:                     "

## 2024-09-26 ENCOUNTER — PATIENT OUTREACH (OUTPATIENT)
Dept: GERIATRIC MEDICINE | Facility: CLINIC | Age: 79
End: 2024-09-26
Payer: COMMERCIAL

## 2024-09-26 NOTE — PROGRESS NOTES
[facility] Care Coordination Contact    Covering Care coordinator received forwarded voice mail from Gemma requesting information on who her care coordinator is and [employer] coverage.   Writer called Gemma and left voice mail providing regular care coordinator's name and contact information.  Also left writer's contact information to return call if her need is urgent otherwise Ifrah Cunha will follow up with her Monday.     Ifrah Cardoza RN PHN  [facility]  Care Coordinator  [phone]

## 2024-09-27 ENCOUNTER — OFFICE VISIT (OUTPATIENT)
Dept: OBGYN | Facility: CLINIC | Age: 79
End: 2024-09-27
Payer: COMMERCIAL

## 2024-09-27 VITALS — SYSTOLIC BLOOD PRESSURE: 112 MMHG | DIASTOLIC BLOOD PRESSURE: 56 MMHG

## 2024-09-27 DIAGNOSIS — N39.3 STRESS INCONTINENCE IN FEMALE: Primary | ICD-10-CM

## 2024-09-27 PROCEDURE — 99213 OFFICE O/P EST LOW 20 MIN: CPT | Performed by: OBSTETRICS & GYNECOLOGY

## 2024-09-27 NOTE — PROGRESS NOTES
SUBJECTIVE:                                                     Gemma Cowart is a 78 year old female  who presents today for discussion of adding mid urethral sling to her prolapse repair and vNOTES hysterectomy.      She reports a long history of stress incontinence with cough, laugh, sneeze.  Even since the prolapse started she still has leakage with a strong cough or sneeze, and wears a pad daily as a result.  Initially she was not certain that she wanted to add a mid urethral sling to the prolapse repair, but now she states that after thinking about this and talking to some friends she would like to do so.    Problem list and histories reviewed & adjusted, as indicated.  Additional history: as documented.    Patient Active Problem List   Diagnosis    Nontoxic uninodular goiter    Rosacea    Vitamin D deficiency disease    Vitamin B12 deficiency    Hyperlipidemia LDL goal <100    Anxiety    Insomnia    Type 2 diabetes mellitus with other specified complication (H)    Psoriasis    Long term current use of anticoagulant-hx multiple DVTs    Malignant neoplasm of nipple of left breast in female, unspecified estrogen receptor status (H)    CKD (chronic kidney disease) stage 3, GFR 30-59 ml/min (H)    Postmenopausal bleeding    COPD (chronic obstructive pulmonary disease) (H)    Iron deficiency anemia, unspecified iron deficiency anemia type    Elevated parathyroid hormone    Other osteoporosis without current pathological fracture    Dysphonia    History of lobectomy of thyroid    Problem with voice production     Past Surgical History:   Procedure Laterality Date    ABDOMEN SURGERY      gastric bypass     COLONOSCOPY  10/11/2012    Procedure: COLONOSCOPY;  colonoscopy;  Surgeon: Gela Cleary MD;  Location:  GI    COLONOSCOPY N/A 10/6/2017    Procedure: COLONOSCOPY;;  Surgeon: Shashank Ortiz MD;  Location:  GI    COLONOSCOPY N/A 3/7/2022    Procedure: COLONOSCOPY;  Surgeon: Fredy  Andrea PINEDA MD;  Location:  GI    DENTAL SURGERY  5/2007    all teeth removed - dentures    ENT SURGERY      all teeth pulled    ESOPHAGOSCOPY, GASTROSCOPY, DUODENOSCOPY (EGD), COMBINED N/A 10/6/2017    Procedure: COMBINED ESOPHAGOSCOPY, GASTROSCOPY, DUODENOSCOPY (EGD);  ESOPHAGOSCOPY, GASTROSCOPY, DUODENOSCOPY (EGD) & Colonoscopy *Needs INR on arrival;  Surgeon: Shashank Ortiz MD;  Location:  GI    ESOPHAGOSCOPY, GASTROSCOPY, DUODENOSCOPY (EGD), COMBINED N/A 9/6/2024    Procedure: Esophagoscopy, gastroscopy, duodenoscopy (EGD), combined;  Surgeon: Andrea Daniel MD;  Location:  GI    GYN SURGERY  1/10/75    tubal ligation     H ABLATION SVT  5/28/2015    stopped metoprolol    HERNIA REPAIR  2005    IMPLANT FILTER INFERIOR VENA CAVA  10/8/2012    taken out  1/4/12    INSERT TISSUE EXPANDER BREAST BILATERAL  9/5/2012    Procedure: INSERT TISSUE EXPANDER BREAST BILATERAL;;  Surgeon: Orlando Wall MD;  Location:  OR    MASTECTOMY SIMPLE, SENTINEL NODE, COMBINED  9/5/2012    Procedure: COMBINED MASTECTOMY SIMPLE, SENTINEL NODE;  BILATERAL MASTECTOMY WITH LEFT AXILLARY SENTINEL LYMPH NODE BIOPSY, BILATERAL TISSUE EXPANDER       MASTECTOMY, BILATERAL      PANNICULECTOMY N/A 4/5/2018    Procedure: PANNICULECTOMY;  PANNICULECTOMY ;  Surgeon: Orlando Wall MD;  Location:  OR    RECONSTRUCT BREAST BILATERAL, IMPLANT PROSTHESIS BILATERAL, COMBINED  5/22/2013    Procedure: COMBINED RECONSTRUCT BREAST BILATERAL, IMPLANT PROSTHESIS BILATERAL;  BILATERAL SECOND STAGE BREAST RECONSTRUCTION WITH SILICONE GEL IMPLANTS AND LIPOSUCTION;  Surgeon: Orlando Wall MD;  Location: [facility]    RECONSTRUCT NIPPLE BILATERAL  8/22/2013    Procedure: RECONSTRUCT NIPPLE BILATERAL;  BILATERAL NIPPLE AREOLAR RECONSTRUCTION;  Surgeon: Orlando Wall MD;  Location: [facility]    THYROIDECTOMY N/A 11/14/2023    Procedure: Left Thyroid Lobectomy and Autotransplant of Left Superior Parathyroid;  Surgeon: Haley Garcia  MD Caroline;  Location:  OR    [facility] BREAST RECONSTRUC W OTHR TECHNIQ  2013      Social History     Tobacco Use    Smoking status: Former     Current packs/day: 0.00     Average packs/day: 1.5 packs/day for 25.0 years (37.5 ttl pk-yrs)     Types: Cigarettes     Start date: 10/21/1961     Quit date: 10/21/1986     Years since quittin.9     Passive exposure: Never    Smokeless tobacco: Never    Tobacco comments:     quit    Substance Use Topics    Alcohol use: Not Currently      Problem (# of Occurrences) Relation (Name,Age of Onset)    Alzheimer Disease (2) Father, Paternal Grandfather    Cancer (3) Paternal Grandmother, Maternal Uncle, Maternal Uncle    Diabetes (3) Brother, Brother, Son    Eye Disorder (1) Brother    Cancer - colorectal (2) Mother, Brother    Prostate Cancer (1) Father    Crohn's Disease (1) Daughter    Colon Cancer (1) Mother              Current Outpatient Medications   Medication Sig Dispense Refill    acetaminophen (TYLENOL) 500 MG tablet Take 500-1,000 mg by mouth 2 times daily as needed for mild pain.      apixaban ANTICOAGULANT (ELIQUIS ANTICOAGULANT) 5 MG tablet Take 1 tablet (5 mg) by mouth 2 times daily 180 tablet 3    blood glucose (NO BRAND SPECIFIED) lancets standard Use to test blood sugar 2 times daily or as directed. 1 each 4    blood glucose (ONETOUCH VERIO IQ) test strip USE TO TEST TWICE DAILY OR AS DIRECTED 200 strip 2    blood glucose calibration (NO BRAND SPECIFIED) solution Use to calibrate blood glucose monitor as directed. One touch verio Flex 1 each 0    clobetasol propionate (TEMOVATE) 0.05 % external cream Apply twice daily as needed 60 g 3    cyanocobalamin (VITAMIN B-12) 1000 MCG tablet Take 1 tablet (1,000 mcg) by mouth daily 90 tablet 3    escitalopram (LEXAPRO) 20 MG tablet Take 1 tablet (20 mg) by mouth daily 90 tablet 1    magnesium glycinate 100 MG CAPS capsule Take 1 capsule (100 mg) by mouth 2 times daily. 180 capsule 3    tiotropium (SPIRIVA  RESPIMAT) 2.5 MCG/ACT inhaler Inhale 2 puffs into the lungs daily 12 g 3    Vitamin D3 (CHOLECALCIFEROL) 25 mcg (1000 units) tablet Take 3 tablets (75 mcg) by mouth daily. 270 tablet 3     No current facility-administered medications for this visit.     Allergies   Allergen Reactions    Povidone Iodine Hives    Bacitracin Unknown    Dust Mites     Nickel        ROS:  Negative other than as noted in HPI.    OBJECTIVE:     Exam:  Constitutional:  Appearance: Well nourished, well developed alert, in no acute distress  Neurologic/Psychiatric:  Mental Status:  Oriented X3       In-Clinic Test Results:  No results found. However, due to the size of the patient record, not all encounters were searched. Please check Results Review for a complete set of results.    ASSESSMENT/PLAN:                                                        ICD-10-CM    1. Stress incontinence in female  N39.3         She is interested in midurethral sling placement.    Risks, benefits, and alternatives to this procedure were discussed in detail, with risks including but not limited to infection, bleeding, damage to other organs, blood vessels, nerves, injury to the bladder, mesh erosion, de kiki urge incontinence. She understands that urinary retention can be risk with this procedure and is usually, but not always, short-lived. She may need a catheter in for a short time following surgery.     Shanique Paniagua MD  [facility]

## 2024-09-27 NOTE — NURSING NOTE
"Chief Complaint   Patient presents with    Consult     Discuss upcoming surgery and sling       Initial /56   LMP 1990  Estimated body mass index is 28.98 kg/m  as calculated from the following:    Height as of 24: 1.588 m (5' 2.5\").    Weight as of 24: 73 kg (161 lb).  BP completed using cuff size: regular    Questioned patient about current smoking habits.  Pt. quit smoking some time ago.        "

## 2024-09-30 ENCOUNTER — LAB (OUTPATIENT)
Dept: LAB | Facility: CLINIC | Age: 79
End: 2024-09-30
Payer: COMMERCIAL

## 2024-09-30 DIAGNOSIS — M81.8 OTHER OSTEOPOROSIS WITHOUT CURRENT PATHOLOGICAL FRACTURE: ICD-10-CM

## 2024-09-30 DIAGNOSIS — R25.2 CRAMP OF LIMB: ICD-10-CM

## 2024-09-30 DIAGNOSIS — C73 MALIGNANT NEOPLASM OF THYROID GLAND (H): ICD-10-CM

## 2024-09-30 DIAGNOSIS — E04.2 NON-TOXIC MULTINODULAR GOITER: ICD-10-CM

## 2024-09-30 DIAGNOSIS — D50.9 IRON DEFICIENCY ANEMIA, UNSPECIFIED IRON DEFICIENCY ANEMIA TYPE: Primary | ICD-10-CM

## 2024-09-30 DIAGNOSIS — N18.30 CKD (CHRONIC KIDNEY DISEASE) STAGE 3, GFR 30-59 ML/MIN (H): ICD-10-CM

## 2024-09-30 LAB
ALBUMIN SERPL BCG-MCNC: 3.6 G/DL (ref 3.5–5.2)
ALP SERPL-CCNC: 76 U/L (ref 40–150)
ALT SERPL W P-5'-P-CCNC: 16 U/L (ref 0–50)
ANION GAP SERPL CALCULATED.3IONS-SCNC: 9 MMOL/L (ref 7–15)
AST SERPL W P-5'-P-CCNC: 25 U/L (ref 0–45)
BILIRUB SERPL-MCNC: 0.4 MG/DL
BUN SERPL-MCNC: 24.7 MG/DL (ref 8–23)
CALCIUM SERPL-MCNC: 8.6 MG/DL (ref 8.8–10.4)
CHLORIDE SERPL-SCNC: 111 MMOL/L (ref 98–107)
CREAT SERPL-MCNC: 1.62 MG/DL (ref 0.51–0.95)
EGFRCR SERPLBLD CKD-EPI 2021: 32 ML/MIN/1.73M2
GLUCOSE SERPL-MCNC: 97 MG/DL (ref 70–99)
HCO3 SERPL-SCNC: 23 MMOL/L (ref 22–29)
PHOSPHATE SERPL-MCNC: 3.9 MG/DL (ref 2.5–4.5)
POTASSIUM SERPL-SCNC: 5.1 MMOL/L (ref 3.4–5.3)
PROT SERPL-MCNC: 5.8 G/DL (ref 6.4–8.3)
PTH-INTACT SERPL-MCNC: 50 PG/ML (ref 15–65)
SODIUM SERPL-SCNC: 143 MMOL/L (ref 135–145)
T4 FREE SERPL-MCNC: 0.97 NG/DL (ref 0.9–1.7)
TSH SERPL DL<=0.005 MIU/L-ACNC: 3.21 UIU/ML (ref 0.3–4.2)
VIT D+METAB SERPL-MCNC: 48 NG/ML (ref 20–50)

## 2024-09-30 PROCEDURE — 86800 THYROGLOBULIN ANTIBODY: CPT | Mod: 90

## 2024-09-30 PROCEDURE — 80053 COMPREHEN METABOLIC PANEL: CPT

## 2024-09-30 PROCEDURE — 36415 COLL VENOUS BLD VENIPUNCTURE: CPT

## 2024-09-30 PROCEDURE — 84100 ASSAY OF PHOSPHORUS: CPT

## 2024-09-30 PROCEDURE — 99000 SPECIMEN HANDLING OFFICE-LAB: CPT

## 2024-09-30 PROCEDURE — 84432 ASSAY OF THYROGLOBULIN: CPT | Mod: 90

## 2024-09-30 PROCEDURE — 82306 VITAMIN D 25 HYDROXY: CPT

## 2024-09-30 PROCEDURE — 84443 ASSAY THYROID STIM HORMONE: CPT

## 2024-09-30 PROCEDURE — 83970 ASSAY OF PARATHORMONE: CPT

## 2024-09-30 PROCEDURE — 84439 ASSAY OF FREE THYROXINE: CPT

## 2024-10-04 LAB — SCANNED LAB RESULT: NORMAL

## 2024-10-08 ENCOUNTER — OFFICE VISIT (OUTPATIENT)
Dept: FAMILY MEDICINE | Facility: CLINIC | Age: 79
End: 2024-10-08
Payer: COMMERCIAL

## 2024-10-08 ENCOUNTER — TELEPHONE (OUTPATIENT)
Dept: FAMILY MEDICINE | Facility: CLINIC | Age: 79
End: 2024-10-08

## 2024-10-08 VITALS
HEART RATE: 61 BPM | OXYGEN SATURATION: 99 % | DIASTOLIC BLOOD PRESSURE: 68 MMHG | RESPIRATION RATE: 16 BRPM | TEMPERATURE: 97.9 F | HEIGHT: 62 IN | BODY MASS INDEX: 30.47 KG/M2 | WEIGHT: 165.6 LBS | SYSTOLIC BLOOD PRESSURE: 118 MMHG

## 2024-10-08 DIAGNOSIS — E11.69 TYPE 2 DIABETES MELLITUS WITH OTHER SPECIFIED COMPLICATION, WITHOUT LONG-TERM CURRENT USE OF INSULIN (H): Chronic | ICD-10-CM

## 2024-10-08 DIAGNOSIS — N39.3 STRESS INCONTINENCE OF URINE: ICD-10-CM

## 2024-10-08 DIAGNOSIS — Z01.818 PREOP GENERAL PHYSICAL EXAM: Primary | ICD-10-CM

## 2024-10-08 DIAGNOSIS — E78.5 HYPERLIPIDEMIA LDL GOAL <70: ICD-10-CM

## 2024-10-08 DIAGNOSIS — N81.4 UTERINE PROLAPSE: ICD-10-CM

## 2024-10-08 PROBLEM — D50.9 IRON DEFICIENCY ANEMIA, UNSPECIFIED IRON DEFICIENCY ANEMIA TYPE: Status: RESOLVED | Noted: 2021-10-27 | Resolved: 2024-10-08

## 2024-10-08 PROBLEM — N95.0 POSTMENOPAUSAL BLEEDING: Status: RESOLVED | Noted: 2018-05-09 | Resolved: 2024-10-08

## 2024-10-08 PROBLEM — M81.8 OTHER OSTEOPOROSIS WITHOUT CURRENT PATHOLOGICAL FRACTURE: Status: RESOLVED | Noted: 2023-01-04 | Resolved: 2024-10-08

## 2024-10-08 LAB
CHOLEST SERPL-MCNC: 182 MG/DL
CREAT UR-MCNC: 110 MG/DL
ERYTHROCYTE [DISTWIDTH] IN BLOOD BY AUTOMATED COUNT: 14.3 % (ref 10–15)
EST. AVERAGE GLUCOSE BLD GHB EST-MCNC: 114 MG/DL
FASTING STATUS PATIENT QL REPORTED: NO
HBA1C MFR BLD: 5.6 % (ref 0–5.6)
HCT VFR BLD AUTO: 35.5 % (ref 35–47)
HDLC SERPL-MCNC: 66 MG/DL
HGB BLD-MCNC: 11.1 G/DL (ref 11.7–15.7)
LDLC SERPL CALC-MCNC: 103 MG/DL
MCH RBC QN AUTO: 26.4 PG (ref 26.5–33)
MCHC RBC AUTO-ENTMCNC: 31.3 G/DL (ref 31.5–36.5)
MCV RBC AUTO: 84 FL (ref 78–100)
MICROALBUMIN UR-MCNC: <12 MG/L
MICROALBUMIN/CREAT UR: NORMAL MG/G{CREAT}
NONHDLC SERPL-MCNC: 116 MG/DL
PLATELET # BLD AUTO: 173 10E3/UL (ref 150–450)
RBC # BLD AUTO: 4.21 10E6/UL (ref 3.8–5.2)
TRIGL SERPL-MCNC: 64 MG/DL
WBC # BLD AUTO: 4 10E3/UL (ref 4–11)

## 2024-10-08 PROCEDURE — 36415 COLL VENOUS BLD VENIPUNCTURE: CPT | Performed by: NURSE PRACTITIONER

## 2024-10-08 PROCEDURE — 93005 ELECTROCARDIOGRAM TRACING: CPT | Performed by: NURSE PRACTITIONER

## 2024-10-08 PROCEDURE — 99214 OFFICE O/P EST MOD 30 MIN: CPT | Performed by: NURSE PRACTITIONER

## 2024-10-08 PROCEDURE — 80061 LIPID PANEL: CPT | Performed by: NURSE PRACTITIONER

## 2024-10-08 PROCEDURE — 83036 HEMOGLOBIN GLYCOSYLATED A1C: CPT | Performed by: NURSE PRACTITIONER

## 2024-10-08 PROCEDURE — 85027 COMPLETE CBC AUTOMATED: CPT | Performed by: NURSE PRACTITIONER

## 2024-10-08 ASSESSMENT — PAIN SCALES - GENERAL: PAINLEVEL: NO PAIN (0)

## 2024-10-08 NOTE — Clinical Note
Patient is scheduled for surgery in 2 days.  Previously she has held the Eliquis x 2 days before surgery so unless you have other recommendation that is what I will have her do this time as well.   Thank you, Gemma Caicedo, CNP

## 2024-10-08 NOTE — TELEPHONE ENCOUNTER
----- Message from Gemma Caicedo sent at 10/8/2024  3:58 PM CDT -----  Please call patient- hemoglobin is a little low.  Follow-up with PCP for recheck in about 1 month.

## 2024-10-08 NOTE — PROGRESS NOTES
Preoperative Evaluation  [facility] [place]  [address]  [place] [facility] [address]  Phone: [phone]  Fax: [phone]  Primary Provider: Arminda Foley MD  Pre-op Performing Provider: Gemma Caicedo CNP  Oct 8, 2024             10/8/2024   Surgical Information   What procedure is being done? Hysterectomy and bladder sling   Facility or Hospital where procedure/surgery will be performed: [facility]   Who is doing the procedure / surgery? Dr. Melendez   Date of surgery / procedure: 10/10/24   Time of surgery / procedure: 540am   Where do you plan to recover after surgery? at home with family        Fax number for surgical facility: Note does not need to be faxed, will be available electronically in Epic.      Subjective   Gemma is a 78 year old, presenting for the following:  Pre-Op Exam (10/10/24 Dr. Melendez [facility] Hysterectomy, Bladder sling)          10/8/2024    11:27 AM   Additional Questions   Roomed by Pamela HANDLEY LPN         10/8/2024   Forms   Any forms needing to be completed Yes        HPI related to upcoming procedure: Uterine prolapse- worse x 6 months.  Urinary incontinence.          10/8/2024   Pre-Op Questionnaire   Have you ever had a heart attack or stroke? No   Have you ever had surgery on your heart or blood vessels, such as a stent placement, a coronary artery bypass, or surgery on an artery in your head, neck, heart, or legs? (!) YES stent   Do you have chest pain with activity? No   Do you have a history of heart failure? No   Do you currently have a cold, bronchitis or symptoms of other infection? No   Do you have a cough, shortness of breath, or wheezing? (!) YES COPD- stable   Do you or anyone in your family have previous history of blood clots? (!) YES Hx blood clots   Do you or does anyone in your family have a serious bleeding problem such as prolonged bleeding following surgeries or cuts? No   Have you ever had  problems with anemia or been told to take iron pills? (!) YES    Have you had any abnormal blood loss such as black, tarry or bloody stools, or abnormal vaginal bleeding? No   Have you ever had a blood transfusion? (!) YES   Have you ever had a transfusion reaction? No   Are you willing to have a blood transfusion if it is medically needed before, during, or after your surgery? Yes   Have you or any of your relatives ever had problems with anesthesia? No   Do you have sleep apnea, excessive snoring or daytime drowsiness? No   Do you have any artifical heart valves or other implanted medical devices like a pacemaker, defibrillator, or continuous glucose monitor? No   Do you have artificial joints? No   Are you allergic to latex? No        Health Care Directive  Patient does not have a Health Care Directive or Living Will: Discussed advance care planning with patient; however, patient declined at this time.    Preoperative Review of    reviewed - no record of controlled substances prescribed.        Patient Active Problem List    Diagnosis Date Noted    Dysphonia 12/08/2023     Priority: Medium    History of lobectomy of thyroid 12/08/2023     Priority: Medium    Problem with voice production 12/08/2023     Priority: Medium    Elevated parathyroid hormone 04/12/2022     Priority: Medium     Asked her to follow-up with endo May 23, 2023. Not taking her calcium because someone on youtube told her not to      COPD (chronic obstructive pulmonary disease) (H) 04/21/2021     Priority: Medium     PFTs were normal other than higher residual volume (seen in COPD vs asthma) but still symptomatic so referred to pulm May 23, 2023    Stress test negative fall 2022      CKD (chronic kidney disease) stage 3, GFR 30-59 ml/min (H) 10/16/2016     Priority: Medium    Long term current use of anticoagulant-hx multiple DVTs 05/04/2016     Priority: Medium    Type 2 diabetes mellitus with other specified complication (H) 10/09/2015      Priority: Medium     Asked her to start a statin May 23, 2023. Waiting to hear back      Anxiety 04/23/2013     Priority: Medium    Vitamin D deficiency disease 02/01/2013     Priority: Medium    Rosacea 10/18/2012     Priority: Medium      Past Medical History:   Diagnosis Date    Anemia     iron deficiency anemia    Antiplatelet or antithrombotic long-term use     Anxiety     Arrhythmia     Breast cancer (H) 2012    Bilateral Masectomies 9/2012    Complications of gastric bypass surgery     COPD (chronic obstructive pulmonary disease) (H) 04/21/2021    Diabetes mellitus (H)     diet controlled    DVT (deep venous thrombosis) (H)     Eczema     Fracture of left knee region     patella - vertical    History of blood transfusion     History of kidney stones     Iron deficiency anemia, unspecified iron deficiency anemia type 10/27/2021    Noninfectious ileitis     Obesity     Palpitation     with no treatment    Polyneuropathy     Pruritus     generalized    Renal disease     Rosacea     SVT (supraventricular tachycardia) (H)     s/p ablation 5/28/2015 at [facility] for left lateral AP    Thrombosis     Thyroid disease     Vitamin B12 deficiency 02/01/2013     Past Surgical History:   Procedure Laterality Date    ABDOMEN SURGERY  2000    gastric bypass 2000    COLONOSCOPY  10/11/2012    Procedure: COLONOSCOPY;  colonoscopy;  Surgeon: Gela Cleary MD;  Location:  GI    COLONOSCOPY N/A 10/6/2017    Procedure: COLONOSCOPY;;  Surgeon: Shashank Ortiz MD;  Location:  GI    COLONOSCOPY N/A 3/7/2022    Procedure: COLONOSCOPY;  Surgeon: Andrea Daniel MD;  Location:  GI    DENTAL SURGERY  5/2007    all teeth removed - dentures    ENT SURGERY      all teeth pulled    ESOPHAGOSCOPY, GASTROSCOPY, DUODENOSCOPY (EGD), COMBINED N/A 10/6/2017    Procedure: COMBINED ESOPHAGOSCOPY, GASTROSCOPY, DUODENOSCOPY (EGD);  ESOPHAGOSCOPY, GASTROSCOPY, DUODENOSCOPY (EGD) & Colonoscopy *Needs INR on arrival;   Surgeon: Shashank Ortiz MD;  Location:  GI    ESOPHAGOSCOPY, GASTROSCOPY, DUODENOSCOPY (EGD), COMBINED N/A 9/6/2024    Procedure: Esophagoscopy, gastroscopy, duodenoscopy (EGD), combined;  Surgeon: Andrea Daniel MD;  Location:  GI    GYN SURGERY  1/10/75    tubal ligation     H ABLATION SVT  5/28/2015    stopped metoprolol    HERNIA REPAIR  2005    IMPLANT FILTER INFERIOR VENA CAVA  10/8/2012    taken out  1/4/12    INSERT TISSUE EXPANDER BREAST BILATERAL  9/5/2012    Procedure: INSERT TISSUE EXPANDER BREAST BILATERAL;;  Surgeon: Orlando Wall MD;  Location:  OR    MASTECTOMY SIMPLE, SENTINEL NODE, COMBINED  9/5/2012    Procedure: COMBINED MASTECTOMY SIMPLE, SENTINEL NODE;  BILATERAL MASTECTOMY WITH LEFT AXILLARY SENTINEL LYMPH NODE BIOPSY, BILATERAL TISSUE EXPANDER       MASTECTOMY, BILATERAL      PANNICULECTOMY N/A 4/5/2018    Procedure: PANNICULECTOMY;  PANNICULECTOMY ;  Surgeon: Orlando Wall MD;  Location:  OR    RECONSTRUCT BREAST BILATERAL, IMPLANT PROSTHESIS BILATERAL, COMBINED  5/22/2013    Procedure: COMBINED RECONSTRUCT BREAST BILATERAL, IMPLANT PROSTHESIS BILATERAL;  BILATERAL SECOND STAGE BREAST RECONSTRUCTION WITH SILICONE GEL IMPLANTS AND LIPOSUCTION;  Surgeon: Orlando Wall MD;  Location:  SD    RECONSTRUCT NIPPLE BILATERAL  8/22/2013    Procedure: RECONSTRUCT NIPPLE BILATERAL;  BILATERAL NIPPLE AREOLAR RECONSTRUCTION;  Surgeon: Orlando Wall MD;  Location:  SD    THYROIDECTOMY N/A 11/14/2023    Procedure: Left Thyroid Lobectomy and Autotransplant of Left Superior Parathyroid;  Surgeon: Haley Garcia MD;  Location:  OR    ZZHC BREAST RECONSTRUC W OTHR TECHNIQ  5/8/2013     Current Outpatient Medications   Medication Sig Dispense Refill    acetaminophen (TYLENOL) 500 MG tablet Take 500-1,000 mg by mouth 2 times daily as needed for mild pain.      apixaban ANTICOAGULANT (ELIQUIS ANTICOAGULANT) 5 MG tablet Take 1 tablet (5 mg) by mouth 2 times  daily 180 tablet 3    blood glucose (NO BRAND SPECIFIED) lancets standard Use to test blood sugar 2 times daily or as directed. 1 each 4    blood glucose (ONETOUCH VERIO IQ) test strip USE TO TEST TWICE DAILY OR AS DIRECTED 200 strip 2    blood glucose calibration (NO BRAND SPECIFIED) solution Use to calibrate blood glucose monitor as directed. One touch verio Flex 1 each 0    clobetasol propionate (TEMOVATE) 0.05 % external cream Apply twice daily as needed 60 g 3    cyanocobalamin (VITAMIN B-12) 1000 MCG tablet Take 1 tablet (1,000 mcg) by mouth daily 90 tablet 3    escitalopram (LEXAPRO) 20 MG tablet Take 1 tablet (20 mg) by mouth daily 90 tablet 1    magnesium glycinate 100 MG CAPS capsule Take 1 capsule (100 mg) by mouth 2 times daily. 180 capsule 3    tiotropium (SPIRIVA RESPIMAT) 2.5 MCG/ACT inhaler Inhale 2 puffs into the lungs daily 12 g 3    Vitamin D3 (CHOLECALCIFEROL) 25 mcg (1000 units) tablet Take 3 tablets (75 mcg) by mouth daily. 270 tablet 3       Allergies   Allergen Reactions    Bacitracin Hives    Dust Mites Other (See Comments)     sneezing    Nickel Swelling        Social History     Tobacco Use    Smoking status: Former     Current packs/day: 0.00     Average packs/day: 1.5 packs/day for 25.0 years (37.5 ttl pk-yrs)     Types: Cigarettes     Start date: 10/21/1961     Quit date: 10/21/1986     Years since quittin.9     Passive exposure: Current    Smokeless tobacco: Never    Tobacco comments:     quit    Substance Use Topics    Alcohol use: Not Currently     Family History   Problem Relation Age of Onset    Cancer - colorectal Mother     Colon Cancer Mother     Prostate Cancer Father     Alzheimer Disease Father     Cancer Paternal Grandmother     Alzheimer Disease Paternal Grandfather     Cancer - colorectal Brother     Diabetes Brother     Crohn's Disease Daughter     Diabetes Brother     Eye Disorder Brother     Diabetes Son     Cancer Maternal Uncle     Cancer Maternal Uncle   "    History   Drug Use No         Review of Systems  CONSTITUTIONAL: NEGATIVE for fever, chills, or recent illness  INTEGUMENTARY/SKIN: NEGATIVE for worrisome rashes, moles or lesions  EYES: NEGATIVE for vision changes or irritation  ENT/MOUTH: NEGATIVE for ear, mouth. Chronic, intermittent sore throat  RESP: stable COPD  CV: NEGATIVE for chest pain, palpitations,  + peripheral edema left leg  GI: NEGATIVE for nausea, abdominal pain, heartburn, or change in bowel habits.  +IBS  : urinary incontinence CKD stage 3  MUSCULOSKELETAL: Walks with walker, arthritis   NEURO: NEGATIVE for weakness, dizziness or paresthesias  ENDOCRINE: NEGATIVE for temperature intolerance, skin/hair changes  HEME: NEGATIVE for bleeding problems.  Hx blood clots  PSYCHIATRIC: NEGATIVE for changes in mood or affect    Objective    /68 (BP Location: Right arm, Patient Position: Sitting, Cuff Size: Adult Regular)   Pulse 61   Temp 97.9  F (36.6  C) (Oral)   Resp 16   Ht 1.581 m (5' 2.25\")   Wt 75.1 kg (165 lb 9.6 oz)   LMP 09/24/1990   SpO2 99%   BMI 30.05 kg/m     Estimated body mass index is 30.05 kg/m  as calculated from the following:    Height as of this encounter: 1.581 m (5' 2.25\").    Weight as of this encounter: 75.1 kg (165 lb 9.6 oz).  Physical Exam  GENERAL: alert and no distress  EYES: Eyes grossly normal to inspection, PERRL and conjunctivae and sclerae normal  HEENT: ear canals and TM's normal, nose and mouth without ulcers or lesions.  Bilateral hearing aids.  Upper and lower dentures.   NECK: no adenopathy, no asymmetry, masses, or scars  RESP: lungs clear to auscultation - no rales, rhonchi or wheezes  CV: regular rate and rhythm, normal S1 S2, no S3 or S4, no murmur, click or rub, no peripheral edema  ABDOMEN: soft, nontender, no hepatosplenomegaly, no masses and bowel sounds normal  MS: no gross musculoskeletal defects noted, no edema.  Mild limp- gait steady with walker.    SKIN: no suspicious lesions or " rashes  NEURO: Normal strength and tone, mentation intact and speech normal  PSYCH: mentation appears normal, affect normal/bright    Recent Labs   Lab Test 09/30/24  1123 06/14/24  1156 11/10/23  1429     --   --    POTASSIUM 5.1  --   --    CR 1.62*  --   --    A1C  --  5.9* 5.7*        Diagnostics  Recent Results (from the past 240 hour(s))   Comprehensive metabolic panel    Collection Time: 09/30/24 11:23 AM   Result Value Ref Range    Sodium 143 135 - 145 mmol/L    Potassium 5.1 3.4 - 5.3 mmol/L    Carbon Dioxide (CO2) 23 22 - 29 mmol/L    Anion Gap 9 7 - 15 mmol/L    Urea Nitrogen 24.7 (H) 8.0 - 23.0 mg/dL    Creatinine 1.62 (H) 0.51 - 0.95 mg/dL    GFR Estimate 32 (L) >60 mL/min/1.73m2    Calcium 8.6 (L) 8.8 - 10.4 mg/dL    Chloride 111 (H) 98 - 107 mmol/L    Glucose 97 70 - 99 mg/dL    Alkaline Phosphatase 76 40 - 150 U/L    AST 25 0 - 45 U/L    ALT 16 0 - 50 U/L    Protein Total 5.8 (L) 6.4 - 8.3 g/dL    Albumin 3.6 3.5 - 5.2 g/dL    Bilirubin Total 0.4 <=1.2 mg/dL   Parathyroid Hormone Intact    Collection Time: 09/30/24 11:23 AM   Result Value Ref Range    Parathyroid Hormone Intact 50 15 - 65 pg/mL   Vitamin D Deficiency    Collection Time: 09/30/24 11:23 AM   Result Value Ref Range    Vitamin D, Total (25-Hydroxy) 48 20 - 50 ng/mL   TSH    Collection Time: 09/30/24 11:23 AM   Result Value Ref Range    TSH 3.21 0.30 - 4.20 uIU/mL   T4 free    Collection Time: 09/30/24 11:23 AM   Result Value Ref Range    Free T4 0.97 0.90 - 1.70 ng/dL   Phosphorus    Collection Time: 09/30/24 11:23 AM   Result Value Ref Range    Phosphorus 3.9 2.5 - 4.5 mg/dL   Thyroglobulin and Antibody (Sendout to [facility])    Collection Time: 09/30/24 11:23 AM   Result Value Ref Range    See Scanned Result       THYROGLOBULIN AND ANTIBODY (SENDOUT TO [facility])-Scanned      EKG: sinus bradycardia    Revised Cardiac Risk Index (RCRI)  The patient has the following serious cardiovascular risks for perioperative complications:   - No serious  cardiac risks = 0 points     RCRI Interpretation: 2 points: Class III (moderate risk - 6.6% complication rate)     Estimated Functional Capacity: CANNOT perform 4 METS without symptoms     A/P:  1. Preop general physical exam  Cleared for surgery  - CBC with platelets; Future  - EKG 12-lead, tracing only    2. Uterine prolapse  Surgery as scheduled    3. Stress incontinence of urine  Surgery as scheduled    4. Type 2 diabetes mellitus with other specified complication, without long-term current use of insulin (H)  Diet controlled- stable  - Random Glucose; Future  - Hemoglobin A1c; Future      Signed Electronically by: Gemma Caicedo CNP  A copy of this evaluation report is provided to the requesting physician.

## 2024-10-08 NOTE — LETTER
October 10, 2024      Gemma Cowart  [address] [place] [address] [place] [address]        Dear ,    We are writing to inform you of your test results.    The results from your recent lab work appear to be stable to labs in the past.  The cholesterol looks fine, but is mildly elevated and overall is very stable to readings done in the past.  If you are not taking a statin medication for the cholesterol, this can be considered.  Please make a followup clinic appointment at [facility] to discuss statin therapy if interested in helping lower the cholesterol a little bit.        Thank you for choosing [facility] for your health care needs,       Tram Horowitz PA-C     Resulted Orders   Lipid Profile (Chol, Trig, HDL, LDL calc)   Result Value Ref Range    Cholesterol 182 <200 mg/dL    Triglycerides 64 <150 mg/dL    Direct Measure HDL 66 >=50 mg/dL    LDL Cholesterol Calculated 103 (H) <100 mg/dL    Non HDL Cholesterol 116 <130 mg/dL    Patient Fasting > 8hrs? No     Narrative    Cholesterol  Desirable: < 200 mg/dL  Borderline High: 200 - 239 mg/dL  High: >= 240 mg/dL    Triglycerides  Normal: < 150 mg/dL  Borderline High: 150 - 199 mg/dL  High: 200-499 mg/dL  Very High: >= 500 mg/dL    Direct Measure HDL  Female: >= 50 mg/dL   Male: >= 40 mg/dL    LDL Cholesterol  Desirable: < 100 mg/dL  Above Desirable: 100 - 129 mg/dL   Borderline High: 130 - 159 mg/dL   High:  160 - 189 mg/dL   Very High: >= 190 mg/dL    Non HDL Cholesterol  Desirable: < 130 mg/dL  Above Desirable: 130 - 159 mg/dL  Borderline High: 160 - 189 mg/dL  High: 190 - 219 mg/dL  Very High: >= 220 mg/dL       If you have any questions or concerns, please call the clinic at the number listed above.       Sincerely,      Arminda Foley MD

## 2024-10-08 NOTE — PATIENT INSTRUCTIONS
How to Take Your Medication Before Surgery  Preoperative Medication Instructions   Antiplatelet or Anticoagulation Medication Instructions   - apixaban (Eliquis), edoxaban (Savaysa), rivaroxaban (Xarelto): CrCl < 50 mL/min. HOLD  2 days before procedure.     Additional Medication Instructions   - SSRIs, SNRIs, TCAs, Antipsychotics: Continue without modification.        Patient Education   Preparing for Your Surgery  For Adults  Getting started  In most cases, a nurse will call to review your health history and instructions. They will give you an arrival time based on your scheduled surgery time. Please be ready to share:  Your doctor's clinic name and phone number  Your medical, surgical, and anesthesia history  A list of allergies and sensitivities  A list of medicines, including herbal treatments and over-the-counter drugs  Whether the patient has a legal guardian (ask how to send us the papers in advance)  Note: You may not receive a call if you were seen at our PAC (Preoperative Assessment Center).  Please tell us if you're pregnant--or if there's any chance you might be pregnant. Some surgeries may injure a fetus (unborn baby), so they require a pregnancy test. Surgeries that are safe for a fetus don't always need a test, and you can choose whether to have one.   Preparing for surgery  Within 10 to 30 days of surgery: Have a pre-op exam (sometimes called an H&P, or History and Physical). This can be done at a clinic or pre-operative center.  If you're having a , you may not need this exam. Talk to your care team.  At your pre-op exam, talk to your care team about all medicines you take. (This includes CBD oil and any drugs, such as THC, marijuana, and other forms of cannabis.) If you need to stop any medicine before surgery, ask when to start taking it again.  This is for your safety. Many medicines and drugs can make you bleed too much during surgery. Some change how well surgery (anesthesia) drugs  work.  Call your insurance company to let them know you're having surgery. (If you don't have insurance, call [phone].)  Call your clinic if there's any change in your health. This includes a scrape or scratch near the surgery site, or any signs of a cold (sore throat, runny nose, cough, rash, fever).  Eating and drinking guidelines  For your safety: Unless your surgeon tells you otherwise, follow the guidelines below.  Eat and drink as normal until 8 hours before you arrive for surgery. After that, no food or milk. You can spit out gum when you arrive.  Drink clear liquids until 2 hours before you arrive. These are liquids you can see through, like water, Gatorade, and Propel Water. They also include plain black coffee and tea (no cream or milk).  No alcohol for 24 hours before you arrive. The night before surgery, stop any drinks that contain THC.  If your care team tells you to take medicine on the morning of surgery, it's okay to take it with a sip of water. No other medicines or drugs are allowed (including CBD oil)--follow your care team's instructions.  If you have questions the day of surgery, call your hospital or surgery center.   Preventing infection  Shower or bathe the night before and the morning of surgery. Follow the instructions your clinic gave you. (If no instructions, use regular soap.)  Don't shave or clip hair near your surgery site. We'll remove the hair if needed.  Don't smoke or vape the morning of surgery. No chewing tobacco for 6 hours before you arrive. A nicotine patch is okay. You may spit out nicotine gum when you arrive.  For some surgeries, the surgeon will tell you to fully quit smoking and nicotine.  We will make every effort to keep you safe from infection. We will:  Clean our hands often with soap and water (or an alcohol-based hand rub).  Clean the skin at your surgery site with a special soap that kills germs.  Give you a special gown to keep you warm. (Cold raises the  risk of infection.)  Wear hair covers, masks, gowns, and gloves during surgery.  Give antibiotic medicine, if prescribed. Not all surgeries need this medicine.  What to bring on the day of surgery  Photo ID and insurance card  Copy of your health care directive, if you have one  Glasses and hearing aids (bring cases)  You can't wear contacts during surgery  Inhaler and eye drops, if you use them (tell us about these when you arrive)  CPAP machine or breathing device, if you use them  A few personal items, if spending the night  If you have . . .  A pacemaker, ICD (cardiac defibrillator), or other implant: Bring the ID card.  An implanted stimulator: Bring the remote control.  A legal guardian: Bring a copy of the certified (court-stamped) guardianship papers.  Please remove any jewelry, including body piercings. Leave jewelry and other valuables at home.  If you're going home the day of surgery  You must have a responsible adult drive you home. They should stay with you overnight as well.  If you don't have someone to stay with you, and you aren't safe to go home alone, we may keep you overnight. Insurance often won't pay for this.  After surgery  If it's hard to control your pain or you need more pain medicine, please call your surgeon's office.  Questions?   If you have any questions for your care team, list them here:   ____________________________________________________________________________________________________________________________________________________________________________________________________________________________________________________________  For informational purposes only. Not to replace the advice of your health care provider. Copyright   2003, 2019 [facility]. All rights reserved. Clinically reviewed by Haroon Yepez MD. [employer]ART[employer] [id number] - REV 08/24.

## 2024-10-08 NOTE — TELEPHONE ENCOUNTER
Spoke to patient and relayed provider message. Patient verbalized understanding and is agreement with plan.     States will call and schedule appointment with PCP.     Mone Smiley RN  [facility]

## 2024-10-09 LAB
ATRIAL RATE - MUSE: 57 BPM
DIASTOLIC BLOOD PRESSURE - MUSE: NORMAL MMHG
INTERPRETATION ECG - MUSE: NORMAL
P AXIS - MUSE: 16 DEGREES
PR INTERVAL - MUSE: 158 MS
QRS DURATION - MUSE: 82 MS
QT - MUSE: 446 MS
QTC - MUSE: 434 MS
R AXIS - MUSE: 70 DEGREES
SYSTOLIC BLOOD PRESSURE - MUSE: NORMAL MMHG
T AXIS - MUSE: 58 DEGREES
VENTRICULAR RATE- MUSE: 57 BPM

## 2024-10-09 PROCEDURE — 93010 ELECTROCARDIOGRAM REPORT: CPT | Performed by: INTERNAL MEDICINE

## 2024-10-10 NOTE — TELEPHONE ENCOUNTER
Surgery rescheduled due to IV fluid shortage.    Type of surgery: vnotes vaginal hysterectomy, bilateral salpingo-oohporectomy, possible anterior repair, possible posterior repair, possible cystoscopy. Midurethral sling placement  Location of surgery: [facility]  Date and time of surgery: 11/5/24 @ 9:10 am  Surgeon: Dr. Paniagua  Pre-Op Appt Date: 10/8/24  Post-Op Appt Date: 11/21/24 and 12/19/24   Packet sent out: Yes  Pre-cert/Authorization completed:  No  Date: 10/10/24

## 2024-10-10 NOTE — RESULT ENCOUNTER NOTE
Note to staff: Please send a result letter    The results from your recent lab work appear to be stable to labs in the past.  The cholesterol looks fine, but is mildly elevated and overall is very stable to readings done in the past.  If you are not taking a statin medication for the cholesterol, this can be considered.  Please make a followup clinic appointment at [facility] to discuss statin therapy if interested in helping lower the cholesterol a little bit.        Thank you for choosing [facility] for your health care needs,      Tram Horowitz PA-C

## 2024-10-14 ENCOUNTER — VIRTUAL VISIT (OUTPATIENT)
Dept: PEDIATRICS | Facility: CLINIC | Age: 79
End: 2024-10-14
Payer: COMMERCIAL

## 2024-10-14 DIAGNOSIS — N64.4 BREAST PAIN: ICD-10-CM

## 2024-10-14 DIAGNOSIS — E55.9 VITAMIN D DEFICIENCY: Primary | ICD-10-CM

## 2024-10-14 DIAGNOSIS — M62.81 GENERALIZED MUSCLE WEAKNESS: ICD-10-CM

## 2024-10-14 DIAGNOSIS — E53.8 VITAMIN B12 DEFICIENCY: ICD-10-CM

## 2024-10-14 PROCEDURE — 99443 PR PHYSICIAN TELEPHONE EVALUATION 21-30 MIN: CPT | Mod: 93 | Performed by: PEDIATRICS

## 2024-10-14 RX ORDER — MULTIVIT-MIN/IRON/FOLIC ACID/K 18-600-40
1 CAPSULE ORAL DAILY
Qty: 90 CAPSULE | Refills: 3 | Status: SHIPPED | OUTPATIENT
Start: 2024-10-14

## 2024-10-14 RX ORDER — CYANOCOBALAMIN (VITAMIN B-12) 2500 MCG
2500 TABLET, SUBLINGUAL SUBLINGUAL DAILY
Qty: 90 TABLET | Refills: 3 | Status: SHIPPED | OUTPATIENT
Start: 2024-10-14

## 2024-10-14 NOTE — Clinical Note
Please schedule in person visit with me on 11/13/24 in a same day slot for breast pain and muscle weakness

## 2024-10-14 NOTE — PROGRESS NOTES
"Gemma is a 78 year old who is being evaluated via a billable telephone visit.    What phone number would you like to be contacted at? EPIC    How would you like to obtain your AVS? MyChart  Originating Location (pt. Location): Home    Distant Location (provider location):  Off-site    Assessment & Plan     (E55.9) Vitamin D deficiency  (primary encounter diagnosis)  Comment: refilled  Plan: Cholecalciferol (VITAMIN D) 50 MCG (2000 UT)         CAPS            (E53.8) Vitamin B12 deficiency  Comment: refilled  Plan: vitamin B-12 (CYANOCOBALAMIN) 2500 MCG         sublingual tablet            (N64.4) Breast pain  Comment: bilateral now x 2 weeks, h/o implants with surgery in about 2012  Plan: needs in person visit/exam, likely imaging    (M62.81) Generalized muscle weakness  Comment: with specifically right neck and legs  Plan: needs in person visit for full exam    Will schedule with me within the month.          BMI  Estimated body mass index is 30.05 kg/m  as calculated from the following:    Height as of 10/8/24: 1.581 m (5' 2.25\").    Weight as of 10/8/24: 75.1 kg (165 lb 9.6 oz).             Subjective   Gemma is a 78 year old, presenting for the following health issues:  No chief complaint on file.    HPI     Last appt with my PA, Tram Horowitz 8/9/24:  --advised to follow up with endo  --follow up with surgeon for breast pain  --MTM to discuss restarting paxil      When I had previously seen her on 2/14/22 I though paxil might be contributing to her memory loss (we tried switching to sertraline which caused stomach pain and now on lexapro). Buspar also caused stomach upset.     _______    Today:    Would like b12 switched back to SL and vit D as a soft gel    Since thyroid removal a year ago - states throat is sore and itchy (voice is getting more hoarse at end of the day). On right side of neck - muscles getting tigher and tighter - hurts with chin looking down. She also thinks rest of her muscles are getting " "weaker. Patient also feeling that legs are getting weaker (she feels she might fall). Feeling more unsteady.     Patient had right hip cortisone injection in groin area - states this has not worked totally. Within the last week she can \"feel where she got the injection\" - pain going down to left knee. States hip pain was better, but now back. Still using walker.     Patient is having right and left breast pain - left pain x 2 months and bilateral for 2 weeks.     Next appt 12/20 AWV      Objective           Vitals:  No vitals were obtained today due to virtual visit.    Physical Exam   General: Alert and no distress //Respiratory: No audible wheeze, cough, or shortness of breath // Psychiatric:  Appropriate affect, tone, and pace of words            Phone call duration: 21 minutes  Signed Electronically by: Arminda Foley MD    "

## 2024-10-16 ENCOUNTER — TELEPHONE (OUTPATIENT)
Dept: PEDIATRICS | Facility: CLINIC | Age: 79
End: 2024-10-16
Payer: COMMERCIAL

## 2024-10-16 NOTE — TELEPHONE ENCOUNTER
Forms/Letter Request    Type of form/letter: OTHER: [employer]       Do we have the form/letter: Yes: Form is on the provider's desk for review      Who is the form from? Insurance comp    Where did/will the form come from? form was faxed in    When is form/letter needed by: ASAP

## 2024-10-17 ENCOUNTER — HOSPITAL ENCOUNTER (OUTPATIENT)
Dept: BONE DENSITY | Facility: CLINIC | Age: 79
Discharge: HOME | End: 2024-10-17
Attending: INTERNAL MEDICINE | Admitting: INTERNAL MEDICINE
Payer: COMMERCIAL

## 2024-10-17 DIAGNOSIS — M81.8 OTHER OSTEOPOROSIS WITHOUT CURRENT PATHOLOGICAL FRACTURE: ICD-10-CM

## 2024-10-17 PROCEDURE — 77085 DXA BONE DENSITY AXL VRT FX: CPT

## 2024-10-18 ENCOUNTER — TRANSFERRED RECORDS (OUTPATIENT)
Dept: HEALTH INFORMATION MANAGEMENT | Facility: CLINIC | Age: 79
End: 2024-10-18
Payer: COMMERCIAL

## 2024-10-22 ENCOUNTER — TELEPHONE (OUTPATIENT)
Dept: PEDIATRICS | Facility: CLINIC | Age: 79
End: 2024-10-22
Payer: COMMERCIAL

## 2024-10-22 NOTE — TELEPHONE ENCOUNTER
Called and talked to the patient. The patient said that she is ambulatory with assistance. She has a walker that she uses in and out of the house. Tram Hi

## 2024-10-22 NOTE — TELEPHONE ENCOUNTER
FYI - Status Update    Who is Calling: patient    Update: Pt would like an update on instructions for her upcoming surgery on 11/05/2024. Please call     Does caller want a call/response back: Yes     Could we send this information to you in [employer] or would you prefer to receive a phone call?:   Patient would prefer a phone call   Okay to leave a detailed message?: Yes at Cell number on file:    Telephone Information:   Mobile [phone]

## 2024-10-22 NOTE — TELEPHONE ENCOUNTER
Please call patient and clarify if she uses one of the following for transportation:  -stretcher  -wheelchair  -ambulatory w/ assitance  -ambulatory w/ NO assistance    I needs this to complete paperwork. I will keep the paperwork in my inbox.     Arminda Foley MD  Internal Medicine/Pediatrics  [facility]

## 2024-10-23 NOTE — TELEPHONE ENCOUNTER
Spoke to patient. Inquiring to when she should stop taking medications before procedure.       Scheduled 11/5/24  vaginal natural orifice transluminal endoscopic surgery vaginal hysterectomy, bilateral salpingo-oophorectomy, possible anterior repair, possible posterior repair,     possible cystoscopy, mid urethral sling placement       Has pre-op on Friday 10/25    Specifically asking when she should stop Eliquis before procedure and if she needs to stop any other medications ( lexapro and vitamins).     Please advise.     Krysta Mancuso RN  [facility]

## 2024-10-25 ENCOUNTER — OFFICE VISIT (OUTPATIENT)
Dept: INTERNAL MEDICINE | Facility: CLINIC | Age: 79
End: 2024-10-25
Payer: COMMERCIAL

## 2024-10-25 ENCOUNTER — TRANSFERRED RECORDS (OUTPATIENT)
Dept: HEALTH INFORMATION MANAGEMENT | Facility: CLINIC | Age: 79
End: 2024-10-25

## 2024-10-25 VITALS
SYSTOLIC BLOOD PRESSURE: 134 MMHG | HEART RATE: 57 BPM | BODY MASS INDEX: 30.35 KG/M2 | OXYGEN SATURATION: 98 % | RESPIRATION RATE: 18 BRPM | WEIGHT: 168.6 LBS | DIASTOLIC BLOOD PRESSURE: 76 MMHG | TEMPERATURE: 98.1 F

## 2024-10-25 DIAGNOSIS — Z01.818 PRE-OP EXAM: Primary | ICD-10-CM

## 2024-10-25 DIAGNOSIS — N81.9 FEMALE GENITAL PROLAPSE, UNSPECIFIED TYPE: ICD-10-CM

## 2024-10-25 PROCEDURE — 99214 OFFICE O/P EST MOD 30 MIN: CPT | Performed by: STUDENT IN AN ORGANIZED HEALTH CARE EDUCATION/TRAINING PROGRAM

## 2024-10-25 ASSESSMENT — PAIN SCALES - GENERAL: PAINLEVEL_OUTOF10: EXTREME PAIN (8)

## 2024-10-25 NOTE — PROGRESS NOTES
Preoperative Evaluation  [facility]  [address]  [address]  [facility] [address]  Phone: [phone]  Primary Provider: Arminda Foley MD  Pre-op Performing Provider: Chris Garcia MD  Oct 25, 2024           10/25/2024   Surgical Information   What procedure is being done? hysorectomy    Facility or Hospital where procedure/surgery [facility] be performed[facility]    Who is doing the procedure / surgery? trell ahumada    Date of surgery / procedure: [id number]    Time of surgery / procedure:  730    Where do you plan to recover after surgery? at home with family        Patient-reported     Fax number for surgical facility: Note does not need to be faxed, will be available electronically in Epic.    Assessment & Plan     The proposed surgical procedure is considered INTERMEDIATE risk.    (Z01.818) Pre-op exam  (primary encounter diagnosis)  - Cardiac history includes tachycardia s/p ablation, no CAD or CHF history. Pulmonary history includes COPD on intermittent inhalers. Clear lungs on exam. 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 A1c and sugars acceptable on recent check.  - METs<4, walker dependent  - Tobacco use is remote, denies alcohol use  - Multiple surgeries in the past, no issues with anesthesia  Plan: Recommend holding eliquis 2 days prior to procedure. Patient is intermediate medical risk for proposed intermediate risk surgery, patient is medically optimized for surgical procedure.      (N81.9) Female genital prolapse, unspecified type  Plan: Procedure as above          - No identified additional risk factors other than previously addressed    Antiplatelet or Anticoagulation Medication Instructions   - apixaban (Eliquis), edoxaban (Savaysa), rivaroxaban (Xarelto): Bleeding risk is moderate or high for this procedure AND CrCl  (>=) 50 mL/min. DO NOT TAKE 2 days before surgery.     Additional Medication Instructions  Take all scheduled medications on the day of surgery EXCEPT for  modifications listed below: eliquis    Recommendation  Approval given to proceed with proposed procedure, without further diagnostic evaluation.    Angel Menendez is a 79 year old, presenting for the following:  Pre-Op Exam          10/25/2024     2:43 PM   Additional Questions   Roomed by hope r   Accompanied by self         10/25/2024     2:43 PM   Patient Reported Additional Medications   Patient reports taking the following new medications n/a     HPI related to upcoming procedure: 79F pmh breast cancer s/p bilateral mastectomy, thyroid cancer s/p partial thyroidectomy, COPD on room air, s/p cardiac ablation, recurrent DVTs on eliquis, h/o gastric bypass, and right hip OA presents for pre operative evaluation. Patient is currently on eliquis for recurrent DVTs. History of COPD on intermittent inhalers, does not require supplemental oxygen. H/o DM with most recent A1c and blood sugars in acceptable ranges. She has limited mobility due to right hip pain and planned right hip arthroplasty planned later this year, ambulates with walker. Denies current tobacco or alcohol use.        10/25/2024   Pre-Op Questionnaire   Have you ever had a heart attack or stroke? No    Have you ever had surgery on your heart or blood vessels, such as a stent placement, a coronary artery bypass, or surgery on an artery in your head, neck, heart, or legs? (!) YES     Do you have chest pain with activity? No    Do you have a history of heart failure? No    Do you currently have a cold, bronchitis or symptoms of other infection? No    Do you have a cough, shortness of breath, or wheezing? (!) YES     Do you or anyone in your family have previous history of blood clots? (!) YES     Do you or does anyone in your family have a serious bleeding problem such as prolonged bleeding following surgeries or cuts? No    Have you ever had problems with anemia or been told to take iron pills? (!) YES     Have you had any abnormal blood loss such as  black, tarry or bloody stools, or abnormal vaginal bleeding? No    Have you ever had a blood transfusion? (!) YES    Have you ever had a transfusion reaction? No    Are you willing to have a blood transfusion if it is medically needed before, during, or after your surgery? Yes    Have you or any of your relatives ever had problems with anesthesia? No    Do you have sleep apnea, excessive snoring or daytime drowsiness? (!) YES    Do you have a CPAP machine? (!) NO     Do you have any artifical heart valves or other implanted medical devices like a pacemaker, defibrillator, or continuous glucose monitor? No    Do you have artificial joints? No    Are you allergic to latex? No        Patient-reported       Preoperative Review of    reviewed - no record of controlled substances prescribed.      Patient Active Problem List    Diagnosis Date Noted    Dysphonia 12/08/2023     Priority: Medium    History of lobectomy of thyroid 12/08/2023     Priority: Medium    Problem with voice production 12/08/2023     Priority: Medium    Elevated parathyroid hormone 04/12/2022     Priority: Medium     Asked her to follow-up with endo May 23, 2023. Not taking her calcium because someone on youtube told her not to      COPD (chronic obstructive pulmonary disease) (H) 04/21/2021     Priority: Medium     PFTs were normal other than higher residual volume (seen in COPD vs asthma) but still symptomatic so referred to pulm May 23, 2023    Stress test negative fall 2022      CKD (chronic kidney disease) stage 3, GFR 30-59 ml/min (H) 10/16/2016     Priority: Medium    Long term current use of anticoagulant-hx multiple DVTs 05/04/2016     Priority: Medium    Type 2 diabetes mellitus with other specified complication (H) 10/09/2015     Priority: Medium     Asked her to start a statin May 23, 2023. Waiting to hear back      Anxiety 04/23/2013     Priority: Medium    Vitamin D deficiency disease 02/01/2013     Priority: Medium    Rosacea  10/18/2012     Priority: Medium      Past Medical History:   Diagnosis Date    Anemia     iron deficiency anemia    Antiplatelet or antithrombotic long-term use     Anxiety     Arrhythmia     Breast cancer (H) 2012    Bilateral Masectomies 9/2012    Complications of gastric bypass surgery     COPD (chronic obstructive pulmonary disease) (H) 04/21/2021    Diabetes mellitus (H)     diet controlled    DVT (deep venous thrombosis) (H)     Eczema     Fracture of left knee region     patella - vertical    History of blood transfusion     History of kidney stones     Iron deficiency anemia, unspecified iron deficiency anemia type 10/27/2021    Noninfectious ileitis     Obesity     Palpitation     with no treatment    Polyneuropathy     Pruritus     generalized    Renal disease     Rosacea     SVT (supraventricular tachycardia) (H)     s/p ablation 5/28/2015 at [facility] for left lateral AP    Thrombosis     Thyroid disease     Vitamin B12 deficiency 02/01/2013     Past Surgical History:   Procedure Laterality Date    ABDOMEN SURGERY  2000    gastric bypass 2000    COLONOSCOPY  10/11/2012    Procedure: COLONOSCOPY;  colonoscopy;  Surgeon: Gela Cleary MD;  Location:  GI    COLONOSCOPY N/A 10/6/2017    Procedure: COLONOSCOPY;;  Surgeon: Shashank Ortiz MD;  Location:  GI    COLONOSCOPY N/A 3/7/2022    Procedure: COLONOSCOPY;  Surgeon: Andrea Daniel MD;  Location:  GI    DENTAL SURGERY  5/2007    all teeth removed - dentures    ENT SURGERY      all teeth pulled    ESOPHAGOSCOPY, GASTROSCOPY, DUODENOSCOPY (EGD), COMBINED N/A 10/6/2017    Procedure: COMBINED ESOPHAGOSCOPY, GASTROSCOPY, DUODENOSCOPY (EGD);  ESOPHAGOSCOPY, GASTROSCOPY, DUODENOSCOPY (EGD) & Colonoscopy *Needs INR on arrival;  Surgeon: Shashank Ortiz MD;  [facility]    ESOPHAGOSCOPY, GASTROSCOPY, DUODENOSCOPY (EGD), COMBINED N/A 9/6/2024    Procedure: Esophagoscopy, gastroscopy, duodenoscopy (EGD), combined;  Surgeon:  Andrea Daniel MD;  Location:  GI    GYN SURGERY  1/10/75    tubal ligation     H ABLATION SVT  5/28/2015    stopped metoprolol    HERNIA REPAIR  2005    IMPLANT FILTER INFERIOR VENA CAVA  10/8/2012    taken out  1/4/12    INSERT TISSUE EXPANDER BREAST BILATERAL  9/5/2012    Procedure: INSERT TISSUE EXPANDER BREAST BILATERAL;;  Surgeon: Orlando Wall MD;  Location: [facility] OR    MASTECTOMY SIMPLE, SENTINEL NODE, COMBINED  9/5/2012    Procedure: COMBINED MASTECTOMY SIMPLE, SENTINEL NODE;  BILATERAL MASTECTOMY WITH LEFT AXILLARY SENTINEL LYMPH NODE BIOPSY, BILATERAL TISSUE EXPANDER       MASTECTOMY, BILATERAL      PANNICULECTOMY N/A 4/5/2018    Procedure: PANNICULECTOMY;  PANNICULECTOMY ;  Surgeon: Orlando Wall MD;  Location:  OR    RECONSTRUCT BREAST BILATERAL, IMPLANT PROSTHESIS BILATERAL, COMBINED  5/22/2013    Procedure: COMBINED RECONSTRUCT BREAST BILATERAL, IMPLANT PROSTHESIS BILATERAL;  BILATERAL SECOND STAGE BREAST RECONSTRUCTION WITH SILICONE GEL IMPLANTS AND LIPOSUCTION;  Surgeon: Orlando Wall MD;  Location:  [facility]    RECONSTRUCT NIPPLE BILATERAL  8/22/2013    Procedure: RECONSTRUCT NIPPLE BILATERAL;  BILATERAL NIPPLE AREOLAR RECONSTRUCTION;  Surgeon: Orlando Wall MD;  Location:  [facility]    THYROIDECTOMY N/A 11/14/2023    Procedure: Left Thyroid Lobectomy and Autotransplant of Left Superior Parathyroid;  Surgeon: Haley Garcia MD;  Location:  OR    [facility] BREAST RECONSTRUC W OTHR TECHNIQ  5/8/2013     Current Outpatient Medications   Medication Sig Dispense Refill    acetaminophen (TYLENOL) 500 MG tablet Take 500-1,000 mg by mouth 2 times daily as needed for mild pain.      apixaban ANTICOAGULANT (ELIQUIS ANTICOAGULANT) 5 MG tablet Take 1 tablet (5 mg) by mouth 2 times daily 180 tablet 3    blood glucose (NO BRAND SPECIFIED) lancets standard Use to test blood sugar 2 times daily or as directed. 1 each 4    blood glucose (ONETOUCH VERIO IQ) test strip USE TO TEST TWICE  DAILY OR AS DIRECTED 200 strip 2    blood glucose calibration (NO BRAND SPECIFIED) solution Use to calibrate blood glucose monitor as directed. One touch verio Flex 1 each 0    Cholecalciferol (VITAMIN D) 50 MCG (2000 UT) CAPS Take 1 capsule by mouth daily. 90 capsule 3    clobetasol propionate (TEMOVATE) 0.05 % external cream Apply twice daily as needed 60 g 3    escitalopram (LEXAPRO) 20 MG tablet Take 1 tablet (20 mg) by mouth daily 90 tablet 1    magnesium glycinate 100 MG CAPS capsule Take 1 capsule (100 mg) by mouth 2 times daily. 180 capsule 3    tiotropium (SPIRIVA RESPIMAT) 2.5 MCG/ACT inhaler Inhale 2 puffs into the lungs daily 12 g 3    vitamin B-12 (CYANOCOBALAMIN) 2500 MCG sublingual tablet Take 1 tablet (2,500 mcg) by mouth daily. 90 tablet 3       Allergies   Allergen Reactions    Bacitracin Hives    Dust Mites Other (See Comments)     sneezing    Nickel Swelling        Social History     Tobacco Use    Smoking status: Former     Current packs/day: 0.00     Average packs/day: 1.5 packs/day for 25.0 years (37.5 ttl pk-yrs)     Types: Cigarettes     Start date: 10/21/1961     Quit date: 10/21/1986     Years since quittin.0     Passive exposure: Current    Smokeless tobacco: Never    Tobacco comments:     quit    Substance Use Topics    Alcohol use: Not Currently     Family History   Problem Relation Age of Onset    Cancer - colorectal Mother     Colon Cancer Mother     Prostate Cancer Father     Alzheimer Disease Father     Cancer Paternal Grandmother     Alzheimer Disease Paternal Grandfather     Cancer - colorectal Brother     Diabetes Brother     Crohn's Disease Daughter     Diabetes Brother     Eye Disorder Brother     Diabetes Son     Cancer Maternal Uncle     Cancer Maternal Uncle      History   Drug Use No             Review of Systems  Constitutional, neuro, ENT, endocrine, pulmonary, cardiac, gastrointestinal, genitourinary, musculoskeletal, integument and psychiatric systems are  "negative, except as otherwise noted.    Objective    /76 (BP Location: Right arm, Patient Position: Sitting, Cuff Size: Adult Small)   Pulse 57   Temp 98.1  F (36.7  C) (Oral)   Resp 18   Wt 76.5 kg (168 lb 9.6 oz)   LMP 09/24/1990   SpO2 98%   Breastfeeding No   BMI 30.35 kg/m     Estimated body mass index is 30.35 kg/m  as calculated from the following:    Height as of an earlier encounter on 10/25/24: 1.588 m (5' 2.5\").    Weight as of this encounter: 76.5 kg (168 lb 9.6 oz).  Physical Exam  Vitals reviewed.   Constitutional:       Appearance: Normal appearance.   HENT:      Head: Atraumatic.   Eyes:      Extraocular Movements: Extraocular movements intact.   Cardiovascular:      Rate and Rhythm: Bradycardia present.   Pulmonary:      Breath sounds: Normal breath sounds.   Abdominal:      Palpations: Abdomen is soft.   Musculoskeletal:         General: No swelling.      Cervical back: Normal range of motion.   Skin:     General: Skin is warm.   Neurological:      General: No focal deficit present.   Psychiatric:         Mood and Affect: Mood normal.           Recent Labs   Lab Test 10/08/24  1231 09/30/24  1123 06/14/24  1156   HGB 11.1*  --   --      --   --    NA  --  143  --    POTASSIUM  --  5.1  --    CR  --  1.62*  --    A1C 5.6  --  5.9*        Diagnostics  No labs were ordered during this visit.   No EKG this visit, completed in the last 90 days.    Revised Cardiac Risk Index (RCRI)  The patient has the following serious cardiovascular risks for perioperative complications:   - No serious cardiac risks = 0 points     RCRI Interpretation: 0 points: Class I (very low risk - 0.4% complication rate)         Signed Electronically by: Chris Garcia MD  A copy of this evaluation report is provided to the requesting physician.         "

## 2024-11-05 ENCOUNTER — PATIENT OUTREACH (OUTPATIENT)
Dept: GERIATRIC MEDICINE | Facility: CLINIC | Age: 79
End: 2024-11-05

## 2024-11-05 ENCOUNTER — ANESTHESIA EVENT (OUTPATIENT)
Dept: SURGERY | Facility: CLINIC | Age: 79
End: 2024-11-05
Payer: COMMERCIAL

## 2024-11-05 ENCOUNTER — HOSPITAL ENCOUNTER (OUTPATIENT)
Facility: CLINIC | Age: 79
Discharge: HOME | End: 2024-11-05
Attending: OBSTETRICS & GYNECOLOGY | Admitting: OBSTETRICS & GYNECOLOGY
Payer: COMMERCIAL

## 2024-11-05 ENCOUNTER — ANESTHESIA (OUTPATIENT)
Dept: SURGERY | Facility: CLINIC | Age: 79
End: 2024-11-05
Payer: COMMERCIAL

## 2024-11-05 VITALS
WEIGHT: 167.7 LBS | TEMPERATURE: 97.3 F | DIASTOLIC BLOOD PRESSURE: 91 MMHG | HEART RATE: 88 BPM | RESPIRATION RATE: 19 BRPM | HEIGHT: 62 IN | SYSTOLIC BLOOD PRESSURE: 156 MMHG | BODY MASS INDEX: 30.86 KG/M2 | OXYGEN SATURATION: 93 %

## 2024-11-05 DIAGNOSIS — Z90.710 STATUS POST VAGINAL HYSTERECTOMY: Primary | ICD-10-CM

## 2024-11-05 LAB
GLUCOSE BLDC GLUCOMTR-MCNC: 198 MG/DL (ref 70–99)
GLUCOSE BLDC GLUCOMTR-MCNC: 94 MG/DL (ref 70–99)

## 2024-11-05 PROCEDURE — 370N000017 HC ANESTHESIA TECHNICAL FEE, PER MIN: Performed by: OBSTETRICS & GYNECOLOGY

## 2024-11-05 PROCEDURE — 360N000077 HC SURGERY LEVEL 4, PER MIN: Performed by: OBSTETRICS & GYNECOLOGY

## 2024-11-05 PROCEDURE — 258N000003 HC RX IP 258 OP 636: Performed by: ANESTHESIOLOGY

## 2024-11-05 PROCEDURE — 250N000011 HC RX IP 250 OP 636: Performed by: ANESTHESIOLOGY

## 2024-11-05 PROCEDURE — 250N000011 HC RX IP 250 OP 636: Performed by: OBSTETRICS & GYNECOLOGY

## 2024-11-05 PROCEDURE — C1771 REP DEV, URINARY, W/SLING: HCPCS | Performed by: OBSTETRICS & GYNECOLOGY

## 2024-11-05 PROCEDURE — 250N000009 HC RX 250

## 2024-11-05 PROCEDURE — 250N000011 HC RX IP 250 OP 636

## 2024-11-05 PROCEDURE — 88305 TISSUE EXAM BY PATHOLOGIST: CPT | Mod: 26 | Performed by: PATHOLOGY

## 2024-11-05 PROCEDURE — 710N000009 HC RECOVERY PHASE 1, LEVEL 1, PER MIN: Performed by: OBSTETRICS & GYNECOLOGY

## 2024-11-05 PROCEDURE — 58262 VAG HYST INCLUDING T/O: CPT | Performed by: OBSTETRICS & GYNECOLOGY

## 2024-11-05 PROCEDURE — 57250 REPAIR RECTUM & VAGINA: CPT | Mod: 51 | Performed by: OBSTETRICS & GYNECOLOGY

## 2024-11-05 PROCEDURE — 250N000025 HC SEVOFLURANE, PER MIN: Performed by: OBSTETRICS & GYNECOLOGY

## 2024-11-05 PROCEDURE — 258N000003 HC RX IP 258 OP 636: Performed by: OBSTETRICS & GYNECOLOGY

## 2024-11-05 PROCEDURE — 250N000011 HC RX IP 250 OP 636: Mod: JZ | Performed by: OBSTETRICS & GYNECOLOGY

## 2024-11-05 PROCEDURE — 272N000001 HC OR GENERAL SUPPLY STERILE: Performed by: OBSTETRICS & GYNECOLOGY

## 2024-11-05 PROCEDURE — 58262 VAG HYST INCLUDING T/O: CPT | Mod: 80 | Performed by: OBSTETRICS & GYNECOLOGY

## 2024-11-05 PROCEDURE — 999N000141 HC STATISTIC PRE-PROCEDURE NURSING ASSESSMENT: Performed by: OBSTETRICS & GYNECOLOGY

## 2024-11-05 PROCEDURE — 710N000012 HC RECOVERY PHASE 2, PER MINUTE: Performed by: OBSTETRICS & GYNECOLOGY

## 2024-11-05 PROCEDURE — 82962 GLUCOSE BLOOD TEST: CPT

## 2024-11-05 PROCEDURE — 57250 REPAIR RECTUM & VAGINA: CPT | Mod: 80 | Performed by: OBSTETRICS & GYNECOLOGY

## 2024-11-05 PROCEDURE — 88307 TISSUE EXAM BY PATHOLOGIST: CPT | Mod: TC | Performed by: OBSTETRICS & GYNECOLOGY

## 2024-11-05 PROCEDURE — 250N000013 HC RX MED GY IP 250 OP 250 PS 637: Performed by: OBSTETRICS & GYNECOLOGY

## 2024-11-05 PROCEDURE — 57288 REPAIR BLADDER DEFECT: CPT | Mod: 51 | Performed by: OBSTETRICS & GYNECOLOGY

## 2024-11-05 PROCEDURE — 250N000009 HC RX 250: Performed by: OBSTETRICS & GYNECOLOGY

## 2024-11-05 PROCEDURE — 57288 REPAIR BLADDER DEFECT: CPT | Mod: 80 | Performed by: OBSTETRICS & GYNECOLOGY

## 2024-11-05 DEVICE — SIS SYSTEM
Type: IMPLANTABLE DEVICE | Site: VAGINA | Status: FUNCTIONAL
Brand: SOLYX™ BLUE

## 2024-11-05 RX ORDER — PROPOFOL 10 MG/ML
INJECTION, EMULSION INTRAVENOUS CONTINUOUS PRN
Status: DISCONTINUED | OUTPATIENT
Start: 2024-11-05 | End: 2024-11-05

## 2024-11-05 RX ORDER — HYDROMORPHONE HCL IN WATER/PF 6 MG/30 ML
0.4 PATIENT CONTROLLED ANALGESIA SYRINGE INTRAVENOUS EVERY 5 MIN PRN
Status: DISCONTINUED | OUTPATIENT
Start: 2024-11-05 | End: 2024-11-05 | Stop reason: HOSPADM

## 2024-11-05 RX ORDER — HYDRALAZINE HYDROCHLORIDE 20 MG/ML
2.5-5 INJECTION INTRAMUSCULAR; INTRAVENOUS EVERY 10 MIN PRN
Status: DISCONTINUED | OUTPATIENT
Start: 2024-11-05 | End: 2024-11-05 | Stop reason: HOSPADM

## 2024-11-05 RX ORDER — LIDOCAINE 40 MG/G
CREAM TOPICAL
Status: DISCONTINUED | OUTPATIENT
Start: 2024-11-05 | End: 2024-11-05 | Stop reason: HOSPADM

## 2024-11-05 RX ORDER — LIDOCAINE HYDROCHLORIDE 20 MG/ML
INJECTION, SOLUTION INFILTRATION; PERINEURAL PRN
Status: DISCONTINUED | OUTPATIENT
Start: 2024-11-05 | End: 2024-11-05

## 2024-11-05 RX ORDER — PROPOFOL 10 MG/ML
INJECTION, EMULSION INTRAVENOUS PRN
Status: DISCONTINUED | OUTPATIENT
Start: 2024-11-05 | End: 2024-11-05

## 2024-11-05 RX ORDER — CEFAZOLIN SODIUM/WATER 2 G/20 ML
2 SYRINGE (ML) INTRAVENOUS SEE ADMIN INSTRUCTIONS
Status: DISCONTINUED | OUTPATIENT
Start: 2024-11-05 | End: 2024-11-05 | Stop reason: HOSPADM

## 2024-11-05 RX ORDER — ACETAMINOPHEN 325 MG/1
975 TABLET ORAL ONCE
Status: COMPLETED | OUTPATIENT
Start: 2024-11-05 | End: 2024-11-05

## 2024-11-05 RX ORDER — KETOROLAC TROMETHAMINE 30 MG/ML
INJECTION, SOLUTION INTRAMUSCULAR; INTRAVENOUS PRN
Status: DISCONTINUED | OUTPATIENT
Start: 2024-11-05 | End: 2024-11-05

## 2024-11-05 RX ORDER — DIAZEPAM 10 MG/2ML
2.5 INJECTION, SOLUTION INTRAMUSCULAR; INTRAVENOUS
Status: DISCONTINUED | OUTPATIENT
Start: 2024-11-05 | End: 2024-11-05 | Stop reason: HOSPADM

## 2024-11-05 RX ORDER — GLYCOPYRROLATE 0.2 MG/ML
INJECTION, SOLUTION INTRAMUSCULAR; INTRAVENOUS PRN
Status: DISCONTINUED | OUTPATIENT
Start: 2024-11-05 | End: 2024-11-05

## 2024-11-05 RX ORDER — OXYCODONE HYDROCHLORIDE 5 MG/1
5-10 TABLET ORAL EVERY 4 HOURS PRN
Qty: 12 TABLET | Refills: 0 | Status: SHIPPED | OUTPATIENT
Start: 2024-11-05

## 2024-11-05 RX ORDER — FENTANYL CITRATE 50 UG/ML
INJECTION, SOLUTION INTRAMUSCULAR; INTRAVENOUS PRN
Status: DISCONTINUED | OUTPATIENT
Start: 2024-11-05 | End: 2024-11-05

## 2024-11-05 RX ORDER — SODIUM CHLORIDE, SODIUM LACTATE, POTASSIUM CHLORIDE, CALCIUM CHLORIDE 600; 310; 30; 20 MG/100ML; MG/100ML; MG/100ML; MG/100ML
INJECTION, SOLUTION INTRAVENOUS CONTINUOUS
Status: DISCONTINUED | OUTPATIENT
Start: 2024-11-05 | End: 2024-11-05 | Stop reason: HOSPADM

## 2024-11-05 RX ORDER — DEXAMETHASONE SODIUM PHOSPHATE 4 MG/ML
INJECTION, SOLUTION INTRA-ARTICULAR; INTRALESIONAL; INTRAMUSCULAR; INTRAVENOUS; SOFT TISSUE PRN
Status: DISCONTINUED | OUTPATIENT
Start: 2024-11-05 | End: 2024-11-05

## 2024-11-05 RX ORDER — LABETALOL HYDROCHLORIDE 5 MG/ML
10 INJECTION, SOLUTION INTRAVENOUS
Status: DISCONTINUED | OUTPATIENT
Start: 2024-11-05 | End: 2024-11-05 | Stop reason: HOSPADM

## 2024-11-05 RX ORDER — NALOXONE HYDROCHLORIDE 0.4 MG/ML
0.1 INJECTION, SOLUTION INTRAMUSCULAR; INTRAVENOUS; SUBCUTANEOUS
Status: DISCONTINUED | OUTPATIENT
Start: 2024-11-05 | End: 2024-11-05 | Stop reason: HOSPADM

## 2024-11-05 RX ORDER — ONDANSETRON 2 MG/ML
INJECTION INTRAMUSCULAR; INTRAVENOUS PRN
Status: DISCONTINUED | OUTPATIENT
Start: 2024-11-05 | End: 2024-11-05

## 2024-11-05 RX ORDER — OXYCODONE HYDROCHLORIDE 5 MG/1
5 TABLET ORAL
Status: DISCONTINUED | OUTPATIENT
Start: 2024-11-05 | End: 2024-11-05 | Stop reason: HOSPADM

## 2024-11-05 RX ORDER — DIPHENHYDRAMINE HYDROCHLORIDE 50 MG/ML
12.5 INJECTION INTRAMUSCULAR; INTRAVENOUS EVERY 6 HOURS PRN
Status: DISCONTINUED | OUTPATIENT
Start: 2024-11-05 | End: 2024-11-05 | Stop reason: HOSPADM

## 2024-11-05 RX ORDER — ALBUTEROL SULFATE 0.83 MG/ML
2.5 SOLUTION RESPIRATORY (INHALATION) EVERY 4 HOURS PRN
Status: DISCONTINUED | OUTPATIENT
Start: 2024-11-05 | End: 2024-11-05 | Stop reason: HOSPADM

## 2024-11-05 RX ORDER — FENTANYL CITRATE 50 UG/ML
50 INJECTION, SOLUTION INTRAMUSCULAR; INTRAVENOUS EVERY 5 MIN PRN
Status: DISCONTINUED | OUTPATIENT
Start: 2024-11-05 | End: 2024-11-05 | Stop reason: HOSPADM

## 2024-11-05 RX ORDER — FENTANYL CITRATE 50 UG/ML
25 INJECTION, SOLUTION INTRAMUSCULAR; INTRAVENOUS EVERY 5 MIN PRN
Status: DISCONTINUED | OUTPATIENT
Start: 2024-11-05 | End: 2024-11-05 | Stop reason: HOSPADM

## 2024-11-05 RX ORDER — IBUPROFEN 600 MG/1
600 TABLET, FILM COATED ORAL ONCE
Status: DISCONTINUED | OUTPATIENT
Start: 2024-11-05 | End: 2024-11-05 | Stop reason: HOSPADM

## 2024-11-05 RX ORDER — ACETAMINOPHEN 325 MG/1
975 TABLET ORAL ONCE
Status: DISCONTINUED | OUTPATIENT
Start: 2024-11-05 | End: 2024-11-05 | Stop reason: HOSPADM

## 2024-11-05 RX ORDER — HYDROMORPHONE HCL IN WATER/PF 6 MG/30 ML
0.2 PATIENT CONTROLLED ANALGESIA SYRINGE INTRAVENOUS EVERY 5 MIN PRN
Status: DISCONTINUED | OUTPATIENT
Start: 2024-11-05 | End: 2024-11-05 | Stop reason: HOSPADM

## 2024-11-05 RX ORDER — DIPHENHYDRAMINE HCL 12.5MG/5ML
12.5 LIQUID (ML) ORAL EVERY 6 HOURS PRN
Status: DISCONTINUED | OUTPATIENT
Start: 2024-11-05 | End: 2024-11-05 | Stop reason: HOSPADM

## 2024-11-05 RX ORDER — EPHEDRINE SULFATE 50 MG/ML
INJECTION, SOLUTION INTRAMUSCULAR; INTRAVENOUS; SUBCUTANEOUS PRN
Status: DISCONTINUED | OUTPATIENT
Start: 2024-11-05 | End: 2024-11-05

## 2024-11-05 RX ORDER — ACETAMINOPHEN 325 MG/1
975 TABLET ORAL ONCE
Status: DISCONTINUED | OUTPATIENT
Start: 2024-11-05 | End: 2024-11-05

## 2024-11-05 RX ORDER — CEFAZOLIN SODIUM/WATER 2 G/20 ML
2 SYRINGE (ML) INTRAVENOUS
Status: COMPLETED | OUTPATIENT
Start: 2024-11-05 | End: 2024-11-05

## 2024-11-05 RX ORDER — ONDANSETRON 2 MG/ML
4 INJECTION INTRAMUSCULAR; INTRAVENOUS EVERY 30 MIN PRN
Status: DISCONTINUED | OUTPATIENT
Start: 2024-11-05 | End: 2024-11-05 | Stop reason: HOSPADM

## 2024-11-05 RX ORDER — BUPIVACAINE HYDROCHLORIDE AND EPINEPHRINE 5; 5 MG/ML; UG/ML
INJECTION, SOLUTION EPIDURAL; INTRACAUDAL; PERINEURAL PRN
Status: DISCONTINUED | OUTPATIENT
Start: 2024-11-05 | End: 2024-11-05 | Stop reason: HOSPADM

## 2024-11-05 RX ORDER — ONDANSETRON 4 MG/1
4 TABLET, ORALLY DISINTEGRATING ORAL EVERY 30 MIN PRN
Status: DISCONTINUED | OUTPATIENT
Start: 2024-11-05 | End: 2024-11-05 | Stop reason: HOSPADM

## 2024-11-05 RX ORDER — DEXAMETHASONE SODIUM PHOSPHATE 4 MG/ML
4 INJECTION, SOLUTION INTRA-ARTICULAR; INTRALESIONAL; INTRAMUSCULAR; INTRAVENOUS; SOFT TISSUE
Status: DISCONTINUED | OUTPATIENT
Start: 2024-11-05 | End: 2024-11-05 | Stop reason: HOSPADM

## 2024-11-05 RX ADMIN — Medication 2 G: at 09:28

## 2024-11-05 RX ADMIN — DEXAMETHASONE SODIUM PHOSPHATE 8 MG: 4 INJECTION, SOLUTION INTRA-ARTICULAR; INTRALESIONAL; INTRAMUSCULAR; INTRAVENOUS; SOFT TISSUE at 09:28

## 2024-11-05 RX ADMIN — ROCURONIUM BROMIDE 50 MG: 50 INJECTION, SOLUTION INTRAVENOUS at 09:28

## 2024-11-05 RX ADMIN — SUGAMMADEX 200 MG: 100 INJECTION, SOLUTION INTRAVENOUS at 11:38

## 2024-11-05 RX ADMIN — SODIUM CHLORIDE, POTASSIUM CHLORIDE, SODIUM LACTATE AND CALCIUM CHLORIDE: 600; 310; 30; 20 INJECTION, SOLUTION INTRAVENOUS at 07:33

## 2024-11-05 RX ADMIN — KETOROLAC TROMETHAMINE 15 MG: 30 INJECTION, SOLUTION INTRAMUSCULAR at 09:28

## 2024-11-05 RX ADMIN — FENTANYL CITRATE 50 MCG: 50 INJECTION INTRAMUSCULAR; INTRAVENOUS at 10:03

## 2024-11-05 RX ADMIN — ROCURONIUM BROMIDE 10 MG: 50 INJECTION, SOLUTION INTRAVENOUS at 10:27

## 2024-11-05 RX ADMIN — PROPOFOL 50 MCG/KG/MIN: 10 INJECTION, EMULSION INTRAVENOUS at 09:28

## 2024-11-05 RX ADMIN — LIDOCAINE HYDROCHLORIDE 50 MG: 20 INJECTION, SOLUTION INFILTRATION; PERINEURAL at 09:28

## 2024-11-05 RX ADMIN — GLYCOPYRROLATE 0.2 MG: 0.2 INJECTION, SOLUTION INTRAMUSCULAR; INTRAVENOUS at 09:28

## 2024-11-05 RX ADMIN — PROPOFOL 120 MG: 10 INJECTION, EMULSION INTRAVENOUS at 09:28

## 2024-11-05 RX ADMIN — HYDROMORPHONE HYDROCHLORIDE 1 MG: 1 INJECTION, SOLUTION INTRAMUSCULAR; INTRAVENOUS; SUBCUTANEOUS at 09:28

## 2024-11-05 RX ADMIN — FENTANYL CITRATE 100 MCG: 50 INJECTION INTRAMUSCULAR; INTRAVENOUS at 09:28

## 2024-11-05 RX ADMIN — FENTANYL CITRATE 50 MCG: 0.05 INJECTION, SOLUTION INTRAMUSCULAR; INTRAVENOUS at 12:23

## 2024-11-05 RX ADMIN — EPHEDRINE SULFATE 5 MG: 5 INJECTION INTRAVENOUS at 09:44

## 2024-11-05 RX ADMIN — SODIUM CHLORIDE, POTASSIUM CHLORIDE, SODIUM LACTATE AND CALCIUM CHLORIDE: 600; 310; 30; 20 INJECTION, SOLUTION INTRAVENOUS at 09:22

## 2024-11-05 RX ADMIN — ACETAMINOPHEN 975 MG: 325 TABLET, FILM COATED ORAL at 07:33

## 2024-11-05 RX ADMIN — FENTANYL CITRATE 50 MCG: 0.05 INJECTION, SOLUTION INTRAMUSCULAR; INTRAVENOUS at 12:09

## 2024-11-05 RX ADMIN — ONDANSETRON 4 MG: 2 INJECTION INTRAMUSCULAR; INTRAVENOUS at 09:28

## 2024-11-05 RX ADMIN — FENTANYL CITRATE 50 MCG: 50 INJECTION INTRAMUSCULAR; INTRAVENOUS at 10:11

## 2024-11-05 ASSESSMENT — ACTIVITIES OF DAILY LIVING (ADL)
ADLS_ACUITY_SCORE: 0

## 2024-11-05 ASSESSMENT — COPD QUESTIONNAIRES
COPD: 1
CAT_SEVERITY: MILD

## 2024-11-05 ASSESSMENT — ENCOUNTER SYMPTOMS: DYSRHYTHMIAS: 1

## 2024-11-05 ASSESSMENT — LIFESTYLE VARIABLES: TOBACCO_USE: 1

## 2024-11-05 NOTE — ANESTHESIA POSTPROCEDURE EVALUATION
Patient: Gemma Cowart    Procedure: Procedure(s):  vaginal natural orifice transluminal endoscopic surgery vaginal hysterectomy, bilateral salpingo-oophorectomy, and posterior repair  cystoscopy, mid urethral sling placement       Anesthesia Type:  General    Note:  Disposition: Outpatient   Postop Pain Control: Uneventful            Sign Out: Well controlled pain   PONV: No   Neuro/Psych: Uneventful            Sign Out: Acceptable/Baseline neuro status   Airway/Respiratory: Uneventful            Sign Out: Acceptable/Baseline resp. status   CV/Hemodynamics: Uneventful            Sign Out: Acceptable CV status; No obvious hypovolemia; No obvious fluid overload   Other NRE: NONE   DID A NON-ROUTINE EVENT OCCUR? No           Last vitals:  Vitals Value Taken Time   /78 11/05/24 1200   Temp 98.42  F (36.9  C) 11/05/24 1207   Pulse 87 11/05/24 1208   Resp 10 11/05/24 1208   SpO2 89 % 11/05/24 1208   Vitals shown include unfiled device data.    Electronically Signed By: Eddie Adams MD  November 5, 2024  12:08 PM

## 2024-11-05 NOTE — PROGRESS NOTES
[facility] Care Coordination Contact    [facility]  Ambulatory Care Coordination to Inpatient Care Management   Hand-In Communication    Type of Residence:  No data recorded     Living arrangement:   No data recorded    Equipment within the home:   Equipment Currently Used at Home: none      Current services in place: None  No data recorded    Additional details/specific concerns r/t this admission: NA     I will follow this admission in Epic. Please feel free to contact me with questions or for further collaboration in discharge planning.     KENJI Harrison

## 2024-11-05 NOTE — DISCHARGE INSTRUCTIONS
SHAUN SANHDU MD     CLINIC PHONE NUMBER:  [phone]  [facility]      Maximum acetaminophen (Tylenol) dose from all sources should not exceed 4 grams (4000 mg) per day.  You received 975 mg at 7:33 am. Next possible dose at 130 PM.    You received Toradol, an IV form of Ibuprofen (Motrin) at 9:30 am.  Do not take any Ibuprofen products until 3:30 pm.

## 2024-11-05 NOTE — ANESTHESIA PROCEDURE NOTES
Airway       Patient location during procedure: OR       Procedure Start/Stop Times: 11/5/2024 9:30 AM  Staff -        CRNA: Tram Franklin APRN CRNA       Performed By: CRNA  Consent for Airway        Urgency: elective  Indications and Patient Condition       Indications for airway management: seamus-procedural       Induction type:intravenous       Mask difficulty assessment: 1 - vent by mask    Final Airway Details       Final airway type: endotracheal airway       Successful airway: ETT - single  Endotracheal Airway Details        ETT size (mm): 7.0       Cuffed: yes       Successful intubation technique: direct laryngoscopy       DL Blade Type: MAC 3       Grade View of Cords: 1       Adjucts: stylet       Position: Right       Measured from: gums/teeth       Secured at (cm): 21       Bite block used: None    Post intubation assessment        Placement verified by: capnometry, equal breath sounds and chest rise        Number of attempts at approach: 1       Number of other approaches attempted: 0       Secured with: tape       Ease of procedure: easy       Dentition: Intact and Unchanged    Medication(s) Administered   Medication Administration Time: 11/5/2024 9:30 AM

## 2024-11-05 NOTE — ANESTHESIA PREPROCEDURE EVALUATION
Anesthesia Pre-Procedure Evaluation    Patient: Gemma Cowart   MRN: [id number] : 1945        Procedure : Procedure(s):  vaginal natural orifice transluminal endoscopic surgery vaginal hysterectomy, bilateral salpingo-oophorectomy, possible anterior repair, possible posterior repair,  possible cystoscopy, mid urethral sling placement          Past Medical History:   Diagnosis Date    Anemia     iron deficiency anemia    Antiplatelet or antithrombotic long-term use     Anxiety     Arrhythmia     Breast cancer (H)     Bilateral Masectomies 2012    Complications of gastric bypass surgery     COPD (chronic obstructive pulmonary disease) (H) 2021    Diabetes mellitus (H)     diet controlled    DVT (deep venous thrombosis) (H)     Eczema     Fracture of left knee region     patella - vertical    History of blood transfusion     History of kidney stones     Iron deficiency anemia, unspecified iron deficiency anemia type 10/27/2021    Noninfectious ileitis     Obesity     Palpitation     with no treatment    Polyneuropathy     Pruritus     generalized    Renal disease     Rosacea     SVT (supraventricular tachycardia) (H)     s/p ablation 2015 at [facility] for left lateral AP    Thrombosis     Thyroid disease     Vitamin B12 deficiency 2013      Past Surgical History:   Procedure Laterality Date    ABDOMEN SURGERY      gastric bypass     COLONOSCOPY  10/11/2012    Procedure: COLONOSCOPY;  colonoscopy;  Surgeon: Gela Cleary MD;  Location:  GI    COLONOSCOPY N/A 10/6/2017    Procedure: COLONOSCOPY;;  Surgeon: Shashank Ortiz MD;  Location:  GI    COLONOSCOPY N/A 3/7/2022    Procedure: COLONOSCOPY;  Surgeon: Andrea Daniel MD;  Location:  GI    DENTAL SURGERY  2007    all teeth removed - dentures    ENT SURGERY      all teeth pulled    ESOPHAGOSCOPY, GASTROSCOPY, DUODENOSCOPY (EGD), COMBINED N/A 10/6/2017    Procedure: COMBINED ESOPHAGOSCOPY,  GASTROSCOPY, DUODENOSCOPY (EGD);  ESOPHAGOSCOPY, GASTROSCOPY, DUODENOSCOPY (EGD) & Colonoscopy *Needs INR on arrival;  Surgeon: Shashank Ortiz MD;  Location:  GI    ESOPHAGOSCOPY, GASTROSCOPY, DUODENOSCOPY (EGD), COMBINED N/A 9/6/2024    Procedure: Esophagoscopy, gastroscopy, duodenoscopy (EGD), combined;  Surgeon: Andrea Daniel MD;  Location:  GI    GYN SURGERY  1/10/75    tubal ligation     H ABLATION SVT  5/28/2015    stopped metoprolol    HERNIA REPAIR  2005    IMPLANT FILTER INFERIOR VENA CAVA  10/8/2012    taken out  1/4/12    INSERT TISSUE EXPANDER BREAST BILATERAL  9/5/2012    Procedure: INSERT TISSUE EXPANDER BREAST BILATERAL;;  Surgeon: Orlando Wall MD;  Location:  OR    MASTECTOMY SIMPLE, SENTINEL NODE, COMBINED  9/5/2012    Procedure: COMBINED MASTECTOMY SIMPLE, SENTINEL NODE;  BILATERAL MASTECTOMY WITH LEFT AXILLARY SENTINEL LYMPH NODE BIOPSY, BILATERAL TISSUE EXPANDER       MASTECTOMY, BILATERAL      PANNICULECTOMY N/A 4/5/2018    Procedure: PANNICULECTOMY;  PANNICULECTOMY ;  Surgeon: Orlando Wall MD;  Location:  OR    RECONSTRUCT BREAST BILATERAL, IMPLANT PROSTHESIS BILATERAL, COMBINED  5/22/2013    Procedure: COMBINED RECONSTRUCT BREAST BILATERAL, IMPLANT PROSTHESIS BILATERAL;  BILATERAL SECOND STAGE BREAST RECONSTRUCTION WITH SILICONE GEL IMPLANTS AND LIPOSUCTION;  Surgeon: Orlando Wall MD;  Location:  SD    RECONSTRUCT NIPPLE BILATERAL  8/22/2013    Procedure: RECONSTRUCT NIPPLE BILATERAL;  BILATERAL NIPPLE AREOLAR RECONSTRUCTION;  Surgeon: Orlando Wall MD;  Location:  SD    THYROIDECTOMY N/A 11/14/2023    Procedure: Left Thyroid Lobectomy and Autotransplant of Left Superior Parathyroid;  Surgeon: Haley Garcia MD;  Location:  OR    ZZHC BREAST RECONSTRUC W OTHR TECHNIQ  5/8/2013      Allergies   Allergen Reactions    Bacitracin Hives    Dust Mites Other (See Comments)     sneezing    Nickel Swelling      Social History     Tobacco Use     Smoking status: Former     Current packs/day: 0.00     Average packs/day: 1.5 packs/day for 25.0 years (37.5 ttl pk-yrs)     Types: Cigarettes     Start date: 10/21/1961     Quit date: 10/21/1986     Years since quittin.0     Passive exposure: Current    Smokeless tobacco: Never    Tobacco comments:     quit    Substance Use Topics    Alcohol use: Not Currently      Wt Readings from Last 1 Encounters:   24 76.1 kg (167 lb 11.2 oz)        Anesthesia Evaluation   Pt has had prior anesthetic. Type: General.    No history of anesthetic complications       ROS/MED HX  ENT/Pulmonary: Comment: S/P thyroid and parathyroid surgery with some VC paresis    (+)            vocal cord abnormalities -  Hoarseness,   tobacco use, Past use,  38  Pack-Year Hx,       mild,  COPD,              Neurologic:  - neg neurologic ROS     Cardiovascular: Comment: SVT S/p ablation    (+) Dyslipidemia - -   -  - -   Taking blood thinners Pt has received instructions: Instructions Given to patient: held.                   dysrhythmias, Other,             METS/Exercise Tolerance:     Hematologic: Comments: On antiplatelet agents    (+) History of blood clots,    pt is not anticoagulated,           Musculoskeletal:   (+)  arthritis,             GI/Hepatic:  - neg GI/hepatic ROS     Renal/Genitourinary:     (+) renal disease, type: CRI, Pt does not require dialysis,           Endo: Comment: Class 1 obesity    (+)  type II DM,   Not using insulin, - not using insulin pump.    thyroid problem, nodular disease and parathyroid disease,    Obesity,       Psychiatric/Substance Use:     (+) psychiatric history anxiety       Infectious Disease:  - neg infectious disease ROS     Malignancy:   (+) Malignancy, History of Breast.Breast CA Remission status post Surgery and Chemo.      Other:  - neg other ROS          Physical Exam    Airway        Mallampati: II   TM distance: > 3 FB   Neck ROM: full   Mouth opening: > 3 cm    Respiratory  Devices and Support         Dental           Cardiovascular   cardiovascular exam normal       Rhythm and rate: regular and normal     Pulmonary   pulmonary exam normal        breath sounds clear to auscultation       Other findings: Lab Test        10/08/24     08/08/23     11/17/22     10/27/21     07/24/21     07/24/21     07/16/21                       1231          1310          0942          1233          2301          2046          1247          WBC          4.0          4.8          4.1            < >         --          4.8           --           HGB          11.1*        12.5         13.3           < >         --          10.0*         --           MCV          84           85           86             < >         --          79            --           PLT          173          170          190            < >         --          185           --           INR           --           --           --           --          2.18*        2.08*        3.5*           < > = values in this interval not displayed.                  Lab Test        11/05/24     09/30/24     09/06/24     11/10/23     08/08/23     05/23/23                       0648          1123          1028          1429          1310          1054          NA            --          143           --           --          143          144           POTASSIUM     --          5.1           --           --          5.3          5.1           CHLORIDE      --          111*          --           --          110*         109*          CO2           --          23            --           --          24           23            BUN           --          24.7*         --           --          30.5*        17.3          CR            --          1.62*         --           --          1.63*        1.55*         ANIONGAP      --          9             --           --          9            12            LADI           --          8.6*          --           --           9.1          9.6           GLC          94           97           97             < >        142*         99             < > = values in this interval not displayed.                     ECG  Sinus bradycardia   Otherwise normal ECG   When compared with ECG of 05-Oct-2022 10:21,   No significant change was found       OUTSIDE LABS:  CBC:   Lab Results   Component Value Date    WBC 4.0 10/08/2024    WBC 4.8 08/08/2023    HGB 11.1 (L) 10/08/2024    HGB 12.5 08/08/2023    HCT 35.5 10/08/2024    HCT 40.8 08/08/2023     10/08/2024     08/08/2023     BMP:   Lab Results   Component Value Date     09/30/2024     08/08/2023    POTASSIUM 5.1 09/30/2024    POTASSIUM 5.3 08/08/2023    CHLORIDE 111 (H) 09/30/2024    CHLORIDE 110 (H) 08/08/2023    CO2 23 09/30/2024    CO2 24 08/08/2023    BUN 24.7 (H) 09/30/2024    BUN 30.5 (H) 08/08/2023    CR 1.62 (H) 09/30/2024    CR 1.63 (H) 08/08/2023    GLC 94 11/05/2024    GLC 97 09/30/2024     COAGS:   Lab Results   Component Value Date    INR 2.18 (H) 07/24/2021     POC:   Lab Results   Component Value Date     (H) 10/06/2017     HEPATIC:   Lab Results   Component Value Date    ALBUMIN 3.6 09/30/2024    PROTTOTAL 5.8 (L) 09/30/2024    ALT 16 09/30/2024    AST 25 09/30/2024    ALKPHOS 76 09/30/2024    BILITOTAL 0.4 09/30/2024     OTHER:   Lab Results   Component Value Date    A1C 5.6 10/08/2024    LADI 8.6 (L) 09/30/2024    PHOS 3.9 09/30/2024    MAG 2.2 05/23/2023    TSH 3.21 09/30/2024    T4 0.97 09/30/2024    CRP <2.9 10/02/2017    SED 10 10/02/2017       Anesthesia Plan    ASA Status:  3    NPO Status:  NPO Appropriate    Anesthesia Type: General.     - Airway: ETT   Induction: Intravenous.   Maintenance: Balanced.        Consents    Anesthesia Plan(s) and associated risks, benefits, and realistic alternatives discussed. Questions answered and patient/representative(s) expressed understanding.     - Discussed: Risks, Benefits and Alternatives for BOTH  "SEDATION and the PROCEDURE were discussed     - Discussed with:  Patient      - Extended Intubation/Ventilatory Support Discussed: No.      - Patient is DNR/DNI Status: No     Use of blood products discussed: No .     Postoperative Care    Pain management: IV analgesics, Oral pain medications, Multi-modal analgesia.   PONV prophylaxis: Dexamethasone or Solumedrol, Ondansetron (or other 5HT-3), Background Propofol Infusion     Comments:               Eddie Adams MD    I have reviewed the pertinent notes and labs in the chart from the past 30 days and (re)examined the patient.  Any updates or changes from those notes are reflected in this note.            # Drug Induced Coagulation Defect: home medication list includes an anticoagulant medication              # Obesity: Estimated body mass index is 30.43 kg/m  as calculated from the following:    Height as of this encounter: 1.581 m (5' 2.24\").    Weight as of this encounter: 76.1 kg (167 lb 11.2 oz).       # COPD: noted on problem list       "

## 2024-11-05 NOTE — OP NOTE
Preoperative Diagnosis: Pelvic organ prolapse, stress urinary incontinence  Postoperative Diagnosis: Same  Procedure(s): vNOTES laparoscopic assisted vaginal hysterectomy, bilateral salpingo-oophorectomy,  modified Coon culdoplasty, posterior rectocele repair with perineoplasty, mid urethral sling placement, cystoscopy  Surgeon: Shanique Paniagua MD   Assistant: Dr. Lidia Knowles  Type of anesthesia: General Endotracheal  Complications: None  EBL 25 cc  Findings: Small, postmenopausal uterus with grade 3 prolapse.  Normal bilateral tubes and ovaries.  Adhesions of the omentum to the anterior abdominal wall In the upper abdomen.  Specimen(s) removed: uterus with cervix, bilateral tubes and ovaries.      Indications:   Symptomatic pelvic organ prolapse and stress urinary incontinence.  Risks, benefits and alternative treatments have been discussed with the patient. Informed consent obtained.     OPERATIVE PROCEDURE:  The patient was taken to the operating room where general anesthetic was established. She was positioned in the dorsal lithotomy position in [facility], and was then prepped and draped in the usual sterile fashion. An operative timeout was performed.  The bladder was drained and a Farias catheter was left in place.  A weighted speculum was placed in the posterior fornix, and with the aid of a jesus retractor anteriorly, the cervix was visualized and grasped with two single-toothed tenaculae. Dilute vasopression solution was injected under the vaginal epithelium, and then incision was made with a scalpel circumferentially at the cervicovaginal junction. The vagina was dissected from the cervix anteriorly and posteriorly.  The anterior peritoneum was entered sharply, and tagged with a suture.  Following this, the peritoneal cavity was entered sharply posteriorly, and the peritoneum was tagged with a suture. The bilateral uterosacral ligaments were then clamped, transected and suture ligated with  transfixion sutures of 0 Vicryl and tagged long. The ring of the John retractor was then introduced with the introducer anteriorly, and the anterior retractor was removed.  This was repeated posteriorly.  A small laparotomy sponge was placed in the posterior cul-de-sac.  The outer ring was rolled inward 2 times.  The gel point ring was then situated on the outer ring, and carbon dioxide gas was insufflated to a pressure of 8 mmHg.  The patient was placed in 20 degrees of Trendelenburg.  Both adnexa were examined, and appeared normal.  The posterior cul-de-sac appeared normal, and anteriorly we could see omental adhesions to the anterior abdominal wall, which obscured visualization of the liver and stomach.    The LigaSure seal and cut device was then introduced along with a grasper, and remaining uterosacral ligament attachments, cardinal ligament including the uterine vessels, and the round ligament were taken down on the left side.  Attention was turned to the right side, where the same procedure was performed, and where the infundibulopelvic ligament was also sealed and transected in the same manner, removing the right tube and ovary.  Remaining attachments of the utero-ovarian ligament on the left side were then taken down, thus mobilizing the uterus completely.  Due to collapsing bowel, it was difficult to initially visualize the tube and ovary on the left side, and so the uterus with the right tube and ovary was removed from the abdominal cavity, the laparoscopic platform was replaced, and then the left tube and ovary were removed with the LigaSure device.  All pedicles did appear hemostatic.  The laparoscopic portion of the procedure was then terminated, and the left tube and ovary also removed from the abdominal cavity.  The gas was evacuated.  The John retractor was then removed.     A modified Coon culdoplasty was then performed, incorporating the uterosacral ligaments bilaterally for apical support.  The vagina was closed with interrupted figure-of-8 sutures of 0 Vicryl incorporating the uterosacral ligament at either corner. Hemostasis was noted to be excellent.    At this point, Coon suture was tied, and the anterior compartment was examined and good support was noted, therefore anterior repair was not necessary.  We then proceeded with the placement of a single incision mid urethral sling.    Farias catheter left in place. The bladder neck and then the midurethral location for incision was identified. 10 ml of 0.25% marcaine with epinephrine was injected in the midurethral region. A 2 cm incision was made in the vaginal epithelium at the midurethra. Sharp dissection was used to open this space to the level of the pubic rami bilaterally.  Solyx single incision sling was then placed on the right side, the anchor was deployed, and the sling was found to be adequately anchored.  The same procedure was performed on the left side, and the tension was examined at the mid urethra.  This was determined to be appropriately placed. Cystoscopy was performed, and revealed a normal bladder with normal ureteral orifices and no perforation of the bladder with placement of the sling.  Bilateral ureteral efflux was noted.  Cystoscopy was terminated and the bladder was drained. The vaginal incision was closed with 2.0 vicryl.     Attention was turned to the posterior compartment, where a small but grade 3 rectocele was still noted.  The perineal body was grasped with 2 Allis forceps.  Quarter percent Marcaine with epinephrine was injected in the perineum and in the midline in the posterior vagina overlying the rectocele.  Wedge resection of the skin overlying the perineal body was accomplished, and then the vaginal mucosa in the midline was undermined and incised in a sequential fashion with Metzenbaum scissors until the cephalad angle of the rectocele was reached.  The rectovaginal fascia was then dissected off of the  overlying vaginal mucosa on each side, and a pursestring suture of 2-0 Vicryl was placed, the rectocele reduced, and the suture tied.  This resulted in resolution of the rectocele entirely.  Excess vaginal mucosa was trimmed bilaterally, and the vaginal mucosa was reapproximated in the midline with a running locking suture of 2-0 Vicryl.  2-0 Vicryl was also used to reapproximate the perineal body and to close the skin.     At the end of the procedure, the sponge, needle and instrument counts were correct x2.  The Farias catheter was removed from the bladder.  The patient was awakened and taken to the recovery room in stable condition.      Dr. Lidia Knowles's assistance was needed for retraction, exposure, and for safe completion of the operative procedure(s) listed above.  Performance of a vaginal hysterectomy with repairs is not possible without a skilled surgical assistant, and no other assistant was available.      Shanique Paniagua MD  November 5, 2024  11:52 AM

## 2024-11-05 NOTE — OR NURSING
Post op blood sugar 198 - MDA Bryan made aware, no new orders at this time    Aparna Marsh RN on 11/5/2024 at 12:01 PM

## 2024-11-05 NOTE — ANESTHESIA CARE TRANSFER NOTE
Patient: Gemma Cowart    Procedure: Procedure(s):  vaginal natural orifice transluminal endoscopic surgery vaginal hysterectomy, bilateral salpingo-oophorectomy, and posterior repair  cystoscopy, mid urethral sling placement       Diagnosis: Prolapse of female pelvic organs [N81.9]  Diagnosis Additional Information: No value filed.    Anesthesia Type:   General     Note:    Oropharynx: oropharynx clear of all foreign objects  Level of Consciousness: awake  Oxygen Supplementation: face mask  Level of Supplemental Oxygen (L/min / FiO2): 6  Independent Airway: airway patency satisfactory and stable  Dentition: dentition unchanged  Vital Signs Stable: post-procedure vital signs reviewed and stable  Report to RN Given: handoff report given  Patient transferred to: PACU    Handoff Report: Identifed the Patient, Identified the Reponsible Provider, Reviewed the pertinent medical history, Discussed the surgical course, Reviewed Intra-OP anesthesia mangement and issues during anesthesia, Set expectations for post-procedure period and Allowed opportunity for questions and acknowledgement of understanding      Vitals:  Vitals Value Taken Time   BP     Temp     Pulse 86 11/05/24 1147   Resp 16 11/05/24 1147   SpO2 99 % 11/05/24 1147   Vitals shown include unfiled device data.    Electronically Signed By: ANTON Donovan CRNA  November 5, 2024  11:48 AM   [facility] [place]  [address]  [facility] [address]   [phone]      Magnolia Damon :1963 MRN:[id number]    2022 Time Session Began: 11:00  Time Session Ended: 11:45    Due to COVID-19 precautions, this visit was performed via live interactive two-way Video visit with patient's verbal consent.   Clinician Location:Home.  Patient Location: Home.  Verified patient identity:  [x] Yes    Session Type:Therapy 38-52 minutes (06780)     Others Present: NA    Intervention: Supportive cognitive behavioral     Suicide/Homicide/Violence Ideation: No    If Yes, explain: NA    Current Outpatient Medications   Medication Sig   • COVID-19 mRNA bivalent, Pfizer, (Pfizer COVID-19 Vac Bivalent) 30 MCG/0.3ML Suspension Inject 0.3 mLs into the muscle once.   • OLANZapine (ZyPREXA) 5 MG tablet Take 1 tablet by mouth nightly.   • amphetamine-dextroamphetamine (Adderall) 10 MG tablet Indications: mood disorder 0.5 to 1 tab every am and 0.5 to 1 tab daily at noon   • amphetamine-dextroamphetamine (Adderall) 10 MG tablet Take 0.5-1 tablets by mouth 2 times daily. Do not start before 2022.   • LORazepam (Ativan) 1 MG tablet Take 0.5-1 tablets by mouth 3 times daily as needed for Anxiety.   • Vortioxetine HBr 20 MG Tab Take 1 tablet by mouth daily.   • buPROPion XL (WELLBUTRIN XL) 300 MG 24 hr tablet Take 1 tablet by mouth every morning. In addition to 150 mg tab for a total of 450 mg every am   • lithium (ESKALITH) 450 MG CR tablet Take 1 tablet by mouth at bedtime.   • traZODone (DESYREL) 100 MG tablet Take 1.5-3 tablets by mouth nightly.   • Magnesium 500 MG Cap    • Omega-3 Fatty Acids (Fish Oil) 1200 MG capsule    • Multiple Vitamin (MULTI VITAMIN DAILY PO) Take by mouth daily.   • VITAMIN D, CHOLECALCIFEROL, PO Take by mouth daily.     No current facility-administered medications for this visit.        Change in Medication(s) Reported: No  If Yes, explain: NA    Patient/Family Education Provided: Yes  Patient/Family Displays Understanding: Yes    If No, explain: NA    Chief complaint in patient's own words: Patient would like to work on strategies to help improve her anxiety and depressive symptoms.     Progress Note containing chief complaint and symptoms/problems related to the complaint:    (Data/Action/Response/Plan)    D:  Follow-up appointment with 59-year-old female.  Patient discussed her time since last session with this provider.  Patient report feeling less anxious at today's session.  Patient noted that her son has finished up his treatment and are happy to see his tumor markers going down.  Patient discussed her recent trip out of state with her family and noted that things went well.  Patient shared some anxiety feelings related to changes at work that could increase her work load.  Patient has been using some of her healthy coping strategies on a regular basis.  Patient noted that she needs to continue working on getting out of bed sooner to have better day.   A:  Patient's feelings were reflected, validated, processed, and summarized. Keep working on healthy coping strategies.  Patient was encouraged to get out of bed shortly after she wakes up as staying in bed does not seem to help with her mood.  Patient was encouraged to continue challenging anxious thoughts as they come up.  R: The patient understood and was receptive to the interventions used.  P: Will meet with patient on 12-12-22.      Need for Community Resources Assessed: Yes    Resources Needed: No    If Yes, what resources: NA    Primary Diagnosis: HUMPHREY (generalized anxiety disorder)  (primary encounter diagnosis)  Moderate episode of recurrent major depressive disorder (CMS/HCC)    Treatment Plan: unchanged      Discharge Plan: N/A    Next Appointment: 12-12-22      Josefina Ocampo PSYD

## 2024-11-05 NOTE — PROGRESS NOTES
TRANSITIONS OF CARE (KENTON) LOG    KENTON tasks should be completed by the CC within one (1) business day of notification of each transition. Follow up contact with member is required after return to their usual care setting.  Note:  If CC finds out about the transitions fifteen (15) days or more after the member has returned to their usual care setting, no KENTON log is needed. However, the CC should check in with the member to discuss the transition process, any changes needed to the care plan and document it in a case note.     Member Name:  Gemma Cowart [facility] [id number]   Product: [facility] Care Coordinator Contact:  KENJI Harrison (covering CC) Agency/County/Care System: [facility]   Transition Communication Actions from Care Management Contact   Transition #1   Notification Date: 11/5/24 Transition Date:   11/5/24 Transition From: Home     Is this the member s usual care setting?               no Transition To: [facility]   Transition Type:  Planned    Documentation from conversation with the member/responsible party, provider, discharging and receiving facility:   Date: 11/5/24: Received notification of admission to hospital with dx of Status post vaginal hysterectomy  - Primary Z90.710  CC contacted Hospital /discharge planner,  via the [employer] Transitional Care Hand-In Process, with community care plan included.  CC reached out to member regarding transition and left a message requesting a return call.  Reviewed and update support plan as needed.  Notified community service providers and placed services None on hold as needed.  Transition log initiated.   PCP, Arminda Foley, notified of hospitalization via EMR.    KENJI Harrison  [facility]  [phone]       Transition #2   Notification Date: 11/5/24 Transition Date:   11/5/24 Transition From: [facility]     Is this the  member s usual care setting?               no Transition To: Home   Transition Type:  Planned    Documentation from conversation with the member/responsible party, provider, discharging and receiving facility:   Date: 11/5/24: Received notification of transition to home.  CC contacted member and left a message requesting a return call.  Member has a follow-up appointment with PCP in 7 days: Yes: scheduled on 11/13/24    Member has had a change in condition: No  Home visit needed: No  Support Plan reviewed and updated.  The following home based services None were resumed.  New referrals placed: No  Transition log completed.   PCP, Arminda Foley, notified of transition back to home via EMR.    Sabrina Moreiravon KENJI  [facility]  [phone]    11/6/24  CC 2nd attempt to contact Gemma for KENTON follow up.  Left a VM requesting a call back.    Sabrina Moreiravon [facility]  [phone]    11/12/24  CC 3rd attempt to contact Gemma for KENTON follow up.    Left a VM requesting a call back.    Sabrina Moreiravon KENJI  [facility]  [phone]    11/13/24  CC 4th attempt to contact Gemma for KENTON follow up but there was no answer and no return call.      Sabrina Vaughan [facility] Center[facility]ESSIKA  [facility]  [phone]           *RETURN TO USUAL CARE SETTING: *Complete tasks below when the member is discharging TO their usual care setting within one (1) business day of notification..      For situations where the Care Coordinator is notified of the discharge prior to the date of discharge, the Care Coordinator must follow up with the member or designated representative to confirm that discharge actually occurred and discuss required KENTON tasks as outlined in the KENTON Instructions.  (This includes situations where it may be a  new  usual care setting for the member. (i.e., a community member who decides upon permanent nursing home placement following hospitalization and rehab).    Discuss with Member/Responsible Party:    Check   Yes  - if the member, family member and/or SNF/facility staff manages the following:    If  No  provide explanation in the comments section.          Date completed: 11/5/24 Communicated with member or their designated representative about the following:  care transition process; about changes to the member s health status; plan of care updates; education about transitions and how to prevent unplanned transitions/readmissions    Four Pillars for Optimal Transition:    Check  Yes  - if the member, family member and/or SNF/facility staff manages the following:    If  No  provide explanation in the comments section.          [x]  Yes     []  No Does the member have a follow-up appointment scheduled with primary care or specialist? (Mental health hospitalizations--the appt. should be w/in 7 days)              For mental health hospitalizations:  []  Yes     []  No     Does the member have a follow-up appointment scheduled with a mental health practitioner within 7 days of discharge?  [x]  Yes     []  No     Has a medication review been completed with member? If no, refer to PCP, home care nurse, MTM, pharmacist  [x]  Yes     []  No     Can the member manage their medications or is there a system in place to manage medications (e.g. home care set-up)?         [x]  Yes     []  No     Can the member verbalize warning signs and symptoms to watch for and how to respond?  [x]  Yes     []  No     Does the member have a copy of and understand their discharge instructions?  If no, assist to obtain copy of discharge instructions, review discharge instructions, and assist to contact PCP to discuss questions about their recent hospitalization.  [x]  Yes     []  No     Does the member have adequate food, housing and transportation?  If no, add goal and discuss additional supports available to the member                                                                                                                                                                                  [x]  Yes     []  No     Is the member safe in their home?  If no, document needs and support provided                                                                                                                                                                          []  Yes     [x]  No     Are there any concerns of vulnerability, abuse, or neglect?  If yes, document concerns and actions taken by Care Coordinator as a mandated                                                                                                                                                                              [x]  Yes     []  No     Does the member use a Personal Health Care Record?  Check  Yes  if visit summary, discharge summary, and/or healthcare summary are being used as a PHR.                                                                                                                                                                                  [x]  Yes     []  No     Have you reviewed the discharge summary with the member? If  No  provide explanation in comments.  [x]  Yes     []  No     Have you updated the member s care plan/support plan? Add new diagnosis, medications, treatments, goals & interventions, as applicable. If No, provide explanation in comments.    Comments:           Notes from conversation with the member/responsible party, provider, discharging and receiving facility (as applicable):

## 2024-11-06 NOTE — RESULT ENCOUNTER NOTE
Hello -    Here are my comments about the recent results. Declining bone density-- would recommend reclast as we discussed if you're open to it. Let m ekluis eduardow    Please let us know if you have any questions or concerns.    Regards,  Haley Singh MD

## 2024-11-07 LAB
PATH REPORT.COMMENTS IMP SPEC: NORMAL
PATH REPORT.COMMENTS IMP SPEC: NORMAL
PATH REPORT.FINAL DX SPEC: NORMAL
PATH REPORT.GROSS SPEC: NORMAL
PATH REPORT.MICROSCOPIC SPEC OTHER STN: NORMAL
PATH REPORT.RELEVANT HX SPEC: NORMAL
PHOTO IMAGE: NORMAL

## 2024-11-08 DIAGNOSIS — M81.8 OTHER OSTEOPOROSIS WITHOUT CURRENT PATHOLOGICAL FRACTURE: Primary | ICD-10-CM

## 2024-11-14 ENCOUNTER — ANCILLARY PROCEDURE (OUTPATIENT)
Dept: GENERAL RADIOLOGY | Facility: CLINIC | Age: 79
End: 2024-11-14
Attending: INTERNAL MEDICINE
Payer: COMMERCIAL

## 2024-11-14 DIAGNOSIS — M81.8 OTHER OSTEOPOROSIS WITHOUT CURRENT PATHOLOGICAL FRACTURE: ICD-10-CM

## 2024-11-14 PROCEDURE — 72100 X-RAY EXAM L-S SPINE 2/3 VWS: CPT | Mod: TC | Performed by: RADIOLOGY

## 2024-11-14 PROCEDURE — 72070 X-RAY EXAM THORAC SPINE 2VWS: CPT | Mod: TC | Performed by: RADIOLOGY

## 2024-11-21 ENCOUNTER — OFFICE VISIT (OUTPATIENT)
Dept: OBGYN | Facility: CLINIC | Age: 79
End: 2024-11-21
Payer: COMMERCIAL

## 2024-11-21 VITALS — SYSTOLIC BLOOD PRESSURE: 142 MMHG | DIASTOLIC BLOOD PRESSURE: 78 MMHG

## 2024-11-21 DIAGNOSIS — N81.4 UTERINE PROLAPSE: Primary | ICD-10-CM

## 2024-11-21 DIAGNOSIS — N81.6 RECTOCELE: ICD-10-CM

## 2024-11-21 DIAGNOSIS — N39.3 STRESS INCONTINENCE IN FEMALE: ICD-10-CM

## 2024-11-21 NOTE — PROGRESS NOTES
SUBJECTIVE:                                                   Gemma Cowart is a 79 year old female who presents to clinic today for the following health issue(s):  Postoperative visit    HPI:  She is status post V notes laparoscopic assisted vaginal hysterectomy with bilateral salpingo-oophorectomy, posterior repair, and mid urethral sling placement. She feels great. Reports no bleeding, no fevers, chills, or other concerns.  She took no strong pain medications, is using no pain medications now.  Bladder feels the best it has in years.  She has no leakage.      Problem list and histories reviewed & adjusted, as indicated.  Additional history: as documented.    Patient Active Problem List   Diagnosis    Rosacea    Vitamin D deficiency disease    Anxiety    Type 2 diabetes mellitus with other specified complication (H)    Long term current use of anticoagulant-hx multiple DVTs    CKD (chronic kidney disease) stage 3, GFR 30-59 ml/min (H)    COPD (chronic obstructive pulmonary disease) (H)    Elevated parathyroid hormone    Dysphonia    History of lobectomy of thyroid    Problem with voice production     Past Surgical History:   Procedure Laterality Date    ABDOMEN SURGERY  2000    gastric bypass 2000    COLONOSCOPY  10/11/2012    Procedure: COLONOSCOPY;  colonoscopy;  Surgeon: Gela Cleary MD;  Location:  GI    COLONOSCOPY N/A 10/6/2017    Procedure: COLONOSCOPY;;  Surgeon: Shashank Ortiz MD;  Location:  GI    COLONOSCOPY N/A 3/7/2022    Procedure: COLONOSCOPY;  Surgeon: Andrea Daniel MD;  Location:  GI    CYSTOSCOPY, SLING TRANSVAGINAL N/A 11/5/2024    Procedure: cystoscopy, mid urethral sling placement;  Surgeon: Shanique Paniagua MD;  Location:  OR    DENTAL SURGERY  5/2007    all teeth removed - dentures    ENT SURGERY      all teeth pulled    ESOPHAGOSCOPY, GASTROSCOPY, DUODENOSCOPY (EGD), COMBINED N/A 10/6/2017    Procedure: COMBINED ESOPHAGOSCOPY, GASTROSCOPY, DUODENOSCOPY (EGD);   ESOPHAGOSCOPY, GASTROSCOPY, DUODENOSCOPY (EGD) & Colonoscopy *Needs INR on arrival;  Surgeon: Shashank Ortiz MD;  Location:  GI    ESOPHAGOSCOPY, GASTROSCOPY, DUODENOSCOPY (EGD), COMBINED N/A 9/6/2024    Procedure: Esophagoscopy, gastroscopy, duodenoscopy (EGD), combined;  Surgeon: Andrea Daniel MD;  Location:  GI    GYN SURGERY  1/10/75    tubal ligation     H ABLATION SVT  5/28/2015    stopped metoprolol    HERNIA REPAIR  2005    HYSTERECTOMY, ENDOSCOPIC, VAGINAL TRANSLUMINAL NATURAL ORIFICE APPROACH Bilateral 11/5/2024    Procedure: vaginal natural orifice transluminal endoscopic surgery vaginal hysterectomy, bilateral salpingo-oophorectomy, and posterior repair;  Surgeon: Shanique Paniagua MD;  Location:  OR    IMPLANT FILTER INFERIOR VENA CAVA  10/8/2012    taken out  1/4/12    INSERT TISSUE EXPANDER BREAST BILATERAL  9/5/2012    Procedure: INSERT TISSUE EXPANDER BREAST BILATERAL;;  Surgeon: Orlando Wall MD;  Location:  OR    MASTECTOMY SIMPLE, SENTINEL NODE, COMBINED  9/5/2012    Procedure: COMBINED MASTECTOMY SIMPLE, SENTINEL NODE;  BILATERAL MASTECTOMY WITH LEFT AXILLARY SENTINEL LYMPH NODE BIOPSY, BILATERAL TISSUE EXPANDER       MASTECTOMY, BILATERAL      PANNICULECTOMY N/A 4/5/2018    Procedure: PANNICULECTOMY;  PANNICULECTOMY ;  Surgeon: Orlando Wall MD;  Location:  OR    RECONSTRUCT BREAST BILATERAL, IMPLANT PROSTHESIS BILATERAL, COMBINED  5/22/2013    Procedure: COMBINED RECONSTRUCT BREAST BILATERAL, IMPLANT PROSTHESIS BILATERAL;  BILATERAL SECOND STAGE BREAST RECONSTRUCTION WITH SILICONE GEL IMPLANTS AND LIPOSUCTION;  Surgeon: Orlando Wall MD;  Location:  SD    RECONSTRUCT NIPPLE BILATERAL  8/22/2013    Procedure: RECONSTRUCT NIPPLE BILATERAL;  BILATERAL NIPPLE AREOLAR RECONSTRUCTION;  Surgeon: Orlando Wall MD;  Location:  SD    THYROIDECTOMY N/A 11/14/2023    Procedure: Left Thyroid Lobectomy and Autotransplant of Left Superior Parathyroid;   Surgeon: Haley Garcia MD;  Location:  OR    [facility] BREAST RECONSTRUC W OTHR TECHNIQ  2013      Social History     Tobacco Use    Smoking status: Former     Current packs/day: 0.00     Average packs/day: 1.5 packs/day for 25.0 years (37.5 ttl pk-yrs)     Types: Cigarettes     Start date: 10/21/1961     Quit date: 10/21/1986     Years since quittin.1     Passive exposure: Current    Smokeless tobacco: Never    Tobacco comments:     quit    Substance Use Topics    Alcohol use: Not Currently      Problem (# of Occurrences) Relation (Name,Age of Onset)    Alzheimer Disease (2) Father, Paternal Grandfather    Cancer (3) Paternal Grandmother, Maternal Uncle, Maternal Uncle    Diabetes (3) Brother, Brother, Son    Eye Disorder (1) Brother    Cancer - colorectal (2) Mother, Brother    Prostate Cancer (1) Father    Crohn's Disease (1) Daughter    Colon Cancer (1) Mother              Current Outpatient Medications   Medication Sig Dispense Refill    acetaminophen (TYLENOL) 500 MG tablet Take 500-1,000 mg by mouth 2 times daily as needed for mild pain.      apixaban ANTICOAGULANT (ELIQUIS ANTICOAGULANT) 5 MG tablet Take 1 tablet (5 mg) by mouth 2 times daily 180 tablet 3    blood glucose (NO BRAND SPECIFIED) lancets standard Use to test blood sugar 2 times daily or as directed. 1 each 4    blood glucose (ONETOUCH VERIO IQ) test strip USE TO TEST TWICE DAILY OR AS DIRECTED 200 strip 2    blood glucose calibration (NO BRAND SPECIFIED) solution Use to calibrate blood glucose monitor as directed. One touch verio Flex 1 each 0    Cholecalciferol (VITAMIN D) 50 MCG (2000 UT) CAPS Take 1 capsule by mouth daily. 90 capsule 3    clobetasol propionate (TEMOVATE) 0.05 % external cream Apply twice daily as needed 60 g 3    escitalopram (LEXAPRO) 20 MG tablet Take 1 tablet (20 mg) by mouth daily 90 tablet 1    magnesium glycinate 100 MG CAPS capsule Take 1 capsule (100 mg) by mouth 2 times daily. 180 capsule 3     tiotropium (SPIRIVA RESPIMAT) 2.5 MCG/ACT inhaler Inhale 2 puffs into the lungs daily 12 g 3    vitamin B-12 (CYANOCOBALAMIN) 2500 MCG sublingual tablet Take 1 tablet (2,500 mcg) by mouth daily. 90 tablet 3     No current facility-administered medications for this visit.     Allergies   Allergen Reactions    Bacitracin Hives    Nickel Swelling    Dust Mites Other (See Comments)     sneezing         OBJECTIVE:     BP (!) 142/78   LMP 09/24/1990    BMI: There is no height or weight on file to calculate BMI.  General: Alert and oriented, no distress.  Psychiatric: Mood and affect within normal limits.  No other exam today, will need a full exam at her next appointment.    In-Clinic Test Results:  No results found. However, due to the size of the patient record, not all encounters were searched. Please check Results Review for a complete set of results.    ASSESSMENT/PLAN:                                                      Postoperative exam  Status post V notes laparoscopic assisted vaginal hysterectomy with bilateral salpingo-oophorectomy, rectocele repair, single incision mid urethral sling.    Continue restrictions as previously outlined.  Follow-up with me in 4 to 6 weeks for final postop exam.    Shanique Paniagua MD  [facility] [place]

## 2024-11-26 NOTE — PROGRESS NOTES
Discharge plan according to [facility]:     10/25/24 1212   Discharge Planning   Patient/Family Anticipates Transition to home with family   Living Arrangements   People in Home child(wilmar), adult   Type of Residence Private Residence   Is your private residence a single family home or apartment? Single family home   Stair Railings, Within Home, Primary railings safe and in good condition   Bathroom Shower/Tub Tub/Shower unit   Support System   Support Systems Family Members;Children   Do you have someone available to stay with you one or two nights once you are home? Yes  (Daughter - Donna)

## 2024-12-02 DIAGNOSIS — F41.1 GAD (GENERALIZED ANXIETY DISORDER): ICD-10-CM

## 2024-12-03 ENCOUNTER — OFFICE VISIT (OUTPATIENT)
Dept: FAMILY MEDICINE | Facility: CLINIC | Age: 79
End: 2024-12-03
Payer: COMMERCIAL

## 2024-12-03 VITALS
RESPIRATION RATE: 14 BRPM | OXYGEN SATURATION: 97 % | HEART RATE: 64 BPM | DIASTOLIC BLOOD PRESSURE: 64 MMHG | SYSTOLIC BLOOD PRESSURE: 112 MMHG | BODY MASS INDEX: 29.83 KG/M2 | WEIGHT: 162.1 LBS | HEIGHT: 62 IN | TEMPERATURE: 97.6 F

## 2024-12-03 DIAGNOSIS — Z85.3 HX: BREAST CANCER: ICD-10-CM

## 2024-12-03 DIAGNOSIS — I47.10 SVT (SUPRAVENTRICULAR TACHYCARDIA) (H): ICD-10-CM

## 2024-12-03 DIAGNOSIS — N18.30 STAGE 3 CHRONIC KIDNEY DISEASE, UNSPECIFIED WHETHER STAGE 3A OR 3B CKD (H): ICD-10-CM

## 2024-12-03 DIAGNOSIS — J43.9 PULMONARY EMPHYSEMA, UNSPECIFIED EMPHYSEMA TYPE (H): ICD-10-CM

## 2024-12-03 DIAGNOSIS — N64.4 PAIN OF BOTH BREASTS: Primary | ICD-10-CM

## 2024-12-03 DIAGNOSIS — I82.402 RECURRENT ACUTE DEEP VEIN THROMBOSIS (DVT) OF LEFT LOWER EXTREMITY (H): ICD-10-CM

## 2024-12-03 DIAGNOSIS — D64.9 ANEMIA, UNSPECIFIED TYPE: ICD-10-CM

## 2024-12-03 LAB
BASOPHILS # BLD AUTO: 0 10E3/UL (ref 0–0.2)
BASOPHILS NFR BLD AUTO: 1 %
EOSINOPHIL # BLD AUTO: 0.1 10E3/UL (ref 0–0.7)
EOSINOPHIL NFR BLD AUTO: 2 %
ERYTHROCYTE [DISTWIDTH] IN BLOOD BY AUTOMATED COUNT: 14 % (ref 10–15)
HCT VFR BLD AUTO: 35.4 % (ref 35–47)
HGB BLD-MCNC: 11 G/DL (ref 11.7–15.7)
IMM GRANULOCYTES # BLD: 0 10E3/UL
IMM GRANULOCYTES NFR BLD: 0 %
LYMPHOCYTES # BLD AUTO: 0.9 10E3/UL (ref 0.8–5.3)
LYMPHOCYTES NFR BLD AUTO: 18 %
MCH RBC QN AUTO: 25.9 PG (ref 26.5–33)
MCHC RBC AUTO-ENTMCNC: 31.1 G/DL (ref 31.5–36.5)
MCV RBC AUTO: 84 FL (ref 78–100)
MONOCYTES # BLD AUTO: 0.5 10E3/UL (ref 0–1.3)
MONOCYTES NFR BLD AUTO: 10 %
NEUTROPHILS # BLD AUTO: 3.7 10E3/UL (ref 1.6–8.3)
NEUTROPHILS NFR BLD AUTO: 70 %
PLATELET # BLD AUTO: 245 10E3/UL (ref 150–450)
RBC # BLD AUTO: 4.24 10E6/UL (ref 3.8–5.2)
WBC # BLD AUTO: 5.3 10E3/UL (ref 4–11)

## 2024-12-03 PROCEDURE — 82607 VITAMIN B-12: CPT | Performed by: NURSE PRACTITIONER

## 2024-12-03 PROCEDURE — 99214 OFFICE O/P EST MOD 30 MIN: CPT | Mod: 24 | Performed by: NURSE PRACTITIONER

## 2024-12-03 PROCEDURE — 82550 ASSAY OF CK (CPK): CPT | Performed by: NURSE PRACTITIONER

## 2024-12-03 PROCEDURE — 80048 BASIC METABOLIC PNL TOTAL CA: CPT | Performed by: NURSE PRACTITIONER

## 2024-12-03 PROCEDURE — 85025 COMPLETE CBC W/AUTO DIFF WBC: CPT | Performed by: NURSE PRACTITIONER

## 2024-12-03 PROCEDURE — 82728 ASSAY OF FERRITIN: CPT | Performed by: NURSE PRACTITIONER

## 2024-12-03 PROCEDURE — 36415 COLL VENOUS BLD VENIPUNCTURE: CPT | Performed by: NURSE PRACTITIONER

## 2024-12-03 RX ORDER — ESCITALOPRAM OXALATE 20 MG/1
20 TABLET ORAL DAILY
Qty: 90 TABLET | Refills: 0 | Status: SHIPPED | OUTPATIENT
Start: 2024-12-03

## 2024-12-03 ASSESSMENT — PAIN SCALES - GENERAL: PAINLEVEL_OUTOF10: MILD PAIN (2)

## 2024-12-03 NOTE — PROGRESS NOTES
"    Angel Menendez is a 79 year old, presenting for the following health issues:  Musculoskeletal Problem and Pain (Breast Pain - Both sides, Double Mastectomy in 2000)        12/3/2024     3:32 PM   Additional Questions   Roomed by  LPN     Musculoskeletal Problem  This is a new problem. The current episode started more than 1 month ago. The problem occurs intermittently. The problem has been gradually worsening. Associated symptoms comments: Breast Discomfort.  Double Mastectomy. Nothing aggravates the symptoms. The treatment provided no relief.   History of Present Illness       Reason for visit:  Breast PainShe consumes 0 sweetened beverage(s) daily.She exercises with enough effort to increase her heart rate 9 or less minutes per day.  She exercises with enough effort to increase her heart rate 3 or less days per week.   She is taking medications regularly.     Intermittent bilateral breast pain x 1 year.  Hx bilateral mastectomy.  Aching pain, gradually worsening.  No apparent aggravating or alleviating factors.        Review of Systems  Constitutional, neuro, ENT, endocrine, pulmonary, cardiac, gastrointestinal, genitourinary, musculoskeletal, integument and psychiatric systems are negative, except as otherwise noted.  Left hip pain- scheduled for hip replacement surgery.       Objective    /64 (BP Location: Right arm, Patient Position: Sitting, Cuff Size: Adult Regular)   Pulse 64   Temp 97.6  F (36.4  C) (Oral)   Resp 14   Ht 1.575 m (5' 2\")   Wt 73.5 kg (162 lb 1.6 oz)   LMP 09/24/1990   SpO2 97%   Breastfeeding No   BMI 29.65 kg/m    Body mass index is 29.65 kg/m .  Physical Exam   GENERAL: alert and no distress  RESP: lungs clear  BREAST:  bilateral reconstruction with implants- right breast tenderness to palpation  MS: gait steady with walker  SKIN: no suspicious lesions or rashes  PSYCH: mentation appears normal, affect normal/bright    A/P:  1. Pain of both breasts (Primary)  - CK " total; Future  - CBC with platelets and differential; Future  - Basic metabolic panel; Future  - MR Breast Bilateral without Contrast; Future  - CK total  - CBC with platelets and differential  - Basic metabolic panel    2. HX: breast cancer  - MR Breast Bilateral without Contrast; Future    3. Stage 3 chronic kidney disease, unspecified whether stage 3a or 3b CKD (H)  - Basic metabolic panel; Future  - Basic metabolic panel          Signed Electronically by: Gemma Caicedo CNP

## 2024-12-04 ENCOUNTER — TELEPHONE (OUTPATIENT)
Dept: FAMILY MEDICINE | Facility: CLINIC | Age: 79
End: 2024-12-04
Payer: COMMERCIAL

## 2024-12-04 DIAGNOSIS — D64.9 ANEMIA, UNSPECIFIED TYPE: Primary | ICD-10-CM

## 2024-12-04 LAB
ANION GAP SERPL CALCULATED.3IONS-SCNC: 10 MMOL/L (ref 7–15)
BUN SERPL-MCNC: 30.6 MG/DL (ref 8–23)
CALCIUM SERPL-MCNC: 9 MG/DL (ref 8.8–10.4)
CHLORIDE SERPL-SCNC: 107 MMOL/L (ref 98–107)
CK SERPL-CCNC: 54 U/L (ref 26–192)
CREAT SERPL-MCNC: 1.71 MG/DL (ref 0.51–0.95)
EGFRCR SERPLBLD CKD-EPI 2021: 30 ML/MIN/1.73M2
FERRITIN SERPL-MCNC: 34 NG/ML (ref 11–328)
GLUCOSE SERPL-MCNC: 78 MG/DL (ref 70–99)
HCO3 SERPL-SCNC: 24 MMOL/L (ref 22–29)
POTASSIUM SERPL-SCNC: 4.8 MMOL/L (ref 3.4–5.3)
SODIUM SERPL-SCNC: 141 MMOL/L (ref 135–145)
VIT B12 SERPL-MCNC: 1415 PG/ML (ref 232–1245)

## 2024-12-04 NOTE — TELEPHONE ENCOUNTER
----- Message from Gemma Caicedo sent at 12/4/2024  7:31 AM CST -----  Please call patient- creatinine continues to increase- recommend consult with nephrology for further evaluation of chronic kidney disease.  Hemoglobin is a little low but stable- recheck in a couple months.

## 2024-12-04 NOTE — TELEPHONE ENCOUNTER
Left message to return call. If patient calls back, please route to [facility].     TCs, please send to RNs.    Almas Hinkle RN  [facility]

## 2024-12-04 NOTE — TELEPHONE ENCOUNTER
Spoke to patient and relayed message from provider.  Patient verbalized understanding and agrees with plan. Scheduled labs for early March.     YENI CarrilloN RN  [facility]

## 2024-12-10 ENCOUNTER — TELEPHONE (OUTPATIENT)
Dept: NURSING | Facility: CLINIC | Age: 79
End: 2024-12-10
Payer: COMMERCIAL

## 2024-12-10 NOTE — TELEPHONE ENCOUNTER
Spoke to patient and relayed message from provider.  Patient verbalized understanding and agrees with plan.    YENI CarrilloN RN  [facility]

## 2024-12-10 NOTE — TELEPHONE ENCOUNTER
----- Message from Gemma Caicedo sent at 12/8/2024  5:55 PM CST -----  Patient hasn't viewed [employer] results message- please call with results and recommendations.      Your B12 level is a little high.  You can decrease your supplement by about 1/3 (take 5 days/week).  Recheck in a few months.     Patient did have Myla-Y surgery in 2000 so wondering if this has any impact on it. Please advise.    YENI CarrilloN RN  [facility]

## 2024-12-18 ENCOUNTER — LAB (OUTPATIENT)
Dept: INFUSION THERAPY | Facility: CLINIC | Age: 79
End: 2024-12-18
Attending: INTERNAL MEDICINE
Payer: COMMERCIAL

## 2024-12-18 ENCOUNTER — ONCOLOGY VISIT (OUTPATIENT)
Dept: ONCOLOGY | Facility: CLINIC | Age: 79
End: 2024-12-18
Attending: INTERNAL MEDICINE
Payer: COMMERCIAL

## 2024-12-18 VITALS
SYSTOLIC BLOOD PRESSURE: 126 MMHG | TEMPERATURE: 98.4 F | HEART RATE: 59 BPM | WEIGHT: 160.6 LBS | DIASTOLIC BLOOD PRESSURE: 68 MMHG | RESPIRATION RATE: 16 BRPM | BODY MASS INDEX: 29.37 KG/M2 | OXYGEN SATURATION: 97 %

## 2024-12-18 DIAGNOSIS — Z98.84 HISTORY OF GASTRIC BYPASS: ICD-10-CM

## 2024-12-18 LAB
FERRITIN SERPL-MCNC: 38 NG/ML (ref 11–328)
IRON BINDING CAPACITY (ROCHE): 290 UG/DL (ref 240–430)
IRON SATN MFR SERPL: 13 % (ref 15–46)
IRON SERPL-MCNC: 37 UG/DL (ref 37–145)

## 2024-12-18 PROCEDURE — 99417 PROLNG OP E/M EACH 15 MIN: CPT | Performed by: INTERNAL MEDICINE

## 2024-12-18 PROCEDURE — 83550 IRON BINDING TEST: CPT | Performed by: INTERNAL MEDICINE

## 2024-12-18 PROCEDURE — G2211 COMPLEX E/M VISIT ADD ON: HCPCS | Performed by: INTERNAL MEDICINE

## 2024-12-18 PROCEDURE — 99215 OFFICE O/P EST HI 40 MIN: CPT | Performed by: INTERNAL MEDICINE

## 2024-12-18 PROCEDURE — G0463 HOSPITAL OUTPT CLINIC VISIT: HCPCS | Performed by: INTERNAL MEDICINE

## 2024-12-18 PROCEDURE — 82728 ASSAY OF FERRITIN: CPT | Performed by: INTERNAL MEDICINE

## 2024-12-18 PROCEDURE — 36415 COLL VENOUS BLD VENIPUNCTURE: CPT | Performed by: INTERNAL MEDICINE

## 2024-12-18 ASSESSMENT — PAIN SCALES - GENERAL: PAINLEVEL_OUTOF10: SEVERE PAIN (7)

## 2024-12-18 NOTE — PROGRESS NOTES
"Oncology Rooming Note    December 18, 2024 9:47 AM   Gemma Cowart is a 79 year old female who presents for:    Chief Complaint   Patient presents with    Oncology Clinic Visit     Initial Vitals: /68   Pulse 59   Temp 98.4  F (36.9  C) (Oral)   Resp 16   Wt 72.8 kg (160 lb 9.6 oz)   LMP 09/24/1990   SpO2 97%   BMI 29.37 kg/m   Estimated body mass index is 29.37 kg/m  as calculated from the following:    Height as of 12/3/24: 1.575 m (5' 2\").    Weight as of this encounter: 72.8 kg (160 lb 9.6 oz). Body surface area is 1.78 meters squared.  Severe Pain (7) Comment: Data Unavailable   Patient's last menstrual period was 09/24/1990.  Allergies reviewed: Yes  Medications reviewed: Yes    Medications: Medication refills not needed today.  Pharmacy name entered into anchor.travel: [facility] [address] [place] [address] AT [facility] [address]    Frailty Screening:   Is the patient here for a new oncology consult visit in cancer care? 2. No      Clinical concerns: Pain in left and right breasts - comes and goes x 6-8 months  Dr. Bowen  was notified.      Shari J. Schoenberger, [facility]            "

## 2024-12-18 NOTE — LETTER
"12/18/2024      Gemma Cowart  [address]  [facility] [address]      Dear Colleague,    Thank you for referring your patient, Gemma Cowart, to the [facility]. Please see a copy of my visit note below.    Oncology Rooming Note    December 18, 2024 9:47 AM   Gemma Cowart is a 79 year old female who presents for:    Chief Complaint   Patient presents with     Oncology Clinic Visit     Initial Vitals: /68   Pulse 59   Temp 98.4  F (36.9  C) (Oral)   Resp 16   Wt 72.8 kg (160 lb 9.6 oz)   LMP 09/24/1990   SpO2 97%   BMI 29.37 kg/m   Estimated body mass index is 29.37 kg/m  as calculated from the following:    Height as of 12/3/24: 1.575 m (5' 2\").    Weight as of this encounter: 72.8 kg (160 lb 9.6 oz). Body surface area is 1.78 meters squared.  Severe Pain (7) Comment: Data Unavailable   Patient's last menstrual period was 09/24/1990.  Allergies reviewed: Yes  Medications reviewed: Yes    Medications: Medication refills not needed today.  Pharmacy name entered into reBounces: [id number] - [place], [place] - [address] [place] [address] [address]    Frailty Screening:   Is the patient here for a new oncology consult visit in cancer care? 2. No      Clinical concerns: Pain in left and right breasts - comes and goes x 6-8 months  Dr. Bowen  was notified.      Shari J. Schoenberger, [facility]              Oncology/Hematology Visit Note  Dec 18, 2024          Oncologic History:  Diagnosed with left breast cancer screening mammogram 8/2012. Status post bilateral mastectomies, pathology with left breast 2 cm, grade 1, IDC, ER/[place]+ HER-2/gibran-. [id number]. She under bilateral mastectomies reconstructive surgery done 03/2013 by Dr. Wall. She received Femara 10/2012 through Fall 2017. It was temporarily held 2016 due to memory issues. She was not interested in continuing beyond 5 years due to hair loss, hot flashes, and bone health.     She has a history of memory " "issues, headaches, and dysarthria for which she follows with neurology. An MRI brain for headaches 7/2022 showed patchy marrow signal abnormality. She had a follow-up PET-CT 8/2022 with no evidence of metabolically active disease including of skull. The images were reviewed with radiology who though the skull lesions may represent bone islands considering negative PET. Repeat MRI brain 11/2022 was stable    Interval History:  Gemma returns for follow up today. MRI breast on Friday she states that she noticed some \"poking pain\" in the left breast and it is getting larger.  Her ferritin is 34 she has a history of gastric bypass.  She has total hip replacement scheduled for next month at [facility]    Review of Systems:  A complete review of systems was negative except as noted in the HPI.     Past medical, Surgical, and social history:  Reviewed    Physical Examination:          Status post bilateral mastectomies her left breast is greater than the right breast.  No palpable masses on exam    Laboratory Data:  CBC, CMP, tumor markers reviewed.       Assessment and Plan:  Gemma Cowart is a 77 year old female with:    1. Left breast cancer s/p double mastectomies, ER/[place]+, her-2- s/p  5 yrs of Femara till 2017.  - Continue annual oncology follow-ups   -mRI breast Friday we will follow up    2. Abnormal MRI findings  3. Weight Loss     - Patient has had intended weight loss; she has no B symptoms or concerns exam finding indicative of metastatic or hematologic disorder  - Discussed with patient; will forgo further evaluation at this time with no convincing findings; she is aware to notify us with any changes    4. History of gastric bypass surgery   -ferritin dropping hgb lower iv iron schedule     6. 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 Multiple Comorbidites  Follow-up with PCP and specialists for CKD, osteopenia, hyperparathryoid, COPD, DMII, HTN, HLD, psoriasis, CKD stage 3,     60 minutes spent on the date of the encounter doing chart review, review of test results, interpretation of tests, patient visit, documentation, and discussion with other provider(s)     Alan Bowen MD   [facility]   [phone]      Again, thank you for allowing me to participate in the care of your patient.        Sincerely,        Alan Bowen MD

## 2024-12-18 NOTE — PROGRESS NOTES
"Oncology/Hematology Visit Note  Dec 18, 2024          Oncologic History:  Diagnosed with left breast cancer screening mammogram 8/2012. Status post bilateral mastectomies, pathology with left breast 2 cm, grade 1, IDC, ER/[place]+ HER-2/gibran-. [id number]. She under bilateral mastectomies reconstructive surgery done 03/2013 by Dr. Wall. She received Femara 10/2012 through Fall 2017. It was temporarily held 2016 due to memory issues. She was not interested in continuing beyond 5 years due to hair loss, hot flashes, and bone health.     She has a history of memory issues, headaches, and dysarthria for which she follows with neurology. An MRI brain for headaches 7/2022 showed patchy marrow signal abnormality. She had a follow-up PET-CT 8/2022 with no evidence of metabolically active disease including of skull. The images were reviewed with radiology who though the skull lesions may represent bone islands considering negative PET. Repeat MRI brain 11/2022 was stable    Interval History:  Gemma returns for follow up today. MRI breast on Friday she states that she noticed some \"poking pain\" in the left breast and it is getting larger.  Her ferritin is 34 she has a history of gastric bypass.  She has total hip replacement scheduled for next month at [facility]    Review of Systems:  A complete review of systems was negative except as noted in the HPI.     Past medical, Surgical, and social history:  Reviewed    Physical Examination:          Status post bilateral mastectomies her left breast is greater than the right breast.  No palpable masses on exam    Laboratory Data:  CBC, CMP, tumor markers reviewed.       Assessment and Plan:  Gemma Cowart is a 77 year old female with:    1. Left breast cancer s/p double mastectomies, ER/[place]+, her-2- s/p  5 yrs of Femara till 2017.  - Continue annual oncology follow-ups   -mRI breast Friday we will follow up    2. Abnormal MRI findings  3. Weight Loss     - Patient has had intended weight " loss; she has no B symptoms or concerns exam finding indicative of metastatic or hematologic disorder  - Discussed with patient; will forgo further evaluation at this time with no convincing findings; she is aware to notify us with any changes    4. History of gastric bypass surgery   -ferritin dropping hgb lower iv iron schedule     6. 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 Multiple Comorbidites  Follow-up with PCP and specialists for CKD, osteopenia, hyperparathryoid, COPD, DMII, HTN, HLD, psoriasis, CKD stage 3,     60 minutes spent on the date of the encounter doing chart review, review of test results, interpretation of tests, patient visit, documentation, and discussion with other provider(s)     Alan Bowen MD   [facility]   [phone]

## 2024-12-20 ENCOUNTER — HOSPITAL ENCOUNTER (OUTPATIENT)
Dept: MRI IMAGING | Facility: HOSPITAL | Age: 79
Discharge: HOME | End: 2024-12-20
Attending: NURSE PRACTITIONER | Admitting: NURSE PRACTITIONER
Payer: COMMERCIAL

## 2024-12-20 DIAGNOSIS — Z85.3 HX: BREAST CANCER: ICD-10-CM

## 2024-12-20 DIAGNOSIS — N64.4 PAIN OF BOTH BREASTS: ICD-10-CM

## 2024-12-20 PROCEDURE — A9585 GADOBUTROL INJECTION: HCPCS | Performed by: NURSE PRACTITIONER

## 2024-12-20 PROCEDURE — 77049 MRI BREAST C-+ W/CAD BI: CPT

## 2024-12-20 PROCEDURE — 255N000002 HC RX 255 OP 636: Performed by: NURSE PRACTITIONER

## 2024-12-20 RX ORDER — GADOBUTROL 604.72 MG/ML
7.5 INJECTION INTRAVENOUS ONCE
Status: COMPLETED | OUTPATIENT
Start: 2024-12-20 | End: 2024-12-20

## 2024-12-20 RX ADMIN — GADOBUTROL 7.5 ML: 604.72 INJECTION INTRAVENOUS at 21:53

## 2024-12-20 NOTE — H&P (VIEW-ONLY)
Preoperative Evaluation  [facility] [place]  [address]  [address]  [place] [address]  Phone: [phone]  Fax: [phone]  Primary Provider: Arminda Foley MD  Pre-op Performing Provider: Arminda Foley MD  Dec 20, 2024             12/20/2024   Surgical Information   What procedure is being done? Right hip replacement    Facility or [facility] where [facility]    Who is doing the procedure / surgery? Dr. Patrice Richmond    Date of surgery / procedure: 12/31/24    Time of surgery / procedure: 7:30am    Where do you plan to recover after surgery? at home with family        Proxy-reported     Fax number for surgical facility: Note does not need to be faxed, will be available electronically in Epic. [facility]     Assessment & Plan     The proposed surgical procedure is considered INTERMEDIATE risk.    (Z01.818) Preop general physical exam  (primary encounter diagnosis)  Comment: cleared  Plan: labs done within the past 30 days    (M25.552) Hip pain, left  Comment: ok to continue tylenol  Plan:     (Z86.718) Personal history of DVT (deep vein thrombosis)  Comment: stop eliquis 12/27/24  Plan:     (N18.30) Stage 3 chronic kidney disease, unspecified whether stage 3a or 3b CKD (H)  Comment: patient should establish with nephrology (not urgent)  Plan: Adult Nephrology  Referral            (J44.9) Chronic obstructive pulmonary disease, unspecified COPD type (H)  Comment: stable  Plan: continue spiriva    (E11.69) Type 2 diabetes mellitus with other specified complication, without long-term current use of insulin (H)  Comment: diet controlled  Plan: normal A1C    (D50.9) Iron deficiency anemia, unspecified iron deficiency anemia type  Comment: patient follows with hematology - they were trying to get her an iron transfusion before surgery  Plan:     (R01.1) Heart murmur  Comment: known - echo last year trace leakage in all valves  Plan:  stable      The longitudinal plan of care for the diagnosis(es)/condition(s) as documented were addressed during this visit. Due to the added complexity in care, I will continue to support Gemma in the subsequent management and with ongoing continuity of care.      Risks and Recommendations  The patient has the following additional risks and recommendations for perioperative complications:  Pulmonary:    - Incentive spirometry post-op    Preoperative Medication Instructions  Antiplatelet or Anticoagulation Medication Instructions   - apixaban (Eliquis) - hold 3 days prior to surgery (last dose on 12/27/24)    Additional Medication Instructions  Take all scheduled medications on the day of surgery EXCEPT for modifications listed below:   - Herbal medications and vitamins: DO NOT TAKE 14 days prior to surgery.    Recommendation  Approval given to proceed with proposed procedure, without further diagnostic evaluation.    Subjective   Gemma is a 79 year old, presenting for the following:  Physical (And pre-op and follow up)          12/20/2024     3:12 PM   Additional Questions   Roomed by Mary Carmen Garg   Accompanied by n/a         12/20/2024     3:12 PM   Patient Reported Additional Medications   Patient reports taking the following new medications no     HPI related to upcoming procedure: Patient with right hip pain and osteoarthritis.         12/20/2024   Pre-Op Questionnaire   Have you ever had a heart attack or stroke? No    Have you ever had surgery on your heart or blood vessels, such as a stent placement, a coronary artery bypass, or surgery on an artery in your head, neck, heart, or legs? (!) YES ablation for tachycardia    Do you have chest pain with activity? No    Do you have a history of heart failure? No    Do you currently have a cold, bronchitis or symptoms of other infection? No    Do you have a cough, shortness of breath, or wheezing? (!) YES COPD    Do you or anyone in your family have previous history  of blood clots? (!) YES Patient with h/o 4 DVTs - on Eliquis    Do you or does anyone in your family have a serious bleeding problem such as prolonged bleeding following surgeries or cuts? No    Have you ever had problems with anemia or been told to take iron pills? (!) YES needed iron transfusion a few years ago    Have you had any abnormal blood loss such as black, tarry or bloody stools, or abnormal vaginal bleeding? No    Have you ever had a blood transfusion? (!) YES    Have you ever had a transfusion reaction? No    Are you willing to have a blood transfusion if it is medically needed before, during, or after your surgery? Yes    Have you or any of your relatives ever had problems with anesthesia? No    Do you have sleep apnea, excessive snoring or daytime drowsiness? (!) YES Daytime drowsiness - thought due to low iron   Do you have a CPAP machine? (!) NO     Do you have any artifical heart valves or other implanted medical devices like a pacemaker, defibrillator, or continuous glucose monitor? No    Do you have artificial joints? No    Are you allergic to latex? No        Proxy-reported     Health Care Directive  Patient does not have a Health Care Directive: Discussed advance care planning with patient; information given to patient to review.    Preoperative Review of    reviewed - controlled substances prescribed by other outside provider(s).      Patient Active Problem List    Diagnosis Date Noted    Dysphonia 12/08/2023     Priority: Medium    History of lobectomy of thyroid 12/08/2023     Priority: Medium    Problem with voice production 12/08/2023     Priority: Medium    Elevated parathyroid hormone 04/12/2022     Priority: Medium     Asked her to follow-up with endo May 23, 2023. Not taking her calcium because someone on youtube told her not to      Iron deficiency anemia 10/27/2021     Priority: Medium    COPD (chronic obstructive pulmonary disease) (H) 04/21/2021     Priority: Medium     PFTs  were normal other than higher residual volume (seen in COPD vs asthma) but still symptomatic so referred to pulm May 23, 2023    Stress test negative fall 2022      CKD (chronic kidney disease) stage 3, GFR 30-59 ml/min (H) 10/16/2016     Priority: Medium    Long term current use of anticoagulant-hx multiple DVTs 05/04/2016     Priority: Medium    Type 2 diabetes mellitus with other specified complication (H) 10/09/2015     Priority: Medium     Asked her to start a statin May 23, 2023. Waiting to hear back      Anxiety 04/23/2013     Priority: Medium      Past Medical History:   Diagnosis Date    Anemia     iron deficiency anemia    Antiplatelet or antithrombotic long-term use     Anxiety     Arrhythmia     Breast cancer (H) 2012    Bilateral Masectomies 9/2012    Complications of gastric bypass surgery     COPD (chronic obstructive pulmonary disease) (H) 04/21/2021    Diabetes mellitus (H)     diet controlled    DVT (deep venous thrombosis) (H)     Eczema     Fracture of left knee region     patella - vertical    History of blood transfusion     History of kidney stones     Iron deficiency anemia, unspecified iron deficiency anemia type 10/27/2021    Noninfectious ileitis     Obesity     Palpitation     with no treatment    Polyneuropathy     Pruritus     generalized    Renal disease     Rosacea     SVT (supraventricular tachycardia) (H)     s/p ablation 5/28/2015 at [facility] for left lateral AP    Thrombosis     Thyroid disease     Vitamin B12 deficiency 02/01/2013     Past Surgical History:   Procedure Laterality Date    ABDOMEN SURGERY  2000    gastric bypass 2000    COLONOSCOPY  10/11/2012    Procedure: COLONOSCOPY;  colonoscopy;  Surgeon: Gela Cleary MD;  Location:  GI    COLONOSCOPY N/A 10/6/2017    Procedure: COLONOSCOPY;;  Surgeon: Shashank Ortiz MD;  Location: [facility]    COLONOSCOPY N/A 3/7/2022    Procedure: COLONOSCOPY;  Surgeon: Andrea Daniel MD;  Location:  GI     CYSTOSCOPY, SLING TRANSVAGINAL N/A 11/5/2024    Procedure: cystoscopy, mid urethral sling placement;  Surgeon: Shanique Paniagua MD;  Location: [facility] OR    DENTAL SURGERY  5/2007    all teeth removed - dentures    ENT SURGERY      all teeth pulled    ESOPHAGOSCOPY, GASTROSCOPY, DUODENOSCOPY (EGD), COMBINED N/A 10/6/2017    Procedure: COMBINED ESOPHAGOSCOPY, GASTROSCOPY, DUODENOSCOPY (EGD);  ESOPHAGOSCOPY, GASTROSCOPY, DUODENOSCOPY (EGD) & Colonoscopy *Needs INR on arrival;  Surgeon: Shashank Ortiz MD;  Location:  GI    ESOPHAGOSCOPY, GASTROSCOPY, DUODENOSCOPY (EGD), COMBINED N/A 9/6/2024    Procedure: Esophagoscopy, gastroscopy, duodenoscopy (EGD), combined;  Surgeon: Andrea Daniel MD;  Location:  GI    GYN SURGERY  1/10/75    tubal ligation     H ABLATION SVT  5/28/2015    stopped metoprolol    HERNIA REPAIR  2005    HYSTERECTOMY, ENDOSCOPIC, VAGINAL TRANSLUMINAL NATURAL ORIFICE APPROACH Bilateral 11/5/2024    Procedure: vaginal natural orifice transluminal endoscopic surgery vaginal hysterectomy, bilateral salpingo-oophorectomy, and posterior repair;  Surgeon: Shanique Paniagua MD;  Location:  OR    IMPLANT FILTER INFERIOR VENA CAVA  10/8/2012    taken out  1/4/12    INSERT TISSUE EXPANDER BREAST BILATERAL  9/5/2012    Procedure: INSERT TISSUE EXPANDER BREAST BILATERAL;;  Surgeon: Orlando Wall MD;  Location:  OR    MASTECTOMY SIMPLE, SENTINEL NODE, COMBINED  9/5/2012    Procedure: COMBINED MASTECTOMY SIMPLE, SENTINEL NODE;  BILATERAL MASTECTOMY WITH LEFT AXILLARY SENTINEL LYMPH NODE BIOPSY, BILATERAL TISSUE EXPANDER       MASTECTOMY, BILATERAL      PANNICULECTOMY N/A 4/5/2018    Procedure: PANNICULECTOMY;  PANNICULECTOMY ;  Surgeon: Orlando Wall MD;  Location: [facility] OR    RECONSTRUCT BREAST BILATERAL, IMPLANT PROSTHESIS BILATERAL, COMBINED  5/22/2013    Procedure: COMBINED RECONSTRUCT BREAST BILATERAL, IMPLANT PROSTHESIS BILATERAL;  BILATERAL SECOND STAGE BREAST RECONSTRUCTION WITH  SILICONE GEL IMPLANTS AND LIPOSUCTION;  Surgeon: Orlando Wall MD;  Location:  [facility]    RECONSTRUCT NIPPLE BILATERAL  2013    Procedure: RECONSTRUCT NIPPLE BILATERAL;  BILATERAL NIPPLE AREOLAR RECONSTRUCTION;  Surgeon: Orlando Wall MD;  Location:  [facility]    THYROIDECTOMY N/A 2023    Procedure: Left Thyroid Lobectomy and Autotransplant of Left Superior Parathyroid;  Surgeon: Haley Garcia MD;  Location: [facility] OR    ZZHC BREAST RECONSTRUC W OTHR TECHNIQ  2013     Current Outpatient Medications   Medication Sig Dispense Refill    acetaminophen (TYLENOL) 500 MG tablet Take 500-1,000 mg by mouth 2 times daily as needed for mild pain.      apixaban ANTICOAGULANT (ELIQUIS ANTICOAGULANT) 5 MG tablet Take 1 tablet (5 mg) by mouth 2 times daily 180 tablet 3    blood glucose (NO BRAND SPECIFIED) lancets standard Use to test blood sugar 2 times daily or as directed. 1 each 4    blood glucose (ONETOUCH VERIO IQ) test strip USE TO TEST TWICE DAILY OR AS DIRECTED 200 strip 2    Cholecalciferol (VITAMIN D) 50 MCG (2000 UT) CAPS Take 1 capsule by mouth daily. 90 capsule 3    clobetasol propionate (TEMOVATE) 0.05 % external cream Apply twice daily as needed 60 g 3    escitalopram (LEXAPRO) 20 MG tablet TAKE 1 TABLET(20 MG) BY MOUTH DAILY 90 tablet 0    tiotropium (SPIRIVA RESPIMAT) 2.5 MCG/ACT inhaler Inhale 2 puffs into the lungs daily 12 g 3    vitamin B-12 (CYANOCOBALAMIN) 2500 MCG sublingual tablet Take 1 tablet (2,500 mcg) by mouth daily. 90 tablet 3       Allergies   Allergen Reactions    Bacitracin Hives    Nickel Swelling    Dust Mites Other (See Comments)     sneezing        Social History     Tobacco Use    Smoking status: Former     Current packs/day: 0.00     Average packs/day: 1.5 packs/day for 25.0 years (37.5 ttl pk-yrs)     Types: Cigarettes     Start date: 10/21/1961     Quit date: 10/21/1986     Years since quittin.1     Passive exposure: Current    Smokeless tobacco: Never     "Tobacco comments:     quit 1985   Substance Use Topics    Alcohol use: Not Currently       History   Drug Use No               Objective    /79 (BP Location: Right arm, Patient Position: Sitting, Cuff Size: Adult Regular)   Pulse 62   Temp 97.8  F (36.6  C) (Temporal)   Resp 12   Ht 1.588 m (5' 2.5\")   Wt 74.5 kg (164 lb 4.8 oz)   LMP 09/24/1990   SpO2 98%   BMI 29.57 kg/m     Estimated body mass index is 29.57 kg/m  as calculated from the following:    Height as of this encounter: 1.588 m (5' 2.5\").    Weight as of this encounter: 74.5 kg (164 lb 4.8 oz).  Physical Exam  GENERAL: alert and no distress  EYES: Eyes grossly normal to inspection, PERRL and conjunctivae and sclerae normal  HENT: ear canals and TM's normal, nose and mouth without ulcers or lesions  NECK: no adenopathy, no asymmetry, masses, or scars  RESP: lungs clear to auscultation - no rales, rhonchi or wheezes  CV: 2/6 FORD  ABDOMEN: soft, nontender, no hepatosplenomegaly, no masses and bowel sounds normal  MS: no gross musculoskeletal defects noted, no edema  SKIN: no suspicious lesions or rashes  NEURO: Normal strength and tone, mentation intact and speech normal  PSYCH: mentation appears normal, affect normal/bright    Recent Labs   Lab Test 12/03/24  1603 10/08/24  1231 09/30/24  1123 06/14/24  1156   HGB 11.0* 11.1*  --   --     173  --   --      --  143  --    POTASSIUM 4.8  --  5.1  --    CR 1.71*  --  1.62*  --    A1C  --  5.6  --  5.9*        Diagnostics  Recent Results (from the past 720 hours)   CK total    Collection Time: 12/03/24  4:03 PM   Result Value Ref Range    CK 54 26 - 192 U/L   Basic metabolic panel    Collection Time: 12/03/24  4:03 PM   Result Value Ref Range    Sodium 141 135 - 145 mmol/L    Potassium 4.8 3.4 - 5.3 mmol/L    Chloride 107 98 - 107 mmol/L    Carbon Dioxide (CO2) 24 22 - 29 mmol/L    Anion Gap 10 7 - 15 mmol/L    Urea Nitrogen 30.6 (H) 8.0 - 23.0 mg/dL    Creatinine 1.71 (H) 0.51 - " 0.95 mg/dL    GFR Estimate 30 (L) >60 mL/min/1.73m2    Calcium 9.0 8.8 - 10.4 mg/dL    Glucose 78 70 - 99 mg/dL   CBC with platelets and differential    Collection Time: 12/03/24  4:03 PM   Result Value Ref Range    WBC Count 5.3 4.0 - 11.0 10e3/uL    RBC Count 4.24 3.80 - 5.20 10e6/uL    Hemoglobin 11.0 (L) 11.7 - 15.7 g/dL    Hematocrit 35.4 35.0 - 47.0 %    MCV 84 78 - 100 fL    MCH 25.9 (L) 26.5 - 33.0 pg    MCHC 31.1 (L) 31.5 - 36.5 g/dL    RDW 14.0 10.0 - 15.0 %    Platelet Count 245 150 - 450 10e3/uL    % Neutrophils 70 %    % Lymphocytes 18 %    % Monocytes 10 %    % Eosinophils 2 %    % Basophils 1 %    % Immature Granulocytes 0 %    Absolute Neutrophils 3.7 1.6 - 8.3 10e3/uL    Absolute Lymphocytes 0.9 0.8 - 5.3 10e3/uL    Absolute Monocytes 0.5 0.0 - 1.3 10e3/uL    Absolute Eosinophils 0.1 0.0 - 0.7 10e3/uL    Absolute Basophils 0.0 0.0 - 0.2 10e3/uL    Absolute Immature Granulocytes 0.0 <=0.4 10e3/uL   Ferritin    Collection Time: 12/03/24  4:03 PM   Result Value Ref Range    Ferritin 34 11 - 328 ng/mL   Vitamin B12    Collection Time: 12/03/24  4:03 PM   Result Value Ref Range    Vitamin B12 1,415 (H) 232 - 1,245 pg/mL   Ferritin    Collection Time: 12/18/24 10:53 AM   Result Value Ref Range    Ferritin 38 11 - 328 ng/mL   Iron and iron binding capacity    Collection Time: 12/18/24 10:53 AM   Result Value Ref Range    Iron 37 37 - 145 ug/dL    Iron Binding Capacity 290 240 - 430 ug/dL    Iron Sat Index 13 (L) 15 - 46 %      No EKG required, no history of coronary heart disease, significant arrhythmia, peripheral arterial disease or other structural heart disease.    Revised Cardiac Risk Index (RCRI)  The patient has the following serious cardiovascular risks for perioperative complications:   - No serious cardiac risks = 0 points     RCRI Interpretation: 0 points: Class I (very low risk - 0.4% complication rate)         Signed Electronically by: Arminda Foley MD  A copy of this evaluation report  is provided to the requesting physician.

## 2024-12-30 ENCOUNTER — HOSPITAL ENCOUNTER (OUTPATIENT)
Dept: ULTRASOUND IMAGING | Facility: CLINIC | Age: 79
Discharge: HOME | End: 2024-12-30
Attending: INTERNAL MEDICINE | Admitting: INTERNAL MEDICINE
Payer: COMMERCIAL

## 2024-12-30 ENCOUNTER — ANESTHESIA EVENT (OUTPATIENT)
Dept: SURGERY | Facility: CLINIC | Age: 79
End: 2024-12-30
Payer: COMMERCIAL

## 2024-12-30 DIAGNOSIS — C73 MALIGNANT NEOPLASM OF THYROID GLAND (H): ICD-10-CM

## 2024-12-30 PROCEDURE — 76536 US EXAM OF HEAD AND NECK: CPT

## 2024-12-30 NOTE — OR NURSING
OK to proceed with Hgb 11.0 per Srujit per teams message, no need for Fe infusion prior to OR (pt was cleared by PCP at pre op) and surgeon aware.

## 2024-12-30 NOTE — PROVIDER NOTIFICATION
I am evaluating this patient for upcoming Right Direct Anterior Total Hip Arthroplasty with Dr. Richmond at [facility] on 12/31/24:    - Notified by preop RN that patient was cleared by PCP for surgery at preop exam but had hemoglobin of 11.0. Patient does have history of chronic iron deficiency anemia and chronic kidney disease-stage 3a. Follows with [facility] Hematology/Oncology and they mentioned in their recent visit note from 12/18/24 about possibly scheduling patient for an IV iron infusion prior to hip surgery. However, patient states that this iron infusion was never scheduled. Notified Dr. Richmond of information above and he is OK proceeding. Full Treatment Plan note to follow. Call me below with any questions.       ANTON Beasley, CNP   Advanced Practice Nurse Navigator- Orthopedics  [facility]   Office Phone: [phone]  Direct Fax: [phone]

## 2024-12-31 ENCOUNTER — APPOINTMENT (OUTPATIENT)
Dept: RADIOLOGY | Facility: CLINIC | Age: 79
End: 2024-12-31
Attending: ORTHOPAEDIC SURGERY
Payer: COMMERCIAL

## 2024-12-31 ENCOUNTER — ANESTHESIA (OUTPATIENT)
Dept: SURGERY | Facility: CLINIC | Age: 79
End: 2024-12-31
Payer: COMMERCIAL

## 2024-12-31 ENCOUNTER — APPOINTMENT (OUTPATIENT)
Dept: PHYSICAL THERAPY | Facility: CLINIC | Age: 79
End: 2024-12-31
Attending: ORTHOPAEDIC SURGERY
Payer: COMMERCIAL

## 2024-12-31 ENCOUNTER — HOSPITAL ENCOUNTER (OUTPATIENT)
Facility: CLINIC | Age: 79
Discharge: HOME | End: 2025-01-01
Attending: ORTHOPAEDIC SURGERY | Admitting: ORTHOPAEDIC SURGERY
Payer: COMMERCIAL

## 2024-12-31 ENCOUNTER — PATIENT OUTREACH (OUTPATIENT)
Dept: GERIATRIC MEDICINE | Facility: CLINIC | Age: 79
End: 2024-12-31

## 2024-12-31 DIAGNOSIS — Z96.641 AFTERCARE FOLLOWING RIGHT HIP JOINT REPLACEMENT SURGERY: Primary | ICD-10-CM

## 2024-12-31 DIAGNOSIS — Z47.1 AFTERCARE FOLLOWING RIGHT HIP JOINT REPLACEMENT SURGERY: Primary | ICD-10-CM

## 2024-12-31 LAB
ABO + RH BLD: ABNORMAL
ANION GAP SERPL CALCULATED.3IONS-SCNC: 11 MMOL/L (ref 7–15)
BLD GP AB INVEST PLASRBC-IMP: NORMAL
BLD GP AB SCN SERPL QL: POSITIVE
BUN SERPL-MCNC: 24.7 MG/DL (ref 8–23)
CALCIUM SERPL-MCNC: 9.3 MG/DL (ref 8.8–10.4)
CHLORIDE SERPL-SCNC: 108 MMOL/L (ref 98–107)
CREAT SERPL-MCNC: 1.55 MG/DL (ref 0.51–0.95)
E AG RBC QL: NEGATIVE
EGFRCR SERPLBLD CKD-EPI 2021: 34 ML/MIN/1.73M2
GLUCOSE BLDC GLUCOMTR-MCNC: 101 MG/DL (ref 70–99)
GLUCOSE BLDC GLUCOMTR-MCNC: 236 MG/DL (ref 70–99)
GLUCOSE SERPL-MCNC: 118 MG/DL (ref 70–99)
HCO3 SERPL-SCNC: 23 MMOL/L (ref 22–29)
LITTLE C AG RBC QL: NEGATIVE
POTASSIUM SERPL-SCNC: 4.9 MMOL/L (ref 3.4–5.3)
SODIUM SERPL-SCNC: 142 MMOL/L (ref 135–145)
SPECIMEN EXP DATE BLD: ABNORMAL
SPECIMEN EXP DATE BLD: NORMAL
SPECIMEN EXP DATE BLD: NORMAL

## 2024-12-31 PROCEDURE — 36415 COLL VENOUS BLD VENIPUNCTURE: CPT | Performed by: NURSE PRACTITIONER

## 2024-12-31 PROCEDURE — 86900 BLOOD TYPING SEROLOGIC ABO: CPT | Performed by: PHYSICIAN ASSISTANT

## 2024-12-31 PROCEDURE — 250N000011 HC RX IP 250 OP 636: Performed by: ANESTHESIOLOGY

## 2024-12-31 PROCEDURE — 250N000011 HC RX IP 250 OP 636: Performed by: PHYSICIAN ASSISTANT

## 2024-12-31 PROCEDURE — 360N000084 HC SURGERY LEVEL 4 W/ FLUORO, PER MIN: Performed by: ORTHOPAEDIC SURGERY

## 2024-12-31 PROCEDURE — 97161 PT EVAL LOW COMPLEX 20 MIN: CPT | Mod: GP

## 2024-12-31 PROCEDURE — 80048 BASIC METABOLIC PNL TOTAL CA: CPT | Performed by: NURSE PRACTITIONER

## 2024-12-31 PROCEDURE — 250N000013 HC RX MED GY IP 250 OP 250 PS 637

## 2024-12-31 PROCEDURE — 250N000011 HC RX IP 250 OP 636

## 2024-12-31 PROCEDURE — 258N000003 HC RX IP 258 OP 636

## 2024-12-31 PROCEDURE — 258N000001 HC RX 258: Performed by: ORTHOPAEDIC SURGERY

## 2024-12-31 PROCEDURE — 258N000003 HC RX IP 258 OP 636: Performed by: PHYSICIAN ASSISTANT

## 2024-12-31 PROCEDURE — 999N000141 HC STATISTIC PRE-PROCEDURE NURSING ASSESSMENT: Performed by: ORTHOPAEDIC SURGERY

## 2024-12-31 PROCEDURE — 86905 BLOOD TYPING RBC ANTIGENS: CPT | Performed by: PHYSICIAN ASSISTANT

## 2024-12-31 PROCEDURE — 97116 GAIT TRAINING THERAPY: CPT | Mod: GP

## 2024-12-31 PROCEDURE — 97110 THERAPEUTIC EXERCISES: CPT | Mod: GP

## 2024-12-31 PROCEDURE — 86870 RBC ANTIBODY IDENTIFICATION: CPT | Performed by: PHYSICIAN ASSISTANT

## 2024-12-31 PROCEDURE — 86850 RBC ANTIBODY SCREEN: CPT | Performed by: PHYSICIAN ASSISTANT

## 2024-12-31 PROCEDURE — 999N000182 XR SURGERY CARM FLUORO GREATER THAN 5 MIN

## 2024-12-31 PROCEDURE — 99244 OFF/OP CNSLTJ NEW/EST MOD 40: CPT | Performed by: STUDENT IN AN ORGANIZED HEALTH CARE EDUCATION/TRAINING PROGRAM

## 2024-12-31 PROCEDURE — C1713 ANCHOR/SCREW BN/BN,TIS/BN: HCPCS | Performed by: ORTHOPAEDIC SURGERY

## 2024-12-31 PROCEDURE — 272N000001 HC OR GENERAL SUPPLY STERILE: Performed by: ORTHOPAEDIC SURGERY

## 2024-12-31 PROCEDURE — 250N000013 HC RX MED GY IP 250 OP 250 PS 637: Performed by: PHYSICIAN ASSISTANT

## 2024-12-31 PROCEDURE — 370N000017 HC ANESTHESIA TECHNICAL FEE, PER MIN: Performed by: ORTHOPAEDIC SURGERY

## 2024-12-31 PROCEDURE — 258N000003 HC RX IP 258 OP 636: Performed by: ANESTHESIOLOGY

## 2024-12-31 PROCEDURE — 710N000010 HC RECOVERY PHASE 1, LEVEL 2, PER MIN: Performed by: ORTHOPAEDIC SURGERY

## 2024-12-31 PROCEDURE — C1776 JOINT DEVICE (IMPLANTABLE): HCPCS | Performed by: ORTHOPAEDIC SURGERY

## 2024-12-31 PROCEDURE — 250N000009 HC RX 250

## 2024-12-31 PROCEDURE — 82962 GLUCOSE BLOOD TEST: CPT

## 2024-12-31 DEVICE — PINNACLE GRIPTION ACETABULAR SHELL SECTOR 52MM OD
Type: IMPLANTABLE DEVICE | Site: HIP | Status: FUNCTIONAL
Brand: PINNACLE GRIPTION

## 2024-12-31 DEVICE — PINNACLE CANCELLOUS BONE SCREW 6.5MM X 30MM
Type: IMPLANTABLE DEVICE | Site: HIP | Status: FUNCTIONAL
Brand: PINNACLE

## 2024-12-31 DEVICE — BIOLOX DELTA CERAMIC FEMORAL HEAD +1.5 36MM DIA 12/14 TAPER
Type: IMPLANTABLE DEVICE | Site: HIP | Status: FUNCTIONAL
Brand: BIOLOX DELTA

## 2024-12-31 DEVICE — PINNACLE HIP SOLUTIONS ALTRX POLYETHYLENE ACETABULAR LINER NEUTRAL 36MM ID 52MM OD
Type: IMPLANTABLE DEVICE | Site: HIP | Status: FUNCTIONAL
Brand: PINNACLE ALTRX

## 2024-12-31 DEVICE — ACTIS DUOFIX HIP PROSTHESIS (FEMORAL STEM 12/14 TAPER CEMENTLESS SIZE 5 STD COLLAR)  CE
Type: IMPLANTABLE DEVICE | Site: HIP | Status: FUNCTIONAL
Brand: ACTIS

## 2024-12-31 RX ORDER — HYDROMORPHONE HCL IN WATER/PF 6 MG/30 ML
0.2 PATIENT CONTROLLED ANALGESIA SYRINGE INTRAVENOUS EVERY 5 MIN PRN
Status: DISCONTINUED | OUTPATIENT
Start: 2024-12-31 | End: 2024-12-31 | Stop reason: HOSPADM

## 2024-12-31 RX ORDER — HYDROMORPHONE HCL IN WATER/PF 6 MG/30 ML
0.1 PATIENT CONTROLLED ANALGESIA SYRINGE INTRAVENOUS EVERY 4 HOURS PRN
Status: DISCONTINUED | OUTPATIENT
Start: 2024-12-31 | End: 2025-01-01 | Stop reason: HOSPADM

## 2024-12-31 RX ORDER — CEFAZOLIN SODIUM/WATER 2 G/20 ML
2 SYRINGE (ML) INTRAVENOUS
Status: COMPLETED | OUTPATIENT
Start: 2024-12-31 | End: 2024-12-31

## 2024-12-31 RX ORDER — BUPIVACAINE HYDROCHLORIDE 5 MG/ML
INJECTION, SOLUTION EPIDURAL; INTRACAUDAL PRN
Status: DISCONTINUED | OUTPATIENT
Start: 2024-12-31 | End: 2024-12-31

## 2024-12-31 RX ORDER — NALOXONE HYDROCHLORIDE 0.4 MG/ML
0.1 INJECTION, SOLUTION INTRAMUSCULAR; INTRAVENOUS; SUBCUTANEOUS
Status: DISCONTINUED | OUTPATIENT
Start: 2024-12-31 | End: 2024-12-31 | Stop reason: HOSPADM

## 2024-12-31 RX ORDER — DEXAMETHASONE SODIUM PHOSPHATE 4 MG/ML
4 INJECTION, SOLUTION INTRA-ARTICULAR; INTRALESIONAL; INTRAMUSCULAR; INTRAVENOUS; SOFT TISSUE
Status: DISCONTINUED | OUTPATIENT
Start: 2024-12-31 | End: 2024-12-31 | Stop reason: HOSPADM

## 2024-12-31 RX ORDER — SODIUM CHLORIDE, SODIUM LACTATE, POTASSIUM CHLORIDE, CALCIUM CHLORIDE 600; 310; 30; 20 MG/100ML; MG/100ML; MG/100ML; MG/100ML
INJECTION, SOLUTION INTRAVENOUS CONTINUOUS
Status: DISCONTINUED | OUTPATIENT
Start: 2024-12-31 | End: 2024-12-31 | Stop reason: HOSPADM

## 2024-12-31 RX ORDER — LIDOCAINE 40 MG/G
CREAM TOPICAL
Status: DISCONTINUED | OUTPATIENT
Start: 2024-12-31 | End: 2025-01-01 | Stop reason: HOSPADM

## 2024-12-31 RX ORDER — SODIUM CHLORIDE, SODIUM LACTATE, POTASSIUM CHLORIDE, CALCIUM CHLORIDE 600; 310; 30; 20 MG/100ML; MG/100ML; MG/100ML; MG/100ML
INJECTION, SOLUTION INTRAVENOUS CONTINUOUS
Status: DISCONTINUED | OUTPATIENT
Start: 2024-12-31 | End: 2025-01-01 | Stop reason: HOSPADM

## 2024-12-31 RX ORDER — NALOXONE HYDROCHLORIDE 0.4 MG/ML
0.4 INJECTION, SOLUTION INTRAMUSCULAR; INTRAVENOUS; SUBCUTANEOUS
Status: DISCONTINUED | OUTPATIENT
Start: 2024-12-31 | End: 2025-01-01 | Stop reason: HOSPADM

## 2024-12-31 RX ORDER — BISACODYL 10 MG
10 SUPPOSITORY, RECTAL RECTAL DAILY PRN
Status: DISCONTINUED | OUTPATIENT
Start: 2025-01-03 | End: 2025-01-01 | Stop reason: HOSPADM

## 2024-12-31 RX ORDER — OXYCODONE HYDROCHLORIDE 5 MG/1
5 TABLET ORAL EVERY 4 HOURS PRN
Status: DISCONTINUED | OUTPATIENT
Start: 2024-12-31 | End: 2025-01-01 | Stop reason: HOSPADM

## 2024-12-31 RX ORDER — NALOXONE HYDROCHLORIDE 0.4 MG/ML
0.2 INJECTION, SOLUTION INTRAMUSCULAR; INTRAVENOUS; SUBCUTANEOUS
Status: DISCONTINUED | OUTPATIENT
Start: 2024-12-31 | End: 2025-01-01 | Stop reason: HOSPADM

## 2024-12-31 RX ORDER — LIDOCAINE HYDROCHLORIDE 10 MG/ML
INJECTION, SOLUTION INFILTRATION; PERINEURAL PRN
Status: DISCONTINUED | OUTPATIENT
Start: 2024-12-31 | End: 2024-12-31

## 2024-12-31 RX ORDER — LABETALOL HYDROCHLORIDE 5 MG/ML
5 INJECTION, SOLUTION INTRAVENOUS EVERY 10 MIN PRN
Status: DISCONTINUED | OUTPATIENT
Start: 2024-12-31 | End: 2024-12-31 | Stop reason: HOSPADM

## 2024-12-31 RX ORDER — ONDANSETRON 2 MG/ML
4 INJECTION INTRAMUSCULAR; INTRAVENOUS EVERY 6 HOURS PRN
Status: DISCONTINUED | OUTPATIENT
Start: 2024-12-31 | End: 2025-01-01 | Stop reason: HOSPADM

## 2024-12-31 RX ORDER — PROCHLORPERAZINE MALEATE 5 MG/1
5 TABLET ORAL EVERY 6 HOURS PRN
Status: DISCONTINUED | OUTPATIENT
Start: 2024-12-31 | End: 2025-01-01 | Stop reason: HOSPADM

## 2024-12-31 RX ORDER — ONDANSETRON 4 MG/1
4 TABLET, ORALLY DISINTEGRATING ORAL EVERY 30 MIN PRN
Status: DISCONTINUED | OUTPATIENT
Start: 2024-12-31 | End: 2024-12-31 | Stop reason: HOSPADM

## 2024-12-31 RX ORDER — VITAMIN B COMPLEX
3 TABLET ORAL DAILY
COMMUNITY

## 2024-12-31 RX ORDER — HYDROMORPHONE HCL IN WATER/PF 6 MG/30 ML
0.2 PATIENT CONTROLLED ANALGESIA SYRINGE INTRAVENOUS EVERY 4 HOURS PRN
Status: DISCONTINUED | OUTPATIENT
Start: 2024-12-31 | End: 2025-01-01 | Stop reason: HOSPADM

## 2024-12-31 RX ORDER — AMOXICILLIN 250 MG
1 CAPSULE ORAL 2 TIMES DAILY
Status: DISCONTINUED | OUTPATIENT
Start: 2024-12-31 | End: 2025-01-01 | Stop reason: HOSPADM

## 2024-12-31 RX ORDER — ONDANSETRON 2 MG/ML
INJECTION INTRAMUSCULAR; INTRAVENOUS PRN
Status: DISCONTINUED | OUTPATIENT
Start: 2024-12-31 | End: 2024-12-31

## 2024-12-31 RX ORDER — ONDANSETRON 4 MG/1
4 TABLET, ORALLY DISINTEGRATING ORAL EVERY 6 HOURS PRN
Status: DISCONTINUED | OUTPATIENT
Start: 2024-12-31 | End: 2025-01-01 | Stop reason: HOSPADM

## 2024-12-31 RX ORDER — ACETAMINOPHEN 325 MG/1
650 TABLET ORAL EVERY 4 HOURS PRN
Qty: 100 TABLET | Refills: 0 | Status: SHIPPED | OUTPATIENT
Start: 2024-12-31

## 2024-12-31 RX ORDER — TRANEXAMIC ACID 650 MG/1
1950 TABLET ORAL ONCE
Status: COMPLETED | OUTPATIENT
Start: 2024-12-31 | End: 2024-12-31

## 2024-12-31 RX ORDER — LIDOCAINE 40 MG/G
CREAM TOPICAL
Status: DISCONTINUED | OUTPATIENT
Start: 2024-12-31 | End: 2024-12-31 | Stop reason: HOSPADM

## 2024-12-31 RX ORDER — DEXMEDETOMIDINE HYDROCHLORIDE 4 UG/ML
INJECTION, SOLUTION INTRAVENOUS PRN
Status: DISCONTINUED | OUTPATIENT
Start: 2024-12-31 | End: 2024-12-31

## 2024-12-31 RX ORDER — ACETAMINOPHEN 325 MG/1
650 TABLET ORAL EVERY 4 HOURS
Status: DISCONTINUED | OUTPATIENT
Start: 2024-12-31 | End: 2025-01-01 | Stop reason: HOSPADM

## 2024-12-31 RX ORDER — OXYCODONE HYDROCHLORIDE 5 MG/1
5-10 TABLET ORAL EVERY 4 HOURS PRN
Qty: 26 TABLET | Refills: 0 | Status: SHIPPED | OUTPATIENT
Start: 2024-12-31 | End: 2025-01-01

## 2024-12-31 RX ORDER — POLYETHYLENE GLYCOL 3350 17 G/17G
17 POWDER, FOR SOLUTION ORAL DAILY
Status: DISCONTINUED | OUTPATIENT
Start: 2025-01-01 | End: 2025-01-01 | Stop reason: HOSPADM

## 2024-12-31 RX ORDER — HYDROXYZINE HYDROCHLORIDE 10 MG/1
10 TABLET, FILM COATED ORAL EVERY 6 HOURS PRN
Status: DISCONTINUED | OUTPATIENT
Start: 2024-12-31 | End: 2025-01-01 | Stop reason: HOSPADM

## 2024-12-31 RX ORDER — HYDROXYZINE HYDROCHLORIDE 10 MG/1
10 TABLET, FILM COATED ORAL EVERY 6 HOURS PRN
Qty: 30 TABLET | Refills: 0 | Status: SHIPPED | OUTPATIENT
Start: 2024-12-31 | End: 2025-01-01

## 2024-12-31 RX ORDER — VITAMIN B COMPLEX
75 TABLET ORAL DAILY
Status: DISCONTINUED | OUTPATIENT
Start: 2025-01-01 | End: 2025-01-01 | Stop reason: HOSPADM

## 2024-12-31 RX ORDER — CEFAZOLIN SODIUM 1 G/3ML
INJECTION, POWDER, FOR SOLUTION INTRAMUSCULAR; INTRAVENOUS
Status: DISCONTINUED
Start: 2024-12-31 | End: 2024-12-31 | Stop reason: HOSPADM

## 2024-12-31 RX ORDER — CEFAZOLIN SODIUM 1 G/3ML
1 INJECTION, POWDER, FOR SOLUTION INTRAMUSCULAR; INTRAVENOUS EVERY 12 HOURS
Status: COMPLETED | OUTPATIENT
Start: 2024-12-31 | End: 2025-01-01

## 2024-12-31 RX ORDER — NICOTINE POLACRILEX 4 MG
15-30 LOZENGE BUCCAL
Status: DISCONTINUED | OUTPATIENT
Start: 2024-12-31 | End: 2025-01-01 | Stop reason: HOSPADM

## 2024-12-31 RX ORDER — HYDROMORPHONE HYDROCHLORIDE 1 MG/ML
0.5 INJECTION, SOLUTION INTRAMUSCULAR; INTRAVENOUS; SUBCUTANEOUS EVERY 5 MIN PRN
Status: DISCONTINUED | OUTPATIENT
Start: 2024-12-31 | End: 2024-12-31 | Stop reason: HOSPADM

## 2024-12-31 RX ORDER — ESCITALOPRAM OXALATE 20 MG/1
20 TABLET ORAL DAILY
Status: DISCONTINUED | OUTPATIENT
Start: 2025-01-01 | End: 2025-01-01 | Stop reason: HOSPADM

## 2024-12-31 RX ORDER — ONDANSETRON 2 MG/ML
4 INJECTION INTRAMUSCULAR; INTRAVENOUS EVERY 30 MIN PRN
Status: DISCONTINUED | OUTPATIENT
Start: 2024-12-31 | End: 2024-12-31 | Stop reason: HOSPADM

## 2024-12-31 RX ORDER — DEXTROSE MONOHYDRATE 25 G/50ML
25-50 INJECTION, SOLUTION INTRAVENOUS
Status: DISCONTINUED | OUTPATIENT
Start: 2024-12-31 | End: 2025-01-01 | Stop reason: HOSPADM

## 2024-12-31 RX ORDER — LANOLIN ALCOHOL/MO/W.PET/CERES
1000 CREAM (GRAM) TOPICAL DAILY
Status: DISCONTINUED | OUTPATIENT
Start: 2025-01-01 | End: 2025-01-01 | Stop reason: HOSPADM

## 2024-12-31 RX ORDER — CEFADROXIL 500 MG/1
500 CAPSULE ORAL 2 TIMES DAILY
Qty: 20 CAPSULE | Refills: 0 | Status: SHIPPED | OUTPATIENT
Start: 2024-12-31

## 2024-12-31 RX ORDER — CEFAZOLIN SODIUM/WATER 2 G/20 ML
2 SYRINGE (ML) INTRAVENOUS SEE ADMIN INSTRUCTIONS
Status: DISCONTINUED | OUTPATIENT
Start: 2024-12-31 | End: 2024-12-31 | Stop reason: HOSPADM

## 2024-12-31 RX ORDER — PROPOFOL 10 MG/ML
INJECTION, EMULSION INTRAVENOUS CONTINUOUS PRN
Status: DISCONTINUED | OUTPATIENT
Start: 2024-12-31 | End: 2024-12-31

## 2024-12-31 RX ORDER — ACETAMINOPHEN 325 MG/1
975 TABLET ORAL ONCE
Status: COMPLETED | OUTPATIENT
Start: 2024-12-31 | End: 2024-12-31

## 2024-12-31 RX ORDER — DEXAMETHASONE SODIUM PHOSPHATE 10 MG/ML
INJECTION, SOLUTION INTRAMUSCULAR; INTRAVENOUS PRN
Status: DISCONTINUED | OUTPATIENT
Start: 2024-12-31 | End: 2024-12-31

## 2024-12-31 RX ORDER — AMOXICILLIN 250 MG
1-2 CAPSULE ORAL 2 TIMES DAILY
Qty: 30 TABLET | Refills: 0 | Status: SHIPPED | OUTPATIENT
Start: 2024-12-31

## 2024-12-31 RX ADMIN — ACETAMINOPHEN 650 MG: 325 TABLET ORAL at 12:36

## 2024-12-31 RX ADMIN — PROPOFOL 150 MCG/KG/MIN: 10 INJECTION, EMULSION INTRAVENOUS at 07:45

## 2024-12-31 RX ADMIN — ACETAMINOPHEN 650 MG: 325 TABLET ORAL at 20:13

## 2024-12-31 RX ADMIN — CEFAZOLIN 1 G: 1 INJECTION, POWDER, FOR SOLUTION INTRAMUSCULAR; INTRAVENOUS at 18:01

## 2024-12-31 RX ADMIN — DEXAMETHASONE SODIUM PHOSPHATE 4 MG: 10 INJECTION, SOLUTION INTRAMUSCULAR; INTRAVENOUS at 07:50

## 2024-12-31 RX ADMIN — ONDANSETRON 4 MG: 2 INJECTION INTRAMUSCULAR; INTRAVENOUS at 09:12

## 2024-12-31 RX ADMIN — LIDOCAINE HYDROCHLORIDE 3 ML: 10 INJECTION, SOLUTION INFILTRATION; PERINEURAL at 07:40

## 2024-12-31 RX ADMIN — BUPIVACAINE HYDROCHLORIDE 2.3 ML: 5 INJECTION, SOLUTION EPIDURAL; INTRACAUDAL; PERINEURAL at 07:44

## 2024-12-31 RX ADMIN — Medication 2 G: at 07:32

## 2024-12-31 RX ADMIN — SODIUM CHLORIDE, POTASSIUM CHLORIDE, SODIUM LACTATE AND CALCIUM CHLORIDE: 600; 310; 30; 20 INJECTION, SOLUTION INTRAVENOUS at 07:32

## 2024-12-31 RX ADMIN — SODIUM CHLORIDE, POTASSIUM CHLORIDE, SODIUM LACTATE AND CALCIUM CHLORIDE: 600; 310; 30; 20 INJECTION, SOLUTION INTRAVENOUS at 13:25

## 2024-12-31 RX ADMIN — ACETAMINOPHEN 975 MG: 325 TABLET ORAL at 07:00

## 2024-12-31 RX ADMIN — SENNOSIDES AND DOCUSATE SODIUM 1 TABLET: 50; 8.6 TABLET ORAL at 20:14

## 2024-12-31 RX ADMIN — DEXMEDETOMIDINE 8 MCG: 100 INJECTION, SOLUTION, CONCENTRATE INTRAVENOUS at 07:33

## 2024-12-31 RX ADMIN — OXYCODONE 5 MG: 5 TABLET ORAL at 16:14

## 2024-12-31 RX ADMIN — PHENYLEPHRINE HYDROCHLORIDE 0.2 MCG/KG/MIN: 10 INJECTION INTRAVENOUS at 07:40

## 2024-12-31 RX ADMIN — ACETAMINOPHEN 650 MG: 325 TABLET ORAL at 17:59

## 2024-12-31 RX ADMIN — TRANEXAMIC ACID 1950 MG: 650 TABLET ORAL at 06:22

## 2024-12-31 RX ADMIN — OXYCODONE 5 MG: 5 TABLET ORAL at 20:13

## 2024-12-31 ASSESSMENT — COPD QUESTIONNAIRES: COPD: 1

## 2024-12-31 ASSESSMENT — ACTIVITIES OF DAILY LIVING (ADL)
ADLS_ACUITY_SCORE: 36
ADLS_ACUITY_SCORE: 36
ADLS_ACUITY_SCORE: 35
ADLS_ACUITY_SCORE: 36
ADLS_ACUITY_SCORE: 36
ADLS_ACUITY_SCORE: 35
ADLS_ACUITY_SCORE: 36
ADLS_ACUITY_SCORE: 36
ADLS_ACUITY_SCORE: 33
ADLS_ACUITY_SCORE: 36
ADLS_ACUITY_SCORE: 36
ADLS_ACUITY_SCORE: 35
ADLS_ACUITY_SCORE: 36
ADLS_ACUITY_SCORE: 36

## 2024-12-31 ASSESSMENT — ENCOUNTER SYMPTOMS: DYSRHYTHMIAS: 1

## 2024-12-31 NOTE — ANESTHESIA CARE TRANSFER NOTE
Patient: Gemma Cowart    Procedure: Procedure(s):  RIGHT DIRECT ANTERIOR TOTAL HIP ARTHROPLASTY       Diagnosis: Right hip pain [M25.551]  Osteoarthritis of right hip [M16.11]  Diagnosis Additional Information: No value filed.    Anesthesia Type:   Spinal     Note:    Oropharynx: oropharynx clear of all foreign objects and spontaneously breathing  Level of Consciousness: awake  Oxygen Supplementation: face mask  Level of Supplemental Oxygen (L/min / FiO2): 8  Independent Airway: airway patency satisfactory and stable  Dentition: dentition unchanged  Vital Signs Stable: post-procedure vital signs reviewed and stable  Report to RN Given: handoff report given  Patient transferred to: PACU    Handoff Report: Identifed the Patient, Identified the Reponsible Provider, Reviewed the pertinent medical history, Discussed the surgical course, Reviewed Intra-OP anesthesia mangement and issues during anesthesia, Set expectations for post-procedure period and Allowed opportunity for questions and acknowledgement of understanding      Vitals:  Vitals Value Taken Time   /68 12/31/24 0951   Temp 32.3  C (90.14  F) 12/31/24 0959   Pulse 63 12/31/24 0959   Resp 21 12/31/24 0959   SpO2 100 % 12/31/24 0959   Vitals shown include unfiled device data.    Electronically Signed By: ANTON Rossi CRNA  December 31, 2024  10:00 AM

## 2024-12-31 NOTE — ANESTHESIA POSTPROCEDURE EVALUATION
Patient: Gemma Cowart    Procedure: Procedure(s):  RIGHT DIRECT ANTERIOR TOTAL HIP ARTHROPLASTY       Anesthesia Type:  Spinal    Note:     Postop Pain Control: Uneventful            Sign Out: Well controlled pain   PONV: No   Neuro/Psych: Uneventful            Sign Out: Acceptable/Baseline neuro status   Airway/Respiratory: Uneventful            Sign Out: Acceptable/Baseline resp. status   CV/Hemodynamics: Uneventful            Sign Out: Acceptable CV status; No obvious hypovolemia; No obvious fluid overload   Other NRE: NONE   DID A NON-ROUTINE EVENT OCCUR? No           Last vitals:  Vitals Value Taken Time   /63 12/31/24 1202   Temp 35.8  C (96.44  F) 12/31/24 1041   Pulse 65 12/31/24 1207   Resp 23 12/31/24 1200   SpO2 98 % 12/31/24 1207   Vitals shown include unfiled device data.    Electronically Signed By: Erika Harmon MD  December 31, 2024  12:36 PM

## 2024-12-31 NOTE — OP NOTE
PATIENT:  Gemma Cowart    DATE OF SURGERY:  12/31/2024     PREOPERATIVE DIAGNOSIS: Right hip osteoarthritis.     POSTOPERATIVE DIAGNOSIS: Right hip osteoarthritis.     PROCEDURE: Right total hip arthroplasty.     SURGEON: EZEKIEL SEAY MD.     ASSISTANT: Anastasia Espinoza PA-C; RISA assist was essential and required for all portions of the case, including patient positioning, soft tissue retraction, patient safety, use of complex orthopedic instrumentation, assistance with closure of the wound, and postoperative care    ANESTHESIA: Spinal    EBL: 300 mL    COMPLICATIONS: None    IMPLANTS:   1. DePuy Actis femoral stem, size 5, STD offset, 12/14 trunnion.   2. DePuy Gription acetabular shell, 52 mm outer diameter.   3. DePuy AltrX polyethylene liner, +0mm offset.   4. DePuy Biolox ceramic femoral head, 36 mm outer diameter, +1.5 mm neck length.     INDICATION FOR PROCEDURE: Gemma Cowart is a pleasant 79 year old female with long standing Hip degenerative joint disease.  It failed to respond to conservative management.  The above procedure was recommended.  For full details please see my clinic notes.  The risks including, but not limited to, bleeding, infection, nerve injury, dvt, implant failure, implant wear, fracture, limb length inequality, anesthetic complications, heart attack, stroke, and even death were discussed.  Pt verbalized understanding.  Informed consent was obtained      DESCRIPTION OF PROCEDURE: The patient was seen and evaluated in the preop area. Discussion of the surgery and any further questions were answered with the patient and family using easily understandable non-medical terms. The correct site was identified with the patient, and I placed my initials at the surgical site. Pre-procedure verification was completed. Relevant information / documentation available, they were reviewed and properly matched to the patient.  I verified the consent was accurate and complete.  I verified that the  proper surgical equipment and supplies were available. The patient was taken to the operating room and placed in position according to the procedure in consideration with all extremities well padded.  The patient received preoperative prophylactic antibiotics based on the patient s procedure, BMI, and allergy profile.  A Time Out was conducted just prior to starting procedure to verify the eight required elements: 1-patient identity, 2-consent accurate and complete, 3-position, 4-correct side/site marked (if applicable), 5-procedure, 6-relevant images / results properly labeled and displayed (if applicable), 7-antibiotics / irrigation fluids (if applicable), 8-safety precautions.    The patient was  given successful spinal anesthesia. The patient was then placed in the supine position on the Kiowa table with all extremities well padded.  The operative  Hip was then prepped and draped in sterile fashion.  The leg was covered with a Ioban type drape.     I made a longitudinal incision over the tensor fascia muscle.  It was located approx 1 cm distal and 3 cm lateral to the anterior superior iliac spine.  It was angled slightly posterior  And lateral towards the lateral femoral condyle.  The incision was approx 8cm long and parallels the fibers of the tensor fascia jonathan muscle.  The subcutanuos tissues were incised with a scalpel.  Once at the tensor fascia it was split longitudinally.  An Hellen type clamp was placed on the anterior aspect. The vessel that perforates the fascia was coagulated with electrocautery.  Blunt finger dissection was performed anteriorly and medially to the tensor fascia muscle.  A Angel retractor was placed anteriorly and blunt Cobra retractors were on the superior lateral and inferior medial femoral neck.  A Harris elevator was then used to elevate the ilipsoas muscle and rectus femorus muscles off the anterior hip capsule.  I then identified the lateral femoral circumflex vessels which were  "then tied off and/or coagulated.  I then performed a \"T\" capsulotomy of the hip capsule, which extended down to the intertrochanteric line.  Each leaf was tagged for later repair.  I then placed the Cobra retractors inside of the hip capsule and a right angle retractor was carefully placed anteriorly to the hip joint, just under the rectus femorus muscle.      We then obtained intraoperative xrays of the pelvis and hips.  The hip was placed under gentle traction and externally rotated 20 degrees. Blunt retractors were placed around the femoral neck to protect the surrounding structures. I then made the femoral neck cut with an oscillating saw as per my preoperative templating and using C-arm verification.  The femoral head was then removed without complication. Traction was then released. Retractors were then placed around the acetabulum.  The labrum was sharply excised.  The capsule was released off the medial and posterior medial neck.      We then began our reaming of the acetabulum by aiming the reamer anterior to posterior and proximal.  We medialized under C-arm visualization.  We sequentially reamed in 1-2mm increments.  Progress was checked by visualizing the acetabulum, direct palpation of the acetabulum and C-arm fluoroscopy.  We reamed until there was good circumferential fit on C-arm pictures and our approximate templated size.  Careful attentian was paid to make sure we had a true AP pelvis xray.  After we had reamed to the appropriate size I inserted the acetabular component.  It was impacted into position under C-arm fluoroscopic guidance.  It was placed at 45 degrees of inclination and 20 degrees of anteversion.  It had excellent fit and stability.  No additional screw fixation was felt to be needed.      We then turned our attention to femoral preparation.  The bone hook was placed around the posterior femur just distal to the vastus tubercle.  The femur was then gently externally rotated and " releases were performed.  The capsule about the greater trochanter was excised to aid in delivering the femur.  The leg was then progressively externally rotated to approximately 120 degrees.   The hip was then adducted and extended using the Thoreau table.  Once good visualization of the femoral neck was achieved, blunt retractors were placed around the femoral neck.  A rongeur was used to remove excess bone from the lateral neck remnant.  I then began femoral broaching.  I started with the smallest size.  The broach was oriented such that it paralleled the posterior cortex of the femoral neck.  C-arm pictures were taken periodically to make sure the broaches were going down the femur appropriately and not in varus.  Broaching continued until complete stability was achieved throughout the proximal femur.  The appropriate trial neck and head were placed on the broach.  The leg ws brought back up to neutral position, then utilizing the Thoreau table, the hip reduced.      The leg length, offset, component size and position was then checked with the C-arm xray.  The pictures were also printed off to compare pre and post position and compare to non-operative side, using an overlay technique.  Once the appropriate neck length and offset were achieved, the hip was then dislocated.   The leg was externally rotated, adducted and extended.  The femoral trials were removed.  The acetabular liner trial was removed.  The appproriate highly cross linked polyethylene liner was then impacted appropriately into the acetabular component.  The appropriate sized femoral component was then impacted into the proximal femur without complication.  The appropriate final head was then impacted onto the stem.  The hip was then reduced utilizing the Thoreau table.  C-arm pictures were taken again to confirm appropriate leg length, offset, component size and postition.  I externally the hip to 90 degrees and internally rotated to 90 degrees without  any instability.  A shuck test was performed without any instability.  The soft tissue tension appeared appropriate.      The hip was then irrigated with antibiotic saline.  The hip capsule was closed with #1 fiberwire sutures. The fascia was closed with #1 vicryl suture.  Subcutaneous layer with 2-O vicryl suture.  Skin with 3-O monocryl suture and Dermabond.  A sterile dressing was placed on the surgical hip.  The sponge and needle counts were correct at the end of the case.  The patient left the operating room in stable condition.      POST OP PLAN:   Pain Control:  IV narcotics, transition to oral narcotics as bowel function returns, Toradol, Decadron, Gabapentin, Ice.  DVT Prophylaxis:  Restart home dose eliquis tomorrow, mechanical devices, early ambulation  Infection Prophylaxis: IV Abx as per pt's allergy profile and BMI x 24 hrs, then discharge on Duricef 500mg PO BID x 10 days  PT/OT: Eval and Treat as per ANTERIOR APPROACH protocol  Incision/Dressing Care: Keep on, clean. Reniforce prn.  OK to shower.   Medical Cares: Primary medical consult  Disposition:  consult as needed for disposition    Implant Name Type Inv. Item Serial No.  Lot No. LRB No. Used Action   IMP HEAD FEMORAL DEPUY CERAMIC 36MM +1.5MM 1364- - [id number] Total Joint Component/Insert IMP HEAD FEMORAL DEPUY CERAMIC 36MM +1.5MM [id number]  [employer] [facility]- [id number] Right 1 Implanted   IMP STEM FEM DEPUY ACTIS HI COLLAR OFFSET SZ 5MM 1010- - [id number] Total Joint Component/Insert IMP STEM FEM DEPUY ACTIS HI COLLAR OFFSET SZ 5MM 1010-  [employer]- [id number] Right 1 Implanted   IMP SCR BONE CAN ACE 6.5X30MM 1217- - [id number] Metallic Hardware/[place] IMP SCR BONE CAN ACE 6.5X30MM 1217-  [employer]- [id number] Right 1 Implanted   IMP SCR BONE CAN ACE 6.5X30MM 1217- - [id number] Metallic Hardware/[place] IMP SCR BONE CAN ACE 6.5X30MM 1217-  [employer]- [id number] Right 1 Implanted   IMP INSERT HIP DEPUY PINNACLE ALTRX [id number] 1221- - [id number] Total Joint Component/Insert IMP INSERT HIP DEPUY PINNACLE ALTRX [id number] 1221-  &[facility]- [id number] Right 1 Implanted   IMP SHELL ACET DEPUY PINNACLE GRIPTION 52MM [id number] - [id number] Total Joint Component/Insert IMP SHELL ACET DEPUY PINNACLE GRIPTION 52MM [id number]  [employer] [facility]- [id number] Right 1 Implanted         Humberto Richmond MD  12/31/2024  9:17 AM      @C(1)@  Humberto Richmond MD    @C(2)@  Arminda Foley

## 2024-12-31 NOTE — PHARMACY-ADMISSION MEDICATION HISTORY
Pharmacist Admission Medication History    Admission medication history is complete. The information provided in this note is only as accurate as the sources available at the time of the update.    Information Source(s): Patient and CareEverywhere/SureScripts via in-person    Pertinent Information: Patient brought in Spiriva inhaler for use.    Allergies reviewed with patient and updates made in EHR: yes    Medication History Completed By: Fabian Brown RPH 12/31/2024 7:07 AM    PTA Med List   Medication Sig Note Last Dose/Taking    acetaminophen (TYLENOL) 500 MG tablet Take 500-1,000 mg by mouth 2 times daily as needed for mild pain.  12/31/2024 Morning    apixaban ANTICOAGULANT (ELIQUIS ANTICOAGULANT) 5 MG tablet Take 1 tablet (5 mg) by mouth 2 times daily  12/24/2024 Evening    clobetasol propionate (TEMOVATE) 0.05 % external cream Apply twice daily as needed  Past Week    escitalopram (LEXAPRO) 20 MG tablet TAKE 1 TABLET(20 MG) BY MOUTH DAILY  12/31/2024 Morning    tiotropium (SPIRIVA RESPIMAT) 2.5 MCG/ACT inhaler Inhale 2 puffs into the lungs daily 12/31/2024: Has with 12/31/2024 Morning    vitamin B-12 (CYANOCOBALAMIN) 1000 MCG tablet Take 1,000 mcg by mouth daily.  12/31/2024 Morning    Vitamin D3 (CHOLECALCIFEROL) 25 mcg (1000 units) tablet Take 3 tablets by mouth daily.  12/31/2024 Morning

## 2024-12-31 NOTE — PROGRESS NOTES
12/31/24 7753   Appointment Info   Signing Clinician's Name / Credentials (PT) Greg Smith, PT   Quick Adds   Quick Adds Certification   Living Environment   People in Home alone   Current Living Arrangements apartment   Home Accessibility no concerns   Self-Care   Usual Activity Tolerance moderate   Current Activity Tolerance moderate   Equipment Currently Used at Home walker, rolling   Fall history within last six months no   Activity/Exercise/Self-Care Comment Indep prior for all I ADL's and ADL's except driving, daugther takes her shopping   General Information   Onset of Illness/Injury or Date of Surgery 12/31/24   Referring Physician Dr. Humberto Richmond   Patient/Family Therapy Goals Statement (PT) none stated   Pertinent History of Current Problem (include personal factors and/or comorbidities that impact the POC) s/p R MAURA 79 year old female admitted on 12/31/2024. She has a past medical history significant for stable COPD, T2DM diet controlled, history of SVT status post ablation, CKD stage IIIb, anxiety, and history of DVTs chronic anticoagulation with Eliquis who presented to [facility] for routine elective right total hip arthroplasty, hospital medicine was consulted for medical comanagement.   Existing Precautions/Restrictions no known precautions/restrictions   Weight-Bearing Status - RLE weight-bearing as tolerated   Cognition   Affect/Mental Status (Cognition) WFL   Range of Motion (ROM)   ROM Comment decreased ROM s/p R MAURA   Strength (Manual Muscle Testing)   Strength (Manual Muscle Testing) No deficits observed during functional mobility   Transfers   Transfers sit-stand transfer   Maintains Weight-bearing Status (Transfers) verbal cues to maintain   Sit-Stand Transfer   Sit-Stand Wall (Transfers) contact guard;verbal cues   Assistive Device (Sit-Stand Transfers) walker, front-wheeled   Gait/Stairs (Locomotion)   Wall Level (Gait) contact guard;verbal cues   Assistive Device  (Gait) walker, front-wheeled   Distance in Feet (Gait) 20   Pattern (Gait) swing-through   Deviations/Abnormal Patterns (Gait) jeaneth decreased;gait speed decreased   Balance   Balance no deficits were identified   Clinical Impression   Criteria for Skilled Therapeutic Intervention Yes, treatment indicated   PT Diagnosis (PT) impaired functional mobility   Influenced by the following impairments pain, decreased ROM, impaired balance, decreased strength   Functional limitations due to impairments gait,  transfers   Clinical Presentation (PT Evaluation Complexity) stable   Clinical Presentation Rationale pt presents as medically diagnosed   Clinical Decision Making (Complexity) low complexity   Planned Therapy Interventions (PT) gait training;home exercise program;patient/family education;stair training;transfer training   Risk & Benefits of therapy have been explained care plan/treatment goals reviewed;patient   PT Total Evaluation Time   PT Eval, Low Complexity Minutes (97612) 10   Therapy Certification   Start of care date 12/31/24   Certification date from 12/31/24   Certification date to 12/31/24   Physical Therapy Goals   PT Frequency Daily   PT Predicted Duration/Target Date for Goal Attainment 12/31/24   PT Goals PT Goal 1;Gait;Transfers;Stairs   PT: Transfers Modified independent;Sit to/from stand;Assistive device;Within precautions;Goal Met   PT: Gait Modified independent;Rolling walker;150 feet;Within precautions;Goal Met   PT: Goal 1 Independent with written HEP for LE strengthening and ROM, goal met   Interventions   Interventions Quick Adds Therapeutic Procedure;Therapeutic Activity;Gait Training   Therapeutic Procedure/Exercise   Ther. Procedure: strength, endurance, ROM, flexibillity Minutes (87252) 12   Symptoms Noted During/After Treatment none   Treatment Detail/Skilled Intervention MAURA protocol therex x10 reps, cueing for technique, all questions answered   Therapeutic Activity   Therapeutic  Activities: dynamic activities to improve functional performance Minutes (88704) 3   Symptoms Noted During/After Treatment None   Treatment Detail/Skilled Intervention sit to/from stand, cueing for safe hand placement   Gait Training   Gait Training Minutes (01067) 8   Symptoms Noted During/After Treatment (Gait Training) none   Treatment Detail/Skilled Intervention vc for heel strike, stable slow gait, discussion on walking program at home on discharge   Distance in Feet 200   [place] Level (Gait Training) independent   Physical Assistance Level (Gait Training) verbal cues   Weight Bearing (Gait Training) weight-bearing as tolerated   Assistive Device (Gait Training) rolling walker   Pattern Analysis (Gait Training) swing-through gait   Gait Analysis Deviations decreased jeaneth;decreased step length   PT Discharge Planning   PT Plan progress per MAURA protocol   PT Discharge Recommendation (DC Rec) other (see comments)   PT Rationale for DC Rec Paitent moving well overall and has good home setup and support   PT Brief overview of current status Patient independent with gait/transfers   PT Equipment Needed at Discharge cane, straight;walker, rolling   Physical Therapy Time and Intention   Timed Code Treatment Minutes 23   Total Session Time (sum of timed and untimed services) 33   [facility]                                                                                   OUTPATIENT PHYSICAL THERAPY    PLAN OF TREATMENT FOR OUTPATIENT REHABILITATION   Patient's Last Name, First Name, Gemma Card YOB: 1945   Provider's Name   [facility]   Medical Record No.  [id number]     Onset Date: 12/31/24 Start of Care Date: 12/31/24     Medical Diagnosis:                  PT Diagnosis:  impaired functional mobility Certification Dates:  From: 12/31/24  To: 12/31/24       See note for plan of treatment, functional goals, and  certification details.    I CERTIFY THE NEED FOR THESE SERVICES FURNISHED UNDER        THIS PLAN OF TREATMENT AND WHILE UNDER MY CARE (Physician co-signature of this document indicates review and certification of the therapy plan).

## 2024-12-31 NOTE — ANESTHESIA PROCEDURE NOTES
"Intrathecal injection Procedure Note    Pre-Procedure   Staff -        Anesthesiologist:  Erika Harmon MD       Performed By: anesthesiologist       Location: OR       Procedure Start/Stop Times: 12/31/2024 7:38 AM and 12/31/2024 7:44 AM       Pre-Anesthestic Checklist: patient identified, IV checked, risks and benefits discussed, informed consent, monitors and equipment checked, pre-op evaluation and at physician/surgeon's request  Timeout:       Correct Patient: Yes        Correct Procedure: Yes        Correct Site: Yes        Correct Position: Yes   Procedure Documentation  Procedure: intrathecal injection       Patient Position: sitting       Patient Prep/Sterile Barriers: sterile gloves, mask, patient draped       Skin prep: Chloraprep       Insertion Site: L3-4. (midline approach).       Needle Gauge: 24.        Needle Length (Inches): 4        Spinal Needle Type: Pencan       Introducer used       # of attempts: 2 and  # of redirects:     Assessment/Narrative         Paresthesias: No.       CSF fluid: clear.    Medication(s) Administered   Medication Administration Time: 12/31/2024 7:38 AM      FOR [facility] (Psychiatric/[facility]) ONLY:   Pain Team Contact information: please page the Pain Team Via PASSNFLY. Search \"Pain\". During daytime hours, please page the attending first. At night please page the resident first.      "

## 2024-12-31 NOTE — INTERVAL H&P NOTE
"I have reviewed the surgical (or preoperative) H&P that is linked to this encounter, and examined the patient. There are no significant changes    Clinical Conditions Present on Arrival:  Clinically Significant Risk Factors Present on Admission          # Hyperchloremia: Highest Cl = 108 mmol/L in last 2 days, will monitor as appropriate            # Drug Induced Coagulation Defect: home medication list includes an anticoagulant medication       # Obesity: Estimated body mass index is 30 kg/m  as calculated from the following:    Height as of this encounter: 1.575 m (5' 2\").    Weight as of this encounter: 74.4 kg (164 lb).       "

## 2024-12-31 NOTE — PLAN OF CARE
Problem: Adult Inpatient Plan of Care  Goal: Absence of Hospital-Acquired Illness or Injury  Intervention: Identify and Manage Fall Risk  Recent Flowsheet Documentation  Taken 12/31/2024 1625 by Jonel Feldman RN  Safety Promotion/Fall Prevention:   safety round/check completed   lighting adjusted   nonskid shoes/slippers when out of bed   patient and family education  Taken 12/31/2024 1425 by Jonel Feldman RN  Safety Promotion/Fall Prevention:   safety round/check completed   lighting adjusted   nonskid shoes/slippers when out of bed   patient and family education  Taken 12/31/2024 1329 by Jonel Feldman RN  Safety Promotion/Fall Prevention:   safety round/check completed   lighting adjusted   nonskid shoes/slippers when out of bed   patient and family education  Taken 12/31/2024 1225 by Jonel Feldman RN  Safety Promotion/Fall Prevention:   safety round/check completed   lighting adjusted   nonskid shoes/slippers when out of bed   patient and family education     Problem: Adult Inpatient Plan of Care  Goal: Optimal Comfort and Wellbeing  Outcome: Progressing   Goal Outcome Evaluation:         Patient vital signs are at baseline: Yes  Patient able to ambulate as they were prior to admission or with assist devices provided by therapies during their stay:  Yes  Patient MUST void prior to discharge:  Yes  Patient able to tolerate oral intake:  Yes  Pain has adequate pain control using Oral analgesics:  Yes  Does patient have an identified :  Yes  Has goal D/C date and time been discussed with patient:  Yes    Pt alert and oriented times 4. VSS-RA. SBA Tolerating oral intake. Pain managed with PRN oxycodone and ice. Dressing CDI. LR @75mL. A1 gaitbelt and walker. Pt reports not taking any insulin for diabetes. Denies SOB, chest pains, n/v.  Call light and phone within reach.       HL Admission: 4- Move to Chair  HL Daily7-Walk 25 feet or more

## 2024-12-31 NOTE — ANESTHESIA PREPROCEDURE EVALUATION
Anesthesia Pre-Procedure Evaluation    Patient: Gemma Cowart   MRN: [id number] : 1945        Procedure : Procedure(s):  RIGHT DIRECT ANTERIOR TOTAL HIP ARTHROPLASTY          Past Medical History:   Diagnosis Date    Anemia     iron deficiency anemia    Antiplatelet or antithrombotic long-term use     Anxiety     Arrhythmia     Breast cancer (H)     Bilateral Masectomies 2012    Complications of gastric bypass surgery     COPD (chronic obstructive pulmonary disease) (H) 2021    Diabetes mellitus (H)     diet controlled    DVT (deep venous thrombosis) (H)     Eczema     Fracture of left knee region     patella - vertical    History of blood transfusion     History of kidney stones     Iron deficiency anemia, unspecified iron deficiency anemia type 10/27/2021    Noninfectious ileitis     Obesity     Palpitation     with no treatment    Polyneuropathy     Pruritus     generalized    Renal disease     Rosacea     SVT (supraventricular tachycardia) (H)     s/p ablation 2015 at [facility] for left lateral AP    Thrombosis     Thyroid disease     Vitamin B12 deficiency 2013      Past Surgical History:   Procedure Laterality Date    ABDOMEN SURGERY      gastric bypass     COLONOSCOPY  10/11/2012    Procedure: COLONOSCOPY;  colonoscopy;  Surgeon: Gela Cleary MD;  Location:  GI    COLONOSCOPY N/A 10/6/2017    Procedure: COLONOSCOPY;;  Surgeon: Shashank Ortiz MD;  Location:  GI    COLONOSCOPY N/A 3/7/2022    Procedure: COLONOSCOPY;  Surgeon: Andrea Daniel MD;  Location:  GI    CYSTOSCOPY, SLING TRANSVAGINAL N/A 2024    Procedure: cystoscopy, mid urethral sling placement;  Surgeon: Shanique Paniagua MD;  Location:  OR    DENTAL SURGERY  2007    all teeth removed - dentures    ENT SURGERY      all teeth pulled    ESOPHAGOSCOPY, GASTROSCOPY, DUODENOSCOPY (EGD), COMBINED N/A 10/6/2017    Procedure: COMBINED ESOPHAGOSCOPY, GASTROSCOPY, DUODENOSCOPY  (EGD);  ESOPHAGOSCOPY, GASTROSCOPY, DUODENOSCOPY (EGD) & Colonoscopy *Needs INR on arrival;  Surgeon: Shashank Ortiz MD;  Location:  GI    ESOPHAGOSCOPY, GASTROSCOPY, DUODENOSCOPY (EGD), COMBINED N/A 9/6/2024    Procedure: Esophagoscopy, gastroscopy, duodenoscopy (EGD), combined;  Surgeon: Andrea Daniel MD;  Location:  GI    GYN SURGERY  1/10/75    tubal ligation     H ABLATION SVT  5/28/2015    stopped metoprolol    HERNIA REPAIR  2005    HYSTERECTOMY, ENDOSCOPIC, VAGINAL TRANSLUMINAL NATURAL ORIFICE APPROACH Bilateral 11/5/2024    Procedure: vaginal natural orifice transluminal endoscopic surgery vaginal hysterectomy, bilateral salpingo-oophorectomy, and posterior repair;  Surgeon: Shanique Paniagua MD;  Location:  OR    IMPLANT FILTER INFERIOR VENA CAVA  10/8/2012    taken out  1/4/12    INSERT TISSUE EXPANDER BREAST BILATERAL  9/5/2012    Procedure: INSERT TISSUE EXPANDER BREAST BILATERAL;;  Surgeon: Orlando Wall MD;  Location:  OR    MASTECTOMY SIMPLE, SENTINEL NODE, COMBINED  9/5/2012    Procedure: COMBINED MASTECTOMY SIMPLE, SENTINEL NODE;  BILATERAL MASTECTOMY WITH LEFT AXILLARY SENTINEL LYMPH NODE BIOPSY, BILATERAL TISSUE EXPANDER       MASTECTOMY, BILATERAL      PANNICULECTOMY N/A 4/5/2018    Procedure: PANNICULECTOMY;  PANNICULECTOMY ;  Surgeon: Orlando Wall MD;  Location:  OR    RECONSTRUCT BREAST BILATERAL, IMPLANT PROSTHESIS BILATERAL, COMBINED  5/22/2013    Procedure: COMBINED RECONSTRUCT BREAST BILATERAL, IMPLANT PROSTHESIS BILATERAL;  BILATERAL SECOND STAGE BREAST RECONSTRUCTION WITH SILICONE GEL IMPLANTS AND LIPOSUCTION;  Surgeon: Orlando Wall MD;  Location:  SD    RECONSTRUCT NIPPLE BILATERAL  8/22/2013    Procedure: RECONSTRUCT NIPPLE BILATERAL;  BILATERAL NIPPLE AREOLAR RECONSTRUCTION;  Surgeon: Orlando Wall MD;  Location:  SD    THYROIDECTOMY N/A 11/14/2023    Procedure: Left Thyroid Lobectomy and Autotransplant of Left Superior  Parathyroid;  Surgeon: Haley Garcia MD;  Location:  OR    [facility] BREAST RECONSTRUC W OTHR TECHNIQ  2013      Allergies   Allergen Reactions    Bacitracin Hives    Nickel Swelling    Dust Mites Other (See Comments)     sneezing      Social History     Tobacco Use    Smoking status: Former     Current packs/day: 0.00     Average packs/day: 1.5 packs/day for 25.0 years (37.5 ttl pk-yrs)     Types: Cigarettes     Start date: 10/21/1961     Quit date: 10/21/1986     Years since quittin.2     Passive exposure: Current    Smokeless tobacco: Never    Tobacco comments:     quit    Substance Use Topics    Alcohol use: Not Currently      Wt Readings from Last 1 Encounters:   24 74.4 kg (164 lb)        Anesthesia Evaluation   Pt has had prior anesthetic.     No history of anesthetic complications       ROS/MED HX  ENT/Pulmonary:     (+)                          COPD,              Neurologic:     (+)    peripheral neuropathy,                            Cardiovascular:     (+)  - -   -  - -   Taking blood thinners                     dysrhythmias (Hx SVT - s/p ablation),              METS/Exercise Tolerance: 4 - Raking leaves, gardening    Hematologic:     (+) History of blood clots,    pt is anticoagulated, anemia,          Musculoskeletal:   (+)  arthritis,             GI/Hepatic: Comment: Hx bariatric surgery      Renal/Genitourinary:     (+) renal disease, type: CRI,            Endo:     (+)  type II DM (diet controlled),        thyroid problem,     Obesity,       Psychiatric/Substance Use:     (+) psychiatric history anxiety       Infectious Disease:       Malignancy:       Other:            Physical Exam    Airway        Mallampati: III   TM distance: > 3 FB   Neck ROM: full   Mouth opening: > 3 cm    Respiratory Devices and Support         Dental       (+) Edentulous and Removable bridges or other hardware      Cardiovascular          Rhythm and rate: regular     Pulmonary           breath sounds  clear to auscultation           OUTSIDE LABS:  CBC:   Lab Results   Component Value Date    WBC 5.3 12/03/2024    WBC 4.0 10/08/2024    HGB 11.0 (L) 12/03/2024    HGB 11.1 (L) 10/08/2024    HCT 35.4 12/03/2024    HCT 35.5 10/08/2024     12/03/2024     10/08/2024     BMP:   Lab Results   Component Value Date     12/03/2024     09/30/2024    POTASSIUM 4.8 12/03/2024    POTASSIUM 5.1 09/30/2024    CHLORIDE 107 12/03/2024    CHLORIDE 111 (H) 09/30/2024    CO2 24 12/03/2024    CO2 23 09/30/2024    BUN 30.6 (H) 12/03/2024    BUN 24.7 (H) 09/30/2024    CR 1.71 (H) 12/03/2024    CR 1.62 (H) 09/30/2024    GLC 78 12/03/2024     (H) 11/05/2024     COAGS:   Lab Results   Component Value Date    INR 2.18 (H) 07/24/2021     POC:   Lab Results   Component Value Date     (H) 10/06/2017     HEPATIC:   Lab Results   Component Value Date    ALBUMIN 3.6 09/30/2024    PROTTOTAL 5.8 (L) 09/30/2024    ALT 16 09/30/2024    AST 25 09/30/2024    ALKPHOS 76 09/30/2024    BILITOTAL 0.4 09/30/2024     OTHER:   Lab Results   Component Value Date    A1C 5.6 10/08/2024    LADI 9.0 12/03/2024    PHOS 3.9 09/30/2024    MAG 2.2 05/23/2023    TSH 3.21 09/30/2024    T4 0.97 09/30/2024    CRP <2.9 10/02/2017    SED 10 10/02/2017       Anesthesia Plan    ASA Status:  3    NPO Status:  NPO Appropriate    Anesthesia Type: Spinal.   Induction: N/a.   Maintenance: TIVA.        Consents    Anesthesia Plan(s) and associated risks, benefits, and realistic alternatives discussed. Questions answered and patient/representative(s) expressed understanding.     - Discussed:     - Discussed with:  Patient       Use of blood products discussed: Yes.     - Discussed with: Patient.     - Consented: consented to blood products     Postoperative Care    Pain management: Multi-modal analgesia.   PONV prophylaxis: Ondansetron (or other 5HT-3), Dexamethasone or Solumedrol     Comments:    Other Comments:     Norman gtt in line and started  "at 0.2 mcg/kg/min             Erika Harmon MD              # Drug Induced Coagulation Defect: home medication list includes an anticoagulant medication              # Obesity: Estimated body mass index is 30 kg/m  as calculated from the following:    Height as of this encounter: 1.575 m (5' 2\").    Weight as of this encounter: 74.4 kg (164 lb).       # COPD: noted on problem list       "

## 2024-12-31 NOTE — CONSULTS
"[facility]  Consult Note - Hospitalist Service  Date of Admission:  12/31/2024  Consult Requested by: Orthopedics  Reason for Consult: Hospitalist consult    Assessment & Plan   Gemma Cowart is a 79 year old female admitted on 12/31/2024. She has a past medical history significant for stable COPD, T2DM diet controlled, history of SVT status post ablation, CKD stage IIIb, anxiety, and history of DVTs chronic anticoagulation with Eliquis who presented to [facility] for routine elective right total hip arthroplasty, hospital medicine was consulted for medical comanagement.    S/P R. MAURA  -Analgesics, antiemetics, DVT prophylaxis, and therapies per surgical team     Hx of DVT  -Resume PTA Eliquis when safe to do so from a surgical standpoint    Type 2 diabetes-diet controlled most recent A1c 5.6 on 10/8/2024  -Medium dose sliding scale insulin, blood sugars remain below 180 throughout the day likely discontinue tomorrow    COPD  -PTA inhaled medications    CKD  -Avoid nephrotoxins    Anxiety  -PTA Lexapro       Clinically Significant Risk Factors Present on Admission          # Hyperchloremia: Highest Cl = 108 mmol/L in last 2 days, will monitor as appropriate           # Drug Induced Coagulation Defect: home medication list includes an anticoagulant medication              # Obesity: Estimated body mass index is 30 kg/m  as calculated from the following:    Height as of this encounter: 1.575 m (5' 2\").    Weight as of this encounter: 74.4 kg (164 lb).       # COPD: noted on problem list        Ludin Gardner MD  Hospitalist Service  Securely message with [employer] (more info)  Text page via [employer] [facility] Paging/Directory   ______________________________________________________________________    Chief Complaint   Post-op    History is obtained from the patient    History of Present Illness   Gemma Cowart is a 79 year old female admitted on 12/31/2024. She has a past medical history significant for " stable COPD, T2DM diet controlled, history of SVT status post ablation, CKD stage IIIb, anxiety, and history of DVTs chronic anticoagulation with Eliquis who presented to [facility] for routine elective right total hip arthroplasty, hospital medicine was consulted for medical comanagement. Evaluated patient at bedside, introduced myself, explained the roll of hospital medicine, medically co-managing along side the primary team. Explained that the primary team is usually managing everything regarding the surgery, including any pain medications, anti nausea, as well as therapies and anything else related to the procedure. Explained that we as hospitalists manage any medical conditions that already exist or may arise during your stay. Explained any and all questions to the best of my ability. Post operatively she is doing well, denies any other questions or concerns.        Past Medical History    Past Medical History:   Diagnosis Date    Anemia     iron deficiency anemia    Antiplatelet or antithrombotic long-term use     Anxiety     Arrhythmia     Breast cancer (H) 2012    Bilateral Masectomies 9/2012    Complications of gastric bypass surgery     COPD (chronic obstructive pulmonary disease) (H) 04/21/2021    Diabetes mellitus (H)     diet controlled    DVT (deep venous thrombosis) (H)     Eczema     Fracture of left knee region     patella - vertical    History of blood transfusion     History of kidney stones     Iron deficiency anemia, unspecified iron deficiency anemia type 10/27/2021    Noninfectious ileitis     Obesity     Palpitation     with no treatment    Polyneuropathy     Pruritus     generalized    Renal disease     Rosacea     SVT (supraventricular tachycardia) (H)     s/p ablation 5/28/2015 at [facility] for left lateral AP    Thrombosis     Thyroid disease     Vitamin B12 deficiency 02/01/2013       Past Surgical History   Past Surgical History:   Procedure Laterality Date    ABDOMEN SURGERY   2000    gastric bypass 2000    COLONOSCOPY  10/11/2012    Procedure: COLONOSCOPY;  colonoscopy;  Surgeon: Gela Cleary MD;  Location:  GI    COLONOSCOPY N/A 10/6/2017    Procedure: COLONOSCOPY;;  Surgeon: Shashank Ortiz MD;  Location:  GI    COLONOSCOPY N/A 3/7/2022    Procedure: COLONOSCOPY;  Surgeon: Andrea Daniel MD;  Location:  GI    CYSTOSCOPY, SLING TRANSVAGINAL N/A 11/5/2024    Procedure: cystoscopy, mid urethral sling placement;  Surgeon: Shanique Paniagua MD;  Location:  OR    DENTAL SURGERY  5/2007    all teeth removed - dentures    ENT SURGERY      all teeth pulled    ESOPHAGOSCOPY, GASTROSCOPY, DUODENOSCOPY (EGD), COMBINED N/A 10/6/2017    Procedure: COMBINED ESOPHAGOSCOPY, GASTROSCOPY, DUODENOSCOPY (EGD);  ESOPHAGOSCOPY, GASTROSCOPY, DUODENOSCOPY (EGD) & Colonoscopy *Needs INR on arrival;  Surgeon: Shashank Ortiz MD;  Location:  GI    ESOPHAGOSCOPY, GASTROSCOPY, DUODENOSCOPY (EGD), COMBINED N/A 9/6/2024    Procedure: Esophagoscopy, gastroscopy, duodenoscopy (EGD), combined;  Surgeon: Andrea Daniel MD;  Location:  GI    GYN SURGERY  1/10/75    tubal ligation     H ABLATION SVT  5/28/2015    stopped metoprolol    HERNIA REPAIR  2005    HYSTERECTOMY, ENDOSCOPIC, VAGINAL TRANSLUMINAL NATURAL ORIFICE APPROACH Bilateral 11/5/2024    Procedure: vaginal natural orifice transluminal endoscopic surgery vaginal hysterectomy, bilateral salpingo-oophorectomy, and posterior repair;  Surgeon: Shanique Paniagua MD;  Location:  OR    IMPLANT FILTER INFERIOR VENA CAVA  10/8/2012    taken out  1/4/12    INSERT TISSUE EXPANDER BREAST BILATERAL  9/5/2012    Procedure: INSERT TISSUE EXPANDER BREAST BILATERAL;;  Surgeon: Orlando Wall MD;  Location:  OR    MASTECTOMY SIMPLE, SENTINEL NODE, COMBINED  9/5/2012    Procedure: COMBINED MASTECTOMY SIMPLE, SENTINEL NODE;  BILATERAL MASTECTOMY WITH LEFT AXILLARY SENTINEL LYMPH NODE BIOPSY, BILATERAL TISSUE EXPANDER       MASTECTOMY,  BILATERAL      PANNICULECTOMY N/A 4/5/2018    Procedure: PANNICULECTOMY;  PANNICULECTOMY ;  Surgeon: Orlando Wall MD;  Location: [facility] OR    RECONSTRUCT BREAST BILATERAL, IMPLANT PROSTHESIS BILATERAL, COMBINED  5/22/2013    Procedure: COMBINED RECONSTRUCT BREAST BILATERAL, IMPLANT PROSTHESIS BILATERAL;  BILATERAL SECOND STAGE BREAST RECONSTRUCTION WITH SILICONE GEL IMPLANTS AND LIPOSUCTION;  Surgeon: Orlando Wall MD;  Location:  SD    RECONSTRUCT NIPPLE BILATERAL  8/22/2013    Procedure: RECONSTRUCT NIPPLE BILATERAL;  BILATERAL NIPPLE AREOLAR RECONSTRUCTION;  Surgeon: Orlando Wall MD;  Location:  SD    THYROIDECTOMY N/A 11/14/2023    Procedure: Left Thyroid Lobectomy and Autotransplant of Left Superior Parathyroid;  Surgeon: Haley Garcia MD;  Location:  OR    ZZ BREAST RECONSTRUC W OTHR TECHNIQ  5/8/2013       Medications   I have reviewed this patient's current medications          Physical Exam   Vital Signs: Temp: 98  F (36.7  C) Temp src: Oral BP: 135/83 Pulse: 67   Resp: 23 SpO2: 99 % O2 Device: None (Room air) Oxygen Delivery: 2 LPM  Weight: 164 lbs 0 oz    Gen: Appears well, NAD, on RA  Card:Warm well perfused, pulses assumed patient talking  Pulm: Normal I/E effort on RA  Abd:Non distended  Skin:No obvious rashes or lesions on exposed areas of skin  Neuro AxOx4, S/S grossly intact, no obvious FND,   Psych:Pleasant, answering questions appropriately, insight good, judgement good, does not appear to be responding to I/E stimuli     Medical Decision Making       50 MINUTES SPENT BY ME on the date of service doing chart review, history, exam, documentation & further activities per the note.      Data   ------------------------- PAST 24 HR DATA REVIEWED -----------------------------------------------    I have personally reviewed the following data over the past 24 hrs:    N/A  \   N/A   / N/A     142 108 (H) 24.7 (H) /  118 (H)   4.9 23 1.55 (H) \       Imaging results  reviewed over the past 24 hrs:   Recent Results (from the past 24 hours)   XR Surgery JULIANO  Fluoro G/T 5 Min    Narrative    This exam was marked as non-reportable because it will not be read by a   radiologist or a [place] non-radiologist provider.

## 2025-01-01 VITALS
OXYGEN SATURATION: 96 % | WEIGHT: 164 LBS | SYSTOLIC BLOOD PRESSURE: 127 MMHG | HEART RATE: 64 BPM | RESPIRATION RATE: 16 BRPM | TEMPERATURE: 97.7 F | DIASTOLIC BLOOD PRESSURE: 65 MMHG | BODY MASS INDEX: 30.18 KG/M2 | HEIGHT: 62 IN

## 2025-01-01 LAB
GLUCOSE BLDC GLUCOMTR-MCNC: 128 MG/DL (ref 70–99)
GLUCOSE BLDC GLUCOMTR-MCNC: 142 MG/DL (ref 70–99)
GLUCOSE BLDC GLUCOMTR-MCNC: 156 MG/DL (ref 70–99)
HGB BLD-MCNC: 8.3 G/DL (ref 11.7–15.7)
HOLD SPECIMEN: NORMAL

## 2025-01-01 PROCEDURE — 36415 COLL VENOUS BLD VENIPUNCTURE: CPT

## 2025-01-01 PROCEDURE — 97535 SELF CARE MNGMENT TRAINING: CPT | Mod: GO

## 2025-01-01 PROCEDURE — 99214 OFFICE O/P EST MOD 30 MIN: CPT | Performed by: INTERNAL MEDICINE

## 2025-01-01 PROCEDURE — 97166 OT EVAL MOD COMPLEX 45 MIN: CPT | Mod: GO

## 2025-01-01 PROCEDURE — 82962 GLUCOSE BLOOD TEST: CPT

## 2025-01-01 PROCEDURE — 250N000011 HC RX IP 250 OP 636

## 2025-01-01 PROCEDURE — 250N000013 HC RX MED GY IP 250 OP 250 PS 637: Performed by: STUDENT IN AN ORGANIZED HEALTH CARE EDUCATION/TRAINING PROGRAM

## 2025-01-01 PROCEDURE — 250N000013 HC RX MED GY IP 250 OP 250 PS 637

## 2025-01-01 PROCEDURE — 85018 HEMOGLOBIN: CPT

## 2025-01-01 RX ORDER — HYDROXYZINE HYDROCHLORIDE 10 MG/1
10 TABLET, FILM COATED ORAL EVERY 6 HOURS PRN
Qty: 30 TABLET | Refills: 0 | Status: SHIPPED | OUTPATIENT
Start: 2025-01-01

## 2025-01-01 RX ORDER — OXYCODONE HYDROCHLORIDE 5 MG/1
5-10 TABLET ORAL EVERY 4 HOURS PRN
Qty: 26 TABLET | Refills: 0 | Status: SHIPPED | OUTPATIENT
Start: 2025-01-01

## 2025-01-01 RX ADMIN — ESCITALOPRAM OXALATE 20 MG: 20 TABLET ORAL at 09:04

## 2025-01-01 RX ADMIN — CEFAZOLIN 1 G: 1 INJECTION, POWDER, FOR SOLUTION INTRAMUSCULAR; INTRAVENOUS at 06:44

## 2025-01-01 RX ADMIN — Medication 75 MCG: at 09:04

## 2025-01-01 RX ADMIN — ACETAMINOPHEN 650 MG: 325 TABLET ORAL at 09:05

## 2025-01-01 RX ADMIN — CYANOCOBALAMIN TAB 1000 MCG 1000 MCG: 1000 TAB at 09:04

## 2025-01-01 RX ADMIN — ACETAMINOPHEN 650 MG: 325 TABLET ORAL at 00:54

## 2025-01-01 RX ADMIN — OXYCODONE 5 MG: 5 TABLET ORAL at 13:13

## 2025-01-01 RX ADMIN — OXYCODONE 5 MG: 5 TABLET ORAL at 00:54

## 2025-01-01 RX ADMIN — APIXABAN 5 MG: 5 TABLET, FILM COATED ORAL at 09:04

## 2025-01-01 RX ADMIN — ACETAMINOPHEN 650 MG: 325 TABLET ORAL at 13:09

## 2025-01-01 RX ADMIN — ACETAMINOPHEN 650 MG: 325 TABLET ORAL at 06:44

## 2025-01-01 ASSESSMENT — ACTIVITIES OF DAILY LIVING (ADL)
ADLS_ACUITY_SCORE: 36

## 2025-01-01 NOTE — PLAN OF CARE
Problem: Adult Inpatient Plan of Care  Goal: Absence of Hospital-Acquired Illness or Injury  Intervention: Identify and Manage Fall Risk  Recent Flowsheet Documentation  Taken 1/1/2025 1313 by Jonel Feldman RN  Safety Promotion/Fall Prevention: safety round/check completed  Taken 1/1/2025 1130 by Jonel Feldman RN  Safety Promotion/Fall Prevention: safety round/check completed  Taken 1/1/2025 0900 by Jonel Feldman RN  Safety Promotion/Fall Prevention: safety round/check completed     Problem: Adult Inpatient Plan of Care  Goal: Absence of Hospital-Acquired Illness or Injury  Intervention: Prevent Skin Injury  Recent Flowsheet Documentation  Taken 1/1/2025 1123 by Jonel Feldman RN  Body Position: position changed independently  Taken 1/1/2025 0900 by Jonel Feldman RN  Body Position: position changed independently   Goal Outcome Evaluation:         Patient vital signs are at baseline: Yes  Patient able to ambulate as they were prior to admission or with assist devices provided by therapies during their stay:  Yes  Patient MUST void prior to discharge:  Yes  Patient able to tolerate oral intake:  Yes  Pain has adequate pain control using Oral analgesics:  Yes  Does patient have an identified :  Yes  Has goal D/C date and time been discussed with patient:  Yes    Pt alert and oriented times 4. VSS-RA. Tolerating oral intake. Pain managed with PRN oxycodone. SBA. Pt pharmacy closed and stated was unable to  pain meds today. Paged ORTHO rounder but no response, paged hospitalist Dr. Franklin if pt pain meds can be changed to an open pharmacy. Dr. Magdiel mcwilliams in switching pharmacies. Discharge instructions provided to pt and daughter. Both states understanding, no concerns. Daughter to transport pt home.

## 2025-01-01 NOTE — PROGRESS NOTES
"[facility]    Medicine Progress Note - Hospitalist Service    Date of Admission:  12/31/2024    Assessment & Plan    Gemma Cowart is a 79 year old female admitted on 12/31/2024. She has a past medical history significant for stable COPD, T2DM diet controlled, history of SVT status post ablation, CKD stage IIIb, anxiety, and history of DVTs chronic anticoagulation with Eliquis who presented to [facility] for routine elective right total hip arthroplasty, hospital medicine was consulted for management of below medical conditions.     Hx of DVT  -PTA Eliquis when safe to do so from a surgical standpoint     Type 2 diabetes-diet controlled most recent A1c 5.6 on 10/8/2024  -postoperative blood glucose levels at goal (<180 mg/dL).  -Insulin Novolog medium correction scale QID AC HS.     Postoperative anemia secondary to acute blood loss  Hemoglobin prior to surgery 11.0 g/dL.   Hemogloin 8.3 g/dL.   No indication for transfusion.   Hemoglobin monitoring per orthopedic protocol.     COPD  -PTA Spiriva 2 puff daily.     CKD 3b  Creatinine stable at 1.55, baseline prior to surgery 1.7 mg/dL.   -Avoid nephrotoxins     Anxiety  -PTA Lexapro 20 mg daily.           Diet: Advance Diet as Tolerated: Regular Diet Adult  Discharge Instruction - Regular Diet Adult    DVT Prophylaxis: Defer to primary service  Farias Catheter: Not present  Lines: None     Cardiac Monitoring: None  Code Status: Full Code      Clinically Significant Risk Factors Present on Admission          # Hyperchloremia: Highest Cl = 108 mmol/L in last 2 days, will monitor as appropriate           # Drug Induced Coagulation Defect: home medication list includes an anticoagulant medication         # Anemia: based on hgb <11       # Obesity: Estimated body mass index is 30 kg/m  as calculated from the following:    Height as of this encounter: 1.575 m (5' 2\").    Weight as of this encounter: 74.4 kg (164 lb).       # COPD: noted on problem " list        Social Drivers of Health    Food Insecurity: High Risk (12/31/2024)    Food Insecurity     Within the past 12 months, did you worry that your food would run out before you got money to buy more?: Yes     Within the past 12 months, did the food you bought just not last and you didn t have money to get more?: Yes   Tobacco Use: Medium Risk (12/31/2024)    Patient History     Smoking Tobacco Use: Former     Smokeless Tobacco Use: Never     Passive Exposure: Current   Financial Resource Strain: Low Risk  (12/31/2024)    Financial Resource Strain     Within the past 12 months, have you or your family members you live with been unable to get utilities (heat, electricity) when it was really needed?: No   Recent Concern: Financial Resource Strain - High Risk (12/20/2024)    Financial Resource Strain     Within the past 12 months, have you or your family members you live with been unable to get utilities (heat, electricity) when it was really needed?: Yes   Transportation Needs: High Risk (12/31/2024)    Transportation Needs     Within the past 12 months, has lack of transportation kept you from medical appointments, getting your medicines, non-medical meetings or appointments, work, or from getting things that you need?: Yes   Physical Activity: Inactive (12/20/2024)    Exercise Vital Sign     Days of Exercise per Week: 0 days     Minutes of Exercise per Session: 0 min   Stress: Stress Concern Present (12/20/2024)    [place] [employer] - Occupational Stress Questionnaire     Feeling of Stress : Rather much   Social Connections: Unknown (12/20/2024)    Social Connection and Isolation Panel [NHANES]     Frequency of Social Gatherings with Friends and Family: More than three times a week          Disposition Plan     Medically Ready for Discharge: Anticipated Today             Junior Franklin DO  Hospitalist Service  [facility]  Securely message with Harmony Brantleymore  info)  Text page via [facility] Paging/Directory   ______________________________________________________________________    Interval History   Gemma reports no complaints. Pain is controlled.  She passed PT and OT evaluation.  Anticipating discharge.     Physical Exam   Vital Signs: Temp: 98.2  F (36.8  C) Temp src: Oral BP: 115/77 Pulse: 68   Resp: 17 SpO2: 97 % O2 Device: None (Room air) Oxygen Delivery: 2 LPM  Weight: 164 lbs 0 oz    Gen: Appears well, NAD, on RA  Card:Warm well perfused, pulses assumed patient talking  Pulm: Normal I/E effort on RA  Abd:Non distended  Skin:No obvious rashes or lesions on exposed areas of skin  Neuro AxOx4, S/S grossly intact, no obvious FND,   Psych:Pleasant, answering questions appropriately, insight good, judgement good, does not appear to be responding to I/E stimuli        Medical Decision Making       40 MINUTES SPENT BY ME on the date of service doing chart review, history, exam, documentation & further activities per the note.      Data     I have personally reviewed the following data over the past 24 hrs:    N/A  \   8.3 (L)   / N/A     N/A N/A N/A /  156 (H)   N/A N/A N/A \       Imaging results reviewed over the past 24 hrs:   Recent Results (from the past 24 hours)   XR Surgery JULIANO  Fluoro G/T 5 Min    Narrative    This exam was marked as non-reportable because it will not be read by a   radiologist or a [place] non-radiologist provider.

## 2025-01-01 NOTE — PROGRESS NOTES
"Orthopedic Progress Note      Assessment: 1 Day Post-Op  S/P Procedure(s):  RIGHT DIRECT ANTERIOR TOTAL HIP ARTHROPLASTY    Plan:   - Continue PT/OT  - Weightbearing status: as tolerated  - Anticoagulation: Eliquis in addition to SCDs, tommy stockings and early ambulation.  - Discharge planning: Discharge home today      Subjective:  Pain: Mild  Nausea, Vomiting: No  Lightheadedness, Dizziness: No  Neuro:  Patient denies new onset numbness or paresthesias    Patient is POD 1 right total hip arthroplasty by Dr. Schwartz.  Overall she is doing very well.  Pain is well-controlled with oral medications.  Denies numbness and tingling.    Objective:  /65 (BP Location: Right arm)   Pulse 64   Temp 97.7  F (36.5  C) (Oral)   Resp 16   Ht 1.575 m (5' 2\")   Wt 74.4 kg (164 lb)   LMP 09/24/1990   SpO2 96%   BMI 30.00 kg/m    The patient is A&Ox3. Appears comfortable.   Sensation is intact.  Dorsiflexion and plantar flexion is intact.  Dorsalis pedis pulse intact.  Calves are soft and non-tender. Negative Jonel's.  The incision is covered with mild ecchymosis surrounding dressing. Dressing C/D/I.    No drain     Pertinent Labs   Lab Results: personally reviewed.   Lab Results   Component Value Date    INR 2.18 (H) 07/24/2021    INR 2.08 (H) 07/24/2021    INR 3.5 (H) 07/16/2021     Lab Results   Component Value Date    WBC 5.3 12/03/2024    HGB 8.3 (L) 01/01/2025    HCT 35.4 12/03/2024    MCV 84 12/03/2024     12/03/2024     Lab Results   Component Value Date     12/31/2024    CO2 23 12/31/2024         Report completed by:  Art Rodriguez PA-C, MD  Date: 1/1/2025  Time: 10:26 AM       "

## 2025-01-01 NOTE — PROGRESS NOTES
01/01/25 0739   Appointment Info   Signing Clinician's Name / Credentials (OT) Sammy Alonzo OTR/L   Quick Adds   Quick Adds Certification   Living Environment   People in Home alone   Current Living Arrangements apartment   Transportation Anticipated family or friend will provide   Living Environment Comments Pt staying at daughter's townhouse for few weeks. Pt has FWW, walkin shower upstairs w w/ grab bars, higher toilets, reacher   Self-Care   Usual Activity Tolerance good   Current Activity Tolerance moderate   Equipment Currently Used at Home walker, rolling   Fall history within last six months no   Activity/Exercise/Self-Care Comment Pt IND w/ ADLs and IADLs at baseline   General Information   Onset of Illness/Injury or Date of Surgery 12/31/24   Referring Physician Humberto Richmond MD   Patient/Family Therapy Goal Statement (OT) To go home   Additional Occupational Profile Info/Pertinent History of Current Problem R MAURA   Existing Precautions/Restrictions no known precautions/restrictions   Right Lower Extremity (Weight-bearing Status) weight-bearing as tolerated (WBAT)   Cognitive Status Examination   Orientation Status orientation to person, place and time   Affect/Mental Status (Cognitive) WNL   Visual Perception   Visual Impairment/Limitations corrective lenses full-time   Sensory   Sensory Quick Adds sensation intact   Pain Assessment   Patient Currently in Pain Yes, see Vital Sign flowsheet   Posture   Posture not impaired   Range of Motion Comprehensive   General Range of Motion no range of motion deficits identified   Strength Comprehensive (MMT)   General Manual Muscle Testing (MMT) Assessment no strength deficits identified   Bed Mobility   Bed Mobility supine-sit;sit-supine   Comment (Bed Mobility) SBA-CGA   Transfers   Transfers bed-chair transfer;sit-stand transfer;toilet transfer;shower transfer   Transfer Comments SBA-CGA   Activities of Daily Living   BADL Assessment/Intervention lower  body dressing;toileting;bathing   Bathing Assessment/Intervention   [place] Level (Bathing) lower body;minimum assist (75% patient effort)   Lower Body Dressing Assessment/Training   [place] Level (Lower Body Dressing) lower body dressing skills;minimum assist (75% patient effort)   Toileting   [place] Level (Toileting) adjust/manage clothing;supervision   Clinical Impression   Criteria for Skilled Therapeutic Interventions Met (OT) Yes, treatment indicated   OT Diagnosis decreased ADLs   Influenced by the following impairments MAURA   OT Problem List-Impairments impacting ADL activity tolerance impaired;pain;mobility   Assessment of Occupational Performance 3-5 Performance Deficits   Identified Performance Deficits LE dressing/bathing, bed mobility, all transfers, toileting   Planned Therapy Interventions (OT) ADL retraining;bed mobility training;transfer training   Clinical Decision Making Complexity (OT) detailed assessment/moderate complexity   Risk & Benefits of therapy have been explained evaluation/treatment results reviewed;patient   OT Total Evaluation Time   OT Eval, Moderate Complexity Minutes (47659) 10   Therapy Certification   Medical Diagnosis MAURA   Start of Care Date 01/01/25   Certification date from 01/01/25   Certification date to 01/01/25   OT Goals   Therapy Frequency (OT) One time eval and treatment   OT Predicted Duration/Target Date for Goal Attainment 01/01/25   OT Goals Lower Body Dressing;Bed Mobility;Transfers;Toilet Transfer/Toileting   OT: Lower Body Dressing Modified independent;using adaptive equipment;Goal Met   OT: Bed Mobility Modified independent;supine to/from sitting;Goal Met   OT: Transfer Supervision/stand-by assist;with assistive device;Goal Met  (walkin shower)   OT: Toilet Transfer/Toileting Modified independent;toilet transfer;Goal Met   Interventions   Interventions Quick Adds Self-Care/Home Management   Self-Care/Home Management   Self-Care/Home Mgmt/ADL,  Compensatory, Meal Prep Minutes (34795) 24   Symptoms Noted During/After Treatment (Meal Preparation/Planning Training) none   Treatment Detail/Skilled Intervention Pt edu on compensatory strategies for LE dressing using sock aid - completed Mod I w/ AE. Pt wants sock aid for home. STSx2 w/ SBA and FWW. Pt amb. 25 ft to BR w/ FWW Mod I. Edu on RTS/walkin shower transfer w/ grab bars - completed each Mod I. Educ on shower chair w/ pt will be buying online along w/ sock aid. Pt instructed on bed mobility techniques - completed sit<>supine Mod I. Pt edu on sleeping position,LE bathing techniques, and car transfers - pt verbalized understanding. Pt amb. back to recliner and setup for breakfast.   OT Discharge Planning   OT Plan DC OT   OT Discharge Recommendation (DC Rec)   (defer to ortho)   OT Rationale for DC Rec Pt tolerating therapy well. Pt has good home setup and will be staying w/ daughter and grandson will be helping as well. Pt getting sock aid and shower chair for home.   OT Brief overview of current status Mod I w/ ADLs   OT Equipment Needed at Discharge dressing equipment;shower chair  (sock aid)   Total Session Time   Timed Code Treatment Minutes 24   Total Session Time (sum of timed and untimed services) 34     [facility]                                                                                   OUTPATIENT OCCUPATIONAL THERAPY    PLAN OF TREATMENT FOR OUTPATIENT REHABILITATION   Patient's Last Name, First Name, Gemma Card YOB: 1945   Provider's Name   [facility]   Medical Record No.  [id number]     Onset Date: 12/31/24 Start of Care Date: 01/01/25     Medical Diagnosis:  MAURA               OT Diagnosis:  decreased ADLs Certification Dates:  From: 01/01/25  To: 01/01/25     See note for plan of treatment, functional goals, and certification details.    I CERTIFY THE NEED FOR THESE SERVICES FURNISHED UNDER         THIS PLAN OF TREATMENT AND WHILE UNDER MY CARE (Physician co-signature of this document indicates review and certification of the therapy plan).

## 2025-01-06 ENCOUNTER — PATIENT OUTREACH (OUTPATIENT)
Dept: GERIATRIC MEDICINE | Facility: CLINIC | Age: 80
End: 2025-01-06
Payer: COMMERCIAL

## 2025-01-06 NOTE — PROGRESS NOTES
[facility] Care Coordination Contact    Care coordinator was notified that member went in for outpatient procedure only on 12/31/24.    Ifrah Emerson RN  [facility]  [phone]

## 2025-01-06 NOTE — PROGRESS NOTES
TRANSITIONS OF CARE (KENTON) LOG    KENTON tasks should be completed by the CC within one (1) business day of notification of each transition. Follow up contact with member is required after return to their usual care setting.  Note:  If CC finds out about the transitions fifteen (15) days or more after the member has returned to their usual care setting, no KENTON log is needed. However, the CC should check in with the member to discuss the transition process, any changes needed to the care plan and document it in a case note.     Member Name:  Gemma Cowart [facility] Member ID[facility] [id number]   Product: [facility] Care Coordinator Contact:  Ifrah Emerson RN Agency/County/Care System: [facility]   Transition Communication Actions from Care Management Contact   Transition #1   Notification Date: 1/6/25 Transition Date:   12/31/24 Transition From: Home     Is this the member s usual care setting?               yes Transition To: [facility]   Transition Type:  Planned procedure/surgery but unplanned overnight stay. Member was scheduled for outpatient/same day surgery on 12/31/24.    Documentation from conversation with the member/responsible party, provider, discharging and receiving facility:   Date: 1/6/25: Received notification of admission to hospital with dx of scheduled and elective right hip replacement  CC contacted Hospital /discharge planner,N/A. Member discharged before care coordinator was notified of admission.  CC reached out to member regarding transition: N/A. Member discharged before care coordinator was notified of admission.  Reviewed and update support plan as needed.  Notified community service providers and placed services None on hold as needed.  Transition log initiated.   PCP, Arminda Foley, notified of hospitalization via EMR.    Ifrah Emerson RN  [facility]  [phone]     Transition #2   Notification Date: 1/6/25  Transition Date:   1/1/25 Transition From: [facility]     Is this the member s usual care setting?               no Transition To: Home   Transition Type:  Planned    Documentation from conversation with the member/responsible party, provider, discharging and receiving facility:   Date: 1/6/25: Received notification of transition to home.  CC contacted member and reviewed discharge summary.  Member has a follow-up appointment with PCP in 7 days: no but discharge instructions is to see RI in 2 weeks and member has appointment scheduled for 1/21/24  Member has had a change in condition: No. Member reports she is recovering well and receiving PT.  Home visit needed: No. Member is due for annual HRA and care coordinator will see member on 1/16/25.  Support Plan reviewed and updated.  The following home based services None were resumed.  New referrals placed: No  Transition log completed.   PCP, Arminda Foley, notified of transition back to home via EMR.    Ifrah Emerson RN  [facility]  [phone]       *RETURN TO USUAL CARE SETTING: *Complete tasks below when the member is discharging TO their usual care setting within one (1) business day of notification..      For situations where the Care Coordinator is notified of the discharge prior to the date of discharge, the Care Coordinator must follow up with the member or designated representative to confirm that discharge actually occurred and discuss required KENTON tasks as outlined in the KENTON Instructions.  (This includes situations where it may be a  new  usual care setting for the member. (i.e., a community member who decides upon permanent nursing home placement following hospitalization and rehab).    Discuss with Member/Responsible Party:    Check  Yes  - if the member, family member and/or SNF/facility staff manages the following:    If  No  provide explanation in the comments section.          Date completed: 1/6/25  Communicated with member or their designated representative about the following:  care transition process; about changes to the member s health status; plan of care updates; education about transitions and how to prevent unplanned transitions/readmissions    Four Pillars for Optimal Transition:    Check  Yes  - if the member, family member and/or SNF/facility staff manages the following:    If  No  provide explanation in the comments section.          [x]  Yes     []  No Does the member have a follow-up appointment scheduled with primary care or specialist? (Mental health hospitalizations--the appt. should be w/in 7 days) per Discharge instructions, member to follow up in 2 weeks and member has appoint scheduled for 1/21/25.              For mental health hospitalizations:  []  Yes     [x]  No     Does the member have a follow-up appointment scheduled with a mental health practitioner within 7 days of discharge?  [x]  Yes     []  No     Has a medication review been completed with member? If no, refer to PCP, home care nurse, MTM, pharmacist  [x]  Yes     []  No     Can the member manage their medications or is there a system in place to manage medications (e.g. home care set-up)?         [x]  Yes     []  No     Can the member verbalize warning signs and symptoms to watch for and how to respond?  [x]  Yes     []  No     Does the member have a copy of and understand their discharge instructions?  If no, assist to obtain copy of discharge instructions, review discharge instructions, and assist to contact PCP to discuss questions about their recent hospitalization.  []  Yes     [x]  No     Does the member have adequate food, housing and transportation?  If no, add goal and discuss additional supports available to the member                  Per SDOH on 12/31/24, concerns with food but care coordinator will address more with member at visit.                                                                                                                                                                 [x]  Yes     []  No     Is the member safe in their home?  If no, document needs and support provided                                                                                                                                                                          []  Yes     [x]  No     Are there any concerns of vulnerability, abuse, or neglect?  If yes, document concerns and actions taken by Care Coordinator as a mandated                                                                                                                                                                              [x]  Yes     []  No     Does the member use a Personal Health Care Record?  Check  Yes  if visit summary, discharge summary, and/or healthcare summary are being used as a PHR.                                                                                                                                                                                  [x]  Yes     []  No     Have you reviewed the discharge summary with the member? If  No  provide explanation in comments.  [x]  Yes     []  No     Have you updated the member s care plan/support plan? Add new diagnosis, medications, treatments, goals & interventions, as applicable. If No, provide explanation in comments.    Comments:           Notes from conversation with the member/responsible party, provider, discharging and receiving facility (as applicable): see transition 2 above.  Ifrah Emerson RN  [facility]  [phone]

## 2025-01-09 NOTE — H&P
Pre-Endoscopy History and Physical     Gemma Cowart MRN# [id number]   YOB: 1945 Age: 78 year old     Date of Procedure: 9/6/2024  Primary care provider: Arminda Foley  Type of Endoscopy: Gastroscopy with possible biopsy, possible dilation  Reason for Procedure: dysphagia  Type of Anesthesia Anticipated: Conscious Sedation    HPI:    Gemma is a 78 year old female who will be undergoing the above procedure.      A history and physical has been performed. The patient's medications and allergies have been reviewed. The risks and benefits of the procedure and the sedation options and risks were discussed with the patient.  All questions were answered and informed consent was obtained.      She denies a personal or family history of anesthesia complications or bleeding disorders.     Patient Active Problem List   Diagnosis    Nontoxic uninodular goiter    Rosacea    Vitamin D deficiency disease    Vitamin B12 deficiency    Hyperlipidemia LDL goal <100    Anxiety    Insomnia    Type 2 diabetes mellitus with other specified complication (H)    Psoriasis    Long term current use of anticoagulant-hx multiple DVTs    Malignant neoplasm of nipple of left breast in female, unspecified estrogen receptor status (H)    CKD (chronic kidney disease) stage 3, GFR 30-59 ml/min (H)    Postmenopausal bleeding    COPD (chronic obstructive pulmonary disease) (H)    Iron deficiency anemia, unspecified iron deficiency anemia type    Elevated parathyroid hormone    Other osteoporosis without current pathological fracture    Dysphonia    History of lobectomy of thyroid    Problem with voice production        Past Medical History:   Diagnosis Date    Anemia     iron deficiency anemia    Anxiety     Breast cancer (H) 2012    Bilateral Masectomies 9/2012    Complications of gastric bypass surgery     COPD (chronic obstructive pulmonary disease) (H) 04/21/2021    Diabetes mellitus (H)     diet controlled    DVT (deep  venous thrombosis) (H)     Eczema     Fracture of left knee region     patella - vertical    History of kidney stones     Obesity     Palpitation     with no treatment    Polyneuropathy     Pruritus     generalized    Rosacea     SVT (supraventricular tachycardia) (H24)     s/p ablation 5/28/2015 at [facility] for left lateral AP        Past Surgical History:   Procedure Laterality Date    ABDOMEN SURGERY  2000    gastric bypass 2000    COLONOSCOPY  10/11/2012    Procedure: COLONOSCOPY;  colonoscopy;  Surgeon: Gela Cleary MD;  Location:  GI    COLONOSCOPY N/A 10/6/2017    Procedure: COLONOSCOPY;;  Surgeon: Shashank Ortiz MD;  Location:  GI    COLONOSCOPY N/A 3/7/2022    Procedure: COLONOSCOPY;  Surgeon: Andrea Daniel MD;  Location:  GI    DENTAL SURGERY  5/2007    all teeth removed - dentures    ENT SURGERY      all teeth pulled    ESOPHAGOSCOPY, GASTROSCOPY, DUODENOSCOPY (EGD), COMBINED N/A 10/6/2017    Procedure: COMBINED ESOPHAGOSCOPY, GASTROSCOPY, DUODENOSCOPY (EGD);  ESOPHAGOSCOPY, GASTROSCOPY, DUODENOSCOPY (EGD) & Colonoscopy *Needs INR on arrival;  Surgeon: Shashank Ortiz MD;  Location:  GI    GYN SURGERY  1/10/75    tubal ligation     H ABLATION SVT  5/28/2015    stopped metoprolol    HERNIA REPAIR  2005    IMPLANT FILTER INFERIOR VENA CAVA  10/8/2012    taken out  1/4/12    INSERT TISSUE EXPANDER BREAST BILATERAL  9/5/2012    Procedure: INSERT TISSUE EXPANDER BREAST BILATERAL;;  Surgeon: Orlando Wall MD;  Location:  OR    MASTECTOMY SIMPLE, SENTINEL NODE, COMBINED  9/5/2012    Procedure: COMBINED MASTECTOMY SIMPLE, SENTINEL NODE;  BILATERAL MASTECTOMY WITH LEFT AXILLARY SENTINEL LYMPH NODE BIOPSY, BILATERAL TISSUE EXPANDER       MASTECTOMY, BILATERAL      PANNICULECTOMY N/A 4/5/2018    Procedure: PANNICULECTOMY;  PANNICULECTOMY ;  Surgeon: Orlando Wall MD;  Location:  OR    RECONSTRUCT BREAST BILATERAL, IMPLANT PROSTHESIS BILATERAL, COMBINED   2013    Procedure: COMBINED RECONSTRUCT BREAST BILATERAL, IMPLANT PROSTHESIS BILATERAL;  BILATERAL SECOND STAGE BREAST RECONSTRUCTION WITH SILICONE GEL IMPLANTS AND LIPOSUCTION;  Surgeon: Orlando Wall MD;  Location: [facility]    RECONSTRUCT NIPPLE BILATERAL  2013    Procedure: RECONSTRUCT NIPPLE BILATERAL;  BILATERAL NIPPLE AREOLAR RECONSTRUCTION;  Surgeon: Orlando Wall MD;  Location: [facility]    THYROIDECTOMY N/A 2023    Procedure: Left Thyroid Lobectomy and Autotransplant of Left Superior Parathyroid;  Surgeon: Haley Garcia MD;  Location: [facility] OR    ZZHC BREAST RECONSTRUC W OTHR TECHNIQ  2013       Social History     Tobacco Use    Smoking status: Former     Current packs/day: 0.00     Average packs/day: 1.5 packs/day for 25.0 years (37.5 ttl pk-yrs)     Types: Cigarettes     Start date: 10/21/1961     Quit date: 10/21/1986     Years since quittin.9     Passive exposure: Never    Smokeless tobacco: Never    Tobacco comments:     quit    Substance Use Topics    Alcohol use: Not Currently       Family History   Problem Relation Age of Onset    Cancer - colorectal Mother     Colon Cancer Mother     Prostate Cancer Father     Alzheimer Disease Father     Cancer Paternal Grandmother     Alzheimer Disease Paternal Grandfather     Cancer - colorectal Brother     Diabetes Brother     Crohn's Disease Daughter     Diabetes Brother     Eye Disorder Brother     Diabetes Son     Cancer Maternal Uncle     Cancer Maternal Uncle        Prior to Admission medications    Medication Sig Start Date End Date Taking? Authorizing Provider   apixaban ANTICOAGULANT (ELIQUIS ANTICOAGULANT) 5 MG tablet Take 1 tablet (5 mg) by mouth 2 times daily 23  Yes Arminda Foley MD   escitalopram (LEXAPRO) 20 MG tablet Take 1 tablet (20 mg) by mouth daily 24  Yes Arminda Foley MD   magnesium gluconate (MAGONATE) 500 (27 Mg) MG tablet Take 1 tablet (500 mg) by mouth 2 times daily  "7/30/24  Yes Haley Singh MD   tiotropium (SPIRIVA RESPIMAT) 2.5 MCG/ACT inhaler Inhale 2 puffs into the lungs daily 4/19/24  Yes Arminda Foley MD   acetaminophen (TYLENOL) 500 MG tablet Take 2 tablets (1,000 mg) by mouth every 6 hours as needed for mild pain 11/14/23   Haley Garcia MD   amoxicillin-clavulanate (AUGMENTIN) 875-125 MG tablet  5/18/24   Reported, Patient   blood glucose (NO BRAND SPECIFIED) lancets standard Use to test blood sugar 2 times daily or as directed. 6/14/24   Arminda Foley MD   blood glucose (ONETOUCH VERIO IQ) test strip Use to test blood sugar 2 times daily or as directed. 8/3/23   Arminda Larson APRN CNP   blood glucose calibration (NO BRAND SPECIFIED) solution Use to calibrate blood glucose monitor as directed. One touch verio Flex 8/23/24   Tram Horowitz PA-C   Blood Glucose Monitoring Suppl (ONETOUCH VERIO REFLECT) w/Device KIT USE TO TEST TWICE DAILY 4/25/24   Arminda Foley MD   Cholecalciferol (D3-1000) 25 MCG (1000 UT) CAPS Take 3 capsules by mouth daily 6/14/24   Reported, Patient   Cholecalciferol (VITAMIN D3) 75 MCG (3000 UT) TABS Take 1 tablet by mouth daily 4/19/24   Arminda Foley MD   clobetasol (TEMOVATE) 0.05 % external cream Apply twice daily to rash (thigh, knees, hands, feet) twice daily for 7 days AS NEEDED \"use sparingly and not to be used on the face\" Follow up recommended. 9/26/22   Arminda Larson APRN CNP   cyanocobalamin (VITAMIN B-12) 1000 MCG tablet Take 1 tablet (1,000 mcg) by mouth daily 6/14/24   Arminda Foley MD   Lancets (ONETOUCH DELICA PLUS CAMVUN05I) MISC USE TO TEST BLOOD SUGAR 2 TIMES DAILY OR AS DIRECTED 6/14/24   Reported, Patient   magnesium 200 MG TABS TAKE 2 TABLETS BY MOUTH DAILY AT 2PM 6/14/24   Reported, Patient   VITAMIN D3 25 MCG (1000 UT) tablet Take 3 tablets by mouth daily at 2 pm 4/22/24   Reported, Patient   tiotropium (SPIRIVA RESPIMAT) 2.5 MCG/ACT inhaler " "Inhale 2 puffs into the lungs daily 3/10/22   Arminda Larson APRN CNP       Allergies   Allergen Reactions    Povidone Iodine Hives    Bacitracin Unknown    Dust Mites     Nickel         REVIEW OF SYSTEMS:   5 point ROS negative except as noted above in HPI, including Gen., Resp., CV, GI &  system review.    PHYSICAL EXAM:   /66   Pulse (!) 48   Resp 25   Wt 73 kg (161 lb)   LMP 09/24/1990   SpO2 100%   BMI 28.98 kg/m   Estimated body mass index is 28.98 kg/m  as calculated from the following:    Height as of 8/9/24: 1.588 m (5' 2.5\").    Weight as of this encounter: 73 kg (161 lb).   GENERAL APPEARANCE: alert, and oriented  MENTAL STATUS: alert  AIRWAY EXAM: Mallampatti Class I (visualization of the soft palate, fauces, uvula, anterior and posterior pillars)  RESP: lungs clear to auscultation - no rales, rhonchi or wheezes  CV: regular rates and rhythm  DIAGNOSTICS:    Not indicated    IMPRESSION   ASA Class 2 - Mild systemic disease    PLAN:   Plan for Gastroscopy with possible biopsy, possible dilation. We discussed the risks, benefits and alternatives and the patient wished to proceed.    The above has been forwarded to the consulting provider.      Signed Electronically by: Andrea Daniel MD  September 6, 2024          " Improved

## 2025-01-13 ENCOUNTER — TRANSFERRED RECORDS (OUTPATIENT)
Dept: HEALTH INFORMATION MANAGEMENT | Facility: CLINIC | Age: 80
End: 2025-01-13
Payer: COMMERCIAL

## 2025-01-14 DIAGNOSIS — N18.30 STAGE 3 CHRONIC KIDNEY DISEASE, UNSPECIFIED WHETHER STAGE 3A OR 3B CKD (H): ICD-10-CM

## 2025-01-14 DIAGNOSIS — D50.9 IRON DEFICIENCY ANEMIA, UNSPECIFIED IRON DEFICIENCY ANEMIA TYPE: Primary | ICD-10-CM

## 2025-01-14 RX ORDER — EPINEPHRINE 1 MG/ML
0.3 INJECTION, SOLUTION INTRAMUSCULAR; SUBCUTANEOUS EVERY 5 MIN PRN
OUTPATIENT
Start: 2025-01-14

## 2025-01-14 RX ORDER — DIPHENHYDRAMINE HYDROCHLORIDE 50 MG/ML
50 INJECTION INTRAMUSCULAR; INTRAVENOUS
Start: 2025-01-14

## 2025-01-14 RX ORDER — ALBUTEROL SULFATE 0.83 MG/ML
2.5 SOLUTION RESPIRATORY (INHALATION)
OUTPATIENT
Start: 2025-01-14

## 2025-01-14 RX ORDER — HEPARIN SODIUM,PORCINE 10 UNIT/ML
5-20 VIAL (ML) INTRAVENOUS DAILY PRN
OUTPATIENT
Start: 2025-01-14

## 2025-01-14 RX ORDER — HEPARIN SODIUM (PORCINE) LOCK FLUSH IV SOLN 100 UNIT/ML 100 UNIT/ML
5 SOLUTION INTRAVENOUS
OUTPATIENT
Start: 2025-01-14

## 2025-01-14 RX ORDER — DIPHENHYDRAMINE HYDROCHLORIDE 50 MG/ML
25 INJECTION INTRAMUSCULAR; INTRAVENOUS
Start: 2025-01-14

## 2025-01-14 RX ORDER — MEPERIDINE HYDROCHLORIDE 25 MG/ML
25 INJECTION INTRAMUSCULAR; INTRAVENOUS; SUBCUTANEOUS
OUTPATIENT
Start: 2025-01-14

## 2025-01-14 RX ORDER — ALBUTEROL SULFATE 90 UG/1
1-2 INHALANT RESPIRATORY (INHALATION)
Start: 2025-01-14

## 2025-01-14 RX ORDER — METHYLPREDNISOLONE SODIUM SUCCINATE 40 MG/ML
40 INJECTION INTRAMUSCULAR; INTRAVENOUS
Start: 2025-01-14

## 2025-01-16 ENCOUNTER — PATIENT OUTREACH (OUTPATIENT)
Dept: GERIATRIC MEDICINE | Facility: CLINIC | Age: 80
End: 2025-01-16
Payer: COMMERCIAL

## 2025-01-16 ENCOUNTER — TELEPHONE (OUTPATIENT)
Dept: PEDIATRICS | Facility: CLINIC | Age: 80
End: 2025-01-16
Payer: COMMERCIAL

## 2025-01-16 DIAGNOSIS — Z47.1 AFTERCARE FOLLOWING RIGHT HIP JOINT REPLACEMENT SURGERY: Primary | ICD-10-CM

## 2025-01-16 DIAGNOSIS — Z96.641 AFTERCARE FOLLOWING RIGHT HIP JOINT REPLACEMENT SURGERY: Primary | ICD-10-CM

## 2025-01-16 DIAGNOSIS — Z85.3 PERSONAL HISTORY OF MALIGNANT NEOPLASM OF BREAST: Primary | ICD-10-CM

## 2025-01-16 NOTE — TELEPHONE ENCOUNTER
Order/Referral Request    Who is requesting: Mammography    Orders being requested: MRI    Reason service is needed/diagnosis:     Patient has a mammo and US scheduled on 1/20/2025, per caller was advised to have patient redo MRI before having these other imaging done.    I did reach out to patient to let her know that her appts on 1/20/2025 would likely need to be cancelled due to delay/schedule availability.     Patient was seen for breast issues, by Gemma Caicedo.

## 2025-01-17 NOTE — TELEPHONE ENCOUNTER
Lmtcb X 1. If  patient calls back please relay information below, or transfer to TC or RN line for further discussion.

## 2025-01-20 NOTE — TELEPHONE ENCOUNTER
Call out to patient to discuss what orders were placed and why. If she'd like to discuss further, she is invited to call back.     Post mastectomy. No longer needs mammograms or ultrasounds per chart notes. Breast MRI completed 12/20/24 due to breast pain. Repeat MRI advised.

## 2025-01-20 NOTE — TELEPHONE ENCOUNTER
Call taken today ( duplicate encounter 1-) with pt unsure if all these images ordered are necessary. I did call and speak with patient to relay the MRI requested imaging advised below.     Pt isn't happy with the care she is receiving and feels there are too many images ordered. I did offer to schedule an appt with provider to discuss further but patient stated she had another provider she will see for her breast care.

## 2025-01-21 ENCOUNTER — OFFICE VISIT (OUTPATIENT)
Dept: OBGYN | Facility: CLINIC | Age: 80
End: 2025-01-21
Payer: COMMERCIAL

## 2025-01-21 VITALS — SYSTOLIC BLOOD PRESSURE: 142 MMHG | DIASTOLIC BLOOD PRESSURE: 60 MMHG | BODY MASS INDEX: 30 KG/M2 | WEIGHT: 164 LBS

## 2025-01-21 DIAGNOSIS — N39.3 STRESS INCONTINENCE IN FEMALE: ICD-10-CM

## 2025-01-21 DIAGNOSIS — N81.4 UTERINE PROLAPSE: Primary | ICD-10-CM

## 2025-01-21 PROCEDURE — 99024 POSTOP FOLLOW-UP VISIT: CPT | Performed by: OBSTETRICS & GYNECOLOGY

## 2025-01-21 ASSESSMENT — ACTIVITIES OF DAILY LIVING (ADL): DEPENDENT_IADLS:: MONEY MANAGEMENT;TRANSPORTATION

## 2025-01-21 ASSESSMENT — PATIENT HEALTH QUESTIONNAIRE - PHQ9: SUM OF ALL RESPONSES TO PHQ QUESTIONS 1-9: 1

## 2025-01-21 NOTE — PROGRESS NOTES
[facility] Care Coordination Contact    [facility] Home Visit Assessment     Home visit for Health Risk Assessment with Gemma Cowart completed on January 16, 2025    Type of residence:: Apartment - handicap accessible  Current living arrangement:: I live alone     Assessment completed with:: Patient    Current Care Plan  Member currently receiving the following home care services:     Member currently receiving the following community resources: Other (see comment) (SNAP)      Medication Review  Medication reconciliation completed in Epic: Yes  Medication set-up & administration: Independent-does not set up.  Self-administers medications.  Medication Risk Assessment Medication (1 or more, place referral to MTM): N/A: No risk factors identified  MTM Referral Placed: No: No risk factors idenified    Mental/Behavioral Health   Depression Screening:   PHQ-2 Total Score (Adult) - Positive if 3 or more points; Administer PHQ-9 if positive: 0  PHQ-9 Total Score: 1    Mental health DX:: Yes   Mental health DX how managed:: Medication    Falls Assessment:   Fallen 2 or more times in the past year?: No   Any fall with injury in the past year?: No    ADL/IADL Dependencies:   Dependent ADLs:: Independent  Dependent IADLs:: Money Management, Transportation    [facility]: Shared information regarding [employer]. Reviewed preventative health screening and health plan supplemental benefits/incentives. Reviewed medication disposal form and transition of care member handout.    CFSS Assessment completed at visit: Not Applicable     Elderly Waiver Eligibility: No-does not meet criteria    Care Plan & Recommendations: member will continue to get informal support with transportation and paperwork as needed. She had already applied for Section 8 housing through [facility] and will follow up with them to see when she is eligible. She already has an apartment in mind that  she wants to move to.    Due to recent right hip replacement surgery, she would like shower chair. Order sent to PCP to sign.     [place].    Follow-Up Plan: Member informed of future contact, plan to f/u with member with a 6 month telephone assessment.  Contact information shared with member and family, encouraged member to call with any questions or concerns at any time.    [facility] care continuum providers: Please see Snapshot and Care Management Flowsheets for Specific details of care plan.    This CC note routed to PCP, Arminda oFley.    Ifrah Emerson RN  [facility]  [phone]

## 2025-01-21 NOTE — PROGRESS NOTES
Here for postop from vnotes hysterectomy, bilateral salpingo-oophorectomy, single incision sling.  Doing well.  Had her hip replacement and that went well also.  No leakage of urine, feels much better since surgery for prolapse.    BP (!) 142/60   Wt 74.4 kg (164 lb)   LMP 09/24/1990   BMI 30.00 kg/m      Pelvis: External genitalia, Bartholin, urethral, and [place] glands within normal limits. On spec exam, no prolapse, cuff well supported, no suture noted.    Cleared from all postop restrictions. RTC as needed.    Shanique Paniagua MD  [facility] Obstetrics and Gynecology

## 2025-01-22 ENCOUNTER — INFUSION THERAPY VISIT (OUTPATIENT)
Dept: INFUSION THERAPY | Facility: CLINIC | Age: 80
End: 2025-01-22
Attending: INTERNAL MEDICINE
Payer: COMMERCIAL

## 2025-01-22 VITALS
OXYGEN SATURATION: 98 % | DIASTOLIC BLOOD PRESSURE: 64 MMHG | HEART RATE: 73 BPM | SYSTOLIC BLOOD PRESSURE: 141 MMHG | TEMPERATURE: 99 F | RESPIRATION RATE: 18 BRPM

## 2025-01-22 DIAGNOSIS — N18.30 STAGE 3 CHRONIC KIDNEY DISEASE, UNSPECIFIED WHETHER STAGE 3A OR 3B CKD (H): Primary | ICD-10-CM

## 2025-01-22 DIAGNOSIS — D50.9 IRON DEFICIENCY ANEMIA, UNSPECIFIED IRON DEFICIENCY ANEMIA TYPE: ICD-10-CM

## 2025-01-22 PROCEDURE — 96374 THER/PROPH/DIAG INJ IV PUSH: CPT

## 2025-01-22 PROCEDURE — 258N000003 HC RX IP 258 OP 636: Performed by: INTERNAL MEDICINE

## 2025-01-22 PROCEDURE — 250N000011 HC RX IP 250 OP 636: Performed by: INTERNAL MEDICINE

## 2025-01-22 RX ORDER — MEPERIDINE HYDROCHLORIDE 25 MG/ML
25 INJECTION INTRAMUSCULAR; INTRAVENOUS; SUBCUTANEOUS
OUTPATIENT
Start: 2025-01-23

## 2025-01-22 RX ORDER — DIPHENHYDRAMINE HYDROCHLORIDE 50 MG/ML
25 INJECTION INTRAMUSCULAR; INTRAVENOUS
Start: 2025-01-23

## 2025-01-22 RX ORDER — HEPARIN SODIUM (PORCINE) LOCK FLUSH IV SOLN 100 UNIT/ML 100 UNIT/ML
5 SOLUTION INTRAVENOUS
OUTPATIENT
Start: 2025-01-23

## 2025-01-22 RX ORDER — ALBUTEROL SULFATE 0.83 MG/ML
2.5 SOLUTION RESPIRATORY (INHALATION)
OUTPATIENT
Start: 2025-01-23

## 2025-01-22 RX ORDER — DIPHENHYDRAMINE HYDROCHLORIDE 50 MG/ML
50 INJECTION INTRAMUSCULAR; INTRAVENOUS
Start: 2025-01-23

## 2025-01-22 RX ORDER — EPINEPHRINE 1 MG/ML
0.3 INJECTION, SOLUTION INTRAMUSCULAR; SUBCUTANEOUS EVERY 5 MIN PRN
OUTPATIENT
Start: 2025-01-23

## 2025-01-22 RX ORDER — ALBUTEROL SULFATE 90 UG/1
1-2 INHALANT RESPIRATORY (INHALATION)
Start: 2025-01-23

## 2025-01-22 RX ORDER — METHYLPREDNISOLONE SODIUM SUCCINATE 40 MG/ML
40 INJECTION INTRAMUSCULAR; INTRAVENOUS
Start: 2025-01-23

## 2025-01-22 RX ORDER — HEPARIN SODIUM,PORCINE 10 UNIT/ML
5-20 VIAL (ML) INTRAVENOUS DAILY PRN
OUTPATIENT
Start: 2025-01-23

## 2025-01-22 RX ADMIN — SODIUM CHLORIDE 250 ML: 9 INJECTION, SOLUTION INTRAVENOUS at 10:00

## 2025-01-22 RX ADMIN — IRON SUCROSE 200 MG: 20 INJECTION, SOLUTION INTRAVENOUS at 10:00

## 2025-01-22 NOTE — PROGRESS NOTES
Infusion Nursing Note:  Gemma WYATT Cowart presents today for venofer 2/5.    Patient seen by provider today: No   present during visit today: Not Applicable.    Note: Patient feeling fatigued with low iron levels; tolerated first dose well.      Intravenous Access:  Peripheral IV placed.    Treatment Conditions:  Not Applicable.      Post Infusion Assessment:  Patient tolerated infusion without incident.  Patient observed for 15 minutes post venofer per protocol.  Site patent and intact, free from redness, edema or discomfort.  No evidence of extravasations.  Access discontinued per protocol.       Discharge Plan:   AVS to patient via MYCHART.  Patient will return as prev jonathan for next appointment.   Patient discharged in stable condition accompanied by: self.  Departure Mode: Ambulatory with walker.      Jorge King RN

## 2025-01-27 ENCOUNTER — PATIENT OUTREACH (OUTPATIENT)
Dept: GERIATRIC MEDICINE | Facility: CLINIC | Age: 80
End: 2025-01-27
Payer: COMMERCIAL

## 2025-01-27 NOTE — LETTER
January 27, 2025       SALUD MARTINEZ  [address] [place] [address] [place] [id number]      Dear Salud,    At [facility], we re dedicated to improving your health and wellness. Enclosed is the Support Plan developed with you on 01/16/2025. Please review the Support Plan carefully.    As a reminder, during your visit we talked about:   Ways to manage your physical and mental health   Using health care to maintain and improve your health    Your preventive care needs      Remember to contact your care coordinator if you:   Are hospitalized or plan to be hospitalized    Have a fall     Have a change in your physical or mental health   Need help finding support or services    If you have questions or don t agree with your Support Plan, call me at [phone]. You can also call me if your needs change. TTY users call the [place] Rel[employer] at 814 or 1-330.270.6949 (upsuyn-gv-fxlhib relay service).    Sincerely,         Ifrah Emerson RN    E-Mail:  [email or web address]  Phone: [phone]      [employer] [facility]                [id number] accepted     (06/2024)                [address], [place] [address]  [phone]  fax [phone]  [facility]

## 2025-01-27 NOTE — PROGRESS NOTES
[facility] Care Coordination Contact    Received after visit chart from care coordinator.  Completed following tasks: Mailed copy of support plan to member, Mailed [place] Cho[place] signature sheet pages 3-4, and Mailed Safe Medication Disposal     Lilia Frias  Care Management Specialist  [facility]  [phone]

## 2025-01-29 ENCOUNTER — INFUSION THERAPY VISIT (OUTPATIENT)
Dept: INFUSION THERAPY | Facility: CLINIC | Age: 80
End: 2025-01-29
Attending: INTERNAL MEDICINE
Payer: COMMERCIAL

## 2025-01-29 VITALS
OXYGEN SATURATION: 98 % | RESPIRATION RATE: 18 BRPM | DIASTOLIC BLOOD PRESSURE: 68 MMHG | HEART RATE: 60 BPM | TEMPERATURE: 97.8 F | SYSTOLIC BLOOD PRESSURE: 131 MMHG

## 2025-01-29 DIAGNOSIS — D50.9 IRON DEFICIENCY ANEMIA, UNSPECIFIED IRON DEFICIENCY ANEMIA TYPE: ICD-10-CM

## 2025-01-29 DIAGNOSIS — N18.30 STAGE 3 CHRONIC KIDNEY DISEASE, UNSPECIFIED WHETHER STAGE 3A OR 3B CKD (H): Primary | ICD-10-CM

## 2025-01-29 PROCEDURE — 258N000003 HC RX IP 258 OP 636: Performed by: INTERNAL MEDICINE

## 2025-01-29 PROCEDURE — 250N000011 HC RX IP 250 OP 636: Performed by: INTERNAL MEDICINE

## 2025-01-29 PROCEDURE — 96374 THER/PROPH/DIAG INJ IV PUSH: CPT

## 2025-01-29 RX ORDER — DIPHENHYDRAMINE HYDROCHLORIDE 50 MG/ML
25 INJECTION INTRAMUSCULAR; INTRAVENOUS
Start: 2025-01-30

## 2025-01-29 RX ORDER — HEPARIN SODIUM (PORCINE) LOCK FLUSH IV SOLN 100 UNIT/ML 100 UNIT/ML
5 SOLUTION INTRAVENOUS
OUTPATIENT
Start: 2025-01-30

## 2025-01-29 RX ORDER — ALBUTEROL SULFATE 0.83 MG/ML
2.5 SOLUTION RESPIRATORY (INHALATION)
OUTPATIENT
Start: 2025-01-30

## 2025-01-29 RX ORDER — EPINEPHRINE 1 MG/ML
0.3 INJECTION, SOLUTION INTRAMUSCULAR; SUBCUTANEOUS EVERY 5 MIN PRN
OUTPATIENT
Start: 2025-01-30

## 2025-01-29 RX ORDER — HEPARIN SODIUM,PORCINE 10 UNIT/ML
5-20 VIAL (ML) INTRAVENOUS DAILY PRN
OUTPATIENT
Start: 2025-01-30

## 2025-01-29 RX ORDER — MEPERIDINE HYDROCHLORIDE 25 MG/ML
25 INJECTION INTRAMUSCULAR; INTRAVENOUS; SUBCUTANEOUS
OUTPATIENT
Start: 2025-01-30

## 2025-01-29 RX ORDER — METHYLPREDNISOLONE SODIUM SUCCINATE 40 MG/ML
40 INJECTION INTRAMUSCULAR; INTRAVENOUS
Start: 2025-01-30

## 2025-01-29 RX ORDER — DIPHENHYDRAMINE HYDROCHLORIDE 50 MG/ML
50 INJECTION INTRAMUSCULAR; INTRAVENOUS
Start: 2025-01-30

## 2025-01-29 RX ORDER — ALBUTEROL SULFATE 90 UG/1
1-2 INHALANT RESPIRATORY (INHALATION)
Start: 2025-01-30

## 2025-01-29 RX ADMIN — IRON SUCROSE 200 MG: 20 INJECTION, SOLUTION INTRAVENOUS at 11:20

## 2025-01-29 RX ADMIN — SODIUM CHLORIDE 250 ML: 9 INJECTION, SOLUTION INTRAVENOUS at 11:20

## 2025-01-29 ASSESSMENT — PAIN SCALES - GENERAL: PAINLEVEL_OUTOF10: NO PAIN (0)

## 2025-01-29 NOTE — PROGRESS NOTES
Infusion Nursing Note:  Gemma Cowart presents today for Venofer.    Patient seen by provider today: No   present during visit today: Not Applicable.    Note: N/A.      Intravenous Access:  Peripheral IV placed.    Treatment Conditions:  Not Applicable.      Post Infusion Assessment:  Patient tolerated infusion without incident.  Patient observed for 15 minutes post Venofer per protocol.  Site patent and intact, free from redness, edema or discomfort.  No evidence of extravasations.  Access discontinued per protocol.       Discharge Plan:   Discharge instructions reviewed with: Patient.  Patient and/or family verbalized understanding of discharge instructions and all questions answered.  AVS to patient via [employer].  Patient will return 2/24/25 for next appointment.   Patient discharged in stable condition accompanied by: self.  Departure Mode: Ambulatory.      Lenore Littlejohn RN

## 2025-02-06 ENCOUNTER — HOSPITAL ENCOUNTER (OUTPATIENT)
Dept: MRI IMAGING | Facility: CLINIC | Age: 80
Discharge: HOME | End: 2025-02-06
Attending: NURSE PRACTITIONER
Payer: COMMERCIAL

## 2025-02-06 DIAGNOSIS — Z85.3 PERSONAL HISTORY OF MALIGNANT NEOPLASM OF BREAST: ICD-10-CM

## 2025-02-06 PROCEDURE — 255N000002 HC RX 255 OP 636: Performed by: NURSE PRACTITIONER

## 2025-02-06 PROCEDURE — A9585 GADOBUTROL INJECTION: HCPCS | Performed by: NURSE PRACTITIONER

## 2025-02-06 PROCEDURE — 77049 MRI BREAST C-+ W/CAD BI: CPT

## 2025-02-06 RX ORDER — GADOBUTROL 604.72 MG/ML
7.5 INJECTION INTRAVENOUS ONCE
Status: COMPLETED | OUTPATIENT
Start: 2025-02-06 | End: 2025-02-06

## 2025-02-06 RX ADMIN — GADOBUTROL 7.5 ML: 604.72 INJECTION INTRAVENOUS at 12:08

## 2025-02-10 ENCOUNTER — TRANSFERRED RECORDS (OUTPATIENT)
Dept: HEALTH INFORMATION MANAGEMENT | Facility: CLINIC | Age: 80
End: 2025-02-10
Payer: COMMERCIAL

## 2025-02-12 ENCOUNTER — PATIENT OUTREACH (OUTPATIENT)
Dept: GERIATRIC MEDICINE | Facility: CLINIC | Age: 80
End: 2025-02-12
Payer: COMMERCIAL

## 2025-02-12 NOTE — PROGRESS NOTES
[facility] Care Coordination Contact    Called member and left a voice mail message to assist member to schedule Dental exam per care coordinator's request, and to indicate dental issues that member might have that would need to be address at the dental appointment. left contact info.     LUIS Barr  [facility]  [phone]

## 2025-02-18 ENCOUNTER — VIRTUAL VISIT (OUTPATIENT)
Dept: ENDOCRINOLOGY | Facility: CLINIC | Age: 80
End: 2025-02-18
Payer: COMMERCIAL

## 2025-02-18 DIAGNOSIS — M81.8 OTHER OSTEOPOROSIS WITHOUT CURRENT PATHOLOGICAL FRACTURE: ICD-10-CM

## 2025-02-18 DIAGNOSIS — C73 MALIGNANT NEOPLASM OF THYROID GLAND (H): ICD-10-CM

## 2025-02-18 DIAGNOSIS — E04.2 NON-TOXIC MULTINODULAR GOITER: Primary | ICD-10-CM

## 2025-02-18 PROCEDURE — 98007 SYNCH AUDIO-VIDEO EST HI 40: CPT | Performed by: INTERNAL MEDICINE
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
## 2025-02-18 NOTE — PATIENT INSTRUCTIONS
Reclast-- annual infusion, bisphosphonate (Fosamax like)  [email or web address]      Prolia-- q6 month   [email or web address]      Tymlos/Forteo-- daily injection x2 years  [email or web address]  [email or web address]      Evenity-- monthly injection x12 months  [email or web address]
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
## 2025-02-24 ENCOUNTER — INFUSION THERAPY VISIT (OUTPATIENT)
Dept: INFUSION THERAPY | Facility: CLINIC | Age: 80
End: 2025-02-24
Attending: INTERNAL MEDICINE
Payer: COMMERCIAL

## 2025-02-24 VITALS
DIASTOLIC BLOOD PRESSURE: 67 MMHG | HEART RATE: 56 BPM | OXYGEN SATURATION: 98 % | SYSTOLIC BLOOD PRESSURE: 123 MMHG | TEMPERATURE: 97.6 F | RESPIRATION RATE: 20 BRPM

## 2025-02-24 DIAGNOSIS — D50.9 IRON DEFICIENCY ANEMIA, UNSPECIFIED IRON DEFICIENCY ANEMIA TYPE: ICD-10-CM

## 2025-02-24 DIAGNOSIS — N18.30 STAGE 3 CHRONIC KIDNEY DISEASE, UNSPECIFIED WHETHER STAGE 3A OR 3B CKD (H): Primary | ICD-10-CM

## 2025-02-24 PROCEDURE — 96374 THER/PROPH/DIAG INJ IV PUSH: CPT

## 2025-02-24 PROCEDURE — 250N000011 HC RX IP 250 OP 636: Performed by: INTERNAL MEDICINE

## 2025-02-24 PROCEDURE — 258N000003 HC RX IP 258 OP 636: Performed by: INTERNAL MEDICINE

## 2025-02-24 RX ORDER — ALBUTEROL SULFATE 90 UG/1
1-2 INHALANT RESPIRATORY (INHALATION)
Status: CANCELLED
Start: 2025-02-24

## 2025-02-24 RX ORDER — HEPARIN SODIUM (PORCINE) LOCK FLUSH IV SOLN 100 UNIT/ML 100 UNIT/ML
5 SOLUTION INTRAVENOUS
Status: CANCELLED | OUTPATIENT
Start: 2025-02-24

## 2025-02-24 RX ORDER — ALBUTEROL SULFATE 0.83 MG/ML
2.5 SOLUTION RESPIRATORY (INHALATION)
Status: CANCELLED | OUTPATIENT
Start: 2025-02-24

## 2025-02-24 RX ORDER — HEPARIN SODIUM,PORCINE 10 UNIT/ML
5-20 VIAL (ML) INTRAVENOUS DAILY PRN
Status: CANCELLED | OUTPATIENT
Start: 2025-02-24

## 2025-02-24 RX ORDER — DIPHENHYDRAMINE HYDROCHLORIDE 50 MG/ML
50 INJECTION INTRAMUSCULAR; INTRAVENOUS
Status: CANCELLED
Start: 2025-02-24

## 2025-02-24 RX ORDER — METHYLPREDNISOLONE SODIUM SUCCINATE 40 MG/ML
40 INJECTION INTRAMUSCULAR; INTRAVENOUS
Status: CANCELLED
Start: 2025-02-24

## 2025-02-24 RX ORDER — MEPERIDINE HYDROCHLORIDE 25 MG/ML
25 INJECTION INTRAMUSCULAR; INTRAVENOUS; SUBCUTANEOUS
Status: CANCELLED | OUTPATIENT
Start: 2025-02-24

## 2025-02-24 RX ORDER — DIPHENHYDRAMINE HYDROCHLORIDE 50 MG/ML
25 INJECTION INTRAMUSCULAR; INTRAVENOUS
Status: CANCELLED
Start: 2025-02-24

## 2025-02-24 RX ORDER — EPINEPHRINE 1 MG/ML
0.3 INJECTION, SOLUTION INTRAMUSCULAR; SUBCUTANEOUS EVERY 5 MIN PRN
Status: CANCELLED | OUTPATIENT
Start: 2025-02-24

## 2025-02-24 RX ADMIN — IRON SUCROSE 200 MG: 20 INJECTION, SOLUTION INTRAVENOUS at 12:15

## 2025-02-24 RX ADMIN — SODIUM CHLORIDE 250 ML: 0.9 INJECTION, SOLUTION INTRAVENOUS at 12:15

## 2025-02-24 ASSESSMENT — PAIN SCALES - GENERAL: PAINLEVEL_OUTOF10: NO PAIN (0)

## 2025-02-24 NOTE — PROGRESS NOTES
Infusion Nursing Note:  Gemma WYATT Cowart presents today for Venofer infusion.    Patient seen by provider today: No   present during visit today: Not Applicable.    Note: pt here for dose 5/5 of Venofer 200mg. Pt states she has no noted side effects or reactions. Pt states not feeling any improvement in symptoms as of yet. Pt has lab redraw on 3/4/25.      Intravenous Access:  Peripheral IV placed.    Treatment Conditions:  Not Applicable.      Post Infusion Assessment:  Patient tolerated infusion without incident.  Patient observed for 15 minutes post Venofer 200mg per protocol.  Access discontinued per protocol.       Discharge Plan:   Discharge instructions reviewed with: Patient.  Patient and/or family verbalized understanding of discharge instructions and all questions answered.  Copy of AVS reviewed with patient and/or family.  Patient will return as scheduled for next appointment.  Patient discharged in stable condition accompanied by: self.  Departure Mode: Ambulatory with walker.      Mayi Alexandre RN

## 2025-02-26 DIAGNOSIS — Z86.718 PERSONAL HISTORY OF DVT (DEEP VEIN THROMBOSIS): ICD-10-CM

## 2025-02-26 DIAGNOSIS — F41.1 GAD (GENERALIZED ANXIETY DISORDER): ICD-10-CM

## 2025-02-26 RX ORDER — ESCITALOPRAM OXALATE 20 MG/1
20 TABLET ORAL DAILY
Qty: 90 TABLET | Refills: 0 | Status: SHIPPED | OUTPATIENT
Start: 2025-02-26

## 2025-02-26 NOTE — TELEPHONE ENCOUNTER
90 day ayleen.    Patient overdue for in person AWV/med check. She is scheduled in 2026. I'd like to see her within the next three months (last AWV was in 2023). Same day ok.    Arminda Foley MD  Internal Medicine/Pediatrics  [facility]

## 2025-02-27 NOTE — TELEPHONE ENCOUNTER
Called patient and scheduled physical at a sooner date.     Closing encounter at this time.     Elza Wilkins, CMA

## 2025-03-05 DIAGNOSIS — E55.9 VITAMIN D DEFICIENCY: ICD-10-CM

## 2025-03-05 DIAGNOSIS — E53.8 VITAMIN B12 DEFICIENCY: Primary | ICD-10-CM

## 2025-03-05 RX ORDER — VITAMIN B COMPLEX
3 TABLET ORAL DAILY
Qty: 270 TABLET | Refills: 3 | Status: SHIPPED | OUTPATIENT
Start: 2025-03-05

## 2025-03-05 NOTE — TELEPHONE ENCOUNTER
Vitamin B-a2 2500 mcg was discontinued while pt was in the hospital, entered as historical when she got discharged for 1000 mcg daily.     Called pt to get clarification. Pt says that she has been taking vitamin B12 1000 mcg 6 days a week(skips on Mondays). Also, she was advised by a pharmacist that she should be taking vitamin D3 25 mcg daily, but she was taking 75 mcg daily. Please send rx for the appropriate dose. Thanks.     vitamin B-12 (CYANOCOBALAMIN) 2500 MCG sublingual tablet (Discontinued) 90 tablet 3 10/14/2024 12/31/2024 No   Sig - Route: Take 1 tablet (2,500 mcg) by mouth daily. - Oral     Vitamin D3 (CHOLECALCIFEROL) 25 mcg (1000 units) tablet -- --  -- No   Sig - Route: Take 3 tablets by mouth daily. - Oral     Carlos Alejandra RN  [facility]

## 2025-03-05 NOTE — TELEPHONE ENCOUNTER
Clinic RN: Please investigate patient's chart or contact patient if the information cannot be found because the medication is listed as historical or discontinued. Confirm patient is taking this medication. Document findings and route refill encounter to provider for approval or denial.  Thanks,  Elinor VELA RN

## 2025-03-11 ENCOUNTER — OFFICE VISIT (OUTPATIENT)
Dept: PEDIATRICS | Facility: CLINIC | Age: 80
End: 2025-03-11
Attending: PEDIATRICS
Payer: COMMERCIAL

## 2025-03-11 VITALS
SYSTOLIC BLOOD PRESSURE: 136 MMHG | WEIGHT: 160.9 LBS | RESPIRATION RATE: 17 BRPM | OXYGEN SATURATION: 96 % | HEIGHT: 62 IN | BODY MASS INDEX: 29.61 KG/M2 | HEART RATE: 56 BPM | TEMPERATURE: 99 F | DIASTOLIC BLOOD PRESSURE: 63 MMHG

## 2025-03-11 DIAGNOSIS — J43.9 PULMONARY EMPHYSEMA, UNSPECIFIED EMPHYSEMA TYPE (H): ICD-10-CM

## 2025-03-11 DIAGNOSIS — Z86.718 PERSONAL HISTORY OF DVT (DEEP VEIN THROMBOSIS): ICD-10-CM

## 2025-03-11 DIAGNOSIS — E11.69 TYPE 2 DIABETES MELLITUS WITH OTHER SPECIFIED COMPLICATION, WITHOUT LONG-TERM CURRENT USE OF INSULIN (H): ICD-10-CM

## 2025-03-11 DIAGNOSIS — Z13.9 ENCOUNTER FOR SCREENING INVOLVING SOCIAL DETERMINANTS OF HEALTH (SDOH): ICD-10-CM

## 2025-03-11 DIAGNOSIS — Z00.00 ENCOUNTER FOR MEDICARE ANNUAL WELLNESS EXAM: Primary | ICD-10-CM

## 2025-03-11 DIAGNOSIS — L40.9 PSORIASIS: Chronic | ICD-10-CM

## 2025-03-11 DIAGNOSIS — E53.8 VITAMIN B12 DEFICIENCY: ICD-10-CM

## 2025-03-11 DIAGNOSIS — N18.4 CHRONIC KIDNEY DISEASE, STAGE 4 (SEVERE) (H): ICD-10-CM

## 2025-03-11 DIAGNOSIS — J44.9 CHRONIC OBSTRUCTIVE PULMONARY DISEASE, UNSPECIFIED COPD TYPE (H): ICD-10-CM

## 2025-03-11 DIAGNOSIS — F41.1 GAD (GENERALIZED ANXIETY DISORDER): ICD-10-CM

## 2025-03-11 LAB
EST. AVERAGE GLUCOSE BLD GHB EST-MCNC: 105 MG/DL
HBA1C MFR BLD: 5.3 % (ref 0–5.6)

## 2025-03-11 PROCEDURE — 99214 OFFICE O/P EST MOD 30 MIN: CPT | Mod: 25 | Performed by: PEDIATRICS

## 2025-03-11 PROCEDURE — 99207 PR FOOT EXAM NO CHARGE: CPT | Mod: 25 | Performed by: PEDIATRICS

## 2025-03-11 PROCEDURE — 3078F DIAST BP <80 MM HG: CPT | Performed by: PEDIATRICS

## 2025-03-11 PROCEDURE — G2211 COMPLEX E/M VISIT ADD ON: HCPCS | Performed by: PEDIATRICS

## 2025-03-11 PROCEDURE — 1126F AMNT PAIN NOTED NONE PRSNT: CPT | Performed by: PEDIATRICS

## 2025-03-11 PROCEDURE — 36415 COLL VENOUS BLD VENIPUNCTURE: CPT | Performed by: PEDIATRICS

## 2025-03-11 PROCEDURE — 83036 HEMOGLOBIN GLYCOSYLATED A1C: CPT | Performed by: PEDIATRICS

## 2025-03-11 PROCEDURE — G0439 PPPS, SUBSEQ VISIT: HCPCS | Performed by: PEDIATRICS

## 2025-03-11 PROCEDURE — 3075F SYST BP GE 130 - 139MM HG: CPT | Performed by: PEDIATRICS

## 2025-03-11 RX ORDER — CLOBETASOL PROPIONATE 0.5 MG/G
CREAM TOPICAL
Qty: 60 G | Refills: 3 | Status: SHIPPED | OUTPATIENT
Start: 2025-03-11

## 2025-03-11 RX ORDER — ESCITALOPRAM OXALATE 20 MG/1
20 TABLET ORAL DAILY
Qty: 90 TABLET | Refills: 1 | Status: SHIPPED | OUTPATIENT
Start: 2025-03-11

## 2025-03-11 RX ORDER — APIXABAN 5 MG/1
5 TABLET, FILM COATED ORAL 2 TIMES DAILY
Qty: 180 TABLET | Refills: 0 | Status: SHIPPED | OUTPATIENT
Start: 2025-03-11 | End: 2025-03-11

## 2025-03-11 RX ORDER — MAGNESIUM 200 MG
TABLET ORAL
Qty: 90 TABLET | Refills: 3 | Status: SHIPPED | OUTPATIENT
Start: 2025-03-11

## 2025-03-11 SDOH — HEALTH STABILITY: PHYSICAL HEALTH
ON AVERAGE, HOW MANY DAYS PER WEEK DO YOU ENGAGE IN MODERATE TO STRENUOUS EXERCISE (LIKE A BRISK WALK)?: PATIENT DECLINED

## 2025-03-11 ASSESSMENT — PAIN SCALES - GENERAL: PAINLEVEL_OUTOF10: NO PAIN (0)

## 2025-03-11 ASSESSMENT — SOCIAL DETERMINANTS OF HEALTH (SDOH)
HOW OFTEN DO YOU GET TOGETHER WITH FRIENDS OR RELATIVES?: ONCE A WEEK
HOW OFTEN DO YOU GET TOGETHER WITH FRIENDS OR RELATIVES?: ONCE A WEEK

## 2025-03-11 NOTE — PROGRESS NOTES
"Preventive Care Visit  [facility]  Arminda Foley MD, Internal Medicine - Pediatrics  Mar 11, 2025      Assessment & Plan     (Z00.00) Encounter for Medicare annual wellness exam  (primary encounter diagnosis)  Comment: recommend RSV next fall  Plan:     (Z13.9) Encounter for screening involving social determinants of health (SDoH)  Comment:   Plan:     (F41.1) ELI (generalized anxiety disorder)  Comment: stable  Plan: escitalopram (LEXAPRO) 20 MG tablet        Follow up in 6m    (Z86.718) Personal history of DVT (deep vein thrombosis)  Comment: stable  Plan: apixaban ANTICOAGULANT (ELIQUIS ANTICOAGULANT)         5 MG tablet            (L40.9) Psoriasis  Comment: stable  Plan: clobetasol propionate (TEMOVATE) 0.05 %         external cream            (J44.9) Chronic obstructive pulmonary disease, unspecified COPD type (H)  Comment: stable  Plan: tiotropium (SPIRIVA RESPIMAT) 2.5 MCG/ACT         inhaler            (N18.4) Chronic kidney disease, stage 4 (severe) (H)  Comment: stable  Plan: upcoming nephrology appt    (J43.9) Pulmonary emphysema, unspecified emphysema type (H)  Comment: stable  Plan:     (E11.69) Type 2 diabetes mellitus with other specified complication, without long-term current use of insulin (H)  Comment: diet controlled  Plan: Hemoglobin A1c        Follow up in 6m    (E53.8) Vitamin B12 deficiency  Comment: stable  Plan: Cyanocobalamin (B-12) 1000 MCG SUBL          The longitudinal plan of care for the diagnosis(es)/condition(s) as documented were addressed during this visit. Due to the added complexity in care, I will continue to support Gemma in the subsequent management and with ongoing continuity of care.      Patient has been advised of split billing requirements and indicates understanding: Yes        BMI  Estimated body mass index is 29.57 kg/m  as calculated from the following:    Height as of this encounter: 1.571 m (5' 1.85\").    Weight as of this encounter: 73 " "kg (160 lb 14.4 oz).       Counseling  Appropriate preventive services were addressed with this patient via screening, questionnaire, or discussion as appropriate for fall prevention, nutrition, physical activity, Tobacco-use cessation, social engagement, weight loss and cognition.  Checklist reviewing preventive services available has been given to the patient.  Reviewed patient's diet, addressing concerns and/or questions.   The patient was instructed to see the dentist every 6 months.   Discussed possible causes of fatigue. Updated plan of care.  Patient reported difficulty with activities of daily living were addressed today.Addressed any concerns about safety while driving.  The patient was provided with written information regarding signs of hearing loss.           Angel Menendez is a 79 year old, presenting for the following:  Annual Visit        3/11/2025    11:01 AM   Additional Questions   Roomed by BB Vf   Accompanied by none         3/11/2025    11:01 AM   Patient Reported Additional Medications   Patient reports taking the following new medications none           HPI     Recovering from hip replacement - doing ok.     Patient got COVID vaccine a few months ago    Nephrology - upcoming appt in July, GFR 34%    Oncology - following breast cancer and josy (recent iron infusion)    Psoriasis    Anxiety/depression - lexapro 20mg - \"ok\"    COPD - spiriva working well    Would like to clarify b12 and vit D doses    All medications going to [facility] mail order now.       Advance Care Planning  Patient does not have a Health Care Directive: Discussed advance care planning with patient; information given to patient to review.      3/11/2025   General Health   How would you rate your overall physical health? Good   Feel stress (tense, anxious, or unable to sleep) Not at all         3/11/2025   Nutrition   Diet: Regular (no restrictions)         3/11/2025   Exercise   Days per week of moderate/strenous exercise " Patient declined     Patient trying to walk up and down long halls twice (three times per day)        3/11/2025   Social Factors   Frequency of gathering with friends or relatives Once a week   Worry food won't last until get money to buy more No   Food not last or not have enough money for food? No   Do you have housing? (Housing is defined as stable permanent housing and does not include staying ouside in a car, in a tent, in an abandoned building, in an overnight shelter, or couch-surfing.) Yes   Are you worried about losing your housing? Yes   Lack of transportation? Yes   Unable to get utilities (heat,electricity)? Yes   Want help with housing or utility concern? (!) YES    (!) TRANSPORTATION CONCERN PRESENT(!) HOUSING CONCERN PRESENT(!) FINANCIAL RESOURCE STRAIN CONCERN      3/11/2025   Fall Risk   Fallen 2 or more times in the past year? No     No   Trouble with walking or balance? Yes     Yes   Gait Speed Test (Document in seconds) 7.44   Gait Speed Test Interpretation Greater than 5.01 seconds - ABNORMAL       Proxy-reported    Multiple values from one day are sorted in reverse-chronological order          3/11/2025   Activities of Daily Living- Home Safety   Needs help with the following daily activites Telephone use    Transportation    Shopping   Safety concerns in the home None of the above       Multiple values from one day are sorted in reverse-chronological order         3/11/2025   Dental   Dentist two times every year? (!) NO         3/11/2025   Hearing Screening   Hearing concerns? (!) I FEEL THAT PEOPLE ARE MUMBLING OR NOT SPEAKING CLEARLY.    (!) I NEED TO ASK PEOPLE TO SPEAK UP OR REPEAT THEMSELVES.    (!) IT'S HARD TO FOLLOW A CONVERSATION IN A NOISY RESTAURANT OR CROWDED ROOM.    (!) TROUBLE UNDERSTANDING SOFT OR WHISPERED SPEECH.    (!) TROUBLE UNDERSTANDING SPEECH ON THE TELEPHONE       Multiple values from one day are sorted in reverse-chronological order         3/11/2025   Driving Risk  Screening   Patient/family members have concerns about driving (!) YES          3/11/2025   General Alertness/Fatigue Screening   Have you been more tired than usual lately? (!) YES         3/11/2025   Urinary Incontinence Screening   Bothered by leaking urine in past 6 months No                Today's PHQ-2 Score:       3/11/2025    10:28 AM   PHQ-2 (  [employer])   Q1: Little interest or pleasure in doing things 0   Q2: Feeling down, depressed or hopeless 0   PHQ-2 Score 0    Q1: Little interest or pleasure in doing things Not at all   Q2: Feeling down, depressed or hopeless Not at all   PHQ-2 Score 0       Patient-reported           3/11/2025   Substance Use   Alcohol more than 3/day or more than 7/wk No   Do you have a current opioid prescription? No   How severe/bad is pain from 1 to 10? 0/10 (No Pain)   Do you use any other substances recreationally? No    (!) DECLINE       Multiple values from one day are sorted in reverse-chronological order     Social History     Tobacco Use    Smoking status: Former     Current packs/day: 0.00     Average packs/day: 1.5 packs/day for 25.0 years (37.5 ttl pk-yrs)     Types: Cigarettes     Start date: 10/21/1961     Quit date: 10/21/1986     Years since quittin.4     Passive exposure: Current    Smokeless tobacco: Never    Tobacco comments:     quit    Vaping Use    Vaping status: Never Used    Passive vaping exposure: Yes   Substance Use Topics    Alcohol use: Not Currently    Drug use: No          H/o breast cancer    ASCVD Risk   The 10-year ASCVD risk score (Tyler DICKERSON, et al., 2019) is: 42.4%    Values used to calculate the score:      Age: 79 years      Sex: Female      Is Non- : No      Diabetic: Yes      Tobacco smoker: No      Systolic Blood Pressure: 136 mmHg      Is BP treated: No      HDL Cholesterol: 66 mg/dL      Total Cholesterol: 182 mg/dL            Reviewed and updated as needed this visit by Provider     Meds                   Current providers sharing in care for this patient include:  Patient Care Team:  Arminda Foley MD as PCP - General (Internal Medicine - Pediatrics)  Lashawn Marrero MD as Hospitalist (Endocrinology, Diabetes, and Metabolism)  Latosha Elmore, RN as Diabetes Educator (Diabetes Education)  Ifrah Emerson, RN as Lead Care Coordinator (Primary Care - CC)  Haley Singh MD as MD (Endocrinology, Diabetes, and Metabolism)  Rosita Cárdenas GC as Genetic Counselor (Genetic Counselor, MS)  Haley Singh MD as Assigned Endocrinology Provider  Fany Chavarria, EMT as Personal Advocate & Liaison (PAL)  Haley Garcia MD as Assigned Surgical Provider  Cindy Brownlee MD as MD (Otolaryngology)  Arminda Foley MD as Assigned PCP  Shanique Paniagua MD as MD (OB/Gyn)  Shanique Paniagua MD as Assigned OBGYN Provider  Jo Vásquez [facility] as Assigned MTM Pharmacist  Didier Garcia MD as MD (Nephrology)  Alan Bowen MD as Assigned Cancer Care Provider    The following health maintenance items are reviewed in Epic and correct as of today:  Health Maintenance   Topic Date Due    COPD ACTION PLAN  Never done    RSV VACCINE (1 - 1-dose 75+ series) Never done    COVID-19 Vaccine (7 - 2024-25 season) 09/01/2024    DIABETIC FOOT EXAM  12/13/2024    MICROALBUMIN  01/08/2025    BMP  03/31/2025    A1C  04/08/2025    HEMOGLOBIN  07/01/2025    EYE EXAM  07/29/2025    LIPID  10/08/2025    CBC  12/03/2025    ANNUAL REVIEW OF HM ORDERS  12/20/2025    MEDICARE ANNUAL WELLNESS VISIT  03/11/2026    FALL RISK ASSESSMENT  03/11/2026    ADVANCE CARE PLANNING  12/20/2029    DTAP/TDAP/TD IMMUNIZATION (5 - Td or Tdap) 08/28/2032    DEXA  10/17/2039    PARATHYROID  Completed    PHOSPHORUS  Completed    SPIROMETRY  Completed    HEPATITIS C SCREENING  Completed    PHQ-2 (once per calendar year)  Completed    INFLUENZA VACCINE  Completed    Pneumococcal Vaccine: 50+ Years  Completed    URINALYSIS   "Completed    ALK PHOS  Completed    ZOSTER IMMUNIZATION  Completed    HPV IMMUNIZATION  Aged Out    MENINGITIS IMMUNIZATION  Aged Out    TSH W/FREE T4 REFLEX  Discontinued    COLORECTAL CANCER SCREENING  Discontinued            Objective    Exam  /63 (BP Location: Right arm, Patient Position: Sitting, Cuff Size: Adult Small)   Pulse 56   Temp 99  F (37.2  C) (Temporal)   Resp 17   Ht 1.571 m (5' 1.85\")   Wt 73 kg (160 lb 14.4 oz)   LMP 09/24/1990 (Approximate)   SpO2 96%   BMI 29.57 kg/m     Estimated body mass index is 29.57 kg/m  as calculated from the following:    Height as of this encounter: 1.571 m (5' 1.85\").    Weight as of this encounter: 73 kg (160 lb 14.4 oz).    Physical Exam  GENERAL: alert and no distress  EYES: Eyes grossly normal to inspection, PERRL and conjunctivae and sclerae normal  HENT: ear canals and TM's normal, nose and mouth without ulcers or lesions  NECK: no adenopathy, no asymmetry, masses, or scars  RESP: lungs clear to auscultation - no rales, rhonchi or wheezes  CV: regular rate and rhythm, normal S1 S2, no S3 or S4, no murmur, click or rub, no peripheral edema  ABDOMEN: soft, nontender, no hepatosplenomegaly, no masses and bowel sounds normal  MS: no gross musculoskeletal defects noted, no edema  SKIN: no suspicious lesions or rashes  NEURO: Normal strength and tone, mentation intact and speech normal  PSYCH: mentation appears normal, affect normal/bright  Diabetic foot exam: normal monofilament exam        3/11/2025   Mini Cog   Clock Draw Score 2 Normal   3 Item Recall 3 objects recalled   Mini Cog Total Score 5              Signed Electronically by: Arminda Foley MD    "

## 2025-03-11 NOTE — PATIENT INSTRUCTIONS
Please consider getting the RSV vaccine from your pharmacy next fall.       Patient Education   Preventive Care Advice   This is general advice given by our system to help you stay healthy. However, your care team may have specific advice just for you. Please talk to your care team about your preventive care needs.  Nutrition  Eat 5 or more servings of fruits and vegetables each day.  Try wheat bread, brown rice and whole grain pasta (instead of white bread, rice, and pasta).  Get enough calcium and vitamin D. Check the label on foods and aim for 100% of the RDA (recommended daily allowance).  Lifestyle  Exercise at least 150 minutes each week  (30 minutes a day, 5 days a week).  Do muscle strengthening activities 2 days a week. These help control your weight and prevent disease.  No smoking.  Wear sunscreen to prevent skin cancer.  Have a dental exam and cleaning every 6 months.  Yearly exams  See your health care team every year to talk about:  Any changes in your health.  Any medicines your care team has prescribed.  Preventive care, family planning, and ways to prevent chronic diseases.  Shots (vaccines)   HPV shots (up to age 26), if you've never had them before.  Hepatitis B shots (up to age 59), if you've never had them before.  COVID-19 shot: Get this shot when it's due.  Flu shot: Get a flu shot every year.  Tetanus shot: Get a tetanus shot every 10 years.  Pneumococcal, hepatitis A, and RSV shots: Ask your care team if you need these based on your risk.  Shingles shot (for age 50 and up)  General health tests  Diabetes screening:  Starting at age 35, Get screened for diabetes at least every 3 years.  If you are younger than age 35, ask your care team if you should be screened for diabetes.  Cholesterol test: At age 39, start having a cholesterol test every 5 years, or more often if advised.  Bone density scan (DEXA): At age 50, ask your care team if you should have this scan for osteoporosis (brittle  bones).  Hepatitis C: Get tested at least once in your life.  STIs (sexually transmitted infections)  Before age 24: Ask your care team if you should be screened for STIs.  After age 24: Get screened for STIs if you're at risk. You are at risk for STIs (including HIV) if:  You are sexually active with more than one person.  You don't use condoms every time.  You or a partner was diagnosed with a sexually transmitted infection.  If you are at risk for HIV, ask about PrEP medicine to prevent HIV.  Get tested for HIV at least once in your life, whether you are at risk for HIV or not.  Cancer screening tests  Cervical cancer screening: If you have a cervix, begin getting regular cervical cancer screening tests starting at age 21.  Breast cancer scan (mammogram): If you've ever had breasts, begin having regular mammograms starting at age 40. This is a scan to check for breast cancer.  Colon cancer screening: It is important to start screening for colon cancer at age 45.  Have a colonoscopy test every 10 years (or more often if you're at risk) Or, ask your provider about stool tests like a FIT test every year or Cologuard test every 3 years.  To learn more about your testing options, visit:   .  For help making a decision, visit:   https://bit.ly/nn69406.  Prostate cancer screening test: If you have a prostate, ask your care team if a prostate cancer screening test (PSA) at age 55 is right for you.  Lung cancer screening: If you are a current or former smoker ages 50 to 80, ask your care team if ongoing lung cancer screenings are right for you.  For informational purposes only. Not to replace the advice of your health care provider. Copyright   2023 [facility]. All rights reserved. Clinically reviewed by the [facility]. [facility] [id number] - REV 01/24.  Learning About Activities of Daily Living  What are activities of daily living?     Activities of daily living (ADLs) are the basic  self-care tasks you do every day. These include eating, bathing, dressing, and moving around.  As you age, and if you have health problems, you may find that it's harder to do some of these tasks. If so, your doctor can suggest ideas that may help.  To measure what kind of help you may need, your doctor will ask how well you are able to do ADLs. Let your doctor know if there are any tasks that you are having trouble doing. This is an important first step to getting help. And when you have the help you need, you can stay as independent as possible.  How will a doctor assess your ADLs?  Asking about ADLs is part of a routine health checkup your doctor will likely do as you age. Your health check might be done in a doctor's office, in your home, or at a hospital. The goal is to find out if you are having any problems that could make it hard to care for yourself or that make it unsafe for you to be on your own.  To measure your ADLs, your doctor will ask how hard it is for you to do routine tasks. Your doctor may also want to know if you have changed the way you do a task because of a health problem. Your doctor may watch how you:  Walk back and forth.  Keep your balance while you stand or walk.  Move from sitting to standing or from a bed to a chair.  Button or unbutton a shirt or sweater.  Remove and put on your shoes.  It's common to feel a little worried or anxious if you find you can't do all the things you used to be able to do. Talking with your doctor about ADLs is a way to make sure you're as safe as possible and able to care for yourself as well as you can. You may want to bring a caregiver, friend, or family member to your checkup. They can help you talk to your doctor.  Follow-up care is a key part of your treatment and safety. Be sure to make and go to all appointments, and call your doctor if you are having problems. It's also a good idea to know your test results and keep a list of the medicines you  take.  Current as of: October 24, 2023  Content Version: 14.3    2024 [employer].   Care instructions adapted under license by your healthcare professional. If you have questions about a medical condition or this instruction, always ask your healthcare professional. [employer] disclaims any warranty or liability for your use of this information.    Preventing Falls: Care Instructions  Injuries and health problems such as trouble walking or poor eyesight can increase your risk of falling. So can some medicines. But there are things you can do to help prevent falls. You can exercise to get stronger. You can also arrange your home to make it safer.    Talk to your doctor about the medicines you take. Ask if any of them increase the risk of falls and whether they can be changed or stopped.   Try to exercise regularly. It can help improve your strength and balance. This can help lower your risk of falling.         Practice fall safety and prevention.   Wear low-heeled shoes that fit well and give your feet good support. Talk to your doctor if you have foot problems that make this hard.  Carry a cellphone or wear a medical alert device that you can use to call for help.  Use stepladders instead of chairs to reach high objects. Don't climb if you're at risk for falls. Ask for help, if needed.  Wear the correct eyeglasses, if you need them.        Make your home safer.   Remove rugs, cords, clutter, and furniture from walkways.  Keep your house well lit. Use night-lights in hallways and bathrooms.  Install and use sturdy handrails on stairways.  Wear nonskid footwear, even inside. Don't walk barefoot or in socks without shoes.        Be safe outside.   Use handrails, curb cuts, and ramps whenever possible.  Keep your hands free by using a shoulder bag or backpack.  Try to walk in well-lit areas. Watch out for uneven ground, changes in pavement, and debris.  Be careful in the winter. Walk on the grass  "or gravel when sidewalks are slippery. Use de-icer on steps and walkways. Add non-slip devices to shoes.    Put grab bars and nonskid mats in your shower or tub and near the toilet. Try to use a shower chair or bath bench when bathing.   Get into a tub or shower by putting in your weaker leg first. Get out with your strong side first. Have a phone or medical alert device in the bathroom with you.   Where can you learn more?  Go to https://www.[employer].net/patiented  Enter G117 in the search box to learn more about \"Preventing Falls: Care Instructions.\"  Current as of: July 31, 2024  Content Version: 14.3    2024 [employer].   Care instructions adapted under license by your healthcare professional. If you have questions about a medical condition or this instruction, always ask your healthcare professional. [employer] disclaims any warranty or liability for your use of this information.    Hearing Loss: Care Instructions  Overview     Hearing loss is a sudden or slow decrease in how well you hear. It can range from slight to profound. Permanent hearing loss can occur with aging. It also can happen when you are exposed long-term to loud noise. Examples include listening to loud music, riding motorcycles, or being around other loud machines.  Hearing loss can affect your work and home life. It can make you feel lonely or depressed. You may feel that you have lost your independence. But hearing aids and other devices can help you hear better and feel connected to others.  Follow-up care is a key part of your treatment and safety. Be sure to make and go to all appointments, and call your doctor if you are having problems. It's also a good idea to know your test results and keep a list of the medicines you take.  How can you care for yourself at home?  Avoid loud noises whenever possible. This helps keep your hearing from getting worse.  Always wear hearing protection around loud noises.  Wear a " "hearing aid as directed.  A professional can help you pick a hearing aid that will work best for you.  You can also get hearing aids over the counter for mild to moderate hearing loss.  Have hearing tests as your doctor suggests. They can show whether your hearing has changed. Your hearing aid may need to be adjusted.  Use other devices as needed. These may include:  Telephone amplifiers and hearing aids that can connect to a television, stereo, radio, or microphone.  Devices that use lights or vibrations. These alert you to the doorbell, a ringing telephone, or a baby monitor.  Television closed-captioning. This shows the words at the bottom of the screen. Most new TVs can do this.  TTY (text telephone). This lets you type messages back and forth on the telephone instead of talking or listening. These devices are also called TDD. When messages are typed on the keyboard, they are sent over the phone line to a receiving TTY. The message is shown on a monitor.  Use text messaging, social media, and email if it is hard for you to communicate by telephone.  Try to learn a listening technique called speechreading. It is not lipreading. You pay attention to people's gestures, expressions, posture, and tone of voice. These clues can help you understand what a person is saying. Face the person you are talking to, and have them face you. Make sure the lighting is good. You need to see the other person's face clearly.  Think about counseling if you need help to adjust to your hearing loss.  When should you call for help?  Watch closely for changes in your health, and be sure to contact your doctor if:    You think your hearing is getting worse.     You have new symptoms, such as dizziness or nausea.   Where can you learn more?  Go to https://www.healthwise.net/patiented  Enter R798 in the search box to learn more about \"Hearing Loss: Care Instructions.\"  Current as of: September 27, 2023  Content Version: 14.3    2024 [employer].   Care instructions adapted under license by your healthcare professional. If you have questions about a medical condition or this instruction, always ask your healthcare professional. [facility] [employer] disclaims any warranty or liability for your use of this information.    Learning About Sleeping Well  What does sleeping well mean?     Sleeping well means getting enough sleep to feel good and stay healthy. How much sleep is enough varies among people.  The number of hours you sleep and how you feel when you wake up are both important. If you do not feel refreshed, you probably need more sleep. Another sign of not getting enough sleep is feeling tired during the day.  Experts recommend that adults get at least 7 or more hours of sleep per day. Children and older adults need more sleep.  Why is getting enough sleep important?  Getting enough quality sleep is a basic part of good health. When your sleep suffers, your physical health, mood, and your thoughts can suffer too. You may find yourself feeling more grumpy or stressed. Not getting enough sleep also can lead to serious problems, including injury, accidents, anxiety, and depression.  What might cause poor sleeping?  Many things can cause sleep problems, including:  Changes to your sleep schedule.  Stress. Stress can be caused by fear about a single event, such as giving a speech. Or you may have ongoing stress, such as worry about work or school.  Depression, anxiety, and other mental or emotional conditions.  Changes in your sleep habits or surroundings. This includes changes that happen where you sleep, such as noise, light, or sleeping in a different bed. It also includes changes in your sleep pattern, such as having jet lag or working a late shift.  Health problems, such as pain, breathing problems, and restless legs syndrome.  Lack of regular exercise.  Using alcohol, nicotine, or caffeine before bed.  How can you help yourself?  Here  "are some tips that may help you sleep more soundly and wake up feeling more refreshed.  Your sleeping area   Use your bedroom only for sleeping and sex. A bit of light reading may help you fall asleep. But if it doesn't, do your reading elsewhere in the house. Try not to use your TV, computer, smartphone, or tablet while you are in bed.  Be sure your bed is big enough to stretch out comfortably, especially if you have a sleep partner.  Keep your bedroom quiet, dark, and cool. Use curtains, blinds, or a sleep mask to block out light. To block out noise, use earplugs, soothing music, or a \"white noise\" machine.  Your evening and bedtime routine   Create a relaxing bedtime routine. You might want to take a warm shower or bath, or listen to soothing music.  Go to bed at the same time every night. And get up at the same time every morning, even if you feel tired.  What to avoid   Limit caffeine (coffee, tea, caffeinated sodas) during the day, and don't have any for at least 6 hours before bedtime.  Avoid drinking alcohol before bedtime. Alcohol can cause you to wake up more often during the night.  Try not to smoke or use tobacco, especially in the evening. Nicotine can keep you awake.  Limit naps during the day, especially close to bedtime.  Avoid lying in bed awake for too long. If you can't fall asleep or if you wake up in the middle of the night and can't get back to sleep within about 20 minutes, get out of bed and go to another room until you feel sleepy.  Avoid taking medicine right before bed that may keep you awake or make you feel hyper or energized. Your doctor can tell you if your medicine may do this and if you can take it earlier in the day.  If you can't sleep   Imagine yourself in a peaceful, pleasant scene. Focus on the details and feelings of being in a place that is relaxing.  Get up and do a quiet or boring activity until you feel sleepy.  Avoid drinking any liquids before going to bed to help prevent " "waking up often to use the bathroom.  Where can you learn more?  Go to https://www.[employer].net/patiented  Enter J942 in the search box to learn more about \"Learning About Sleeping Well.\"  Current as of: July 31, 2024  Content Version: 14.3    2024 [employer].   Care instructions adapted under license by your healthcare professional. If you have questions about a medical condition or this instruction, always ask your healthcare professional. [employer] disclaims any warranty or liability for your use of this information.       "

## 2025-03-27 ENCOUNTER — TRANSFERRED RECORDS (OUTPATIENT)
Dept: HEALTH INFORMATION MANAGEMENT | Facility: CLINIC | Age: 80
End: 2025-03-27
Payer: COMMERCIAL

## 2025-03-28 ENCOUNTER — TRANSFERRED RECORDS (OUTPATIENT)
Dept: HEALTH INFORMATION MANAGEMENT | Facility: CLINIC | Age: 80
End: 2025-03-28
Payer: COMMERCIAL

## 2025-04-28 ENCOUNTER — TRANSFERRED RECORDS (OUTPATIENT)
Dept: HEALTH INFORMATION MANAGEMENT | Facility: CLINIC | Age: 80
End: 2025-04-28
Payer: COMMERCIAL

## 2025-05-04 NOTE — PROGRESS NOTES
Medical Assistant Note:  Gemma Cowart presents today for labs.    Patient seen by provider today: No.   present during visit today: Not Applicable.    Concerns: No Concerns.    Procedure:  Lab draw site: RAC, Needle type: BF, Gauge: 21.    Post Assessment:  Labs drawn without difficulty: Yes.    Discharge Plan:  Pt tolerated procedure well. Gauze and coban applied.    Face to Face Time: 8min.    Brina Ignacio MA        
no acute st change

## 2025-05-29 ENCOUNTER — TRANSFERRED RECORDS (OUTPATIENT)
Dept: HEALTH INFORMATION MANAGEMENT | Facility: CLINIC | Age: 80
End: 2025-05-29
Payer: COMMERCIAL

## 2025-07-03 DIAGNOSIS — E55.9 VITAMIN D DEFICIENCY: ICD-10-CM

## 2025-07-03 DIAGNOSIS — N18.30 CKD (CHRONIC KIDNEY DISEASE) STAGE 3, GFR 30-59 ML/MIN (H): Primary | ICD-10-CM

## 2025-07-15 ENCOUNTER — OFFICE VISIT (OUTPATIENT)
Dept: NEPHROLOGY | Facility: CLINIC | Age: 80
End: 2025-07-15
Attending: PEDIATRICS
Payer: COMMERCIAL

## 2025-07-15 ENCOUNTER — LAB (OUTPATIENT)
Dept: LAB | Facility: CLINIC | Age: 80
End: 2025-07-15
Payer: COMMERCIAL

## 2025-07-15 VITALS
HEIGHT: 62 IN | BODY MASS INDEX: 29.81 KG/M2 | WEIGHT: 162 LBS | HEART RATE: 53 BPM | DIASTOLIC BLOOD PRESSURE: 76 MMHG | OXYGEN SATURATION: 99 % | SYSTOLIC BLOOD PRESSURE: 157 MMHG

## 2025-07-15 DIAGNOSIS — N18.30 STAGE 3 CHRONIC KIDNEY DISEASE, UNSPECIFIED WHETHER STAGE 3A OR 3B CKD (H): ICD-10-CM

## 2025-07-15 DIAGNOSIS — E55.9 VITAMIN D DEFICIENCY: ICD-10-CM

## 2025-07-15 DIAGNOSIS — N18.30 CKD (CHRONIC KIDNEY DISEASE) STAGE 3, GFR 30-59 ML/MIN (H): ICD-10-CM

## 2025-07-15 DIAGNOSIS — N18.4 CHRONIC KIDNEY DISEASE, STAGE 4 (SEVERE) (H): Primary | ICD-10-CM

## 2025-07-15 LAB
ALBUMIN MFR UR ELPH: 6.4 MG/DL
ALBUMIN SERPL BCG-MCNC: 3.7 G/DL (ref 3.5–5.2)
ALBUMIN UR-MCNC: NEGATIVE MG/DL
ANION GAP SERPL CALCULATED.3IONS-SCNC: 9 MMOL/L (ref 7–15)
APPEARANCE UR: CLEAR
BACTERIA #/AREA URNS HPF: ABNORMAL /HPF
BILIRUB UR QL STRIP: NEGATIVE
BUN SERPL-MCNC: 28.9 MG/DL (ref 8–23)
CALCIUM SERPL-MCNC: 8.9 MG/DL (ref 8.8–10.4)
CHLORIDE SERPL-SCNC: 110 MMOL/L (ref 98–107)
COLOR UR AUTO: YELLOW
CREAT SERPL-MCNC: 1.64 MG/DL (ref 0.51–0.95)
CREAT UR-MCNC: 92.7 MG/DL
CREAT UR-MCNC: 92.7 MG/DL
EGFRCR SERPLBLD CKD-EPI 2021: 32 ML/MIN/1.73M2
ERYTHROCYTE [DISTWIDTH] IN BLOOD BY AUTOMATED COUNT: 13.9 % (ref 10–15)
GLUCOSE SERPL-MCNC: 94 MG/DL (ref 70–99)
GLUCOSE UR STRIP-MCNC: NEGATIVE MG/DL
HCO3 SERPL-SCNC: 24 MMOL/L (ref 22–29)
HCT VFR BLD AUTO: 37.1 % (ref 35–47)
HGB BLD-MCNC: 11.2 G/DL (ref 11.7–15.7)
HGB UR QL STRIP: NEGATIVE
KETONES UR STRIP-MCNC: NEGATIVE MG/DL
LEUKOCYTE ESTERASE UR QL STRIP: NEGATIVE
MCH RBC QN AUTO: 26.9 PG (ref 26.5–33)
MCHC RBC AUTO-ENTMCNC: 30.2 G/DL (ref 31.5–36.5)
MCV RBC AUTO: 89 FL (ref 78–100)
MICROALBUMIN UR-MCNC: <12 MG/L
MICROALBUMIN/CREAT UR: NORMAL MG/G{CREAT}
NITRATE UR QL: NEGATIVE
PH UR STRIP: 6 [PH] (ref 5–7)
PHOSPHATE SERPL-MCNC: 4.5 MG/DL (ref 2.5–4.5)
PLATELET # BLD AUTO: 168 10E3/UL (ref 150–450)
POTASSIUM SERPL-SCNC: 5.1 MMOL/L (ref 3.4–5.3)
PROT/CREAT 24H UR: 0.07 MG/MG CR (ref 0–0.2)
PTH-INTACT SERPL-MCNC: 57 PG/ML (ref 15–65)
RBC # BLD AUTO: 4.16 10E6/UL (ref 3.8–5.2)
RBC #/AREA URNS AUTO: ABNORMAL /HPF
SODIUM SERPL-SCNC: 143 MMOL/L (ref 135–145)
SP GR UR STRIP: 1.02 (ref 1–1.03)
SQUAMOUS #/AREA URNS AUTO: ABNORMAL /LPF
UROBILINOGEN UR STRIP-ACNC: 0.2 E.U./DL
VIT D+METAB SERPL-MCNC: 47 NG/ML (ref 20–50)
WBC # BLD AUTO: 4.5 10E3/UL (ref 4–11)
WBC #/AREA URNS AUTO: ABNORMAL /HPF

## 2025-07-15 PROCEDURE — 84156 ASSAY OF PROTEIN URINE: CPT

## 2025-07-15 PROCEDURE — 81001 URINALYSIS AUTO W/SCOPE: CPT

## 2025-07-15 PROCEDURE — 3077F SYST BP >= 140 MM HG: CPT | Performed by: INTERNAL MEDICINE

## 2025-07-15 PROCEDURE — 99204 OFFICE O/P NEW MOD 45 MIN: CPT | Performed by: INTERNAL MEDICINE

## 2025-07-15 PROCEDURE — 82043 UR ALBUMIN QUANTITATIVE: CPT

## 2025-07-15 PROCEDURE — 36415 COLL VENOUS BLD VENIPUNCTURE: CPT

## 2025-07-15 PROCEDURE — 82306 VITAMIN D 25 HYDROXY: CPT

## 2025-07-15 PROCEDURE — 3078F DIAST BP <80 MM HG: CPT | Performed by: INTERNAL MEDICINE

## 2025-07-15 PROCEDURE — 85027 COMPLETE CBC AUTOMATED: CPT

## 2025-07-15 PROCEDURE — 80069 RENAL FUNCTION PANEL: CPT

## 2025-07-15 PROCEDURE — 82570 ASSAY OF URINE CREATININE: CPT

## 2025-07-15 PROCEDURE — 1125F AMNT PAIN NOTED PAIN PRSNT: CPT | Performed by: INTERNAL MEDICINE

## 2025-07-15 PROCEDURE — 83970 ASSAY OF PARATHORMONE: CPT

## 2025-07-15 ASSESSMENT — PAIN SCALES - GENERAL: PAINLEVEL_OUTOF10: MILD PAIN (1)

## 2025-07-15 NOTE — NURSING NOTE
"Chief Complaint   Patient presents with    Consult     Stage 3 chronic kidney disease, unspecified whether stage 3a or 3b CKD (H)       Vitals:    07/15/25 0747   BP: (!) 157/76   Pulse: 53   SpO2: 99%   Weight: 73.5 kg (162 lb)   Height: 1.571 m (5' 1.85\")       Body mass index is 29.77 kg/m .      "

## 2025-07-15 NOTE — PROGRESS NOTES
Nephrology Outpatient Consult Note (07/15/2025)     Requesting Provider  Arminda Foley MD  [address] [place] [address]    Chief Complaint/Reason for Visit  Chronic kidney disease    History of Present Illness  This is a 79-year-old female who was referred to our clinic for further evaluation and management of chronic kidney disease.  Patient's past medical history is notable for COPD, type 2 diabetes mellitus, DVT, and breast cancer.      With regards to her kidney function, we have laboratory data going back to 2000 2012.  Back pain, her serum creatinine was 1 mg/dL.  Over the ensuing years, serum creatinine measurements have progressively increased, and last year they ran between 1.6 to 1.7 mg/dL. Her labs from today are still pending.    Overall, the patient is doing well today.  She continues to live independently at her apartment.  She walks with a walker.  She has chronic lower back pain and hip pain related to osteoarthritis.  She does not take NSAIDs.  Her son has ESRD due to diabetes and is on dialysis.    She denies acute symptoms today.  No shortness of breath or chest pain.  No abdominal pain.  No dysuria or gross hematuria.  She does have chronic mild lower extremity edema worse on the left side.    The following portions of the patient's history were reviewed and updated as appropriate: allergies, current medications, past family history, past medical history, past social history, past surgical history, and problem list.    Subjective   Review of Systems  A comprehensive review of systems was performed. Pertinent positives and negatives are described above.    Past Medical/Surgical History  Patient  has a past medical history of Anemia, Antiplatelet or antithrombotic long-term use, Anxiety, Arrhythmia, Breast cancer (H) (2012), Complications of gastric bypass surgery, COPD (chronic obstructive pulmonary disease) (H) (04/21/2021), Diabetes mellitus (H), DVT (deep venous  thrombosis) (H), Eczema, Fracture of left knee region, History of blood transfusion, History of kidney stones, Iron deficiency anemia, unspecified iron deficiency anemia type (10/27/2021), Noninfectious ileitis, Obesity, Palpitation, Polyneuropathy, Pruritus, Renal disease, Rosacea, SVT (supraventricular tachycardia), Thrombosis, Thyroid disease, and Vitamin B12 deficiency (02/01/2013).    She has no past medical history of Arthritis, Asthma, Basal cell carcinoma, Chronic infection, Complication of anesthesia, Congestive heart failure, unspecified, Coronary artery disease, Difficult intubation, Gastroesophageal reflux disease, Heart attack (H), Hypertension, Irregular heart beat, Malignant hyperthermia, Motion sickness, PONV (postoperative nausea and vomiting), Sleep apnea, Spinal headache, Squamous cell carcinoma, Stented coronary artery, or Unspecified cerebral artery occlusion with cerebral infarction.  Patient  has a past surgical history that includes ENT surgery; Mastectomy simple, sentinel node, combined (09/05/2012); Insert tissue expander breast bilateral (09/05/2012); hernia repair (01/01/2005); Abdomen surgery (01/01/2000); GYN surgery (01/10/1975); Colonoscopy (10/11/2012); Implant filter inferior vena cava (10/08/2012); Dental surgery (05/01/2007); BREAST RECONSTRUC W OTHR TECHNIQ (05/08/2013); Reconstruct breast bilateral, implant prosthesis bilateral, combined (05/22/2013); Reconstruct nipple bilateral (08/22/2013); EP Ablation SVT (05/28/2015); mastectomy, bilateral; Esophagoscopy, gastroscopy, duodenoscopy (EGD), combined (N/A, 10/06/2017); Colonoscopy (N/A, 10/06/2017); Panniculectomy (N/A, 04/05/2018); Colonoscopy (N/A, 03/07/2022); Thyroidectomy (N/A, 11/14/2023); Esophagoscopy, gastroscopy, duodenoscopy (EGD), combined (N/A, 09/06/2024); Hysterectomy, Endoscopic, Vaginal Transluminal Natural Orifice Approach (Bilateral, 11/05/2024); Cystoscopy, Sling Transvaginal (N/A, 11/05/2024); and  "Arthroplasty Hip Anterior (Right, 12/31/2024).    Social History  Patient  reports that she quit smoking about 38 years ago. Her smoking use included cigarettes. She started smoking about 63 years ago. She has a 37.5 pack-year smoking history. She has been exposed to tobacco smoke. She has never used smokeless tobacco. She reports that she does not currently use alcohol. She reports that she does not use drugs.    Family History  family history includes Alzheimer Disease in her father and paternal grandfather; Cancer in her maternal uncle, maternal uncle, and paternal grandmother; Cancer - colorectal in her brother and mother; Colon Cancer in her mother; Crohn's Disease in her daughter; Diabetes in her brother, brother, and son; Eye Disorder in her brother; Prostate Cancer in her father.     Physical Examination  Blood pressure (!) 157/76, pulse 53, height 1.571 m (5' 1.85\"), weight 73.5 kg (162 lb), last menstrual period 09/24/1990, SpO2 99%, not currently breastfeeding. Body mass index is 29.77 kg/m .  General:  Elderly. Pleasant. Walks with a walker  Head:  normocephalic, atraumatic    Eyes: conjunctiva clear, EOM intact, PERRL.   Throat:  No erythema, exudates or lesions.   Neck:  neck supple, no masses  Heart:  RRR, no murmur   Lungs:  CTA bilaterally, no wheezes, rhonchi, rales.  Breathing unlabored.   Abdomen:  Soft, NT/ND, no HSM, no masses.   Extremities: No deformities, clubbing, cyanosis, or edema.   Neurologic: A/O x 3. No focal deficits.     Objective   Pertinent Laboratory and Imaging Results  Most Recent Serum Chemistries  Lab Results   Component Value Date    WBC 4.5 07/15/2025    HGB 11.2 (L) 07/15/2025    HCT 37.1 07/15/2025     07/15/2025     12/31/2024    POTASSIUM 4.9 12/31/2024    CHLORIDE 108 (H) 12/31/2024    CO2 23 12/31/2024    BUN 24.7 (H) 12/31/2024    CR 1.55 (H) 12/31/2024     (H) 01/01/2025    SED 10 10/02/2017    DD 0.44 07/24/2021    AST 25 09/30/2024    ALT 16 " 09/30/2024    ALKPHOS 76 09/30/2024    INR 2.18 (H) 07/24/2021     Most Recent Urinalysis  Lab Results   Component Value Date    COLOR Yellow 07/15/2025    APPEARANCE Clear 07/15/2025    URINEGLC Negative 07/15/2025    URINEBILI Negative 07/15/2025    URINEKETONE Negative 07/15/2025    SG 1.020 07/15/2025    URINEPH 6.0 07/15/2025    PROTEIN Negative 07/15/2025    UROBILINOGEN 0.2 07/15/2025    NITRITE Negative 07/15/2025    LEUKEST Negative 07/15/2025     Current Outpatient Medications   Medication Sig Dispense Refill    acetaminophen (TYLENOL) 325 MG tablet Take 2 tablets (650 mg) by mouth every 4 hours as needed for other (mild pain). 100 tablet 0    apixaban ANTICOAGULANT (ELIQUIS ANTICOAGULANT) 5 MG tablet Take 1 tablet (5 mg) by mouth 2 times daily. 180 tablet 3    blood glucose (NO BRAND SPECIFIED) lancets standard Use to test blood sugar 2 times daily or as directed. 1 each 4    blood glucose (ONETOUCH VERIO IQ) test strip USE TO TEST TWICE DAILY OR AS DIRECTED 200 strip 2    clobetasol propionate (TEMOVATE) 0.05 % external cream Apply twice daily as needed 60 g 3    Cyanocobalamin (B-12) 1000 MCG SUBL Take 1000 MCG sublingual daily (except on Mondays). 90 tablet 3    escitalopram (LEXAPRO) 20 MG tablet Take 1 tablet (20 mg) by mouth daily. 90 tablet 1    tiotropium (SPIRIVA RESPIMAT) 2.5 MCG/ACT inhaler Inhale 2 puffs into the lungs daily. 12 g 3    Vitamin D3 (CHOLECALCIFEROL) 25 mcg (1000 units) tablet Take 3 tablets (75 mcg) by mouth daily. 270 tablet 3    vitamin B-12 (CYANOCOBALAMIN) 1000 MCG tablet Take 1 tablet (1,000 mcg) by mouth daily. Pt taking B12 Tues - Sun. Pt skips on Monday. (Patient not taking: Reported on 7/15/2025) 90 tablet 3     No current facility-administered medications for this visit.        Assessment & Plan   # CKD stage 3b  # Hypertension  # Type II DM  # COPD  # History of DVTs on chronic anticoagulation    The patient has chronic kidney disease, currently in stage 3b.  The  etiology is not entirely clear but is likely due to type II DM.  The most recent urinalysis shows a bland urine sediment, and there is no proteinuria.  This makes it fairly unlikely that the patient has an active parenchymal kidney disease.     With regards to workup, I would like to obtain a kidney ultrasound to rule out obstruction, and urinary retention. If the results of the evaluation is normal, then no additional workup would be warranted.     With regards to management, I emphasized to the patient the importance of blood pressure control.  She indicates that her blood pressure readings previously have been normal.  She does not monitor blood pressure at home.  I instructed her to get a blood pressure cuff and to start monitoring her blood pressure readings at home.  She voiced understanding of the plan.    I also discussed with her the benefits of adding an SGLT 2 inhibitor to her regimen.  However, the patient indicated that she does not want to take any new medications at this point.    I discussed with the patient possibilities of progressing towards end-stage renal disease in the future.  She is familiar with dialysis as her son is on dialysis.  However, she would like to get more information.  I will get her connected with the CKD class.  I would like to see her back in 6 months for follow-up appointment    My recommendations are the following:  - Check your blood pressure at home. Goal blood pressure is 130/80. Call my office if your blood pressure readings are running higher than that.  - Referral to CKD class  - Nurse check in 2 weeks for blood pressure  - Return to Nephrology Clinic in 6 months to review your progress. Check a CKD panel prior to the visit    Total time was of this encounter on the date of service was 50 minutes. All questions were answered to the patient's satisfaction.  Chart documentation was completed, in part, with [employer] voice-recognition software. Even though reviewed, some  grammatical, spelling, and word errors may remain.    Orders placed today:  Orders Placed This Encounter   Procedures    US Renal Complete Non-Vascular    Specialty Care Coordination Referral     Didier Garcia MD  Division of Nephrology and Hypertension  [employer] (Pixif[employer])   Vocera Web Console (Pixifly)

## 2025-07-15 NOTE — PATIENT INSTRUCTIONS
It was a pleasure taking care of you today.  I've included a brief summary of our discussion and care plan from today's visit below.      My recommendations are summarized as follows:  - Check your blood pressure at home. Goal blood pressure is 130/80. Call my office if your blood pressure readings are running higher than that.  - Referral to CKD class  - Nurse check in 2 weeks for blood pressure  - Return to Nephrology Clinic in 6 months to review your progress.     Who do I call with any questions after my visit?  Please be in touch if there are any further questions that arise following today's visit.  There are multiple ways to contact your nephrology care team.      During business hours, you may reach your Nephrology RN Care Coordinator, Fernanda Drummond at [phone]. For urgent/emergent questions after business hours, you may reach the on-call Nephrology Fellow by contacting the [facility]  at [phone]. You can always send a secure message through [email or web address].  [email or web address] messages are answered by your nurse or doctor typically within 24 hours.  Please allow extra time on weekends and holidays.      How do I schedule appointments?  To schedule or reschedule an appointment, please call [phone]. To schedule imaging please call [phone]. To schedule your lab appointment at [address] lab call [phone].    How will I get the results of any tests ordered?    You will receive all of your results.  If you have signed up for [employer], any tests ordered at your visit will be available to you after your physician reviews them.  Typically this takes 1-2 weeks.  If there are urgent results that require a change in your care plan, your physician or nurse will call you to discuss the next steps.      What is [email or web address]?  [email or web address] is a secure way for you to access all of your healthcare records from the [facility].  It is a web based computer program, so you can sign  on to it from any location.  It also allows you to send secure messages to your care team.  I recommend signing up for [employer] access if you have not already done so and are comfortable with using a computer.      Sincerely,    Didier Garcia MD  [facility]  Division of Nephrology and Hypertension

## 2025-07-16 ENCOUNTER — PATIENT OUTREACH (OUTPATIENT)
Dept: GERIATRIC MEDICINE | Facility: CLINIC | Age: 80
End: 2025-07-16
Payer: COMMERCIAL

## 2025-07-16 NOTE — PROGRESS NOTES
[facility] Care Coordination Contact      [facility] Six-Month Telephone Assessment    6 month telephone assessment completed on 07/16/25.    ER visits: No  Hospitalizations: No  TCU stays: No  Significant health status changes: no  Falls/Injuries: No  ADL/IADL changes: No  Changes in services: No    Caregiver Assessment follow up:  n/a    Goals: See Support Plan for goal progress documentation.      Will see member in 6 months for an annual health risk assessment.   Encouraged member to call CC with any questions or concerns in the meantime.     Member reported that she has not heard from anyone about dental visit and she needs to find new dentist and eye doctor because she needs new glasses. Care coordinator informed her that CHW had contacted her and left messages but did not hear a return call from her. Gemma reports that if no messages left then she doesn't answer her phone if no caller ID because mostly likely it is spam calls. Informed Gemma that care coordinator will reach out to CHW to assist again with scheduling dental visit and eye exam. Member also report that she still wants information on housing.Will have CHW connect with her again.    Ifrah Emerson RN  [facility]  [phone]

## 2025-07-17 ENCOUNTER — PATIENT OUTREACH (OUTPATIENT)
Dept: GERIATRIC MEDICINE | Facility: CLINIC | Age: 80
End: 2025-07-17
Payer: COMMERCIAL

## 2025-07-17 NOTE — PROGRESS NOTES
[facility] Care Coordination Contact       CHW  per care coordinator Ifrah Emerson followed up w/ member on housing, dental and eye appointment. Left message to return call.      LUIS Rodriguez  [facility]  [phone]

## 2025-07-21 ENCOUNTER — TELEPHONE (OUTPATIENT)
Dept: MULTI SPECIALTY CLINIC | Facility: CLINIC | Age: 80
End: 2025-07-21
Payer: COMMERCIAL

## 2025-07-22 ENCOUNTER — PATIENT OUTREACH (OUTPATIENT)
Dept: GERIATRIC MEDICINE | Facility: CLINIC | Age: 80
End: 2025-07-22
Payer: COMMERCIAL

## 2025-07-22 NOTE — TELEPHONE ENCOUNTER
Called Gemma again; states she had a hard time getting in touch with us, that the call center bounced her around many different times before getting a message to us. Writer apologized and offered to send card with writers info her mailing address. She agreed to to this. We discussed result note from kidney function labs. Discussed reason for kidney ultrasound. She verbalized understanding.  
Called Gemma monzon, left .  
[facility] Call Center    Phone Message    May a detailed message be left on voicemail: yes     Reason for Call: Other: Please call patient to go over lab from 7.15.25 and she is also questioning why Dr requested a Renal Ultrasound.       Action Taken: Other: [facility]    Travel Screening: Not Applicable     Date of Service:                                                                     
General

## 2025-07-22 NOTE — PROGRESS NOTES
[facility] Care Coordination Contact     CHW confirmed dental appointment noted below w/ Member and reminded her to schedule eye appointment and to follow up w/ PCP on hearing exam.    Date:8/7/25@11am  [facility]  [address], [place] [address]  Phone: [phone]    LUIS Rodriguez  [facility]  [phone]

## 2025-08-04 ENCOUNTER — VIRTUAL VISIT (OUTPATIENT)
Dept: PEDIATRICS | Facility: CLINIC | Age: 80
End: 2025-08-04
Payer: COMMERCIAL

## 2025-08-04 DIAGNOSIS — R41.3 MEMORY LOSS: ICD-10-CM

## 2025-08-04 DIAGNOSIS — F41.9 ANXIETY: Primary | ICD-10-CM

## 2025-08-04 DIAGNOSIS — E11.69 TYPE 2 DIABETES MELLITUS WITH OTHER SPECIFIED COMPLICATION, UNSPECIFIED WHETHER LONG TERM INSULIN USE (H): Chronic | ICD-10-CM

## 2025-08-04 DIAGNOSIS — E53.8 VITAMIN B12 DEFICIENCY: ICD-10-CM

## 2025-08-04 PROBLEM — R47.89 PROBLEM WITH VOICE PRODUCTION: Status: RESOLVED | Noted: 2023-12-08 | Resolved: 2025-08-04

## 2025-08-04 PROCEDURE — 98014 SYNCH AUDIO-ONLY EST MOD 30: CPT | Performed by: NURSE PRACTITIONER

## 2025-08-04 RX ORDER — BUSPIRONE HYDROCHLORIDE 5 MG/1
TABLET ORAL
Qty: 350 TABLET | Refills: 0 | Status: SHIPPED | OUTPATIENT
Start: 2025-08-04 | End: 2025-11-02

## 2025-08-04 RX ORDER — MAGNESIUM 200 MG
TABLET ORAL
Status: SHIPPED
Start: 2025-08-04

## 2025-08-05 ENCOUNTER — PATIENT OUTREACH (OUTPATIENT)
Dept: CARE COORDINATION | Facility: CLINIC | Age: 80
End: 2025-08-05
Payer: COMMERCIAL

## 2025-08-07 ENCOUNTER — PATIENT OUTREACH (OUTPATIENT)
Dept: CARE COORDINATION | Facility: CLINIC | Age: 80
End: 2025-08-07
Payer: COMMERCIAL

## 2025-08-14 ENCOUNTER — PATIENT OUTREACH (OUTPATIENT)
Dept: NEPHROLOGY | Facility: CLINIC | Age: 80
End: 2025-08-14
Payer: COMMERCIAL

## 2025-09-04 ENCOUNTER — TELEPHONE (OUTPATIENT)
Dept: PEDIATRICS | Facility: CLINIC | Age: 80
End: 2025-09-04
Payer: COMMERCIAL

## (undated) DEVICE — SYSTEM PSTN 29X20X1IN PNK PD LF NS 40580 (COI)

## (undated) DEVICE — SU DERMABOND ADVANCED .7ML DNX12

## (undated) DEVICE — PAD CHUX UNDERPAD 30X36" P3036C

## (undated) DEVICE — CUSTOM PACK ANTERIOR HIP SOP5BAHHED

## (undated) DEVICE — PADDING CAST 4IN WEBRIL STRL 2502

## (undated) DEVICE — PREP CHLORAPREP 26ML TINTED ORANGE  260815

## (undated) DEVICE — SPONGE LAP 4X18" X8415

## (undated) DEVICE — SURGICEL FIBRILLAR HEMOSTAT 1"X2" 1961

## (undated) DEVICE — SU VICRYL 3-0 SH 27" J316H

## (undated) DEVICE — SUCTION CANISTER MEDIVAC LINER 3000ML W/LID 65651-530

## (undated) DEVICE — Device

## (undated) DEVICE — DRAPE BREAST/CHEST 29420

## (undated) DEVICE — SOL NACL 0.9% IRRIG 1000ML BOTTLE 2F7124

## (undated) DEVICE — PREP DYNA-HEX 4% CHG SCRUB 4OZ BOTTLE MDS098710

## (undated) DEVICE — LINEN HALF SHEET 5512

## (undated) DEVICE — SU FIBERWIRE 2 38" T-8 NDL  AR-7206

## (undated) DEVICE — CATH TRAY FOLEY SURESTEP 16FR DRAIN BAG STATOCK A899916

## (undated) DEVICE — HOOD SURG T7 PLUS STRL LF DISP 0416-801-200

## (undated) DEVICE — SU MONOCRYL 4-0 PS-2 18" UND Y496G

## (undated) DEVICE — SU SILK 3-0 SH 30" K832H

## (undated) DEVICE — SU VICRYL 4-0 TIE 12X18" DYED J103T

## (undated) DEVICE — SUCTION MANIFOLD NEPTUNE 2 SYS 4 PORT 0702-020-000

## (undated) DEVICE — SU PROLENE 4-0 RB-1DA 36" 8557H

## (undated) DEVICE — GLOVE BIOGEL PI SZ 6.5 40865

## (undated) DEVICE — DRAIN JACKSON PRATT 15FR ROUND SIL LF JP-2229

## (undated) DEVICE — BLADE KNIFE SURG 10 371110

## (undated) DEVICE — SPONGE LAP 04X18" 1525

## (undated) DEVICE — SUTURE VICRYL+ 0 CT-1 18" DYED VIO VCP740D

## (undated) DEVICE — SOL NACL 0.9% INJ 250ML BAG 2B1322Q

## (undated) DEVICE — KIT PATIENT CARE HANA TABLE PROFX SUPINE 6855

## (undated) DEVICE — SU VICRYL 3-0 SH 27" UND J416H

## (undated) DEVICE — SUTURE VICRYL+ 1 27IN CT-1 UND VCP261H

## (undated) DEVICE — LINEN ORTHO ACL PACK 5447

## (undated) DEVICE — SU VICRYL 2-0 TIE 12X18" J905T

## (undated) DEVICE — ESU LIGASURE DISSECTOR EXACT LF2019

## (undated) DEVICE — GOWN IMPERVIOUS BREATHABLE 2XL/XLONG

## (undated) DEVICE — SU STRATAFIX PDS PLUS 1 CT-1 18" SXPP1A404

## (undated) DEVICE — ENDO SNARE HEXAGONAL ISNARE 2.5X4.0CM W/25GA NDL

## (undated) DEVICE — NEEDLE SPINAL DISP 18GA X 3.5" QUINCKE 333350

## (undated) DEVICE — SU VICRYL 2-0 CT-2 27" J333H

## (undated) DEVICE — ESU LIGASURE LAPAROSCOPIC BLUNT TIP SEALER 5MMX37CM LF1837

## (undated) DEVICE — MAT FLOOR WATERPROOF BACKSHEET FMBP30

## (undated) DEVICE — TUBING SUCTION 12"X1/4" N612

## (undated) DEVICE — STPL SKIN SUBCUTICULAR INSORB  2030

## (undated) DEVICE — SYR 10ML FINGER CONTROL W/O NDL 309695

## (undated) DEVICE — NDL SPINAL 20GA 3.5" 405182

## (undated) DEVICE — HOOD SURG T7PLUS PEEL AWAY FACE SHIELD STRL LF 0416-801-100

## (undated) DEVICE — SU SILK 2-0 FSL 18" 677G

## (undated) DEVICE — GLOVE PROTEXIS BLUE W/NEU-THERA 7.5  2D73EB75

## (undated) DEVICE — ELECTRODE PATIENT RETURN ADULT L10 FT 2 PLATE CORD 0855C

## (undated) DEVICE — PREP CHLORAPREP 26ML TINTED HI-LITE ORANGE 930815

## (undated) DEVICE — BAG CLEAR TRASH 1.3M 39X33" P4040C

## (undated) DEVICE — SUCTION IRR SYSTEM W/O TIP INTERPULSE HANDPIECE 0210-100-000

## (undated) DEVICE — SUTURE VICRYL+ 2-0 27IN CT-1 UND VCP259H

## (undated) DEVICE — SU PDS II 0 CT 36" Z358T

## (undated) DEVICE — DRAPE SHEET REV FOLD 3/4 9349

## (undated) DEVICE — GLOVE BIOGEL PI MICRO INDICATOR UNDERGLOVE SZ 6.5 48965

## (undated) DEVICE — BNDG ABDOMINAL BINDER 9X62-84" 79-89210

## (undated) DEVICE — LINEN TOWEL PACK X5 5464

## (undated) DEVICE — BONE CLEANING TIP INTERPULSE  0210-010-000

## (undated) DEVICE — DRAIN JACKSON PRATT RESERVOIR 100ML SU130-1305

## (undated) DEVICE — GLOVE UNDER INDICATOR PI SZ 8.5 LF 41685

## (undated) DEVICE — KIT ENDO TURNOVER/PROCEDURE W/CLEAN A SCOPE LINERS 103888

## (undated) DEVICE — GLOVE BIOGEL PI SZ 8.5 40885

## (undated) DEVICE — BLADE PRECISION 25X1.27X9 6725127090

## (undated) DEVICE — SOL NACL 0.9% INJ 1000ML BAG 2B1324X

## (undated) DEVICE — SU MONOCRYL 3-0 PS-2 27" Y427H

## (undated) DEVICE — VIAL DECANTER STERILE WHITE DYNJDEC06

## (undated) DEVICE — ESU ELEC BLADE 6" COATED E1450-6

## (undated) DEVICE — ESU GROUND PAD ADULT W/CORD E7507

## (undated) DEVICE — PACK MAJOR HEAD AND NECK RIDGES

## (undated) DEVICE — SU VICRYL+ 0 27IN CT-2 VLT VCP334H

## (undated) DEVICE — SU MONOCRYL 4-0 P-3 18" UND Y494G

## (undated) DEVICE — DRSG STERI STRIP 1X5" R1548

## (undated) DEVICE — ESU PENCIL W/SMOKE EVAC E2515HS

## (undated) DEVICE — DRSG GAUZE 4X4" 3033

## (undated) DEVICE — LINEN FULL SHEET 5511

## (undated) DEVICE — SOL WATER IRRIG 1000ML BOTTLE 2F7114

## (undated) DEVICE — DRAPE IOBAN ISOLATION VERTICAL 320X21CM 6617

## (undated) DEVICE — PACK LAP HYST RIDGES

## (undated) DEVICE — GLOVE BIOGEL PI ULTRATOUCH G SZ 7.5 42175

## (undated) DEVICE — ENDO ACCESS PLATFORM GELPOINT 9.5CM DIA V PATH C2A12

## (undated) DEVICE — SUTURE MONOCRYL 3-0 18 PS2 UND MCP497G

## (undated) DEVICE — DRAPE STERI TOWEL LG 1010

## (undated) DEVICE — SUCTION TIP YANKAUER W/O VENT K86

## (undated) DEVICE — PACK MAJOR SBA15MAFSI

## (undated) DEVICE — ADH LIQUID MASTISOL TOPICAL VIAL 2-3ML 0523-48

## (undated) DEVICE — ENDO BITE BLOCK ADULT OLYMPUS LATEX FREE MAJ-1632

## (undated) DEVICE — ESU CLEANER TIP 31142717

## (undated) DEVICE — SPONGE LAP 18X18" X8435

## (undated) DEVICE — NIM PROBE PRASS STIMULATOR PROTECTED TIP 8225101

## (undated) DEVICE — MANIFOLD NEPTUNE 4 PORT 700-20

## (undated) DEVICE — LINEN POUCH DBL 5427

## (undated) DEVICE — ESU CORD BIPOLAR GREEN 10-4000

## (undated) DEVICE — INSUFFLATION TUBING SET WITH GAS FILTER

## (undated) DEVICE — SU VICRYL 3-0 TIE 12X18" J904T

## (undated) DEVICE — DRSG ABDOMINAL 12X16"

## (undated) DEVICE — GLOVE PROTEXIS W/NEU-THERA 5.5  2D73TE55

## (undated) DEVICE — GLOVE BIOGEL PI MICRO INDICATOR UNDERGLOVE SZ 6.0 48960

## (undated) DEVICE — TUBING IRRIG CYSTO/BLADDER SET 81" LF 2C4040

## (undated) DEVICE — GLOVE BIOGEL INDICATOR 7.5 LF 41675

## (undated) DEVICE — SPONGE KITTNER 30-101

## (undated) DEVICE — SOL WATER IRRIG 3000ML BAG 2B7117

## (undated) RX ORDER — CEFAZOLIN SODIUM/WATER 2 G/20 ML
SYRINGE (ML) INTRAVENOUS
Status: DISPENSED
Start: 2023-11-14

## (undated) RX ORDER — PROPOFOL 10 MG/ML
INJECTION, EMULSION INTRAVENOUS
Status: DISPENSED
Start: 2018-04-05

## (undated) RX ORDER — KETOROLAC TROMETHAMINE 30 MG/ML
INJECTION, SOLUTION INTRAMUSCULAR; INTRAVENOUS
Status: DISPENSED
Start: 2024-11-05

## (undated) RX ORDER — PROPOFOL 10 MG/ML
INJECTION, EMULSION INTRAVENOUS
Status: DISPENSED
Start: 2024-12-31

## (undated) RX ORDER — FENTANYL CITRATE 50 UG/ML
INJECTION, SOLUTION INTRAMUSCULAR; INTRAVENOUS
Status: DISPENSED
Start: 2023-11-14

## (undated) RX ORDER — ONDANSETRON 2 MG/ML
INJECTION INTRAMUSCULAR; INTRAVENOUS
Status: DISPENSED
Start: 2024-11-05

## (undated) RX ORDER — ONDANSETRON 2 MG/ML
INJECTION INTRAMUSCULAR; INTRAVENOUS
Status: DISPENSED
Start: 2023-11-14

## (undated) RX ORDER — FENTANYL CITRATE 50 UG/ML
INJECTION, SOLUTION INTRAMUSCULAR; INTRAVENOUS
Status: DISPENSED
Start: 2024-11-05

## (undated) RX ORDER — DEXAMETHASONE SODIUM PHOSPHATE 4 MG/ML
INJECTION, SOLUTION INTRA-ARTICULAR; INTRALESIONAL; INTRAMUSCULAR; INTRAVENOUS; SOFT TISSUE
Status: DISPENSED
Start: 2023-11-14

## (undated) RX ORDER — EPINEPHRINE 1 MG/ML
INJECTION, SOLUTION INTRAMUSCULAR; SUBCUTANEOUS
Status: DISPENSED
Start: 2018-04-05

## (undated) RX ORDER — ONDANSETRON 2 MG/ML
INJECTION INTRAMUSCULAR; INTRAVENOUS
Status: DISPENSED
Start: 2018-04-05

## (undated) RX ORDER — LABETALOL HYDROCHLORIDE 5 MG/ML
INJECTION, SOLUTION INTRAVENOUS
Status: DISPENSED
Start: 2024-11-05

## (undated) RX ORDER — PROPOFOL 10 MG/ML
INJECTION, EMULSION INTRAVENOUS
Status: DISPENSED
Start: 2024-11-05

## (undated) RX ORDER — BUPIVACAINE HYDROCHLORIDE 2.5 MG/ML
INJECTION, SOLUTION EPIDURAL; INFILTRATION; INTRACAUDAL
Status: DISPENSED
Start: 2018-04-05

## (undated) RX ORDER — ACETAMINOPHEN 325 MG/1
TABLET ORAL
Status: DISPENSED
Start: 2024-11-05

## (undated) RX ORDER — GLYCOPYRROLATE 0.2 MG/ML
INJECTION INTRAMUSCULAR; INTRAVENOUS
Status: DISPENSED
Start: 2023-11-14

## (undated) RX ORDER — FENTANYL CITRATE 50 UG/ML
INJECTION, SOLUTION INTRAMUSCULAR; INTRAVENOUS
Status: DISPENSED
Start: 2018-04-05

## (undated) RX ORDER — FENTANYL CITRATE 50 UG/ML
INJECTION, SOLUTION INTRAMUSCULAR; INTRAVENOUS
Status: DISPENSED
Start: 2024-09-06

## (undated) RX ORDER — BUPIVACAINE HYDROCHLORIDE 5 MG/ML
INJECTION, SOLUTION EPIDURAL; INTRACAUDAL
Status: DISPENSED
Start: 2023-11-14

## (undated) RX ORDER — GLYCOPYRROLATE 0.2 MG/ML
INJECTION, SOLUTION INTRAMUSCULAR; INTRAVENOUS
Status: DISPENSED
Start: 2024-11-05

## (undated) RX ORDER — PROPOFOL 10 MG/ML
INJECTION, EMULSION INTRAVENOUS
Status: DISPENSED
Start: 2023-11-14

## (undated) RX ORDER — DEXAMETHASONE SODIUM PHOSPHATE 4 MG/ML
INJECTION, SOLUTION INTRA-ARTICULAR; INTRALESIONAL; INTRAMUSCULAR; INTRAVENOUS; SOFT TISSUE
Status: DISPENSED
Start: 2024-11-05

## (undated) RX ORDER — PHENYLEPHRINE HYDROCHLORIDE 10 MG/ML
INJECTION INTRAVENOUS
Status: DISPENSED
Start: 2024-12-31

## (undated) RX ORDER — FENTANYL CITRATE 50 UG/ML
INJECTION, SOLUTION INTRAMUSCULAR; INTRAVENOUS
Status: DISPENSED
Start: 2017-10-06

## (undated) RX ORDER — LIDOCAINE HYDROCHLORIDE 10 MG/ML
INJECTION, SOLUTION EPIDURAL; INFILTRATION; INTRACAUDAL; PERINEURAL
Status: DISPENSED
Start: 2024-11-05

## (undated) RX ORDER — LIDOCAINE HYDROCHLORIDE 20 MG/ML
INJECTION, SOLUTION EPIDURAL; INFILTRATION; INTRACAUDAL; PERINEURAL
Status: DISPENSED
Start: 2018-04-05

## (undated) RX ORDER — CEFAZOLIN SODIUM/WATER 2 G/20 ML
SYRINGE (ML) INTRAVENOUS
Status: DISPENSED
Start: 2024-11-05

## (undated) RX ORDER — LIDOCAINE HYDROCHLORIDE 10 MG/ML
INJECTION, SOLUTION EPIDURAL; INFILTRATION; INTRACAUDAL; PERINEURAL
Status: DISPENSED
Start: 2024-12-31

## (undated) RX ORDER — EPHEDRINE SULFATE 50 MG/ML
INJECTION, SOLUTION INTRAMUSCULAR; INTRAVENOUS; SUBCUTANEOUS
Status: DISPENSED
Start: 2024-11-05

## (undated) RX ORDER — LIDOCAINE HYDROCHLORIDE 10 MG/ML
INJECTION, SOLUTION EPIDURAL; INFILTRATION; INTRACAUDAL; PERINEURAL
Status: DISPENSED
Start: 2023-11-14

## (undated) RX ORDER — OXYCODONE HYDROCHLORIDE 5 MG/1
TABLET ORAL
Status: DISPENSED
Start: 2018-04-05

## (undated) RX ORDER — VASOPRESSIN 20 U/ML
INJECTION PARENTERAL
Status: DISPENSED
Start: 2024-11-05

## (undated) RX ORDER — DEXAMETHASONE SODIUM PHOSPHATE 4 MG/ML
INJECTION, SOLUTION INTRA-ARTICULAR; INTRALESIONAL; INTRAMUSCULAR; INTRAVENOUS; SOFT TISSUE
Status: DISPENSED
Start: 2024-12-31

## (undated) RX ORDER — FENTANYL CITRATE 0.05 MG/ML
INJECTION, SOLUTION INTRAMUSCULAR; INTRAVENOUS
Status: DISPENSED
Start: 2022-03-07

## (undated) RX ORDER — BUPIVACAINE HYDROCHLORIDE AND EPINEPHRINE 5; 5 MG/ML; UG/ML
INJECTION, SOLUTION EPIDURAL; INTRACAUDAL; PERINEURAL
Status: DISPENSED
Start: 2024-11-05

## (undated) RX ORDER — CEFAZOLIN SODIUM 2 G/100ML
INJECTION, SOLUTION INTRAVENOUS
Status: DISPENSED
Start: 2018-04-05

## (undated) RX ORDER — ONDANSETRON 2 MG/ML
INJECTION INTRAMUSCULAR; INTRAVENOUS
Status: DISPENSED
Start: 2024-12-31

## (undated) RX ORDER — DEXAMETHASONE SODIUM PHOSPHATE 4 MG/ML
INJECTION, SOLUTION INTRA-ARTICULAR; INTRALESIONAL; INTRAMUSCULAR; INTRAVENOUS; SOFT TISSUE
Status: DISPENSED
Start: 2018-04-05